# Patient Record
Sex: MALE | Race: BLACK OR AFRICAN AMERICAN | NOT HISPANIC OR LATINO
[De-identification: names, ages, dates, MRNs, and addresses within clinical notes are randomized per-mention and may not be internally consistent; named-entity substitution may affect disease eponyms.]

---

## 2014-05-20 RX ORDER — LISINOPRIL 2.5 MG/1
1 TABLET ORAL
Qty: 0 | Refills: 0 | COMMUNITY
Start: 2014-05-20

## 2017-02-15 ENCOUNTER — APPOINTMENT (OUTPATIENT)
Dept: INTERNAL MEDICINE | Facility: CLINIC | Age: 62
End: 2017-02-15

## 2017-02-28 ENCOUNTER — MEDICATION RENEWAL (OUTPATIENT)
Age: 62
End: 2017-02-28

## 2017-04-12 ENCOUNTER — APPOINTMENT (OUTPATIENT)
Dept: INTERNAL MEDICINE | Facility: CLINIC | Age: 62
End: 2017-04-12

## 2017-04-12 DIAGNOSIS — M79.673 PAIN IN UNSPECIFIED FOOT: ICD-10-CM

## 2017-04-12 DIAGNOSIS — Z78.9 OTHER SPECIFIED HEALTH STATUS: ICD-10-CM

## 2017-04-13 ENCOUNTER — OTHER (OUTPATIENT)
Age: 62
End: 2017-04-13

## 2017-04-13 VITALS
BODY MASS INDEX: 30.54 KG/M2 | HEART RATE: 60 BPM | DIASTOLIC BLOOD PRESSURE: 60 MMHG | WEIGHT: 219 LBS | RESPIRATION RATE: 14 BRPM | SYSTOLIC BLOOD PRESSURE: 118 MMHG

## 2017-04-13 PROBLEM — M79.673 FOOT PAIN: Status: RESOLVED | Noted: 2017-02-28 | Resolved: 2017-04-13

## 2017-04-13 LAB
25(OH)D3 SERPL-MCNC: 25.1 NG/ML
ALBUMIN SERPL ELPH-MCNC: 3.9 G/DL
ALP BLD-CCNC: 82 U/L
ALT SERPL-CCNC: 12 U/L
ANION GAP SERPL CALC-SCNC: 18 MMOL/L
AST SERPL-CCNC: 15 U/L
BILIRUB SERPL-MCNC: 0.5 MG/DL
BUN SERPL-MCNC: 22 MG/DL
CALCIUM SERPL-MCNC: 9.2 MG/DL
CHLORIDE SERPL-SCNC: 110 MMOL/L
CHOLEST SERPL-MCNC: 132 MG/DL
CHOLEST/HDLC SERPL: 2.2 RATIO
CO2 SERPL-SCNC: 19 MMOL/L
CREAT SERPL-MCNC: 1.84 MG/DL
GLUCOSE SERPL-MCNC: 88 MG/DL
HBA1C MFR BLD HPLC: 6.5 %
HDLC SERPL-MCNC: 61 MG/DL
LDLC SERPL CALC-MCNC: 51 MG/DL
POTASSIUM SERPL-SCNC: 3.6 MMOL/L
PROT SERPL-MCNC: 6.8 G/DL
PSA FREE FLD-MCNC: 25.6 %
PSA FREE SERPL-MCNC: 2.44 NG/ML
PSA SERPL-MCNC: 9.52 NG/ML
SODIUM SERPL-SCNC: 147 MMOL/L
TRIGL SERPL-MCNC: 100 MG/DL
URATE SERPL-MCNC: 11.5 MG/DL

## 2017-05-03 ENCOUNTER — RX RENEWAL (OUTPATIENT)
Age: 62
End: 2017-05-03

## 2017-06-24 ENCOUNTER — RX RENEWAL (OUTPATIENT)
Age: 62
End: 2017-06-24

## 2017-07-12 ENCOUNTER — APPOINTMENT (OUTPATIENT)
Dept: INTERNAL MEDICINE | Facility: CLINIC | Age: 62
End: 2017-07-12

## 2017-07-13 ENCOUNTER — APPOINTMENT (OUTPATIENT)
Dept: UROLOGY | Facility: CLINIC | Age: 62
End: 2017-07-13

## 2017-07-13 VITALS
HEIGHT: 70 IN | HEART RATE: 84 BPM | SYSTOLIC BLOOD PRESSURE: 133 MMHG | WEIGHT: 222 LBS | RESPIRATION RATE: 16 BRPM | BODY MASS INDEX: 31.78 KG/M2 | DIASTOLIC BLOOD PRESSURE: 90 MMHG

## 2017-07-15 LAB — BACTERIA UR CULT: NORMAL

## 2017-07-24 ENCOUNTER — MEDICATION RENEWAL (OUTPATIENT)
Age: 62
End: 2017-07-24

## 2017-08-10 ENCOUNTER — APPOINTMENT (OUTPATIENT)
Dept: UROLOGY | Facility: CLINIC | Age: 62
End: 2017-08-10
Payer: COMMERCIAL

## 2017-08-10 PROCEDURE — 99213 OFFICE O/P EST LOW 20 MIN: CPT

## 2017-08-16 LAB
ANION GAP SERPL CALC-SCNC: 17 MMOL/L
BACTERIA UR CULT: NORMAL
BUN SERPL-MCNC: 32 MG/DL
CALCIUM SERPL-MCNC: 9.8 MG/DL
CHLORIDE SERPL-SCNC: 104 MMOL/L
CO2 SERPL-SCNC: 22 MMOL/L
CREAT SERPL-MCNC: 1.93 MG/DL
GLUCOSE SERPL-MCNC: 108 MG/DL
POTASSIUM SERPL-SCNC: 5.1 MMOL/L
PSA FREE FLD-MCNC: 25.1
PSA FREE SERPL-MCNC: 2.63 NG/ML
PSA SERPL-MCNC: 10.47 NG/ML
SODIUM SERPL-SCNC: 143 MMOL/L

## 2017-08-22 ENCOUNTER — APPOINTMENT (OUTPATIENT)
Dept: INTERNAL MEDICINE | Facility: CLINIC | Age: 62
End: 2017-08-22

## 2017-08-30 ENCOUNTER — APPOINTMENT (OUTPATIENT)
Dept: INTERNAL MEDICINE | Facility: CLINIC | Age: 62
End: 2017-08-30
Payer: COMMERCIAL

## 2017-08-30 DIAGNOSIS — M25.511 PAIN IN RIGHT SHOULDER: ICD-10-CM

## 2017-08-30 PROCEDURE — 99213 OFFICE O/P EST LOW 20 MIN: CPT

## 2017-08-31 LAB — URATE SERPL-MCNC: 11.2 MG/DL

## 2017-09-19 ENCOUNTER — MESSAGE (OUTPATIENT)
Age: 62
End: 2017-09-19

## 2017-09-21 ENCOUNTER — APPOINTMENT (OUTPATIENT)
Dept: UROLOGY | Facility: CLINIC | Age: 62
End: 2017-09-21
Payer: COMMERCIAL

## 2017-09-21 ENCOUNTER — OUTPATIENT (OUTPATIENT)
Dept: OUTPATIENT SERVICES | Facility: HOSPITAL | Age: 62
LOS: 1 days | End: 2017-09-21
Payer: COMMERCIAL

## 2017-09-21 VITALS
DIASTOLIC BLOOD PRESSURE: 72 MMHG | HEART RATE: 54 BPM | SYSTOLIC BLOOD PRESSURE: 132 MMHG | TEMPERATURE: 98.3 F | RESPIRATION RATE: 16 BRPM

## 2017-09-21 DIAGNOSIS — R35.0 FREQUENCY OF MICTURITION: ICD-10-CM

## 2017-09-21 DIAGNOSIS — Z95.9 PRESENCE OF CARDIAC AND VASCULAR IMPLANT AND GRAFT, UNSPECIFIED: Chronic | ICD-10-CM

## 2017-09-21 PROCEDURE — 55700: CPT

## 2017-09-21 PROCEDURE — 76942 ECHO GUIDE FOR BIOPSY: CPT | Mod: 26,59

## 2017-09-21 PROCEDURE — 76872 US TRANSRECTAL: CPT | Mod: 26

## 2017-09-21 PROCEDURE — 76872 US TRANSRECTAL: CPT

## 2017-09-21 PROCEDURE — 76942 ECHO GUIDE FOR BIOPSY: CPT | Mod: 59

## 2017-09-23 LAB — CORE LAB BIOPSY: NORMAL

## 2017-09-26 DIAGNOSIS — R97.20 ELEVATED PROSTATE SPECIFIC ANTIGEN [PSA]: ICD-10-CM

## 2017-10-26 ENCOUNTER — MEDICATION RENEWAL (OUTPATIENT)
Age: 62
End: 2017-10-26

## 2017-11-01 ENCOUNTER — APPOINTMENT (OUTPATIENT)
Dept: INTERNAL MEDICINE | Facility: CLINIC | Age: 62
End: 2017-11-01
Payer: COMMERCIAL

## 2017-11-01 PROCEDURE — 99213 OFFICE O/P EST LOW 20 MIN: CPT

## 2017-11-10 ENCOUNTER — MED ADMIN CHARGE (OUTPATIENT)
Age: 62
End: 2017-11-10

## 2017-12-08 ENCOUNTER — APPOINTMENT (OUTPATIENT)
Dept: MRI IMAGING | Facility: CLINIC | Age: 62
End: 2017-12-08
Payer: COMMERCIAL

## 2017-12-08 ENCOUNTER — OUTPATIENT (OUTPATIENT)
Dept: OUTPATIENT SERVICES | Facility: HOSPITAL | Age: 62
LOS: 1 days | End: 2017-12-08
Payer: COMMERCIAL

## 2017-12-08 DIAGNOSIS — Z95.9 PRESENCE OF CARDIAC AND VASCULAR IMPLANT AND GRAFT, UNSPECIFIED: Chronic | ICD-10-CM

## 2017-12-08 DIAGNOSIS — Z00.8 ENCOUNTER FOR OTHER GENERAL EXAMINATION: ICD-10-CM

## 2017-12-08 PROCEDURE — 73221 MRI JOINT UPR EXTREM W/O DYE: CPT | Mod: 26,RT

## 2017-12-08 PROCEDURE — 73221 MRI JOINT UPR EXTREM W/O DYE: CPT

## 2017-12-10 ENCOUNTER — RX RENEWAL (OUTPATIENT)
Age: 62
End: 2017-12-10

## 2018-01-05 ENCOUNTER — RX RENEWAL (OUTPATIENT)
Age: 63
End: 2018-01-05

## 2018-01-30 ENCOUNTER — MEDICATION RENEWAL (OUTPATIENT)
Age: 63
End: 2018-01-30

## 2018-01-31 ENCOUNTER — NON-APPOINTMENT (OUTPATIENT)
Age: 63
End: 2018-01-31

## 2018-01-31 ENCOUNTER — APPOINTMENT (OUTPATIENT)
Dept: ELECTROPHYSIOLOGY | Facility: CLINIC | Age: 63
End: 2018-01-31
Payer: COMMERCIAL

## 2018-01-31 VITALS
WEIGHT: 217 LBS | SYSTOLIC BLOOD PRESSURE: 137 MMHG | BODY MASS INDEX: 31.07 KG/M2 | DIASTOLIC BLOOD PRESSURE: 89 MMHG | HEART RATE: 64 BPM | HEIGHT: 70 IN | OXYGEN SATURATION: 100 %

## 2018-01-31 PROCEDURE — 99215 OFFICE O/P EST HI 40 MIN: CPT

## 2018-01-31 PROCEDURE — 93000 ELECTROCARDIOGRAM COMPLETE: CPT

## 2018-01-31 RX ORDER — APIXABAN 5 MG/1
5 TABLET, FILM COATED ORAL
Qty: 180 | Refills: 0 | Status: COMPLETED | COMMUNITY
Start: 2017-11-10

## 2018-01-31 RX ORDER — APIXABAN 2.5 MG/1
2.5 TABLET, FILM COATED ORAL
Qty: 180 | Refills: 3 | Status: DISCONTINUED | COMMUNITY
Start: 2018-01-31 | End: 2018-01-31

## 2018-01-31 RX ORDER — NAPROXEN 500 MG/1
500 TABLET ORAL
Qty: 60 | Refills: 1 | Status: DISCONTINUED | COMMUNITY
Start: 2017-08-30 | End: 2018-01-31

## 2018-01-31 RX ORDER — LISINOPRIL 10 MG/1
10 TABLET ORAL
Qty: 30 | Refills: 0 | Status: COMPLETED | COMMUNITY
Start: 2017-12-26

## 2018-01-31 RX ORDER — ALLOPURINOL 100 MG/1
100 TABLET ORAL DAILY
Qty: 90 | Refills: 3 | Status: DISCONTINUED | COMMUNITY
Start: 2017-08-31 | End: 2018-01-31

## 2018-01-31 RX ORDER — ALLOPURINOL 100 MG/1
100 TABLET ORAL DAILY
Qty: 90 | Refills: 3 | Status: DISCONTINUED | COMMUNITY
Start: 2017-07-12 | End: 2018-01-31

## 2018-01-31 RX ORDER — CEFDINIR 300 MG/1
300 CAPSULE ORAL
Qty: 14 | Refills: 0 | Status: COMPLETED | COMMUNITY
Start: 2017-03-07

## 2018-02-13 ENCOUNTER — OUTPATIENT (OUTPATIENT)
Dept: OUTPATIENT SERVICES | Facility: HOSPITAL | Age: 63
LOS: 1 days | End: 2018-02-13
Payer: COMMERCIAL

## 2018-02-13 ENCOUNTER — APPOINTMENT (OUTPATIENT)
Dept: CV DIAGNOSITCS | Facility: HOSPITAL | Age: 63
End: 2018-02-13

## 2018-02-13 VITALS
DIASTOLIC BLOOD PRESSURE: 96 MMHG | WEIGHT: 216.93 LBS | TEMPERATURE: 98 F | HEART RATE: 58 BPM | RESPIRATION RATE: 18 BRPM | SYSTOLIC BLOOD PRESSURE: 154 MMHG | OXYGEN SATURATION: 100 % | HEIGHT: 71 IN

## 2018-02-13 DIAGNOSIS — Z95.9 PRESENCE OF CARDIAC AND VASCULAR IMPLANT AND GRAFT, UNSPECIFIED: Chronic | ICD-10-CM

## 2018-02-13 DIAGNOSIS — I48.91 UNSPECIFIED ATRIAL FIBRILLATION: ICD-10-CM

## 2018-02-13 LAB
ALBUMIN SERPL ELPH-MCNC: 3.7 G/DL — SIGNIFICANT CHANGE UP (ref 3.3–5)
ALP SERPL-CCNC: 78 U/L — SIGNIFICANT CHANGE UP (ref 40–120)
ALT FLD-CCNC: 10 U/L RC — SIGNIFICANT CHANGE UP (ref 10–45)
ANION GAP SERPL CALC-SCNC: 13 MMOL/L — SIGNIFICANT CHANGE UP (ref 5–17)
APTT BLD: 33.6 SEC — SIGNIFICANT CHANGE UP (ref 27.5–37.4)
AST SERPL-CCNC: 14 U/L — SIGNIFICANT CHANGE UP (ref 10–40)
BILIRUB SERPL-MCNC: 0.8 MG/DL — SIGNIFICANT CHANGE UP (ref 0.2–1.2)
BUN SERPL-MCNC: 22 MG/DL — SIGNIFICANT CHANGE UP (ref 7–23)
CALCIUM SERPL-MCNC: 9.5 MG/DL — SIGNIFICANT CHANGE UP (ref 8.4–10.5)
CHLORIDE SERPL-SCNC: 103 MMOL/L — SIGNIFICANT CHANGE UP (ref 96–108)
CO2 SERPL-SCNC: 28 MMOL/L — SIGNIFICANT CHANGE UP (ref 22–31)
CREAT SERPL-MCNC: 1.54 MG/DL — HIGH (ref 0.5–1.3)
GLUCOSE SERPL-MCNC: 76 MG/DL — SIGNIFICANT CHANGE UP (ref 70–99)
HCT VFR BLD CALC: 38.9 % — LOW (ref 39–50)
HGB BLD-MCNC: 12.6 G/DL — LOW (ref 13–17)
INR BLD: 1.44 RATIO — HIGH (ref 0.88–1.16)
MCHC RBC-ENTMCNC: 20.7 PG — LOW (ref 27–34)
MCHC RBC-ENTMCNC: 32.3 GM/DL — SIGNIFICANT CHANGE UP (ref 32–36)
MCV RBC AUTO: 64.2 FL — LOW (ref 80–100)
PLATELET # BLD AUTO: 243 K/UL — SIGNIFICANT CHANGE UP (ref 150–400)
POTASSIUM SERPL-MCNC: 3.8 MMOL/L — SIGNIFICANT CHANGE UP (ref 3.5–5.3)
POTASSIUM SERPL-SCNC: 3.8 MMOL/L — SIGNIFICANT CHANGE UP (ref 3.5–5.3)
PROT SERPL-MCNC: 7 G/DL — SIGNIFICANT CHANGE UP (ref 6–8.3)
PROTHROM AB SERPL-ACNC: 15.7 SEC — HIGH (ref 9.8–12.7)
RBC # BLD: 6.06 M/UL — HIGH (ref 4.2–5.8)
RBC # FLD: 15.9 % — HIGH (ref 10.3–14.5)
SODIUM SERPL-SCNC: 144 MMOL/L — SIGNIFICANT CHANGE UP (ref 135–145)
WBC # BLD: 14.7 K/UL — HIGH (ref 3.8–10.5)
WBC # FLD AUTO: 14.7 K/UL — HIGH (ref 3.8–10.5)

## 2018-02-13 PROCEDURE — 85730 THROMBOPLASTIN TIME PARTIAL: CPT

## 2018-02-13 PROCEDURE — 93010 ELECTROCARDIOGRAM REPORT: CPT | Mod: 76

## 2018-02-13 PROCEDURE — 93005 ELECTROCARDIOGRAM TRACING: CPT

## 2018-02-13 PROCEDURE — 80053 COMPREHEN METABOLIC PANEL: CPT

## 2018-02-13 PROCEDURE — 93306 TTE W/DOPPLER COMPLETE: CPT

## 2018-02-13 PROCEDURE — 85027 COMPLETE CBC AUTOMATED: CPT

## 2018-02-13 PROCEDURE — 93312 ECHO TRANSESOPHAGEAL: CPT | Mod: 26

## 2018-02-13 PROCEDURE — 93306 TTE W/DOPPLER COMPLETE: CPT | Mod: 26

## 2018-02-13 PROCEDURE — 93312 ECHO TRANSESOPHAGEAL: CPT

## 2018-02-13 PROCEDURE — 85610 PROTHROMBIN TIME: CPT

## 2018-02-13 NOTE — H&P CARDIOLOGY - PSH
History of Arthroscopy- ankle  left ankle 2003 s/p "something fell on it"  S/P angioplasty with stent    S/P Arthroscopic Surgery of Left Knee  2001 injury- hit knee on desk

## 2018-02-13 NOTE — H&P CARDIOLOGY - PMH
Arthritis  L arm and hands  Atrial fibrillation    CAD (coronary artery disease)  1 stent  Childhood Asthma    CKD (chronic kidney disease)    Gout    History of cardioversion    HLD (hyperlipidemia)    HTN - Hypertension    Inguinal Hernia  left  Keloid  multiple on chest and arms

## 2018-02-13 NOTE — H&P CARDIOLOGY - HISTORY OF PRESENT ILLNESS
60 year old male with PMH of HTN, HLD, CAD with prior stent, A Fib on Eliquis with Hx of cardioversion, stage 3 CKD, childhood asthma presents today for A-fib ablation. 62 year old male with PMH of HTN, HLD, CAD with prior stent in 2014, A Fib on Eliquis with Hx of cardioversion, stage 3 CKD, childhood asthma, s/p  A-fib ablation, LV dysfunction with repeat echo 8/2014 showing EF 50%.   Pt presents today for NICOLETTE/DCCV. 62 year old male with PMH of HTN, HLD, CAD with prior stent in 2014, A Fib on Eliquis with Hx of cardioversion, stage 3 CKD, childhood asthma, s/p  A-fib ablation, LV dysfunction with repeat echo 8/2014 showing EF 50%. Pt went into Afib/Aflutter last month.    Pt presents today for NCIOLETTE/DCCV.

## 2018-02-21 ENCOUNTER — TRANSCRIPTION ENCOUNTER (OUTPATIENT)
Age: 63
End: 2018-02-21

## 2018-02-21 ENCOUNTER — INPATIENT (INPATIENT)
Facility: HOSPITAL | Age: 63
LOS: 0 days | Discharge: HOME | DRG: 247 | End: 2018-02-22
Attending: INTERNAL MEDICINE | Admitting: INTERNAL MEDICINE
Payer: COMMERCIAL

## 2018-02-21 VITALS
HEART RATE: 93 BPM | SYSTOLIC BLOOD PRESSURE: 141 MMHG | OXYGEN SATURATION: 99 % | RESPIRATION RATE: 20 BRPM | DIASTOLIC BLOOD PRESSURE: 87 MMHG | TEMPERATURE: 98 F

## 2018-02-21 DIAGNOSIS — R07.9 CHEST PAIN, UNSPECIFIED: ICD-10-CM

## 2018-02-21 DIAGNOSIS — Z95.9 PRESENCE OF CARDIAC AND VASCULAR IMPLANT AND GRAFT, UNSPECIFIED: Chronic | ICD-10-CM

## 2018-02-21 LAB
ALBUMIN SERPL ELPH-MCNC: 3.7 G/DL — SIGNIFICANT CHANGE UP (ref 3.3–5)
ALP SERPL-CCNC: 73 U/L — SIGNIFICANT CHANGE UP (ref 40–120)
ALT FLD-CCNC: 8 U/L RC — LOW (ref 10–45)
ANION GAP SERPL CALC-SCNC: 14 MMOL/L — SIGNIFICANT CHANGE UP (ref 5–17)
APTT BLD: 35.2 SEC — SIGNIFICANT CHANGE UP (ref 27.5–37.4)
AST SERPL-CCNC: 14 U/L — SIGNIFICANT CHANGE UP (ref 10–40)
BASOPHILS # BLD AUTO: 0.1 K/UL — SIGNIFICANT CHANGE UP (ref 0–0.2)
BASOPHILS NFR BLD AUTO: 0.4 % — SIGNIFICANT CHANGE UP (ref 0–2)
BILIRUB SERPL-MCNC: 1 MG/DL — SIGNIFICANT CHANGE UP (ref 0.2–1.2)
BLD GP AB SCN SERPL QL: NEGATIVE — SIGNIFICANT CHANGE UP
BUN SERPL-MCNC: 24 MG/DL — HIGH (ref 7–23)
CALCIUM SERPL-MCNC: 9.8 MG/DL — SIGNIFICANT CHANGE UP (ref 8.4–10.5)
CHLORIDE SERPL-SCNC: 102 MMOL/L — SIGNIFICANT CHANGE UP (ref 96–108)
CK MB CFR SERPL CALC: 1.1 NG/ML — SIGNIFICANT CHANGE UP (ref 0–6.7)
CK SERPL-CCNC: 87 U/L — SIGNIFICANT CHANGE UP (ref 30–200)
CO2 SERPL-SCNC: 25 MMOL/L — SIGNIFICANT CHANGE UP (ref 22–31)
CREAT SERPL-MCNC: 1.66 MG/DL — HIGH (ref 0.5–1.3)
EOSINOPHIL # BLD AUTO: 0 K/UL — SIGNIFICANT CHANGE UP (ref 0–0.5)
EOSINOPHIL NFR BLD AUTO: 0.3 % — SIGNIFICANT CHANGE UP (ref 0–6)
GAS PNL BLDV: SIGNIFICANT CHANGE UP
GLUCOSE SERPL-MCNC: 114 MG/DL — HIGH (ref 70–99)
HCT VFR BLD CALC: 39 % — SIGNIFICANT CHANGE UP (ref 39–50)
HGB BLD-MCNC: 13 G/DL — SIGNIFICANT CHANGE UP (ref 13–17)
INR BLD: 1.38 RATIO — HIGH (ref 0.88–1.16)
LYMPHOCYTES # BLD AUTO: 14.1 % — SIGNIFICANT CHANGE UP (ref 13–44)
LYMPHOCYTES # BLD AUTO: 2.1 K/UL — SIGNIFICANT CHANGE UP (ref 1–3.3)
MCHC RBC-ENTMCNC: 21 PG — LOW (ref 27–34)
MCHC RBC-ENTMCNC: 33.3 GM/DL — SIGNIFICANT CHANGE UP (ref 32–36)
MCV RBC AUTO: 63.2 FL — LOW (ref 80–100)
MONOCYTES # BLD AUTO: 1.4 K/UL — HIGH (ref 0–0.9)
MONOCYTES NFR BLD AUTO: 9.1 % — SIGNIFICANT CHANGE UP (ref 2–14)
NEUTROPHILS # BLD AUTO: 11.5 K/UL — HIGH (ref 1.8–7.4)
NEUTROPHILS NFR BLD AUTO: 76 % — SIGNIFICANT CHANGE UP (ref 43–77)
PLATELET # BLD AUTO: 225 K/UL — SIGNIFICANT CHANGE UP (ref 150–400)
POTASSIUM SERPL-MCNC: 4.3 MMOL/L — SIGNIFICANT CHANGE UP (ref 3.5–5.3)
POTASSIUM SERPL-SCNC: 4.3 MMOL/L — SIGNIFICANT CHANGE UP (ref 3.5–5.3)
PROT SERPL-MCNC: 8.1 G/DL — SIGNIFICANT CHANGE UP (ref 6–8.3)
PROTHROM AB SERPL-ACNC: 15.1 SEC — HIGH (ref 9.8–12.7)
RBC # BLD: 6.18 M/UL — HIGH (ref 4.2–5.8)
RBC # FLD: 15.9 % — HIGH (ref 10.3–14.5)
RH IG SCN BLD-IMP: POSITIVE — SIGNIFICANT CHANGE UP
SODIUM SERPL-SCNC: 141 MMOL/L — SIGNIFICANT CHANGE UP (ref 135–145)
TROPONIN T SERPL-MCNC: 0.01 NG/ML — SIGNIFICANT CHANGE UP (ref 0–0.06)
WBC # BLD: 15.2 K/UL — HIGH (ref 3.8–10.5)
WBC # FLD AUTO: 15.2 K/UL — HIGH (ref 3.8–10.5)

## 2018-02-21 PROCEDURE — 93010 ELECTROCARDIOGRAM REPORT: CPT

## 2018-02-21 PROCEDURE — 71046 X-RAY EXAM CHEST 2 VIEWS: CPT | Mod: 26

## 2018-02-21 PROCEDURE — 99285 EMERGENCY DEPT VISIT HI MDM: CPT | Mod: 25

## 2018-02-21 RX ORDER — ASPIRIN/CALCIUM CARB/MAGNESIUM 324 MG
81 TABLET ORAL DAILY
Qty: 0 | Refills: 0 | Status: DISCONTINUED | OUTPATIENT
Start: 2018-02-21 | End: 2018-02-22

## 2018-02-21 RX ORDER — PANTOPRAZOLE SODIUM 20 MG/1
40 TABLET, DELAYED RELEASE ORAL
Qty: 0 | Refills: 0 | Status: DISCONTINUED | OUTPATIENT
Start: 2018-02-21 | End: 2018-02-22

## 2018-02-21 RX ORDER — CLOPIDOGREL BISULFATE 75 MG/1
1 TABLET, FILM COATED ORAL
Qty: 30 | Refills: 11 | OUTPATIENT
Start: 2018-02-21 | End: 2019-02-15

## 2018-02-21 RX ORDER — APIXABAN 2.5 MG/1
5 TABLET, FILM COATED ORAL EVERY 12 HOURS
Qty: 0 | Refills: 0 | Status: DISCONTINUED | OUTPATIENT
Start: 2018-02-21 | End: 2018-02-22

## 2018-02-21 RX ORDER — CLOPIDOGREL BISULFATE 75 MG/1
75 TABLET, FILM COATED ORAL DAILY
Qty: 0 | Refills: 0 | Status: DISCONTINUED | OUTPATIENT
Start: 2018-02-22 | End: 2018-02-22

## 2018-02-21 RX ORDER — LISINOPRIL 2.5 MG/1
2.5 TABLET ORAL DAILY
Qty: 0 | Refills: 0 | Status: DISCONTINUED | OUTPATIENT
Start: 2018-02-21 | End: 2018-02-22

## 2018-02-21 RX ORDER — AMIODARONE HYDROCHLORIDE 400 MG/1
200 TABLET ORAL
Qty: 0 | Refills: 0 | Status: DISCONTINUED | OUTPATIENT
Start: 2018-02-21 | End: 2018-02-22

## 2018-02-21 RX ORDER — SIMVASTATIN 20 MG/1
40 TABLET, FILM COATED ORAL AT BEDTIME
Qty: 0 | Refills: 0 | Status: DISCONTINUED | OUTPATIENT
Start: 2018-02-21 | End: 2018-02-22

## 2018-02-21 RX ORDER — HYDROCHLOROTHIAZIDE 25 MG
25 TABLET ORAL DAILY
Qty: 0 | Refills: 0 | Status: DISCONTINUED | OUTPATIENT
Start: 2018-02-21 | End: 2018-02-22

## 2018-02-21 RX ORDER — AMIODARONE HYDROCHLORIDE 400 MG/1
1 TABLET ORAL
Qty: 60 | Refills: 2 | OUTPATIENT
Start: 2018-02-21 | End: 2018-05-21

## 2018-02-21 RX ORDER — ASPIRIN/CALCIUM CARB/MAGNESIUM 324 MG
1 TABLET ORAL
Qty: 30 | Refills: 11 | OUTPATIENT
Start: 2018-02-21 | End: 2019-02-15

## 2018-02-21 RX ORDER — METOPROLOL TARTRATE 50 MG
25 TABLET ORAL DAILY
Qty: 0 | Refills: 0 | Status: DISCONTINUED | OUTPATIENT
Start: 2018-02-21 | End: 2018-02-22

## 2018-02-21 RX ADMIN — APIXABAN 5 MILLIGRAM(S): 2.5 TABLET, FILM COATED ORAL at 20:25

## 2018-02-21 RX ADMIN — AMIODARONE HYDROCHLORIDE 200 MILLIGRAM(S): 400 TABLET ORAL at 16:59

## 2018-02-21 RX ADMIN — SIMVASTATIN 40 MILLIGRAM(S): 20 TABLET, FILM COATED ORAL at 20:25

## 2018-02-21 NOTE — ED ADULT NURSE NOTE - OBJECTIVE STATEMENT
Patient presented to ED ambulatory w/daughter c/o palpitations since Saturday on and off, patient also c/o SOB on and off. Patient denies pain. Patient O2 in ED  99/100%. No respiratory distress or WOB noted.

## 2018-02-21 NOTE — DISCHARGE NOTE ADULT - MEDICATION SUMMARY - MEDICATIONS TO TAKE
I will START or STAY ON the medications listed below when I get home from the hospital:    aspirin 81 mg oral delayed release tablet  -- 1 tab(s) by mouth once a day  -- Indication: For To keep stent open     lisinopril 2.5 mg oral tablet  -- 1 tab(s) by mouth once a day  -- Indication: For High blood pressure     amiodarone 200 mg oral tablet  -- 1 tab(s) by mouth 2 times a day.   ******TAKE 2 TABS DAILY UNTIL 02/25/2018 EVENING.   *****THEN 1 TAB DAILY   -- Indication: For Heart rhythm control     Eliquis 2.5 mg oral tablet  -- 1 tab(s) by mouth 2 times a day- LAST DOSE monday morning   -- Indication: For Afib    simvastatin 40 mg oral tablet  -- 1 tab(s) by mouth once a day (at bedtime)  -- Indication: For High cholesterol     clopidogrel 75 mg oral tablet  -- 1 tab(s) by mouth once a day  -- Indication: For To keep stent open     metoprolol succinate 25 mg oral tablet, extended release  -- 1 tab(s) by mouth 2 times a day  -- Indication: For High blood pressure     hydroCHLOROthiazide 25 mg oral tablet  -- 1 tab(s) by mouth once a day  -- Indication: For High blood preesure     pantoprazole 40 mg oral delayed release tablet  -- 1 tab(s) by mouth once a day (before a meal)  -- Indication: For GERD I will START or STAY ON the medications listed below when I get home from the hospital:    aspirin 81 mg oral delayed release tablet  -- 1 tab(s) by mouth once a day  -- Indication: For To keep stent open     lisinopril 2.5 mg oral tablet  -- 1 tab(s) by mouth once a day  -- Indication: For High blood pressure     amiodarone 200 mg oral tablet  -- 2 tab(s) by mouth every 12 hours for 7 doses first dose tonight then 1 tab oral daily starting 2/26/18  -- Indication: For atrial fibrillation    Eliquis 2.5 mg oral tablet  -- 1 tab(s) by mouth 2 times a day- LAST DOSE monday morning   -- Indication: For Afib    simvastatin 40 mg oral tablet  -- 1 tab(s) by mouth once a day (at bedtime)  -- Indication: For High cholesterol     clopidogrel 75 mg oral tablet  -- 1 tab(s) by mouth once a day  -- Indication: For To keep stent open     metoprolol succinate 25 mg oral tablet, extended release  -- 1 tab(s) by mouth 2 times a day MDD:2  -- Indication: For atrial fibrillation    hydroCHLOROthiazide 25 mg oral tablet  -- 1 tab(s) by mouth once a day  -- Indication: For High blood preesure     pantoprazole 40 mg oral delayed release tablet  -- 1 tab(s) by mouth once a day (before a meal)  -- Indication: For GERD

## 2018-02-21 NOTE — CONSULT NOTE ADULT - ASSESSMENT
63 yo male with PMH of HTN, HLD, CAD, Afib on Eliquis, systolic CHF, CKD3, gout, p/w chest pain, now s/p cath:  1. Chest pain, CAD - s/p cath w/PCI, plan per Cardio, c/w ASA, Plavix, statin  2. Syst CHF - appears euvolemic  3. HLD - c/w statin  4. HTN - BP at goal  5. CKD3 - Cr around baseline, monitor post-contrast  6. A fib - c/w Eliquis, Amio per Cardio

## 2018-02-21 NOTE — DISCHARGE NOTE ADULT - HOSPITAL COURSE
This is a 61 yo - American male, former smoker, with PMH of HTN, HLD, CAD w/ stent ( last stent approx 4 years ago), Afib on Eliquis s/p x2 failed cardioversions - last cardioversion a few weeks ago ( last dose on Eliquis on Monday night -as per patient), CHF ( systolic, last EF on ECHO from 02/13/2018 is 25%), CKD3 ( creat 1.66 today, pt states to have seen a Nephrologist in the past but cannot recall his name), Gout.  Presents to [facility] ED w/ s/s oc shortness of breath and chest pain x 3 days.  Symptoms both intermittent occasional, at rest and on exertion; pt unable to verbalize any specific triggers.  Seen by Dr Lee, given symptomatology referred for cardiac cath today w/ possible intervention.  Presently in [facility] awaiting on cath, symptomatic. Pt is now s/p cardiac cath MARIAN x 1 pLAD ( ISR 80%) via right radial artery access. This is a 61 yo - American male, former smoker, with PMH of HTN, HLD, CAD w/ stent ( last stent approx 4 years ago), Afib on Eliquis s/p x2 failed cardioversions - last cardioversion a few weeks ago ( last dose on Eliquis on Monday night -as per patient), CHF ( systolic, last EF on ECHO from 02/13/2018 is 25%), CKD3 ( creat 1.66 today, pt states to have seen a Nephrologist in the past but cannot recall his name), Gout.  Presents to [facility] ED w/ s/s oc shortness of breath and chest pain x 3 days.  Symptoms both intermittent occasional, at rest and on exertion; pt unable to verbalize any specific triggers.  Seen by Dr Lee, given symptomatology referred for cardiac cath today w/ possible intervention.  Presently in [facility] awaiting on cath, symptomatic. Pt is now s/p cardiac cath MARIAN x 1 pLAD ( ISR 80%) via right radial artery access.       63 y/o AA male with pmh of HTN, HLD, CAD with prior stent, AF on Eliquis HFrEF, CKD stage 3 s/p Cath with lunimal LCx, Ramus, RCA disease  but severe instent lesion of the ostial/proximal LAD stent via RRA with NSVT o/n on tele.    - Will change current amio load to 400mg BID x 4 days with first dose now and Amiodarone 200mg daily.    - Increase Toprol XL 25 Daily to BID   - EP consult with Dr. James follow up in 2 weeks as outpt.    - Cardioversion in 1 month   - Cont Eliquis for Atrial Flutter  - CKD stable with Cr trending down  - Discussed with Dr. Lee pt stable for d/c home

## 2018-02-21 NOTE — ED PROVIDER NOTE - OBJECTIVE STATEMENT
62 year old man PMH AFib/apixaban, HTN, HLD gout, CAD/PCI p/w palpitations. States that over the past 2-3 days has had intermittent palpitations and shortness of breath. Associated with mild chest discomfort. No exertional dyspnea, orthopnea, recent infectious symptoms.

## 2018-02-21 NOTE — H&P CARDIOLOGY - HISTORY OF PRESENT ILLNESS
This is a 61 yo - American male, former smoker, with PMH of HTN, HLD, CAD w/ stent ( last stent approx 4 years ago), Afib on Eliquis s/p x2 failed cardioversions - last cardioversion a few weeks ago ( last dose on Eliquis on Monday night -as per patient), CHF ( systolic, last EF on ECHO from 02/13/2018 is 25%), CKD3 ( creat 1.66 today, pt states to have seen a Nephrologist in the past but cannot recall his name), Gout.  Presents to [facility] ED w/ s/s oc shortness of breath and chest pain x 3 days.  Symptoms both intermittent occasional, at rest and on exertion; pt unable to verbalize any specific triggers.  Seen by Dr Lee, given symptomatology referred for cardiac cath today w/ possible intervention.  Presently in [facility] awaiting on cath, symptomatic.       ECHO   Conclusions:  EF 25%  1. Tethered thickened  mitral valve leaflets. Severe  posterolateral mitral regurgitation.  2. Mildly dilated left atrium.  LA volume index = 37 cc/m2.    No left atrial or left atrial appendage thrombus.  Normal left atrial appendage function (velocity= 0.5 m/s).  3. Severe segmental left ventricular systolic dysfunction.  Akinesis of the inferior and inferolateral wall, apex.  Hypokinesis of the remaining segments.  4. Moderate right atrial enlargement.  5. Right ventricular enlargement with decreased right  ventricular systolic function.  6. Estimated pulmonary artery systolic pressure equals 46  mm Hg, assuming right atrial pressure equals 8 mm Hg,  consistent with mild pulmonary pressures.  Reviewed in real time with Dr. James  *** Compared with echocardiogram of 2/27/2016,  there is a  decliine in left ventricular systolic function as well as  development of significant mitral regurgitation.

## 2018-02-21 NOTE — ED PROVIDER NOTE - ATTENDING CONTRIBUTION TO CARE
Attending MD Plaza.  Agree with above.  Pt is a 62 yr old male with PMHx of Afib, HLD, CAD< gout, CKD, arthritis, HTN, reduced EF who presents to ED with complaint of palpitations since last weekend.  Pt in aflutter with variable block.  Dr. Lee (cards) called who is recommending admission to cardiology for planned cath and further eval poss with EP for management.  PT has remote hx of cardioversion.  Pt with stable vital signs. Attending MD Plaza.  Agree with above.  Pt is a 62 yr old male with PMHx of Afib, HLD, CAD< gout, CKD, arthritis, HTN, reduced EF who presents to ED with complaint of palpitations since last weekend.  Pt in aflutter with variable block.  Dr. Lee (cards) called who is recommending admission to cardiology for planned cath and further eval poss with EP for management.  PT has remote hx of cardioversion.  Pt with stable vital signs.  Cath lab calling for report.  PT made aware of plan.  No acute distress at time of admission.

## 2018-02-21 NOTE — DISCHARGE NOTE ADULT - PLAN OF CARE
Pt remains chest pain free and understands post cath discharge instructions No heavy lifting or pushing/pulling with procedure arm for 2 weeks. No driving for 2 days. You may shower 24 hours following the procedure but avoid baths/swimming for 1 week. Check your wrist site for bleeding and/or swelling daily following procedure and call your doctor immediately if it occurs or if you experience increased pain at the site. Follow up with your cardiologist in 1-2 weeks. You may call [facility] if you have any questions/concerns regarding your procedure [phone]. Your LDL cholesterol will be less than 70mg/dL Continue with your cholesterol medications. Eat a heart healthy diet that is low in saturated fats and salt, and includes whole grains, fruits, vegetables and lean protein; exercise regularly (consult with your physician or cardiologist first); maintain a heart healthy weight. Continue to follow with your primary physician or cardiologist for treatment goals, continue medication, have liver function testing every 3 months as anti lipid medications can cause liver irritation. If you smoke - quit (A resource to help you stop smoking is the [facility] – phone number 074-267-4588.). Your heart rate and rhythm will be controlled. Cont amiodarone load as instructed  Continue Eliquis  Follow up with Dr. Lee as scheduled  Follow up with Dr. James in 2 weeks make appointment [phone]

## 2018-02-21 NOTE — DISCHARGE NOTE ADULT - ADDITIONAL INSTRUCTIONS
Follow-up with your Cardiologist in 1-2 weeks Follow-up with your Cardiologist in 1-2 weeks  ***********************Do not stop you Aspirin or Plavix unless instructed to do so by your cardiologist.****************** Follow-up with your Cardiologist as scheduled.  ***********************Do not stop you Aspirin or Plavix unless instructed to do so by your cardiologist.******************  Follow-up with Dr. James, Electrophysiologist in 2 weeks

## 2018-02-21 NOTE — H&P CARDIOLOGY - PMH
Arthritis  L arm and hands  Atrial fibrillation    CAD (coronary artery disease)  1 stent  CHF (congestive heart failure)    Childhood Asthma    CKD (chronic kidney disease)    Gout    History of cardioversion    HLD (hyperlipidemia)    HTN - Hypertension    Inguinal Hernia  left  Keloid  multiple on chest and arms

## 2018-02-21 NOTE — DISCHARGE NOTE ADULT - CARE PLAN
Principal Discharge DX:	CAD (coronary artery disease)  Goal:	Pt remains chest pain free and understands post cath discharge instructions  Assessment and plan of treatment:	No heavy lifting or pushing/pulling with procedure arm for 2 weeks. No driving for 2 days. You may shower 24 hours following the procedure but avoid baths/swimming for 1 week. Check your wrist site for bleeding and/or swelling daily following procedure and call your doctor immediately if it occurs or if you experience increased pain at the site. Follow up with your cardiologist in 1-2 weeks. You may call [facility] if you have any questions/concerns regarding your procedure [phone].  Secondary Diagnosis:	HLD (hyperlipidemia)  Goal:	Your LDL cholesterol will be less than 70mg/dL  Assessment and plan of treatment:	Continue with your cholesterol medications. Eat a heart healthy diet that is low in saturated fats and salt, and includes whole grains, fruits, vegetables and lean protein; exercise regularly (consult with your physician or cardiologist first); maintain a heart healthy weight. Continue to follow with your primary physician or cardiologist for treatment goals, continue medication, have liver function testing every 3 months as anti lipid medications can cause liver irritation. If you smoke - quit (A resource to help you stop smoking is the [facility] – phone number 942-817-8277.). Principal Discharge DX:	CAD (coronary artery disease)  Goal:	Pt remains chest pain free and understands post cath discharge instructions  Assessment and plan of treatment:	No heavy lifting or pushing/pulling with procedure arm for 2 weeks. No driving for 2 days. You may shower 24 hours following the procedure but avoid baths/swimming for 1 week. Check your wrist site for bleeding and/or swelling daily following procedure and call your doctor immediately if it occurs or if you experience increased pain at the site. Follow up with your cardiologist in 1-2 weeks. You may call [facility] if you have any questions/concerns regarding your procedure [phone].  Secondary Diagnosis:	HLD (hyperlipidemia)  Goal:	Your LDL cholesterol will be less than 70mg/dL  Assessment and plan of treatment:	Continue with your cholesterol medications. Eat a heart healthy diet that is low in saturated fats and salt, and includes whole grains, fruits, vegetables and lean protein; exercise regularly (consult with your physician or cardiologist first); maintain a heart healthy weight. Continue to follow with your primary physician or cardiologist for treatment goals, continue medication, have liver function testing every 3 months as anti lipid medications can cause liver irritation. If you smoke - quit (A resource to help you stop smoking is the [facility] – phone number 792-480-7109.).  Secondary Diagnosis:	Atrial fibrillation, unspecified type  Goal:	Your heart rate and rhythm will be controlled.  Assessment and plan of treatment:	Cont amiodarone load as instructed  Continue Eliquis  Follow up with Dr. Lee as scheduled  Follow up with Dr. James in 2 weeks make appointment [phone]

## 2018-02-21 NOTE — CONSULT NOTE ADULT - SUBJECTIVE AND OBJECTIVE BOX
HPI: This is a 63 yo - American male, former smoker, with PMH of HTN, HLD, CAD w/ stent ( last stent approx 4 years ago), Afib on Eliquis s/p x2 failed cardioversions - last cardioversion a few weeks ago ( last dose on Eliquis on Monday night -as per patient), CHF ( systolic, last EF on ECHO from 02/13/2018 is 25%), CKD3 ( creat 1.66 today, pt states to have seen a Nephrologist in the past but cannot recall his name), Gout.  Presents to [facility] ED w/ s/s oc shortness of breath and chest pain x 3 days.  Symptoms both intermittent occasional, at rest and on exertion; pt unable to verbalize any specific triggers.  Currently s/p cath, feeling well.      Allergies:  No Known Allergies      PAST MEDICAL & SURGICAL HISTORY:  CHF (congestive heart failure)  History of cardioversion  Atrial fibrillation  HLD (hyperlipidemia)  CAD (coronary artery disease): 1 stent  Gout  CKD (chronic kidney disease)  Arthritis: L arm and hands  Keloid: multiple on chest and arms    Childhood Asthma  Inguinal Hernia: left  HTN - Hypertension  S/P angioplasty with stent  History of Arthroscopy- ankle: left ankle 2003 s/p &quot;something fell on it&quot;  S/P Arthroscopic Surgery of Left Knee: 2001 injury- hit knee on desk      FAMILY HISTORY:  Family history of diabetes mellitus (Mother)  Family history of hypertension (Mother)      REVIEW OF SYSTEMS:  CONSTITUTIONAL: No fever, weight loss, or fatigue  EYES: No eye pain, visual disturbances, or discharge  NECK: No pain or stiffness  RESPIRATORY: No cough or wheezing, + shortness of breath  CARDIOVASCULAR: + chest pain, palpitations, dizziness, or leg swelling  GASTROINTESTINAL: No abdominal or epigastric pain. No nausea, vomiting, diarrhea or constipation  GENITOURINARY: No dysuria, urinary frequency or urgency, no hematuria  NEUROLOGICAL: No headaches, memory loss, loss of strength, numbness, or tremors  SKIN: No itching, burning, rashes, or lesions   MUSCULOSKELETAL: No joint pain or swelling; No muscle, back, or extremity pain    Medications:  MEDICATIONS  (STANDING):  amiodarone    Tablet 200 milliGRAM(s) Oral two times a day  apixaban 5 milliGRAM(s) Oral every 12 hours  aspirin enteric coated 81 milliGRAM(s) Oral daily  hydrochlorothiazide 25 milliGRAM(s) Oral daily  lisinopril 2.5 milliGRAM(s) Oral daily  metoprolol succinate ER 25 milliGRAM(s) Oral daily  pantoprazole    Tablet 40 milliGRAM(s) Oral before breakfast  simvastatin 40 milliGRAM(s) Oral at bedtime    MEDICATIONS  (PRN):    	    PHYSICAL EXAM:  T(C): 36.7 (02-21-18 @ 19:52), Max: 37 (02-21-18 @ 08:59)  HR: 85 (02-21-18 @ 19:52) (66 - 112)  BP: 114/83 (02-21-18 @ 19:52) (105/95 - 144/103)  RR: 16 (02-21-18 @ 19:52) (16 - 20)  SpO2: 98% (02-21-18 @ 19:52) (95% - 100%)  Wt(kg): --  I&O's Summary    21 Feb 2018 07:01  -  21 Feb 2018 21:10  --------------------------------------------------------  IN: 240 mL / OUT: 0 mL / NET: 240 mL        Appearance: Normal	  HEENT:   NCAT, PERRL, EOMI	  Lymphatic: No lymphadenopathy  Cardiovascular: Normal S1 S2, RRR  Respiratory: Lungs clear to auscultation BL  Psychiatry: A & O x 3, Mood & affect appropriate  Gastrointestinal:  Soft, Non-tender, + BS  Skin: No rashes, No ecchymoses, No cyanosis	  Neurologic: Non-focal  Extremities: Normal range of motion, No clubbing, cyanosis or edema    	  LABS:	 	    CARDIAC MARKERS:  CARDIAC MARKERS ( 21 Feb 2018 07:21 )  x     / 0.01 ng/mL / 87 U/L / x     / 1.1 ng/mL                                13.0   15.2  )-----------( 225      ( 21 Feb 2018 07:21 )             39.0     02-21    141  |  102  |  24<H>  ----------------------------<  114<H>  4.3   |  25  |  1.66<H>    Ca    9.8      21 Feb 2018 07:21    TPro  8.1  /  Alb  3.7  /  TBili  1.0  /  DBili  x   /  AST  14  /  ALT  8<L>  /  AlkPhos  73  02-21    proBNP:   Lipid Profile:   HgA1c:   TSH:

## 2018-02-21 NOTE — H&P CARDIOLOGY - ATTENDING COMMENTS
Patient seen and examined.  Agree with above NP note.  pt with history of severe LV dysfunction, history of PCI, with recent progressive chest pain and increased dyspnea with class III HF symptoms  cath performed for above  + severe instent restenosis of lad stent   s/p pci to proximal lad  asa, plavix withg eliquis restarted  will attempt to restore sinus rhythm with amio and eventual ablation if needed to further improve lv fxn, dec lv dilatation with hopeful improvement in mr burden

## 2018-02-21 NOTE — DISCHARGE NOTE ADULT - CARE PROVIDER_API CALL
Pernell Lee (MD), Cardiovascular Disease; Internal Medicine; Interventional Cardiology; Nuclear Cardiology  [address]  [place] [address]  Phone: [phone]  Fax: [phone] Pernell Lee (MD), Cardiovascular Disease; Internal Medicine; Interventional Cardiology; Nuclear Cardiology  [address]  [place] [address]  Phone: [phone]  Fax: [phone]    Ephraim James), Internal Medicine  [address], [place] [id number]  Phone: [phone]  Fax: [phone]

## 2018-02-21 NOTE — DISCHARGE NOTE ADULT - PATIENT PORTAL LINK FT
You can access the [facility], offered by [facility], by registering with the following website: [email or web address]

## 2018-02-21 NOTE — DISCHARGE NOTE ADULT - CARE PROVIDERS DIRECT ADDRESSES
,DirectAddress_Unknown ,DirectAddress_Unknown,[email or web address]

## 2018-02-21 NOTE — CHART NOTE - NSCHARTNOTEFT_GEN_A_CORE
Patient underwent a PCI procedure and is being admitted as they are at increased risk for major adverse cardiac and vascular events if discharged due to the following high risk characteristics:  ACS, acute systolic heart failure , major criteria unprotected LM , bifurcation lesion.

## 2018-02-21 NOTE — ED PROVIDER NOTE - PROGRESS NOTE DETAILS
discussed with pt's cardiologist would like admission to his service for work up of cardiomyopathy discovered outpt, will admit after labs result

## 2018-02-22 VITALS
OXYGEN SATURATION: 99 % | HEART RATE: 84 BPM | RESPIRATION RATE: 17 BRPM | TEMPERATURE: 98 F | DIASTOLIC BLOOD PRESSURE: 82 MMHG | SYSTOLIC BLOOD PRESSURE: 117 MMHG

## 2018-02-22 LAB
ANION GAP SERPL CALC-SCNC: 13 MMOL/L — SIGNIFICANT CHANGE UP (ref 5–17)
BUN SERPL-MCNC: 23 MG/DL — SIGNIFICANT CHANGE UP (ref 7–23)
CALCIUM SERPL-MCNC: 9.1 MG/DL — SIGNIFICANT CHANGE UP (ref 8.4–10.5)
CHLORIDE SERPL-SCNC: 102 MMOL/L — SIGNIFICANT CHANGE UP (ref 96–108)
CO2 SERPL-SCNC: 24 MMOL/L — SIGNIFICANT CHANGE UP (ref 22–31)
CREAT SERPL-MCNC: 1.55 MG/DL — HIGH (ref 0.5–1.3)
GLUCOSE SERPL-MCNC: 85 MG/DL — SIGNIFICANT CHANGE UP (ref 70–99)
HCT VFR BLD CALC: 41.2 % — SIGNIFICANT CHANGE UP (ref 39–50)
HGB BLD-MCNC: 12.7 G/DL — LOW (ref 13–17)
MAGNESIUM SERPL-MCNC: 2.3 MG/DL — SIGNIFICANT CHANGE UP (ref 1.6–2.6)
MCHC RBC-ENTMCNC: 19.8 PG — LOW (ref 27–34)
MCHC RBC-ENTMCNC: 31 GM/DL — LOW (ref 32–36)
MCV RBC AUTO: 63.9 FL — LOW (ref 80–100)
PLATELET # BLD AUTO: 221 K/UL — SIGNIFICANT CHANGE UP (ref 150–400)
POTASSIUM SERPL-MCNC: 3.7 MMOL/L — SIGNIFICANT CHANGE UP (ref 3.5–5.3)
POTASSIUM SERPL-SCNC: 3.7 MMOL/L — SIGNIFICANT CHANGE UP (ref 3.5–5.3)
RBC # BLD: 6.44 M/UL — HIGH (ref 4.2–5.8)
RBC # FLD: 16.1 % — HIGH (ref 10.3–14.5)
SODIUM SERPL-SCNC: 139 MMOL/L — SIGNIFICANT CHANGE UP (ref 135–145)
TSH SERPL-MCNC: 1.05 UIU/ML — SIGNIFICANT CHANGE UP (ref 0.27–4.2)
WBC # BLD: 10.5 K/UL — SIGNIFICANT CHANGE UP (ref 3.8–10.5)
WBC # FLD AUTO: 10.5 K/UL — SIGNIFICANT CHANGE UP (ref 3.8–10.5)

## 2018-02-22 PROCEDURE — 99152 MOD SED SAME PHYS/QHP 5/>YRS: CPT

## 2018-02-22 PROCEDURE — 86900 BLOOD TYPING SEROLOGIC ABO: CPT

## 2018-02-22 PROCEDURE — 85730 THROMBOPLASTIN TIME PARTIAL: CPT

## 2018-02-22 PROCEDURE — C9600: CPT | Mod: LD

## 2018-02-22 PROCEDURE — 82553 CREATINE MB FRACTION: CPT

## 2018-02-22 PROCEDURE — 71046 X-RAY EXAM CHEST 2 VIEWS: CPT

## 2018-02-22 PROCEDURE — 82803 BLOOD GASES ANY COMBINATION: CPT

## 2018-02-22 PROCEDURE — 93005 ELECTROCARDIOGRAM TRACING: CPT

## 2018-02-22 PROCEDURE — 99285 EMERGENCY DEPT VISIT HI MDM: CPT | Mod: 25

## 2018-02-22 PROCEDURE — 84132 ASSAY OF SERUM POTASSIUM: CPT

## 2018-02-22 PROCEDURE — 93454 CORONARY ARTERY ANGIO S&I: CPT | Mod: 59

## 2018-02-22 PROCEDURE — 85610 PROTHROMBIN TIME: CPT

## 2018-02-22 PROCEDURE — 84484 ASSAY OF TROPONIN QUANT: CPT

## 2018-02-22 PROCEDURE — C1725: CPT

## 2018-02-22 PROCEDURE — 84443 ASSAY THYROID STIM HORMONE: CPT

## 2018-02-22 PROCEDURE — 80053 COMPREHEN METABOLIC PANEL: CPT

## 2018-02-22 PROCEDURE — 82947 ASSAY GLUCOSE BLOOD QUANT: CPT

## 2018-02-22 PROCEDURE — 80048 BASIC METABOLIC PNL TOTAL CA: CPT

## 2018-02-22 PROCEDURE — 82550 ASSAY OF CK (CPK): CPT

## 2018-02-22 PROCEDURE — 86901 BLOOD TYPING SEROLOGIC RH(D): CPT

## 2018-02-22 PROCEDURE — 82330 ASSAY OF CALCIUM: CPT

## 2018-02-22 PROCEDURE — 86850 RBC ANTIBODY SCREEN: CPT

## 2018-02-22 PROCEDURE — C1894: CPT

## 2018-02-22 PROCEDURE — 85014 HEMATOCRIT: CPT

## 2018-02-22 PROCEDURE — C1874: CPT

## 2018-02-22 PROCEDURE — 82435 ASSAY OF BLOOD CHLORIDE: CPT

## 2018-02-22 PROCEDURE — 99153 MOD SED SAME PHYS/QHP EA: CPT

## 2018-02-22 PROCEDURE — 85027 COMPLETE CBC AUTOMATED: CPT

## 2018-02-22 PROCEDURE — 83605 ASSAY OF LACTIC ACID: CPT

## 2018-02-22 PROCEDURE — C1769: CPT

## 2018-02-22 PROCEDURE — 83735 ASSAY OF MAGNESIUM: CPT

## 2018-02-22 PROCEDURE — C1887: CPT

## 2018-02-22 PROCEDURE — 84295 ASSAY OF SERUM SODIUM: CPT

## 2018-02-22 RX ORDER — AMIODARONE HYDROCHLORIDE 400 MG/1
400 TABLET ORAL EVERY 8 HOURS
Qty: 0 | Refills: 0 | Status: DISCONTINUED | OUTPATIENT
Start: 2018-02-22 | End: 2018-02-22

## 2018-02-22 RX ORDER — METOPROLOL TARTRATE 50 MG
1 TABLET ORAL
Qty: 60 | Refills: 0 | OUTPATIENT
Start: 2018-02-22 | End: 2018-03-23

## 2018-02-22 RX ORDER — METOPROLOL TARTRATE 50 MG
25 TABLET ORAL EVERY 12 HOURS
Qty: 0 | Refills: 0 | Status: DISCONTINUED | OUTPATIENT
Start: 2018-02-22 | End: 2018-02-22

## 2018-02-22 RX ORDER — METOPROLOL TARTRATE 50 MG
1 TABLET ORAL
Qty: 0 | Refills: 0 | COMMUNITY

## 2018-02-22 RX ORDER — AMIODARONE HYDROCHLORIDE 400 MG/1
400 TABLET ORAL EVERY 12 HOURS
Qty: 0 | Refills: 0 | Status: DISCONTINUED | OUTPATIENT
Start: 2018-02-22 | End: 2018-02-22

## 2018-02-22 RX ORDER — POTASSIUM CHLORIDE 20 MEQ
40 PACKET (EA) ORAL ONCE
Qty: 0 | Refills: 0 | Status: COMPLETED | OUTPATIENT
Start: 2018-02-22 | End: 2018-02-22

## 2018-02-22 RX ORDER — AMIODARONE HYDROCHLORIDE 400 MG/1
200 TABLET ORAL DAILY
Qty: 0 | Refills: 0 | Status: CANCELLED | OUTPATIENT
Start: 2018-02-26 | End: 2018-02-22

## 2018-02-22 RX ORDER — AMIODARONE HYDROCHLORIDE 400 MG/1
2 TABLET ORAL
Qty: 120 | Refills: 0 | OUTPATIENT
Start: 2018-02-22 | End: 2018-03-23

## 2018-02-22 RX ADMIN — APIXABAN 5 MILLIGRAM(S): 2.5 TABLET, FILM COATED ORAL at 08:27

## 2018-02-22 RX ADMIN — Medication 25 MILLIGRAM(S): at 04:47

## 2018-02-22 RX ADMIN — CLOPIDOGREL BISULFATE 75 MILLIGRAM(S): 75 TABLET, FILM COATED ORAL at 04:48

## 2018-02-22 RX ADMIN — PANTOPRAZOLE SODIUM 40 MILLIGRAM(S): 20 TABLET, DELAYED RELEASE ORAL at 04:47

## 2018-02-22 RX ADMIN — AMIODARONE HYDROCHLORIDE 400 MILLIGRAM(S): 400 TABLET ORAL at 10:57

## 2018-02-22 RX ADMIN — Medication 40 MILLIEQUIVALENT(S): at 10:57

## 2018-02-22 RX ADMIN — LISINOPRIL 2.5 MILLIGRAM(S): 2.5 TABLET ORAL at 04:48

## 2018-02-22 RX ADMIN — Medication 81 MILLIGRAM(S): at 04:48

## 2018-02-22 RX ADMIN — AMIODARONE HYDROCHLORIDE 200 MILLIGRAM(S): 400 TABLET ORAL at 04:48

## 2018-02-22 RX ADMIN — Medication 25 MILLIGRAM(S): at 04:48

## 2018-02-22 NOTE — PROGRESS NOTE ADULT - ASSESSMENT
62M CAD s/p PCI to pLAD with MARIAN for ISR/CP, pAF hx of ablation, CKD, HTN with Afib and NSVT in setting of recent PCI. asymptomatic.   -cont amiodarone 400mg BID x 4 more days, then change to 400mg once daily  -pt to follow-up with Dr. James in two weeks

## 2018-02-22 NOTE — PROGRESS NOTE ADULT - SUBJECTIVE AND OBJECTIVE BOX
62y old  Male who presents with  chest pain (2018 20:41) now s/p cardiac cath MARIAN x 1 pLAD ( ISR 90%) via right radial artery access.       Allergies    No Known Allergies    Intolerances        Medications:  amiodarone    Tablet 200 milliGRAM(s) Oral two times a day  apixaban 5 milliGRAM(s) Oral every 12 hours  aspirin enteric coated 81 milliGRAM(s) Oral daily  clopidogrel Tablet 75 milliGRAM(s) Oral daily  hydrochlorothiazide 25 milliGRAM(s) Oral daily  lisinopril 2.5 milliGRAM(s) Oral daily  metoprolol succinate ER 25 milliGRAM(s) Oral daily  pantoprazole    Tablet 40 milliGRAM(s) Oral before breakfast  simvastatin 40 milliGRAM(s) Oral at bedtime      Vitals:  T(C): 36.6 (18 @ 04:36), Max: 37 (18 @ 08:59)  HR: 81 (18 @ 04:36) (66 - 112)  BP: 113/90 (18 @ 04:36) (105/95 - 144/103)  BP(mean): 106 (18 @ 08:59) (106 - 106)  RR: 17 (18 @ 04:36) (16 - 20)  SpO2: 98% (18 @ 04:36) (95% - 100%)  Wt(kg): --  Daily Height in cm: 180.34 (2018 11:10)    Daily Weight in k.4 (2018 20:41)  I&O's Summary    2018 07:01  -  2018 06:05  --------------------------------------------------------  IN: 420 mL / OUT: 0 mL / NET: 420 mL          Physical Exam:  Appearance: Normal  Eyes: PERRL, EOMI  Cardiovascular: S1S2, RRR, No M/R/G, no JVD, No Lower extremity edema  Procedural Access Site: Right radial artery access. No hematoma, Non-tender to palpation, 2+ pulse, No bruit, No Ecchymosis  Respiratory: Clear to auscultation bilaterally  Gastrointestinal: Soft, Non tender, Normal Bowel Sounds  Musculoskeletal: No clubbing, No joint deformity   Neurologic: Non-focal  Psychiatry: AAOx3, Mood & affect appropriate  Skin: No rashes, No ecchymoses, No cyanosis        139  |  102  |  23  ----------------------------<  85  3.7   |  24  |  1.55<H>    Ca    9.1      2018 03:51    TPro  8.1  /  Alb  3.7  /  TBili  1.0  /  DBili  x   /  AST  14  /  ALT  8<L>  /  AlkPhos  73      PT/INR - ( 2018 07:21 )   PT: 15.1 sec;   INR: 1.38 ratio         PTT - ( 2018 07:21 )  PTT:35.2 sec  CARDIAC MARKERS ( 2018 07:21 )  x     / 0.01 ng/mL / 87 U/L / x     / 1.1 ng/mL      Interpretation of Telemetry: 80-90bpm 10 beats WCT     ASSESSMENT/PLAN  62y old  Male who presents with  chest pain (2018 20:41) now s/p cardiac cath MARIAN x 1 pLAD ( ISR 90%) via right radial artery access. Pt tolerated the procedure well. cardiac cath site benign. Overnight remained uneventful. Overnight 10beats WCT. Amiodarone load in progress. Discharge  planning pending.

## 2018-02-22 NOTE — CHART NOTE - NSCHARTNOTEFT_GEN_A_CORE
Cardiology Note    CC: NSVT on tele 6 beats @6:03am and 4 beats multiform on 2/21 @1811    63 yo - American male, former smoker, with PMH of HTN, HLD, CAD w/ stent ( last stent approx 4 years ago), Afib on Eliquis s/p x2 failed cardioversions - last cardioversion a few weeks ago ( last dose on Eliquis on Monday night -as per patient), CHF ( systolic, last EF on ECHO from 02/13/2018 is 25%), CKD3 ( creat 1.66 today, pt states to have seen a Nephrologist in the past but cannot recall his name), Gout.  Presents to [facility] ED w/ s/s oc shortness of breath and chest pain x 3 days.  Symptoms both intermittent occasional, at rest and on exertion; pt unable to verbalize any specific triggers.  Seen by Dr Lee, given symptomatology referred for cardiac cath today w/ possible intervention.  Presently in [facility] awaiting on cath, symptomatic.       Vital Signs T(C): 36.6 (02-22-18 @ 04:36), Max: 36.8 (02-21-18 @ 13:25)  HR: 81 (02-22-18 @ 04:36) (66 - 102)  BP: 113/90 (02-22-18 @ 04:36) (109/77 - 144/103)  RR: 17 (02-22-18 @ 04:36) (16 - 18)  SpO2: 98% (02-22-18 @ 04:36) (95% - 100%)  Wt(kg): --     Medications amiodarone    Tablet 200 milliGRAM(s) Oral two times a day  apixaban 5 milliGRAM(s) Oral every 12 hours  aspirin enteric coated 81 milliGRAM(s) Oral daily  clopidogrel Tablet 75 milliGRAM(s) Oral daily  hydrochlorothiazide 25 milliGRAM(s) Oral daily  lisinopril 2.5 milliGRAM(s) Oral daily  metoprolol succinate ER 25 milliGRAM(s) Oral every 12 hours  pantoprazole    Tablet 40 milliGRAM(s) Oral before breakfast  simvastatin 40 milliGRAM(s) Oral at bedtime                          12.7   10.5  )-----------( 221      ( 22 Feb 2018 03:51 )             41.2  02-22    139  |  102  |  23  ----------------------------<  85  3.7   |  24  |  1.55<H>    Ca    9.1      22 Feb 2018 03:51    TPro  8.1  /  Alb  3.7  /  TBili  1.0  /  DBili  x   /  AST  14  /  ALT  8<L>  /  AlkPhos  73  02-21      63 y/o AA male with pmh of HTN, HLD, CAD with prior stent, AF on Eliquis HFrEF, CKD stage 3 s/p Cath with lunimal LCx, Ramus, RCA disease  but severe instent lesion of the ostial/proximal LAD stent via RRA with NSVT o/n on tele.    - Continue Amio load  - Increase Toprol XL 25 Daily to BID   - Cardioversion in 1 month   - Cont Eliquis for Atrial Flutter  - CKD stable with Cr trending down  - Discussed with Dr. Lee  - Maintain K >4 Mag >2 will replete K and added Magnesium to am labs will follow    Nory Lopez NP-C  Cardiology Cardiology Note    CC: NSVT on tele 10 beats ~200bpm @ 2:54am; 6 beats @6:03am and 4 beats multiform on 2/21 @1811    63 yo - American male, former smoker, with PMH of HTN, HLD, CAD w/ stent ( last stent approx 4 years ago), Afib on Eliquis s/p x2 failed cardioversions - last cardioversion a few weeks ago ( last dose on Eliquis on Monday night -as per patient), CHF ( systolic, last EF on ECHO from 02/13/2018 is 25%), CKD3 ( creat 1.66 today, pt states to have seen a Nephrologist in the past but cannot recall his name), Gout.  Presents to [facility] ED w/ s/s oc shortness of breath and chest pain x 3 days.  Symptoms both intermittent occasional, at rest and on exertion; pt unable to verbalize any specific triggers.  Seen by Dr eLe, given symptomatology referred for cardiac cath today w/ possible intervention.  Presently in [facility] awaiting on cath, symptomatic.       Vital Signs T(C): 36.6 (02-22-18 @ 04:36), Max: 36.8 (02-21-18 @ 13:25)  HR: 81 (02-22-18 @ 04:36) (66 - 102)  BP: 113/90 (02-22-18 @ 04:36) (109/77 - 144/103)  RR: 17 (02-22-18 @ 04:36) (16 - 18)  SpO2: 98% (02-22-18 @ 04:36) (95% - 100%)  Wt(kg): --     Medications amiodarone    Tablet 200 milliGRAM(s) Oral two times a day  apixaban 5 milliGRAM(s) Oral every 12 hours  aspirin enteric coated 81 milliGRAM(s) Oral daily  clopidogrel Tablet 75 milliGRAM(s) Oral daily  hydrochlorothiazide 25 milliGRAM(s) Oral daily  lisinopril 2.5 milliGRAM(s) Oral daily  metoprolol succinate ER 25 milliGRAM(s) Oral every 12 hours  pantoprazole    Tablet 40 milliGRAM(s) Oral before breakfast  simvastatin 40 milliGRAM(s) Oral at bedtime                          12.7   10.5  )-----------( 221      ( 22 Feb 2018 03:51 )             41.2  02-22    139  |  102  |  23  ----------------------------<  85  3.7   |  24  |  1.55<H>    Ca    9.1      22 Feb 2018 03:51    TPro  8.1  /  Alb  3.7  /  TBili  1.0  /  DBili  x   /  AST  14  /  ALT  8<L>  /  AlkPhos  73  02-21      61 y/o AA male with pmh of HTN, HLD, CAD with prior stent, AF on Eliquis HFrEF, CKD stage 3 s/p Cath with lunimal LCx, Ramus, RCA disease  but severe instent lesion of the ostial/proximal LAD stent via RRA with NSVT o/n on tele.    - Continue Amio load 5gms 400mg TID then 200mg daily  - Increase Toprol XL 25 Daily to BID   - EP consult  - Cardioversion in 1 month   - Cont Eliquis for Atrial Flutter  - CKD stable with Cr trending down  - Discussed with Dr. Lee  - Maintain K >4 Mag >2 will replete K 3.7 with potassium 40 MEQ    Nory Lopez NP-C  Cardiology Cardiology Note    CC: NSVT on tele 10 beats ~200bpm @ 2:54am; 6 beats @6:03am and 4 beats multiform on 2/21 @1811    63 yo - American male, former smoker, with PMH of HTN, HLD, CAD w/ stent ( last stent approx 4 years ago), Afib on Eliquis s/p x2 failed cardioversions - last cardioversion a few weeks ago ( last dose on Eliquis on Monday night -as per patient), CHF ( systolic, last EF on ECHO from 02/13/2018 is 25%), CKD3 ( creat 1.66 today, pt states to have seen a Nephrologist in the past but cannot recall his name), Gout.  Presents to [facility] ED w/ s/s oc shortness of breath and chest pain x 3 days.  Symptoms both intermittent occasional, at rest and on exertion; pt unable to verbalize any specific triggers.  Seen by Dr Lee, given symptomatology referred for cardiac cath today w/ possible intervention.  Presently in [facility] awaiting on cath, symptomatic.       Vital Signs T(C): 36.6 (02-22-18 @ 04:36), Max: 36.8 (02-21-18 @ 13:25)  HR: 81 (02-22-18 @ 04:36) (66 - 102)  BP: 113/90 (02-22-18 @ 04:36) (109/77 - 144/103)  RR: 17 (02-22-18 @ 04:36) (16 - 18)  SpO2: 98% (02-22-18 @ 04:36) (95% - 100%)  Wt(kg): --     Medications amiodarone    Tablet 200 milliGRAM(s) Oral two times a day  apixaban 5 milliGRAM(s) Oral every 12 hours  aspirin enteric coated 81 milliGRAM(s) Oral daily  clopidogrel Tablet 75 milliGRAM(s) Oral daily  hydrochlorothiazide 25 milliGRAM(s) Oral daily  lisinopril 2.5 milliGRAM(s) Oral daily  metoprolol succinate ER 25 milliGRAM(s) Oral every 12 hours  pantoprazole    Tablet 40 milliGRAM(s) Oral before breakfast  simvastatin 40 milliGRAM(s) Oral at bedtime                          12.7   10.5  )-----------( 221      ( 22 Feb 2018 03:51 )             41.2  02-22    139  |  102  |  23  ----------------------------<  85  3.7   |  24  |  1.55<H>    Ca    9.1      22 Feb 2018 03:51    TPro  8.1  /  Alb  3.7  /  TBili  1.0  /  DBili  x   /  AST  14  /  ALT  8<L>  /  AlkPhos  73  02-21      63 y/o AA male with pmh of HTN, HLD, CAD with prior stent, AF on Eliquis HFrEF, CKD stage 3 s/p Cath with lunimal LCx, Ramus, RCA disease  but severe instent lesion of the ostial/proximal LAD stent via RRA with NSVT o/n on tele.    - Will change current amio load to 400mg BID x 4 days with first dose now and Amiodarone 200mg daily.    - Increase Toprol XL 25 Daily to BID   - EP consult with Dr. James  - Cardioversion in 1 month   - Cont Eliquis for Atrial Flutter  - CKD stable with Cr trending down  - Discussed with Dr. Lee  - Maintain K >4 Mag >2 will replete K 3.7 with potassium 40 MEQ    HOLLI Harris  Cardiology Cardiology Note    CC: NSVT on tele 10 beats ~200bpm @ 2:54am; 6 beats @6:03am and 4 beats multiform on 2/21 @1811    61 yo - American male, former smoker, with PMH of HTN, HLD, CAD w/ stent ( last stent approx 4 years ago), Afib on Eliquis s/p x2 failed cardioversions - last cardioversion a few weeks ago ( last dose on Eliquis on Monday night -as per patient), CHF ( systolic, last EF on ECHO from 02/13/2018 is 25%), CKD3 ( creat 1.66 today, pt states to have seen a Nephrologist in the past but cannot recall his name), Gout.  Presents to [facility] ED w/ s/s oc shortness of breath and chest pain x 3 days.  Symptoms both intermittent occasional, at rest and on exertion; pt unable to verbalize any specific triggers.  Seen by Dr Lee, given symptomatology referred for cardiac cath today w/ possible intervention.  Presently in [facility] awaiting on cath, symptomatic.       Vital Signs T(C): 36.6 (02-22-18 @ 04:36), Max: 36.8 (02-21-18 @ 13:25)  HR: 81 (02-22-18 @ 04:36) (66 - 102)  BP: 113/90 (02-22-18 @ 04:36) (109/77 - 144/103)  RR: 17 (02-22-18 @ 04:36) (16 - 18)  SpO2: 98% (02-22-18 @ 04:36) (95% - 100%)  Wt(kg): --     Medications amiodarone    Tablet 200 milliGRAM(s) Oral two times a day  apixaban 5 milliGRAM(s) Oral every 12 hours  aspirin enteric coated 81 milliGRAM(s) Oral daily  clopidogrel Tablet 75 milliGRAM(s) Oral daily  hydrochlorothiazide 25 milliGRAM(s) Oral daily  lisinopril 2.5 milliGRAM(s) Oral daily  metoprolol succinate ER 25 milliGRAM(s) Oral every 12 hours  pantoprazole    Tablet 40 milliGRAM(s) Oral before breakfast  simvastatin 40 milliGRAM(s) Oral at bedtime                          12.7   10.5  )-----------( 221      ( 22 Feb 2018 03:51 )             41.2  02-22    139  |  102  |  23  ----------------------------<  85  3.7   |  24  |  1.55<H>    Ca    9.1      22 Feb 2018 03:51    TPro  8.1  /  Alb  3.7  /  TBili  1.0  /  DBili  x   /  AST  14  /  ALT  8<L>  /  AlkPhos  73  02-21      63 y/o AA male with pmh of HTN, HLD, CAD with prior stent, AF on Eliquis HFrEF, CKD stage 3 s/p Cath with lunimal LCx, Ramus, RCA disease  but severe instent lesion of the ostial/proximal LAD stent via RRA with NSVT o/n on tele.    - Will change current amio load to 400mg BID x 4 days with first dose now and Amiodarone 400mg daily.    - Increase Toprol XL 25 Daily to BID   - EP consult with Dr. James follow up in 2 weeks as outpt.    - Cardioversion in 1 month   - Cont Eliquis for Atrial Flutter  - CKD stable with Cr trending down  - Discussed with Dr. Lee  - Maintain K >4 Mag >2 will replete K 3.7 with potassium 40 MEQ    Nory Lopez NP-LATANYA  Cardiology Cardiology Note    CC: NSVT on tele 10 beats ~200bpm @ 2:54am; 6 beats @6:03am and 4 beats multiform on 2/21 @1811    63 yo - American male, former smoker, with PMH of HTN, HLD, CAD w/ stent ( last stent approx 4 years ago), Afib on Eliquis s/p x2 failed cardioversions - last cardioversion a few weeks ago ( last dose on Eliquis on Monday night -as per patient), CHF ( systolic, last EF on ECHO from 02/13/2018 is 25%), CKD3 ( creat 1.66 today, pt states to have seen a Nephrologist in the past but cannot recall his name), Gout.  Presents to [facility] ED w/ s/s oc shortness of breath and chest pain x 3 days.  Symptoms both intermittent occasional, at rest and on exertion; pt unable to verbalize any specific triggers.  Seen by Dr Lee, given symptomatology referred for cardiac cath today w/ possible intervention.  Presently in [facility] awaiting on cath, symptomatic.       Vital Signs T(C): 36.6 (02-22-18 @ 04:36), Max: 36.8 (02-21-18 @ 13:25)  HR: 81 (02-22-18 @ 04:36) (66 - 102)  BP: 113/90 (02-22-18 @ 04:36) (109/77 - 144/103)  RR: 17 (02-22-18 @ 04:36) (16 - 18)  SpO2: 98% (02-22-18 @ 04:36) (95% - 100%)  Wt(kg): --     Medications amiodarone    Tablet 200 milliGRAM(s) Oral two times a day  apixaban 5 milliGRAM(s) Oral every 12 hours  aspirin enteric coated 81 milliGRAM(s) Oral daily  clopidogrel Tablet 75 milliGRAM(s) Oral daily  hydrochlorothiazide 25 milliGRAM(s) Oral daily  lisinopril 2.5 milliGRAM(s) Oral daily  metoprolol succinate ER 25 milliGRAM(s) Oral every 12 hours  pantoprazole    Tablet 40 milliGRAM(s) Oral before breakfast  simvastatin 40 milliGRAM(s) Oral at bedtime                          12.7   10.5  )-----------( 221      ( 22 Feb 2018 03:51 )             41.2  02-22    139  |  102  |  23  ----------------------------<  85  3.7   |  24  |  1.55<H>    Ca    9.1      22 Feb 2018 03:51    TPro  8.1  /  Alb  3.7  /  TBili  1.0  /  DBili  x   /  AST  14  /  ALT  8<L>  /  AlkPhos  73  02-21      63 y/o AA male with pmh of HTN, HLD, CAD with prior stent, AF on Eliquis HFrEF, CKD stage 3 s/p Cath with lunimal LCx, Ramus, RCA disease  but severe instent lesion of the ostial/proximal LAD stent via RRA with NSVT o/n on tele.    - Will change current amio load to 400mg BID x 4 days with first dose now and Amiodarone 200mg daily.    - Increase Toprol XL 25 Daily to BID   - EP consult with Dr. James follow up in 2 weeks as outpt.    - Cardioversion in 1 month   - Cont Eliquis for Atrial Flutter  - CKD stable with Cr trending down  - Discussed with Dr. Lee  - Maintain K >4 Mag >2 will replete K 3.7 with potassium 40 MEQ    Nory Lopez NP-LATANYA  Cardiology

## 2018-02-22 NOTE — PROGRESS NOTE ADULT - ASSESSMENT
CAD, s/p PCI to proximal LAD, CMP, AF, s/p ablation, CKD, HTN admitted with unstable angina sx, chf, s/p cath with severe proximal LAD restenosis, s/p PCI to lad 2/21.    stable  no chest pain, sob  on tele NSVT x 3  inc amio to 400mg tid  cont toprol, inc to BID  cont asa, plaVIX, eliquis   will d./w with eps regarding nsvt  d/c pending ep

## 2018-02-22 NOTE — PROGRESS NOTE ADULT - ASSESSMENT
63 yo male with PMH of HTN, HLD, CAD, Afib on Eliquis, systolic CHF, CKD3, gout, p/w chest pain, now s/p cath:  1. Chest pain, CAD - s/p cath w/PCI, plan per Cardio, c/w ASA, Plavix, statin  2. Syst CHF - appears euvolemic  3. HLD - c/w statin  4. HTN - BP at goal  5. CKD3 - Cr around baseline, monitor post-contrast  6. A fib - c/w Eliquis  7. NSVT - on Amio load per EPS

## 2018-02-22 NOTE — PROGRESS NOTE ADULT - SUBJECTIVE AND OBJECTIVE BOX
CARDIOLOGY FOLLOW UP NOTE - DR. MANCUSO    Subjective:    feels good  no cp, sob, palps    PHYSICAL EXAM:  T(C): 36.6 (02-22-18 @ 04:36), Max: 36.8 (02-21-18 @ 13:25)  HR: 81 (02-22-18 @ 04:36) (66 - 102)  BP: 113/90 (02-22-18 @ 04:36) (109/77 - 144/103)  RR: 17 (02-22-18 @ 04:36) (16 - 18)  SpO2: 98% (02-22-18 @ 04:36) (95% - 100%)  Wt(kg): --  I&O's Summary    21 Feb 2018 07:01  -  22 Feb 2018 07:00  --------------------------------------------------------  IN: 420 mL / OUT: 0 mL / NET: 420 mL    22 Feb 2018 07:01  -  22 Feb 2018 10:28  --------------------------------------------------------  IN: 240 mL / OUT: 0 mL / NET: 240 mL        Appearance: Normal	  Cardiovascular: Normal S1 S2,RRR, No JVD, No murmurs  Respiratory: Lungs clear to auscultation	  Gastrointestinal:  Soft, Non-tender, + BS	  Extremities: Normal range of motion,min edema b/l  radial site c/d/i, 2+ pulse    MEDICATIONS  (STANDING):  amiodarone    Tablet 400 milliGRAM(s) Oral every 8 hours  apixaban 5 milliGRAM(s) Oral every 12 hours  aspirin enteric coated 81 milliGRAM(s) Oral daily  clopidogrel Tablet 75 milliGRAM(s) Oral daily  hydrochlorothiazide 25 milliGRAM(s) Oral daily  lisinopril 2.5 milliGRAM(s) Oral daily  metoprolol succinate ER 25 milliGRAM(s) Oral every 12 hours  pantoprazole    Tablet 40 milliGRAM(s) Oral before breakfast  potassium chloride    Tablet ER 40 milliEquivalent(s) Oral once  simvastatin 40 milliGRAM(s) Oral at bedtime      TELEMETRY: 	    ECG:  	  RADIOLOGY:   DIAGNOSTIC TESTING:  [ ] Echocardiogram:  [ ] Catheterization:  [ ] Stress Test:    OTHER: 	    LABS:	 	    CARDIAC MARKERS:                                12.7   10.5  )-----------( 221      ( 22 Feb 2018 03:51 )             41.2     02-22    139  |  102  |  23  ----------------------------<  85  3.7   |  24  |  1.55<H>    Ca    9.1      22 Feb 2018 03:51  Mg     2.3     02-22    TPro  8.1  /  Alb  3.7  /  TBili  1.0  /  DBili  x   /  AST  14  /  ALT  8<L>  /  AlkPhos  73  02-21    proBNP:   PT/INR - ( 21 Feb 2018 07:21 )   PT: 15.1 sec;   INR: 1.38 ratio         PTT - ( 21 Feb 2018 07:21 )  PTT:35.2 sec  Lipid Profile:   HgA1c:

## 2018-02-22 NOTE — PROVIDER CONTACT NOTE (OTHER) - SITUATION
as per [facility] tele tech pt had 6 beats of wide complex tachycardia. pt is asymptomatic and resting in bed watching tv. pt denies chest pain.

## 2018-02-22 NOTE — PROGRESS NOTE ADULT - SUBJECTIVE AND OBJECTIVE BOX
chief complaint: chest pain    Interval hx: feels well, NSVT on tele    Allergies:  No Known Allergies      PAST MEDICAL & SURGICAL HISTORY:  CHF (congestive heart failure)  History of cardioversion  Atrial fibrillation  HLD (hyperlipidemia)  CAD (coronary artery disease): 1 stent  Gout  CKD (chronic kidney disease)  Arthritis: L arm and hands  Keloid: multiple on chest and arms    Childhood Asthma  Inguinal Hernia: left  HTN - Hypertension  S/P angioplasty with stent  History of Arthroscopy- ankle: left ankle 2003 s/p &quot;something fell on it&quot;  S/P Arthroscopic Surgery of Left Knee: 2001 injury- hit knee on desk      FAMILY HISTORY:  Family history of diabetes mellitus (Mother)  Family history of hypertension (Mother)      REVIEW OF SYSTEMS:  CONSTITUTIONAL: No fever, weight loss, or fatigue  EYES: No eye pain, visual disturbances, or discharge  NECK: No pain or stiffness  RESPIRATORY: No cough or wheezing, + shortness of breath  CARDIOVASCULAR: + chest pain, palpitations, dizziness, or leg swelling  GASTROINTESTINAL: No abdominal or epigastric pain. No nausea, vomiting, diarrhea or constipation  GENITOURINARY: No dysuria, urinary frequency or urgency, no hematuria  NEUROLOGICAL: No headaches, memory loss, loss of strength, numbness, or tremors  SKIN: No itching, burning, rashes, or lesions   MUSCULOSKELETAL: No joint pain or swelling; No muscle, back, or extremity pain      Medications:  MEDICATIONS  (STANDING):  amiodarone    Tablet 400 milliGRAM(s) Oral every 12 hours  apixaban 5 milliGRAM(s) Oral every 12 hours  aspirin enteric coated 81 milliGRAM(s) Oral daily  clopidogrel Tablet 75 milliGRAM(s) Oral daily  hydrochlorothiazide 25 milliGRAM(s) Oral daily  lisinopril 2.5 milliGRAM(s) Oral daily  metoprolol succinate ER 25 milliGRAM(s) Oral every 12 hours  pantoprazole    Tablet 40 milliGRAM(s) Oral before breakfast  simvastatin 40 milliGRAM(s) Oral at bedtime    MEDICATIONS  (PRN):    	    PHYSICAL EXAM:  T(C): 36.6 (02-22-18 @ 04:36), Max: 36.8 (02-21-18 @ 13:25)  HR: 81 (02-22-18 @ 04:36) (80 - 102)  BP: 113/90 (02-22-18 @ 04:36) (109/77 - 141/104)  RR: 17 (02-22-18 @ 04:36) (16 - 18)  SpO2: 98% (02-22-18 @ 04:36) (95% - 99%)  Wt(kg): --  I&O's Summary    21 Feb 2018 07:01  -  22 Feb 2018 07:00  --------------------------------------------------------  IN: 420 mL / OUT: 0 mL / NET: 420 mL    22 Feb 2018 07:01  -  22 Feb 2018 12:06  --------------------------------------------------------  IN: 240 mL / OUT: 0 mL / NET: 240 mL      Appearance: Normal	  HEENT:   NCAT, PERRL, EOMI	  Lymphatic: No lymphadenopathy  Cardiovascular: Normal S1 S2, RRR  Respiratory: Lungs clear to auscultation BL  Psychiatry: A & O x 3, Mood & affect appropriate  Gastrointestinal:  Soft, Non-tender, + BS  Skin: No rashes, No ecchymoses, No cyanosis	  Neurologic: Non-focal  Extremities: Normal range of motion, No clubbing, cyanosis or edema    	  LABS:	 	    CARDIAC MARKERS:  CARDIAC MARKERS ( 21 Feb 2018 07:21 )  x     / 0.01 ng/mL / 87 U/L / x     / 1.1 ng/mL                                12.7   10.5  )-----------( 221      ( 22 Feb 2018 03:51 )             41.2     02-22    139  |  102  |  23  ----------------------------<  85  3.7   |  24  |  1.55<H>    Ca    9.1      22 Feb 2018 03:51  Mg     2.3     02-22    TPro  8.1  /  Alb  3.7  /  TBili  1.0  /  DBili  x   /  AST  14  /  ALT  8<L>  /  AlkPhos  73  02-21    proBNP:   Lipid Profile:   HgA1c:   TSH:

## 2018-02-22 NOTE — PROGRESS NOTE ADULT - SUBJECTIVE AND OBJECTIVE BOX
Electrophysiology Progress Note    New EP consults: [phone]  EP NP Spectra: [id number]  EP Fellow Spectra: [id number]    Chief complaint:  afib/nsvt    Interval Events:  feels well    MEDICATIONS:  amiodarone    Tablet 400 milliGRAM(s) Oral every 12 hours  apixaban 5 milliGRAM(s) Oral every 12 hours  aspirin enteric coated 81 milliGRAM(s) Oral daily  clopidogrel Tablet 75 milliGRAM(s) Oral daily  hydrochlorothiazide 25 milliGRAM(s) Oral daily  lisinopril 2.5 milliGRAM(s) Oral daily  metoprolol succinate ER 25 milliGRAM(s) Oral every 12 hours  pantoprazole    Tablet 40 milliGRAM(s) Oral before breakfast  simvastatin 40 milliGRAM(s) Oral at bedtime    PHYSICAL EXAM:  T(C): 36.6 (18 @ 04:36), Max: 36.8 (18 @ 13:25)  HR: 81 (18 @ 04:36) (80 - 102)  BP: 113/90 (18 @ 04:36) (109/77 - 144/103)  RR: 17 (18 @ 04:36) (16 - 18)  SpO2: 98% (18 @ 04:36) (95% - 100%)  Wt(kg): --  I&O's Summary    2018 07:  -  2018 07:00  --------------------------------------------------------  IN: 420 mL / OUT: 0 mL / NET: 420 mL    2018 07:  -  2018 11:26  --------------------------------------------------------  IN: 240 mL / OUT: 0 mL / NET: 240 mL      Daily     Daily Weight in k.4 (2018 20:41)    Appearance: Normal	  HEENT:  mmm	  Cardiovascular: Normal S1 S2, irr irr, No JVD, No edema  Respiratory: Lungs clear to auscultation	  Psychiatry: A & O x 3, Mood & affect appropriate  Gastrointestinal:  soft nt nd,  Skin: No rashes, No ecchymoses, No cyanosis	  Neurologic: grossly nonfocal  Extremities:  No clubbing, cyanosis    LABS:	 	  CBC Full  -  ( 2018 03:51 )  WBC Count : 10.5 K/uL  Hemoglobin : 12.7 g/dL  Hematocrit : 41.2 %  Platelet Count - Automated : 221 K/uL      139  |  102  |  23  ----------------------------<  85  3.7   |  24  |  1.55<H>      141  |  102  |  24<H>  ----------------------------<  114<H>  4.3   |  25  |  1.66<H>    Ca    9.1      2018 03:51  Ca    9.8      2018 07:21  Mg     2.3         TPro  8.1  /  Alb  3.7  /  TBili  1.0  /  DBili  x   /  AST  14  /  ALT  8<L>  /  AlkPhos  73      CARDIAC MARKERS:  Troponin T, Serum: 0.01 ng/mL ( @ 07:21)    TELEMETRY: 	  afib/flut with NSVT	    PREVIOUS DIAGNOSTIC TESTING:    [x] Catheterization: < from: Cardiac Cath Lab - Adult (18 @ 09:21) >  CORONARY VESSELS: The coronary circulation is right dominant.  LM:   --  LM: Normal.  LAD:   --  Proximal LAD: There was a tubular 75 % stenosis at the site of a  prior stent.  CX:   --  Circumflex: Normal.  --  OM1: Normal.  --  OM2: Normal.  RI:   --  Ramus intermedius: Angiography showed minor luminal  irregularities with no flow limiting lesions.  RCA:   --  RCA: Angiography showed minor luminal irregularities with no  flow limiting lesions.  --  RPDA: Angiography showed minor luminal irregularities with no flow  limiting lesions.  --  RPL1: Angiography showed minor luminal irregularities withno flow  limiting lesions.  [ ] Stress Test:

## 2018-03-02 ENCOUNTER — MEDICATION RENEWAL (OUTPATIENT)
Age: 63
End: 2018-03-02

## 2018-03-07 ENCOUNTER — APPOINTMENT (OUTPATIENT)
Dept: ELECTROPHYSIOLOGY | Facility: CLINIC | Age: 63
End: 2018-03-07

## 2018-03-13 ENCOUNTER — APPOINTMENT (OUTPATIENT)
Dept: INTERNAL MEDICINE | Facility: CLINIC | Age: 63
End: 2018-03-13
Payer: COMMERCIAL

## 2018-03-13 PROCEDURE — 99213 OFFICE O/P EST LOW 20 MIN: CPT

## 2018-03-28 ENCOUNTER — NON-APPOINTMENT (OUTPATIENT)
Age: 63
End: 2018-03-28

## 2018-03-28 ENCOUNTER — APPOINTMENT (OUTPATIENT)
Dept: ELECTROPHYSIOLOGY | Facility: CLINIC | Age: 63
End: 2018-03-28
Payer: COMMERCIAL

## 2018-03-28 VITALS — DIASTOLIC BLOOD PRESSURE: 75 MMHG | SYSTOLIC BLOOD PRESSURE: 131 MMHG | HEART RATE: 53 BPM

## 2018-03-28 PROCEDURE — 93000 ELECTROCARDIOGRAM COMPLETE: CPT

## 2018-03-28 PROCEDURE — 99215 OFFICE O/P EST HI 40 MIN: CPT

## 2018-03-28 RX ORDER — HYDROCHLOROTHIAZIDE 25 MG/1
25 TABLET ORAL
Qty: 30 | Refills: 3 | Status: DISCONTINUED | COMMUNITY
Start: 2018-01-31 | End: 2018-03-28

## 2018-03-28 RX ORDER — CEPHALEXIN 250 MG/1
250 CAPSULE ORAL TWICE DAILY
Qty: 20 | Refills: 1 | Status: DISCONTINUED | COMMUNITY
Start: 2018-03-13 | End: 2018-03-28

## 2018-03-28 RX ORDER — AMIODARONE HYDROCHLORIDE 200 MG/1
200 TABLET ORAL
Qty: 120 | Refills: 0 | Status: COMPLETED | COMMUNITY
Start: 2018-02-22 | End: 1900-01-01

## 2018-04-25 ENCOUNTER — APPOINTMENT (OUTPATIENT)
Dept: INTERNAL MEDICINE | Facility: CLINIC | Age: 63
End: 2018-04-25
Payer: COMMERCIAL

## 2018-04-25 DIAGNOSIS — R97.20 ELEVATED PROSTATE, SPECIFIC ANTIGEN [PSA]: ICD-10-CM

## 2018-04-25 DIAGNOSIS — Z87.891 PERSONAL HISTORY OF NICOTINE DEPENDENCE: ICD-10-CM

## 2018-04-25 DIAGNOSIS — Z87.2 PERSONAL HISTORY OF DISEASES OF THE SKIN AND SUBCUTANEOUS TISSUE: ICD-10-CM

## 2018-04-25 PROCEDURE — 99396 PREV VISIT EST AGE 40-64: CPT

## 2018-04-25 PROCEDURE — G0444 DEPRESSION SCREEN ANNUAL: CPT | Mod: 59

## 2018-04-26 ENCOUNTER — OTHER (OUTPATIENT)
Age: 63
End: 2018-04-26

## 2018-04-26 VITALS
SYSTOLIC BLOOD PRESSURE: 122 MMHG | DIASTOLIC BLOOD PRESSURE: 68 MMHG | HEART RATE: 60 BPM | WEIGHT: 213 LBS | RESPIRATION RATE: 14 BRPM | BODY MASS INDEX: 30.56 KG/M2

## 2018-04-26 PROBLEM — Z87.2 HISTORY OF CELLULITIS: Status: RESOLVED | Noted: 2018-03-13 | Resolved: 2018-04-26

## 2018-04-26 PROBLEM — R97.20 ELEVATED PSA MEASUREMENT: Noted: 2017-07-13

## 2018-04-26 LAB
25(OH)D3 SERPL-MCNC: 10.2 NG/ML
ALBUMIN SERPL ELPH-MCNC: 3.8 G/DL
ALP BLD-CCNC: 88 U/L
ALT SERPL-CCNC: 11 U/L
ANION GAP SERPL CALC-SCNC: 15 MMOL/L
AST SERPL-CCNC: 19 U/L
BILIRUB SERPL-MCNC: 0.5 MG/DL
BUN SERPL-MCNC: 28 MG/DL
CALCIUM SERPL-MCNC: 9.3 MG/DL
CHLORIDE SERPL-SCNC: 105 MMOL/L
CHOLEST SERPL-MCNC: 148 MG/DL
CHOLEST/HDLC SERPL: 2.1 RATIO
CO2 SERPL-SCNC: 25 MMOL/L
CREAT SERPL-MCNC: 1.62 MG/DL
GLUCOSE SERPL-MCNC: 63 MG/DL
HBA1C MFR BLD HPLC: 6.1 %
HDLC SERPL-MCNC: 71 MG/DL
LDLC SERPL CALC-MCNC: 66 MG/DL
POTASSIUM SERPL-SCNC: 4.5 MMOL/L
PROT SERPL-MCNC: 7.2 G/DL
PSA SERPL-MCNC: 10.3 NG/ML
SODIUM SERPL-SCNC: 145 MMOL/L
TRIGL SERPL-MCNC: 57 MG/DL

## 2018-04-26 RX ORDER — ENEMA 19; 7 G/133ML; G/133ML
7-19 ENEMA RECTAL
Qty: 1 | Refills: 0 | Status: DISCONTINUED | COMMUNITY
Start: 2017-08-10 | End: 2018-04-26

## 2018-04-26 RX ORDER — SELENIUM SULFIDE 25 MG/ML
2.5 LOTION TOPICAL
Qty: 1 | Refills: 5 | Status: DISCONTINUED | COMMUNITY
Start: 2017-02-28 | End: 2018-04-26

## 2018-04-26 RX ORDER — AMOXICILLIN AND CLAVULANATE POTASSIUM 875; 125 MG/1; MG/1
875-125 TABLET, COATED ORAL
Qty: 6 | Refills: 0 | Status: DISCONTINUED | COMMUNITY
Start: 2017-08-10 | End: 2018-04-26

## 2018-06-07 ENCOUNTER — MEDICATION RENEWAL (OUTPATIENT)
Age: 63
End: 2018-06-07

## 2018-06-07 ENCOUNTER — MESSAGE (OUTPATIENT)
Age: 63
End: 2018-06-07

## 2018-06-16 ENCOUNTER — RX RENEWAL (OUTPATIENT)
Age: 63
End: 2018-06-16

## 2018-06-27 ENCOUNTER — APPOINTMENT (OUTPATIENT)
Dept: INTERNAL MEDICINE | Facility: CLINIC | Age: 63
End: 2018-06-27

## 2018-06-27 ENCOUNTER — OTHER (OUTPATIENT)
Age: 63
End: 2018-06-27

## 2018-06-28 ENCOUNTER — APPOINTMENT (OUTPATIENT)
Dept: UROLOGY | Facility: CLINIC | Age: 63
End: 2018-06-28
Payer: COMMERCIAL

## 2018-06-28 ENCOUNTER — APPOINTMENT (OUTPATIENT)
Dept: OPHTHALMOLOGY | Facility: CLINIC | Age: 63
End: 2018-06-28
Payer: COMMERCIAL

## 2018-06-28 ENCOUNTER — APPOINTMENT (OUTPATIENT)
Dept: INTERNAL MEDICINE | Facility: CLINIC | Age: 63
End: 2018-06-28
Payer: COMMERCIAL

## 2018-06-28 DIAGNOSIS — H25.043 POSTERIOR SUBCAPSULAR POLAR AGE-RELATED CATARACT, BILATERAL: ICD-10-CM

## 2018-06-28 PROCEDURE — 92004 COMPRE OPH EXAM NEW PT 1/>: CPT

## 2018-06-28 PROCEDURE — 99213 OFFICE O/P EST LOW 20 MIN: CPT

## 2018-06-28 NOTE — PHYSICAL EXAM
[Appearance Of Hands Right] : diffuse dorsal hand swelling [2nd] : 2nd [3rd] : 3rd [PIP] : PIP [Warmth] : no warmth

## 2018-06-28 NOTE — HISTORY OF PRESENT ILLNESS
[Hand Problem] : Hand Problem [Pain] : pain [Swelling] : swelling [Stiffness] : stiffness [Warmth] : no warmth [Weak Grasp] : weak grasp [Difficulty Writing] : difficulty writing

## 2018-06-29 ENCOUNTER — RX RENEWAL (OUTPATIENT)
Age: 63
End: 2018-06-29

## 2018-07-02 LAB
ANION GAP SERPL CALC-SCNC: 16 MMOL/L
BUN SERPL-MCNC: 27 MG/DL
CALCIUM SERPL-MCNC: 9.6 MG/DL
CHLORIDE SERPL-SCNC: 100 MMOL/L
CO2 SERPL-SCNC: 27 MMOL/L
CREAT SERPL-MCNC: 1.78 MG/DL
GLUCOSE SERPL-MCNC: 83 MG/DL
POTASSIUM SERPL-SCNC: 3.7 MMOL/L
PSA FREE FLD-MCNC: 23.8
PSA FREE SERPL-MCNC: 3.26 NG/ML
PSA SERPL-MCNC: 13.71 NG/ML
SODIUM SERPL-SCNC: 143 MMOL/L

## 2018-07-03 ENCOUNTER — OTHER (OUTPATIENT)
Age: 63
End: 2018-07-03

## 2018-07-25 ENCOUNTER — NON-APPOINTMENT (OUTPATIENT)
Age: 63
End: 2018-07-25

## 2018-07-25 ENCOUNTER — APPOINTMENT (OUTPATIENT)
Dept: ELECTROPHYSIOLOGY | Facility: CLINIC | Age: 63
End: 2018-07-25
Payer: COMMERCIAL

## 2018-07-25 ENCOUNTER — APPOINTMENT (OUTPATIENT)
Dept: INTERNAL MEDICINE | Facility: CLINIC | Age: 63
End: 2018-07-25

## 2018-07-25 VITALS
OXYGEN SATURATION: 99 % | HEIGHT: 70 IN | HEART RATE: 50 BPM | WEIGHT: 213 LBS | SYSTOLIC BLOOD PRESSURE: 132 MMHG | DIASTOLIC BLOOD PRESSURE: 75 MMHG | BODY MASS INDEX: 30.49 KG/M2

## 2018-07-25 PROCEDURE — 99214 OFFICE O/P EST MOD 30 MIN: CPT

## 2018-07-25 PROCEDURE — 93000 ELECTROCARDIOGRAM COMPLETE: CPT

## 2018-07-30 ENCOUNTER — APPOINTMENT (OUTPATIENT)
Dept: OPHTHALMOLOGY | Facility: CLINIC | Age: 63
End: 2018-07-30
Payer: COMMERCIAL

## 2018-07-30 PROCEDURE — 92014 COMPRE OPH EXAM EST PT 1/>: CPT

## 2018-07-30 PROCEDURE — 76519 ECHO EXAM OF EYE: CPT

## 2018-07-31 PROBLEM — I50.9 HEART FAILURE, UNSPECIFIED: Chronic | Status: ACTIVE | Noted: 2018-02-21

## 2018-08-01 ENCOUNTER — OTHER (OUTPATIENT)
Age: 63
End: 2018-08-01

## 2018-08-02 ENCOUNTER — FORM ENCOUNTER (OUTPATIENT)
Age: 63
End: 2018-08-02

## 2018-08-03 ENCOUNTER — OUTPATIENT (OUTPATIENT)
Dept: OUTPATIENT SERVICES | Facility: HOSPITAL | Age: 63
LOS: 1 days | End: 2018-08-03
Payer: COMMERCIAL

## 2018-08-03 ENCOUNTER — APPOINTMENT (OUTPATIENT)
Dept: COLORECTAL SURGERY | Facility: CLINIC | Age: 63
End: 2018-08-03
Payer: COMMERCIAL

## 2018-08-03 ENCOUNTER — APPOINTMENT (OUTPATIENT)
Dept: MRI IMAGING | Facility: IMAGING CENTER | Age: 63
End: 2018-08-03
Payer: COMMERCIAL

## 2018-08-03 VITALS
BODY MASS INDEX: 30.1 KG/M2 | HEIGHT: 71 IN | SYSTOLIC BLOOD PRESSURE: 115 MMHG | HEART RATE: 51 BPM | RESPIRATION RATE: 14 BRPM | DIASTOLIC BLOOD PRESSURE: 79 MMHG | OXYGEN SATURATION: 100 % | WEIGHT: 215 LBS

## 2018-08-03 DIAGNOSIS — R97.20 ELEVATED PROSTATE SPECIFIC ANTIGEN [PSA]: ICD-10-CM

## 2018-08-03 DIAGNOSIS — Z95.9 PRESENCE OF CARDIAC AND VASCULAR IMPLANT AND GRAFT, UNSPECIFIED: Chronic | ICD-10-CM

## 2018-08-03 PROCEDURE — 72197 MRI PELVIS W/O & W/DYE: CPT

## 2018-08-03 PROCEDURE — 82565 ASSAY OF CREATININE: CPT

## 2018-08-03 PROCEDURE — 72197 MRI PELVIS W/O & W/DYE: CPT | Mod: 26

## 2018-08-03 PROCEDURE — 45300 PROCTOSIGMOIDOSCOPY DX: CPT

## 2018-08-03 PROCEDURE — A9585: CPT

## 2018-08-03 PROCEDURE — 99202 OFFICE O/P NEW SF 15 MIN: CPT | Mod: 25

## 2018-08-03 RX ORDER — SULINDAC 150 MG/1
150 TABLET ORAL
Qty: 10 | Refills: 0 | Status: DISCONTINUED | COMMUNITY
Start: 2018-06-28 | End: 2018-08-03

## 2018-08-03 RX ORDER — DICLOFENAC SODIUM 10 MG/G
1 GEL TOPICAL
Qty: 1 | Refills: 3 | Status: DISCONTINUED | COMMUNITY
Start: 2018-03-13 | End: 2018-08-03

## 2018-08-03 RX ORDER — BACLOFEN 10 MG/1
10 TABLET ORAL 3 TIMES DAILY
Qty: 30 | Refills: 0 | Status: DISCONTINUED | COMMUNITY
Start: 2018-04-25 | End: 2018-08-03

## 2018-08-03 RX ORDER — IPRATROPIUM BROMIDE AND ALBUTEROL SULFATE 2.5; .5 MG/3ML; MG/3ML
0.5-2.5 (3) SOLUTION RESPIRATORY (INHALATION)
Qty: 90 | Refills: 0 | Status: DISCONTINUED | COMMUNITY
Start: 2017-03-07 | End: 2018-08-03

## 2018-08-26 ENCOUNTER — RX RENEWAL (OUTPATIENT)
Age: 63
End: 2018-08-26

## 2018-09-02 ENCOUNTER — OTHER (OUTPATIENT)
Age: 63
End: 2018-09-02

## 2018-09-17 ENCOUNTER — OTHER (OUTPATIENT)
Age: 63
End: 2018-09-17

## 2018-09-28 ENCOUNTER — MED ADMIN CHARGE (OUTPATIENT)
Age: 63
End: 2018-09-28

## 2018-10-10 ENCOUNTER — APPOINTMENT (OUTPATIENT)
Dept: INTERNAL MEDICINE | Facility: CLINIC | Age: 63
End: 2018-10-10
Payer: COMMERCIAL

## 2018-10-10 PROCEDURE — 99213 OFFICE O/P EST LOW 20 MIN: CPT

## 2018-10-11 VITALS — HEART RATE: 64 BPM | RESPIRATION RATE: 14 BRPM | DIASTOLIC BLOOD PRESSURE: 78 MMHG | SYSTOLIC BLOOD PRESSURE: 108 MMHG

## 2018-10-11 NOTE — HISTORY OF PRESENT ILLNESS
[Aortic Stenosis] : no aortic stenosis [Atrial Fibrillation] : atrial fibrillation [Coronary Artery Disease] : coronary artery disease [Recent Myocardial Infarction] : no recent myocardial infarction [Implantable Device/Pacemaker] : no implantable device/pacemaker [Asthma] : no asthma [COPD] : no COPD [Sleep Apnea] : no sleep apnea [Smoker] : not a smoker [No Adverse Anesthesia Reaction] : no adverse anesthesia reaction in self or family member [Chronic Anticoagulation] : chronic anticoagulation [Chronic Kidney Disease] : chronic kidney disease [Diabetes] : no diabetes [(Patient denies any chest pain, claudication, dyspnea on exertion, orthopnea, palpitations or syncope)] : Patient denies any chest pain, claudication, dyspnea on exertion, orthopnea, palpitations or syncope [Moderate (4-6 METs)] : Moderate (4-6 METs) [Anti-Platelet Agents: _____] : Anti-Platelet Agents: [unfilled] [Anticoagulants: _____] : Anticoagulants: [unfilled] [FreeTextEntry1] : Cataract extraction OS [FreeTextEntry2] : 10/18/18

## 2018-10-11 NOTE — PHYSICAL EXAM
[Clear to Auscultation] : lungs were clear to auscultation bilaterally [Normal Rate] : normal rate  [Regular Rhythm] : with a regular rhythm [Normal S1, S2] : normal S1 and S2 [No Edema] : there was no peripheral edema [Soft] : abdomen soft [Non Tender] : non-tender [Non-distended] : non-distended [No HSM] : no HSM

## 2018-10-11 NOTE — ASSESSMENT
[High Risk Surgery - Intraperitoneal, Intrathoracic or Supringuinal Vascular Procedures] : High Risk Surgery - Intraperitoneal, Intrathoracic or Supringuinal Vascular Procedures - No (0) [Ischemic Heart Disease] : Ischemic Heart Disease  - Yes (1) [Congestive Heart Failure] : Congestive Heart Failure - Yes (1) [Prior Cerebrovascular Accident or TIA] : Prior Cerebrovascular Accident or TIA - No (0) [Creatinine >= 2mg/dL (1 Point)] : Creatinine >= 2mg/dL - No (0) [Insulin-dependent Diabetic (1 Point)] : Insulin-dependent Diabetic - No (0) [2] : 2 , RCRI Class: III, Risk of Post-Op Cardiac Complications: 6.6%, Procedure Risk: Elevated-Risk [Patient Optimized for Surgery] : Patient optimized for surgery [No Further Testing Recommended] : no further testing recommended [FreeTextEntry4] : Patient is a good candidate for this low risk procedure. \par Hold Eliquis for 5 days before procedure, as per cardiology\par Continue ASA, Amiodarone and metoprolol AM of procedure

## 2018-10-12 ENCOUNTER — RX RENEWAL (OUTPATIENT)
Age: 63
End: 2018-10-12

## 2018-10-12 DIAGNOSIS — H26.013: ICD-10-CM

## 2018-10-17 ENCOUNTER — TRANSCRIPTION ENCOUNTER (OUTPATIENT)
Age: 63
End: 2018-10-17

## 2018-10-18 ENCOUNTER — APPOINTMENT (OUTPATIENT)
Dept: OPHTHALMOLOGY | Facility: HOSPITAL | Age: 63
End: 2018-10-18

## 2018-10-18 ENCOUNTER — OUTPATIENT (OUTPATIENT)
Dept: OUTPATIENT SERVICES | Facility: HOSPITAL | Age: 63
LOS: 1 days | End: 2018-10-18
Payer: COMMERCIAL

## 2018-10-18 VITALS
DIASTOLIC BLOOD PRESSURE: 76 MMHG | HEIGHT: 72 IN | TEMPERATURE: 99 F | SYSTOLIC BLOOD PRESSURE: 126 MMHG | HEART RATE: 53 BPM | WEIGHT: 209 LBS | OXYGEN SATURATION: 100 % | RESPIRATION RATE: 21 BRPM

## 2018-10-18 VITALS
DIASTOLIC BLOOD PRESSURE: 78 MMHG | RESPIRATION RATE: 18 BRPM | HEART RATE: 46 BPM | OXYGEN SATURATION: 97 % | SYSTOLIC BLOOD PRESSURE: 124 MMHG

## 2018-10-18 DIAGNOSIS — Z95.9 PRESENCE OF CARDIAC AND VASCULAR IMPLANT AND GRAFT, UNSPECIFIED: Chronic | ICD-10-CM

## 2018-10-18 DIAGNOSIS — Z98.890 OTHER SPECIFIED POSTPROCEDURAL STATES: Chronic | ICD-10-CM

## 2018-10-18 DIAGNOSIS — H25.812 COMBINED FORMS OF AGE-RELATED CATARACT, LEFT EYE: ICD-10-CM

## 2018-10-18 PROCEDURE — 66984 XCAPSL CTRC RMVL W/O ECP: CPT | Mod: LT

## 2018-10-18 PROCEDURE — V2632: CPT

## 2018-10-18 NOTE — ASU PATIENT PROFILE, ADULT - PSH
History of Arthroscopy- ankle  left ankle 2003 s/p "something fell on it"  S/P angioplasty with stent    S/P Arthroscopic Surgery of Left Knee  2001 injury- hit knee on desk  S/P hernia surgery

## 2018-10-19 ENCOUNTER — APPOINTMENT (OUTPATIENT)
Dept: OPHTHALMOLOGY | Facility: CLINIC | Age: 63
End: 2018-10-19
Payer: COMMERCIAL

## 2018-10-19 DIAGNOSIS — Z96.1 PRESENCE OF INTRAOCULAR LENS: ICD-10-CM

## 2018-10-19 PROCEDURE — 99024 POSTOP FOLLOW-UP VISIT: CPT

## 2018-10-24 ENCOUNTER — RX RENEWAL (OUTPATIENT)
Age: 63
End: 2018-10-24

## 2018-10-25 ENCOUNTER — APPOINTMENT (OUTPATIENT)
Dept: INTERNAL MEDICINE | Facility: CLINIC | Age: 63
End: 2018-10-25
Payer: COMMERCIAL

## 2018-10-25 ENCOUNTER — APPOINTMENT (OUTPATIENT)
Dept: GASTROENTEROLOGY | Facility: CLINIC | Age: 63
End: 2018-10-25
Payer: COMMERCIAL

## 2018-10-25 VITALS
TEMPERATURE: 98 F | OXYGEN SATURATION: 98 % | DIASTOLIC BLOOD PRESSURE: 70 MMHG | RESPIRATION RATE: 16 BRPM | HEART RATE: 48 BPM | WEIGHT: 210 LBS | SYSTOLIC BLOOD PRESSURE: 110 MMHG | HEIGHT: 71 IN | BODY MASS INDEX: 29.4 KG/M2

## 2018-10-25 DIAGNOSIS — R22.9 LOCALIZED SWELLING, MASS AND LUMP, UNSPECIFIED: ICD-10-CM

## 2018-10-25 PROCEDURE — 99204 OFFICE O/P NEW MOD 45 MIN: CPT

## 2018-10-25 PROCEDURE — 99212 OFFICE O/P EST SF 10 MIN: CPT

## 2018-10-25 NOTE — PHYSICAL EXAM
[de-identified] : RLE medial calf.  Non tender nodule, firm well circumscribed. Does not transilluminate

## 2018-10-25 NOTE — HISTORY OF PRESENT ILLNESS
[FreeTextEntry8] : Present with concern of a nodule on right lower calf.  No trauma Not painful  States waxes and wanes in size over course of day.  Has been present for about a week

## 2018-10-26 ENCOUNTER — APPOINTMENT (OUTPATIENT)
Dept: OPHTHALMOLOGY | Facility: CLINIC | Age: 63
End: 2018-10-26
Payer: COMMERCIAL

## 2018-10-26 PROCEDURE — 99024 POSTOP FOLLOW-UP VISIT: CPT

## 2018-10-28 ENCOUNTER — FORM ENCOUNTER (OUTPATIENT)
Age: 63
End: 2018-10-28

## 2018-10-29 ENCOUNTER — APPOINTMENT (OUTPATIENT)
Dept: ULTRASOUND IMAGING | Facility: IMAGING CENTER | Age: 63
End: 2018-10-29
Payer: COMMERCIAL

## 2018-10-29 ENCOUNTER — OUTPATIENT (OUTPATIENT)
Dept: OUTPATIENT SERVICES | Facility: HOSPITAL | Age: 63
LOS: 1 days | End: 2018-10-29
Payer: COMMERCIAL

## 2018-10-29 DIAGNOSIS — Z98.890 OTHER SPECIFIED POSTPROCEDURAL STATES: Chronic | ICD-10-CM

## 2018-10-29 DIAGNOSIS — Z95.9 PRESENCE OF CARDIAC AND VASCULAR IMPLANT AND GRAFT, UNSPECIFIED: Chronic | ICD-10-CM

## 2018-10-29 DIAGNOSIS — R22.9 LOCALIZED SWELLING, MASS AND LUMP, UNSPECIFIED: ICD-10-CM

## 2018-10-29 PROCEDURE — 76882 US LMTD JT/FCL EVL NVASC XTR: CPT | Mod: 26,RT

## 2018-10-29 PROCEDURE — 76882 US LMTD JT/FCL EVL NVASC XTR: CPT

## 2018-11-02 ENCOUNTER — MEDICATION RENEWAL (OUTPATIENT)
Age: 63
End: 2018-11-02

## 2018-11-02 DIAGNOSIS — K21.9 GASTRO-ESOPHAGEAL REFLUX DISEASE W/OUT ESOPHAGITIS: ICD-10-CM

## 2018-11-14 ENCOUNTER — NON-APPOINTMENT (OUTPATIENT)
Age: 63
End: 2018-11-14

## 2018-11-14 ENCOUNTER — APPOINTMENT (OUTPATIENT)
Dept: ELECTROPHYSIOLOGY | Facility: CLINIC | Age: 63
End: 2018-11-14
Payer: COMMERCIAL

## 2018-11-14 VITALS
DIASTOLIC BLOOD PRESSURE: 80 MMHG | HEART RATE: 56 BPM | OXYGEN SATURATION: 98 % | BODY MASS INDEX: 30.13 KG/M2 | SYSTOLIC BLOOD PRESSURE: 129 MMHG | WEIGHT: 216 LBS

## 2018-11-14 PROCEDURE — 93000 ELECTROCARDIOGRAM COMPLETE: CPT

## 2018-11-14 PROCEDURE — 99214 OFFICE O/P EST MOD 30 MIN: CPT

## 2018-11-14 NOTE — PHYSICAL EXAM
[General Appearance - Well Developed] : well developed [General Appearance - In No Acute Distress] : no acute distress [Normal Conjunctiva] : the conjunctiva exhibited no abnormalities [No Oral Pallor] : no oral pallor [Normal Jugular Venous V Waves Present] : normal jugular venous V waves present [Respiration, Rhythm And Depth] : normal respiratory rhythm and effort [Auscultation Breath Sounds / Voice Sounds] : lungs were clear to auscultation bilaterally [Abdomen Tenderness] : non-tender [Abnormal Walk] : normal gait [Nail Clubbing] : no clubbing of the fingernails [Cyanosis, Localized] : no localized cyanosis [No Venous Stasis] : no venous stasis [Impaired Insight] : insight and judgment were intact [5th Left ICS - MCL] : palpated at the 5th LICS in the midclavicular line [Normal Rate] : normal [Rhythm Regular] : regular [II] : a grade 2 [2+] : left 2+ [No Abnormalities] : the abdominal aorta was not enlarged and no bruit was heard [No Pitting Edema] : no pitting edema present

## 2018-11-16 ENCOUNTER — APPOINTMENT (OUTPATIENT)
Dept: OPHTHALMOLOGY | Facility: CLINIC | Age: 63
End: 2018-11-16
Payer: COMMERCIAL

## 2018-11-16 PROCEDURE — 76519 ECHO EXAM OF EYE: CPT | Mod: 26,RT

## 2018-11-16 PROCEDURE — 92014 COMPRE OPH EXAM EST PT 1/>: CPT | Mod: 24

## 2018-11-21 ENCOUNTER — APPOINTMENT (OUTPATIENT)
Dept: INTERNAL MEDICINE | Facility: CLINIC | Age: 63
End: 2018-11-21

## 2018-12-03 ENCOUNTER — APPOINTMENT (OUTPATIENT)
Dept: GASTROENTEROLOGY | Facility: HOSPITAL | Age: 63
End: 2018-12-03

## 2018-12-03 ENCOUNTER — OUTPATIENT (OUTPATIENT)
Dept: OUTPATIENT SERVICES | Facility: HOSPITAL | Age: 63
LOS: 1 days | End: 2018-12-03
Payer: COMMERCIAL

## 2018-12-03 DIAGNOSIS — Z98.890 OTHER SPECIFIED POSTPROCEDURAL STATES: Chronic | ICD-10-CM

## 2018-12-03 DIAGNOSIS — Z95.9 PRESENCE OF CARDIAC AND VASCULAR IMPLANT AND GRAFT, UNSPECIFIED: Chronic | ICD-10-CM

## 2018-12-03 DIAGNOSIS — R58 HEMORRHAGE, NOT ELSEWHERE CLASSIFIED: ICD-10-CM

## 2018-12-03 PROCEDURE — 45378 DIAGNOSTIC COLONOSCOPY: CPT

## 2018-12-14 ENCOUNTER — APPOINTMENT (OUTPATIENT)
Dept: INTERNAL MEDICINE | Facility: CLINIC | Age: 63
End: 2018-12-14
Payer: COMMERCIAL

## 2018-12-14 VITALS — SYSTOLIC BLOOD PRESSURE: 110 MMHG | DIASTOLIC BLOOD PRESSURE: 70 MMHG | HEART RATE: 64 BPM | RESPIRATION RATE: 14 BRPM

## 2018-12-14 PROCEDURE — 99213 OFFICE O/P EST LOW 20 MIN: CPT

## 2018-12-14 NOTE — HISTORY OF PRESENT ILLNESS
[Aortic Stenosis] : no aortic stenosis [Atrial Fibrillation] : atrial fibrillation [Coronary Artery Disease] : coronary artery disease [Recent Myocardial Infarction] : no recent myocardial infarction [Implantable Device/Pacemaker] : no implantable device/pacemaker [Asthma] : no asthma [COPD] : no COPD [Sleep Apnea] : no sleep apnea [Smoker] : not a smoker [No Adverse Anesthesia Reaction] : no adverse anesthesia reaction in self or family member [Chronic Anticoagulation] : chronic anticoagulation [Chronic Kidney Disease] : chronic kidney disease [Diabetes] : no diabetes [(Patient denies any chest pain, claudication, dyspnea on exertion, orthopnea, palpitations or syncope)] : Patient denies any chest pain, claudication, dyspnea on exertion, orthopnea, palpitations or syncope [Good (7-10 METs)] : Good (7-10 METs) [Anticoagulants: _____] : Anticoagulants: [unfilled] [FreeTextEntry1] : Cataract extraction  [FreeTextEntry2] : 1/3/2019 [FreeTextEntry3] : Dr. Osborne [FreeTextEntry6] : No bleeding or bruising tendencies.\par

## 2018-12-14 NOTE — PHYSICAL EXAM
[No Acute Distress] : no acute distress [Clear to Auscultation] : lungs were clear to auscultation bilaterally [Normal Rate] : normal rate  [Normal S1, S2] : normal S1 and S2 [No Murmur] : no murmur heard [No Carotid Bruits] : no carotid bruits [No Edema] : there was no peripheral edema [Soft] : abdomen soft [Non Tender] : non-tender [Non-distended] : non-distended [No Masses] : no abdominal mass palpated [No HSM] : no HSM

## 2018-12-16 NOTE — DISCUSSION/SUMMARY
[FreeTextEntry1] : He has remained in SR on amiodarone. His LV function is improving on meds and by maint of SR. Follow up echo to reassess MR and will decide whether he should get a repeat ablation - would not want to continue on amiodarone long term\par Will treat with ACE/beta blocker and reassess LV function/MR while in SR.\par Plan\par Possible repeat catheter ablation pending reassessment of severity of the mitral regurgitation.

## 2018-12-16 NOTE — HISTORY OF PRESENT ILLNESS
[FreeTextEntry1] : Patient is a 64 yo man with Cardiomyopathy, previously severe mitral regurg, AF - s/p AF ablation - recurrence - has been on amiodarone and in SR. \par He has history of CAD, s/p stent prox LAD 5/2014, LV dysfunction. \par Patient is feeling well and denies chest pain, SOB, dizziness, lightheadedness, palpitations. \par He stopped smoking about 4 year ago and drank alcohol infrequently. He is on ASA/Eliquis.

## 2018-12-16 NOTE — REVIEW OF SYSTEMS
[see HPI] : see HPI [Negative] : Heme/Lymph [Recent Weight Gain (___ Lbs)] : no recent weight gain [Feeling Fatigued] : not feeling fatigued [Recent Weight Loss (___ Lbs)] : no recent weight loss

## 2018-12-20 ENCOUNTER — OTHER (OUTPATIENT)
Age: 63
End: 2018-12-20

## 2018-12-21 ENCOUNTER — RX RENEWAL (OUTPATIENT)
Age: 63
End: 2018-12-21

## 2019-01-02 ENCOUNTER — TRANSCRIPTION ENCOUNTER (OUTPATIENT)
Age: 64
End: 2019-01-02

## 2019-01-03 ENCOUNTER — APPOINTMENT (OUTPATIENT)
Dept: OPHTHALMOLOGY | Facility: HOSPITAL | Age: 64
End: 2019-01-03

## 2019-01-03 ENCOUNTER — OUTPATIENT (OUTPATIENT)
Dept: OUTPATIENT SERVICES | Facility: HOSPITAL | Age: 64
LOS: 1 days | End: 2019-01-03
Payer: COMMERCIAL

## 2019-01-03 VITALS
DIASTOLIC BLOOD PRESSURE: 75 MMHG | RESPIRATION RATE: 18 BRPM | HEART RATE: 53 BPM | SYSTOLIC BLOOD PRESSURE: 106 MMHG | OXYGEN SATURATION: 99 %

## 2019-01-03 VITALS
HEART RATE: 60 BPM | TEMPERATURE: 99 F | WEIGHT: 209.44 LBS | RESPIRATION RATE: 20 BRPM | HEIGHT: 71 IN | OXYGEN SATURATION: 95 % | SYSTOLIC BLOOD PRESSURE: 128 MMHG | DIASTOLIC BLOOD PRESSURE: 85 MMHG

## 2019-01-03 DIAGNOSIS — H25.811 COMBINED FORMS OF AGE-RELATED CATARACT, RIGHT EYE: ICD-10-CM

## 2019-01-03 DIAGNOSIS — Z95.9 PRESENCE OF CARDIAC AND VASCULAR IMPLANT AND GRAFT, UNSPECIFIED: Chronic | ICD-10-CM

## 2019-01-03 DIAGNOSIS — Z98.49 CATARACT EXTRACTION STATUS, UNSPECIFIED EYE: Chronic | ICD-10-CM

## 2019-01-03 DIAGNOSIS — Z98.890 OTHER SPECIFIED POSTPROCEDURAL STATES: Chronic | ICD-10-CM

## 2019-01-03 PROCEDURE — V2632: CPT

## 2019-01-03 PROCEDURE — 66984 XCAPSL CTRC RMVL W/O ECP: CPT | Mod: RT

## 2019-01-03 PROCEDURE — 66984 XCAPSL CTRC RMVL W/O ECP: CPT | Mod: RT,79

## 2019-01-03 NOTE — ASU DISCHARGE PLAN (ADULT/PEDIATRIC). - SPECIAL INSTRUCTIONS
begin drops 3 times per day in operative eye.    keep eye shield on- only remove to place drops in the eye.

## 2019-01-03 NOTE — ASU PATIENT PROFILE, ADULT - PSH
History of Arthroscopy- ankle  left ankle 2003 s/p "something fell on it"  S/P angioplasty with stent    S/P Arthroscopic Surgery of Left Knee  2001 injury- hit knee on desk  S/P hernia surgery    Status post cataract extraction  left eye done 10/18/2018

## 2019-01-04 ENCOUNTER — APPOINTMENT (OUTPATIENT)
Dept: OPHTHALMOLOGY | Facility: CLINIC | Age: 64
End: 2019-01-04
Payer: COMMERCIAL

## 2019-01-04 PROCEDURE — 99024 POSTOP FOLLOW-UP VISIT: CPT

## 2019-01-11 ENCOUNTER — APPOINTMENT (OUTPATIENT)
Dept: OPHTHALMOLOGY | Facility: CLINIC | Age: 64
End: 2019-01-11
Payer: COMMERCIAL

## 2019-01-11 PROCEDURE — 99024 POSTOP FOLLOW-UP VISIT: CPT

## 2019-02-01 ENCOUNTER — APPOINTMENT (OUTPATIENT)
Dept: OPHTHALMOLOGY | Facility: CLINIC | Age: 64
End: 2019-02-01
Payer: COMMERCIAL

## 2019-02-01 PROCEDURE — 99024 POSTOP FOLLOW-UP VISIT: CPT

## 2019-02-01 PROCEDURE — 92015 DETERMINE REFRACTIVE STATE: CPT

## 2019-03-13 ENCOUNTER — MEDICATION RENEWAL (OUTPATIENT)
Age: 64
End: 2019-03-13

## 2019-03-18 ENCOUNTER — MEDICATION RENEWAL (OUTPATIENT)
Age: 64
End: 2019-03-18

## 2019-03-19 ENCOUNTER — MEDICATION RENEWAL (OUTPATIENT)
Age: 64
End: 2019-03-19

## 2019-03-22 ENCOUNTER — APPOINTMENT (OUTPATIENT)
Dept: INTERNAL MEDICINE | Facility: CLINIC | Age: 64
End: 2019-03-22
Payer: COMMERCIAL

## 2019-03-22 ENCOUNTER — APPOINTMENT (OUTPATIENT)
Dept: COLORECTAL SURGERY | Facility: CLINIC | Age: 64
End: 2019-03-22
Payer: COMMERCIAL

## 2019-03-22 DIAGNOSIS — B35.6 TINEA CRURIS: ICD-10-CM

## 2019-03-22 PROCEDURE — 99213 OFFICE O/P EST LOW 20 MIN: CPT

## 2019-03-22 NOTE — HISTORY OF PRESENT ILLNESS
[FreeTextEntry1] : Very pleasant 63-year-old man of color who presents for a follow-up visit. I saw him initially in August of 2018 with complaints of rectal bleeding and mucus discharge with perianal moisture. He was found to have sizable mixed hemorrhoids. Patient does take Eliquis for atrial fibrillation.\par \par He did have a clearing colonoscopy by Dr. Condon of GI and no problems were encountered. He continues to have the same symptoms that he presented with months ago.\par \par Inspection of the anorectal area reveals a persistence of sizable mixed hemorrhoids.\par \par The only appropriate way to properly deal with his pathology is an excisional hemorrhoidectomy.\par \par I reviewed the procedure with him and asked him to obtain a cardiology clearance preoperatively and stressed the fact he must be off Eliquis for an appropriate period of time prior to the hemorrhoidectomy.

## 2019-03-24 VITALS — RESPIRATION RATE: 14 BRPM | DIASTOLIC BLOOD PRESSURE: 78 MMHG | HEART RATE: 60 BPM | SYSTOLIC BLOOD PRESSURE: 120 MMHG

## 2019-03-24 NOTE — PHYSICAL EXAM
[Clear to Auscultation] : lungs were clear to auscultation bilaterally [No Murmur] : no murmur heard [Skin Lesions 1] : Skin lesion: [Red] : red

## 2019-03-24 NOTE — HISTORY OF PRESENT ILLNESS
[Initial Evaluation] : an initial evaluation of [Tinea Cruris] : tinea latonyas [Rash] : rash [Itchy] : itchy [Inguinal Area] : in the inguinal area [___ Week(s) Ago] : [unfilled] week(s) ago [de-identified] : pruritis

## 2019-04-02 ENCOUNTER — OUTPATIENT (OUTPATIENT)
Dept: OUTPATIENT SERVICES | Facility: HOSPITAL | Age: 64
LOS: 1 days | End: 2019-04-02
Payer: COMMERCIAL

## 2019-04-02 VITALS — HEIGHT: 71 IN | WEIGHT: 214.95 LBS

## 2019-04-02 DIAGNOSIS — Z01.818 ENCOUNTER FOR OTHER PREPROCEDURAL EXAMINATION: ICD-10-CM

## 2019-04-02 DIAGNOSIS — K64.9 UNSPECIFIED HEMORRHOIDS: ICD-10-CM

## 2019-04-02 DIAGNOSIS — I25.10 ATHEROSCLEROTIC HEART DISEASE OF NATIVE CORONARY ARTERY WITHOUT ANGINA PECTORIS: ICD-10-CM

## 2019-04-02 DIAGNOSIS — Z98.890 OTHER SPECIFIED POSTPROCEDURAL STATES: Chronic | ICD-10-CM

## 2019-04-02 DIAGNOSIS — Z95.9 PRESENCE OF CARDIAC AND VASCULAR IMPLANT AND GRAFT, UNSPECIFIED: Chronic | ICD-10-CM

## 2019-04-02 DIAGNOSIS — Z98.49 CATARACT EXTRACTION STATUS, UNSPECIFIED EYE: Chronic | ICD-10-CM

## 2019-04-02 PROCEDURE — 85027 COMPLETE CBC AUTOMATED: CPT

## 2019-04-02 PROCEDURE — 80048 BASIC METABOLIC PNL TOTAL CA: CPT

## 2019-04-02 PROCEDURE — 93010 ELECTROCARDIOGRAM REPORT: CPT | Mod: 76

## 2019-04-02 PROCEDURE — 93005 ELECTROCARDIOGRAM TRACING: CPT

## 2019-04-02 PROCEDURE — G0463: CPT

## 2019-04-02 RX ORDER — SODIUM CHLORIDE 9 MG/ML
3 INJECTION INTRAMUSCULAR; INTRAVENOUS; SUBCUTANEOUS EVERY 8 HOURS
Qty: 0 | Refills: 0 | Status: DISCONTINUED | OUTPATIENT
Start: 2019-04-12 | End: 2019-04-27

## 2019-04-02 RX ORDER — LIDOCAINE HCL 20 MG/ML
0.2 VIAL (ML) INJECTION ONCE
Qty: 0 | Refills: 0 | Status: DISCONTINUED | OUTPATIENT
Start: 2019-04-12 | End: 2019-04-27

## 2019-04-02 NOTE — H&P PST ADULT - NSICDXFAMILYHX_GEN_ALL_CORE_FT
FAMILY HISTORY:  Mother  Still living? Unknown  Family history of diabetes mellitus, Age at diagnosis: Age Unknown  Family history of hypertension, Age at diagnosis: Age Unknown

## 2019-04-02 NOTE — H&P PST ADULT - NSICDXPASTSURGICALHX_GEN_ALL_CORE_FT
PAST SURGICAL HISTORY:  History of Arthroscopy- ankle left ankle 2003 s/p "something fell on it"    S/P angioplasty with stent 5/2014 2016    S/P Arthroscopic Surgery of Left Knee 2001 injury- hit knee on desk    S/P hernia surgery     Status post cataract extraction left eye done 10/18/2018 PAST SURGICAL HISTORY:  H/O cardiac radiofrequency ablation     History of Arthroscopy- ankle left ankle 2003 s/p "something fell on it"    S/P angioplasty with stent 5/2014 2016    S/P Arthroscopic Surgery of Left Knee 2001 injury- hit knee on desk    S/P hernia surgery     Status post cataract extraction left eye done 10/18/2018

## 2019-04-02 NOTE — H&P PST ADULT - HISTORY OF PRESENT ILLNESS
63yr old male with hemorrhoids coming in for hemorrhoidectomy.  Pt hx sig for CAD stents x2 A-fib ablation cardiomyopathy LV dysfunction

## 2019-04-02 NOTE — H&P PST ADULT - NSICDXPROBLEM_GEN_ALL_CORE_FT
PROBLEM DIAGNOSES  Problem: Unspecified hemorrhoids  Assessment and Plan:     Problem: CAD, multiple vessel  Assessment and Plan: Pt on Eliis going for Medical eval with Dr Pernell Lee [phone]  He will give the plan for Eliis PROBLEM DIAGNOSES  Problem: CAD, multiple vessel  Assessment and Plan: RedCap recommendation: interrupt DOAC. Bridging with LMWH not suggested for DOACs  Last dose 2 days before surgery    Problem: Unspecified hemorrhoids  Assessment and Plan:

## 2019-04-02 NOTE — H&P PST ADULT - NSANTHOSAYNRD_GEN_A_CORE
No. BOBBY screening performed.  STOP BANG Legend: 0-2 = LOW Risk; 3-4 = INTERMEDIATE Risk; 5-8 = HIGH Risk

## 2019-04-02 NOTE — H&P PST ADULT - NSICDXPASTMEDICALHX_GEN_ALL_CORE_FT
PAST MEDICAL HISTORY:  Acid reflux     Arthritis L arm and hands    Atrial fibrillation     CAD (coronary artery disease) 1 stent    CHF (congestive heart failure)     Childhood Asthma     CKD (chronic kidney disease)     Gout     History of cardiomyopathy LV dysfunction    History of cardioversion     HLD (hyperlipidemia)     HTN - Hypertension     Inguinal Hernia left    Keloid multiple on chest and arms PAST MEDICAL HISTORY:  Acid reflux     Arthritis L arm and hands    Atrial fibrillation     CAD (coronary artery disease) 1 stent    CHF (congestive heart failure)     Childhood Asthma     CKD (chronic kidney disease)     Gout     History of cardiomyopathy LV dysfunction    History of cardioversion     HLD (hyperlipidemia)     HTN - Hypertension     Inguinal Hernia left    Keloid multiple on chest and arms      Thalassemia minor

## 2019-04-03 PROBLEM — Z86.79 PERSONAL HISTORY OF OTHER DISEASES OF THE CIRCULATORY SYSTEM: Chronic | Status: ACTIVE | Noted: 2019-04-02

## 2019-04-09 ENCOUNTER — MEDICATION RENEWAL (OUTPATIENT)
Age: 64
End: 2019-04-09

## 2019-04-11 ENCOUNTER — TRANSCRIPTION ENCOUNTER (OUTPATIENT)
Age: 64
End: 2019-04-11

## 2019-04-12 ENCOUNTER — APPOINTMENT (OUTPATIENT)
Dept: COLORECTAL SURGERY | Facility: HOSPITAL | Age: 64
End: 2019-04-12

## 2019-04-12 ENCOUNTER — RESULT REVIEW (OUTPATIENT)
Age: 64
End: 2019-04-12

## 2019-04-12 ENCOUNTER — OUTPATIENT (OUTPATIENT)
Dept: OUTPATIENT SERVICES | Facility: HOSPITAL | Age: 64
LOS: 1 days | End: 2019-04-12
Payer: COMMERCIAL

## 2019-04-12 VITALS
HEIGHT: 71 IN | WEIGHT: 214.95 LBS | RESPIRATION RATE: 16 BRPM | HEART RATE: 55 BPM | OXYGEN SATURATION: 98 % | TEMPERATURE: 98 F | DIASTOLIC BLOOD PRESSURE: 76 MMHG | SYSTOLIC BLOOD PRESSURE: 121 MMHG

## 2019-04-12 VITALS
RESPIRATION RATE: 15 BRPM | HEART RATE: 48 BPM | TEMPERATURE: 97 F | OXYGEN SATURATION: 98 % | SYSTOLIC BLOOD PRESSURE: 115 MMHG | DIASTOLIC BLOOD PRESSURE: 70 MMHG

## 2019-04-12 DIAGNOSIS — Z98.49 CATARACT EXTRACTION STATUS, UNSPECIFIED EYE: Chronic | ICD-10-CM

## 2019-04-12 DIAGNOSIS — Z98.890 OTHER SPECIFIED POSTPROCEDURAL STATES: Chronic | ICD-10-CM

## 2019-04-12 DIAGNOSIS — K64.9 UNSPECIFIED HEMORRHOIDS: ICD-10-CM

## 2019-04-12 DIAGNOSIS — Z95.9 PRESENCE OF CARDIAC AND VASCULAR IMPLANT AND GRAFT, UNSPECIFIED: Chronic | ICD-10-CM

## 2019-04-12 DIAGNOSIS — Z01.818 ENCOUNTER FOR OTHER PREPROCEDURAL EXAMINATION: ICD-10-CM

## 2019-04-12 PROCEDURE — 88304 TISSUE EXAM BY PATHOLOGIST: CPT

## 2019-04-12 PROCEDURE — 46260 REMOVE IN/EX HEM GROUPS 2+: CPT

## 2019-04-12 PROCEDURE — 88304 TISSUE EXAM BY PATHOLOGIST: CPT | Mod: 26

## 2019-04-12 PROCEDURE — C1889: CPT

## 2019-04-12 RX ORDER — OXYCODONE HYDROCHLORIDE 5 MG/1
5 TABLET ORAL ONCE
Qty: 0 | Refills: 0 | Status: DISCONTINUED | OUTPATIENT
Start: 2019-04-12 | End: 2019-04-12

## 2019-04-12 RX ORDER — APIXABAN 2.5 MG/1
1 TABLET, FILM COATED ORAL
Qty: 0 | Refills: 0 | COMMUNITY

## 2019-04-12 RX ORDER — OXYCODONE HYDROCHLORIDE 5 MG/1
10 TABLET ORAL ONCE
Qty: 0 | Refills: 0 | Status: DISCONTINUED | OUTPATIENT
Start: 2019-04-12 | End: 2019-04-12

## 2019-04-12 RX ORDER — SODIUM CHLORIDE 9 MG/ML
1000 INJECTION, SOLUTION INTRAVENOUS
Qty: 0 | Refills: 0 | Status: DISCONTINUED | OUTPATIENT
Start: 2019-04-12 | End: 2019-04-27

## 2019-04-12 RX ORDER — FAMOTIDINE 10 MG/ML
20 INJECTION INTRAVENOUS ONCE
Qty: 0 | Refills: 0 | Status: COMPLETED | OUTPATIENT
Start: 2019-04-12 | End: 2019-04-12

## 2019-04-12 RX ORDER — ONDANSETRON 8 MG/1
4 TABLET, FILM COATED ORAL ONCE
Qty: 0 | Refills: 0 | Status: DISCONTINUED | OUTPATIENT
Start: 2019-04-12 | End: 2019-04-27

## 2019-04-12 RX ORDER — OXYCODONE HYDROCHLORIDE 5 MG/1
1 TABLET ORAL
Qty: 20 | Refills: 0 | OUTPATIENT
Start: 2019-04-12

## 2019-04-12 RX ADMIN — FAMOTIDINE 20 MILLIGRAM(S): 10 INJECTION INTRAVENOUS at 07:29

## 2019-04-12 NOTE — ASU PATIENT PROFILE, ADULT - PSH
H/O cardiac radiofrequency ablation    History of Arthroscopy- ankle  left ankle 2003 s/p "something fell on it"  S/P angioplasty with stent  5/2014 2016  S/P Arthroscopic Surgery of Left Knee  2001 injury- hit knee on desk  S/P hernia surgery    Status post cataract extraction  left eye done 10/18/2018

## 2019-04-12 NOTE — ASU DISCHARGE PLAN (ADULT/PEDIATRIC) - CARE PROVIDER_API CALL
Fernie Esparza)  ColonRectal Surgery; Surgery  [address], [address]  [place] [address]  Phone: [phone]  Fax: [phone]  Follow Up Time:

## 2019-04-12 NOTE — BRIEF OPERATIVE NOTE - SPECIMENS
Left lateral, posterior, right anterior, right posterior Left lateral, right anterior, right posterior hemorrhoids

## 2019-04-12 NOTE — ASU DISCHARGE PLAN (ADULT/PEDIATRIC) - ASU DC SPECIAL INSTRUCTIONSFT
HOME MEDICATIONS: Resume all your home meds except for Eliquis, which can be re-started next Wednesday, April 17th.     Please refer to discharge instruction sheet for all other details.

## 2019-04-12 NOTE — ASU PATIENT PROFILE, ADULT - PMH
Acid reflux    Arthritis  L arm and hands  Atrial fibrillation    CAD (coronary artery disease)  1 stent  CHF (congestive heart failure)    Childhood Asthma    CKD (chronic kidney disease)    Gout    History of cardiomyopathy  LV dysfunction  History of cardioversion    HLD (hyperlipidemia)    HTN - Hypertension    Inguinal Hernia  left  Keloid  multiple on chest and arms    Thalassemia minor

## 2019-04-12 NOTE — PRE-ANESTHESIA EVALUATION ADULT - NSANTHPMHFT_GEN_ALL_CORE
CADx2 ( ASA 81 mg 4/12/19)  reflux well controlled.   denies CP, denies SOB  AFIB successfully ablated per patient. CADx2 ( ASA 81 mg 4/12/19). last stent Febr 2018  reflux well controlled.   denies CP, denies SOB  AFIB successfully ablated per patient. on cardizem.

## 2019-04-12 NOTE — ASU DISCHARGE PLAN (ADULT/PEDIATRIC) - CALL YOUR DOCTOR IF YOU HAVE ANY OF THE FOLLOWING:
Wound/Surgical Site with redness, or foul smelling discharge or pus/Bleeding that does not stop/Pain not relieved by Medications/Nausea and vomiting that does not stop/Unable to urinate/Swelling that gets worse

## 2019-04-15 ENCOUNTER — RX RENEWAL (OUTPATIENT)
Age: 64
End: 2019-04-15

## 2019-04-15 RX ORDER — OXYCODONE HYDROCHLORIDE 5 MG/1
1 TABLET ORAL
Qty: 20 | Refills: 0 | OUTPATIENT
Start: 2019-04-15

## 2019-04-18 ENCOUNTER — INPATIENT (INPATIENT)
Facility: HOSPITAL | Age: 64
LOS: 1 days | Discharge: HOME | DRG: 920 | End: 2019-04-20
Attending: SURGERY | Admitting: SURGERY
Payer: COMMERCIAL

## 2019-04-18 VITALS
RESPIRATION RATE: 20 BRPM | TEMPERATURE: 98 F | HEART RATE: 58 BPM | HEIGHT: 71 IN | OXYGEN SATURATION: 99 % | SYSTOLIC BLOOD PRESSURE: 98 MMHG | WEIGHT: 216.93 LBS | DIASTOLIC BLOOD PRESSURE: 56 MMHG

## 2019-04-18 DIAGNOSIS — Z98.890 OTHER SPECIFIED POSTPROCEDURAL STATES: Chronic | ICD-10-CM

## 2019-04-18 DIAGNOSIS — K92.2 GASTROINTESTINAL HEMORRHAGE, UNSPECIFIED: ICD-10-CM

## 2019-04-18 DIAGNOSIS — Z95.9 PRESENCE OF CARDIAC AND VASCULAR IMPLANT AND GRAFT, UNSPECIFIED: Chronic | ICD-10-CM

## 2019-04-18 DIAGNOSIS — Z98.49 CATARACT EXTRACTION STATUS, UNSPECIFIED EYE: Chronic | ICD-10-CM

## 2019-04-18 PROBLEM — D56.3 THALASSEMIA MINOR: Chronic | Status: ACTIVE | Noted: 2019-04-02

## 2019-04-18 LAB
ALBUMIN SERPL ELPH-MCNC: 3.1 G/DL — LOW (ref 3.3–5)
ALBUMIN SERPL ELPH-MCNC: 3.3 G/DL — SIGNIFICANT CHANGE UP (ref 3.3–5)
ALP SERPL-CCNC: 64 U/L — SIGNIFICANT CHANGE UP (ref 40–120)
ALP SERPL-CCNC: 64 U/L — SIGNIFICANT CHANGE UP (ref 40–120)
ALT FLD-CCNC: 10 U/L — SIGNIFICANT CHANGE UP (ref 10–45)
ALT FLD-CCNC: 17 U/L — SIGNIFICANT CHANGE UP (ref 10–45)
ANION GAP SERPL CALC-SCNC: 15 MMOL/L — SIGNIFICANT CHANGE UP (ref 5–17)
ANION GAP SERPL CALC-SCNC: 15 MMOL/L — SIGNIFICANT CHANGE UP (ref 5–17)
APTT BLD: 27 SEC — LOW (ref 27.5–36.3)
AST SERPL-CCNC: 12 U/L — SIGNIFICANT CHANGE UP (ref 10–40)
AST SERPL-CCNC: 45 U/L — HIGH (ref 10–40)
BASOPHILS # BLD AUTO: 0.1 K/UL — SIGNIFICANT CHANGE UP (ref 0–0.2)
BASOPHILS NFR BLD AUTO: 0.4 % — SIGNIFICANT CHANGE UP (ref 0–2)
BILIRUB SERPL-MCNC: 0.5 MG/DL — SIGNIFICANT CHANGE UP (ref 0.2–1.2)
BILIRUB SERPL-MCNC: 0.6 MG/DL — SIGNIFICANT CHANGE UP (ref 0.2–1.2)
BLD GP AB SCN SERPL QL: NEGATIVE — SIGNIFICANT CHANGE UP
BUN SERPL-MCNC: 57 MG/DL — HIGH (ref 7–23)
BUN SERPL-MCNC: 57 MG/DL — HIGH (ref 7–23)
CALCIUM SERPL-MCNC: 8.5 MG/DL — SIGNIFICANT CHANGE UP (ref 8.4–10.5)
CALCIUM SERPL-MCNC: 8.7 MG/DL — SIGNIFICANT CHANGE UP (ref 8.4–10.5)
CHLORIDE SERPL-SCNC: 97 MMOL/L — SIGNIFICANT CHANGE UP (ref 96–108)
CHLORIDE SERPL-SCNC: 98 MMOL/L — SIGNIFICANT CHANGE UP (ref 96–108)
CO2 SERPL-SCNC: 22 MMOL/L — SIGNIFICANT CHANGE UP (ref 22–31)
CO2 SERPL-SCNC: 24 MMOL/L — SIGNIFICANT CHANGE UP (ref 22–31)
CREAT SERPL-MCNC: 5.24 MG/DL — HIGH (ref 0.5–1.3)
CREAT SERPL-MCNC: 5.33 MG/DL — HIGH (ref 0.5–1.3)
EOSINOPHIL # BLD AUTO: 0.2 K/UL — SIGNIFICANT CHANGE UP (ref 0–0.5)
EOSINOPHIL NFR BLD AUTO: 0.8 % — SIGNIFICANT CHANGE UP (ref 0–6)
GAS PNL BLDV: SIGNIFICANT CHANGE UP
GLUCOSE SERPL-MCNC: 103 MG/DL — HIGH (ref 70–99)
GLUCOSE SERPL-MCNC: 112 MG/DL — HIGH (ref 70–99)
HCT VFR BLD CALC: 29.1 % — LOW (ref 39–50)
HCT VFR BLD CALC: 30.8 % — LOW (ref 39–50)
HGB BLD-MCNC: 9.5 G/DL — LOW (ref 13–17)
HGB BLD-MCNC: 9.9 G/DL — LOW (ref 13–17)
INR BLD: 1.29 RATIO — HIGH (ref 0.88–1.16)
LYMPHOCYTES # BLD AUTO: 11.1 % — LOW (ref 13–44)
LYMPHOCYTES # BLD AUTO: 2 K/UL — SIGNIFICANT CHANGE UP (ref 1–3.3)
MCHC RBC-ENTMCNC: 20.7 PG — LOW (ref 27–34)
MCHC RBC-ENTMCNC: 21.2 PG — LOW (ref 27–34)
MCHC RBC-ENTMCNC: 32.2 GM/DL — SIGNIFICANT CHANGE UP (ref 32–36)
MCHC RBC-ENTMCNC: 32.8 GM/DL — SIGNIFICANT CHANGE UP (ref 32–36)
MCV RBC AUTO: 64.3 FL — LOW (ref 80–100)
MCV RBC AUTO: 64.6 FL — LOW (ref 80–100)
MONOCYTES # BLD AUTO: 1.5 K/UL — HIGH (ref 0–0.9)
MONOCYTES NFR BLD AUTO: 8.2 % — SIGNIFICANT CHANGE UP (ref 2–14)
NEUTROPHILS # BLD AUTO: 14.6 K/UL — HIGH (ref 1.8–7.4)
NEUTROPHILS NFR BLD AUTO: 79.5 % — HIGH (ref 43–77)
PLATELET # BLD AUTO: 223 K/UL — SIGNIFICANT CHANGE UP (ref 150–400)
PLATELET # BLD AUTO: 233 K/UL — SIGNIFICANT CHANGE UP (ref 150–400)
POTASSIUM SERPL-MCNC: 3.4 MMOL/L — LOW (ref 3.5–5.3)
POTASSIUM SERPL-MCNC: 6.4 MMOL/L — CRITICAL HIGH (ref 3.5–5.3)
POTASSIUM SERPL-SCNC: 3.4 MMOL/L — LOW (ref 3.5–5.3)
POTASSIUM SERPL-SCNC: 6.4 MMOL/L — CRITICAL HIGH (ref 3.5–5.3)
PROT SERPL-MCNC: 6.1 G/DL — SIGNIFICANT CHANGE UP (ref 6–8.3)
PROT SERPL-MCNC: 6.8 G/DL — SIGNIFICANT CHANGE UP (ref 6–8.3)
PROTHROM AB SERPL-ACNC: 15 SEC — HIGH (ref 10–12.9)
RBC # BLD: 4.5 M/UL — SIGNIFICANT CHANGE UP (ref 4.2–5.8)
RBC # BLD: 4.8 M/UL — SIGNIFICANT CHANGE UP (ref 4.2–5.8)
RBC # FLD: 17 % — HIGH (ref 10.3–14.5)
RBC # FLD: 17.4 % — HIGH (ref 10.3–14.5)
RH IG SCN BLD-IMP: POSITIVE — SIGNIFICANT CHANGE UP
SODIUM SERPL-SCNC: 134 MMOL/L — LOW (ref 135–145)
SODIUM SERPL-SCNC: 137 MMOL/L — SIGNIFICANT CHANGE UP (ref 135–145)
SURGICAL PATHOLOGY STUDY: SIGNIFICANT CHANGE UP
WBC # BLD: 16 K/UL — HIGH (ref 3.8–10.5)
WBC # BLD: 18.4 K/UL — HIGH (ref 3.8–10.5)
WBC # FLD AUTO: 16 K/UL — HIGH (ref 3.8–10.5)
WBC # FLD AUTO: 18.4 K/UL — HIGH (ref 3.8–10.5)

## 2019-04-18 PROCEDURE — 99291 CRITICAL CARE FIRST HOUR: CPT

## 2019-04-18 RX ORDER — SODIUM CHLORIDE 9 MG/ML
1000 INJECTION, SOLUTION INTRAVENOUS
Qty: 0 | Refills: 0 | Status: DISCONTINUED | OUTPATIENT
Start: 2019-04-18 | End: 2019-04-19

## 2019-04-18 RX ORDER — ACETAMINOPHEN 500 MG
650 TABLET ORAL EVERY 6 HOURS
Qty: 0 | Refills: 0 | Status: DISCONTINUED | OUTPATIENT
Start: 2019-04-18 | End: 2019-04-20

## 2019-04-18 RX ADMIN — SODIUM CHLORIDE 100 MILLILITER(S): 9 INJECTION, SOLUTION INTRAVENOUS at 19:53

## 2019-04-18 NOTE — ED PROVIDER NOTE - PROGRESS NOTE DETAILS
Seen by surgery, pending SICU evaluation. Will go to surgery floor if not accepted to SICU. Nichole Price DO

## 2019-04-18 NOTE — ED PROVIDER NOTE - ATTENDING CONTRIBUTION TO CARE
Dr. Davis : I have personally seen and examined this patient at the bedside. I have fully participated in the care of this patient. I have reviewed all pertinent clinical information, including history, physical exam, plan and the Resident's note and agree except as noted.       64yo M PMh Coronary Artery Disease, congestive heart failure, chronic kidney disease, HTN, hyperlipidemia, atrial fibrillation on Eliquis pw BRBPR SINCE YESTERDAY and feeling dizzy. sp hemorrhoidedctomy by dr. jaime 1 week ago. restarted eliques few days ago. notes 2 episodes of clots with brbpr yesterday non today. hypotensive systolic in 90s. tx with 2l fluid and 1 prbc unit and transferred from [facility].   Denies f/c/n/v/cp/sob/palpitations/cough/abd.pain/d/c/dysuria/hematuria. no sick contacts/recent travel.    PE:  head; atraumatic normocephalic  eyes: perrla  Heart: rrr s1s2  lungs: ctab  abd: soft, nt nd + bs no rebound/guarding no cva ttp  le: no swelling no calf ttp  back: no midline cervical/thoracic/lumbar ttp    -->lower gi bleed will fu labs to check h/h; pt currently receiving a unit of blood; no complaints surgery evaluated--pt to be admitted to SICU; new RUSSELL cr 5 got 2 l of fluids at [facility] -- mainanence fluid =s ordered by surg--admit to SICU

## 2019-04-18 NOTE — CONSULT NOTE ADULT - SUBJECTIVE AND OBJECTIVE BOX
HISTORY OF PRESENT ILLNESS:  SYEDA WEAVER is a 63y Male w/ CAD s/p stents x2, afib s/p ablation (eliquis, ASA), cardiomyopathy, LV dysfunction, CKD (not on HD), HTN, thalassemia minor presents s/p 3 quadrant hemorrhoidectomy on 4/12 with BRBPR and hypotension. Patient was recovering well from the hemorrhoidectomy, no longer requiring pain medication. He was instructed to hold his Eliquis post operatively until 4/17. He took one dose yesterday AM and last night, began to feel lightheaded and dizzy. He then had multiple large bowel movements with bright red blood coating the stool and dripping down his leg afterwards. He had another small bowel movement this AM with less blood, more on the toilet paper than in the stool, and continued to feel dizzy. He presented to [facility] for evaluation, where he was noted to be hypotensive 90/50 with H&H 9.1/28.3. He was given one unit pRBC and was transferred to [facility] for further care.  In [facility], patient's repeat H&H prior to completion of transfusion 9.9/30.8. Patient still noted to be hypotensive to the 90s, denied any active bleeding.  SICU consulted for hemodynamic monitoring.     PAST MEDICAL HISTORY: Thalassemia minor  Acid reflux  History of cardiomyopathy  CHF (congestive heart failure)  History of cardioversion  Atrial fibrillation  HLD (hyperlipidemia)  CAD (coronary artery disease)  Gout  CKD (chronic kidney disease)  Arthritis  Keloid  Childhood Asthma  Inguinal Hernia  HTN - Hypertension      PAST SURGICAL HISTORY: H/O cardiac radiofrequency ablation  Status post cataract extraction  S/P hernia surgery  S/P angioplasty with stent  History of Arthroscopy- ankle  S/P Arthroscopic Surgery of Left Knee      FAMILY HISTORY: Family history of diabetes mellitus  Family history of hypertension      HOME MEDICATIONS:  · 	aspirin 81 mg oral delayed release tablet: 1 tab(s) orally once a day, Last Dose Taken:    · 	pantoprazole 40 mg oral delayed release tablet: 1 tab(s) orally once a day (before a meal), Last Dose Taken:    · 	simvastatin 40 mg oral tablet: 1 tab(s) orally once a day (at bedtime), Last Dose Taken:    · 	lisinopril 2.5 mg oral tablet: 1 tab(s) orally once a day, Last Dose Taken:    · 	hydroCHLOROthiazide 25 mg oral tablet: 1 tab(s) orally once a day, Last Dose Taken:    · 	amiodarone 200 mg oral tablet: 1 tab(s) orally once a day, Last Dose Taken:    · 	metoprolol tartrate 25 mg oral tablet: 1/2 tab oral daily, Last Dose Taken:    · 	Eliquis 5 mg oral tablet: 2 tab(s) orally 2 times a day, Last Dose Taken:      ALLERGIES: No Known Allergies      VITAL SIGNS:  ICU Vital Signs Last 24 Hrs  T(C): 37 (18 Apr 2019 17:21), Max: 37 (18 Apr 2019 17:21)  T(F): 98.6 (18 Apr 2019 17:21), Max: 98.6 (18 Apr 2019 17:21)  HR: 61 (18 Apr 2019 17:21) (58 - 61)  BP: 111/67 (18 Apr 2019 17:21) (98/56 - 111/67)  BP(mean): --  ABP: --  ABP(mean): --  RR: 18 (18 Apr 2019 17:21) (18 - 20)  SpO2: 98% (18 Apr 2019 17:21) (98% - 99%)      NEURO  Exam: AOx3, denies pain      RESPIRATORY  Exam: unlabored breathing, CTAB      CARDIOVASCULAR  VBG - ( 18 Apr 2019 17:17 )  pH: 7.38  /  pCO2: 47    /  pO2: 30    / HCO3: 27    / Base Excess: 1.8   /  SaO2: 41     Lactate: 1.4      Exam: hypotensive to 90s, RRR  Cardiac Rhythm: sinus      GI/NUTRITION  Exam: soft, NT, ND, no active bleeding on external rectal exam  Diet: NPO      GENITOURINARY/RENAL      Weight (kg): [id number] (04-18 @ 13:06)  04-18    134<L>  |  97  |  57<H>  ----------------------------<  112<H>  6.4<HH>   |  22  |  5.33<H>    Ca    8.7      18 Apr 2019 13:37    TPro  6.8  /  Alb  3.3  /  TBili  0.5  /  DBili  x   /  AST  45<H>  /  ALT  17  /  AlkPhos  64  04-18    [ ] Huang catheter, indication: urine output monitoring in critically ill patient    HEMATOLOGIC  [ ] VTE Prophylaxis: holding in setting of active bleed                          9.9    18.4  )-----------( 233      ( 18 Apr 2019 13:37 )             30.8     PT/INR - ( 18 Apr 2019 13:37 )   PT: 15.0 sec;   INR: 1.29 ratio         PTT - ( 18 Apr 2019 13:37 )  PTT:27.0 sec  Transfusion: [ ] PRBC	[ ] Platelets	[ ] FFP	[ ] Cryoprecipitate      INFECTIOUS DISEASES  no active issues      ENDOCRINE  no active issues      PATIENT CARE ACCESS DEVICES:  [x] Peripheral IV  [ ] Central Venous Line	[ ] R	[ ] L	[ ] IJ	[ ] Fem	[ ] SC	Placed:   [ ] Arterial Line		[ ] R	[ ] L	[ ] Fem	[ ] Rad	[ ] Ax	Placed:   [ ] PICC:					[ ] Mediport  [ ] Urinary Catheter, Date Placed:   [x] Necessity of urinary, arterial, and venous catheters discussed

## 2019-04-18 NOTE — ED ADULT NURSE NOTE - OBJECTIVE STATEMENT
63 yr old male was a transfer from [facility] after a hemorrhoid procedure last week. he started taking eliquis and started bleeding with bowel movements. h/h/ 9/26 but symptomatic. on assessment a and o x 3 lungs clear abd soft non tender. 63 yr old male was a transfer from [facility] after a hemorrhoid procedure last week. he started taking eliquis and started bleeding with bowel movements. h/h/ 9/26 but symptomatic. on assessment a and o x 3 lungs clear abd soft non tender. no swelling in extremities, vitals stable. no active bleeding since yesterday

## 2019-04-18 NOTE — CONSULT NOTE ADULT - SUBJECTIVE AND OBJECTIVE BOX
CARDIOLOGY CONSULT - Dr. Lee     CHIEF COMPLAINT:  cardiac management     HPI: 63M w/ CAD s/p stents x2, afib s/p ablation (eliquis, ASA), cardiomyopathy, LV dysfunction, CKD, HTN, thalassemia minor presents s/p 3 quadrant hemorrhoidectomy on  with BRBPR and hypotension. Patient was recovering well from the hemorrhoidectomy, no longer requiring pain medication. He was instructed to hold his eliquis post operatively until . He took one dose yesterday AM and last night, began to feel lightheaded and dizzy. He then had multiple large bowel movements with bright red blood coating the stool and dripping down his leg afterwards. He presented to [facility] for evaluation, where he was noted to be hypotensive 90/50 with H&H 9.1/28.3. He was given one unit pRBC and bolus and was transferred to [facility]. Pt. seen and examined in ED, no recent cp/sob/goldberg, LE edema, orthopnea, syncope, or palpitations. Pt. states he does feel weak, last time he ate or drank anything was yesterday. Labs also significant for RUSSELL creat 5.33 and hyperkalemia, potassium 6.4. Pt. denies fever, chills, dysuria.     PAST MEDICAL & SURGICAL HISTORY:  Thalassemia minor  History of cardiomyopathy: LV dysfunction  CHF (congestive heart failure)  Atrial fibrillation  HLD (hyperlipidemia)  CAD (coronary artery disease): 1 stent  Gout  CKD (chronic kidney disease)  Arthritis: L arm and hands  HTN - Hypertension  H/O cardiac radiofrequency ablation  Status post cataract extraction: left eye done 10/18/2018  S/P hernia surgery  S/P angioplasty with stent: 2014  History of Arthroscopy- ankle: left ankle  s/p &quot;something fell on it&quot;  S/P Arthroscopic Surgery of Left Knee:  injury- hit knee on desk    PREVIOUS DIAGNOSTIC TESTING:    [ ] Echocardiogram: < from: Transesophageal Echocardiogram (18 @ 13:43) >  Conclusions:  1. Tethered thickened  mitral valve leaflets. Severe  posterolateral mitral regurgitation.  2. Mildly dilated left atrium.  LA volume index = 37 cc/m2.    No left atrial or left atrial appendage thrombus.  Normal left atrial appendage function (velocity= 0.5 m/s).  3. Severe segmental left ventricular systolic dysfunction.  Akinesis of the inferior and inferolateral wall, apex.  Hypokinesis of the remaining segments.  4. Moderate right atrial enlargement.  5. Right ventricular enlargement with decreased right  ventricular systolic function.  6. Estimated pulmonary artery systolic pressure equals 46  mm Hg, assuming right atrial pressure equals 8 mm Hg,  consistent with mild pulmonary pressures.  Reviewed in real time with Dr. James  *** Compared with echocardiogram of 2016,  there is a  decliine in left ventricular systolic function as well as  development of significant mitral regurgitation.    < end of copied text >    [ ]  Catheterization: < from: Cardiac Cath Lab - Adult (18 @ 09:21) >  [facility]  Department of Cardiology  [address] [place] [address]  [phone]  Cath Lab Report -- Comprehensive Report  Patient: SYEDA WEAVER  Study date: 2018  Account number: [id number]  MR number: [id number]  : 1955  Gender: Male  Race:  Amer  Case Physician(s):  BRYAN Minor M.D.  Fellow:  Yony Sierra M.D.  Referring Physician:  BRYAN Minor M.D.  INDICATIONS: Unstable angina - CCS3. Recent onset increased dyspnea (as  anginal equivalent) and palpitations with now severe LV systolic  dysfunction and class IV CHF. Also in atrial flutter for the past 2  months. Cath with lunimal LCx, Ramus, RCA disease but severe instent  lesion of the ostial/proximal LAD stent.  HISTORY: The patient has a history of coronary artery disease. The patient  has hypertension, renal failure (not requiring dialysis),  medication-treated dyslipidemia, atrial fibrillation, s/p ablation, CKD,  and a family history of coronary artery disease. PRIOR CARDIOVASCULAR  PROCEDURES: Stent of the proximal LAD.  PROCEDURE:  --  Left coronary angiography.  --  Right coronary angiography.  --  Intervention on proximal LAD: drug-eluting stent.  TECHNIQUE: The risks and alternatives of the procedures and conscious  sedation were explained to the patient and informed consent was obtained.  Cardiac catheterization performed electively. Coronary intervention  performed electively.  Local anesthetic given. Right radial artery access. Left coronary artery  angiography. The vessel was injected utilizing a catheter. Right coronary  artery angiography. The vessel was injected utilizing a catheter.  RADIATION EXPOSURE: 31.2 min. A drug-eluting stent was performed on the 75  % lesion in the proximal LAD. Following intervention there was a 1 %  residual stenosis. There was no dissection. Vessel setup was performed. A  6FR JL3.5 LAUNCHER guiding catheter was used to intubate the vessel.  Vessel setup was performed. A BMW UNIVERSAL 190CM wire was used to cross  the lesion. Balloon angioplasty was performed, using a 2.50 X 15 APEX  balloon, with 2 inflations and a maximum inflation pressure of 12 nohemi.  Balloon angioplasty was performed, using a 3.00 X15 APEX balloon, with 2  inflations and a maximum inflation pressure of 14 nohemi. A 3.50 X 38 SYNERGY  drug-eluting stent placement was attempted, but would not cross the lesion  and was removed. A 3.50 X 38 SYNERGY drug-eluting stent was placed across  the lesion and deployed at a maximum inflation pressure of 14 nohemi. Balloon  angioplasty was performed, using a 3.50 X 15 NC APEX balloon, with 2  inflations and a maximum inflation pressure of 16 nohemi.  CONTRAST GIVEN: Omnipaque 54 ml. Omnipaque 101 ml.  MEDICATIONS GIVEN: Midazolam, 0.5 mg, IV. Fentanyl, 25 mcg, IV. Fentanyl,  25 mcg, IV. Verapamil (Isoptin, Calan, Covera), 2.5 mg, IA. Nitroglycerin,  100 mcg, intracoronary. Heparin, 3000 units, IA. Bivalirudin (Angiomax),  infusion rate of 1.75, IV. Bivalirudin (Angiomax), 15 ml, IV. Clopidogrel  (Plavix), 600 mg, PO. Aspirin, 325 mg, PO.  VENTRICLES: No left ventriculogram was performed.  CORONARY VESSELS: The coronary circulation is right dominant.  LM:   --  LM: Normal.  LAD:   --  Proximal LAD: There was a tubular 75 % stenosis at the site of a  prior stent.  CX:   --  Circumflex: Normal.  --  OM1: Normal.  --  OM2: Normal.  RI:   --  Ramus intermedius: Angiography showed minor luminal  irregularities with no flow limiting lesions.  RCA:   --  RCA: Angiography showed minor luminal irregularities with no  flow limiting lesions.  --  RPDA: Angiography showed minor luminal irregularities with no flow  limiting lesions.  --  RPL1: Angiography showed minor luminal irregularities withno flow  limiting lesions.  COMPLICATIONS: There were no complications.  DIAGNOSTIC IMPRESSIONS: Recent onset increased dyspnea and palpitations  with now severe LV systolic dysfunction and class IV CHF. Also in atrial  flutter for the past 2 months.Cath with lunimal LCx, Ramus, RCA disease  but severe instent lesion of the ostial/proximal LAD stent.  DIAGNOSTIC RECOMMENDATIONS: PCI with MARIAN to severe proximal LAD instent  lesion in setting of new onset class IV heart failure and new severe  cardiomyopathy. Patient remains in his atrial arrhythmia. Recent NICOLETTE  revealed severe functional mitral regurgitation. Mitral valve disease is  secondary to new LV dysfunction and subsequent LV dilatation. Etiology of  cardiomyopathy is likely mixed and due to LAD disease and atrial  arrhythmia. PCI to LAD performed In light of severe lesion with  cardiomyopathy with hopeful improvement in LV function. Will need  restoration of sinus rhythm with ablation for optimal improvement in  overall LV function and hopeful decrease in MR burden. Continue asa,  plavix, and eliquis as ordered.  INTERVENTIONAL IMPRESSIONS: Recent onset increased dyspnea and palpitations  with now severe LV systolic dysfunction and class IV CHF. Also in atrial  flutter for thepast 2 months. Cath with lunimal LCx, Ramus, RCA disease  but severe instent lesion of the ostial/proximal LAD stent.  INTERVENTIONAL RECOMMENDATIONS: PCI with MARIAN to severe proximal LAD instent  lesion in setting of new onset class IV heart failure and new severe  cardiomyopathy. Patient remains in his atrial arrhythmia. Recent NICOLETTE  revealed severe functional mitral regurgitation. Mitral valve disease is  secondary to new LV dysfunction and subsequent LV dilatation. Etiology of  cardiomyopathy is likely mixed and due to LAD disease and atrial  arrhythmia. PCI to LAD performed In light of severe lesion with  cardiomyopathy with hopeful improvement in LV function. Will need  restoration of sinus rhythm with ablation for optimal improvement in  overall LV function and hopeful decrease in MR burden. Continue asa,  plavix, and eliquis as ordered.  Prepared and signed by  Pernell Lee M.D.    < end of copied text >    [ ] Stress Test:  	< from: Nuclear Stress Test-Pharmacologic (14 @ 00:00) >  IMPRESSIONS:Abnormal Study  * Chest Pain: No chest pain with exercise.  * HR Response: Appropriate.  * BP Response: Appropriate.  * Heart Rhythm: normal sinus.  * ECG Abnormalities: None.  * Arrhythmia: Frequent VPD's, Ventricular Couplets.  * Review of raw data shows: The study is of good technical  quality.  * The left ventricle was mildly dilated at baseline.There  is a medium sized, moderate to severe defect in inferior  wall that is fixed consistent with infarction with minimal  mauro-infarct ischemia.  * Post-stress gated wall motion analysis was performed  (LVEF = 32 %;LVEDV = 161 ml.), revealing moderate  hypokinesis in inferior and inferolateral wall(s).  * No previous Nuclear/Stress exam.    < end of copied text >      MEDICATIONS:  MEDICATIONS  (STANDING):      FAMILY HISTORY:  Family history of diabetes mellitus (Mother)  Family history of hypertension (Mother)      SOCIAL HISTORY:    [ ] Non-smoker  [ ] Smoker  [ ] Alcohol    Allergies    No Known Allergies    Intolerances    	    REVIEW OF SYSTEMS:  CONSTITUTIONAL: No fever, weight loss, or fatigue  EYES: No eye pain, visual disturbances, or discharge  ENMT:  No difficulty hearing, tinnitus, vertigo; No sinus or throat pain  NECK: No pain or stiffness  RESPIRATORY: No cough, wheezing, chills or hemoptysis; No Shortness of Breath  CARDIOVASCULAR: No chest pain, palpitations, passing out, +dizziness, or leg swelling  GASTROINTESTINAL: No abdominal or epigastric pain. No nausea, vomiting, or hematemesis; No diarrhea or constipation. +BRBPR   GENITOURINARY: No dysuria, frequency, hematuria, or incontinence  NEUROLOGICAL: No headaches, memory loss, loss of strength, numbness, or tremors  SKIN: No itching, burning, rashes, or lesions   	  [X] All others negative	  [ ] Unable to obtain    PHYSICAL EXAM:  T(C): 36.4 (19 @ 13:06), Max: 36.4 (19 @ 13:06)  HR: 58 (19 @ 13:06) (58 - 58)  BP: 98/56 (19 @ 13:06) (98/56 - 98/56)  RR: 20 (19 @ 13:06) (20 - 20)  SpO2: 99% (19 @ 13:06) (99% - 99%)  Wt(kg): --  I&O's Summary      Appearance: Normal	  Psychiatry: A & O x 3, Mood & affect appropriate  HEENT:   Normal oral mucosa, PERRL, EOMI	  Lymphatic: No lymphadenopathy  Cardiovascular: Normal S1 S2,RRR, No JVD, No murmurs  Respiratory: Lungs clear to auscultation	  Gastrointestinal:  Soft, Non-tender, + BS	  Skin: No rashes, No ecchymoses, No cyanosis	  Neurologic: Non-focal  Extremities: Normal range of motion, No clubbing, cyanosis or edema  Vascular: Peripheral pulses palpable 2+ bilaterally    TELEMETRY: 	    ECG:  	  RADIOLOGY:  OTHER: 	  	  LABS:	 	    CARDIAC MARKERS:                                  9.9    18.4  )-----------( 233      ( 2019 13:37 )             30.8     -18    134<L>  |  97  |  57<H>  ----------------------------<  112<H>  6.4<HH>   |  22  |  5.33<H>    Ca    8.7      2019 13:37    TPro  6.8  /  Alb  3.3  /  TBili  0.5  /  DBili  x   /  AST  45<H>  /  ALT  17  /  AlkPhos  64  04-18    PT/INR - ( 2019 13:37 )   PT: 15.0 sec;   INR: 1.29 ratio         PTT - ( 2019 13:37 )  PTT:27.0 sec  proBNP:   Lipid Profile:   HgA1c:   TSH: CARDIOLOGY CONSULT - Dr. Lee     CHIEF COMPLAINT:  cardiac management     63M w/ CAD s/p stents x 2, afib s/p ablation (eliquis, ASA), cardiomyopathy, LV dysfunction, CKD, HTN, thalassemia minor presents s/p 3 quadrant hemorrhoidectomy on  with BRBPR and hypotension. Patient was recovering well from the hemorrhoidectomy, no longer requiring pain medication. He was instructed to hold his eliquis post operatively until . He took one dose yesterday AM and last night, began to feel lightheaded and dizzy. He then had multiple large bowel movements with bright red blood coating the stool and dripping down his leg afterwards. He presented to [facility] for evaluation, where he was noted to be hypotensive 90/50 with H&H 9.1/28.3. He was given one unit pRBC and bolus and was transferred to [facility]. Pt. seen and examined in ED, no recent cp/sob/goldberg, LE edema, orthopnea, syncope, or palpitations. Pt. states he does feel weak, last time he ate or drank anything was yesterday. Labs also significant for RUSSELL creat 5.33 and hyperkalemia, potassium 6.4. Pt. denies fever, chills, dysuria.     PAST MEDICAL & SURGICAL HISTORY:  Thalassemia minor  History of cardiomyopathy: LV dysfunction  CHF (congestive heart failure)  Atrial fibrillation  HLD (hyperlipidemia)  CAD (coronary artery disease): 1 stent  Gout  CKD (chronic kidney disease)  Arthritis: L arm and hands  HTN - Hypertension  H/O cardiac radiofrequency ablation  Status post cataract extraction: left eye done 10/18/2018  S/P hernia surgery  S/P angioplasty with stent: 2014  History of Arthroscopy- ankle: left ankle  s/p &quot;something fell on it&quot;  S/P Arthroscopic Surgery of Left Knee:  injury- hit knee on desk    PREVIOUS DIAGNOSTIC TESTING:    [ ] Echocardiogram: < from: Transesophageal Echocardiogram (18 @ 13:43) >  Conclusions:  1. Tethered thickened  mitral valve leaflets. Severe  posterolateral mitral regurgitation.  2. Mildly dilated left atrium.  LA volume index = 37 cc/m2.    No left atrial or left atrial appendage thrombus.  Normal left atrial appendage function (velocity= 0.5 m/s).  3. Severe segmental left ventricular systolic dysfunction.  Akinesis of the inferior and inferolateral wall, apex.  Hypokinesis of the remaining segments.  4. Moderate right atrial enlargement.  5. Right ventricular enlargement with decreased right  ventricular systolic function.  6. Estimated pulmonary artery systolic pressure equals 46  mm Hg, assuming right atrial pressure equals 8 mm Hg,  consistent with mild pulmonary pressures.  Reviewed in real time with Dr. James  *** Compared with echocardiogram of 2016,  there is a  decliine in left ventricular systolic function as well as  development of significant mitral regurgitation.    < end of copied text >    [ ]  Catheterization: < from: Cardiac Cath Lab - Adult (18 @ 09:21) >  [facility]  Department of Cardiology  [address] [place] [address]  [phone]  Cath Lab Report -- Comprehensive Report  Patient: SYEDA WEAVER  Study date: 2018  Account number: [id number]  MR number: [id number]  : 1955  Gender: Male  Race:  Amer  Case Physician(s):  BRYAN Minor M.D.  Fellow:  Yony Sierra M.D.  Referring Physician:  BRYAN Minor M.D.  INDICATIONS: Unstable angina - CCS3. Recent onset increased dyspnea (as  anginal equivalent) and palpitations with now severe LV systolic  dysfunction and class IV CHF. Also in atrial flutter for the past 2  months. Cath with lunimal LCx, Ramus, RCA disease but severe instent  lesion of the ostial/proximal LAD stent.  HISTORY: The patient has a history of coronary artery disease. The patient  has hypertension, renal failure (not requiring dialysis),  medication-treated dyslipidemia, atrial fibrillation, s/p ablation, CKD,  and a family history of coronary artery disease. PRIOR CARDIOVASCULAR  PROCEDURES: Stent of the proximal LAD.  PROCEDURE:  --  Left coronary angiography.  --  Right coronary angiography.  --  Intervention on proximal LAD: drug-eluting stent.  TECHNIQUE: The risks and alternatives of the procedures and conscious  sedation were explained to the patient and informed consent was obtained.  Cardiac catheterization performed electively. Coronary intervention  performed electively.  Local anesthetic given. Right radial artery access. Left coronary artery  angiography. The vessel was injected utilizing a catheter. Right coronary  artery angiography. The vessel was injected utilizing a catheter.  RADIATION EXPOSURE: 31.2 min. A drug-eluting stent was performed on the 75  % lesion in the proximal LAD. Following intervention there was a 1 %  residual stenosis. There was no dissection. Vessel setup was performed. A  6FR JL3.5 LAUNCHER guiding catheter was used to intubate the vessel.  Vessel setup was performed. A BMW UNIVERSAL 190CM wire was used to cross  the lesion. Balloon angioplasty was performed, using a 2.50 X 15 APEX  balloon, with 2 inflations and a maximum inflation pressure of 12 nohemi.  Balloon angioplasty was performed, using a 3.00 X15 APEX balloon, with 2  inflations and a maximum inflation pressure of 14 nohemi. A 3.50 X 38 SYNERGY  drug-eluting stent placement was attempted, but would not cross the lesion  and was removed. A 3.50 X 38 SYNERGY drug-eluting stent was placed across  the lesion and deployed at a maximum inflation pressure of 14 nohemi. Balloon  angioplasty was performed, using a 3.50 X 15 NC APEX balloon, with 2  inflations and a maximum inflation pressure of 16 nohemi.  CONTRAST GIVEN: Omnipaque 54 ml. Omnipaque 101 ml.  MEDICATIONS GIVEN: Midazolam, 0.5 mg, IV. Fentanyl, 25 mcg, IV. Fentanyl,  25 mcg, IV. Verapamil (Isoptin, Calan, Covera), 2.5 mg, IA. Nitroglycerin,  100 mcg, intracoronary. Heparin, 3000 units, IA. Bivalirudin (Angiomax),  infusion rate of 1.75, IV. Bivalirudin (Angiomax), 15 ml, IV. Clopidogrel  (Plavix), 600 mg, PO. Aspirin, 325 mg, PO.  VENTRICLES: No left ventriculogram was performed.  CORONARY VESSELS: The coronary circulation is right dominant.  LM:   --  LM: Normal.  LAD:   --  Proximal LAD: There was a tubular 75 % stenosis at the site of a  prior stent.  CX:   --  Circumflex: Normal.  --  OM1: Normal.  --  OM2: Normal.  RI:   --  Ramus intermedius: Angiography showed minor luminal  irregularities with no flow limiting lesions.  RCA:   --  RCA: Angiography showed minor luminal irregularities with no  flow limiting lesions.  --  RPDA: Angiography showed minor luminal irregularities with no flow  limiting lesions.  --  RPL1: Angiography showed minor luminal irregularities withno flow  limiting lesions.  COMPLICATIONS: There were no complications.  DIAGNOSTIC IMPRESSIONS: Recent onset increased dyspnea and palpitations  with now severe LV systolic dysfunction and class IV CHF. Also in atrial  flutter for the past 2 months.Cath with lunimal LCx, Ramus, RCA disease  but severe instent lesion of the ostial/proximal LAD stent.  DIAGNOSTIC RECOMMENDATIONS: PCI with MARIAN to severe proximal LAD instent  lesion in setting of new onset class IV heart failure and new severe  cardiomyopathy. Patient remains in his atrial arrhythmia. Recent NICOLETTE  revealed severe functional mitral regurgitation. Mitral valve disease is  secondary to new LV dysfunction and subsequent LV dilatation. Etiology of  cardiomyopathy is likely mixed and due to LAD disease and atrial  arrhythmia. PCI to LAD performed In light of severe lesion with  cardiomyopathy with hopeful improvement in LV function. Will need  restoration of sinus rhythm with ablation for optimal improvement in  overall LV function and hopeful decrease in MR burden. Continue asa,  plavix, and eliquis as ordered.  INTERVENTIONAL IMPRESSIONS: Recent onset increased dyspnea and palpitations  with now severe LV systolic dysfunction and class IV CHF. Also in atrial  flutter for thepast 2 months. Cath with lunimal LCx, Ramus, RCA disease  but severe instent lesion of the ostial/proximal LAD stent.  INTERVENTIONAL RECOMMENDATIONS: PCI with MARIAN to severe proximal LAD instent  lesion in setting of new onset class IV heart failure and new severe  cardiomyopathy. Patient remains in his atrial arrhythmia. Recent NICOLETTE  revealed severe functional mitral regurgitation. Mitral valve disease is  secondary to new LV dysfunction and subsequent LV dilatation. Etiology of  cardiomyopathy is likely mixed and due to LAD disease and atrial  arrhythmia. PCI to LAD performed In light of severe lesion with  cardiomyopathy with hopeful improvement in LV function. Will need  restoration of sinus rhythm with ablation for optimal improvement in  overall LV function and hopeful decrease in MR burden. Continue asa,  plavix, and eliquis as ordered.  Prepared and signed by  Pernell Lee M.D.    < end of copied text >    [ ] Stress Test:  	< from: Nuclear Stress Test-Pharmacologic (14 @ 00:00) >  IMPRESSIONS:Abnormal Study  * Chest Pain: No chest pain with exercise.  * HR Response: Appropriate.  * BP Response: Appropriate.  * Heart Rhythm: normal sinus.  * ECG Abnormalities: None.  * Arrhythmia: Frequent VPD's, Ventricular Couplets.  * Review of raw data shows: The study is of good technical  quality.  * The left ventricle was mildly dilated at baseline.There  is a medium sized, moderate to severe defect in inferior  wall that is fixed consistent with infarction with minimal  mauro-infarct ischemia.  * Post-stress gated wall motion analysis was performed  (LVEF = 32 %;LVEDV = 161 ml.), revealing moderate  hypokinesis in inferior and inferolateral wall(s).  * No previous Nuclear/Stress exam.    < end of copied text >      MEDICATIONS:  MEDICATIONS  (STANDING):      FAMILY HISTORY:  Family history of diabetes mellitus (Mother)  Family history of hypertension (Mother)      SOCIAL HISTORY:    [ ] Non-smoker  [ ] Smoker  [ ] Alcohol    Allergies    No Known Allergies    Intolerances    	    REVIEW OF SYSTEMS:  CONSTITUTIONAL: No fever, weight loss, or fatigue  EYES: No eye pain, visual disturbances, or discharge  ENMT:  No difficulty hearing, tinnitus, vertigo; No sinus or throat pain  NECK: No pain or stiffness  RESPIRATORY: No cough, wheezing, chills or hemoptysis; No Shortness of Breath  CARDIOVASCULAR: No chest pain, palpitations, passing out, +dizziness, or leg swelling  GASTROINTESTINAL: No abdominal or epigastric pain. No nausea, vomiting, or hematemesis; No diarrhea or constipation. +BRBPR   GENITOURINARY: No dysuria, frequency, hematuria, or incontinence  NEUROLOGICAL: No headaches, memory loss, loss of strength, numbness, or tremors  SKIN: No itching, burning, rashes, or lesions   	  [X] All others negative	  [ ] Unable to obtain    PHYSICAL EXAM:  T(C): 36.4 (19 @ 13:06), Max: 36.4 (19 @ 13:06)  HR: 58 (19 @ 13:06) (58 - 58)  BP: 98/56 (19 @ 13:06) (98/56 - 98/56)  RR: 20 (19 @ 13:06) (20 - 20)  SpO2: 99% (19 @ 13:06) (99% - 99%)  Wt(kg): --  I&O's Summary      Appearance: Normal	  Psychiatry: A & O x 3, Mood & affect appropriate  HEENT:   Normal oral mucosa, PERRL, EOMI	  Lymphatic: No lymphadenopathy  Cardiovascular: Normal S1 S2,RRR, No JVD, No murmurs  Respiratory: Lungs clear to auscultation	  Gastrointestinal:  Soft, Non-tender, + BS	  Skin: No rashes, No ecchymoses, No cyanosis	  Neurologic: Non-focal  Extremities: Normal range of motion, No clubbing, cyanosis or edema  Vascular: Peripheral pulses palpable 2+ bilaterally    TELEMETRY: 	    ECG:  	  RADIOLOGY:  OTHER: 	  	  LABS:	 	    CARDIAC MARKERS:                                  9.9    18.4  )-----------( 233      ( 2019 13:37 )             30.8     04-18    134<L>  |  97  |  57<H>  ----------------------------<  112<H>  6.4<HH>   |  22  |  5.33<H>    Ca    8.7      2019 13:37    TPro  6.8  /  Alb  3.3  /  TBili  0.5  /  DBili  x   /  AST  45<H>  /  ALT  17  /  AlkPhos  64  04-18    PT/INR - ( 2019 13:37 )   PT: 15.0 sec;   INR: 1.29 ratio         PTT - ( 2019 13:37 )  PTT:27.0 sec  proBNP:   Lipid Profile:   HgA1c:   TSH:

## 2019-04-18 NOTE — H&P ADULT - NSICDXPASTMEDICALHX_GEN_ALL_CORE_FT
PAST MEDICAL HISTORY:  Arthritis L arm and hands    Atrial fibrillation     CAD (coronary artery disease) 1 stent    CHF (congestive heart failure)     CKD (chronic kidney disease)     Gout     History of cardiomyopathy LV dysfunction    HLD (hyperlipidemia)     HTN - Hypertension     Thalassemia minor

## 2019-04-18 NOTE — H&P ADULT - HISTORY OF PRESENT ILLNESS
63M w/ CAD s/p stents x2, afib s/p ablation (eliquis, ASA), cardiomyopathy, LV dysfunction, CKD (not on HD), HTN, thalassemia minor presents s/p 3 quadrant hemorrhoidectomy on 4/12 with BRBPR and hypotension. Patient was recovering well from the hemorrhoidectomy, no longer requiring pain medication. He was instructed to hold his eliquis post operatively until 4/17. He took one dose yesterday AM and last night, began to feel lightheaded and dizzy. He then had multiple large bowel movements with bright red blood coating the stool and dripping down his leg afterwards. He had another small bowel movement this AM with less blood, more on the toilet paper than in the stool, and continued to feel dizzy. He presented to [facility] for evaluation, where he was noted to be hypotensive 90/50 with H&H 9.1/28.3. He was given one unit pRBC and crystalloid bolus (unknown volume) and was transferred to [facility] for further care.     In [facility], patient's repeat H&H prior to completion of transfusion 9.9/30.8. Patient's vitals notable for bradycardia (HR 58), BP 98/56. Surgery is consulted for post operative bleeding.

## 2019-04-18 NOTE — ED PROVIDER NOTE - OBJECTIVE STATEMENT
62yo male PMh Coronary Artery Disease, congestive heart failure, chronic kidney disease, HTN, hyperlipidemia, atrial fibrillation on Eliquis presenting with bright red blood per rectum and hypotension. patient had recent hemorrhoidectomy by Dr. REDD Mendez 1 week ago. Yesterday, patient had ~2 episodes of clotted bright red blood, a/w lightheadedness. Initially presented to OSH and found to be hypotensive, given 1 unit pRBCs and IV fluids, transferred to ED for evaluation.

## 2019-04-18 NOTE — ED PROVIDER NOTE - PMH
Arthritis  L arm and hands  Atrial fibrillation    CAD (coronary artery disease)  1 stent  CHF (congestive heart failure)    CKD (chronic kidney disease)    Gout    History of cardiomyopathy  LV dysfunction  HLD (hyperlipidemia)    HTN - Hypertension    Thalassemia minor

## 2019-04-18 NOTE — H&P ADULT - ASSESSMENT
ASSESSMENT:   63M w/ CAD s/p stents x2, afib s/p ablation (eliquis, ASA), cardiomyopathy, LV dysfunction, CKD (not on HD), HTN, thalassemia minor presents s/p 3 quadrant hemorrhoidectomy on 4/12 with BRBPR and hypotension. He presented to [facility] for evaluation, where he was noted to be hypotensive 90/50 with H&H 9.1/28.3. He was given one unit pRBC and crystalloid bolus (unknown volume) and was transferred to [facility] for further care. In [facility], patient's repeat H&H prior to completion of transfusion 9.9/30.8. Patient's vitals notable for bradycardia (HR 58), BP 98/56. Surgery is consulted for post operative bleeding.     - Admit to red team surgery under Dr. Esparza  - SICU consult for hypotension   - NPO during active resuscitation + IVF  - Will consult cardiology given patient's extensive medical history    Discussed with Dr. Isaias Payan [facility] PGY-2  Surgery Pager [phone]

## 2019-04-18 NOTE — CONSULT NOTE ADULT - ATTENDING COMMENTS
Patient seen and examined.  Agree with above NP note.  patient with history of CAD, s/p PCI, PAF, s/p ablation, mixed severe cardiomyopathy (AF/CAD), improved EF post DCCV and in SR, last EF 50%, HTN, CKD, baseline creat 1.4-1.6 s/p recent hemorrhoid surgery now returns with rectal bleeding, hypotension, RUSSELL    1. Acute blood loss anemia   prbc per sc  care/management per surgery    2. Hypotension  resolved, secondary to anemia, volume status  prbc/hydrate  hold bp meds    3. CAD/PCI  stable, no chest pain, acs  asa on hold     4. PAF, s/p ablation  remains in sinus rhythm  eventual restart bb  a/c on hold    5. RUSSELL  ? secondary to hypovolemia, recent hypotension, blood loss  r/o infection  hydrate  ace/hctz on hold    care per surgery   sicu eval pending

## 2019-04-18 NOTE — ED PROVIDER NOTE - CLINICAL SUMMARY MEDICAL DECISION MAKING FREE TEXT BOX
64yo male with atrial fibrillation on eliquis p/w lower Gi bleed, recent hemorrhoidectomy, s/p 1 unit pRBCs and now hemodynamically stable. Will obtain repeat labs, surgery c/s, admission. Nichole Price DO

## 2019-04-18 NOTE — H&P ADULT - NSICDXPASTSURGICALHX_GEN_ALL_CORE_FT
PAST SURGICAL HISTORY:  H/O cardiac radiofrequency ablation     History of Arthroscopy- ankle left ankle 2003 s/p "something fell on it"    S/P angioplasty with stent 5/2014 2016    S/P Arthroscopic Surgery of Left Knee 2001 injury- hit knee on desk    S/P hernia surgery     Status post cataract extraction left eye done 10/18/2018

## 2019-04-18 NOTE — CONSULT NOTE ADULT - ASSESSMENT
63M with CAD s/p stents, afib on Eliquis, presenting with BRBPR on POD 6 from hemorrhoidectomy, hypotensive to 90s in ED    Neuro:  - pain control with tylenol PRN    Resp: no active issues  - close monitoring    Cards: BP in 90s  - close hemodynamic monitoring in ICU  - likely 2/2 acute blood loss, transfuse prn  - holding all antihypertensive meds at this time     GI:  - NPO until H/H stable  - bowel regimen    :  - LR @ 100  - close monitoring I/Os    Heme: s/p 1U pRBC  - hold DVT ppx, ASA, and Eliquis  - serial H/H  - transfuse prn    ID: no active issues    Endo: no active issues    DAPHNE Norris pGY2  [id number]

## 2019-04-18 NOTE — CONSULT NOTE ADULT - ASSESSMENT
63M w/ CAD s/p stents x2, HTN, afib s/p ablation on eliquis, cardiomyopathy, CKD, HTN, mild-mod MR, PAD s/p LE stenting, thalassemia minor presents s/p 3 quadrant hemorrhoidectomy on 4/12 with PRBPR, acute anemia, RUSSELL, hyperkalemia.     1. Acute GIB, anemia   s/p recent 3 quadrant hemorrhoidectomy on 4/12   h/h 9.9/30.8 s/p 1UPRBC    continue to trend h/h, PRBC per primary team   hold eliquis, asa   admitted to surg for post-op bleeding   +hypotension, hold bp medications  SICU eval pending     2. RUSSELL/CKD, hyperkalemia   ? 2/2 to acute volume loss, dehydration   hold lisinopril , hydrochlorothiazide   IVF   renal eval    3. AFib s/p ablation  stable  AC on hold for acute anemia     4. CAD s/p PCI  cv stable  AC/asa on hold for anemia     5. Chronic systolic chf, Cardiomyopathy  recent outpt. echo with improved LV function, EF 50%   euvolemic on exam  bp meds on hold for hypotension     dvt ppx 63 year old man with history of CAD s/p PCI x 2, HTN, afib s/p ablation on eliquis, cardiomyopathy, CKD, HTN, mild-mod MR, PAD s/p LE stenting, thalassemia minor presents s/p 3 quadrant hemorrhoidectomy on 4/12 with PRBPR, acute anemia, RUSSELL, hyperkalemia.     1. Acute blood loss anemia, GI/post op bleed    s/p recent 3 quadrant hemorrhoidectomy on 4/12   h/h 9.9/30.8 s/p 1UPRBC    continue to trend h/h, PRBC per primary team   hold eliquis, asa   admitted to surg for post-op bleeding   +hypotension, hold bp medications  SICU eval pending     2. RUSSELL/CKD, hyperkalemia   ? 2/2 to acute volume loss, dehydration, ?abx   hold lisinopril , hydrochlorothiazide   IVF   renal eval    3. AFib s/p ablation  stable  AC on hold for acute anemia     4. CAD s/p PCI  cv stable  AC/asa on hold for anemia     5. Chronic systolic chf, Cardiomyopathy  recent outpt. echo with improved LV function, EF 50%   euvolemic on exam  bp meds on hold for hypotension   hydrate    6. Leukocytosis  r/o infection    dvt ppx

## 2019-04-18 NOTE — ED PROVIDER NOTE - PHYSICAL EXAMINATION
Gen: NAD  Head: NCAT  Lung: CTAB, no respiratory distress, no wheezing, rales, rhonchi  CV: normal s1/s2, rrr, no murmurs, Normal perfusion, pulses 2+ throughout  Abd: soft, NTND  MSK: No edema, no visible deformities, full range of motion in all 4 extremities  Neuro: No focal neurologic deficits  Skin: No rash   Psych: normal affect

## 2019-04-18 NOTE — ED PROVIDER NOTE - CRITICAL CARE PROVIDED
consultation with other physicians/interpretation of diagnostic studies/additional history taking/direct patient care (not related to procedure)/documentation

## 2019-04-18 NOTE — H&P ADULT - NSHPPHYSICALEXAM_GEN_ALL_CORE
Vital Signs Last 24 Hrs  T(C): 36.4 (18 Apr 2019 13:06), Max: 36.4 (18 Apr 2019 13:06)  T(F): 97.5 (18 Apr 2019 13:06), Max: 97.5 (18 Apr 2019 13:06)  HR: 58 (18 Apr 2019 13:06) (58 - 58)  BP: 98/56 (18 Apr 2019 13:06) (98/56 - 98/56)  BP(mean): --  RR: 20 (18 Apr 2019 13:06) (20 - 20)  SpO2: 99% (18 Apr 2019 13:06) (99% - 99%)    General: NAD, resting comfortably  Resp: unlabored breathing on RA  CV: hypotensive, rrr  Abd: soft, nontender, nondistended  Rectal: external w/o active bleed noted, pullup w/ dried blood  Extr: wwp

## 2019-04-19 LAB
ANION GAP SERPL CALC-SCNC: 13 MMOL/L — SIGNIFICANT CHANGE UP (ref 5–17)
BUN SERPL-MCNC: 57 MG/DL — HIGH (ref 7–23)
CALCIUM SERPL-MCNC: 8.7 MG/DL — SIGNIFICANT CHANGE UP (ref 8.4–10.5)
CHLORIDE SERPL-SCNC: 102 MMOL/L — SIGNIFICANT CHANGE UP (ref 96–108)
CO2 SERPL-SCNC: 24 MMOL/L — SIGNIFICANT CHANGE UP (ref 22–31)
CREAT ?TM UR-MCNC: 154 MG/DL — SIGNIFICANT CHANGE UP
CREAT SERPL-MCNC: 4.37 MG/DL — HIGH (ref 0.5–1.3)
GLUCOSE SERPL-MCNC: 97 MG/DL — SIGNIFICANT CHANGE UP (ref 70–99)
HCT VFR BLD CALC: 26.6 % — LOW (ref 39–50)
HCT VFR BLD CALC: 27.5 % — LOW (ref 39–50)
HCV AB S/CO SERPL IA: 0.04 S/CO — SIGNIFICANT CHANGE UP (ref 0–0.99)
HCV AB SERPL-IMP: SIGNIFICANT CHANGE UP
HGB BLD-MCNC: 8.9 G/DL — LOW (ref 13–17)
HGB BLD-MCNC: 9.1 G/DL — LOW (ref 13–17)
MAGNESIUM SERPL-MCNC: 2.6 MG/DL — SIGNIFICANT CHANGE UP (ref 1.6–2.6)
MCHC RBC-ENTMCNC: 21.2 PG — LOW (ref 27–34)
MCHC RBC-ENTMCNC: 21.5 PG — LOW (ref 27–34)
MCHC RBC-ENTMCNC: 33.2 GM/DL — SIGNIFICANT CHANGE UP (ref 32–36)
MCHC RBC-ENTMCNC: 33.4 GM/DL — SIGNIFICANT CHANGE UP (ref 32–36)
MCV RBC AUTO: 63.5 FL — LOW (ref 80–100)
MCV RBC AUTO: 64.9 FL — LOW (ref 80–100)
PHOSPHATE SERPL-MCNC: 5.1 MG/DL — HIGH (ref 2.5–4.5)
PLATELET # BLD AUTO: 203 K/UL — SIGNIFICANT CHANGE UP (ref 150–400)
PLATELET # BLD AUTO: 238 K/UL — SIGNIFICANT CHANGE UP (ref 150–400)
POTASSIUM SERPL-MCNC: 4 MMOL/L — SIGNIFICANT CHANGE UP (ref 3.5–5.3)
POTASSIUM SERPL-SCNC: 4 MMOL/L — SIGNIFICANT CHANGE UP (ref 3.5–5.3)
RBC # BLD: 4.18 M/UL — LOW (ref 4.2–5.8)
RBC # BLD: 4.24 M/UL — SIGNIFICANT CHANGE UP (ref 4.2–5.8)
RBC # FLD: 16.7 % — HIGH (ref 10.3–14.5)
RBC # FLD: 17.4 % — HIGH (ref 10.3–14.5)
SODIUM SERPL-SCNC: 139 MMOL/L — SIGNIFICANT CHANGE UP (ref 135–145)
SODIUM UR-SCNC: 58 MMOL/L — SIGNIFICANT CHANGE UP
WBC # BLD: 12.4 K/UL — HIGH (ref 3.8–10.5)
WBC # BLD: 13.9 K/UL — HIGH (ref 3.8–10.5)
WBC # FLD AUTO: 12.4 K/UL — HIGH (ref 3.8–10.5)
WBC # FLD AUTO: 13.9 K/UL — HIGH (ref 3.8–10.5)

## 2019-04-19 RX ORDER — SIMVASTATIN 20 MG/1
40 TABLET, FILM COATED ORAL AT BEDTIME
Qty: 0 | Refills: 0 | Status: DISCONTINUED | OUTPATIENT
Start: 2019-04-19 | End: 2019-04-20

## 2019-04-19 RX ORDER — PANTOPRAZOLE SODIUM 20 MG/1
40 TABLET, DELAYED RELEASE ORAL
Qty: 0 | Refills: 0 | Status: DISCONTINUED | OUTPATIENT
Start: 2019-04-19 | End: 2019-04-20

## 2019-04-19 RX ORDER — ENOXAPARIN SODIUM 100 MG/ML
30 INJECTION SUBCUTANEOUS DAILY
Qty: 0 | Refills: 0 | Status: DISCONTINUED | OUTPATIENT
Start: 2019-04-19 | End: 2019-04-19

## 2019-04-19 RX ORDER — AMIODARONE HYDROCHLORIDE 400 MG/1
200 TABLET ORAL DAILY
Qty: 0 | Refills: 0 | Status: DISCONTINUED | OUTPATIENT
Start: 2019-04-19 | End: 2019-04-20

## 2019-04-19 RX ORDER — SODIUM CHLORIDE 9 MG/ML
1000 INJECTION INTRAMUSCULAR; INTRAVENOUS; SUBCUTANEOUS
Qty: 0 | Refills: 0 | Status: DISCONTINUED | OUTPATIENT
Start: 2019-04-19 | End: 2019-04-20

## 2019-04-19 RX ORDER — METOPROLOL TARTRATE 50 MG
12.5 TABLET ORAL
Qty: 0 | Refills: 0 | Status: DISCONTINUED | OUTPATIENT
Start: 2019-04-19 | End: 2019-04-20

## 2019-04-19 RX ADMIN — SODIUM CHLORIDE 75 MILLILITER(S): 9 INJECTION INTRAMUSCULAR; INTRAVENOUS; SUBCUTANEOUS at 17:13

## 2019-04-19 RX ADMIN — Medication 12.5 MILLIGRAM(S): at 17:51

## 2019-04-19 RX ADMIN — ENOXAPARIN SODIUM 30 MILLIGRAM(S): 100 INJECTION SUBCUTANEOUS at 11:16

## 2019-04-19 RX ADMIN — SIMVASTATIN 40 MILLIGRAM(S): 20 TABLET, FILM COATED ORAL at 21:11

## 2019-04-19 NOTE — PROGRESS NOTE ADULT - ASSESSMENT
63M with CAD s/p stents, afib on Eliquis, presenting with BRBPR on POD 6 from hemorrhoidectomy, hypotensive to 90s in ED    Neuro:  - pain control with tylenol PRN    Resp: no active issues  - close monitoring    Cards: BP in 90s  - close hemodynamic monitoring in ICU  - likely 2/2 acute blood loss, transfuse prn  - holding all antihypertensive meds at this time     GI:  - NPO until H/H stable  - bowel regimen    :  - LR @ 100  - close monitoring I/Os    Heme: s/p 1U pRBC  - hold DVT ppx, ASA, and Eliquis  - serial H/H  - transfuse prn    ID: no active issues    Endo: no active issues    - Lane Nuñez PA-C

## 2019-04-19 NOTE — PROGRESS NOTE ADULT - SUBJECTIVE AND OBJECTIVE BOX
HISTORY  63y Male w/ CAD s/p stents x2, afib s/p ablation (eliquis, ASA), cardiomyopathy, LV dysfunction, CKD (not on HD), HTN, thalassemia minor presents s/p 3 quadrant hemorrhoidectomy on 4/12 with BRBPR and hypotension. Patient was recovering well from the hemorrhoidectomy, no longer requiring pain medication. He was instructed to hold his Eliquis post operatively until 4/17. He took one dose yesterday AM and last night, began to feel lightheaded and dizzy. He then had multiple large bowel movements with bright red blood coating the stool and dripping down his leg afterwards. He had another small bowel movement this AM with less blood, more on the toilet paper than in the stool, and continued to feel dizzy. He presented to [facility] for evaluation, where he was noted to be hypotensive 90/50 with H&H 9.1/28.3. He was given one unit pRBC and was transferred to [facility] for further care.  In [facility], patient's repeat H&H prior to completion of transfusion 9.9/30.8. Patient still noted to be hypotensive to the 90s, denied any active bleeding.  SICU consulted for hemodynamic monitoring.       24 HOUR EVENTS: CBC has been stable as well as hemodynamics. SBP between 90s and 100s. No overnight events.     SUBJECTIVE/ROS:  [ ] A ten-point review of systems was otherwise negative except as noted.  [ ] Due to altered mental status/intubation, subjective information were not able to be obtained from the patient. History was obtained, to the extent possible, from review of the chart and collateral sources of information.      NEURO  Exam: awake, alert, oriented  Meds: acetaminophen   Tablet .. 650 milliGRAM(s) Oral every 6 hours PRN Mild Pain (1 - 3)    [x] Adequacy of sedation and pain control has been assessed and adjusted      RESPIRATORY  RR: 15 (04-19-19 @ 03:00) (14 - 38)  SpO2: 99% (04-19-19 @ 03:00) (96% - 100%)  Exam: unlabored, clear to auscultation bilaterally  Mechanical Ventilation: none  [N/A] Extubation Readiness Assessed  Meds: none      CARDIOVASCULAR  HR: 56 (04-19-19 @ 03:00) (50 - 69)  BP: 113/66 (04-19-19 @ 03:00) (97/57 - 113/66)  BP(mean): 82 (04-19-19 @ 03:00) (72 - 82)  VBG - ( 18 Apr 2019 17:17 )  pH: 7.38  /  pCO2: 47    /  pO2: 30    / HCO3: 27    / Base Excess: 1.8   /  SaO2: 41     Lactate: 1.4    Exam: regular rate and rhythm  Cardiac Rhythm: sinus  Perfusion     [x]Adequate   [ ]Inadequate  Mentation   [x]Normal       [ ]Reduced  Extremities  [x]Warm         [ ]Cool  Volume Status [ ]Hypervolemic [x]Euvolemic [ ]Hypovolemic  Meds: none      GI/NUTRITION  Exam: soft, nontender, nondistended,   Diet: NPO   Meds: none    GENITOURINARY  I&O's Detail    04-18 @ 07:01  -  04-19 @ 04:37  --------------------------------------------------------  IN:    lactated ringers.: 900 mL  Total IN: 900 mL    OUT:    Voided: 700 mL  Total OUT: 700 mL    Total NET: 200 mL        Weight (kg): 90.3 (04-18 @ 19:43)  04-19    139  |  102  |  57<H>  ----------------------------<  97  4.0   |  24  |  4.37<H>    Ca    8.7      19 Apr 2019 03:09  Phos  5.1     04-19  Mg     2.6     04-19    TPro  6.1  /  Alb  3.1<L>  /  TBili  0.6  /  DBili  x   /  AST  12  /  ALT  10  /  AlkPhos  64  04-18    [ ] Huang catheter, indication: N/A  Meds: lactated ringers. 1000 milliLiter(s) IV Continuous <Continuous>        HEMATOLOGIC  Meds: none  [x] VTE Prophylaxis                        9.1    12.4  )-----------( 203      ( 19 Apr 2019 03:09 )             27.5     PT/INR - ( 18 Apr 2019 13:37 )   PT: 15.0 sec;   INR: 1.29 ratio         PTT - ( 18 Apr 2019 13:37 )  PTT:27.0 sec  Transfusion     [ ] PRBC   [ ] Platelets   [ ] FFP   [ ] Cryoprecipitate      INFECTIOUS DISEASES  WBC Count: 12.4 K/uL (04-19 @ 03:09)  WBC Count: 16.0 K/uL (04-18 @ 20:33)  WBC Count: 18.4 K/uL (04-18 @ 13:37)    RECENT CULTURES: none    Meds: none      ENDOCRINE  CAPILLARY BLOOD GLUCOSE        Meds: none      ACCESS DEVICES:  [x] Peripheral IV  [ ] Central Venous Line	[ ] R	[ ] L	[ ] IJ	[ ] Fem	[ ] SC	Placed:   [ ] Arterial Line		[ ] R	[ ] L	[ ] Fem	[ ] Rad	[ ] Ax	Placed:   [ ] PICC:					[ ] Mediport  [ ] Urinary Catheter, Date Placed:   [x] Necessity of urinary, arterial, and venous catheters discussed    OTHER MEDICATIONS: none      CODE STATUS: full code

## 2019-04-19 NOTE — PROGRESS NOTE ADULT - SUBJECTIVE AND OBJECTIVE BOX
HISTORY  SYEDA WEAVER is a 63y Male w/ CAD s/p stents x2, afib s/p ablation (eliquis, ASA), cardiomyopathy, LV dysfunction, CKD (not on HD), HTN, thalassemia minor presents s/p 3 quadrant hemorrhoidectomy on 4/12 with BRBPR and hypotension. Patient was recovering well from the hemorrhoidectomy, no longer requiring pain medication. He was instructed to hold his Eliquis post operatively until 4/17. He took one dose yesterday AM and last night, began to feel lightheaded and dizzy. He then had multiple large bowel movements with bright red blood coating the stool and dripping down his leg afterwards. He had another small bowel movement this AM with less blood, more on the toilet paper than in the stool, and continued to feel dizzy. He presented to [facility] for evaluation, where he was noted to be hypotensive 90/50 with H&H 9.1/28.3. He was given one unit pRBC and was transferred to [facility] for further care.  In [facility], patient's repeat H&H prior to completion of transfusion 9.9/30.8. Patient still noted to be hypotensive to the 90s, denied any active bleeding.  SICU consulted for hemodynamic monitoring.     24 HOUR EVENTS:  - H/H remained stable overnight 9.5/29.1 --> 9.1/27.5    SUBJECTIVE/ROS:  [x] A ten-point review of systems was otherwise negative except as noted.  [ ] Due to altered mental status/intubation, subjective information were not able to be obtained from the patient. History was obtained, to the extent possible, from review of the chart and collateral sources of information.      NEURO  RASS: 0  Exam: AAOx4  Meds: acetaminophen   Tablet .. 650 milliGRAM(s) Oral every 6 hours PRN Mild Pain (1 - 3)    [x] Adequacy of sedation and pain control has been assessed and adjusted      RESPIRATORY  RR: 15 (04-19-19 @ 03:00) (14 - 38)  SpO2: 99% (04-19-19 @ 03:00) (96% - 100%)  Wt(kg): --  Exam: unlabored, clear to auscultation bilaterally  Mechanical Ventilation:     [ ] Extubation Readiness Assessed  Meds:       CARDIOVASCULAR  HR: 56 (04-19-19 @ 03:00) (50 - 69)  BP: 113/66 (04-19-19 @ 03:00) (97/57 - 113/66)  BP(mean): 82 (04-19-19 @ 03:00) (72 - 82)  ABP: --  ABP(mean): --  Wt(kg): --  CVP(cm H2O): --  VBG - ( 18 Apr 2019 17:17 )  pH: 7.38  /  pCO2: 47    /  pO2: 30    / HCO3: 27    / Base Excess: 1.8   /  SaO2: 41     Lactate: 1.4                Exam:  Cardiac Rhythm: RRR  Perfusion     [x]Adequate   [ ]Inadequate  Mentation   [x]Normal       [ ]Reduced  Extremities  [x]Warm         [ ]Cool  Volume Status [ ]Hypervolemic [x]Euvolemic [ ]Hypovolemic  Meds:       GI/NUTRITION  Exam: Soft, NT/ND  Diet: NPO  Meds:     GENITOURINARY  I&O's Detail    04-18 @ 07:01  -  04-19 @ 04:39  --------------------------------------------------------  IN:    lactated ringers.: 900 mL  Total IN: 900 mL    OUT:    Voided: 700 mL  Total OUT: 700 mL    Total NET: 200 mL        Weight (kg): 90.3 (04-18 @ 19:43)  04-19    139  |  102  |  57<H>  ----------------------------<  97  4.0   |  24  |  4.37<H>    Ca    8.7      19 Apr 2019 03:09  Phos  5.1     04-19  Mg     2.6     04-19    TPro  6.1  /  Alb  3.1<L>  /  TBili  0.6  /  DBili  x   /  AST  12  /  ALT  10  /  AlkPhos  64  04-18    [ ] Huang catheter, indication: N/A  Meds: lactated ringers. 1000 milliLiter(s) IV Continuous <Continuous>        HEMATOLOGIC  Meds:   [x] VTE Prophylaxis                        9.1    12.4  )-----------( 203      ( 19 Apr 2019 03:09 )             27.5     PT/INR - ( 18 Apr 2019 13:37 )   PT: 15.0 sec;   INR: 1.29 ratio         PTT - ( 18 Apr 2019 13:37 )  PTT:27.0 sec  Transfusion     [ ] PRBC   [ ] Platelets   [ ] FFP   [ ] Cryoprecipitate      INFECTIOUS DISEASES  T(C): 37.1 (04-19-19 @ 03:00), Max: 37.2 (04-18-19 @ 19:43)  Wt(kg): --  WBC Count: 12.4 K/uL (04-19 @ 03:09)  WBC Count: 16.0 K/uL (04-18 @ 20:33)  WBC Count: 18.4 K/uL (04-18 @ 13:37)    Recent Cultures:    Meds:       ENDOCRINE  Capillary Blood Glucose    Meds:       ACCESS DEVICES:  [ ] Peripheral IV  [ ] Central Venous Line	[ ] R	[ ] L	[ ] IJ	[ ] Fem	[ ] SC	Placed:   [ ] Arterial Line		[ ] R	[ ] L	[ ] Fem	[ ] Rad	[ ] Ax	Placed:   [ ] PICC:					[ ] Mediport  [ ] Urinary Catheter, Date Placed:   [ ] Necessity of urinary, arterial, and venous catheters discussed    OTHER MEDICATIONS:      CODE STATUS:     IMAGING:

## 2019-04-19 NOTE — PROGRESS NOTE ADULT - SUBJECTIVE AND OBJECTIVE BOX
CARDIOLOGY FOLLOW UP - Dr. Lee    CC no cp/sob   H/H stable       PHYSICAL EXAM:  T(C): 36.8 (04-19-19 @ 12:44), Max: 37.2 (04-18-19 @ 19:43)  HR: 62 (04-19-19 @ 12:44) (49 - 69)  BP: 127/80 (04-19-19 @ 12:44) (97/57 - 134/61)  RR: 18 (04-19-19 @ 12:44) (13 - 38)  SpO2: 99% (04-19-19 @ 12:44) (96% - 100%)  Wt(kg): --  I&O's Summary    18 Apr 2019 07:01  -  19 Apr 2019 07:00  --------------------------------------------------------  IN: 1300 mL / OUT: 700 mL / NET: 600 mL    19 Apr 2019 07:01  -  19 Apr 2019 15:49  --------------------------------------------------------  IN: 680 mL / OUT: 0 mL / NET: 680 mL        Appearance: Normal	  Cardiovascular: Normal S1 S2,RRR, No JVD, No murmurs  Respiratory: Lungs clear to auscultation	  Gastrointestinal:  Soft, Non-tender, + BS	  Extremities: Normal range of motion, No clubbing, cyanosis or edema        MEDICATIONS  (STANDING):  amiodarone    Tablet 200 milliGRAM(s) Oral daily  metoprolol tartrate 12.5 milliGRAM(s) Oral two times a day  pantoprazole    Tablet 40 milliGRAM(s) Oral before breakfast  simvastatin 40 milliGRAM(s) Oral at bedtime      TELEMETRY: 	    ECG:  	  RADIOLOGY:   DIAGNOSTIC TESTING:  [ ] Echocardiogram:  [ ]  Catheterization:  [ ] Stress Test:    OTHER: 	    LABS:	 	                                9.1    12.4  )-----------( 203      ( 19 Apr 2019 03:09 )             27.5     04-19    139  |  102  |  57<H>  ----------------------------<  97  4.0   |  24  |  4.37<H>    Ca    8.7      19 Apr 2019 03:09  Phos  5.1     04-19  Mg     2.6     04-19    TPro  6.1  /  Alb  3.1<L>  /  TBili  0.6  /  DBili  x   /  AST  12  /  ALT  10  /  AlkPhos  64  04-18    PT/INR - ( 18 Apr 2019 13:37 )   PT: 15.0 sec;   INR: 1.29 ratio         PTT - ( 18 Apr 2019 13:37 )  PTT:27.0 sec

## 2019-04-19 NOTE — CHART NOTE - NSCHARTNOTEFT_GEN_A_CORE
GENERAL SURGERY FLOOR TRANSFER NOTE    63y Male transferred to floor from SICU    SUBJECTIVE:  Feeling well. Tolerating diet. No additional episodes of BRBPR.    OBJECTIVE:    T(C): 36.8 (04-19-19 @ 12:44), Max: 37.2 (04-18-19 @ 19:43)  HR: 62 (04-19-19 @ 12:44) (49 - 69)  BP: 127/80 (04-19-19 @ 12:44) (97/57 - 134/61)  RR: 18 (04-19-19 @ 12:44) (13 - 38)  SpO2: 99% (04-19-19 @ 12:44) (96% - 100%)  Wt(kg): --      I&O's Summary    18 Apr 2019 07:01  -  19 Apr 2019 07:00  --------------------------------------------------------  IN: 1300 mL / OUT: 700 mL / NET: 600 mL    19 Apr 2019 07:01  -  19 Apr 2019 13:27  --------------------------------------------------------  IN: 200 mL / OUT: 0 mL / NET: 200 mL                           9.1    12.4  )-----------( 203      ( 19 Apr 2019 03:09 )             27.5       04-19    139  |  102  |  57<H>  ----------------------------<  97  4.0   |  24  |  4.37<H>    Ca    8.7      19 Apr 2019 03:09  Phos  5.1     04-19  Mg     2.6     04-19    TPro  6.1  /  Alb  3.1<L>  /  TBili  0.6  /  DBili  x   /  AST  12  /  ALT  10  /  AlkPhos  64  04-18      MEDICATIONS  (STANDING):  pantoprazole    Tablet 40 milliGRAM(s) Oral before breakfast  simvastatin 40 milliGRAM(s) Oral at bedtime    MEDICATIONS  (PRN):  acetaminophen   Tablet .. 650 milliGRAM(s) Oral every 6 hours PRN Mild Pain (1 - 3)        PHYSICAL EXAM:  NAD, resting in bed responding appropriately  Airway intact, non-labored respirations  Soft, NT, ND  WWP      ASSESSMENT/PLAN:  63M hx CAD s/p PCI on ASA, Afib s/p ablation on Eliquis, cardiomyopathy, LV dysfunction, CKD, thalassemia minor, underwent recent 3 quadrant hemorrhoidectomy (4/12) who started having BRBPR after resuming anticoagulation, admitted for hemodynamic monitoring initially in SICU now stable on floors    - LRD  - Hold ASA and Eliquis, including chemical DVT ppx  - SCDs, OOBA    Dispo: floors    MONTEZ Naik, PGY-1  Red Surgery  [phone] with any questions

## 2019-04-19 NOTE — PROGRESS NOTE ADULT - ASSESSMENT
63M hx CAD s/p PCI on ASA, Afib s/p ablation on Eliquis, cardiomyopathy, LV dysfunction, CKD, thalassemia minor, underwent recent 3 quadrant hemorrhoidectomy (4/12) who started having BRBPR after resuming anticoagulation, admitted for hemodynamic monitoring    - Stable for transfer to floors  - Advance diet  - Hold ASA and Eliquis  - OOBA  - Appreciate SICU care    Dispo: [facility]    MONTEZ Naik, PGY-1  Red Surgery  [phone] with any questions

## 2019-04-19 NOTE — PROGRESS NOTE ADULT - ASSESSMENT
63 year old man with history of CAD s/p PCI x 2, HTN, afib s/p ablation on eliquis, cardiomyopathy, CKD, HTN, mild-mod MR, PAD s/p LE stenting, thalassemia minor presents s/p 3 quadrant hemorrhoidectomy on 4/12 with PRBPR, acute anemia, RUSSELL, hyperkalemia.     1. Acute blood loss anemia, GI/post op bleed    s/p recent 3 quadrant hemorrhoidectomy on 4/12   h/h stable   continue to trend h/h, PRBC per primary team   hold eliquis, asa   bp stable, low dose bb resumed   surg f/u     2. RUSSELL/CKD, hyperkalemia   ? 2/2 to acute volume loss, dehydration, ?abx   improving   hold lisinopril , hydrochlorothiazide   renal eval    3. AFib s/p ablation  stable, cont amio   AC on hold for acute anemia     4. CAD s/p PCI  cv stable  AC/asa on hold for anemia     5. Chronic systolic chf, Cardiomyopathy  recent outpt. echo with improved LV function, EF 50%   euvolemic on exam  gradually resume bp meds as tolerated   hydrate    6. Leukocytosis  r/o infection    dvt ppx 63 year old man with history of CAD s/p PCI x 2, HTN, afib s/p ablation on eliquis, cardiomyopathy, CKD, HTN, mild-mod MR, PAD s/p LE stenting, thalassemia minor presents s/p 3 quadrant hemorrhoidectomy on 4/12 with PRBPR, acute anemia, RUSSELL, hyperkalemia.     1. Acute blood loss anemia, GI/post op bleed    s/p recent 3 quadrant hemorrhoidectomy on 4/12   h/h stable   continue to trend h/h, PRBC per primary team   eliquis, asa on hold    bp stable, low dose bb resumed   surg f/u     2. RUSSELL/CKD, hyperkalemia   ? 2/2 to acute volume loss, dehydration, ?abx   improving   hold lisinopril , hydrochlorothiazide   renal eval    3. AFib s/p ablation  stable, cont amio   AC on hold for acute anemia     4. CAD s/p PCI  cv stable  AC/asa on hold for anemia     5. Chronic systolic chf, Cardiomyopathy  recent outpt. echo with improved LV function, EF 50%   euvolemic on exam  gradually resume bp meds as tolerated   hydrate    6. Leukocytosis  r/o infection    dvt ppx

## 2019-04-19 NOTE — PROGRESS NOTE ADULT - ATTENDING COMMENTS
Patient seen and examined and agree with resident note.    S/p hemorrhoidectomy with post procedural acute blood loss anemia.   H/H stable at 30--> 29-->27.5  CBC q daily     Chronic atrial fibrillation- will hold eloquis    Acute on chronic kidney disease stage 3- improving creatinine  -urine output ok     Patient to get out of bed
Patient seen and examined.  Agree with above NP note.  cv stable  a/c, asa remains on hold   resume ASA 81mg daily when ok by surgery for history of CAD/PCI  sbp stable  cont bb, amio for paf  renal fxn improving   care per sx

## 2019-04-19 NOTE — PROGRESS NOTE ADULT - SUBJECTIVE AND OBJECTIVE BOX
COLORECTAL SURGERY DAILY PROGRESS NOTE:    Interval:  No acute events overnight.    Subjective:  Patient seen and examined. Reports pain is well controlled. Denies N/V, BRBPR.    Vital Signs Last 24 Hrs  T(C): 36.7 (2019 07:00), Max: 37.2 (2019 19:43)  T(F): 98 (2019 07:00), Max: 98.9 (2019 19:43)  HR: 57 (2019 07:00) (49 - 69)  BP: 124/87 (2019 07:00) (97/57 - 124/87)  BP(mean): 95 (2019 07:00) (72 - 95)  RR: 27 (2019 07:00) (13 - 38)  SpO2: 99% (2019 07:00) (96% - 100%)    Exam:  Gen: NAD, resting in bed, alert and responding appropriately  Resp: Airway patent, non-labored respirations  Abd: Soft, ND, NT  Ext: WWP  Neuro: AAOx3, no focal deficits    I&O's Detail    2019 07:01  -  2019 07:00  --------------------------------------------------------  IN:    lactated ringers.: 1300 mL  Total IN: 1300 mL    OUT:    Voided: 700 mL  Total OUT: 700 mL    Total NET: 600 mL      2019 07:01  -  2019 09:47  --------------------------------------------------------  IN:    lactated ringers.: 100 mL  Total IN: 100 mL    OUT:  Total OUT: 0 mL    Total NET: 100 mL          Daily Height in cm: 180.34 (2019 19:43)    Daily Weight in k.3 (2019 00:00)    MEDICATIONS  (STANDING):  lactated ringers. 1000 milliLiter(s) (100 mL/Hr) IV Continuous <Continuous>  pantoprazole    Tablet 40 milliGRAM(s) Oral before breakfast  simvastatin 40 milliGRAM(s) Oral at bedtime    MEDICATIONS  (PRN):  acetaminophen   Tablet .. 650 milliGRAM(s) Oral every 6 hours PRN Mild Pain (1 - 3)      LABS:                        9.1    12.4  )-----------( 203      ( 2019 03:09 )             27.5     04-19    139  |  102  |  57<H>  ----------------------------<  97  4.0   |  24  |  4.37<H>    Ca    8.7      2019 03:09  Phos  5.1     -  Mg     2.6     -    TPro  6.1  /  Alb  3.1<L>  /  TBili  0.6  /  DBili  x   /  AST  12  /  ALT  10  /  AlkPhos  64  04-18    PT/INR - ( 2019 13:37 )   PT: 15.0 sec;   INR: 1.29 ratio         PTT - ( 2019 13:37 )  PTT:27.0 sec

## 2019-04-20 ENCOUNTER — TRANSCRIPTION ENCOUNTER (OUTPATIENT)
Age: 64
End: 2019-04-20

## 2019-04-20 VITALS
RESPIRATION RATE: 18 BRPM | OXYGEN SATURATION: 99 % | HEART RATE: 58 BPM | TEMPERATURE: 99 F | DIASTOLIC BLOOD PRESSURE: 81 MMHG | SYSTOLIC BLOOD PRESSURE: 131 MMHG

## 2019-04-20 LAB
ANION GAP SERPL CALC-SCNC: 14 MMOL/L — SIGNIFICANT CHANGE UP (ref 5–17)
BUN SERPL-MCNC: 45 MG/DL — HIGH (ref 7–23)
CALCIUM SERPL-MCNC: 8.6 MG/DL — SIGNIFICANT CHANGE UP (ref 8.4–10.5)
CHLORIDE SERPL-SCNC: 104 MMOL/L — SIGNIFICANT CHANGE UP (ref 96–108)
CO2 SERPL-SCNC: 23 MMOL/L — SIGNIFICANT CHANGE UP (ref 22–31)
CREAT SERPL-MCNC: 2.69 MG/DL — HIGH (ref 0.5–1.3)
GLUCOSE SERPL-MCNC: 83 MG/DL — SIGNIFICANT CHANGE UP (ref 70–99)
HCT VFR BLD CALC: 24.1 % — LOW (ref 39–50)
HGB BLD-MCNC: 7.6 G/DL — LOW (ref 13–17)
MAGNESIUM SERPL-MCNC: 2.5 MG/DL — SIGNIFICANT CHANGE UP (ref 1.6–2.6)
MCHC RBC-ENTMCNC: 20.2 PG — LOW (ref 27–34)
MCHC RBC-ENTMCNC: 31.5 GM/DL — LOW (ref 32–36)
MCV RBC AUTO: 63.9 FL — LOW (ref 80–100)
PHOSPHATE SERPL-MCNC: 3.4 MG/DL — SIGNIFICANT CHANGE UP (ref 2.5–4.5)
PLATELET # BLD AUTO: 201 K/UL — SIGNIFICANT CHANGE UP (ref 150–400)
POTASSIUM SERPL-MCNC: 4.4 MMOL/L — SIGNIFICANT CHANGE UP (ref 3.5–5.3)
POTASSIUM SERPL-SCNC: 4.4 MMOL/L — SIGNIFICANT CHANGE UP (ref 3.5–5.3)
RBC # BLD: 3.77 M/UL — LOW (ref 4.2–5.8)
RBC # FLD: 18.4 % — HIGH (ref 10.3–14.5)
SODIUM SERPL-SCNC: 141 MMOL/L — SIGNIFICANT CHANGE UP (ref 135–145)
UUN UR-MCNC: 732 MG/DL — SIGNIFICANT CHANGE UP
WBC # BLD: 11.83 K/UL — HIGH (ref 3.8–10.5)
WBC # FLD AUTO: 11.83 K/UL — HIGH (ref 3.8–10.5)

## 2019-04-20 PROCEDURE — 82947 ASSAY GLUCOSE BLOOD QUANT: CPT

## 2019-04-20 PROCEDURE — 85730 THROMBOPLASTIN TIME PARTIAL: CPT

## 2019-04-20 PROCEDURE — 85027 COMPLETE CBC AUTOMATED: CPT

## 2019-04-20 PROCEDURE — 84132 ASSAY OF SERUM POTASSIUM: CPT

## 2019-04-20 PROCEDURE — 80048 BASIC METABOLIC PNL TOTAL CA: CPT

## 2019-04-20 PROCEDURE — 83605 ASSAY OF LACTIC ACID: CPT

## 2019-04-20 PROCEDURE — 86803 HEPATITIS C AB TEST: CPT

## 2019-04-20 PROCEDURE — 82570 ASSAY OF URINE CREATININE: CPT

## 2019-04-20 PROCEDURE — 86850 RBC ANTIBODY SCREEN: CPT

## 2019-04-20 PROCEDURE — 82330 ASSAY OF CALCIUM: CPT

## 2019-04-20 PROCEDURE — 36430 TRANSFUSION BLD/BLD COMPNT: CPT

## 2019-04-20 PROCEDURE — 82435 ASSAY OF BLOOD CHLORIDE: CPT

## 2019-04-20 PROCEDURE — 84300 ASSAY OF URINE SODIUM: CPT

## 2019-04-20 PROCEDURE — 83735 ASSAY OF MAGNESIUM: CPT

## 2019-04-20 PROCEDURE — 85014 HEMATOCRIT: CPT

## 2019-04-20 PROCEDURE — 85610 PROTHROMBIN TIME: CPT

## 2019-04-20 PROCEDURE — 82803 BLOOD GASES ANY COMBINATION: CPT

## 2019-04-20 PROCEDURE — 99291 CRITICAL CARE FIRST HOUR: CPT | Mod: 25

## 2019-04-20 PROCEDURE — 84540 ASSAY OF URINE/UREA-N: CPT

## 2019-04-20 PROCEDURE — 80053 COMPREHEN METABOLIC PANEL: CPT

## 2019-04-20 PROCEDURE — 84100 ASSAY OF PHOSPHORUS: CPT

## 2019-04-20 PROCEDURE — 86900 BLOOD TYPING SEROLOGIC ABO: CPT

## 2019-04-20 PROCEDURE — 84295 ASSAY OF SERUM SODIUM: CPT

## 2019-04-20 PROCEDURE — 86901 BLOOD TYPING SEROLOGIC RH(D): CPT

## 2019-04-20 RX ORDER — ACETAMINOPHEN 500 MG
2 TABLET ORAL
Qty: 0 | Refills: 0 | DISCHARGE
Start: 2019-04-20

## 2019-04-20 RX ORDER — APIXABAN 2.5 MG/1
2 TABLET, FILM COATED ORAL
Qty: 0 | Refills: 0 | COMMUNITY

## 2019-04-20 RX ADMIN — PANTOPRAZOLE SODIUM 40 MILLIGRAM(S): 20 TABLET, DELAYED RELEASE ORAL at 05:38

## 2019-04-20 RX ADMIN — AMIODARONE HYDROCHLORIDE 200 MILLIGRAM(S): 400 TABLET ORAL at 05:38

## 2019-04-20 NOTE — DISCHARGE NOTE NURSING/CASE MANAGEMENT/SOCIAL WORK - NSDCDPATPORTLINK_GEN_ALL_CORE
You can access the [facility] Patient Portal, offered by [facility], by registering with the following website: [email or web address]

## 2019-04-20 NOTE — PROGRESS NOTE ADULT - SUBJECTIVE AND OBJECTIVE BOX
CC: no cp/sob    TELEMETRY:     PHYSICAL EXAM:    T(C): 36.9 (04-20-19 @ 09:00), Max: 37.2 (04-20-19 @ 00:46)  HR: 59 (04-20-19 @ 09:00) (58 - 82)  BP: 123/67 (04-20-19 @ 09:00) (105/67 - 127/80)  RR: 18 (04-20-19 @ 09:00) (18 - 18)  SpO2: 99% (04-20-19 @ 09:00) (97% - 100%)  Wt(kg): --  I&O's Summary    19 Apr 2019 07:01  -  20 Apr 2019 07:00  --------------------------------------------------------  IN: 2230 mL / OUT: 900 mL / NET: 1330 mL    20 Apr 2019 07:01  -  20 Apr 2019 11:18  --------------------------------------------------------  IN: 320 mL / OUT: 800 mL / NET: -480 mL        Appearance: Normal	  Cardiovascular: Normal S1 S2,RRR, No JVD, No murmurs  Respiratory: Lungs clear to auscultation	  Gastrointestinal:  Soft, Non-tender, + BS	  Extremities: Normal range of motion, No clubbing, cyanosis or edema  Vascular: Peripheral pulses palpable 2+ bilaterally     LABS:	 	                          8.9    13.9  )-----------( 238      ( 19 Apr 2019 16:22 )             26.6     04-20    141  |  104  |  45<H>  ----------------------------<  83  4.4   |  23  |  2.69<H>    Ca    8.6      20 Apr 2019 08:10  Phos  3.4     04-20  Mg     2.5     04-20    TPro  6.1  /  Alb  3.1<L>  /  TBili  0.6  /  DBili  x   /  AST  12  /  ALT  10  /  AlkPhos  64  04-18      PT/INR - ( 18 Apr 2019 13:37 )   PT: 15.0 sec;   INR: 1.29 ratio         PTT - ( 18 Apr 2019 13:37 )  PTT:27.0 sec    CARDIAC MARKERS:

## 2019-04-20 NOTE — DISCHARGE NOTE NURSING/CASE MANAGEMENT/SOCIAL WORK - NSDCPNINST_GEN_ALL_CORE
Instructed to call the MD with nausea, vomiting, bleeding, hypotension, dizziness, nausea, and vomiting

## 2019-04-20 NOTE — DISCHARGE NOTE PROVIDER - NSDCCPCAREPLAN_GEN_ALL_CORE_FT
PRINCIPAL DISCHARGE DIAGNOSIS  Diagnosis: Lower GI bleed  Assessment and Plan of Treatment: due to Eliquis restarting

## 2019-04-20 NOTE — PROGRESS NOTE ADULT - SUBJECTIVE AND OBJECTIVE BOX
COLORECTAL SURGERY DAILY PROGRESS NOTE:    Interval:  No acute events overnight.    Subjective:  Patient seen and examined. Reports pain is well controlled. Denies N/V, further bloody BMs. Tolerating diet.     Vital Signs Last 24 Hrs  T(C): 36.9 (20 Apr 2019 05:12), Max: 37.2 (20 Apr 2019 00:46)  T(F): 98.4 (20 Apr 2019 05:12), Max: 98.9 (20 Apr 2019 00:46)  HR: 58 (20 Apr 2019 05:12) (58 - 82)  BP: 117/71 (20 Apr 2019 05:12) (105/67 - 134/61)  BP(mean): 85 (19 Apr 2019 11:00) (85 - 87)  RR: 18 (20 Apr 2019 05:12) (18 - 34)  SpO2: 100% (20 Apr 2019 05:12) (97% - 100%)    Exam:  Gen: NAD, resting in bed, alert and responding appropriately  Resp: Airway patent, non-labored respirations  Abd: Soft, ND, NT  Ext: WWP  Neuro: AAOx3, no focal deficits    I&O's Detail    19 Apr 2019 07:01  -  20 Apr 2019 07:00  --------------------------------------------------------  IN:    lactated ringers.: 200 mL    Oral Fluid: 980 mL    sodium chloride 0.9%: 1050 mL  Total IN: 2230 mL    OUT:    Voided: 900 mL  Total OUT: 900 mL    Total NET: 1330 mL    MEDICATIONS  (STANDING):  amiodarone    Tablet 200 milliGRAM(s) Oral daily  metoprolol tartrate 12.5 milliGRAM(s) Oral two times a day  pantoprazole    Tablet 40 milliGRAM(s) Oral before breakfast  simvastatin 40 milliGRAM(s) Oral at bedtime    MEDICATIONS  (PRN):  acetaminophen   Tablet .. 650 milliGRAM(s) Oral every 6 hours PRN Mild Pain (1 - 3)      LABS:                        8.9    13.9  )-----------( 238      ( 19 Apr 2019 16:22 )             26.6     04-20    141  |  104  |  45<H>  ----------------------------<  83  4.4   |  23  |  2.69<H>    Ca    8.6      20 Apr 2019 08:10  Phos  3.4     04-20  Mg     2.5     04-20    TPro  6.1  /  Alb  3.1<L>  /  TBili  0.6  /  DBili  x   /  AST  12  /  ALT  10  /  AlkPhos  64  04-18    PT/INR - ( 18 Apr 2019 13:37 )   PT: 15.0 sec;   INR: 1.29 ratio         PTT - ( 18 Apr 2019 13:37 )  PTT:27.0 sec

## 2019-04-20 NOTE — PROGRESS NOTE ADULT - ASSESSMENT
63M hx CAD s/p PCI on ASA, Afib s/p ablation on Eliquis, cardiomyopathy, LV dysfunction, CKD, thalassemia minor, underwent recent 3 quadrant hemorrhoidectomy (4/12) who started having BRBPR after resuming anticoagulation, admitted for hemodynamic monitoring and acute on chronic kidney injury (intrinsic)    - Dash Diet  - Hold ASA and Eliquis, hold for additional week starting today  - Downtrending Cr, monitor  - OOBA    Dispo: marah Naik, PGY-1  [facility]  [id number] with any questions

## 2019-04-20 NOTE — DISCHARGE NOTE PROVIDER - NSDCFUADDINST_GEN_ALL_CORE_FT
MEDICATIONS: Hold Eliquis and Aspirin for 1 additional week. Resume on 4/27/19. Hold lisinopril and hydrochlorothiazide until your kidney function improves. Please follow up with your primary care doctor or cardiologist on when to resume these medications.    PAIN CONTROL: You may take 650 mg of Tylenol every 4-6 hours. Do not exceed 4 grams of Tylenol daily.   BATHING: No restrictions. Sitz baths.  ACTIVITY: No heavy lifting or straining. Otherwise, you may return to your usual level of physical activity. If you are taking narcotic pain medication (such as oxycodone), do NOT drive a car, operate machinery or make important decisions.  DIET: Return to your usual diet.  NOTIFY YOUR SURGEON IF: You have any bleeding that does not stop, any pus draining from your wound, any fever (over 100.4 F) or chills, persistent nausea/vomiting, persistent diarrhea, or if your pain is not controlled on your discharge pain medications.  FOLLOW-UP:  1. Please follow up with Dr. Esparza in 1-2 weeks following discharge from the hospital. You may call [phone] to schedule an appointment.   2. Please follow up with your primary care physician in one week regarding your hospitalization.

## 2019-04-20 NOTE — DISCHARGE NOTE PROVIDER - HOSPITAL COURSE
63M w/ CAD s/p stents x2, afib s/p ablation (eliquis, ASA), cardiomyopathy, LV dysfunction, CKD (not on HD), HTN, thalassemia minor presents s/p 3 quadrant hemorrhoidectomy on 4/12 with BRBPR and hypotension. Patient was recovering well from the hemorrhoidectomy, no longer requiring pain medication. He was instructed to hold his eliquis post operatively until 4/17. He took one dose yesterday AM and last night, began to feel lightheaded and dizzy. He then had multiple large bowel movements with bright red blood coating the stool and dripping down his leg afterwards. He had another small bowel movement this AM with less blood, more on the toilet paper than in the stool, and continued to feel dizzy. He presented to [facility] for evaluation, where he was noted to be hypotensive 90/50 with H&H 9.1/28.3. He was given one unit pRBC and crystalloid bolus (unknown volume) and was transferred to [facility] for further care.         In [facility], patient's repeat H&H prior to completion of transfusion 9.9/30.8. Patient's vitals notable for bradycardia (HR 58), BP 98/56. Surgery is consulted for post operative bleeding. He was admitted to SICU for hemodynamic monitoring. On HD2, pt was stable for transfer to floors. H/H remained stable. Creatinine noted to be elevated concerning for acute on chronic kidney damage. Started on gentle hydration, urine electrolytes suggestive of intrinsic process. HD3 pt feeling well with no further episodes of bleeding, hemodynamically stable. Creatinine downtrending with improvement. Deemed stable for discharge with outpatient follow up.

## 2019-04-20 NOTE — PROGRESS NOTE ADULT - ASSESSMENT
63 year old man with history of CAD s/p PCI x 2, HTN, afib s/p ablation on eliquis, cardiomyopathy, CKD, HTN, mild-mod MR, PAD s/p LE stenting, thalassemia minor presents s/p 3 quadrant hemorrhoidectomy on 4/12 with PRBPR, acute anemia, RUSSELL, hyperkalemia.     1. Acute blood loss anemia, GI/post op bleed    s/p recent 3 quadrant hemorrhoidectomy on 4/12   h/h stable   continue to trend h/h, PRBC per primary team   eliquis, asa on hold  for one more week per surgery  bp stable, low dose bb resumed   surg f/u     2. RUSSELL/CKD, hyperkalemia   ? 2/2 to acute volume loss, dehydration, ?abx   improving   hold lisinopril , hydrochlorothiazide   renal eval    3. AFib s/p ablation  stable, cont amio   AC on hold for acute anemia     4. CAD s/p PCI  cv stable  AC/asa on hold for anemia   resume asa ASAP per surgery given h/o PCI    5. Chronic systolic chf, Cardiomyopathy  recent outpt. echo with improved LV function, EF 50%   euvolemic on exam  gradually resume bp meds as tolerated   hydrate    6. Leukocytosis  r/o infection    dvt ppx

## 2019-04-20 NOTE — DISCHARGE NOTE PROVIDER - CARE PROVIDER_API CALL
Fernie Esparza)  ColonRectal Surgery; Surgery  [address], [address]  [place] [address]  Phone: [phone]  Fax: [phone]  Follow Up Time:     Pernell Lee)  Cardiovascular Disease; Interventional Cardiology; Nuclear Cardiology  [address], [address]  [place] [address]  Phone: [phone]  Fax: [phone]  Follow Up Time:

## 2019-05-01 ENCOUNTER — APPOINTMENT (OUTPATIENT)
Dept: COLORECTAL SURGERY | Facility: CLINIC | Age: 64
End: 2019-05-01
Payer: COMMERCIAL

## 2019-05-01 ENCOUNTER — APPOINTMENT (OUTPATIENT)
Dept: INTERNAL MEDICINE | Facility: CLINIC | Age: 64
End: 2019-05-01
Payer: COMMERCIAL

## 2019-05-01 PROCEDURE — 99024 POSTOP FOLLOW-UP VISIT: CPT

## 2019-05-01 PROCEDURE — 99213 OFFICE O/P EST LOW 20 MIN: CPT

## 2019-05-02 NOTE — PHYSICAL EXAM
[No Abnormalities] : no visible abnormalities [None] : no [Tactile Decrease Hand Palmar Aspect Of Radial 3 1/2 Digits] : no tactile decrease in the Median nerve distribution [Wrist Neurological Dysfunction Phalen's Maneuver Right] : a negative Phalen's maneuver

## 2019-05-02 NOTE — HISTORY OF PRESENT ILLNESS
[Right Wrist] : the right wrist [Initial Evaluation] : an initial evaluation of [Hand Weakness] : hand weakness [Muscle Atrophy] : no muscle atrophy [Hand Tingling] : hand tingling [Currently Experiencing] : The patient is currently experiencing symptoms. [None] : There is no radiation [___ Week(s) Ago] : [unfilled] week(s) ago [Increased Pain Sensitivity] : no increased pain sensitivity [Weakened Grasp] : weakened grasp [Decreased Pain Sensitivity] : no decreased pain sensitivity [Decreased Pincer Strength] : decreased pincer strength

## 2019-05-07 NOTE — HISTORY OF PRESENT ILLNESS
[FreeTextEntry1] : Very pleasant 63-year-old white gentleman presents for his first postoperative visit. Patient is approximately 3 weeks status post 3 quadrant excisional hemorrhoidectomy for very large, mixed hemorrhoids. Patient normally takes anticoagulants which were held for one week after his surgery. When he resumed his anticoagulants, he had very significant bleeding requiring admission to the hospital and transfusion. His creatinine transiently lindsay significantly but then it returned to normal.\par \par Subsequent to discharge he has had no additional bleeding.\par \par Inspection of the anorectal area revealed that the postoperative edema is resolving. His excision sites are definitely healing. Digital examination reveals some slight stricture and this was dilated.\par \par I believe we can resume the patient's anticoagulants. I want to see him in 2 weeks for an office visit.

## 2019-05-22 ENCOUNTER — APPOINTMENT (OUTPATIENT)
Dept: ELECTROPHYSIOLOGY | Facility: CLINIC | Age: 64
End: 2019-05-22
Payer: COMMERCIAL

## 2019-05-22 ENCOUNTER — APPOINTMENT (OUTPATIENT)
Dept: COLORECTAL SURGERY | Facility: CLINIC | Age: 64
End: 2019-05-22
Payer: COMMERCIAL

## 2019-05-22 VITALS
HEART RATE: 53 BPM | SYSTOLIC BLOOD PRESSURE: 136 MMHG | WEIGHT: 216 LBS | HEIGHT: 71 IN | BODY MASS INDEX: 30.24 KG/M2 | DIASTOLIC BLOOD PRESSURE: 85 MMHG | OXYGEN SATURATION: 100 %

## 2019-05-22 DIAGNOSIS — I34.0 NONRHEUMATIC MITRAL (VALVE) INSUFFICIENCY: ICD-10-CM

## 2019-05-22 PROCEDURE — 99024 POSTOP FOLLOW-UP VISIT: CPT

## 2019-05-22 PROCEDURE — 99214 OFFICE O/P EST MOD 30 MIN: CPT

## 2019-05-22 PROCEDURE — 93000 ELECTROCARDIOGRAM COMPLETE: CPT

## 2019-05-22 RX ORDER — SODIUM SULFATE, POTASSIUM SULFATE, MAGNESIUM SULFATE 17.5; 3.13; 1.6 G/ML; G/ML; G/ML
17.5-3.13-1.6 SOLUTION, CONCENTRATE ORAL
Qty: 1 | Refills: 0 | Status: DISCONTINUED | COMMUNITY
Start: 2018-10-25 | End: 2019-05-22

## 2019-05-22 RX ORDER — MOXIFLOXACIN OPHTHALMIC 5 MG/ML
0.5 SOLUTION/ DROPS OPHTHALMIC 4 TIMES DAILY
Qty: 1 | Refills: 5 | Status: DISCONTINUED | COMMUNITY
Start: 2018-10-12 | End: 2019-05-22

## 2019-05-22 RX ORDER — PREDNISOLONE ACETATE 10 MG/ML
1 SUSPENSION/ DROPS OPHTHALMIC
Qty: 1 | Refills: 5 | Status: DISCONTINUED | COMMUNITY
Start: 2018-10-12 | End: 2019-05-22

## 2019-05-22 RX ORDER — BENZONATATE 100 MG/1
100 CAPSULE ORAL 3 TIMES DAILY
Qty: 21 | Refills: 0 | Status: DISCONTINUED | COMMUNITY
Start: 2018-12-14 | End: 2019-05-22

## 2019-05-22 NOTE — HISTORY OF PRESENT ILLNESS
[FreeTextEntry1] : Very pleasant 63-year-old gentleman who presents for follow-up visit. He is approximately 5 weeks status post 3 quadrant excisional hemorrhoidectomy for complex, bleeding mixed hemorrhoids. He looks and feels well. He has no pain with bowel movements and no further bleeding or drainage. He has good control of his bowel movements.\par \par Inspection of the anorectal area reveals that his excisional hemorrhoidectomy sites are 95% healed.\par \par Patient was encouraged that he is making an excellent recuperation. I will see him one more time in approximately one month.

## 2019-06-03 ENCOUNTER — NON-APPOINTMENT (OUTPATIENT)
Age: 64
End: 2019-06-03

## 2019-06-03 PROBLEM — I34.0 SEVERE MITRAL REGURGITATION: Status: ACTIVE | Noted: 2018-04-16

## 2019-06-03 NOTE — DISCUSSION/SUMMARY
[FreeTextEntry1] : He has remained in SR on amiodarone. \par No symptoms. \par Follow up echo pending.\par Discussed repeat ablation in order to discontinue amiodarone - he will consider this in the future. \par Amiodarone side effects rediscussed. \par Follow up visit within 6 months to rediscuss catheter ablation.\par Followup testing recommended: PFTs with DLCO, LFTs, TFTs, eye exam. I have asked him to follow up with you and referral to pulmonary for PFT testing. Continue on AC

## 2019-06-03 NOTE — HISTORY OF PRESENT ILLNESS
[FreeTextEntry1] : Patient is a 64 yo man with Cardiomyopathy, previously severe mitral regurg, AF - s/p AF ablation - recurrence - has been on amiodarone and in SR. \par He has history of CAD, s/p stent prox LAD 5/2014, LV dysfunction. He is on ASA/Eliquis.\par Follow up 5/22/19: Patient is feeling well and denies chest pain, SOB, dizziness, lightheadedness, palpitations. \par Prior rectal bleed 2nd to Hemorrhoid - s/p hemorrhoidectomy> No recurrence of bleeding. \par Was at [facility]  for rectal office. \par He stopped smoking about 4 year ago and drank alcohol infrequently.

## 2019-06-19 ENCOUNTER — APPOINTMENT (OUTPATIENT)
Dept: COLORECTAL SURGERY | Facility: CLINIC | Age: 64
End: 2019-06-19

## 2019-06-26 ENCOUNTER — APPOINTMENT (OUTPATIENT)
Dept: ORTHOPEDIC SURGERY | Facility: CLINIC | Age: 64
End: 2019-06-26

## 2019-06-27 ENCOUNTER — MEDICATION RENEWAL (OUTPATIENT)
Age: 64
End: 2019-06-27

## 2019-07-01 ENCOUNTER — APPOINTMENT (OUTPATIENT)
Dept: UROLOGY | Facility: CLINIC | Age: 64
End: 2019-07-01
Payer: COMMERCIAL

## 2019-07-01 PROCEDURE — 99214 OFFICE O/P EST MOD 30 MIN: CPT

## 2019-07-01 NOTE — ADDENDUM
[FreeTextEntry1] : Entered by Jaelyn Curry, acting as scribe for Dr. Pernell Keith.\par \par The documentation recorded by the scribe accurately reflects the service I personally performed and the decisions made by me.

## 2019-07-01 NOTE — LETTER BODY
[FreeTextEntry1] : London Lara M.D.\par [address]\[place] [address]\par P: [phone] \par F: [phone]\par \par Pernell Lee MD\par [address] \[address]\[place] [address]\par P: [phone]\par F: [phone]\par \par Dear Dr. Lara and Dr. Lee, \par \par Reason for visit: Elevated PSA.\par \par This is a 63 -year-old gentleman with history of hypertension presenting with elevated PSA. Patient returns today for followup. His most recent PSA was 13.71. Patient underwent a negative prostate biopsy in September 2017. Since his last visit, the patient reports he is doing well. He denies any urinary difficulties. Patient denies any changes in health. He denies any hematuria or dysuria. The past medical history, family history and social history are unchanged. All other review of systems are negative. Patient denies any changes in medications. Medication list was reconciled.\par \par On examination, the patient is a healthy-appearing gentleman in no acute distress. He is alert and oriented follows commands. He has normal mood and affect. He is normocephalic. Oral no thrush. Neck is supple. Respirations are unlabored. His abdomen is soft and nontender. Liver is nonpalpable. Bladder is nonpalpable. No CVA tenderness. Neurologically he is grossly intact. No peripheral edema. Skin without gross abnormality. He has normal male external genitalia. Normal meatus. Bilateral testes are descended intrascrotally and normal to palpation. On rectal examination, there is normal sphincter tone. The prostate is clinically benign without focal induration or nodularity. His prostate is enlarged. There is evidence of hemorrhoids. \par \par Patient's most recent PSA in 2018 demonstrated persistent elevation of PSA at 13.71. Hemoglobin A1c is 6.1%, which is also elevated. 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 \par \par Assessment: Elevated PSA.\par \par I counseled the patient. He will obtain BMP and PSA today to ensure stability. Risks and alternatives were discussed. I answered the patient questions. The patient will follow-up as directed and will contact me with any questions or concerns. Thank you for the opportunity to participate in the care of this patient. I'll keep you updated on his progress.\par \par Plan: Repeat PSA and BMP. Follow up 1 year.

## 2019-07-03 LAB
ANION GAP SERPL CALC-SCNC: 16 MMOL/L
BUN SERPL-MCNC: 29 MG/DL
CALCIUM SERPL-MCNC: 8.5 MG/DL
CHLORIDE SERPL-SCNC: 107 MMOL/L
CO2 SERPL-SCNC: 21 MMOL/L
CREAT SERPL-MCNC: 2.09 MG/DL
GLUCOSE SERPL-MCNC: 87 MG/DL
POTASSIUM SERPL-SCNC: 3.6 MMOL/L
PSA FREE FLD-MCNC: 29 %
PSA FREE SERPL-MCNC: 2.66 NG/ML
PSA SERPL-MCNC: 9.04 NG/ML
SODIUM SERPL-SCNC: 144 MMOL/L

## 2019-07-10 ENCOUNTER — APPOINTMENT (OUTPATIENT)
Dept: INTERNAL MEDICINE | Facility: CLINIC | Age: 64
End: 2019-07-10

## 2019-07-19 ENCOUNTER — APPOINTMENT (OUTPATIENT)
Dept: INTERNAL MEDICINE | Facility: CLINIC | Age: 64
End: 2019-07-19
Payer: COMMERCIAL

## 2019-07-19 PROCEDURE — 99213 OFFICE O/P EST LOW 20 MIN: CPT

## 2019-07-20 VITALS — HEART RATE: 50 BPM | DIASTOLIC BLOOD PRESSURE: 70 MMHG | RESPIRATION RATE: 14 BRPM | SYSTOLIC BLOOD PRESSURE: 108 MMHG

## 2019-07-20 NOTE — ASSESSMENT
[FreeTextEntry1] : Goal blood pressure /90\par At goal\par \par Urged to limit sodium intake\par Urged to maintain diet and exercise habits to keep BMI < 28\par Limit alcohol consumption to < 14 drinks per week\par Reinforced adherence to medication regimen\par

## 2019-07-20 NOTE — HISTORY OF PRESENT ILLNESS
[de-identified] : Patient comes in for reassessment of hypertension. Patient denies edema, chest pain, dizziness, LIRA, PND or orthopnea.  No problems with medications.\par

## 2019-08-01 ENCOUNTER — APPOINTMENT (OUTPATIENT)
Dept: OPHTHALMOLOGY | Facility: CLINIC | Age: 64
End: 2019-08-01

## 2019-09-06 ENCOUNTER — MEDICATION RENEWAL (OUTPATIENT)
Age: 64
End: 2019-09-06

## 2019-09-12 ENCOUNTER — MEDICATION RENEWAL (OUTPATIENT)
Age: 64
End: 2019-09-12

## 2019-09-26 ENCOUNTER — APPOINTMENT (OUTPATIENT)
Dept: INTERNAL MEDICINE | Facility: CLINIC | Age: 64
End: 2019-09-26

## 2019-09-26 ENCOUNTER — LABORATORY RESULT (OUTPATIENT)
Age: 64
End: 2019-09-26

## 2019-09-26 ENCOUNTER — APPOINTMENT (OUTPATIENT)
Dept: INTERNAL MEDICINE | Facility: CLINIC | Age: 64
End: 2019-09-26
Payer: COMMERCIAL

## 2019-09-26 VITALS
DIASTOLIC BLOOD PRESSURE: 80 MMHG | BODY MASS INDEX: 27.02 KG/M2 | OXYGEN SATURATION: 97 % | HEART RATE: 53 BPM | SYSTOLIC BLOOD PRESSURE: 130 MMHG | WEIGHT: 193 LBS | HEIGHT: 71 IN

## 2019-09-26 DIAGNOSIS — M19.049 PRIMARY OSTEOARTHRITIS, UNSPECIFIED HAND: ICD-10-CM

## 2019-09-26 PROCEDURE — 99214 OFFICE O/P EST MOD 30 MIN: CPT

## 2019-09-27 NOTE — REVIEW OF SYSTEMS
[Negative] : Genitourinary [FreeTextEntry7] : see hpi [FreeTextEntry8] : see hpi [FreeTextEntry9] : see hpi [de-identified] : see hpi-hx right CTS

## 2019-09-27 NOTE — PHYSICAL EXAM
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

## 2019-09-30 LAB
ALBUMIN SERPL ELPH-MCNC: 3.9 G/DL
ALP BLD-CCNC: 96 U/L
ALT SERPL-CCNC: 13 U/L
ANION GAP SERPL CALC-SCNC: 15 MMOL/L
AST SERPL-CCNC: 16 U/L
BASOPHILS # BLD AUTO: 0.1 K/UL
BASOPHILS NFR BLD AUTO: 0.7 %
BILIRUB SERPL-MCNC: 0.5 MG/DL
BUN SERPL-MCNC: 25 MG/DL
CALCIUM SERPL-MCNC: 9 MG/DL
CCP AB SER IA-ACNC: 38 UNITS
CHLORIDE SERPL-SCNC: 102 MMOL/L
CO2 SERPL-SCNC: 26 MMOL/L
CREAT SERPL-MCNC: 1.7 MG/DL
EOSINOPHIL # BLD AUTO: 0.26 K/UL
EOSINOPHIL NFR BLD AUTO: 1.9 %
ERYTHROCYTE [SEDIMENTATION RATE] IN BLOOD BY WESTERGREN METHOD: 62 MM/HR
ESTIMATED AVERAGE GLUCOSE: 123 MG/DL
FERRITIN SERPL-MCNC: 24 NG/ML
GLUCOSE SERPL-MCNC: 80 MG/DL
HBA1C MFR BLD HPLC: 5.9 %
HCT VFR BLD CALC: 31.8 %
HGB BLD-MCNC: 9.5 G/DL
IMM GRANULOCYTES NFR BLD AUTO: 0.6 %
IRON SATN MFR SERPL: 5 %
IRON SERPL-MCNC: 23 UG/DL
LYMPHOCYTES # BLD AUTO: 2.14 K/UL
LYMPHOCYTES NFR BLD AUTO: 15.3 %
MAN DIFF?: NORMAL
MCHC RBC-ENTMCNC: 16.9 PG
MCHC RBC-ENTMCNC: 29.9 GM/DL
MCV RBC AUTO: 56.5 FL
MONOCYTES # BLD AUTO: 1.2 K/UL
MONOCYTES NFR BLD AUTO: 8.6 %
NEUTROPHILS # BLD AUTO: 10.17 K/UL
NEUTROPHILS NFR BLD AUTO: 72.9 %
PLATELET # BLD AUTO: 321 K/UL
POTASSIUM SERPL-SCNC: 2.9 MMOL/L
PROT SERPL-MCNC: 7 G/DL
RBC # BLD: 5.63 M/UL
RBC # FLD: 25 %
RF+CCP IGG SER-IMP: ABNORMAL
RHEUMATOID FACT SER QL: <10 IU/ML
SODIUM SERPL-SCNC: 143 MMOL/L
T3FREE SERPL-MCNC: 2.28 PG/ML
T4 FREE SERPL-MCNC: 1.6 NG/DL
TIBC SERPL-MCNC: 420 UG/DL
TSH SERPL-ACNC: 0.86 UIU/ML
UIBC SERPL-MCNC: 397 UG/DL
URATE SERPL-MCNC: 8.8 MG/DL
VIT B12 SERPL-MCNC: 411 PG/ML
WBC # FLD AUTO: 13.96 K/UL

## 2019-10-02 ENCOUNTER — FORM ENCOUNTER (OUTPATIENT)
Age: 64
End: 2019-10-02

## 2019-10-02 LAB — VIT B6 SERPL-MCNC: 2.1 UG/L

## 2019-10-03 ENCOUNTER — MEDICATION RENEWAL (OUTPATIENT)
Age: 64
End: 2019-10-03

## 2019-10-03 ENCOUNTER — OUTPATIENT (OUTPATIENT)
Dept: OUTPATIENT SERVICES | Facility: HOSPITAL | Age: 64
LOS: 1 days | End: 2019-10-03
Payer: COMMERCIAL

## 2019-10-03 ENCOUNTER — APPOINTMENT (OUTPATIENT)
Dept: RADIOLOGY | Facility: IMAGING CENTER | Age: 64
End: 2019-10-03
Payer: COMMERCIAL

## 2019-10-03 DIAGNOSIS — M19.049 PRIMARY OSTEOARTHRITIS, UNSPECIFIED HAND: ICD-10-CM

## 2019-10-03 DIAGNOSIS — Z98.49 CATARACT EXTRACTION STATUS, UNSPECIFIED EYE: Chronic | ICD-10-CM

## 2019-10-03 DIAGNOSIS — Z98.890 OTHER SPECIFIED POSTPROCEDURAL STATES: Chronic | ICD-10-CM

## 2019-10-03 DIAGNOSIS — Z95.9 PRESENCE OF CARDIAC AND VASCULAR IMPLANT AND GRAFT, UNSPECIFIED: Chronic | ICD-10-CM

## 2019-10-03 PROCEDURE — 73130 X-RAY EXAM OF HAND: CPT | Mod: 26,50

## 2019-10-03 PROCEDURE — 73130 X-RAY EXAM OF HAND: CPT

## 2019-10-10 ENCOUNTER — APPOINTMENT (OUTPATIENT)
Dept: RHEUMATOLOGY | Facility: CLINIC | Age: 64
End: 2019-10-10
Payer: COMMERCIAL

## 2019-10-10 VITALS
WEIGHT: 220 LBS | BODY MASS INDEX: 30.68 KG/M2 | OXYGEN SATURATION: 98 % | SYSTOLIC BLOOD PRESSURE: 137 MMHG | TEMPERATURE: 98.4 F | HEART RATE: 76 BPM | DIASTOLIC BLOOD PRESSURE: 78 MMHG

## 2019-10-10 DIAGNOSIS — M17.10 UNILATERAL PRIMARY OSTEOARTHRITIS, UNSPECIFIED KNEE: ICD-10-CM

## 2019-10-10 DIAGNOSIS — Z86.79 PERSONAL HISTORY OF OTHER DISEASES OF THE CIRCULATORY SYSTEM: ICD-10-CM

## 2019-10-10 PROCEDURE — 99204 OFFICE O/P NEW MOD 45 MIN: CPT | Mod: 25

## 2019-10-10 PROCEDURE — 71046 X-RAY EXAM CHEST 2 VIEWS: CPT

## 2019-10-10 PROCEDURE — G0008: CPT

## 2019-10-10 PROCEDURE — 90674 CCIIV4 VAC NO PRSV 0.5 ML IM: CPT

## 2019-10-10 PROCEDURE — 73630 X-RAY EXAM OF FOOT: CPT | Mod: LT

## 2019-10-18 ENCOUNTER — APPOINTMENT (OUTPATIENT)
Dept: RADIOLOGY | Facility: IMAGING CENTER | Age: 64
End: 2019-10-18
Payer: COMMERCIAL

## 2019-10-18 ENCOUNTER — OUTPATIENT (OUTPATIENT)
Dept: OUTPATIENT SERVICES | Facility: HOSPITAL | Age: 64
LOS: 1 days | End: 2019-10-18
Payer: COMMERCIAL

## 2019-10-18 DIAGNOSIS — Z98.890 OTHER SPECIFIED POSTPROCEDURAL STATES: Chronic | ICD-10-CM

## 2019-10-18 DIAGNOSIS — M06.9 RHEUMATOID ARTHRITIS, UNSPECIFIED: ICD-10-CM

## 2019-10-18 DIAGNOSIS — Z98.49 CATARACT EXTRACTION STATUS, UNSPECIFIED EYE: Chronic | ICD-10-CM

## 2019-10-18 DIAGNOSIS — E55.9 VITAMIN D DEFICIENCY, UNSPECIFIED: ICD-10-CM

## 2019-10-18 DIAGNOSIS — Z95.9 PRESENCE OF CARDIAC AND VASCULAR IMPLANT AND GRAFT, UNSPECIFIED: Chronic | ICD-10-CM

## 2019-10-18 DIAGNOSIS — M10.9 GOUT, UNSPECIFIED: ICD-10-CM

## 2019-10-18 PROCEDURE — 73630 X-RAY EXAM OF FOOT: CPT

## 2019-10-18 PROCEDURE — 73630 X-RAY EXAM OF FOOT: CPT | Mod: 26,50

## 2019-10-18 PROCEDURE — 71046 X-RAY EXAM CHEST 2 VIEWS: CPT

## 2019-10-18 PROCEDURE — 71046 X-RAY EXAM CHEST 2 VIEWS: CPT | Mod: 26

## 2019-10-18 PROCEDURE — 72050 X-RAY EXAM NECK SPINE 4/5VWS: CPT | Mod: 26

## 2019-10-18 PROCEDURE — 72050 X-RAY EXAM NECK SPINE 4/5VWS: CPT

## 2019-10-25 LAB
25(OH)D3 SERPL-MCNC: 22.3 NG/ML
ALBUMIN SERPL ELPH-MCNC: 4 G/DL
ALP BLD-CCNC: 95 U/L
ALT SERPL-CCNC: 13 U/L
ANION GAP SERPL CALC-SCNC: 19 MMOL/L
AST SERPL-CCNC: 18 U/L
BASOPHILS # BLD AUTO: 0.07 K/UL
BASOPHILS NFR BLD AUTO: 0.5 %
BILIRUB SERPL-MCNC: 0.6 MG/DL
BUN SERPL-MCNC: 21 MG/DL
CALCIUM SERPL-MCNC: 9.3 MG/DL
CHLORIDE SERPL-SCNC: 103 MMOL/L
CO2 SERPL-SCNC: 23 MMOL/L
CREAT SERPL-MCNC: 1.88 MG/DL
CRP SERPL-MCNC: 2.16 MG/DL
EOSINOPHIL # BLD AUTO: 0.13 K/UL
EOSINOPHIL NFR BLD AUTO: 0.9 %
ERYTHROCYTE [SEDIMENTATION RATE] IN BLOOD BY WESTERGREN METHOD: 102 MM/HR
G6PD SER-CCNC: 27.9 U/G HGB
GLUCOSE SERPL-MCNC: 75 MG/DL
HBV CORE IGG+IGM SER QL: NONREACTIVE
HBV SURFACE AB SER QL: NONREACTIVE
HBV SURFACE AG SER QL: NONREACTIVE
HCT VFR BLD CALC: 30.5 %
HCV AB SER QL: NONREACTIVE
HCV S/CO RATIO: 0.09 S/CO
HGB BLD-MCNC: 9.3 G/DL
IMM GRANULOCYTES NFR BLD AUTO: 0.5 %
LYMPHOCYTES # BLD AUTO: 2.27 K/UL
LYMPHOCYTES NFR BLD AUTO: 15.2 %
M TB IFN-G BLD-IMP: NEGATIVE
MAN DIFF?: NORMAL
MCHC RBC-ENTMCNC: 17.3 PG
MCHC RBC-ENTMCNC: 30.5 GM/DL
MCV RBC AUTO: 56.6 FL
MONOCYTES # BLD AUTO: 1.27 K/UL
MONOCYTES NFR BLD AUTO: 8.5 %
NEUTROPHILS # BLD AUTO: 11.09 K/UL
NEUTROPHILS NFR BLD AUTO: 74.4 %
PLATELET # BLD AUTO: 306 K/UL
POTASSIUM SERPL-SCNC: 3.3 MMOL/L
PROT SERPL-MCNC: 7.3 G/DL
QUANTIFERON TB PLUS MITOGEN MINUS NIL: 0.89 IU/ML
QUANTIFERON TB PLUS NIL: 0.01 IU/ML
QUANTIFERON TB PLUS TB1 MINUS NIL: 0.01 IU/ML
QUANTIFERON TB PLUS TB2 MINUS NIL: 0.02 IU/ML
RBC # BLD: 5.39 M/UL
RBC # FLD: 24 %
SODIUM SERPL-SCNC: 145 MMOL/L
WBC # FLD AUTO: 14.9 K/UL

## 2019-10-29 ENCOUNTER — APPOINTMENT (OUTPATIENT)
Dept: INTERNAL MEDICINE | Facility: CLINIC | Age: 64
End: 2019-10-29
Payer: COMMERCIAL

## 2019-10-29 VITALS — DIASTOLIC BLOOD PRESSURE: 60 MMHG | RESPIRATION RATE: 14 BRPM | HEART RATE: 70 BPM | SYSTOLIC BLOOD PRESSURE: 106 MMHG

## 2019-10-29 PROCEDURE — 99213 OFFICE O/P EST LOW 20 MIN: CPT

## 2019-10-29 NOTE — HISTORY OF PRESENT ILLNESS
[Cough] : Cough [Dyspnea] : no dyspnea [Chills] : no chills [Fever] : no fever [Runny Nose] : no runny nose [Sore Throat] : no sore throat [Pleuritic Chest Pain] : no pleuritic chest pain [Postnasal Drainage] : no postnasal drainage

## 2019-11-01 ENCOUNTER — APPOINTMENT (OUTPATIENT)
Dept: RHEUMATOLOGY | Facility: CLINIC | Age: 64
End: 2019-11-01
Payer: COMMERCIAL

## 2019-11-01 VITALS
TEMPERATURE: 98.1 F | DIASTOLIC BLOOD PRESSURE: 74 MMHG | BODY MASS INDEX: 27.02 KG/M2 | HEART RATE: 60 BPM | HEIGHT: 71 IN | WEIGHT: 193 LBS | OXYGEN SATURATION: 98 % | SYSTOLIC BLOOD PRESSURE: 131 MMHG

## 2019-11-01 PROCEDURE — 99213 OFFICE O/P EST LOW 20 MIN: CPT

## 2019-11-04 ENCOUNTER — APPOINTMENT (OUTPATIENT)
Dept: ORTHOPEDIC SURGERY | Facility: CLINIC | Age: 64
End: 2019-11-04
Payer: COMMERCIAL

## 2019-11-04 VITALS — BODY MASS INDEX: 27.02 KG/M2 | WEIGHT: 193 LBS | HEIGHT: 71 IN

## 2019-11-04 DIAGNOSIS — M70.21 OLECRANON BURSITIS, RIGHT ELBOW: ICD-10-CM

## 2019-11-04 DIAGNOSIS — M70.22 OLECRANON BURSITIS, LEFT ELBOW: ICD-10-CM

## 2019-11-04 PROCEDURE — 73070 X-RAY EXAM OF ELBOW: CPT | Mod: RT

## 2019-11-04 PROCEDURE — 99214 OFFICE O/P EST MOD 30 MIN: CPT

## 2019-11-14 ENCOUNTER — OTHER (OUTPATIENT)
Age: 64
End: 2019-11-14

## 2019-11-15 ENCOUNTER — APPOINTMENT (OUTPATIENT)
Dept: ELECTROPHYSIOLOGY | Facility: CLINIC | Age: 64
End: 2019-11-15
Payer: COMMERCIAL

## 2019-11-15 ENCOUNTER — NON-APPOINTMENT (OUTPATIENT)
Age: 64
End: 2019-11-15

## 2019-11-15 VITALS
OXYGEN SATURATION: 99 % | DIASTOLIC BLOOD PRESSURE: 70 MMHG | BODY MASS INDEX: 26.5 KG/M2 | SYSTOLIC BLOOD PRESSURE: 92 MMHG | WEIGHT: 190 LBS | HEART RATE: 70 BPM

## 2019-11-15 PROCEDURE — 99214 OFFICE O/P EST MOD 30 MIN: CPT | Mod: 25

## 2019-11-15 PROCEDURE — 93000 ELECTROCARDIOGRAM COMPLETE: CPT

## 2019-11-15 NOTE — PHYSICAL EXAM
[General Appearance - In No Acute Distress] : no acute distress [General Appearance - Well Developed] : well developed [Normal Conjunctiva] : the conjunctiva exhibited no abnormalities [No Oral Pallor] : no oral pallor [Normal Jugular Venous V Waves Present] : normal jugular venous V waves present [Respiration, Rhythm And Depth] : normal respiratory rhythm and effort [Abdomen Tenderness] : non-tender [Auscultation Breath Sounds / Voice Sounds] : lungs were clear to auscultation bilaterally [Nail Clubbing] : no clubbing of the fingernails [Cyanosis, Localized] : no localized cyanosis [Abnormal Walk] : normal gait [Impaired Insight] : insight and judgment were intact [No Venous Stasis] : no venous stasis [Rhythm Regular] : regular [5th Left ICS - MCL] : palpated at the 5th LICS in the midclavicular line [Normal Rate] : normal [II] : a grade 2 [No Abnormalities] : the abdominal aorta was not enlarged and no bruit was heard [No Pitting Edema] : no pitting edema present [2+] : left 2+ [Heart Rate And Rhythm] : heart rate and rhythm were normal [Heart Sounds] : normal S1 and S2 [Murmurs] : no murmurs present [Edema] : no peripheral edema present [Arterial Pulses Normal] : the arterial pulses were normal

## 2019-11-20 NOTE — ADDENDUM
[FreeTextEntry1] : I, Nils Butterfield, acted solely as a scribe for Dr. James on this date 11/15/2019.

## 2019-11-20 NOTE — DISCUSSION/SUMMARY
[FreeTextEntry1] : He is currently doing well on amiodarone 200 mg today and has maintained sinus rhythm.  Patient is not symptomatic.  Would not like to continue amiodarone long-term this patient because of potential side effects.  We will reassess again the extent of his mitral regurgitation sized left atrium and would be in favor of another ablation.  We will make a final decision after reviewing his follow-up echocardiogram to determine left atrial size and valvular regurgitation.   \par Follow up echo pending.\par Discussed repeat ablation in order to discontinue amiodarone - he will consider this in the future. \par Amiodarone side effects rediscussed. \par Followup testing recommended: PFTs with DLCO, LFTs, TFTs, eye exam. I have asked him to follow up with you and referral to pulmonary for PFT testing. Continue on AC

## 2019-11-20 NOTE — END OF VISIT
[FreeTextEntry3] : All medical records entries made by the Scribe were at my, Dr. Ephraim James MD, direction and personally dictated by me on 11/15/2019. I have personally reviewed the chart and agree that the record accurately reflects my personal performance of the history, physical exam, assessment, and plan. I have also personally directed, reviewed, and agreed with the chart.

## 2019-11-20 NOTE — HISTORY OF PRESENT ILLNESS
[FreeTextEntry1] : The patient is a 64-year-old man who was seen in follow-up regarding his atrial fibrillation.  He has had prior A. fib ablation had a recurrence.  He has been on amiodarone has been doing well since then.  He previously had severe mitral regurgitation and follow-up echo showed moderate mitral regurgitation.  Patient is now feeling well without any specific complaints. He denies any chest pains, palpitations, shortness of breath, dizziness, lightheadedness, syncope, or near syncope. He is currently on amiodarone 200 mg/day. He notes having some numbness to his fingers which is being evaluated by neurology. \par \par He has history of CAD, s/p stent prox LAD 5/2014, LV dysfunction. He is on ASA/Eliquis.\par Prior rectal bleed 2nd to Hemorrhoid - s/p hemorrhoidectomy> No recurrence of bleeding. \par Was at [facility]  for rectal office. \par He stopped smoking about 4 year ago and drank alcohol infrequently.

## 2019-11-20 NOTE — REVIEW OF SYSTEMS
[Negative] : Endocrine [see HPI] : see HPI [Numbness (Hypesthesia)] : numbness [Feeling Fatigued] : not feeling fatigued [Recent Weight Gain (___ Lbs)] : no recent weight gain [Recent Weight Loss (___ Lbs)] : no recent weight loss

## 2019-11-22 ENCOUNTER — RX RENEWAL (OUTPATIENT)
Age: 64
End: 2019-11-22

## 2019-11-22 ENCOUNTER — TRANSCRIPTION ENCOUNTER (OUTPATIENT)
Age: 64
End: 2019-11-22

## 2019-11-27 ENCOUNTER — APPOINTMENT (OUTPATIENT)
Dept: NEPHROLOGY | Facility: CLINIC | Age: 64
End: 2019-11-27
Payer: COMMERCIAL

## 2019-11-27 ENCOUNTER — LABORATORY RESULT (OUTPATIENT)
Age: 64
End: 2019-11-27

## 2019-11-27 ENCOUNTER — APPOINTMENT (OUTPATIENT)
Dept: ELECTROPHYSIOLOGY | Facility: CLINIC | Age: 64
End: 2019-11-27

## 2019-11-27 VITALS
DIASTOLIC BLOOD PRESSURE: 80 MMHG | BODY MASS INDEX: 26.44 KG/M2 | OXYGEN SATURATION: 98 % | HEART RATE: 56 BPM | WEIGHT: 189.59 LBS | SYSTOLIC BLOOD PRESSURE: 135 MMHG

## 2019-11-27 PROCEDURE — 99204 OFFICE O/P NEW MOD 45 MIN: CPT

## 2019-11-27 NOTE — HISTORY OF PRESENT ILLNESS
[FreeTextEntry1] : 64 year male with hx of HTN long standing, CKD for few years, gout, CAD, AFib. here to establish care.\par no edema.\par BOP well controlled.\par recent gouty attacks and seeing Rheum. NO NSAIDs \par f/u with cardiology for AFIB, cardiomegaly. NO CHF \par iron def anemia but had recent colonoscopy normal.  had internal hemorrhoids( Dr. Condon).\par BPH but no Nocturia \par US in 2014 Normal.

## 2019-11-27 NOTE — REVIEW OF SYSTEMS
[Fever] : no fever [Chills] : no chills [Feeling Tired] : not feeling tired [Eye Pain] : no eye pain [Red Eyes] : eyes not red [Earache] : no earache [Loss Of Hearing] : no hearing loss [As noted in HPI] : as noted in HPI [Chest Pain] : no chest pain [Lower Ext Edema] : no extremity edema [SOB on Exertion] : no shortness of breath during exertion [PND] : no PND [As Noted in HPI] : as noted in HPI [Negative] : Heme/Lymph

## 2019-11-27 NOTE — PHYSICAL EXAM
[General Appearance - Alert] : alert [General Appearance - In No Acute Distress] : in no acute distress [General Appearance - Well Developed] : well developed [General Appearance - Well-Appearing] : healthy appearing [Sclera] : the sclera and conjunctiva were normal [Extraocular Movements] : extraocular movements were intact [Oropharynx] : the oropharynx was normal [Jugular Venous Distention Increased] : there was no jugular-venous distention [Auscultation Breath Sounds / Voice Sounds] : lungs were clear to auscultation bilaterally [Heart Sounds] : normal S1 and S2 [Murmurs] : no murmurs [Edema] : there was no peripheral edema [Abdomen Soft] : soft [No CVA Tenderness] : no ~M costovertebral angle tenderness [Abnormal Walk] : normal gait [FreeTextEntry1] : swollen Proximal P Joint  [] : no rash [Skin Lesions] : no skin lesions [No Focal Deficits] : no focal deficits [Oriented To Time, Place, And Person] : oriented to person, place, and time [Impaired Insight] : insight and judgment were intact

## 2019-11-28 LAB
ALBUMIN SERPL ELPH-MCNC: 3.9 G/DL
ANION GAP SERPL CALC-SCNC: 13 MMOL/L
BASOPHILS # BLD AUTO: 0.06 K/UL
BASOPHILS NFR BLD AUTO: 0.4 %
BUN SERPL-MCNC: 34 MG/DL
CALCIUM SERPL-MCNC: 9.4 MG/DL
CALCIUM SERPL-MCNC: 9.4 MG/DL
CHLORIDE SERPL-SCNC: 104 MMOL/L
CO2 SERPL-SCNC: 25 MMOL/L
CREAT SERPL-MCNC: 1.86 MG/DL
CREAT SPEC-SCNC: 171 MG/DL
CREAT/PROT UR: 0.2 RATIO
EOSINOPHIL # BLD AUTO: 0.09 K/UL
EOSINOPHIL NFR BLD AUTO: 0.6 %
FERRITIN SERPL-MCNC: 31 NG/ML
GLUCOSE SERPL-MCNC: 84 MG/DL
HCT VFR BLD CALC: 32.3 %
HGB BLD-MCNC: 9.7 G/DL
IMM GRANULOCYTES NFR BLD AUTO: 0.3 %
IRON SATN MFR SERPL: 6 %
IRON SERPL-MCNC: 24 UG/DL
LYMPHOCYTES # BLD AUTO: 2.63 K/UL
LYMPHOCYTES NFR BLD AUTO: 18.2 %
MAN DIFF?: NORMAL
MCHC RBC-ENTMCNC: 17.1 PG
MCHC RBC-ENTMCNC: 30 GM/DL
MCV RBC AUTO: 57 FL
MONOCYTES # BLD AUTO: 0.97 K/UL
MONOCYTES NFR BLD AUTO: 6.7 %
NEUTROPHILS # BLD AUTO: 10.65 K/UL
NEUTROPHILS NFR BLD AUTO: 73.8 %
PARATHYROID HORMONE INTACT: 76 PG/ML
PHOSPHATE SERPL-MCNC: 3.7 MG/DL
PLATELET # BLD AUTO: 357 K/UL
POTASSIUM SERPL-SCNC: 4.7 MMOL/L
PROT UR-MCNC: 34 MG/DL
RBC # BLD: 5.67 M/UL
RBC # FLD: 21 %
SODIUM SERPL-SCNC: 142 MMOL/L
TIBC SERPL-MCNC: 431 UG/DL
UIBC SERPL-MCNC: 407 UG/DL
WBC # FLD AUTO: 14.45 K/UL

## 2019-12-02 LAB
ALBUMIN MFR SERPL ELPH: 45.2 %
ALBUMIN SERPL-MCNC: 3.6 G/DL
ALBUMIN/GLOB SERPL: 0.8 RATIO
ALPHA1 GLOB MFR SERPL ELPH: 5.2 %
ALPHA1 GLOB SERPL ELPH-MCNC: 0.4 G/DL
ALPHA2 GLOB MFR SERPL ELPH: 14.6 %
ALPHA2 GLOB SERPL ELPH-MCNC: 1.2 G/DL
B-GLOBULIN MFR SERPL ELPH: 13.5 %
B-GLOBULIN SERPL ELPH-MCNC: 1.1 G/DL
DEPRECATED KAPPA LC FREE/LAMBDA SER: 1.15 RATIO
DEPRECATED KAPPA LC FREE/LAMBDA SER: 1.15 RATIO
GAMMA GLOB FLD ELPH-MCNC: 1.7 G/DL
GAMMA GLOB MFR SERPL ELPH: 21.5 %
IGA SER QL IEP: 450 MG/DL
IGG SER QL IEP: 1274 MG/DL
IGM SER QL IEP: 754 MG/DL
INTERPRETATION SERPL IEP-IMP: NORMAL
KAPPA LC CSF-MCNC: 3.43 MG/DL
KAPPA LC CSF-MCNC: 3.43 MG/DL
KAPPA LC SERPL-MCNC: 3.95 MG/DL
KAPPA LC SERPL-MCNC: 3.95 MG/DL
M PROTEIN MFR SERPL ELPH: NORMAL
M PROTEIN SPEC IFE-MCNC: NORMAL
MONOCLON BAND OBS SERPL: NORMAL
PROT SERPL-MCNC: 7.9 G/DL
PROT SERPL-MCNC: 7.9 G/DL

## 2019-12-06 ENCOUNTER — APPOINTMENT (OUTPATIENT)
Dept: RHEUMATOLOGY | Facility: CLINIC | Age: 64
End: 2019-12-06
Payer: COMMERCIAL

## 2019-12-06 VITALS
SYSTOLIC BLOOD PRESSURE: 131 MMHG | BODY MASS INDEX: 27.58 KG/M2 | HEIGHT: 71 IN | TEMPERATURE: 97.6 F | OXYGEN SATURATION: 97 % | DIASTOLIC BLOOD PRESSURE: 86 MMHG | WEIGHT: 197 LBS | HEART RATE: 98 BPM

## 2019-12-06 DIAGNOSIS — R20.2 ANESTHESIA OF SKIN: ICD-10-CM

## 2019-12-06 DIAGNOSIS — R20.0 ANESTHESIA OF SKIN: ICD-10-CM

## 2019-12-06 PROCEDURE — 99213 OFFICE O/P EST LOW 20 MIN: CPT

## 2019-12-08 PROBLEM — R20.0 NUMBNESS AND TINGLING OF HAND: Status: ACTIVE | Noted: 2019-09-26

## 2019-12-09 LAB
ALBUMIN SERPL ELPH-MCNC: 3.8 G/DL
ALP BLD-CCNC: 92 U/L
ALT SERPL-CCNC: 18 U/L
ANION GAP SERPL CALC-SCNC: 16 MMOL/L
AST SERPL-CCNC: 24 U/L
BILIRUB SERPL-MCNC: 0.5 MG/DL
BUN SERPL-MCNC: 25 MG/DL
CALCIUM SERPL-MCNC: 9 MG/DL
CHLORIDE SERPL-SCNC: 102 MMOL/L
CO2 SERPL-SCNC: 24 MMOL/L
CREAT SERPL-MCNC: 1.72 MG/DL
GLUCOSE SERPL-MCNC: 109 MG/DL
POTASSIUM SERPL-SCNC: 4.1 MMOL/L
PROT SERPL-MCNC: 7.2 G/DL
SODIUM SERPL-SCNC: 142 MMOL/L
URATE SERPL-MCNC: 8.4 MG/DL

## 2019-12-11 ENCOUNTER — INBOUND DOCUMENT (OUTPATIENT)
Age: 64
End: 2019-12-11

## 2020-01-14 ENCOUNTER — RX RENEWAL (OUTPATIENT)
Age: 65
End: 2020-01-14

## 2020-01-31 ENCOUNTER — APPOINTMENT (OUTPATIENT)
Dept: NEUROLOGY | Facility: CLINIC | Age: 65
End: 2020-01-31

## 2020-02-20 ENCOUNTER — APPOINTMENT (OUTPATIENT)
Dept: RHEUMATOLOGY | Facility: CLINIC | Age: 65
End: 2020-02-20

## 2020-02-20 NOTE — HISTORY OF PRESENT ILLNESS
[FreeTextEntry1] : 63 yo M, with PMH of HTN, DL, A.Fib on Eliquis, gout, RA, presenting for follow up evaluation.\par \par -He reports that he has been having for several months numbness at the tips of the first 3 fingers of the right hand. No weakness, not extending to the hand/forearm/arm, no neck pain, no preceding trauma/injury. He was diagnosed with CTS and advised to wear a splint at night. He reports using it for few weeks then wasn't compliant with it. \par -He has a long history of gout with 3-4 attacks/year mostly involving the wrists and knees, occurs after consumption of shrimps/red meat. He has prednisone at home that he takes for acute attacks. never been on urate lowering agents\par -He states that he was diagnosed with RA many years ago but can't recall the name of the doctor or the circumstances, never been on treatment for RA\par he states that he doesn't get joint pain or swelling outside his gout attacks, no morning stiffness. 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

## 2020-02-20 NOTE — ASSESSMENT
[FreeTextEntry1] : =RA,longstanding deforming erosive seropositive RA, never treated \par  -Patient denies any joint pain or swelling related to RA and no active synovitis noted one exam, which likely reflect a "burnt out " disease.\par -Discussed that initiating treatment at this point might prevent further erosions but won't reverse current deformities. Weighing risks/benefits of immunosuppressives in view of his comorbidities. MTX would be contraindicated because of underlying CKD. 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) and erosions on Xrays \par in view of CKD, recurrent attacks, patient requires a urate lowering agent such as allopurinol (renally adjusted)\par (would avoid uloric in view of evidence of increased CV mortality)\par -SUA improved from 11 to 8, but remains not at target, allopurinol was increased to 100 mg daily \par -repeat labs today\par if SUA>6, will increase allopurinol to 150 mg daily \par \par =CTS at right wrist \par advised to use splint at nighttime, referred to hand sx for steroid injection, awaiting NCV studies \par \par =Bone health, low vitamin D\par continue supplementation\par \par RTO in 1 month\par \par Patient aware of my assessment and plan, all questions answered.\par  \par \par

## 2020-02-26 ENCOUNTER — APPOINTMENT (OUTPATIENT)
Dept: INTERNAL MEDICINE | Facility: CLINIC | Age: 65
End: 2020-02-26

## 2020-03-14 ENCOUNTER — APPOINTMENT (OUTPATIENT)
Dept: RHEUMATOLOGY | Facility: CLINIC | Age: 65
End: 2020-03-14

## 2020-04-01 DIAGNOSIS — E87.6 HYPOKALEMIA: ICD-10-CM

## 2020-04-07 ENCOUNTER — APPOINTMENT (OUTPATIENT)
Dept: NEPHROLOGY | Facility: CLINIC | Age: 65
End: 2020-04-07

## 2020-04-08 ENCOUNTER — APPOINTMENT (OUTPATIENT)
Dept: NEPHROLOGY | Facility: CLINIC | Age: 65
End: 2020-04-08

## 2020-05-23 ENCOUNTER — RX RENEWAL (OUTPATIENT)
Age: 65
End: 2020-05-23

## 2020-06-11 ENCOUNTER — APPOINTMENT (OUTPATIENT)
Dept: INTERNAL MEDICINE | Facility: CLINIC | Age: 65
End: 2020-06-11
Payer: COMMERCIAL

## 2020-06-11 DIAGNOSIS — M19.039 PRIMARY OSTEOARTHRITIS, UNSPECIFIED WRIST: ICD-10-CM

## 2020-06-11 DIAGNOSIS — Z01.818 ENCOUNTER FOR OTHER PREPROCEDURAL EXAMINATION: ICD-10-CM

## 2020-06-11 DIAGNOSIS — M06.9 RHEUMATOID ARTHRITIS, UNSPECIFIED: ICD-10-CM

## 2020-06-11 PROCEDURE — 99213 OFFICE O/P EST LOW 20 MIN: CPT

## 2020-06-12 VITALS — RESPIRATION RATE: 14 BRPM | HEART RATE: 88 BPM | SYSTOLIC BLOOD PRESSURE: 100 MMHG | DIASTOLIC BLOOD PRESSURE: 60 MMHG

## 2020-06-12 PROBLEM — Z01.818 PREOPERATIVE EXAMINATION: Status: RESOLVED | Noted: 2018-12-14 | Resolved: 2020-06-12

## 2020-06-12 PROBLEM — M06.9 RHEUMATOID ARTHRITIS: Noted: 2019-10-10

## 2020-06-12 NOTE — HISTORY OF PRESENT ILLNESS
[de-identified] : Mr. WEAVER comes in for reassessment of hypertension. He denies edema, chest pain, dizziness, LIRA, PND and orthopnea.  He  has had no problems with his medications.\par Saw his cardiologist today for atrial fibrillation and CAD.  Doing well.  Remains on AC.

## 2020-06-12 NOTE — PHYSICAL EXAM
[II] : a grade 2 [Normal] : soft, non-tender, non-distended, no masses palpated, no HSM and normal bowel sounds

## 2020-07-09 ENCOUNTER — APPOINTMENT (OUTPATIENT)
Dept: UROLOGY | Facility: CLINIC | Age: 65
End: 2020-07-09
Payer: COMMERCIAL

## 2020-07-09 VITALS — TEMPERATURE: 97.2 F

## 2020-07-09 DIAGNOSIS — H26.9 UNSPECIFIED CATARACT: ICD-10-CM

## 2020-07-09 PROCEDURE — 99213 OFFICE O/P EST LOW 20 MIN: CPT

## 2020-07-09 NOTE — LETTER BODY
[FreeTextEntry1] : London Lara M.D.\par [address] [facility]\[place] [address]\par P: [phone] \par F: [phone]\par \par Pernell Lee MD\par [address] [place] [address] \[address]\[place] [address]\par P: [phone]\par F: [phone]\par \par Dear Dr. Lara and Dr. Lee, \par \par Reason for visit: Elevated PSA.\par \par This is a 63 -year-old gentleman with history of hypertension presenting with elevated PSA. Patient underwent a negative prostate biopsy in September 2017. He obtained a negative prostate MRI in 2018, PIRADS 2. His last PSA was 9 in 2019. Patient returns today for followup. Since his last visit, patient denies any changes in health. He reports seeing nephrologist, Dr. Hayes, for his elevated creatinine. He denies any hematuria or dysuria. The past medical history, family history and social history are unchanged. All other review of systems are negative. Patient denies any changes in medications. Medication list was reconciled.\par \par On examination, the patient is a healthy-appearing gentleman in no acute distress. He is alert and oriented follows commands. He has normal mood and affect. He is normocephalic. Oral no thrush. Neck is supple. Respirations are unlabored. His abdomen is soft and nontender. Liver is nonpalpable. Bladder is nonpalpable. No CVA tenderness. Neurologically he is grossly intact. No peripheral edema. Skin without gross abnormality. He has normal male external genitalia. Normal meatus. Bilateral testes are descended intrascrotally and normal to palpation. On rectal examination, there is normal sphincter tone. The prostate is clinically benign without focal induration or nodularity. His prostate is enlarged. There is evidence of hemorrhoids. \par \par His PSA was 9 last year, elevated. His CMP demonstrated poor renal function, creatinine 1.72.\par \par Assessment: Elevated PSA.\par \par I counseled the patient. He is overall well. I recommend he repeat PSA today to ensure stability. I encouraged him to continue follow-up with Dr. Hayes for his elevated creatinine. Risks and alternatives were discussed. I answered the patient questions. The patient will follow-up as directed and will contact me with any questions or concerns. Thank you for the opportunity to participate in the care of this patient. I'll keep you updated on his progress.\par \par Plan: Repeat PSA. Follow up 1 year.

## 2020-07-09 NOTE — ADDENDUM
[FreeTextEntry1] : Entered by Jared Mallory, acting as scribe for Dr. Pernell Keith.\par \par The documentation recorded by the scribe accurately reflects the service I personally performed and the decisions made by me.

## 2020-07-13 DIAGNOSIS — G56.01 CARPAL TUNNEL SYNDROME, RIGHT UPPER LIMB: ICD-10-CM

## 2020-07-13 LAB
PSA FREE FLD-MCNC: 23 %
PSA FREE SERPL-MCNC: 2.94 NG/ML
PSA SERPL-MCNC: 12.7 NG/ML

## 2020-07-15 ENCOUNTER — APPOINTMENT (OUTPATIENT)
Dept: ELECTROPHYSIOLOGY | Facility: CLINIC | Age: 65
End: 2020-07-15

## 2020-08-17 ENCOUNTER — APPOINTMENT (OUTPATIENT)
Dept: INTERNAL MEDICINE | Facility: CLINIC | Age: 65
End: 2020-08-17
Payer: COMMERCIAL

## 2020-08-17 PROCEDURE — 99211 OFF/OP EST MAY X REQ PHY/QHP: CPT

## 2020-08-17 NOTE — ASSESSMENT
[FreeTextEntry1] : 65M with history as above here for snellen chart measure of vision for DMV\par \par He is 20/40 in both eyes with glasses on\par \par Form was filled out and will be scanned into the system

## 2020-08-17 NOTE — PHYSICAL EXAM
[Normal] : no acute distress, well nourished, well developed and well-appearing [Normal Sclera/Conjunctiva] : normal sclera/conjunctiva [Snellen] : acuity screening with Snellen chart [20/___] : both eyes 20/[unfilled] [No Respiratory Distress] : no respiratory distress  [No Accessory Muscle Use] : no accessory muscle use [Normal Gait] : normal gait [Normal Affect] : the affect was normal [Normal Insight/Judgement] : insight and judgment were intact

## 2020-08-17 NOTE — HISTORY OF PRESENT ILLNESS
[FreeTextEntry1] : eye exam for DMV form [de-identified] : 64M with afib on eliquis, mitral regurgitation, HTN, elevated PSA with MRI showing enlarged prostate (central prostatic hypertrophy) without any suspicious lesions. PIRADS 2 - Low. Unlikely to have clinically significant prostate ca. He is here today for an eye exam - specifically measuring vision on snellen chart for DMV form. He is 20/40 in both eyes when he is wearing his glasses.\par \par He has no other complaints today.

## 2020-08-20 ENCOUNTER — RESULT REVIEW (OUTPATIENT)
Age: 65
End: 2020-08-20

## 2020-08-20 ENCOUNTER — APPOINTMENT (OUTPATIENT)
Dept: MRI IMAGING | Facility: IMAGING CENTER | Age: 65
End: 2020-08-20
Payer: COMMERCIAL

## 2020-08-20 ENCOUNTER — OUTPATIENT (OUTPATIENT)
Dept: OUTPATIENT SERVICES | Facility: HOSPITAL | Age: 65
LOS: 1 days | End: 2020-08-20
Payer: COMMERCIAL

## 2020-08-20 DIAGNOSIS — Z00.00 ENCOUNTER FOR GENERAL ADULT MEDICAL EXAMINATION WITHOUT ABNORMAL FINDINGS: ICD-10-CM

## 2020-08-20 DIAGNOSIS — Z95.9 PRESENCE OF CARDIAC AND VASCULAR IMPLANT AND GRAFT, UNSPECIFIED: Chronic | ICD-10-CM

## 2020-08-20 DIAGNOSIS — Z98.890 OTHER SPECIFIED POSTPROCEDURAL STATES: Chronic | ICD-10-CM

## 2020-08-20 DIAGNOSIS — Z98.49 CATARACT EXTRACTION STATUS, UNSPECIFIED EYE: Chronic | ICD-10-CM

## 2020-08-20 PROCEDURE — 76377 3D RENDER W/INTRP POSTPROCES: CPT | Mod: 26

## 2020-08-20 PROCEDURE — 72197 MRI PELVIS W/O & W/DYE: CPT | Mod: 26

## 2020-08-20 PROCEDURE — A9585: CPT

## 2020-08-20 PROCEDURE — 72197 MRI PELVIS W/O & W/DYE: CPT

## 2020-08-20 PROCEDURE — 76377 3D RENDER W/INTRP POSTPROCES: CPT

## 2020-09-23 ENCOUNTER — APPOINTMENT (OUTPATIENT)
Dept: NEPHROLOGY | Facility: CLINIC | Age: 65
End: 2020-09-23

## 2020-10-07 ENCOUNTER — RX RENEWAL (OUTPATIENT)
Age: 65
End: 2020-10-07

## 2020-10-16 ENCOUNTER — APPOINTMENT (OUTPATIENT)
Dept: INTERNAL MEDICINE | Facility: CLINIC | Age: 65
End: 2020-10-16

## 2020-12-09 ENCOUNTER — NON-APPOINTMENT (OUTPATIENT)
Age: 65
End: 2020-12-09

## 2020-12-09 ENCOUNTER — APPOINTMENT (OUTPATIENT)
Dept: INTERNAL MEDICINE | Facility: CLINIC | Age: 65
End: 2020-12-09
Payer: COMMERCIAL

## 2020-12-09 PROCEDURE — 99213 OFFICE O/P EST LOW 20 MIN: CPT

## 2020-12-09 PROCEDURE — 99072 ADDL SUPL MATRL&STAF TM PHE: CPT

## 2020-12-10 VITALS — SYSTOLIC BLOOD PRESSURE: 112 MMHG | DIASTOLIC BLOOD PRESSURE: 60 MMHG | HEART RATE: 74 BPM | RESPIRATION RATE: 14 BRPM

## 2020-12-10 NOTE — HISTORY OF PRESENT ILLNESS
[de-identified] : Mr. WEAVER comes in for reassessment of hypertension. He denies edema, chest pain, dizziness, LIRA, PND and orthopnea.  He  has had no problems with his medications.\par \par Needs referral to nephrology to follow p CKD.

## 2020-12-16 DIAGNOSIS — R51.9 HEADACHE, UNSPECIFIED: ICD-10-CM

## 2020-12-23 ENCOUNTER — NON-APPOINTMENT (OUTPATIENT)
Age: 65
End: 2020-12-23

## 2020-12-30 ENCOUNTER — APPOINTMENT (OUTPATIENT)
Dept: NEPHROLOGY | Facility: CLINIC | Age: 65
End: 2020-12-30
Payer: COMMERCIAL

## 2020-12-30 VITALS
WEIGHT: 197.75 LBS | DIASTOLIC BLOOD PRESSURE: 90 MMHG | SYSTOLIC BLOOD PRESSURE: 135 MMHG | OXYGEN SATURATION: 99 % | BODY MASS INDEX: 27.69 KG/M2 | TEMPERATURE: 98 F | HEART RATE: 50 BPM | HEIGHT: 71 IN

## 2020-12-30 PROCEDURE — 99213 OFFICE O/P EST LOW 20 MIN: CPT

## 2020-12-30 PROCEDURE — 99072 ADDL SUPL MATRL&STAF TM PHE: CPT

## 2020-12-31 NOTE — PHYSICAL EXAM
[General Appearance - Alert] : alert [General Appearance - In No Acute Distress] : in no acute distress [General Appearance - Well Developed] : well developed [General Appearance - Well-Appearing] : healthy appearing [Sclera] : the sclera and conjunctiva were normal [Extraocular Movements] : extraocular movements were intact [Oropharynx] : the oropharynx was normal [Jugular Venous Distention Increased] : there was no jugular-venous distention [Auscultation Breath Sounds / Voice Sounds] : lungs were clear to auscultation bilaterally [Heart Sounds] : normal S1 and S2 [Murmurs] : no murmurs [Edema] : there was no peripheral edema [Abdomen Soft] : soft [No CVA Tenderness] : no ~M costovertebral angle tenderness [Abnormal Walk] : normal gait [] : no rash [Skin Lesions] : no skin lesions [No Focal Deficits] : no focal deficits [Oriented To Time, Place, And Person] : oriented to person, place, and time [Impaired Insight] : insight and judgment were intact

## 2020-12-31 NOTE — REVIEW OF SYSTEMS
[Fever] : no fever [Chills] : no chills [Feeling Tired] : not feeling tired [Eye Pain] : no eye pain [Red Eyes] : eyes not red [Earache] : no earache [Loss Of Hearing] : no hearing loss [Chest Pain] : no chest pain [Lower Ext Edema] : no extremity edema [SOB on Exertion] : no shortness of breath during exertion [PND] : no PND [Negative] : Heme/Lymph

## 2021-01-13 ENCOUNTER — APPOINTMENT (OUTPATIENT)
Dept: ELECTROPHYSIOLOGY | Facility: CLINIC | Age: 66
End: 2021-01-13
Payer: COMMERCIAL

## 2021-01-13 ENCOUNTER — NON-APPOINTMENT (OUTPATIENT)
Age: 66
End: 2021-01-13

## 2021-01-13 VITALS
WEIGHT: 201 LBS | HEIGHT: 71 IN | TEMPERATURE: 97.1 F | OXYGEN SATURATION: 99 % | HEART RATE: 54 BPM | DIASTOLIC BLOOD PRESSURE: 82 MMHG | BODY MASS INDEX: 28.14 KG/M2 | SYSTOLIC BLOOD PRESSURE: 128 MMHG

## 2021-01-13 DIAGNOSIS — I12.9 HYPERTENSIVE CHRONIC KIDNEY DISEASE WITH STAGE 1 THROUGH STAGE 4 CHRONIC KIDNEY DISEASE, OR UNSPECIFIED CHRONIC KIDNEY DISEASE: ICD-10-CM

## 2021-01-13 PROCEDURE — 93000 ELECTROCARDIOGRAM COMPLETE: CPT

## 2021-01-13 PROCEDURE — 99213 OFFICE O/P EST LOW 20 MIN: CPT

## 2021-01-13 PROCEDURE — 99072 ADDL SUPL MATRL&STAF TM PHE: CPT

## 2021-01-30 NOTE — REVIEW OF SYSTEMS
[see HPI] : see HPI [Feeling Fatigued] : not feeling fatigued [Sore Throat] : no sore throat [Palpitations] : no palpitations [Cough] : no cough [Abdominal Pain] : no abdominal pain [Dysphagia] : no dysphagia [Dizziness] : no dizziness [Easy Bleeding] : no tendency for easy bleeding [Easy Bruising] : no tendency for easy bruising

## 2021-01-30 NOTE — DISCUSSION/SUMMARY
[FreeTextEntry1] : The patient is doing extremely well in terms of symptoms and has not had recurrent atrial fibrillation on amiodarone.  I am concerned with long-term usage of amiodarone and potential toxicity in this patient.  We will decrease the amiodarone to 100 mg/day.\par \par Advised the patient that he needs follow-up pulmonary function testing with diffusing capacity.  Also recommended follow-up thyroid function test as well as liver function test.  Also make recommend follow-up ophthalmologic examination.\par \par He will see his cardiologist and get a follow-up echocardiogram which we will review.  In the meantime I recommend that he continue current medications including anticoagulation.\par \par Follow-up: He will see his primary care physician, nephrology, pulmonary for pulmonary function testing and cardiologist for follow-up echocardiogram.  Patient will see me in follow-up in approximately 6 months.

## 2021-01-30 NOTE — PHYSICAL EXAM
[General Appearance - Well Developed] : well developed [General Appearance - In No Acute Distress] : no acute distress [Normal Conjunctiva] : the conjunctiva exhibited no abnormalities [Normal Jugular Venous V Waves Present] : normal jugular venous V waves present [Auscultation Breath Sounds / Voice Sounds] : lungs were clear to auscultation bilaterally [Heart Rate And Rhythm] : heart rate and rhythm were normal [Heart Sounds] : normal S1 and S2 [Arterial Pulses Normal] : the arterial pulses were normal [Edema] : no peripheral edema present [Abdomen Tenderness] : non-tender [Nail Clubbing] : no clubbing of the fingernails [Cyanosis, Localized] : no localized cyanosis [Impaired Insight] : insight and judgment were intact [FreeTextEntry1] : 2/6 systolic murmur left lower sternal border

## 2021-01-30 NOTE — HISTORY OF PRESENT ILLNESS
[FreeTextEntry1] : The patient is a 65-year-old man who is seen in follow-up evaluation for his previous atrial fibrillation.  He had previous AF ablation done February 2016.  He had recurrent atrial fibrillation and was treated with amiodarone.  The patient has remained in sinus rhythm and has been doing well.\par \par He has a prior history of coronary disease and status post PCI to the LAD 2014.  He has a prior history of PAD and status post lower extremity stenting.  He has had a problem previous cardiomyopathy with ejection fraction 40% and moderate mitral regurgitation.  Patient previously had severe left atrial enlargement as well.  His left atrial diameter is greater than 5.0 cm.\par \par He has a history of hypertension and CKD stage III.\par \par He has had a prior history of rectal bleeding.  This was due to hemorrhoids and he had a hemorrhoidectomy.  He has had no recurrence of bleeding.  Patient continues on aspirin as well as Eliquis.

## 2021-03-08 ENCOUNTER — NON-APPOINTMENT (OUTPATIENT)
Age: 66
End: 2021-03-08

## 2021-03-10 ENCOUNTER — APPOINTMENT (OUTPATIENT)
Dept: INTERNAL MEDICINE | Facility: CLINIC | Age: 66
End: 2021-03-10
Payer: COMMERCIAL

## 2021-03-10 PROCEDURE — 99072 ADDL SUPL MATRL&STAF TM PHE: CPT

## 2021-03-10 PROCEDURE — 99213 OFFICE O/P EST LOW 20 MIN: CPT

## 2021-03-11 VITALS — SYSTOLIC BLOOD PRESSURE: 142 MMHG | RESPIRATION RATE: 14 BRPM | HEART RATE: 60 BPM | DIASTOLIC BLOOD PRESSURE: 60 MMHG

## 2021-03-11 NOTE — PHYSICAL EXAM
[Scoliosis] : scoliosis [Muscle Spasms, Bilateral] : bilateral muscle spasms [Restricted] : was restricted [Pain] : was painful [Normal (bilateral)] : Hip strength was normal bilaterally [SLR] : negative Straight Leg Raise

## 2021-03-11 NOTE — HISTORY OF PRESENT ILLNESS
[Initial Evaluation] : an initial evaluation of [Left Lumbosacral Area] : in the left lumbosacral area [Right Lumbarosacral Area] : in the right lumbarosacral area [None] : There is no radiation [Sudden] : sudden [___ Day(s) Ago] : [unfilled] day(s) ago [With Sitting] : with sitting [Rash] : no rash [Fever] : no fever [Suprapubic Pain] : no suprapubic pain [Urinary Frequency] : no urinary frequency [Hematuria] : no hematuria [Dysuria] : no dysuria [Abdominal Pain] : no abdominal pain [General Malaise] : no general malaise [Weight Loss] : no weight loss [Urinary Incontinence] : no urinary incontinence [Fecal Incontinence] : no fecal incontinence [Urinary Retention] : no urinary retention [de-identified] : No LE weakness or numbness

## 2021-03-11 NOTE — ASSESSMENT
[FreeTextEntry1] : 24 minutes spent in preparation of this visit, including review of previous notes and test results, interview and examination of patient, discussion of plan, arranging for appropriate testing and treatment, and documentation.\par

## 2021-03-18 ENCOUNTER — APPOINTMENT (OUTPATIENT)
Dept: UROLOGY | Facility: CLINIC | Age: 66
End: 2021-03-18
Payer: COMMERCIAL

## 2021-03-18 DIAGNOSIS — I51.7 CARDIOMEGALY: ICD-10-CM

## 2021-03-18 PROCEDURE — 99072 ADDL SUPL MATRL&STAF TM PHE: CPT

## 2021-03-18 PROCEDURE — 99214 OFFICE O/P EST MOD 30 MIN: CPT

## 2021-03-20 LAB
ANION GAP SERPL CALC-SCNC: 15 MMOL/L
BUN SERPL-MCNC: 28 MG/DL
CALCIUM SERPL-MCNC: 9.2 MG/DL
CHLORIDE SERPL-SCNC: 103 MMOL/L
CO2 SERPL-SCNC: 22 MMOL/L
CREAT SERPL-MCNC: 2.43 MG/DL
GLUCOSE SERPL-MCNC: 105 MG/DL
POTASSIUM SERPL-SCNC: 3.9 MMOL/L
PSA FREE FLD-MCNC: 26 %
PSA FREE SERPL-MCNC: 1.68 NG/ML
PSA SERPL-MCNC: 6.49 NG/ML
SODIUM SERPL-SCNC: 140 MMOL/L

## 2021-03-20 NOTE — LETTER BODY
[FreeTextEntry1] : London Lara M.D.\par [address] [facility]\[place] [address]\par P: [phone] \par F: [phone]\par \par Pernell Lee MD\par [address] [place] [address] \[address]\[place] [address]\par P: [phone]\par F: [phone]\par \par Dear Dr. Lara and Dr. Lee, \par \par Reason for visit: Elevated PSA. BPH.\par \par This is a 65 year-old gentleman with hypertension, atrial fibrillation, and cardiomegaly, presenting with chronic BPH and elevated PSA. Patient underwent a negative prostate biopsy in September 2017. He obtained a negative prostate MRI in 2018, PI-RADS 2. Patient previously reported seeing nephrologist, Dr. Hayes, for his elevated creatinine. The patient returns today for follow-up. Since he was last seen, the patient underwent a prostate MRI in August 2020, which demonstrated an enlarged prostate without suspicious prostatic lesions, PI-RADS 2. He reports taking Proscar regularly without any side effects or difficulties with the medication. Patient denies any interval urinary difficulties. The patient is overall well. He denies any changes in health. He denies any hematuria or dysuria. The past medical history, family history and social history are unchanged. All other review of systems are negative. Patient denies any changes in medications. Medication list was reconciled.\par \par On examination, the patient is a healthy-appearing gentleman in no acute distress. He is alert and oriented follows commands. He has normal mood and affect. He is normocephalic. Oral no thrush. Neck is supple. Respirations are unlabored. His abdomen is soft and nontender. Liver is nonpalpable. Bladder is nonpalpable. No CVA tenderness. Neurologically he is grossly intact. No peripheral edema. Skin without gross abnormality. He has normal male external genitalia. Normal meatus. Bilateral testes are descended intrascrotally and normal to palpation. On rectal examination, there is normal sphincter tone. The prostate is clinically benign without focal induration or nodularity. His prostate is enlarged. There is evidence of hemorrhoids. \par \par His last PSA in July 2020 was 12.7, which is elevated. However, his PSA density is appropriate. His previous PSA was 9.\par \par I personally reviewed MRI prostate images with the patient today and images demonstrated an enlarged prostate gland of 81 cc with mass effect on the bladder without suspicious prostatic lesions, PI-RADS 2\par \par Assessment: Elevated PSA. BPH.  Chronic renal insufficiency\par \par I counseled the patient. The patient is doing well. In terms of his BPH, his symptoms are stable on medical therapy. I recommended he continue taking Proscar. I renewed the patient's prescription for Proscar today. I encouraged the patient to continue medications regularly as directed to maintain prostate size. In terms of his elevated PSA, I reassured the patient as his PSA density is appropriate. I recommend the patient repeat PSA and today to ensure stability.  In terms of his chronic renal insufficiency, I recommend he repeat BMP today and continue a low-sodium low-protein diet.  He will follow-up in 6 months to ensure stability. Patient understands that if he develops gross hematuria or any urinary discomfort, he will contact me for further evaluation. Risks and alternatives were discussed. I answered the patient questions. The patient will follow-up as directed and will contact me with any questions or concerns. Thank you for the opportunity to participate in the care of Mr. WEAVER. I will keep you updated on his progress.\par \par Plan: BMP. PSA. Continue Proscar. Follow-up in 6 months.

## 2021-05-08 ENCOUNTER — RX RENEWAL (OUTPATIENT)
Age: 66
End: 2021-05-08

## 2021-05-11 ENCOUNTER — APPOINTMENT (OUTPATIENT)
Dept: RHEUMATOLOGY | Facility: CLINIC | Age: 66
End: 2021-05-11
Payer: COMMERCIAL

## 2021-05-11 VITALS
SYSTOLIC BLOOD PRESSURE: 135 MMHG | OXYGEN SATURATION: 98 % | DIASTOLIC BLOOD PRESSURE: 85 MMHG | RESPIRATION RATE: 14 BRPM | BODY MASS INDEX: 27.02 KG/M2 | HEIGHT: 71 IN | TEMPERATURE: 97.3 F | WEIGHT: 193 LBS | HEART RATE: 52 BPM

## 2021-05-11 DIAGNOSIS — M10.9 GOUT, UNSPECIFIED: ICD-10-CM

## 2021-05-11 PROCEDURE — 99072 ADDL SUPL MATRL&STAF TM PHE: CPT

## 2021-05-11 PROCEDURE — 99212 OFFICE O/P EST SF 10 MIN: CPT

## 2021-05-26 ENCOUNTER — APPOINTMENT (OUTPATIENT)
Dept: NEPHROLOGY | Facility: CLINIC | Age: 66
End: 2021-05-26
Payer: COMMERCIAL

## 2021-05-26 ENCOUNTER — INPATIENT (INPATIENT)
Facility: HOSPITAL | Age: 66
LOS: 1 days | Discharge: HOME | DRG: 683 | End: 2021-05-28
Attending: HOSPITALIST | Admitting: STUDENT IN AN ORGANIZED HEALTH CARE EDUCATION/TRAINING PROGRAM
Payer: MEDICARE

## 2021-05-26 VITALS — OXYGEN SATURATION: 98 % | TEMPERATURE: 97.7 F | WEIGHT: 200.62 LBS | BODY MASS INDEX: 27.98 KG/M2 | HEART RATE: 58 BPM

## 2021-05-26 VITALS
TEMPERATURE: 99 F | RESPIRATION RATE: 16 BRPM | HEART RATE: 73 BPM | OXYGEN SATURATION: 99 % | HEIGHT: 71 IN | SYSTOLIC BLOOD PRESSURE: 158 MMHG | WEIGHT: 199.96 LBS | DIASTOLIC BLOOD PRESSURE: 107 MMHG

## 2021-05-26 VITALS — DIASTOLIC BLOOD PRESSURE: 85 MMHG | SYSTOLIC BLOOD PRESSURE: 145 MMHG

## 2021-05-26 DIAGNOSIS — Z98.890 OTHER SPECIFIED POSTPROCEDURAL STATES: Chronic | ICD-10-CM

## 2021-05-26 DIAGNOSIS — Z98.49 CATARACT EXTRACTION STATUS, UNSPECIFIED EYE: Chronic | ICD-10-CM

## 2021-05-26 DIAGNOSIS — Z95.9 PRESENCE OF CARDIAC AND VASCULAR IMPLANT AND GRAFT, UNSPECIFIED: Chronic | ICD-10-CM

## 2021-05-26 DIAGNOSIS — N17.9 ACUTE KIDNEY FAILURE, UNSPECIFIED: ICD-10-CM

## 2021-05-26 LAB
25(OH)D3 SERPL-MCNC: 17.8 NG/ML
ALBUMIN SERPL ELPH-MCNC: 3 G/DL — LOW (ref 3.3–5)
ALBUMIN SERPL ELPH-MCNC: 3.4 G/DL
ALP SERPL-CCNC: 119 U/L — SIGNIFICANT CHANGE UP (ref 40–120)
ALT FLD-CCNC: 6 U/L — LOW (ref 10–45)
ANION GAP SERPL CALC-SCNC: 12 MMOL/L
ANION GAP SERPL CALC-SCNC: 15 MMOL/L — SIGNIFICANT CHANGE UP (ref 5–17)
APPEARANCE UR: CLEAR — SIGNIFICANT CHANGE UP
APPEARANCE: CLEAR
AST SERPL-CCNC: 12 U/L — SIGNIFICANT CHANGE UP (ref 10–40)
BACTERIA # UR AUTO: NEGATIVE — SIGNIFICANT CHANGE UP
BACTERIA: NEGATIVE
BASOPHILS # BLD AUTO: 0 K/UL — SIGNIFICANT CHANGE UP (ref 0–0.2)
BASOPHILS # BLD AUTO: 0.1 K/UL
BASOPHILS NFR BLD AUTO: 0 % — SIGNIFICANT CHANGE UP (ref 0–2)
BASOPHILS NFR BLD AUTO: 0.8 %
BILIRUB SERPL-MCNC: 0.4 MG/DL — SIGNIFICANT CHANGE UP (ref 0.2–1.2)
BILIRUB UR-MCNC: NEGATIVE — SIGNIFICANT CHANGE UP
BILIRUBIN URINE: NEGATIVE
BLOOD URINE: NEGATIVE
BUN SERPL-MCNC: 44 MG/DL — HIGH (ref 7–23)
BUN SERPL-MCNC: 46 MG/DL
CALCIUM SERPL-MCNC: 8.6 MG/DL — SIGNIFICANT CHANGE UP (ref 8.4–10.5)
CALCIUM SERPL-MCNC: 8.8 MG/DL
CALCIUM SERPL-MCNC: 8.8 MG/DL
CHLORIDE SERPL-SCNC: 106 MMOL/L
CHLORIDE SERPL-SCNC: 107 MMOL/L — SIGNIFICANT CHANGE UP (ref 96–108)
CO2 SERPL-SCNC: 22 MMOL/L — SIGNIFICANT CHANGE UP (ref 22–31)
CO2 SERPL-SCNC: 25 MMOL/L
COLOR SPEC: SIGNIFICANT CHANGE UP
COLOR: NORMAL
CREAT SERPL-MCNC: 3.42 MG/DL — HIGH (ref 0.5–1.3)
CREAT SERPL-MCNC: 3.48 MG/DL
CREAT SPEC-SCNC: 94 MG/DL
CREAT/PROT UR: 1.2 RATIO
DIFF PNL FLD: NEGATIVE — SIGNIFICANT CHANGE UP
EOSINOPHIL # BLD AUTO: 0.19 K/UL
EOSINOPHIL # BLD AUTO: 0.22 K/UL — SIGNIFICANT CHANGE UP (ref 0–0.5)
EOSINOPHIL NFR BLD AUTO: 1.5 %
EOSINOPHIL NFR BLD AUTO: 1.8 % — SIGNIFICANT CHANGE UP (ref 0–6)
EPI CELLS # UR: 0 /HPF — SIGNIFICANT CHANGE UP
FERRITIN SERPL-MCNC: 26 NG/ML
GLUCOSE QUALITATIVE U: NEGATIVE
GLUCOSE SERPL-MCNC: 65 MG/DL
GLUCOSE SERPL-MCNC: 86 MG/DL — SIGNIFICANT CHANGE UP (ref 70–99)
GLUCOSE UR QL: NEGATIVE — SIGNIFICANT CHANGE UP
HCT VFR BLD CALC: 32.5 %
HCT VFR BLD CALC: 33.6 % — LOW (ref 39–50)
HGB BLD-MCNC: 10.1 G/DL
HGB BLD-MCNC: 10.6 G/DL — LOW (ref 13–17)
HYALINE CASTS # UR AUTO: 0 /LPF — SIGNIFICANT CHANGE UP (ref 0–2)
HYALINE CASTS: 0 /LPF
IMM GRANULOCYTES NFR BLD AUTO: 0.2 %
IRON SATN MFR SERPL: 7 %
IRON SERPL-MCNC: 24 UG/DL
KETONES UR-MCNC: NEGATIVE — SIGNIFICANT CHANGE UP
KETONES URINE: NEGATIVE
LEUKOCYTE ESTERASE UR-ACNC: NEGATIVE — SIGNIFICANT CHANGE UP
LEUKOCYTE ESTERASE URINE: NEGATIVE
LYMPHOCYTES # BLD AUTO: 1.71 K/UL
LYMPHOCYTES # BLD AUTO: 19.5 % — SIGNIFICANT CHANGE UP (ref 13–44)
LYMPHOCYTES # BLD AUTO: 2.38 K/UL — SIGNIFICANT CHANGE UP (ref 1–3.3)
LYMPHOCYTES NFR BLD AUTO: 13.9 %
MAGNESIUM SERPL-MCNC: 2.3 MG/DL — SIGNIFICANT CHANGE UP (ref 1.6–2.6)
MAN DIFF?: NORMAL
MANUAL SMEAR VERIFICATION: SIGNIFICANT CHANGE UP
MCHC RBC-ENTMCNC: 19.1 PG — LOW (ref 27–34)
MCHC RBC-ENTMCNC: 19.4 PG
MCHC RBC-ENTMCNC: 31.1 GM/DL
MCHC RBC-ENTMCNC: 31.5 GM/DL — LOW (ref 32–36)
MCV RBC AUTO: 60.4 FL — LOW (ref 80–100)
MCV RBC AUTO: 62.5 FL
MICROSCOPIC-UA: NORMAL
MONOCYTES # BLD AUTO: 0.54 K/UL — SIGNIFICANT CHANGE UP (ref 0–0.9)
MONOCYTES # BLD AUTO: 1.17 K/UL
MONOCYTES NFR BLD AUTO: 4.4 % — SIGNIFICANT CHANGE UP (ref 2–14)
MONOCYTES NFR BLD AUTO: 9.5 %
NEUTROPHILS # BLD AUTO: 9.06 K/UL — HIGH (ref 1.8–7.4)
NEUTROPHILS # BLD AUTO: 9.13 K/UL
NEUTROPHILS NFR BLD AUTO: 74.1 %
NEUTROPHILS NFR BLD AUTO: 74.3 % — SIGNIFICANT CHANGE UP (ref 43–77)
NITRITE UR-MCNC: NEGATIVE — SIGNIFICANT CHANGE UP
NITRITE URINE: NEGATIVE
PARATHYROID HORMONE INTACT: 162 PG/ML
PH UR: 6 — SIGNIFICANT CHANGE UP (ref 5–8)
PH URINE: 6
PHOSPHATE SERPL-MCNC: 3 MG/DL — SIGNIFICANT CHANGE UP (ref 2.5–4.5)
PHOSPHATE SERPL-MCNC: 3.6 MG/DL
PLAT MORPH BLD: NORMAL — SIGNIFICANT CHANGE UP
PLATELET # BLD AUTO: 264 K/UL
PLATELET # BLD AUTO: 265 K/UL — SIGNIFICANT CHANGE UP (ref 150–400)
POTASSIUM SERPL-MCNC: 3.9 MMOL/L — SIGNIFICANT CHANGE UP (ref 3.5–5.3)
POTASSIUM SERPL-SCNC: 3.9 MMOL/L — SIGNIFICANT CHANGE UP (ref 3.5–5.3)
POTASSIUM SERPL-SCNC: 4.3 MMOL/L
PROT SERPL-MCNC: 7.1 G/DL — SIGNIFICANT CHANGE UP (ref 6–8.3)
PROT UR-MCNC: 111 MG/DL
PROT UR-MCNC: ABNORMAL
PROTEIN URINE: ABNORMAL
RBC # BLD: 5.2 M/UL
RBC # BLD: 5.56 M/UL — SIGNIFICANT CHANGE UP (ref 4.2–5.8)
RBC # FLD: 19.5 % — HIGH (ref 10.3–14.5)
RBC # FLD: 19.8 %
RBC BLD AUTO: SIGNIFICANT CHANGE UP
RBC CASTS # UR COMP ASSIST: 2 /HPF — SIGNIFICANT CHANGE UP (ref 0–4)
RED BLOOD CELLS URINE: 1 /HPF
SARS-COV-2 RNA SPEC QL NAA+PROBE: SIGNIFICANT CHANGE UP
SODIUM SERPL-SCNC: 143 MMOL/L
SODIUM SERPL-SCNC: 144 MMOL/L — SIGNIFICANT CHANGE UP (ref 135–145)
SP GR SPEC: 1.02 — SIGNIFICANT CHANGE UP (ref 1.01–1.02)
SPECIFIC GRAVITY URINE: 1.01
SQUAMOUS EPITHELIAL CELLS: 1 /HPF
TIBC SERPL-MCNC: 361 UG/DL
UIBC SERPL-MCNC: 337 UG/DL
UROBILINOGEN FLD QL: ABNORMAL
UROBILINOGEN URINE: ABNORMAL
WBC # BLD: 12.19 K/UL — HIGH (ref 3.8–10.5)
WBC # FLD AUTO: 12.19 K/UL — HIGH (ref 3.8–10.5)
WBC # FLD AUTO: 12.33 K/UL
WBC UR QL: 1 /HPF — SIGNIFICANT CHANGE UP (ref 0–5)
WHITE BLOOD CELLS URINE: 1 /HPF

## 2021-05-26 PROCEDURE — 99214 OFFICE O/P EST MOD 30 MIN: CPT

## 2021-05-26 PROCEDURE — 76770 US EXAM ABDO BACK WALL COMP: CPT | Mod: 26

## 2021-05-26 PROCEDURE — 99285 EMERGENCY DEPT VISIT HI MDM: CPT

## 2021-05-26 PROCEDURE — 99223 1ST HOSP IP/OBS HIGH 75: CPT

## 2021-05-26 RX ORDER — ENEMA 19; 7 G/133ML; G/133ML
7-19 ENEMA RECTAL
Qty: 2 | Refills: 0 | Status: DISCONTINUED | COMMUNITY
Start: 2020-07-13 | End: 2021-05-26

## 2021-05-26 RX ORDER — TRAMADOL HYDROCHLORIDE 50 MG/1
50 TABLET, COATED ORAL
Qty: 30 | Refills: 0 | Status: DISCONTINUED | COMMUNITY
Start: 2021-03-10 | End: 2021-05-26

## 2021-05-26 RX ORDER — AMLODIPINE BESYLATE 2.5 MG/1
5 TABLET ORAL ONCE
Refills: 0 | Status: COMPLETED | OUTPATIENT
Start: 2021-05-26 | End: 2021-05-26

## 2021-05-26 RX ORDER — ALLOPURINOL 100 MG/1
100 TABLET ORAL
Qty: 30 | Refills: 0 | Status: DISCONTINUED | COMMUNITY
Start: 2019-11-01 | End: 2021-05-26

## 2021-05-26 RX ADMIN — AMLODIPINE BESYLATE 5 MILLIGRAM(S): 2.5 TABLET ORAL at 22:10

## 2021-05-26 NOTE — ED PROVIDER NOTE - CLINICAL SUMMARY MEDICAL DECISION MAKING FREE TEXT BOX
65y M  with PMHx of CHF (EF 25% in 2018), HLD, CAD s/p stents, Gout, CKD, arthritis, HTN presents to ED with c/o elevated Cr level after routine blood work.     Discussed with Renal  Plan   -admit to medicine for RUSSELL work up  -UA, Urine lytes, Pr/Cr  -US kidney & bladder, bladder scan  -Possible renal biopsy this admission

## 2021-05-26 NOTE — ED PROVIDER NOTE - ATTENDING CONTRIBUTION TO CARE
65y M  with PMHx of CHF, HLD, CAD, Gout, CKD, arthritis, HTN sent in by his nephrologist, seen today with worsening kidney function, urinating at baseline, no f/c, no swelling, discuss with renal fellow, likely admission, labs sent, US renal.

## 2021-05-26 NOTE — ASSESSMENT
[FreeTextEntry1] : \par CKD stage 3  with normal electrolytes likely secondary to HTN nephrosclerosis;\par -Protein./cr ratio <200mg \par -SPEP/IPEP normal \par -avoid NSAIDS\par -low salt diet reinforced. other readings are ok. \par -gout only prednisone for acute attacks. Allopurinol stopped. \par -check labs \par RTC in 4 months. \par \par addendum \par labs reviewed  now with RUSSELL ? hemodynamic \par asked to go to ER as Cr jumped up to 3.5. inpatient renal team notified.

## 2021-05-26 NOTE — PHYSICAL EXAM
[General Appearance - Alert] : alert [General Appearance - In No Acute Distress] : in no acute distress [Jugular Venous Distention Increased] : there was no jugular-venous distention [Auscultation Breath Sounds / Voice Sounds] : lungs were clear to auscultation bilaterally [Heart Sounds] : normal S1 and S2 [Murmurs] : no murmurs [Edema] : there was no peripheral edema [Abnormal Walk] : normal gait

## 2021-05-26 NOTE — HISTORY OF PRESENT ILLNESS
[FreeTextEntry1] : no complaints \par denies NSAIDs \par stopped allopurinol\par BP a little high on HCTZ and ARB\par cr up to 2.4 in 3/21 from 1.7 BL\par no edema \par no nocturia

## 2021-05-26 NOTE — ED PROVIDER NOTE - PHYSICAL EXAMINATION
Vital Signs Last 24 Hrs  T(C): 36.8 (26 May 2021 19:45), Max: 37.1 (26 May 2021 17:11)  T(F): 98.2 (26 May 2021 19:45), Max: 98.7 (26 May 2021 17:11)  HR: 54 (26 May 2021 19:45) (54 - 73)  BP: 169/110 (26 May 2021 19:45) (158/107 - 169/110)  BP(mean): --  RR: 18 (26 May 2021 19:45) (16 - 18)  SpO2: 98% (26 May 2021 19:45) (98% - 99%)  PHYSICAL EXAM:  GENERAL: NAD, well-groomed, well-developed  HEAD:  Atraumatic, Normocephalic  EYES: EOMI, PERRLA, conjunctiva and sclera clear  ENMT: No tonsillar erythema, exudates, or enlargement; Moist mucous membranes  NECK: Supple, mild JVP appreciated, negative HJR  HEART: irregular rate and rhythm; No murmurs, rubs, or gallops  RESPIRATORY: CTA B/L, No W/R/R  ABDOMEN: Soft, Nontender, Nondistended; Bowel sounds present  NEUROLOGY: A&Ox3, nonfocal, moving all extremities  EXTREMITIES: No clubbing, cyanosis, or edema  SKIN: warm, dry, normal color, no rash or abnormal lesions

## 2021-05-26 NOTE — ED PROVIDER NOTE - OBJECTIVE STATEMENT
65y M  with PMHx of CHF (EF 25% in 2018), HLD, CAD s/p stents, Gout, CKD, arthritis, HTN presents to ED with c/o elevated Cr level after routine blood work check. States last week he was feeling fatigue but is feeling fine as of now. Cr outpatient was 3.48, baseline 1.72 as of 2019. Reports mild SOB with walking 1-2 weeks ago, no PND, orthopnea, or leg swelling. No weight gain. No fevers or sick contacts. No CP. Adherance to diet and all meds. Feels well otherwise.

## 2021-05-26 NOTE — ED PROVIDER NOTE - NS ED ROS FT
REVIEW OF SYSTEMS:  Constitutional: [-] fevers, [ -] chills, [- ] weight loss, [- ] weight gain  HEENT: [ -] vision problems, [- ] eye pain, [ -] nasal congestion, [- ] rhinorrhea, [- ] sore throat, [- ] dysphagia  CV: [- ] chest pain, [- ] orthopnea, [- ] palpitations  Resp: [- ] cough, [- ] dyspnea, [- ] wheezing, [ -] hemoptysis  GI: [- ] nausea, [- ] vomiting, [- ] diarrhea, [ -] constipation, [- ] abdominal pain  : [- ] dysuria [- ] nocturia [- ] hematuria [ -] increased urinary frequency  Musculoskeletal: [- ] back pain [ -] myalgias [- ] arthralgias [- ] fracture  Skin: [- ] rash [ -] itch  Neurological: [ -] headache [- ] dizziness [- ] syncope [- ] weakness [- ] numbness  Psychiatric: [- ] anxiety [- ] depression  Endocrine: [- ] diabetes [ -] thyroid problem  Hematologic/Lymphatic: [- ] anemia [- ] bleeding problem  Allergic/Immunologic: [ -] itchy eyes [ -] nasal discharge [- ] hives [ -] angioedema

## 2021-05-27 ENCOUNTER — TRANSCRIPTION ENCOUNTER (OUTPATIENT)
Age: 66
End: 2021-05-27

## 2021-05-27 DIAGNOSIS — I25.10 ATHEROSCLEROTIC HEART DISEASE OF NATIVE CORONARY ARTERY WITHOUT ANGINA PECTORIS: ICD-10-CM

## 2021-05-27 DIAGNOSIS — I48.91 UNSPECIFIED ATRIAL FIBRILLATION: ICD-10-CM

## 2021-05-27 DIAGNOSIS — M06.9 RHEUMATOID ARTHRITIS, UNSPECIFIED: ICD-10-CM

## 2021-05-27 DIAGNOSIS — M10.9 GOUT, UNSPECIFIED: ICD-10-CM

## 2021-05-27 DIAGNOSIS — I10 ESSENTIAL (PRIMARY) HYPERTENSION: ICD-10-CM

## 2021-05-27 DIAGNOSIS — N17.9 ACUTE KIDNEY FAILURE, UNSPECIFIED: ICD-10-CM

## 2021-05-27 DIAGNOSIS — N40.0 BENIGN PROSTATIC HYPERPLASIA WITHOUT LOWER URINARY TRACT SYMPTOMS: ICD-10-CM

## 2021-05-27 DIAGNOSIS — Z29.9 ENCOUNTER FOR PROPHYLACTIC MEASURES, UNSPECIFIED: ICD-10-CM

## 2021-05-27 DIAGNOSIS — I50.9 HEART FAILURE, UNSPECIFIED: ICD-10-CM

## 2021-05-27 LAB
ALBUMIN SERPL ELPH-MCNC: 2.8 G/DL — LOW (ref 3.3–5)
ALP SERPL-CCNC: 110 U/L — SIGNIFICANT CHANGE UP (ref 40–120)
ALT FLD-CCNC: 5 U/L — LOW (ref 10–45)
ANION GAP SERPL CALC-SCNC: 14 MMOL/L — SIGNIFICANT CHANGE UP (ref 5–17)
AST SERPL-CCNC: 8 U/L — LOW (ref 10–40)
BASE EXCESS BLDV CALC-SCNC: 3.7 MMOL/L — HIGH (ref -2–2)
BILIRUB SERPL-MCNC: 0.4 MG/DL — SIGNIFICANT CHANGE UP (ref 0.2–1.2)
BUN SERPL-MCNC: 42 MG/DL — HIGH (ref 7–23)
CA-I SERPL-SCNC: 1.14 MMOL/L — SIGNIFICANT CHANGE UP (ref 1.12–1.3)
CALCIUM SERPL-MCNC: 8.5 MG/DL — SIGNIFICANT CHANGE UP (ref 8.4–10.5)
CHLORIDE BLDV-SCNC: 110 MMOL/L — HIGH (ref 96–108)
CHLORIDE SERPL-SCNC: 107 MMOL/L — SIGNIFICANT CHANGE UP (ref 96–108)
CO2 BLDV-SCNC: 30 MMOL/L — SIGNIFICANT CHANGE UP (ref 22–30)
CO2 SERPL-SCNC: 24 MMOL/L — SIGNIFICANT CHANGE UP (ref 22–31)
CREAT ?TM UR-MCNC: 49 MG/DL — SIGNIFICANT CHANGE UP
CREAT SERPL-MCNC: 3.1 MG/DL — HIGH (ref 0.5–1.3)
GAS PNL BLDV: 143 MMOL/L — SIGNIFICANT CHANGE UP (ref 135–145)
GAS PNL BLDV: SIGNIFICANT CHANGE UP
GAS PNL BLDV: SIGNIFICANT CHANGE UP
GLUCOSE BLDV-MCNC: 79 MG/DL — SIGNIFICANT CHANGE UP (ref 70–99)
GLUCOSE SERPL-MCNC: 82 MG/DL — SIGNIFICANT CHANGE UP (ref 70–99)
HAV IGM SER-ACNC: SIGNIFICANT CHANGE UP
HBV CORE IGM SER-ACNC: SIGNIFICANT CHANGE UP
HBV SURFACE AB SER-ACNC: SIGNIFICANT CHANGE UP
HBV SURFACE AG SER-ACNC: SIGNIFICANT CHANGE UP
HBV SURFACE AG SER-ACNC: SIGNIFICANT CHANGE UP
HCO3 BLDV-SCNC: 29 MMOL/L — SIGNIFICANT CHANGE UP (ref 21–29)
HCT VFR BLDA CALC: 32 % — LOW (ref 39–50)
HCV AB S/CO SERPL IA: 0.13 S/CO — SIGNIFICANT CHANGE UP (ref 0–0.99)
HCV AB S/CO SERPL IA: 0.13 S/CO — SIGNIFICANT CHANGE UP (ref 0–0.99)
HCV AB SERPL-IMP: SIGNIFICANT CHANGE UP
HCV AB SERPL-IMP: SIGNIFICANT CHANGE UP
HGB BLD CALC-MCNC: 10.3 G/DL — LOW (ref 13–17)
HIV 1+2 AB+HIV1 P24 AG SERPL QL IA: SIGNIFICANT CHANGE UP
LACTATE BLDV-MCNC: 1.5 MMOL/L — SIGNIFICANT CHANGE UP (ref 0.7–2)
MAGNESIUM SERPL-MCNC: 2.3 MG/DL — SIGNIFICANT CHANGE UP (ref 1.6–2.6)
OSMOLALITY UR: 407 MOS/KG — SIGNIFICANT CHANGE UP (ref 300–900)
PCO2 BLDV: 50 MMHG — SIGNIFICANT CHANGE UP (ref 35–50)
PH BLDV: 7.38 — SIGNIFICANT CHANGE UP (ref 7.35–7.45)
PHOSPHATE SERPL-MCNC: 3.8 MG/DL — SIGNIFICANT CHANGE UP (ref 2.5–4.5)
PO2 BLDV: 33 MMHG — SIGNIFICANT CHANGE UP (ref 25–45)
POTASSIUM BLDV-SCNC: 3.4 MMOL/L — LOW (ref 3.5–5.3)
POTASSIUM SERPL-MCNC: 3.6 MMOL/L — SIGNIFICANT CHANGE UP (ref 3.5–5.3)
POTASSIUM SERPL-SCNC: 3.6 MMOL/L — SIGNIFICANT CHANGE UP (ref 3.5–5.3)
PROT ?TM UR-MCNC: 83 MG/DL — HIGH (ref 0–12)
PROT SERPL-MCNC: 6.6 G/DL — SIGNIFICANT CHANGE UP (ref 6–8.3)
PROT/CREAT UR-RTO: 1.7 RATIO — HIGH (ref 0–0.2)
SAO2 % BLDV: 56 % — LOW (ref 67–88)
SODIUM SERPL-SCNC: 145 MMOL/L — SIGNIFICANT CHANGE UP (ref 135–145)
SODIUM UR-SCNC: 123 MMOL/L — SIGNIFICANT CHANGE UP
URATE SERPL-MCNC: 10 MG/DL — HIGH (ref 3.4–8.8)

## 2021-05-27 PROCEDURE — 99233 SBSQ HOSP IP/OBS HIGH 50: CPT | Mod: GC

## 2021-05-27 PROCEDURE — 71045 X-RAY EXAM CHEST 1 VIEW: CPT | Mod: 26

## 2021-05-27 PROCEDURE — 99222 1ST HOSP IP/OBS MODERATE 55: CPT | Mod: GC

## 2021-05-27 RX ORDER — AMIODARONE HYDROCHLORIDE 400 MG/1
100 TABLET ORAL DAILY
Refills: 0 | Status: DISCONTINUED | OUTPATIENT
Start: 2021-05-27 | End: 2021-05-28

## 2021-05-27 RX ORDER — METOPROLOL TARTRATE 50 MG
25 TABLET ORAL DAILY
Refills: 0 | Status: DISCONTINUED | OUTPATIENT
Start: 2021-05-27 | End: 2021-05-28

## 2021-05-27 RX ORDER — AMLODIPINE BESYLATE 2.5 MG/1
10 TABLET ORAL DAILY
Refills: 0 | Status: DISCONTINUED | OUTPATIENT
Start: 2021-05-28 | End: 2021-05-28

## 2021-05-27 RX ORDER — ASPIRIN/CALCIUM CARB/MAGNESIUM 324 MG
81 TABLET ORAL DAILY
Refills: 0 | Status: DISCONTINUED | OUTPATIENT
Start: 2021-05-27 | End: 2021-05-27

## 2021-05-27 RX ORDER — TAMSULOSIN HYDROCHLORIDE 0.4 MG/1
0.4 CAPSULE ORAL AT BEDTIME
Refills: 0 | Status: DISCONTINUED | OUTPATIENT
Start: 2021-05-27 | End: 2021-05-28

## 2021-05-27 RX ORDER — SIMVASTATIN 20 MG/1
40 TABLET, FILM COATED ORAL AT BEDTIME
Refills: 0 | Status: DISCONTINUED | OUTPATIENT
Start: 2021-05-27 | End: 2021-05-28

## 2021-05-27 RX ORDER — AMLODIPINE BESYLATE 2.5 MG/1
5 TABLET ORAL DAILY
Refills: 0 | Status: DISCONTINUED | OUTPATIENT
Start: 2021-05-27 | End: 2021-05-27

## 2021-05-27 RX ORDER — FERROUS SULFATE 325(65) MG
325 TABLET ORAL DAILY
Refills: 0 | Status: DISCONTINUED | OUTPATIENT
Start: 2021-05-27 | End: 2021-05-28

## 2021-05-27 RX ORDER — HYDRALAZINE HCL 50 MG
25 TABLET ORAL THREE TIMES A DAY
Refills: 0 | Status: DISCONTINUED | OUTPATIENT
Start: 2021-05-27 | End: 2021-05-28

## 2021-05-27 RX ORDER — HYDRALAZINE HCL 50 MG
5 TABLET ORAL ONCE
Refills: 0 | Status: COMPLETED | OUTPATIENT
Start: 2021-05-27 | End: 2021-05-27

## 2021-05-27 RX ORDER — PANTOPRAZOLE SODIUM 20 MG/1
40 TABLET, DELAYED RELEASE ORAL
Refills: 0 | Status: DISCONTINUED | OUTPATIENT
Start: 2021-05-27 | End: 2021-05-28

## 2021-05-27 RX ORDER — HEPARIN SODIUM 5000 [USP'U]/ML
5000 INJECTION INTRAVENOUS; SUBCUTANEOUS EVERY 8 HOURS
Refills: 0 | Status: DISCONTINUED | OUTPATIENT
Start: 2021-05-27 | End: 2021-05-27

## 2021-05-27 RX ORDER — AMIODARONE HYDROCHLORIDE 400 MG/1
1 TABLET ORAL
Qty: 0 | Refills: 0 | DISCHARGE

## 2021-05-27 RX ADMIN — PANTOPRAZOLE SODIUM 40 MILLIGRAM(S): 20 TABLET, DELAYED RELEASE ORAL at 09:26

## 2021-05-27 RX ADMIN — TAMSULOSIN HYDROCHLORIDE 0.4 MILLIGRAM(S): 0.4 CAPSULE ORAL at 22:48

## 2021-05-27 RX ADMIN — HEPARIN SODIUM 5000 UNIT(S): 5000 INJECTION INTRAVENOUS; SUBCUTANEOUS at 14:29

## 2021-05-27 RX ADMIN — SIMVASTATIN 40 MILLIGRAM(S): 20 TABLET, FILM COATED ORAL at 22:48

## 2021-05-27 RX ADMIN — AMIODARONE HYDROCHLORIDE 100 MILLIGRAM(S): 400 TABLET ORAL at 09:26

## 2021-05-27 RX ADMIN — Medication 81 MILLIGRAM(S): at 11:36

## 2021-05-27 RX ADMIN — Medication 25 MILLIGRAM(S): at 06:37

## 2021-05-27 RX ADMIN — Medication 25 MILLIGRAM(S): at 18:51

## 2021-05-27 RX ADMIN — AMLODIPINE BESYLATE 5 MILLIGRAM(S): 2.5 TABLET ORAL at 11:36

## 2021-05-27 RX ADMIN — HEPARIN SODIUM 5000 UNIT(S): 5000 INJECTION INTRAVENOUS; SUBCUTANEOUS at 06:37

## 2021-05-27 RX ADMIN — Medication 5 MILLIGRAM(S): at 01:46

## 2021-05-27 NOTE — CONSULT NOTE ADULT - SUBJECTIVE AND OBJECTIVE BOX
HPI from Admission:  63 yo M with history HTN, HLD, CAD s/p PCI to LAD in 2014, PAD s/p lower ext stenting, rectal bleeding s/p hemorrhoidectomy, BPH, afib on eliquis, gout, RA (not on meds), CKD stage 3, presents with acute rise in serum Cr from outpatient nephrology clinic. Per chart review he had a SPEP and UPEP in last 6 months which was repotedly normal. Was on allopurinol for gout but stopped in setting of worsening renal function. No noted electrolyte abnormalities. He continues to take lisinopril, HCTZ, and eliquis daily.     The patient says he feels completely normal in his usual state of health. Denies F/C/CP/SOB/abdominal pain/N/V/D/dysuria.hematuria/oliguria.  (27 May 2021 00:35)    Hospital Course and Subjective:   Patient is admitted to medicine for further workup. Nephrology is consulted for RUSSELL. At bedside, hedenies  chest pain, shortness of breath, dyspnea on exertion, palpitations, pedal edema, weight changes, orthopnea, dizziness, syncope, fevers, chills, nausea, vomiting, diarrhea, abdominal pain, constipation, melena, hematochezia, loss of appetite,  dysuria, cough, runny nose, sore throat, malaise, recent sick contacts, recent travel, rashes, joint pain, headache, sensory/motor weakness, vision changes. Endorses compliance with all medications, denies OTC medication/supplement use, or NSAID use.    Past Medical History:   HTN - Hypertension    Inguinal Hernia    Childhood Asthma    Keloid    Arthritis    CKD (chronic kidney disease)    Gout    CAD (coronary artery disease)    HLD (hyperlipidemia)    Atrial fibrillation    History of cardioversion    CHF (congestive heart failure)    History of cardiomyopathy    Acid reflux    Thalassemia minor        Past Surgical History:   S/P Arthroscopic Surgery of Left Knee    History of Arthroscopy- ankle    S/P angioplasty with stent    S/P hernia surgery    Status post cataract extraction    H/O cardiac radiofrequency ablation        Medications:   aMIOdarone    Tablet 100 milliGRAM(s) Oral daily  aspirin enteric coated 81 milliGRAM(s) Oral daily  heparin   Injectable 5000 Unit(s) SubCutaneous every 8 hours  metoprolol succinate ER 25 milliGRAM(s) Oral daily  pantoprazole    Tablet 40 milliGRAM(s) Oral before breakfast  simvastatin 40 milliGRAM(s) Oral at bedtime      Allergies:  No Known Allergies      FAMILY HISTORY:  Family history of hypertension    Family history of diabetes mellitus      No FH of SCD or early onset heart failure    Social History:  Smoking History: Denied  Alcohol Use: Denied   Drug Use: Denied    Review of Systems:  Constitutional: [ ] Fever [ ] Chills [ ] Fatigue [ ] Weight change   HEENT: [ ] Blurred vision [ ] Eye Pain [ ] Headache [ ] Runny nose [ ] Sore Throat   Respiratory: [ ] Cough [ ] Wheezing [ ] Shortness of breath  Cardiovascular: [ ] Chest Pain [ ] Palpitations [ ] LIRA [ ] PND [ ] Orthopnea  Gastrointestinal: [ ] Abdominal Pain [ ] Diarrhea [ ] Constipation [ ] Hemorrhoids [ ] Nausea [ ] Vomiting  Genitourinary: [ ] Nocturia [ ] Dysuria [ ] Incontinence  Extremities: [ ] Swelling [ ] Joint Pain  Neurologic: [ ] Focal deficit [ ] Paresthesias [ ] Syncope  Lymphatic: [ ] Swelling [ ] Lymphadenopathy   Skin: [ ] Rash [ ] Ecchymoses [ ] Wounds [ ] Lesions  Psychiatry: [ ] Depression [ ] Suicidal/Homicidal Ideation [ ] Anxiety [ ] Sleep Disturbances  [x ] 10 point review of systems is otherwise negative except as mentioned above              [ ] Unable to obtain    Vital Signs Last 24 Hrs  T(C): 36.4 (27 May 2021 08:03), Max: 37.1 (26 May 2021 17:11)  T(F): 97.6 (27 May 2021 08:03), Max: 98.7 (26 May 2021 17:11)  HR: 66 (27 May 2021 08:03) (54 - 88)  BP: 150/112 (27 May 2021 08:03) (150/112 - 177/114)  BP(mean): --  RR: 19 (27 May 2021 08:03) (16 - 19)  SpO2: 98% (27 May 2021 08:03) (97% - 99%)    I&O's Summary      Physical Exam:  General: No distress. Comfortable  HEENT: EOMI	  Neck: JVP not elevated  Chest: Clear to auscultation bilaterally  CV: RRR. Normal S1 and S2. No murmurs, rubs, or gallops. No edema.  Abdomen: Soft, non-distended, non-tender  Skin: No rashes, ecchymoses, or skin breakdown  Extremities: Warm.  Neuro: Alert and oriented x 3. No focal deficits.  Psych: Mood and affect appropriate    Labs:    Personally reviewed labs, imaging, EKG                        10.6   12.19 )-----------( 265      ( 26 May 2021 17:23 )             33.6         145  |  107  |  42<H>  ----------------------------<  82  3.6   |  24  |  3.10<H>    Ca    8.5      27 May 2021 07:00  Phos  3.8       Mg     2.3         TPro  6.6  /  Alb  2.8<L>  /  TBili  0.4  /  DBili  x   /  AST  8<L>  /  ALT  5<L>  /  AlkPhos  110        Urinalysis Basic - ( 26 May 2021 17:41 )    Color: Light Yellow / Appearance: Clear / S.016 / pH: x  Gluc: x / Ketone: Negative  / Bili: Negative / Urobili: 2 mg/dL   Blood: x / Protein: 100 mg/dL / Nitrite: Negative   Leuk Esterase: Negative / RBC: 2 /hpf / WBC 1 /HPF   Sq Epi: x / Non Sq Epi: 0 /hpf / Bacteria: Negative        06:57 - VBG - pH: 7.38  | pCO2: 50    | pO2: 33    | Lactate: 1.5        Chest X Ray 21 @ 10:03 (Personal Read):  ECG 21 @ 10:03 (Personal Read):  HPI from Admission:  63 yo M with history HTN, HLD, CAD s/p PCI to LAD in , PAD s/p lower ext stenting, rectal bleeding s/p hemorrhoidectomy, BPH, afib on eliquis, gout, RA (not on meds), CKD stage 3, presents with acute rise in serum Cr from outpatient nephrology clinic. Per chart review he had a SPEP and UPEP in last 6 months which was repotedly normal. Was on allopurinol for gout but stopped in setting of worsening renal function. No noted electrolyte abnormalities. He continues to take lisinopril, HCTZ, and eliquis daily.     The patient says he feels completely normal in his usual state of health. Denies F/C/CP/SOB/abdominal pain/N/V/D/dysuria.hematuria/oliguria.  (27 May 2021 00:35)    Hospital Course and Subjective:   Patient is admitted to medicine for further workup. Nephrology is consulted for RUSSELL. At bedside, he denies  chest pain, shortness of breath, dyspnea on exertion, palpitations, pedal edema, weight changes, orthopnea, dizziness, syncope, fevers, chills, nausea, vomiting, diarrhea, abdominal pain, constipation, melena, hematochezia, loss of appetite,  dysuria, cough, runny nose, sore throat, malaise, recent sick contacts, recent travel, rashes, joint pain, headache, sensory/motor weakness, vision changes. Endorses compliance with all medications, denies OTC medication/supplement use, or NSAID use.    He endorses polyuria, going to the bathroom 1x/hr, denies any urinary incontinence, incomplete voiding.     Past Medical History:   HTN - Hypertension    Inguinal Hernia    Childhood Asthma    Keloid    Arthritis    CKD (chronic kidney disease)    Gout    CAD (coronary artery disease)    HLD (hyperlipidemia)    Atrial fibrillation    History of cardioversion    CHF (congestive heart failure)    History of cardiomyopathy    Acid reflux    Thalassemia minor        Past Surgical History:   S/P Arthroscopic Surgery of Left Knee    History of Arthroscopy- ankle    S/P angioplasty with stent    S/P hernia surgery    Status post cataract extraction    H/O cardiac radiofrequency ablation        Medications:   aMIOdarone    Tablet 100 milliGRAM(s) Oral daily  aspirin enteric coated 81 milliGRAM(s) Oral daily  heparin   Injectable 5000 Unit(s) SubCutaneous every 8 hours  metoprolol succinate ER 25 milliGRAM(s) Oral daily  pantoprazole    Tablet 40 milliGRAM(s) Oral before breakfast  simvastatin 40 milliGRAM(s) Oral at bedtime      Allergies:  No Known Allergies      FAMILY HISTORY:  Family history of hypertension    Family history of diabetes mellitus      No FH of SCD or early onset heart failure    Social History:  Smoking History: Denied  Alcohol Use: Denied   Drug Use: Denied    Review of Systems:  Constitutional: [ ] Fever [ ] Chills [ ] Fatigue [ ] Weight change   HEENT: [ ] Blurred vision [ ] Eye Pain [ ] Headache [ ] Runny nose [ ] Sore Throat   Respiratory: [ ] Cough [ ] Wheezing [ ] Shortness of breath  Cardiovascular: [ ] Chest Pain [ ] Palpitations [ ] LIRA [ ] PND [ ] Orthopnea  Gastrointestinal: [ ] Abdominal Pain [ ] Diarrhea [ ] Constipation [ ] Hemorrhoids [ ] Nausea [ ] Vomiting  Genitourinary: [ ] Nocturia [ ] Dysuria [ ] Incontinence  Extremities: [ ] Swelling [ ] Joint Pain  Neurologic: [ ] Focal deficit [ ] Paresthesias [ ] Syncope  Lymphatic: [ ] Swelling [ ] Lymphadenopathy   Skin: [ ] Rash [ ] Ecchymoses [ ] Wounds [ ] Lesions  Psychiatry: [ ] Depression [ ] Suicidal/Homicidal Ideation [ ] Anxiety [ ] Sleep Disturbances  [x ] 10 point review of systems is otherwise negative except as mentioned above              [ ] Unable to obtain    Vital Signs Last 24 Hrs  T(C): 36.4 (27 May 2021 08:03), Max: 37.1 (26 May 2021 17:11)  T(F): 97.6 (27 May 2021 08:03), Max: 98.7 (26 May 2021 17:11)  HR: 66 (27 May 2021 08:03) (54 - 88)  BP: 150/112 (27 May 2021 08:03) (150/112 - 177/114)  BP(mean): --  RR: 19 (27 May 2021 08:03) (16 - 19)  SpO2: 98% (27 May 2021 08:03) (97% - 99%)    I&O's Summary      Physical Exam:  General: No distress. Comfortable  HEENT: EOMI	  Neck: JVP not elevated  Chest: Clear to auscultation bilaterally  CV: RRR. Normal S1 and S2. No murmurs, rubs, or gallops. No edema.  Abdomen: Soft, non-distended, non-tender  Skin: No rashes, ecchymoses, or skin breakdown  Extremities: Warm.  Neuro: Alert and oriented x 3. No focal deficits.  Psych: Mood and affect appropriate    Labs:    Personally reviewed labs, imaging, EKG                        10.6   12.19 )-----------( 265      ( 26 May 2021 17:23 )             33.6         145  |  107  |  42<H>  ----------------------------<  82  3.6   |  24  |  3.10<H>    Ca    8.5      27 May 2021 07:00  Phos  3.8       Mg     2.3         TPro  6.6  /  Alb  2.8<L>  /  TBili  0.4  /  DBili  x   /  AST  8<L>  /  ALT  5<L>  /  AlkPhos  110        Urinalysis Basic - ( 26 May 2021 17:41 )    Color: Light Yellow / Appearance: Clear / S.016 / pH: x  Gluc: x / Ketone: Negative  / Bili: Negative / Urobili: 2 mg/dL   Blood: x / Protein: 100 mg/dL / Nitrite: Negative   Leuk Esterase: Negative / RBC: 2 /hpf / WBC 1 /HPF   Sq Epi: x / Non Sq Epi: 0 /hpf / Bacteria: Negative        06:57 - VBG - pH: 7.38  | pCO2: 50    | pO2: 33    | Lactate: 1.5        Chest X Ray 21 @ 10:03 (Personal Read):  ECG 21 @ 10:03 (Personal Read):

## 2021-05-27 NOTE — ED ADULT NURSE REASSESSMENT NOTE - NS ED NURSE REASSESS COMMENT FT1
Patient resting comfortably in stretcher. Patient A&Ox4. Patient's /117, Contacted admitting team regarding blood pressure. Patient receive 25mg metoprolol as per EMAR. Denies chest pain, SOB, headache, N/V/D, abdominal pain, fever/chills. Plan of care discussed. Safety and comfort measures maintained.

## 2021-05-27 NOTE — H&P ADULT - PROBLEM SELECTOR PLAN 9
dvt ppx- HSQ 5000 q 8 for now until eliquis dosing clarified  diet- regular DASH  dispo- likely home

## 2021-05-27 NOTE — DISCHARGE NOTE PROVIDER - NSDCCPTREATMENT_GEN_ALL_CORE_FT
PRINCIPAL PROCEDURE  Procedure: Duplex scan of both kidneys  Findings and Treatment: Ri  < end of copied text >  ght kidney: 11.1. No renal mass, hydronephrosis or calculi. A 5 mm cyst is seen. Increased echogenicity seen consistent with renal parenchymal disease.  Left kidney: 11.9. No renal mass, hydronephrosis or calculi. There is increased echogenicity consistent with renal parenchymal disease.  Both kidneys are better visualized on the current examination.  Urinary bladder: Within normal limits.  Color and spectral Doppler reveals normal, symmetric blood flow throughout both kidneys.  Peak aortic velocity is 66 cm/sec.  IVC/Renal Veins: Patent.  RIGHT  Renal Artery:  Peak systolic velocity is 116 cm/sec origin,151 cm/sec proximal, 113 cm/sec mid, 67 cm/sec distal and 46 cm/sec hilum.  Upper Segmental Artery:  RI = 0.71  Middle Segmental Artery: RI = 0.74  Lower Segmental Artery: RI = 0.68  LEFT  Renal Artery:  Peak systolic velocity is 84 cm/sec origin, 95 cm/sec proximal, 63 cm/sec mid, 68 cm/sec distal and 31 cm/sec hilum.  Upper Segmental Artery:  RI = 0.58  Middle Segmental Artery: RI = 0.60  Lower Segmental Artery: RI = 0.56  IMPRESSION:  Increased echogenicity consistent with renal parenchymal disease. No evidence of hydronephrosis.  No evidence of renal artery stenosis.  < from: US Duplex Kidneys (US Doppler Renal) (05.28.21 @ 11:08) >        SECONDARY PROCEDURE  Procedure: US kidney complete  Findings and Treatment:   < end of copied text >  FINDINGS:  Right kidney: 9.4 cm. No hydronephrosis or obvious renal stone. Echogenic, reflecting parenchymal disease.  Left kidney: 9.7 cm. No hydronephrosis or obvious renal stone.  Echogenic, reflecting parenchymal disease.  Urinary bladder: Partially distended. Prevoid volume of 185 ml. Postvoid volume of 14 ml.  IMPRESSION:  No hydronephrosis. Additional findings as described.< from: US Kidney and Bladder (05.26.21 @ 22:47) >

## 2021-05-27 NOTE — PATIENT PROFILE ADULT - FUNCTIONAL SCREEN CURRENT LEVEL: COMMUNICATION, MLM
NURSE ANTICOAGULATION PHONE CONTACT    Jennifer Martin 1948 contacted by phone today for 1 week INR results  No outpatient medications have been marked as taking for the 1/7/19 encounter (Telephone) with Agatha Mac MD.       INR (no units)   Date Value   12/06/2018 6.8 (HH)     INR-POC (no units)   Date Value   01/02/2019 1.4 (A)     INR CAPILLARY (no units)   Date Value   01/07/2019 2.4     GOAL RANGE: 2.0-3.0    ALLERGIES:   Allergen Reactions   • Augmentin [Amoclan] RASH   • Lisinopril Cough   • Seasonal Other (See Comments)   • Zithromax [Azithromycin Dihydrate] RASH       Patient Active Problem List   Diagnosis   • PAC (premature atrial contraction)   • PVC's (premature ventricular contractions)   • Shingles   • Atrial flutter (CMS/HCC)   • Benign essential HTN   • Paroxysmal atrial fibrillation (CMS/HCC)   • Acute systolic congestive heart failure (CMS/HCC)   • Hypertensive urgency   • Shortness of breath   • Encounter for therapeutic drug monitoring [Z51.81]   • Long term (current) use of anticoagulants [Z79.01]       PATIENT REPORTED CHANGES: Patient is now WNL to discontinue warfarin and start eliquis. Patient will take eliquis starting tomorrow morning.    NURSE DOSING ADJUSTMENTS AND EDUCATION: Discontinue warfarin    Patient encouraged to contact our office with any further questions or concerns.      
----- Message from Maryuri Velarde RN sent at 1/7/2019 10:17 AM CST -----      ----- Message -----  [facility]  Sent: 1/7/2019   9:07 AM  To: Jose (Open Enrollment) Acs Card/Ep    
0 = understands/communicates without difficulty

## 2021-05-27 NOTE — DISCHARGE NOTE PROVIDER - HOSPITAL COURSE
63 yo M with history HTN, HLD, CAD s/p PCI to LAD in 2014, PAD s/p lower ext stenting, rectal bleeding s/p hemorrhoidectomy, BPH, afib on eliquis, gout, RA (not on meds), CKD stage 3, presents with acute rise in serum Cr from outpatient nephrology clinic. Nephrology was consulted who rec that we send labs and perform a kidney biopsy. The biopsy showed __________________. Renal Us did not show signs of hydronephrosis. Duplex showed _________________. NAILA, anti ds DNA were _________. C3/C4 were___________. Patient was seen by cardiology. Liusinopril and htz was stopped and he was started on amlodipine. He _____________________      INCOMPLETE 63 yo M with history HTN, HLD, CAD s/p PCI to LAD in 2014, PAD s/p lower ext stenting, rectal bleeding s/p hemorrhoidectomy, BPH, afib on eliquis, gout, RA (not on meds), CKD stage 3, presents with acute rise in serum Cr from outpatient nephrology clinic. Nephrology was consulted who rec that we send labs and perform a kidney biopsy in the outpatient setting. Renal Us did not show signs of hydronephrosis. Kidney duplex showed Increased echogenicity consistent with renal parenchymal disease. No evidence of hydronephrosis. No evidence of renal artery stenosis. Patient was seen by cardiology. Lisinopril and htz was stopped and his Cr decreased. He was started on amlodipine 10, hydralazine 25 tid and isosorbide dinitrate 10mg.  Follow up with nephology outpatient     Patient medically optimized and stable for discharge

## 2021-05-27 NOTE — DISCHARGE NOTE PROVIDER - CARE PROVIDERS DIRECT ADDRESSES
[email or web address]

## 2021-05-27 NOTE — CHART NOTE - NSCHARTNOTEFT_GEN_A_CORE
Paged nephrology fellow overnight x2 without response.     Eliquis, lisinopril, HCTZ on hold for RUSSELL.    HSQ started in interim. Awaiting recommendations re: kidney biopsy to determine plan for a/c. Day team to follow.

## 2021-05-27 NOTE — DISCHARGE NOTE PROVIDER - NSDCMRMEDTOKEN_GEN_ALL_CORE_FT
amiodarone 100 mg oral tablet: orally once a day  aspirin 81 mg oral tablet: orally once a day  Eliquis 5 mg oral tablet: 1 tab(s) orally 2 times a day  hydroCHLOROthiazide 25 mg oral tablet: 1 tab(s) orally once a day  lisinopril 2.5 mg oral tablet: 1 tab(s) orally 2 times a day  Metoprolol Succinate ER 25 mg oral tablet, extended release: 1 tab(s) orally once a day  metoprolol tartrate 25 mg oral tablet: 1/2 tab oral daily  pantoprazole 40 mg oral delayed release tablet: 1 tab(s) orally once a day (before a meal)  simvastatin 40 mg oral tablet: 1 tab(s) orally once a day (at bedtime)   amiodarone 100 mg oral tablet: orally once a day  amLODIPine 10 mg oral tablet: 1 tab(s) orally once a day  aspirin 81 mg oral tablet: orally once a day  Eliquis 5 mg oral tablet: 1 tab(s) orally 2 times a day  ferrous sulfate 325 mg (65 mg elemental iron) oral tablet: 1 tab(s) orally once a day  hydrALAZINE 25 mg oral tablet: 1 tab(s) orally 3 times a day  isosorbide dinitrate 10 mg oral tablet: 1 tab(s) orally 3 times a day  Metoprolol Succinate ER 25 mg oral tablet, extended release: 1 tab(s) orally once a day  metoprolol tartrate 25 mg oral tablet: 1/2 tab oral daily  pantoprazole 40 mg oral delayed release tablet: 1 tab(s) orally once a day (before a meal)  simvastatin 40 mg oral tablet: 1 tab(s) orally once a day (at bedtime)  tamsulosin 0.4 mg oral capsule: 1 cap(s) orally once a day (at bedtime)   amiodarone 100 mg oral tablet: orally once a day  amLODIPine 10 mg oral tablet: 1 tab(s) orally once a day  aspirin 81 mg oral tablet: orally once a day  Eliquis 5 mg oral tablet: 1 tab(s) orally 2 times a day  ferrous sulfate 325 mg (65 mg elemental iron) oral tablet: 1 tab(s) orally once a day  hydrALAZINE 25 mg oral tablet: 1 tab(s) orally 3 times a day  isosorbide dinitrate 10 mg oral tablet: 1 tab(s) orally 3 times a day  Metoprolol Succinate ER 25 mg oral tablet, extended release: 1 tab(s) orally once a day  pantoprazole 40 mg oral delayed release tablet: 1 tab(s) orally once a day (before a meal)  simvastatin 40 mg oral tablet: 1 tab(s) orally once a day (at bedtime)   amiodarone 100 mg oral tablet: 1 tab(s) orally once a day  amLODIPine 10 mg oral tablet: 1 tab(s) orally once a day  aspirin 81 mg oral tablet: 1 tab(s) orally once a day  Eliquis 5 mg oral tablet: 1 tab(s) orally 2 times a day  ferrous sulfate 325 mg (65 mg elemental iron) oral tablet: 1 tab(s) orally once a day  finasteride 5 mg oral tablet: 1 tab(s) orally once a day  hydrALAZINE 25 mg oral tablet: 1 tab(s) orally 3 times a day  isosorbide dinitrate 10 mg oral tablet: 1 tab(s) orally 3 times a day  Metoprolol Succinate ER 25 mg oral tablet, extended release: 1 tab(s) orally once a day  pantoprazole 40 mg oral delayed release tablet: 1 tab(s) orally once a day (before a meal)  simvastatin 40 mg oral tablet: 1 tab(s) orally once a day (at bedtime)

## 2021-05-27 NOTE — DISCHARGE NOTE PROVIDER - NSDCFUADDAPPT_GEN_ALL_CORE_FT
Please follow up with your nephrologist in one week to set up the kidney biopsy  Please follow up with your nephrologist in one week to set up the kidney biopsy   Follow up with your PCP within 2 weeks

## 2021-05-27 NOTE — H&P ADULT - PROBLEM SELECTOR PLAN 1
RUSSELL on CKD III, likely in setting of longstanding HTN and poor adherence to low sodium diet  FENA calculated to be 0.9% which could suggest pre-renal etiology however no evidence of medication or iatrogenic injury, and no hypotension or fluid losses to suggest a superimposed ischemic injury. Patient was taken off allopurinol, and is off ACEi.  Urine osm sent  UA sent to evaluate for hematuria and proteinuria ( neg blood, 100 protein()  Protein./cr ratio <200mg   check HIV, hepatitis panel and hepatitis C  check mag, phos, uric acid  check VBG  check NAILA  check spep, upep with immunofixation  At this time no acute indication for RRT: No SOB or clinical signs of fluid overload, but will check CXR to r/o trace pulmonary edema. Monitor electrolytes. No change in mental status, seizures or signs/symptoms of pericarditis from uremia, patient producing urine  Monitor I's and O's.  Await formal renal consult RUSSELL on CKD III, likely in setting of longstanding HTN and poor adherence to low sodium diet  FENA calculated to be 0.9% which could suggest pre-renal etiology however no evidence of medication or iatrogenic injury, and no hypotension or fluid losses to suggest a superimposed ischemic injury. Patient was taken off allopurinol, and is off ACEi.  Urine osm sent  UA sent to evaluate for hematuria and proteinuria ( neg blood, 100 protein)  Protein./cr ratio <200mg   check HIV, hepatitis panel and hepatitis C  check mag, phos, uric acid  check VBG  check NAILA  check spep, upep with immunofixation  At this time no acute indication for RRT: No SOB or clinical signs of fluid overload, but will check CXR to r/o trace pulmonary edema. Monitor electrolytes. No change in mental status, seizures or signs/symptoms of pericarditis from uremia, patient producing urine  Monitor I's and O's.  Await formal renal consult  Patient euvolemic, no indication for IV fluids or diuresis.

## 2021-05-27 NOTE — CONSULT NOTE ADULT - SUBJECTIVE AND OBJECTIVE BOX
CARDIOLOGY CONSULT - Dr. Lee         HPI:  63 yo M well known to practice with history Hypertension, Coronary Artery Disease, S/P PCI to LAD (5/2014, MAR[facility], [facility]), S/P PCI to LAD 2/21/2018, History of Severe Mixed Cardiomyopathy, Atrial Fibrillation/Flutter, S/P NICOLETTE/DCCV 1/2016, S/P Ablation 2/2016, PADS/P Lower Extremity Stenting, Mitral Regurgitation , CKD stage 3, presents with acute rise in serum Cr from outpatient nephrology clinic. Per chart review he had a SPEP and UPEP in last 6 months which was reportedly normal. Was on allopurinol for gout but stopped in setting of worsening renal function. No noted electrolyte abnormalities. He continues to take lisinopril, HCTZ, and eliquis daily. The patient says he feels completely normal in his usual state of health. Denies F/C/CP/SOB/abdominal pain/N/V/D/dysuria.hematuria/oliguria. Of note most recent Office Echo 10/2/18 revealed EF 50%, mild-mod MR, min AR, mild lv sys dysfx.  ROS otherwise neg     In ED  Vital Signs Last 24 Hrs  T(C): 36.8 (26 May 2021 19:45), Max: 37.1 (26 May 2021 17:11)  T(F): 98.2 (26 May 2021 19:45), Max: 98.7 (26 May 2021 17:11)  HR: 64 (26 May 2021 22:05) (54 - 73)  BP: 169/116 (26 May 2021 22:05) (158/107 - 169/116)  RR: 18 (26 May 2021 22:05) (16 - 18)  SpO2: 98% (26 May 2021 22:05) (98% - 99%) (27 May 2021 00:35)      PAST MEDICAL & SURGICAL HISTORY:  HTN - Hypertension    Arthritis  L arm and hands    CKD (chronic kidney disease)    Gout    CAD (coronary artery disease)  1 stent    HLD (hyperlipidemia)    Atrial fibrillation    CHF (congestive heart failure)    History of cardiomyopathy  LV dysfunction    Thalassemia minor    S/P Arthroscopic Surgery of Left Knee  2001 injury- hit knee on desk    History of Arthroscopy- ankle  left ankle 2003 s/p &quot;something fell on it&quot;    S/P angioplasty with stent  5/2014 2016    S/P hernia surgery    Status post cataract extraction  left eye done 10/18/2018    H/O cardiac radiofrequency ablation            PREVIOUS DIAGNOSTIC TESTING:    [x ] Echocardiogram:   < from: Transesophageal Echocardiogram (02.13.18 @ 13:43) >  Dimensions:    Normal Values:  LA:     5.0    2.0 - 4.0 cm  Ao:     3.4    2.0 - 3.8 cm  SEPTUM: 1.0    0.6 - 1.2 cm  PWT:    1.3    0.6 - 1.1 cm  LVIDd:  6.3    3.0 - 5.6 cm  LVIDs:  5.5    1.8 - 4.0 cm  Derived variables:  LVMI: 150 g/m2  RWT: 0.41  Fractional short: 13 %  EF (Perry Rule): 25 %  ------------------------------------------------------------------------  Observations:  Mitral Valve: Tethered thickened  mitral valve leaflets.  Severe posterolateral mitral regurgitation.  Aortic Valve/Aorta: Normal trileaflet aortic valve.  Aortic Root: 3.4 cm.  LVOT diameter: 2.3 cm.  Left Atrium: Mildly dilated left atrium.  LA volume index =  37 cc/m2.   No left atrial or left atrial appendage  thrombus.   Normal left atrial appendage function  (velocity= 0.5 m/s).  Left Ventricle: Severe segmental left ventricular systolic  dysfunction.  Akinesis of the inferior and inferolateral  wall, apex.  Hypokinesis of the remaining segments.  Eccentric left ventricular hypertrophy (dilated left  ventricle with normal relative wall thickness).  Right Heart: Moderate right atrial enlargement. Right  ventricular enlargement with decreased right ventricular  systolic function. Normal tricuspid valve. Mild tricuspid  regurgitation. Normal pulmonic valve.  Pericardium/Pleura: Normal pericardiumwith no pericardial  effusion.  Hemodynamic: Estimated right atrial pressure is 8 mm Hg.  Estimated right ventricular systolic pressure equals 46 mm  Hg, assuming right atrial pressure equals 8 mm Hg,  consistent with mild pulmonary hypertension. Agitated  saline injection and color flow Doppler demonstrates no  evidence of a patent foramen ovale.  ------------------------------------------------------------------------  Conclusions:  1. Tethered thickened  mitral valve leaflets. Severe  posterolateral mitral regurgitation.  2. Mildly dilated left atrium.  LA volume index = 37 cc/m2.    No left atrial or left atrial appendage thrombus.  Normal left atrial appendage function (velocity= 0.5 m/s).  3. Severe segmental left ventricular systolic dysfunction.  Akinesis of the inferior and inferolateral wall, apex.  Hypokinesis of the remaining segments.  4. Moderate right atrial enlargement.  5. Right ventricular enlargement with decreased right  ventricular systolic function.  6. Estimated pulmonary artery systolic pressure equals 46  mm Hg, assuming right atrial pressure equals 8 mm Hg,  consistent with mild pulmonary pressures.  Reviewed in real time with Dr. James  *** Compared with echocardiogram of 2/27/2016,  there is a  decliine in left ventricular systolic function as well as  development of significant mitral regurgitation.    < end of copied text >    [ x]  Catheterization:   < from: Cardiac Cath Lab - Adult (02.21.18 @ 09:21) >  CORONARY VESSELS: The coronary circulation is right dominant.  LM:   --  LM: Normal.  LAD:   --  Proximal LAD: There was a tubular 75 % stenosis at the site of a  prior stent.  CX:   --  Circumflex: Normal.  --  OM1: Normal.  --  OM2: Normal.  RI:   --  Ramus intermedius: Angiography showed minor luminal  irregularities with no flow limiting lesions.  RCA:   --  RCA: Angiography showed minor luminal irregularities with no  flow limiting lesions.  --  RPDA: Angiography showed minor luminal irregularities with no flow  limiting lesions.  --  RPL1: Angiography showed minor luminal irregularities withno flow  limiting lesions.  COMPLICATIONS: There were no complications.    < end of copied text >    [ ] Stress Test:  	    MEDICATIONS:  Home Medications:  amiodarone 100 mg oral tablet: orally once a day (27 May 2021 01:36)  aspirin 81 mg oral tablet: orally once a day (27 May 2021 01:36)  Eliquis 5 mg oral tablet: 1 tab(s) orally 2 times a day (27 May 2021 01:36)  hydroCHLOROthiazide 25 mg oral tablet: 1 tab(s) orally once a day (27 May 2021 01:36)  lisinopril 2.5 mg oral tablet: 1 tab(s) orally 2 times a day (27 May 2021 01:36)  Metoprolol Succinate ER 25 mg oral tablet, extended release: 1 tab(s) orally once a day (27 May 2021 01:37)  metoprolol tartrate 25 mg oral tablet: 1/2 tab oral daily (27 May 2021 01:36)  pantoprazole 40 mg oral delayed release tablet: 1 tab(s) orally once a day (before a meal) (27 May 2021 01:36)  simvastatin 40 mg oral tablet: 1 tab(s) orally once a day (at bedtime) (27 May 2021 01:36)      MEDICATIONS  (STANDING):  aMIOdarone    Tablet 100 milliGRAM(s) Oral daily  aspirin enteric coated 81 milliGRAM(s) Oral daily  heparin   Injectable 5000 Unit(s) SubCutaneous every 8 hours  metoprolol succinate ER 25 milliGRAM(s) Oral daily  pantoprazole    Tablet 40 milliGRAM(s) Oral before breakfast  simvastatin 40 milliGRAM(s) Oral at bedtime      FAMILY HISTORY:  Family history of hypertension    Family history of diabetes mellitus        SOCIAL HISTORY:    [x ] Non-smoker  [ ] Smoker  [ ] Alcohol    Allergies    No Known Allergies    Intolerances    	    REVIEW OF SYSTEMS:  CONSTITUTIONAL: No fever, weight loss, or fatigue  EYES: No eye pain, visual disturbances, or discharge  ENMT:  No difficulty hearing, tinnitus, vertigo; No sinus or throat pain  NECK: No pain or stiffness  RESPIRATORY: No cough, wheezing, chills or hemoptysis; No Shortness of Breath  CARDIOVASCULAR: No chest pain, palpitations, passing out, dizziness, or leg swelling  GASTROINTESTINAL: No abdominal or epigastric pain. No nausea, vomiting, or hematemesis; No diarrhea or constipation. No melena or hematochezia.  GENITOURINARY: No dysuria, frequency, hematuria, or incontinence  NEUROLOGICAL: No headaches, memory loss, loss of strength, numbness, or tremors  SKIN: No itching, burning, rashes, or lesions   	    [x ] All others negative	see hpi   [ ] Unable to obtain    PHYSICAL EXAM:  T(C): 36.8 (05-27-21 @ 10:07), Max: 37.1 (05-26-21 @ 17:11)  HR: 53 (05-27-21 @ 10:07) (53 - 88)  BP: 171/104 (05-27-21 @ 10:07) (150/112 - 177/114)  RR: 18 (05-27-21 @ 10:07) (16 - 19)  SpO2: 98% (05-27-21 @ 10:07) (97% - 99%)  Wt(kg): --  I&O's Summary      Appearance: Normal	  Psychiatry: A & O x 3, Mood & affect appropriate  HEENT:   Normal oral mucosa, PERRL, EOMI	  Lymphatic: No lymphadenopathy  Cardiovascular: Normal S1 S2,RRR, No JVD, No murmurs  Respiratory: Lungs clear to auscultation	  Gastrointestinal:  Soft, Non-tender, + BS	  Skin: No rashes, No ecchymoses, No cyanosis	  Neurologic: Non-focal  Extremities: Normal range of motion, No clubbing, cyanosis or edema  Vascular: Peripheral pulses palpable 2+ bilaterally    TELEMETRY: 	    ECG:  	not in chart/sunrise  RADIOLOGY:   from: US Kidney and Bladder (05.26.21 @ 22:47) >  FINDINGS:    Right kidney: 9.4 cm. No hydronephrosis or obvious renal stone. Echogenic, reflecting parenchymal disease.    Left kidney: 9.7 cm. No hydronephrosis or obvious renal stone.  Echogenic, reflecting parenchymal disease.    Urinary bladder: Partially distended. Prevoid volume of 185 ml. Postvoid volume of 14 ml.    IMPRESSION:    No hydronephrosis. Additional findings as described.  OTHER: 	  	  LABS:	 	    CARDIAC MARKERS:                                  10.6   12.19 )-----------( 265      ( 26 May 2021 17:23 )             33.6     05-27    145  |  107  |  42<H>  ----------------------------<  82  3.6   |  24  |  3.10<H>    Ca    8.5      27 May 2021 07:00  Phos  3.8     05-27  Mg     2.3     05-27    TPro  6.6  /  Alb  2.8<L>  /  TBili  0.4  /  DBili  x   /  AST  8<L>  /  ALT  5<L>  /  AlkPhos  110  05-27      proBNP:   Lipid Profile:   HgA1c:   TSH:

## 2021-05-27 NOTE — DISCHARGE NOTE PROVIDER - NSDCFUSCHEDAPPT_GEN_ALL_CORE_FT
SYEDA WEAVER ; 06/30/2021 ; [facility] SYEDA Bañuelos ; 08/11/2021 ; [facility]

## 2021-05-27 NOTE — PROGRESS NOTE ADULT - PROBLEM SELECTOR PLAN 1
RUSSELL on CKD III, likely in setting of longstanding HTN and poor adherence to low sodium diet  FENA calculated to be 0.9% which could suggest pre-renal etiology however no evidence of medication or iatrogenic injury, and no hypotension or fluid losses to suggest a superimposed ischemic injury. Patient was taken off allopurinol, and is off ACEi.  Urine osm sent  UA sent to evaluate for hematuria and proteinuria ( neg blood, 100 protein)  Protein./cr ratio <200mg   check HIV, hepatitis panel and hepatitis C  check mag, phos, uric acid  check VBG  check NAILA  check spep, upep with immunofixation  At this time no acute indication for RRT: No SOB or clinical signs of fluid overload, but will check CXR to r/o trace pulmonary edema. Monitor electrolytes. No change in mental status, seizures or signs/symptoms of pericarditis from uremia, patient producing urine  Monitor I's and O's.  - will need kindey bx  - will send labs per renal recs

## 2021-05-27 NOTE — CONSULT NOTE ADULT - ASSESSMENT
*****INCOMPLETE******  *****INCOMPLETE******  *****INCOMPLETE******  *****INCOMPLETE******  CASE NOT DISCUSSED WITH ATTENDING OR FELLOW  *****INCOMPLETE******  *****INCOMPLETE******  *****INCOMPLETE******  *****INCOMPLETE******  #RUSSELL on CKD  - Likely post renal in setting of BPH vs. prerenal in setting of ACEi   - On presentation: Pr/Cr: 1.7  UA Clear   FeNa: 5.4 (post renal obstructive)  - US Kidney/Bladder: Echogenic bilaterally, representing underlying paremchymal disease. no hydro. Prevoid 185cc, post void 14 cc.   - Uric acid serum: 10mg   - Lactate on presentation 1.5  - Hold home lisinopril    #BPH  - On finasteride at home  - Add flomax .4 qd  *****INCOMPLETE******  *****INCOMPLETE******  *****INCOMPLETE******  *****INCOMPLETE******  CASE NOT DISCUSSED WITH ATTENDING OR FELLOW  *****INCOMPLETE******  *****INCOMPLETE******  *****INCOMPLETE******  *****INCOMPLETE******  #RUSSELL on CKD  - Baseline Scr 1.7, increased to 2.1 recently on 5/21, most recently creatinin 3.1 on admission  - Per outpt nephrology pt with CKD stage 3 with normal electrolytes likely secondary to HTN nephrosclerosis  - RUSSELL likely post renal in setting of BPH vs. prerenal in setting of ACEi   - On presentation: Pr/Cr: 1.7  UA Clear   FeNa: 5.4 (post renal obstructive)  - US Kidney/Bladder: Echogenic bilaterally, representing underlying paremchymal disease. no hydro. Prevoid 185cc, post void 14 cc.   - Uric acid serum: 10mg   - Lactate on presentation 1.5  - Had stopped allopurinol for gout   - Hold home lisinopril    #BPH  - sp prostate MRI in August 2020, which demonstrated an enlarged prostate without suspicious prostatic lesions, PI-RADS 2.  - On finasteride at home  - Add flomax .4 qd  *****INCOMPLETE******  *****INCOMPLETE******  *****INCOMPLETE******  *****INCOMPLETE******  CASE NOT DISCUSSED WITH ATTENDING OR FELLOW  *****INCOMPLETE******  *****INCOMPLETE******  *****INCOMPLETE******  *****INCOMPLETE******  #RUSSELL on CKD  - Baseline Scr 1.7, increased to 2.1 recently on 5/21, most recently creatinin 3.1 on admission  - Per outpt nephrology pt with CKD stage 3 with normal electrolytes likely secondary to HTN nephrosclerosis  - RUSSELL likely post renal in setting of BPH vs. prerenal in setting of ACEi   - On presentation: Pr/Cr: 1.7  UA Clear   FeNa: 5.4 (post renal obstructive)  - US Kidney/Bladder: Echogenic bilaterally, representing underlying paremchymal disease. no hydro. Prevoid 185cc, post void 14 cc.   - Uric acid serum: 10mg   - Lactate on presentation 1.5  - Had stopped allopurinol for gout   - Hold home lisinopril  -Please send tests for NAILA, dsDNA, anti smith, ANCAs, Anti PLA2R level, Hep panel, HIV , c3,c4, immunoglobulin free light chains, Serum immunofixation,         #BPH  - sp prostate MRI in August 2020, which demonstrated an enlarged prostate without suspicious prostatic lesions, PI-RADS 2.  - On finasteride at home  - Add flomax .4 qd  *****INCOMPLETE******  *****INCOMPLETE******  *****INCOMPLETE******  *****INCOMPLETE******  CASE NOT DISCUSSED WITH ATTENDING OR FELLOW  *****INCOMPLETE******  *****INCOMPLETE******  *****INCOMPLETE******  *****INCOMPLETE******  #RUSSELL on CKD  - Baseline Scr 1.7, increased to 2.1 recently on 5/21, most recently creatinin 3.1 on admission. Pt endorses polyuria 1x/hr  - Per outpt nephrology pt with CKD stage 3 with normal electrolytes likely secondary to HTN nephrosclerosis  - RUSSELL likely post renal in setting of BPH vs. prerenal in setting of ACEi   - On presentation: Pr/Cr: 1.7  UA Clear   FeNa: 5.4 (post renal obstructive)  - US Kidney/Bladder: Echogenic bilaterally, representing underlying parenchymal disease. no hydro. Prevoid 185cc, post void 14 cc.   - Uric acid serum: 10mg   - Lactate on presentation 1.5  - Had stopped allopurinol for gout   - Hold home lisinopril, HCTZ  -Please send tests for NAILA, dsDNA, anti smith, ANCAs, Anti PLA2R level, Hep panel, HIV , c3,c4, immunoglobulin free light chains, Serum immunofixation,     #BPH  - sp prostate MRI in August 2020, which demonstrated an enlarged prostate without suspicious prostatic lesions, PI-RADS 2.  - On finasteride at home  - Add flomax .4 qd  *****INCOMPLETE******  *****INCOMPLETE******  *****INCOMPLETE******  *****INCOMPLETE******  CASE NOT DISCUSSED WITH ATTENDING OR FELLOW  *****INCOMPLETE******  *****INCOMPLETE******  *****INCOMPLETE******  *****INCOMPLETE******  #RUSSELL on CKD  - Baseline Scr 1.7, increased to 2.1 recently on 5/21, most recently creatinin 3.1 on admission. Pt endorses polyuria 1x/hr  - Per outpt nephrology pt with CKD stage 3 with normal electrolytes likely secondary to HTN nephrosclerosis  - RUSSELL likely post renal in setting of BPH vs. prerenal in setting of ACEi   - On presentation: Pr/Cr: 1.7  UA Clear   FeNa: 5.4 (post renal obstructive)  - US Kidney/Bladder: Echogenic bilaterally, representing underlying parenchymal disease. no hydro. Prevoid 185cc, post void 14 cc.   - Uric acid serum: 10mg   - Lactate on presentation 1.5  - Had stopped allopurinol for gout   - Hold home lisinopril, HCTZ  -Please send tests for NAILA, dsDNA, anti smith, ANCAs, Anti PLA2R level, Hep panel, HIV , c3,c4, immunoglobulin free light chains, Serum immunofixation, LDH, Haptoglobin     #BPH  - sp prostate MRI in August 2020, which demonstrated an enlarged prostate without suspicious prostatic lesions, PI-RADS 2.  - On finasteride at home  - Add flomax .4 qd  65y M  with PMHx of CHF, HLD, CAD, Gout, CKD, arthritis, HTN presents to ED with c/o elevated Cr level after routine blood work check.     #RUSSELL on CKD  - Baseline Scr 1.7, increased to 2.1 on 5/21, peaked to 3.5 on 5/25 most recently creatinine 3.1 on admission. Pt endorses polyuria 1x/hr  - Per outpt nephrology pt with CKD stage 3 with normal electrolytes likely secondary to HTN nephrosclerosis  - RUSSELL likely post renal in setting of BPH vs. prerenal in setting of ACEi   - On presentation: Pr/Cr: 1.7  UA Clear   FeNa: 5.4 (post renal obstructive)  - US Kidney/Bladder: Echogenic bilaterally, representing underlying parenchymal disease. no hydro. Prevoid 185cc, post void 14 cc.   - Uric acid serum: 10mg   - Lactate on presentation 1.5  - Had stopped allopurinol for gout   - Hold home lisinopril, HCTZ  - Hold asa eliquis for kidney bx   -Please send tests for NAILA, dsDNA, anti smith, ANCAs, Anti PLA2R level, Hep panel, HIV , c3,c4, immunoglobulin free light chains, Serum immunofixation, LDH, Haptoglobin     #BPH  - sp prostate MRI in August 2020, which demonstrated an enlarged prostate without suspicious prostatic lesions, PI-RADS 2.  - On finasteride at home

## 2021-05-27 NOTE — H&P ADULT - PROBLEM SELECTOR PLAN 2
u sodium 58  u cr 154  serum sodium 144  serum Cr 3.42 ischemic CM  no TTE in system  Patient sees Dr. Lee as cardiologist

## 2021-05-27 NOTE — CONSULT NOTE ADULT - ASSESSMENT
Office Echo 10/2/18: EF 50%, mild-mod MR, min AR, mild lv sys dysfx   LHC 2/21/18: PCI to LAD  NICOLETTE 2/13/18: EF 25%, severe MR, severe segmental lv sys dysfx, mild TR, mild pulm HTN     a/p   65 yo M well known to practice with history Hypertension, Coronary Artery Disease, S/P PCI to LAD (5/2014, [facility], [facility]), S/P PCI to LAD 2/21/2018, History of Severe Mixed Cardiomyopathy, Atrial Fibrillation/Flutter, S/P NICOLETTE/DCCV 1/2016, S/P Ablation 2/2016, PADS/P Lower Extremity Stenting, Mitral Regurgitation , CKD stage 3, presents with acute rise in serum Cr from outpatient nephrology clinic.    1. RUSSELL on CKD  -Cr noted  -acei, eliqius, hctz remain on hold  -possible renal bx   -renal eval noted    2. Coronary Artery Disease. S/P PCI to LAD  -stable without chest pain or dyspnea  -continue toprol, statin, and asa 81mg daily    3. Atrial Fibrillation/Flutter. S/P AF Ablation  -remains in sinus rhythm on amiodarone and metoprolol  -Eliquis on hold for now given russell and possible renal bx     4. Chronic Systolic Heart Failure/ Cardiomyopathy  -Recent echo revealed improvement in LV function with sinus rhythm  -EF now 50%. Continue beta blocker  -acei held for russell  -vol status stable - no diuretic need     5. Mitral Regurgitation  -Most recent echo revealed improvement in mitral regurgitation with improvement in LV function  -Continue to monitor    6. Hypertension  -bp elevated with some improvement s/p IV hydral and 1xdose Norvasc 5mg   -acei/hctz on hold as above  -cont bb  -if sbp remains elevated can start Norvasc 5mg and inc as needed       dvt ppx

## 2021-05-27 NOTE — H&P ADULT - HISTORY OF PRESENT ILLNESS
63 yo M with hiostory HTN, HLD, CAD s/p PCI to LAD in 2014, PAD s/p lower ext stenting, rectal bleeding s/p hemorrhoidectomy, BPH, afib on eliquis, gout, RA (not on meds), CKD stage 3, presents with acute rise in serum Cr from outpatient nephrology clinic. Per chart review he had a SPEP and UPEP in last 6 months which was repotedly normal. Was on allopurinol for gout but stopped in setting of worsenng renal function. No noted electrolyte abnormalities.       Meds  eliquis 5 BID  amiodarone 100 qd   simvastatin 40 qd  finasteride 5  protonix 40  HCTZ 12.5 qd    In ED  Vital Signs Last 24 Hrs  T(C): 36.8 (26 May 2021 19:45), Max: 37.1 (26 May 2021 17:11)  T(F): 98.2 (26 May 2021 19:45), Max: 98.7 (26 May 2021 17:11)  HR: 64 (26 May 2021 22:05) (54 - 73)  BP: 169/116 (26 May 2021 22:05) (158/107 - 169/116)  BP(mean): --  RR: 18 (26 May 2021 22:05) (16 - 18)  SpO2: 98% (26 May 2021 22:05) (98% - 99%) 63 yo M with hiostory HTN, HLD, CAD s/p PCI to LAD in 2014, PAD s/p lower ext stenting, rectal bleeding s/p hemorrhoidectomy, BPH, afib on eliquis, gout, RA (not on meds), CKD stage 3, presents with acute rise in serum Cr from outpatient nephrology clinic. Per chart review he had a SPEP and UPEP in last 6 months which was repotedly normal. Was on allopurinol for gout but stopped in setting of worsening renal function. No noted electrolyte abnormalities. He continues to take lisinopril, HCTZ, and eliquis daily.     The patient says he feels completely normal in his usual state of health. Denies F/C/CP/SOB/abdominal pain/N/V/D/dysuria.hematuria/oliguria.     In ED  Vital Signs Last 24 Hrs  T(C): 36.8 (26 May 2021 19:45), Max: 37.1 (26 May 2021 17:11)  T(F): 98.2 (26 May 2021 19:45), Max: 98.7 (26 May 2021 17:11)  HR: 64 (26 May 2021 22:05) (54 - 73)  BP: 169/116 (26 May 2021 22:05) (158/107 - 169/116)  RR: 18 (26 May 2021 22:05) (16 - 18)  SpO2: 98% (26 May 2021 22:05) (98% - 99%) 65 yo M with history HTN, HLD, CAD s/p PCI to LAD in 2014, PAD s/p lower ext stenting, rectal bleeding s/p hemorrhoidectomy, BPH, afib on eliquis, gout, RA (not on meds), CKD stage 3, presents with acute rise in serum Cr from outpatient nephrology clinic. Per chart review he had a SPEP and UPEP in last 6 months which was repotedly normal. Was on allopurinol for gout but stopped in setting of worsening renal function. No noted electrolyte abnormalities. He continues to take lisinopril, HCTZ, and eliquis daily.     The patient says he feels completely normal in his usual state of health. Denies F/C/CP/SOB/abdominal pain/N/V/D/dysuria.hematuria/oliguria.     In ED  Vital Signs Last 24 Hrs  T(C): 36.8 (26 May 2021 19:45), Max: 37.1 (26 May 2021 17:11)  T(F): 98.2 (26 May 2021 19:45), Max: 98.7 (26 May 2021 17:11)  HR: 64 (26 May 2021 22:05) (54 - 73)  BP: 169/116 (26 May 2021 22:05) (158/107 - 169/116)  RR: 18 (26 May 2021 22:05) (16 - 18)  SpO2: 98% (26 May 2021 22:05) (98% - 99%)

## 2021-05-27 NOTE — CONSULT NOTE ADULT - ATTENDING COMMENTS
Alert, conversant, not uremic  1.  ARF on CKD--unclear acute component.  Hold diuretic+ ACEi.  Repeat serologic w/u. Renal venous and arterial dopplers.  Possible renal bx next week but will need to be off eliquis,   2>  Hypertension--CCB, other titration    discussed with med team

## 2021-05-27 NOTE — DISCHARGE NOTE PROVIDER - CARE PROVIDER_API CALL
London Lara)  Internal Medicine  [address], [address]  [place] [address]  Phone: [phone]  Fax: [phone]  Established Patient  Follow Up Time: 1 week    Jamal Hayes)  Internal Medicine; Nephrology  [address], [address] [place] [address]  Phone: [phone]  Fax: [phone]  Established Patient  Follow Up Time: 1 week

## 2021-05-27 NOTE — H&P ADULT - PROBLEM SELECTOR PLAN 8
on eliquis  plan for renal dosing (2.5 qd) however per ED renal is considering biopsy. Paged renal, awaiting response Hold off ordering eliquis for now, d/w renal in AM to clarify plans for biopsy CHADS vasc 4  currently on eliquis  Would plan for renal dosing (2.5 qd) however per ED renal is considering biopsy. Paged renal, awaiting response Hold off ordering eliquis for now, d/w renal in AM to clarify plans for biopsy  5000 HSQ for now

## 2021-05-27 NOTE — DISCHARGE NOTE PROVIDER - NSDCCPCAREPLAN_GEN_ALL_CORE_FT
PRINCIPAL DISCHARGE DIAGNOSIS  Diagnosis: RUSSELL (acute kidney injury)  Assessment and Plan of Treatment:        PRINCIPAL DISCHARGE DIAGNOSIS  Diagnosis: RUSSELL (acute kidney injury)  Assessment and Plan of Treatment: You presented to the hospital with an elevated createnine which indicates worsening of your kidney function. We stopped your lisinopril and hydrochlorothiazide which seemed to help your kidney function improve. Please do not take these two medications and follow up with your kidney doctor for further management including a kidney biopsy. Please call the office to schedule.      SECONDARY DISCHARGE DIAGNOSES  Diagnosis: HTN (hypertension)  Assessment and Plan of Treatment: We stopped two of your blood pressure medications (lisinopril and hydrochlorothiazide) because they were affecting your kidney function. we started you on amlodipine daily and hydralazine and isosorbide dinitrate three times a day. Prescriptions were sent to your pharmacy. Please follow up with your cardiologist for further management. If you start to feel dizzy have a headache, have vision problems please call a physician. Please try to take your blood pressure daily and if the top number (systolic) is greater than 180 or less than 95 please call your doctor.    Diagnosis: Need for prophylactic measure  Assessment and Plan of Treatment: You were found to have mild iron deficiency anemia. We started you on ferrous sulfate (iron) supplementation for you to take daily. Please continue to take this and follow up with your PCP in 1 week. If this supplement makes your constipated you make take Miralax to help move your bowels.     PRINCIPAL DISCHARGE DIAGNOSIS  Diagnosis: RUSSELL (acute kidney injury)  Assessment and Plan of Treatment: You presented to the hospital with an elevated createnine which indicates worsening of your kidney function. We stopped your lisinopril and hydrochlorothiazide which seemed to help your kidney function improve. Please do not take these two medications and follow up with your kidney doctor for further management including a kidney biopsy outpatient. Please call the office to schedule.      SECONDARY DISCHARGE DIAGNOSES  Diagnosis: HTN (hypertension)  Assessment and Plan of Treatment: We stopped two of your blood pressure medications (lisinopril and hydrochlorothiazide) because they were affecting your kidney function. we started you on amlodipine daily and hydralazine and isosorbide dinitrate three times a day. Prescriptions were sent to your pharmacy. Please follow up with your cardiologist for further management. If you start to feel dizzy have a headache, have vision problems please call a physician. Please try to take your blood pressure daily and if the top number (systolic) is greater than 180 or less than 95 please call your doctor.    Diagnosis: Need for prophylactic measure  Assessment and Plan of Treatment: You were found to have mild iron deficiency anemia. We started you on ferrous sulfate (iron) supplementation for you to take daily. Please continue to take this and follow up with your PCP in 1 week. If this supplement makes your constipated you make take Miralax to help move your bowels.

## 2021-05-27 NOTE — DISCHARGE NOTE PROVIDER - PROVIDER TOKENS
PROVIDER:[TOKEN:[[id number]],FOLLOWUP:[1 week],ESTABLISHEDPATIENT:[T]],PROVIDER:[TOKEN:[[id number]],FOLLOWUP:[1 week],ESTABLISHEDPATIENT:[T]]

## 2021-05-27 NOTE — PROGRESS NOTE ADULT - ATTENDING COMMENTS
discussed above plan with resident on rounds. patient seen and examined. no acute complaints. was not taking any nsaids for RA.   labs and imaging reviewed.  I agree with the above findings, assessment, and plan with the following additions and exceptions:  RUSSELL on CKD - acute worsening creatinine from 2.1 to 3.5 since 5/21/21. held ace-i and hctz. renal us without hydro. reported post void residual 14cc. urine lytes c/w intrinsic disease. non-nephrotic range proteinuria. plan for renal biopsy 5/28. Renal consult appreciated. f/u blood work   HTN - uncontrolled bp - held ace-i and hctz. started on norvasc 5mg qd, will likely add hydralazine then isosorbide if bp still elevated. c/w metoprolol  chronic systolic heart failure - previous ef 50% - now appears euvolemic.   CAD - last PCI 2018 - will hold asa for now for biopsy tomorrow. would like to restart soon   AFib - [id number] =4. holding eliquis for biopsy, c/w metoprolol and amiodarone   gout - on allopurinol at home - will continue to hold for now. will discuss with renal restarting at low dose in setting of elevated uric acid level   bph - started on flomax discussed above plan with resident on rounds. patient seen and examined. no acute complaints. was not taking any nsaids for RA.   labs and imaging reviewed.  I agree with the above findings, assessment, and plan with the following additions and exceptions:  RUSSELL on CKD - acute worsening creatinine from 2.1 to 3.5 since 5/21/21. held ace-i and hctz. renal us without hydro. reported post void residual 14cc. urine lytes c/w intrinsic disease. non-nephrotic range proteinuria. plan for renal biopsy 5/28. Renal consult appreciated. f/u blood work   HTN - uncontrolled bp - held ace-i and hctz. started on norvasc 5mg qd, will likely add hydralazine then isosorbide if bp still elevated. c/w metoprolol  chronic systolic heart failure - previous ef 50% - now appears euvolemic.   CAD - last PCI 2018 - will hold asa for now for biopsy tomorrow. would like to restart soon   AFib - [id number] =4. holding eliquis for biopsy, c/w metoprolol and amiodarone   gout - on allopurinol at home - will continue to hold for now. will discuss with renal restarting at low dose in setting of elevated uric acid level   bph - started on flomax, ?on finasteride at home - will need to clarify

## 2021-05-27 NOTE — H&P ADULT - ASSESSMENT
65 yo M with hiostory HTN, HLD, CAD s/p PCI to LAD in 2014, PAD s/p lower ext stenting, rectal bleeding s/p hemorrhoidectomy, BPH, afib on eliquis, gout, RA (not on meds), CKD stage 3, presents with acute rise in serum Cr from outpatient nephrology clinic.

## 2021-05-27 NOTE — H&P ADULT - NSHPLABSRESULTS_GEN_ALL_CORE
LABS:  CBC Full  -  ( 26 May 2021 17:23 )  WBC Count : 12.19 K/uL  Hemoglobin : 10.6 g/dL  Hematocrit : 33.6 %  Platelet Count - Automated : 265 K/uL  Mean Cell Volume : 60.4 fl  Mean Cell Hemoglobin : 19.1 pg  Mean Cell Hemoglobin Concentration : 31.5 gm/dL  Auto Neutrophil # : 9.06 K/uL  Auto Lymphocyte # : 2.38 K/uL  Auto Monocyte # : 0.54 K/uL  Auto Eosinophil # : 0.22 K/uL  Auto Basophil # : 0.00 K/uL  Auto Neutrophil % : 74.3 %  Auto Lymphocyte % : 19.5 %  Auto Monocyte % : 4.4 %  Auto Eosinophil % : 1.8 %  Auto Basophil % : 0.0 %    144    |  107    |  44<H>  ----------------------------<  86       26 May 2021 17:23  3.9     |  22     |  3.42<H>        Ca 8.6           26 May 2021 17:23    Phos  3.0       26 May 2021 17:23    Mg 2.3       26 May 2021 17:23    TPro  7.1    /  Alb  3.0<L>  /  TBili  0.4    /  DBili  x      /  AST  12     /  ALT  6<L>   /  AlkPhos  119    26 May 2021 17:23      Urinalysis Basic - ( 26 May 2021 17:41 )    U< from: US Kidney and Bladder (21 @ 22:47) >    FINDINGS:    Right kidney: 9.4 cm. No hydronephrosis or obvious renal stone. Echogenic, reflecting parenchymal disease.    Left kidney: 9.7 cm. No hydronephrosis or obvious renal stone.  Echogenic, reflecting parenchymal disease.    Urinary bladder: Partially distended. Prevoid volume of 185 ml. Postvoid volume of 14 ml.    IMPRESSION:    No hydronephrosis. Additional findings as described.        < end of copied text >      Color: Light Yellow / Appearance: Clear / S.016 / pH: x  Gluc: x / Ketone: Negative  / Bili: Negative / Urobili: 2 mg/dL   Blood: x / Protein: 100 mg/dL / Nitrite: Negative   Leuk Esterase: Negative / RBC: 2 /hpf / WBC 1 /HPF   Sq Epi: x / Non Sq Epi: 0 /hpf / Bacteria: Negative

## 2021-05-27 NOTE — H&P ADULT - NSHPPHYSICALEXAM_GEN_ALL_CORE
GENERAL: No acute distress, well-developed  HEAD:  Atraumatic, Normocephalic  ENT: EOMI, PERRLA, conjunctiva and sclera clear, Neck supple, No JVD, moist mucosa, no pharynageal erythema, no tonsillar enlargement or exudate  CHEST/LUNG: Clear to auscultation bilaterally; No wheeze, equal breath sounds bilaterally   HEART: Regular rate and rhythm; No murmurs, rubs, or gallops  ABDOMEN: Soft, Nontender, Nondistended; Bowel sounds present, no organomegaly  EXTREMITIES:  2+ Peripheral Pulses, No clubbing, cyanosis, or edema  PSYCH: AAOx3  NEUROLOGY: non-focal, cranial nerves intact  SKIN: Normal color, No rashes or lesions

## 2021-05-28 ENCOUNTER — NON-APPOINTMENT (OUTPATIENT)
Age: 66
End: 2021-05-28

## 2021-05-28 ENCOUNTER — TRANSCRIPTION ENCOUNTER (OUTPATIENT)
Age: 66
End: 2021-05-28

## 2021-05-28 VITALS
HEART RATE: 63 BPM | OXYGEN SATURATION: 95 % | RESPIRATION RATE: 18 BRPM | TEMPERATURE: 98 F | SYSTOLIC BLOOD PRESSURE: 136 MMHG | DIASTOLIC BLOOD PRESSURE: 91 MMHG

## 2021-05-28 LAB
ALBUMIN SERPL ELPH-MCNC: 2.8 G/DL — LOW (ref 3.3–5)
ALP SERPL-CCNC: 116 U/L — SIGNIFICANT CHANGE UP (ref 40–120)
ALT FLD-CCNC: 5 U/L — LOW (ref 10–45)
ANION GAP SERPL CALC-SCNC: 13 MMOL/L — SIGNIFICANT CHANGE UP (ref 5–17)
AST SERPL-CCNC: 10 U/L — SIGNIFICANT CHANGE UP (ref 10–40)
BASOPHILS # BLD AUTO: 0.1 K/UL — SIGNIFICANT CHANGE UP (ref 0–0.2)
BASOPHILS NFR BLD AUTO: 1 % — SIGNIFICANT CHANGE UP (ref 0–2)
BILIRUB SERPL-MCNC: 0.4 MG/DL — SIGNIFICANT CHANGE UP (ref 0.2–1.2)
BUN SERPL-MCNC: 40 MG/DL — HIGH (ref 7–23)
C3 SERPL-MCNC: 101 MG/DL — SIGNIFICANT CHANGE UP (ref 81–157)
C4 SERPL-MCNC: 27 MG/DL — SIGNIFICANT CHANGE UP (ref 13–39)
CALCIUM SERPL-MCNC: 8.4 MG/DL — SIGNIFICANT CHANGE UP (ref 8.4–10.5)
CHLORIDE SERPL-SCNC: 106 MMOL/L — SIGNIFICANT CHANGE UP (ref 96–108)
CO2 SERPL-SCNC: 23 MMOL/L — SIGNIFICANT CHANGE UP (ref 22–31)
COVID-19 SPIKE DOMAIN AB INTERP: NEGATIVE — SIGNIFICANT CHANGE UP
COVID-19 SPIKE DOMAIN ANTIBODY RESULT: 0.4 U/ML — SIGNIFICANT CHANGE UP
CREAT SERPL-MCNC: 2.82 MG/DL — HIGH (ref 0.5–1.3)
EOSINOPHIL # BLD AUTO: 0.55 K/UL — HIGH (ref 0–0.5)
EOSINOPHIL NFR BLD AUTO: 5.2 % — SIGNIFICANT CHANGE UP (ref 0–6)
FERRITIN SERPL-MCNC: 26 NG/ML — LOW (ref 30–400)
GLUCOSE SERPL-MCNC: 93 MG/DL — SIGNIFICANT CHANGE UP (ref 70–99)
HAPTOGLOB SERPL-MCNC: 249 MG/DL — HIGH (ref 34–200)
HAV IGM SER-ACNC: SIGNIFICANT CHANGE UP
HBV CORE IGM SER-ACNC: SIGNIFICANT CHANGE UP
HBV SURFACE AG SER-ACNC: SIGNIFICANT CHANGE UP
HCT VFR BLD CALC: 35.7 % — LOW (ref 39–50)
HCV AB S/CO SERPL IA: 0.13 S/CO — SIGNIFICANT CHANGE UP (ref 0–0.99)
HCV AB SERPL-IMP: SIGNIFICANT CHANGE UP
HGB BLD-MCNC: 11.1 G/DL — LOW (ref 13–17)
HIV 1+2 AB+HIV1 P24 AG SERPL QL IA: SIGNIFICANT CHANGE UP
IGA FLD-MCNC: 344 MG/DL — SIGNIFICANT CHANGE UP (ref 84–499)
IGG FLD-MCNC: 1156 MG/DL — SIGNIFICANT CHANGE UP (ref 610–1660)
IGM SERPL-MCNC: 1204 MG/DL — HIGH (ref 35–242)
IMM GRANULOCYTES NFR BLD AUTO: 0.4 % — SIGNIFICANT CHANGE UP (ref 0–1.5)
INTERPRETATION SERPL IFE-IMP: SIGNIFICANT CHANGE UP
IRON SATN MFR SERPL: 22 UG/DL — LOW (ref 45–165)
IRON SATN MFR SERPL: 7 % — LOW (ref 16–55)
KAPPA LC SER QL IFE: 8.24 MG/DL — HIGH (ref 0.33–1.94)
KAPPA LC SER QL IFE: 8.24 MG/DL — HIGH (ref 0.33–1.94)
KAPPA/LAMBDA FREE LIGHT CHAIN RATIO, SERUM: 1.26 RATIO — SIGNIFICANT CHANGE UP (ref 0.26–1.65)
KAPPA/LAMBDA FREE LIGHT CHAIN RATIO, SERUM: 1.26 RATIO — SIGNIFICANT CHANGE UP (ref 0.26–1.65)
LAMBDA LC SER QL IFE: 6.56 MG/DL — HIGH (ref 0.57–2.63)
LAMBDA LC SER QL IFE: 6.56 MG/DL — HIGH (ref 0.57–2.63)
LDH SERPL L TO P-CCNC: 237 U/L — SIGNIFICANT CHANGE UP (ref 50–242)
LYMPHOCYTES # BLD AUTO: 1.34 K/UL — SIGNIFICANT CHANGE UP (ref 1–3.3)
LYMPHOCYTES # BLD AUTO: 12.8 % — LOW (ref 13–44)
MAGNESIUM SERPL-MCNC: 2.2 MG/DL — SIGNIFICANT CHANGE UP (ref 1.6–2.6)
MCHC RBC-ENTMCNC: 18.9 PG — LOW (ref 27–34)
MCHC RBC-ENTMCNC: 31.1 GM/DL — LOW (ref 32–36)
MCV RBC AUTO: 60.8 FL — LOW (ref 80–100)
MONOCYTES # BLD AUTO: 0.72 K/UL — SIGNIFICANT CHANGE UP (ref 0–0.9)
MONOCYTES NFR BLD AUTO: 6.9 % — SIGNIFICANT CHANGE UP (ref 2–14)
NEUTROPHILS # BLD AUTO: 7.74 K/UL — HIGH (ref 1.8–7.4)
NEUTROPHILS NFR BLD AUTO: 73.7 % — SIGNIFICANT CHANGE UP (ref 43–77)
NRBC # BLD: 0 /100 WBCS — SIGNIFICANT CHANGE UP (ref 0–0)
PHOSPHATE SERPL-MCNC: 3.6 MG/DL — SIGNIFICANT CHANGE UP (ref 2.5–4.5)
PLATELET # BLD AUTO: 263 K/UL — SIGNIFICANT CHANGE UP (ref 150–400)
POTASSIUM SERPL-MCNC: 3.8 MMOL/L — SIGNIFICANT CHANGE UP (ref 3.5–5.3)
POTASSIUM SERPL-SCNC: 3.8 MMOL/L — SIGNIFICANT CHANGE UP (ref 3.5–5.3)
PROT SERPL-MCNC: 6.5 G/DL — SIGNIFICANT CHANGE UP (ref 6–8.3)
RBC # BLD: 5.87 M/UL — HIGH (ref 4.2–5.8)
RBC # FLD: 20.4 % — HIGH (ref 10.3–14.5)
SARS-COV-2 IGG+IGM SERPL QL IA: 0.4 U/ML — SIGNIFICANT CHANGE UP
SARS-COV-2 IGG+IGM SERPL QL IA: NEGATIVE — SIGNIFICANT CHANGE UP
SODIUM SERPL-SCNC: 142 MMOL/L — SIGNIFICANT CHANGE UP (ref 135–145)
TIBC SERPL-MCNC: 316 UG/DL — SIGNIFICANT CHANGE UP (ref 220–430)
TRANSFERRIN SERPL-MCNC: 282 MG/DL — SIGNIFICANT CHANGE UP (ref 200–360)
UIBC SERPL-MCNC: 294 UG/DL — SIGNIFICANT CHANGE UP (ref 110–370)
WBC # BLD: 10.49 K/UL — SIGNIFICANT CHANGE UP (ref 3.8–10.5)
WBC # FLD AUTO: 10.49 K/UL — SIGNIFICANT CHANGE UP (ref 3.8–10.5)

## 2021-05-28 PROCEDURE — 83521 IG LIGHT CHAINS FREE EACH: CPT

## 2021-05-28 PROCEDURE — 93975 VASCULAR STUDY: CPT | Mod: 26

## 2021-05-28 PROCEDURE — 84466 ASSAY OF TRANSFERRIN: CPT

## 2021-05-28 PROCEDURE — 80074 ACUTE HEPATITIS PANEL: CPT

## 2021-05-28 PROCEDURE — 86706 HEP B SURFACE ANTIBODY: CPT

## 2021-05-28 PROCEDURE — 99232 SBSQ HOSP IP/OBS MODERATE 35: CPT | Mod: GC

## 2021-05-28 PROCEDURE — 86160 COMPLEMENT ANTIGEN: CPT

## 2021-05-28 PROCEDURE — 83010 ASSAY OF HAPTOGLOBIN QUANT: CPT

## 2021-05-28 PROCEDURE — 86038 ANTINUCLEAR ANTIBODIES: CPT

## 2021-05-28 PROCEDURE — 86036 ANCA SCREEN EACH ANTIBODY: CPT

## 2021-05-28 PROCEDURE — 82435 ASSAY OF BLOOD CHLORIDE: CPT

## 2021-05-28 PROCEDURE — 82803 BLOOD GASES ANY COMBINATION: CPT

## 2021-05-28 PROCEDURE — 82330 ASSAY OF CALCIUM: CPT

## 2021-05-28 PROCEDURE — 83935 ASSAY OF URINE OSMOLALITY: CPT

## 2021-05-28 PROCEDURE — U0003: CPT

## 2021-05-28 PROCEDURE — 83735 ASSAY OF MAGNESIUM: CPT

## 2021-05-28 PROCEDURE — 83615 LACTATE (LD) (LDH) ENZYME: CPT

## 2021-05-28 PROCEDURE — 96374 THER/PROPH/DIAG INJ IV PUSH: CPT

## 2021-05-28 PROCEDURE — 84550 ASSAY OF BLOOD/URIC ACID: CPT

## 2021-05-28 PROCEDURE — 83540 ASSAY OF IRON: CPT

## 2021-05-28 PROCEDURE — 82728 ASSAY OF FERRITIN: CPT

## 2021-05-28 PROCEDURE — 76770 US EXAM ABDO BACK WALL COMP: CPT

## 2021-05-28 PROCEDURE — 84100 ASSAY OF PHOSPHORUS: CPT

## 2021-05-28 PROCEDURE — 83605 ASSAY OF LACTIC ACID: CPT

## 2021-05-28 PROCEDURE — 84295 ASSAY OF SERUM SODIUM: CPT

## 2021-05-28 PROCEDURE — 83550 IRON BINDING TEST: CPT

## 2021-05-28 PROCEDURE — 84156 ASSAY OF PROTEIN URINE: CPT

## 2021-05-28 PROCEDURE — U0005: CPT

## 2021-05-28 PROCEDURE — 86225 DNA ANTIBODY NATIVE: CPT

## 2021-05-28 PROCEDURE — 99239 HOSP IP/OBS DSCHRG MGMT >30: CPT

## 2021-05-28 PROCEDURE — 87340 HEPATITIS B SURFACE AG IA: CPT

## 2021-05-28 PROCEDURE — 82947 ASSAY GLUCOSE BLOOD QUANT: CPT

## 2021-05-28 PROCEDURE — 93306 TTE W/DOPPLER COMPLETE: CPT | Mod: 26

## 2021-05-28 PROCEDURE — 81001 URINALYSIS AUTO W/SCOPE: CPT

## 2021-05-28 PROCEDURE — 85014 HEMATOCRIT: CPT

## 2021-05-28 PROCEDURE — 82784 ASSAY IGA/IGD/IGG/IGM EACH: CPT

## 2021-05-28 PROCEDURE — 86334 IMMUNOFIX E-PHORESIS SERUM: CPT

## 2021-05-28 PROCEDURE — 84300 ASSAY OF URINE SODIUM: CPT

## 2021-05-28 PROCEDURE — 85025 COMPLETE CBC W/AUTO DIFF WBC: CPT

## 2021-05-28 PROCEDURE — 80053 COMPREHEN METABOLIC PANEL: CPT

## 2021-05-28 PROCEDURE — 93306 TTE W/DOPPLER COMPLETE: CPT

## 2021-05-28 PROCEDURE — 86803 HEPATITIS C AB TEST: CPT

## 2021-05-28 PROCEDURE — 82570 ASSAY OF URINE CREATININE: CPT

## 2021-05-28 PROCEDURE — 85018 HEMOGLOBIN: CPT

## 2021-05-28 PROCEDURE — 71045 X-RAY EXAM CHEST 1 VIEW: CPT

## 2021-05-28 PROCEDURE — 99285 EMERGENCY DEPT VISIT HI MDM: CPT | Mod: 25

## 2021-05-28 PROCEDURE — 93975 VASCULAR STUDY: CPT

## 2021-05-28 PROCEDURE — 84132 ASSAY OF SERUM POTASSIUM: CPT

## 2021-05-28 PROCEDURE — 87389 HIV-1 AG W/HIV-1&-2 AB AG IA: CPT

## 2021-05-28 PROCEDURE — 86769 SARS-COV-2 COVID-19 ANTIBODY: CPT

## 2021-05-28 RX ORDER — FERROUS SULFATE 325(65) MG
1 TABLET ORAL
Qty: 30 | Refills: 0
Start: 2021-05-28 | End: 2021-06-26

## 2021-05-28 RX ORDER — LISINOPRIL 2.5 MG/1
1 TABLET ORAL
Qty: 0 | Refills: 0 | DISCHARGE

## 2021-05-28 RX ORDER — HYDRALAZINE HCL 50 MG
1 TABLET ORAL
Qty: 90 | Refills: 0
Start: 2021-05-28 | End: 2021-06-26

## 2021-05-28 RX ORDER — AMLODIPINE BESYLATE 2.5 MG/1
1 TABLET ORAL
Qty: 30 | Refills: 0
Start: 2021-05-28 | End: 2021-06-26

## 2021-05-28 RX ORDER — ASPIRIN/CALCIUM CARB/MAGNESIUM 324 MG
0 TABLET ORAL
Qty: 0 | Refills: 0 | DISCHARGE

## 2021-05-28 RX ORDER — METOPROLOL TARTRATE 50 MG
0 TABLET ORAL
Qty: 0 | Refills: 0 | DISCHARGE

## 2021-05-28 RX ORDER — ISOSORBIDE DINITRATE 5 MG/1
1 TABLET ORAL
Qty: 90 | Refills: 0
Start: 2021-05-28 | End: 2021-06-26

## 2021-05-28 RX ORDER — ISOSORBIDE DINITRATE 5 MG/1
10 TABLET ORAL THREE TIMES A DAY
Refills: 0 | Status: DISCONTINUED | OUTPATIENT
Start: 2021-05-28 | End: 2021-05-28

## 2021-05-28 RX ORDER — TAMSULOSIN HYDROCHLORIDE 0.4 MG/1
1 CAPSULE ORAL
Qty: 0 | Refills: 0 | DISCHARGE
Start: 2021-05-28

## 2021-05-28 RX ORDER — AMIODARONE HYDROCHLORIDE 400 MG/1
0 TABLET ORAL
Qty: 0 | Refills: 0 | DISCHARGE

## 2021-05-28 RX ADMIN — PANTOPRAZOLE SODIUM 40 MILLIGRAM(S): 20 TABLET, DELAYED RELEASE ORAL at 06:27

## 2021-05-28 RX ADMIN — AMIODARONE HYDROCHLORIDE 100 MILLIGRAM(S): 400 TABLET ORAL at 06:29

## 2021-05-28 RX ADMIN — Medication 25 MILLIGRAM(S): at 06:27

## 2021-05-28 RX ADMIN — Medication 25 MILLIGRAM(S): at 14:23

## 2021-05-28 RX ADMIN — Medication 325 MILLIGRAM(S): at 14:23

## 2021-05-28 RX ADMIN — AMLODIPINE BESYLATE 10 MILLIGRAM(S): 2.5 TABLET ORAL at 06:28

## 2021-05-28 NOTE — PROGRESS NOTE ADULT - PROBLEM SELECTOR PLAN 1
RUSSELL on CKD III, likely in setting of longstanding HTN and poor adherence to low sodium diet  FENA calculated to be 0.9% which could suggest pre-renal etiology however no evidence of medication or iatrogenic injury, and no hypotension or fluid losses to suggest a superimposed ischemic injury. Patient was taken off allopurinol, and is off ACEi.  Urine osm sent  UA sent to evaluate for hematuria and proteinuria ( neg blood, 100 protein)  check NAILA  check spep, upep with immunofixation  Monitor I's and O's.  - will need moshe bx, may be able to do outpatient   - will send labs per renal recs

## 2021-05-28 NOTE — PROGRESS NOTE ADULT - PROBLEM SELECTOR PLAN 9
dvt ppx- HSQ 5000 q 8 for now until eliquis dosing clarified  diet- regular DASH  dispo- likely home
dvt ppx- HSQ 5000 q 8 for now until eliquis dosing clarified  diet- regular DASH  dispo- likely home

## 2021-05-28 NOTE — PROGRESS NOTE ADULT - ASSESSMENT
Office Echo 10/2/18: EF 50%, mild-mod MR, min AR, mild lv sys dysfx   LHC 2/21/18: PCI to LAD  NICOLETTE 2/13/18: EF 25%, severe MR, severe segmental lv sys dysfx, mild TR, mild pulm HTN     a/p   63 yo M well known to practice with history Hypertension, Coronary Artery Disease, S/P PCI to LAD (5/2014, [facility], [facility]), S/P PCI to LAD 2/21/2018, History of Severe Mixed Cardiomyopathy, Atrial Fibrillation/Flutter, S/P NICOLETTE/DCCV 1/2016, S/P Ablation 2/2016, PADS/P Lower Extremity Stenting, Mitral Regurgitation , CKD stage 3, presents with acute rise in serum Cr from outpatient nephrology clinic.    1. RUSSELL on CKD  -Cr noted  -acei, eliqius, hctz remain on hold  -Possible renal bx next week as outpt  pt can proceed from a cv standpoint   -renal f/u  2. Coronary Artery Disease. S/P PCI to LAD  -stable without chest pain or dyspnea  -continue toprol, statin, and asa 81mg daily    3. Atrial Fibrillation/Flutter. S/P AF Ablation  -remains in sinus rhythm on amiodarone and metoprolol  -Resume Eliquis at dc     4. Chronic Systolic Heart Failure/ Cardiomyopathy  -Recent echo revealed improvement in LV function with sinus rhythm  -EF now 50%. Continue beta blocker  -acei held for russell  -vol status stable - no diuretic need     5. Mitral Regurgitation  -Most recent echo revealed improvement in mitral regurgitation with improvement in LV function  -Continue to monitor    6. Hypertension  -bp elevated   -acei/hctz on hold as above  -cont bb  -if sbp remains elevated increase hydral to 50 mg TID        dvt ppx     DCP per primary team     
65y M  with PMHx of CHF, HLD, CAD, Gout, CKD, arthritis, HTN presents to ED with c/o elevated Cr level after routine blood work check.     #RUSSELL on CKD  - Baseline Scr 1.7, increased to 2.1 on 5/21, peaked to 3.5 on 5/25 most recently creatinine 3.1 on admission.  - Improved today to 2.8mg/dl, has proteinuria otherwise bland. Needs biopsy. Non-urgent  - US Kidney/Bladder: Echogenic bilaterally, representing underlying parenchymal disease. no hydro. Prevoid 185cc, post void 14 cc.  - Duplex kidney negative for TIMOTEO   - Serological workup sent  - Can be discharged off of Lisinopril/HCTZ at this time. Can follow up outpt next week with Dr. Hayes at [address] [place] [address] for repeat labs and arranging a renal biopsy    #HTN  Holding Lisinopril/ HCTZ due to RUSSELL  On amlodipine, hydralazine, isordil. Can d/c on these medications and f/u outpt. Once Cr stabilizes we can switch the patient back to his ACE-I    #BPH  - sp prostate MRI in August 2020, which demonstrated an enlarged prostate without suspicious prostatic lesions, PI-RADS 2.  - On finasteride at home   
65 yo M with hiostory HTN, HLD, CAD s/p PCI to LAD in 2014, PAD s/p lower ext stenting, rectal bleeding s/p hemorrhoidectomy, BPH, afib on eliquis, gout, RA (not on meds), CKD stage 3, presents with acute rise in serum Cr from outpatient nephrology clinic.
63 yo M with hiostory HTN, HLD, CAD s/p PCI to LAD in 2014, PAD s/p lower ext stenting, rectal bleeding s/p hemorrhoidectomy, BPH, afib on eliquis, gout, RA (not on meds), CKD stage 3, presents with acute rise in serum Cr from outpatient nephrology clinic.

## 2021-05-28 NOTE — PROGRESS NOTE ADULT - PROBLEM SELECTOR PLAN 2
ischemic CM  no TTE in system  TTE   Can start norvasc and uptitrate for eleavted BP   Patient sees Dr. Lee as cardiologist
ischemic CM  no TTE in system  TTE pending   Patient sees Dr. Lee as cardiologist

## 2021-05-28 NOTE — PROGRESS NOTE ADULT - PROBLEM SELECTOR PLAN 7
not on meds per pt  Pt endorses frequent urination  check PVR bladder scan to r/o obstruction
not on meds per pt  Pt endorses frequent urination  check PVR bladder scan to r/o obstruction

## 2021-05-28 NOTE — PROGRESS NOTE ADULT - TIME BILLING
agree with above NP note.  cv stable  renal fxn improved  cont to hold ace, hctz   cont bp meds as ordered, hydral, amolopind, isordil is new  cont a/c, cont asa  outpt f/u  outpt renal f/u

## 2021-05-28 NOTE — PROGRESS NOTE ADULT - PROBLEM SELECTOR PLAN 8
CHADS vasc 4  currently on eliquis  Would plan for renal dosing (2.5 qd) however per ED renal is considering biopsy. Paged renal, awaiting response Hold off ordering eliquis for now, d/w renal in AM to clarify plans for biopsy  5000 HSQ for now
CHADS vasc 4  currently on eliquis  Would plan for renal dosing (2.5 qd) however per ED renal is considering biopsy. Paged renal, awaiting response Hold off ordering eliquis for now, d/w renal in AM to clarify plans for biopsy  5000 HSQ for now

## 2021-05-28 NOTE — PROGRESS NOTE ADULT - REASON FOR ADMISSION
elevated BUN/Cr on outpatient labs

## 2021-05-28 NOTE — PROGRESS NOTE ADULT - ATTENDING COMMENTS
Feeling OK  1.  ARF on CKD--resolving.  NON oliguric.  Can d/c today and f/u discussed in detail with outpatient nephrologist Abigail.    2.  Hypertension--dopplers -.  Avoid ACEi, ARB in near term.      discussed with med team

## 2021-05-28 NOTE — PROGRESS NOTE ADULT - ATTENDING COMMENTS
discussed above plan with resident on rounds. patient seen and examined. no acute complaints. was not taking any nsaids for RA.   labs and imaging reviewed.  I agree with the above findings, assessment, and plan with the following additions and exceptions:  RUSSELL on CKD - acute worsening creatinine from 2.1 to 3.5 since 5/21/21. held ace-i and hctz. renal us without hydro. reported post void residual 14cc. urine lytes c/w intrinsic disease. non-nephrotic range proteinuria. per renal - no plans for renal biopsy inpatient - patient to follow up as outpatient for biopsy.    HTN - bp better controlled - held ace-i and hctz. c/w norvasc 5mg qd, hydralazine 25mg tid, isosorbide dinitrate 10mg tid, c/w metoprolol   chronic systolic heart failure - previous ef 50% - now appears euvolemic.   CAD - last PCI 2018 - will hold asa for now for biopsy tomorrow. would like to restart soon   AFib - [id number] =4. restarted eliquis - patient will have biopsy scheduled as outaptient and will be informed when to stop eliquis, c/w metoprolol and amiodarone   gout - on allopurinol at home - stopped per renal - will need follow up to restart  bph - restart home medications  discharge time - 36 minutes

## 2021-05-28 NOTE — PROGRESS NOTE ADULT - SUBJECTIVE AND OBJECTIVE BOX
[facility] DIVISION OF KIDNEY DISEASES AND HYPERTENSION -- FOLLOW UP NOTE  --------------------------------------------------------------------------------  Fred Molina   Nephrology Fellow  Pager NS: [phone]/ [facility]: [phone]  (After 5 pm or on weekends please page the on-call fellow, can view the schedule on BLOVES. Login is anne ramesh, schedule under [facility] medicine, psych, derm.      Patient is a 65y old  Male who presents with a chief complaint of elevated BUN/Cr on outpatient labs (28 May 2021 11:18)      24 hour events/subjective: Patient seen and examined at the bedside. Vital signs, labs, medications reviewed. Patient w/o complaints. Scr improving.         PAST HISTORY  --------------------------------------------------------------------------------  No significant changes to PMH, PSH, FHx, SHx, unless otherwise noted    ALLERGIES & MEDICATIONS  --------------------------------------------------------------------------------  Allergies    No Known Allergies    Intolerances      Standing Inpatient Medications  aMIOdarone    Tablet 100 milliGRAM(s) Oral daily  amLODIPine   Tablet 10 milliGRAM(s) Oral daily  ferrous    sulfate 325 milliGRAM(s) Oral daily  hydrALAZINE 25 milliGRAM(s) Oral three times a day  isosorbide   dinitrate Tablet (ISORDIL) 10 milliGRAM(s) Oral three times a day  metoprolol succinate ER 25 milliGRAM(s) Oral daily  pantoprazole    Tablet 40 milliGRAM(s) Oral before breakfast  simvastatin 40 milliGRAM(s) Oral at bedtime  tamsulosin 0.4 milliGRAM(s) Oral at bedtime    PRN Inpatient Medications      REVIEW OF SYSTEMS  --------------------------------------------------------------------------------  Gen: No fevers/chills  Skin: No rashes  Head/Eyes/Ears: Normal hearing, no difficulty seeing  Respiratory: No dyspnea, cough  CV: No chest pain  GI: No abdominal pain, diarrhea  : No dysuria, hematuria  MSK: No  edema  Heme: No easy bruising or bleeding  Psych: No significant depression    >>> <<<    VITALS/PHYSICAL EXAM  --------------------------------------------------------------------------------  T(C): 36.7 (05-28-21 @ 05:28), Max: 36.7 (05-28-21 @ 05:28)  HR: 60 (05-28-21 @ 05:28) (60 - 67)  BP: 156/99 (05-28-21 @ 05:28) (150/89 - 160/99)  RR: 18 (05-28-21 @ 05:28) (18 - 18)  SpO2: 94% (05-28-21 @ 05:28) (94% - 97%)  Wt(kg): --  Height (cm): 180.3 (05-27-21 @ 12:51)  Weight (kg): 87.5 (05-27-21 @ 12:51)  BMI (kg/m2): 26.9 (05-27-21 @ 12:51)  BSA (m2): 2.08 (05-27-21 @ 12:51)      05-27-21 @ 07:01  -  05-28-21 @ 07:00  --------------------------------------------------------  IN: 1400 mL / OUT: 600 mL / NET: 800 mL      Physical Exam:    	Gen: NAD  	HEENT: Anicteric  	Pulm: CTA B/L  	CV: S1S2  	Abd: Soft, +BS   	MSK: No LE edema B/L  	Neuro: Awake  	Skin: Warm and dry  	Vascular access:      LABS/STUDIES  --------------------------------------------------------------------------------              11.1   10.49 >-----------<  263      [05-28-21 @ 07:03]              35.7     142  |  106  |  40  ----------------------------<  93      [05-28-21 @ 06:52]  3.8   |  23  |  2.82        Ca     8.4     [05-28-21 @ 06:52]      Mg     2.2     [05-28-21 @ 06:52]      Phos  3.6     [05-28-21 @ 06:52]    TPro  6.5  /  Alb  2.8  /  TBili  0.4  /  DBili  x   /  AST  10  /  ALT  5   /  AlkPhos  116  [05-28-21 @ 06:52]        Uric acid 10.0      [05-27-21 @ 07:00]        [05-28-21 @ 06:52]    Creatinine Trend:  SCr 2.82 [05-28 @ 06:52]  SCr 3.10 [05-27 @ 07:00]  SCr 3.42 [05-26 @ 17:23]    Urinalysis - [05-26-21 @ 17:41]      Color Light Yellow / Appearance Clear / SG 1.016 / pH 6.0      Gluc Negative / Ketone Negative  / Bili Negative / Urobili 2 mg/dL       Blood Negative / Protein 100 mg/dL / Leuk Est Negative / Nitrite Negative      RBC 2 / WBC 1 / Hyaline 0 / Gran  / Sq Epi  / Non Sq Epi 0 / Bacteria Negative    Urine Creatinine 49      [05-27-21 @ 02:06]  Urine Protein 83      [05-27-21 @ 02:06]  Urine Sodium 123      [05-27-21 @ 02:06]  Urine Osmolality 407      [05-27-21 @ 02:06]    Iron 22, TIBC 316, %sat 7      [05-28-21 @ 10:50]  Ferritin 26      [05-28-21 @ 09:06]    HBsAb Nonreact      [05-27-21 @ 08:38]  HBsAg Nonreact      [05-27-21 @ 08:38]  HCV 0.13, Nonreact      [05-27-21 @ 08:38]  HIV Nonreact      [05-27-21 @ 08:24]    
PROGRESS NOTE:   Authored by Dr. Arielle Berkowitz, JUVENTINO pager [phone]    Patient is a 65y old  Male who presents with a chief complaint of elevated BUN/Cr on outpatient labs (27 May 2021 10:21)      SUBJECTIVE / OVERNIGHT EVENTS: Patient examined at bedside. He appeared well and had no acute complaints. He states that over the last few months he has been trying to go see his docotrs since he has not been able to during COVID. His kidney Dr. sent him in because of increase in Cr. Patient denies difficulties with urinating, dysuria, SOB, chest pain, LE swelling. He sometimes has headaches but denies vision changes and     MEDICATIONS  (STANDING):  aMIOdarone    Tablet 100 milliGRAM(s) Oral daily  amLODIPine   Tablet 5 milliGRAM(s) Oral daily  aspirin enteric coated 81 milliGRAM(s) Oral daily  ferrous    sulfate 325 milliGRAM(s) Oral daily  heparin   Injectable 5000 Unit(s) SubCutaneous every 8 hours  metoprolol succinate ER 25 milliGRAM(s) Oral daily  pantoprazole    Tablet 40 milliGRAM(s) Oral before breakfast  simvastatin 40 milliGRAM(s) Oral at bedtime  tamsulosin 0.4 milliGRAM(s) Oral at bedtime    MEDICATIONS  (PRN):      CAPILLARY BLOOD GLUCOSE        I&O's Summary      PHYSICAL EXAM:  Vital Signs Last 24 Hrs  T(C): 36.5 (27 May 2021 12:51), Max: 37.1 (26 May 2021 17:11)  T(F): 97.7 (27 May 2021 12:51), Max: 98.7 (26 May 2021 17:11)  HR: 50 (27 May 2021 12:51) (50 - 88)  BP: 159/102 (27 May 2021 12:51) (150/112 - 177/114)  BP(mean): --  RR: 16 (27 May 2021 12:51) (16 - 19)  SpO2: 97% (27 May 2021 12:51) (97% - 99%)    GENERAL: No acute distress, well-developed  HEAD:  Atraumatic, Normocephalic  EYES: EOMI, PERRLA, conjunctiva and sclera clear  NECK: Supple, no lymphadenopathy, no JVD  CHEST/LUNG: CTAB; No wheezes, rales, or rhonchi  HEART: Regular rate and rhythm; No murmurs, rubs, or gallops  ABDOMEN: Soft, non-tender, non-distended; normal bowel sounds, no organomegaly  EXTREMITIES:  2+ peripheral pulses b/l, No clubbing, cyanosis, or edema  NEUROLOGY: A&O x 3, no focal deficits  SKIN: No rashes or lesions    LABS:                        10.6   12.19 )-----------( 265      ( 26 May 2021 17:23 )             33.6         145  |  107  |  42<H>  ----------------------------<  82  3.6   |  24  |  3.10<H>    Ca    8.5      27 May 2021 07:00  Phos  3.8       Mg     2.3         TPro  6.6  /  Alb  2.8<L>  /  TBili  0.4  /  DBili  x   /  AST  8<L>  /  ALT  5<L>  /  AlkPhos  110            Urinalysis Basic - ( 26 May 2021 17:41 )    Color: Light Yellow / Appearance: Clear / S.016 / pH: x  Gluc: x / Ketone: Negative  / Bili: Negative / Urobili: 2 mg/dL   Blood: x / Protein: 100 mg/dL / Nitrite: Negative   Leuk Esterase: Negative / RBC: 2 /hpf / WBC 1 /HPF   Sq Epi: x / Non Sq Epi: 0 /hpf / Bacteria: Negative          RADIOLOGY & ADDITIONAL TESTS:  Results Reviewed:   Imaging Personally Reviewed:  Electrocardiogram Personally Reviewed:    COORDINATION OF CARE:  Care Discussed with Consultants/Other Providers [Y/N]:  Prior or Outpatient Records Reviewed [Y/N]:  
CARDIOLOGY FOLLOW UP - Dr. Lee  DATE OF SERVICE: 5/28/21     CC no cp or sob        REVIEW OF SYSTEMS:  CONSTITUTIONAL: No fever, weight loss, or fatigue  RESPIRATORY: No cough, wheezing, chills or hemoptysis; No Shortness of Breath  CARDIOVASCULAR: No chest pain, palpitations, passing out, dizziness, or leg swelling  GASTROINTESTINAL: No abdominal or epigastric pain. No nausea, vomiting, or hematemesis; No diarrhea or constipation. No melena or hematochezia.  VASCULAR: No edema     PHYSICAL EXAM:  T(C): 36.7 (05-28-21 @ 05:28), Max: 36.7 (05-28-21 @ 05:28)  HR: 60 (05-28-21 @ 05:28) (60 - 67)  BP: 156/99 (05-28-21 @ 05:28) (150/89 - 160/99)  RR: 18 (05-28-21 @ 05:28) (18 - 18)  SpO2: 94% (05-28-21 @ 05:28) (94% - 97%)  Wt(kg): --  I&O's Summary    27 May 2021 07:01  -  28 May 2021 07:00  --------------------------------------------------------  IN: 1400 mL / OUT: 600 mL / NET: 800 mL        Appearance: Normal	  Cardiovascular: Normal S1 S2,RRR, No JVD, No murmurs  Respiratory: Lungs clear to auscultation	  Gastrointestinal:  Soft, Non-tender, + BS	  Extremities: Normal range of motion, No clubbing, cyanosis or edema      Home Medications:  amiodarone 100 mg oral tablet: orally once a day (27 May 2021 01:36)  aspirin 81 mg oral tablet: orally once a day (27 May 2021 01:36)  Eliquis 5 mg oral tablet: 1 tab(s) orally 2 times a day (27 May 2021 01:36)  Metoprolol Succinate ER 25 mg oral tablet, extended release: 1 tab(s) orally once a day (27 May 2021 01:37)  pantoprazole 40 mg oral delayed release tablet: 1 tab(s) orally once a day (before a meal) (27 May 2021 01:36)  simvastatin 40 mg oral tablet: 1 tab(s) orally once a day (at bedtime) (27 May 2021 01:36)      MEDICATIONS  (STANDING):  aMIOdarone    Tablet 100 milliGRAM(s) Oral daily  amLODIPine   Tablet 10 milliGRAM(s) Oral daily  ferrous    sulfate 325 milliGRAM(s) Oral daily  hydrALAZINE 25 milliGRAM(s) Oral three times a day  isosorbide   dinitrate Tablet (ISORDIL) 10 milliGRAM(s) Oral three times a day  metoprolol succinate ER 25 milliGRAM(s) Oral daily  pantoprazole    Tablet 40 milliGRAM(s) Oral before breakfast  simvastatin 40 milliGRAM(s) Oral at bedtime  tamsulosin 0.4 milliGRAM(s) Oral at bedtime      TELEMETRY: 	    ECG:  	  RADIOLOGY:   DIAGNOSTIC TESTING:  [ ] Echocardiogram:  [ ]  Catheterization:  [ ] Stress Test:    OTHER: 	    LABS:	 	                            11.1   10.49 )-----------( 263      ( 28 May 2021 07:03 )             35.7     05-28    142  |  106  |  40<H>  ----------------------------<  93  3.8   |  23  |  2.82<H>    Ca    8.4      28 May 2021 06:52  Phos  3.6     05-28  Mg     2.2     05-28    TPro  6.5  /  Alb  2.8<L>  /  TBili  0.4  /  DBili  x   /  AST  10  /  ALT  5<L>  /  AlkPhos  116  05-28            
PROGRESS NOTE:   Authored by Dr. Areille Berkowitz, JUVENTINO pager [phone]    Patient is a 65y old  Male who presents with a chief complaint of elevated BUN/Cr on outpatient labs (27 May 2021 17:01)      SUBJECTIVE / OVERNIGHT EVENTS: Patient examined at bedside. He appears well and denies any acute issues. He is able to urinate without issues and denies dysuria. Patient denies h/a, dizziness, vision changes, SOB and chest pain     MEDICATIONS  (STANDING):  aMIOdarone    Tablet 100 milliGRAM(s) Oral daily  amLODIPine   Tablet 10 milliGRAM(s) Oral daily  ferrous    sulfate 325 milliGRAM(s) Oral daily  hydrALAZINE 25 milliGRAM(s) Oral three times a day  metoprolol succinate ER 25 milliGRAM(s) Oral daily  pantoprazole    Tablet 40 milliGRAM(s) Oral before breakfast  simvastatin 40 milliGRAM(s) Oral at bedtime  tamsulosin 0.4 milliGRAM(s) Oral at bedtime    MEDICATIONS  (PRN):      CAPILLARY BLOOD GLUCOSE        I&O's Summary    27 May 2021 07:01  -  28 May 2021 07:00  --------------------------------------------------------  IN: 1400 mL / OUT: 600 mL / NET: 800 mL        PHYSICAL EXAM:  Vital Signs Last 24 Hrs  T(C): 36.7 (28 May 2021 05:28), Max: 36.7 (28 May 2021 05:28)  T(F): 98 (28 May 2021 05:28), Max: 98 (28 May 2021 05:28)  HR: 60 (28 May 2021 05:28) (50 - 67)  BP: 156/99 (28 May 2021 05:28) (150/89 - 160/99)  BP(mean): --  RR: 18 (28 May 2021 05:28) (16 - 18)  SpO2: 94% (28 May 2021 05:28) (94% - 97%)    GENERAL: No acute distress, well-developed  HEAD:  Atraumatic, Normocephalic  EYES: EOMI, PERRLA, conjunctiva and sclera clear  NECK: Supple, no lymphadenopathy, no JVD  CHEST/LUNG: CTAB; No wheezes, rales, or rhonchi  HEART: Regular rate and rhythm; No murmurs, rubs, or gallops  ABDOMEN: Soft, non-tender, non-distended; normal bowel sounds, no organomegaly  EXTREMITIES:  2+ peripheral pulses b/l, No clubbing, cyanosis, or edema  NEUROLOGY: A&O x 3, no focal deficits  SKIN: No rashes or lesions    LABS:                        11.1   10.49 )-----------( 263      ( 28 May 2021 07:03 )             35.7         142  |  106  |  40<H>  ----------------------------<  93  3.8   |  23  |  2.82<H>    Ca    8.4      28 May 2021 06:52  Phos  3.6       Mg     2.2         TPro  6.5  /  Alb  2.8<L>  /  TBili  0.4  /  DBili  x   /  AST  10  /  ALT  5<L>  /  AlkPhos  116            Urinalysis Basic - ( 26 May 2021 17:41 )    Color: Light Yellow / Appearance: Clear / S.016 / pH: x  Gluc: x / Ketone: Negative  / Bili: Negative / Urobili: 2 mg/dL   Blood: x / Protein: 100 mg/dL / Nitrite: Negative   Leuk Esterase: Negative / RBC: 2 /hpf / WBC 1 /HPF   Sq Epi: x / Non Sq Epi: 0 /hpf / Bacteria: Negative          RADIOLOGY & ADDITIONAL TESTS:  Results Reviewed:   Imaging Personally Reviewed:  Electrocardiogram Personally Reviewed:    COORDINATION OF CARE:  Care Discussed with Consultants/Other Providers [Y/N]:  Prior or Outpatient Records Reviewed [Y/N]:

## 2021-05-28 NOTE — PROGRESS NOTE ADULT - PROBLEM SELECTOR PLAN 4
holding lisinopril  - can start norvasc for htn
holding lisinopril  Can start norvasc and uptitrate for eleavted BP   - started on hydralazone and will start on isosorbide dinitrate

## 2021-05-28 NOTE — DISCHARGE NOTE NURSING/CASE MANAGEMENT/SOCIAL WORK - PATIENT PORTAL LINK FT
You can access the FollowMyHealth Patient Portal offered by [facility] by registering at the following website: [email or web address]. By joining [facility]’s FollowMyHealth portal, you will also be able to view your health information using other applications (apps) compatible with our system.

## 2021-05-28 NOTE — DISCHARGE NOTE NURSING/CASE MANAGEMENT/SOCIAL WORK - NSDCFUADDAPPT_GEN_ALL_CORE_FT
Please follow up with your nephrologist in one week to set up the kidney biopsy   Follow up with your PCP within 2 weeks

## 2021-05-31 LAB — DSDNA AB SER-ACNC: <12 IU/ML — SIGNIFICANT CHANGE UP

## 2021-06-01 ENCOUNTER — NON-APPOINTMENT (OUTPATIENT)
Age: 66
End: 2021-06-01

## 2021-06-02 ENCOUNTER — LABORATORY RESULT (OUTPATIENT)
Age: 66
End: 2021-06-02

## 2021-06-02 LAB
ANA TITR SER: NEGATIVE — SIGNIFICANT CHANGE UP
AUTO DIFF PNL BLD: NEGATIVE — SIGNIFICANT CHANGE UP
C-ANCA SER-ACNC: NEGATIVE — SIGNIFICANT CHANGE UP
MPO AB + PR3 PNL SER: SIGNIFICANT CHANGE UP
P-ANCA SER-ACNC: NEGATIVE — SIGNIFICANT CHANGE UP

## 2021-06-03 LAB
ALBUMIN SERPL ELPH-MCNC: 3.3 G/DL
ANION GAP SERPL CALC-SCNC: 11 MMOL/L
APPEARANCE: CLEAR
BACTERIA: NEGATIVE
BASOPHILS # BLD AUTO: 0.13 K/UL
BASOPHILS NFR BLD AUTO: 1.2 %
BILIRUBIN URINE: NEGATIVE
BLOOD URINE: NEGATIVE
BUN SERPL-MCNC: 27 MG/DL
CALCIUM SERPL-MCNC: 8.9 MG/DL
CHLORIDE SERPL-SCNC: 105 MMOL/L
CO2 SERPL-SCNC: 24 MMOL/L
COLOR: YELLOW
CREAT SERPL-MCNC: 2.94 MG/DL
CREAT SPEC-SCNC: 246 MG/DL
CREAT/PROT UR: 1.4 RATIO
EOSINOPHIL # BLD AUTO: 0.5 K/UL
EOSINOPHIL NFR BLD AUTO: 4.7 %
GLUCOSE QUALITATIVE U: NEGATIVE
GLUCOSE SERPL-MCNC: 78 MG/DL
HCT VFR BLD CALC: 35.4 %
HGB BLD-MCNC: 11 G/DL
HYALINE CASTS: 2 /LPF
IMM GRANULOCYTES NFR BLD AUTO: 0.4 %
KETONES URINE: NEGATIVE
LEUKOCYTE ESTERASE URINE: NEGATIVE
LYMPHOCYTES # BLD AUTO: 1.56 K/UL
LYMPHOCYTES NFR BLD AUTO: 14.8 %
MAN DIFF?: NORMAL
MCHC RBC-ENTMCNC: 19.3 PG
MCHC RBC-ENTMCNC: 31.1 GM/DL
MCV RBC AUTO: 62 FL
MICROSCOPIC-UA: NORMAL
MONOCYTES # BLD AUTO: 1 K/UL
MONOCYTES NFR BLD AUTO: 9.5 %
NEUTROPHILS # BLD AUTO: 7.32 K/UL
NEUTROPHILS NFR BLD AUTO: 69.4 %
NITRITE URINE: NEGATIVE
PH URINE: 6
PHOSPHATE SERPL-MCNC: 3.1 MG/DL
PLATELET # BLD AUTO: 272 K/UL
POTASSIUM SERPL-SCNC: 3.7 MMOL/L
PROT UR-MCNC: 345 MG/DL
PROTEIN URINE: ABNORMAL
RBC # BLD: 5.71 M/UL
RBC # FLD: 21.3 %
RED BLOOD CELLS URINE: 1 /HPF
SODIUM SERPL-SCNC: 140 MMOL/L
SPECIFIC GRAVITY URINE: 1.02
SQUAMOUS EPITHELIAL CELLS: 1 /HPF
URATE SERPL-MCNC: 6.8 MG/DL
UROBILINOGEN URINE: ABNORMAL
WBC # FLD AUTO: 10.55 K/UL
WHITE BLOOD CELLS URINE: 4 /HPF

## 2021-06-06 ENCOUNTER — INPATIENT (INPATIENT)
Facility: HOSPITAL | Age: 66
LOS: 1 days | Discharge: HOME | DRG: 683 | End: 2021-06-08
Attending: HOSPITALIST | Admitting: STUDENT IN AN ORGANIZED HEALTH CARE EDUCATION/TRAINING PROGRAM
Payer: COMMERCIAL

## 2021-06-06 VITALS
RESPIRATION RATE: 18 BRPM | TEMPERATURE: 99 F | DIASTOLIC BLOOD PRESSURE: 74 MMHG | HEART RATE: 93 BPM | OXYGEN SATURATION: 98 % | SYSTOLIC BLOOD PRESSURE: 128 MMHG | WEIGHT: 197.98 LBS | HEIGHT: 71 IN

## 2021-06-06 DIAGNOSIS — Z98.890 OTHER SPECIFIED POSTPROCEDURAL STATES: Chronic | ICD-10-CM

## 2021-06-06 DIAGNOSIS — Z95.9 PRESENCE OF CARDIAC AND VASCULAR IMPLANT AND GRAFT, UNSPECIFIED: Chronic | ICD-10-CM

## 2021-06-06 DIAGNOSIS — I50.9 HEART FAILURE, UNSPECIFIED: ICD-10-CM

## 2021-06-06 DIAGNOSIS — I10 ESSENTIAL (PRIMARY) HYPERTENSION: ICD-10-CM

## 2021-06-06 DIAGNOSIS — I48.91 UNSPECIFIED ATRIAL FIBRILLATION: ICD-10-CM

## 2021-06-06 DIAGNOSIS — N17.9 ACUTE KIDNEY FAILURE, UNSPECIFIED: ICD-10-CM

## 2021-06-06 DIAGNOSIS — M25.539 PAIN IN UNSPECIFIED WRIST: ICD-10-CM

## 2021-06-06 DIAGNOSIS — E78.5 HYPERLIPIDEMIA, UNSPECIFIED: ICD-10-CM

## 2021-06-06 DIAGNOSIS — N40.0 BENIGN PROSTATIC HYPERPLASIA WITHOUT LOWER URINARY TRACT SYMPTOMS: ICD-10-CM

## 2021-06-06 DIAGNOSIS — M10.9 GOUT, UNSPECIFIED: ICD-10-CM

## 2021-06-06 DIAGNOSIS — Z98.49 CATARACT EXTRACTION STATUS, UNSPECIFIED EYE: Chronic | ICD-10-CM

## 2021-06-06 DIAGNOSIS — Z29.9 ENCOUNTER FOR PROPHYLACTIC MEASURES, UNSPECIFIED: ICD-10-CM

## 2021-06-06 LAB
ALBUMIN SERPL ELPH-MCNC: 3.1 G/DL — LOW (ref 3.3–5)
ALP SERPL-CCNC: 114 U/L — SIGNIFICANT CHANGE UP (ref 40–120)
ALT FLD-CCNC: 5 U/L — LOW (ref 10–45)
ANION GAP SERPL CALC-SCNC: 15 MMOL/L — SIGNIFICANT CHANGE UP (ref 5–17)
APPEARANCE UR: CLEAR — SIGNIFICANT CHANGE UP
AST SERPL-CCNC: 9 U/L — LOW (ref 10–40)
BACTERIA # UR AUTO: NEGATIVE — SIGNIFICANT CHANGE UP
BASOPHILS # BLD AUTO: 0.12 K/UL — SIGNIFICANT CHANGE UP (ref 0–0.2)
BASOPHILS NFR BLD AUTO: 0.9 % — SIGNIFICANT CHANGE UP (ref 0–2)
BILIRUB SERPL-MCNC: 0.7 MG/DL — SIGNIFICANT CHANGE UP (ref 0.2–1.2)
BILIRUB UR-MCNC: NEGATIVE — SIGNIFICANT CHANGE UP
BUN SERPL-MCNC: 31 MG/DL — HIGH (ref 7–23)
CALCIUM SERPL-MCNC: 8.8 MG/DL — SIGNIFICANT CHANGE UP (ref 8.4–10.5)
CHLORIDE SERPL-SCNC: 102 MMOL/L — SIGNIFICANT CHANGE UP (ref 96–108)
CO2 SERPL-SCNC: 20 MMOL/L — LOW (ref 22–31)
COLOR SPEC: SIGNIFICANT CHANGE UP
CREAT SERPL-MCNC: 3.68 MG/DL — HIGH (ref 0.5–1.3)
CRP SERPL-MCNC: 52 MG/L — HIGH (ref 0–4)
DIFF PNL FLD: NEGATIVE — SIGNIFICANT CHANGE UP
EOSINOPHIL # BLD AUTO: 0.6 K/UL — HIGH (ref 0–0.5)
EOSINOPHIL NFR BLD AUTO: 4.4 % — SIGNIFICANT CHANGE UP (ref 0–6)
EPI CELLS # UR: 0 /HPF — SIGNIFICANT CHANGE UP
GLUCOSE SERPL-MCNC: 110 MG/DL — HIGH (ref 70–99)
GLUCOSE UR QL: NEGATIVE — SIGNIFICANT CHANGE UP
HCT VFR BLD CALC: 34 % — LOW (ref 39–50)
HGB BLD-MCNC: 10.5 G/DL — LOW (ref 13–17)
HYALINE CASTS # UR AUTO: 1 /LPF — SIGNIFICANT CHANGE UP (ref 0–2)
KETONES UR-MCNC: NEGATIVE — SIGNIFICANT CHANGE UP
LEUKOCYTE ESTERASE UR-ACNC: NEGATIVE — SIGNIFICANT CHANGE UP
LYMPHOCYTES # BLD AUTO: 0.6 K/UL — LOW (ref 1–3.3)
LYMPHOCYTES # BLD AUTO: 4.4 % — LOW (ref 13–44)
MANUAL SMEAR VERIFICATION: SIGNIFICANT CHANGE UP
MCHC RBC-ENTMCNC: 18.4 PG — LOW (ref 27–34)
MCHC RBC-ENTMCNC: 30.9 GM/DL — LOW (ref 32–36)
MCV RBC AUTO: 59.5 FL — LOW (ref 80–100)
METAMYELOCYTES # FLD: 0.9 % — HIGH (ref 0–0)
MONOCYTES # BLD AUTO: 0.71 K/UL — SIGNIFICANT CHANGE UP (ref 0–0.9)
MONOCYTES NFR BLD AUTO: 5.2 % — SIGNIFICANT CHANGE UP (ref 2–14)
NEUTROPHILS # BLD AUTO: 11.47 K/UL — HIGH (ref 1.8–7.4)
NEUTROPHILS NFR BLD AUTO: 84.2 % — HIGH (ref 43–77)
NITRITE UR-MCNC: NEGATIVE — SIGNIFICANT CHANGE UP
OSMOLALITY UR: 218 MOS/KG — LOW (ref 300–900)
PH UR: 6 — SIGNIFICANT CHANGE UP (ref 5–8)
PLAT MORPH BLD: NORMAL — SIGNIFICANT CHANGE UP
PLATELET # BLD AUTO: 242 K/UL — SIGNIFICANT CHANGE UP (ref 150–400)
POTASSIUM SERPL-MCNC: 3.5 MMOL/L — SIGNIFICANT CHANGE UP (ref 3.5–5.3)
POTASSIUM SERPL-SCNC: 3.5 MMOL/L — SIGNIFICANT CHANGE UP (ref 3.5–5.3)
PROT SERPL-MCNC: 7.3 G/DL — SIGNIFICANT CHANGE UP (ref 6–8.3)
PROT UR-MCNC: 100 — SIGNIFICANT CHANGE UP
RBC # BLD: 5.71 M/UL — SIGNIFICANT CHANGE UP (ref 4.2–5.8)
RBC # FLD: 19.2 % — HIGH (ref 10.3–14.5)
RBC BLD AUTO: SIGNIFICANT CHANGE UP
RBC CASTS # UR COMP ASSIST: 2 /HPF — SIGNIFICANT CHANGE UP (ref 0–4)
SARS-COV-2 RNA SPEC QL NAA+PROBE: SIGNIFICANT CHANGE UP
SODIUM SERPL-SCNC: 137 MMOL/L — SIGNIFICANT CHANGE UP (ref 135–145)
SODIUM UR-SCNC: 15 MMOL/L — SIGNIFICANT CHANGE UP
SP GR SPEC: 1.01 — LOW (ref 1.01–1.02)
URATE SERPL-MCNC: 9.1 MG/DL — HIGH (ref 3.4–8.8)
UROBILINOGEN FLD QL: NEGATIVE — SIGNIFICANT CHANGE UP
WBC # BLD: 13.62 K/UL — HIGH (ref 3.8–10.5)
WBC # FLD AUTO: 13.62 K/UL — HIGH (ref 3.8–10.5)
WBC UR QL: 1 /HPF — SIGNIFICANT CHANGE UP (ref 0–5)

## 2021-06-06 PROCEDURE — 99223 1ST HOSP IP/OBS HIGH 75: CPT | Mod: GC

## 2021-06-06 PROCEDURE — 73120 X-RAY EXAM OF HAND: CPT | Mod: 26,LT

## 2021-06-06 PROCEDURE — 99285 EMERGENCY DEPT VISIT HI MDM: CPT

## 2021-06-06 PROCEDURE — 73110 X-RAY EXAM OF WRIST: CPT | Mod: 26,LT

## 2021-06-06 RX ORDER — AMIODARONE HYDROCHLORIDE 400 MG/1
100 TABLET ORAL DAILY
Refills: 0 | Status: DISCONTINUED | OUTPATIENT
Start: 2021-06-07 | End: 2021-06-08

## 2021-06-06 RX ORDER — PANTOPRAZOLE SODIUM 20 MG/1
40 TABLET, DELAYED RELEASE ORAL
Refills: 0 | Status: DISCONTINUED | OUTPATIENT
Start: 2021-06-06 | End: 2021-06-08

## 2021-06-06 RX ORDER — SODIUM CHLORIDE 9 MG/ML
1000 INJECTION, SOLUTION INTRAVENOUS
Refills: 0 | Status: DISCONTINUED | OUTPATIENT
Start: 2021-06-06 | End: 2021-06-06

## 2021-06-06 RX ORDER — FERROUS SULFATE 325(65) MG
325 TABLET ORAL
Refills: 0 | Status: DISCONTINUED | OUTPATIENT
Start: 2021-06-06 | End: 2021-06-08

## 2021-06-06 RX ORDER — SIMVASTATIN 20 MG/1
40 TABLET, FILM COATED ORAL AT BEDTIME
Refills: 0 | Status: DISCONTINUED | OUTPATIENT
Start: 2021-06-06 | End: 2021-06-08

## 2021-06-06 RX ORDER — OXYCODONE HYDROCHLORIDE 5 MG/1
5 TABLET ORAL ONCE
Refills: 0 | Status: DISCONTINUED | OUTPATIENT
Start: 2021-06-06 | End: 2021-06-06

## 2021-06-06 RX ORDER — ACETAMINOPHEN 500 MG
975 TABLET ORAL ONCE
Refills: 0 | Status: COMPLETED | OUTPATIENT
Start: 2021-06-06 | End: 2021-06-06

## 2021-06-06 RX ORDER — METOPROLOL TARTRATE 50 MG
25 TABLET ORAL DAILY
Refills: 0 | Status: DISCONTINUED | OUTPATIENT
Start: 2021-06-07 | End: 2021-06-08

## 2021-06-06 RX ORDER — FINASTERIDE 5 MG/1
5 TABLET, FILM COATED ORAL DAILY
Refills: 0 | Status: DISCONTINUED | OUTPATIENT
Start: 2021-06-06 | End: 2021-06-08

## 2021-06-06 RX ORDER — HEPARIN SODIUM 5000 [USP'U]/ML
5000 INJECTION INTRAVENOUS; SUBCUTANEOUS EVERY 8 HOURS
Refills: 0 | Status: DISCONTINUED | OUTPATIENT
Start: 2021-06-06 | End: 2021-06-07

## 2021-06-06 RX ORDER — ASPIRIN/CALCIUM CARB/MAGNESIUM 324 MG
81 TABLET ORAL DAILY
Refills: 0 | Status: DISCONTINUED | OUTPATIENT
Start: 2021-06-06 | End: 2021-06-07

## 2021-06-06 RX ORDER — ACETAMINOPHEN 500 MG
650 TABLET ORAL EVERY 6 HOURS
Refills: 0 | Status: DISCONTINUED | OUTPATIENT
Start: 2021-06-06 | End: 2021-06-08

## 2021-06-06 RX ORDER — ISOSORBIDE DINITRATE 5 MG/1
10 TABLET ORAL THREE TIMES A DAY
Refills: 0 | Status: DISCONTINUED | OUTPATIENT
Start: 2021-06-06 | End: 2021-06-08

## 2021-06-06 RX ORDER — HYDRALAZINE HCL 50 MG
25 TABLET ORAL THREE TIMES A DAY
Refills: 0 | Status: DISCONTINUED | OUTPATIENT
Start: 2021-06-06 | End: 2021-06-08

## 2021-06-06 RX ORDER — AMLODIPINE BESYLATE 2.5 MG/1
10 TABLET ORAL DAILY
Refills: 0 | Status: DISCONTINUED | OUTPATIENT
Start: 2021-06-07 | End: 2021-06-08

## 2021-06-06 RX ADMIN — SODIUM CHLORIDE 75 MILLILITER(S): 9 INJECTION, SOLUTION INTRAVENOUS at 15:03

## 2021-06-06 RX ADMIN — Medication 25 MILLIGRAM(S): at 22:44

## 2021-06-06 RX ADMIN — SODIUM CHLORIDE 75 MILLILITER(S): 9 INJECTION, SOLUTION INTRAVENOUS at 18:09

## 2021-06-06 RX ADMIN — Medication 975 MILLIGRAM(S): at 11:22

## 2021-06-06 RX ADMIN — SODIUM CHLORIDE 1000 MILLILITER(S): 9 INJECTION, SOLUTION INTRAVENOUS at 15:40

## 2021-06-06 RX ADMIN — OXYCODONE HYDROCHLORIDE 5 MILLIGRAM(S): 5 TABLET ORAL at 11:25

## 2021-06-06 RX ADMIN — SODIUM CHLORIDE 75 MILLILITER(S): 9 INJECTION, SOLUTION INTRAVENOUS at 16:35

## 2021-06-06 RX ADMIN — SIMVASTATIN 40 MILLIGRAM(S): 20 TABLET, FILM COATED ORAL at 22:44

## 2021-06-06 RX ADMIN — Medication 50 MILLIGRAM(S): at 11:25

## 2021-06-06 RX ADMIN — SODIUM CHLORIDE 75 MILLILITER(S): 9 INJECTION, SOLUTION INTRAVENOUS at 15:39

## 2021-06-06 RX ADMIN — OXYCODONE HYDROCHLORIDE 5 MILLIGRAM(S): 5 TABLET ORAL at 11:41

## 2021-06-06 RX ADMIN — HEPARIN SODIUM 5000 UNIT(S): 5000 INJECTION INTRAVENOUS; SUBCUTANEOUS at 22:43

## 2021-06-06 RX ADMIN — Medication 975 MILLIGRAM(S): at 11:21

## 2021-06-06 NOTE — H&P ADULT - NSHPLABSRESULTS_GEN_ALL_CORE
LABS:                        10.5   13.62 )-----------( 242      ( 06 Jun 2021 11:18 )             34.0     06-06    137  |  102  |  31<H>  ----------------------------<  110<H>  3.5   |  20<L>  |  3.68<H>    Ca    8.8      06 Jun 2021 11:18    TPro  7.3  /  Alb  3.1<L>  /  TBili  0.7  /  DBili  x   /  AST  9<L>  /  ALT  5<L>  /  AlkPhos  114  06-06    06-06 @ 11:18 ESR -- / CRP 52                Microbiology     EKG:    RADIOLOGY & ADDITIONAL TESTS: LABS:                        10.5   13.62 )-----------( 242      ( 06 Jun 2021 11:18 )             34.0     06-06    137  |  102  |  31<H>  ----------------------------<  110<H>  3.5   |  20<L>  |  3.68<H>    Ca    8.8      06 Jun 2021 11:18    TPro  7.3  /  Alb  3.1<L>  /  TBili  0.7  /  DBili  x   /  AST  9<L>  /  ALT  5<L>  /  AlkPhos  114  06-06    06-06 @ 11:18 ESR -- / CRP 52    I have reviewed the labs and imaging

## 2021-06-06 NOTE — H&P ADULT - ATTENDING COMMENTS
64M with PMHx of HTN, HLD, CAD s/p PCI to LAD in 2014, PAD s/p lower ext stenting, BPH, Afib on eliquis, gout, RA (not on meds), CKD stage 3, Gout, presents to the ED c/o sudden onset atraumatic left hand pain with worsening swelling since 3 days ago. Pain and swelling located primarily around metacarpal joints. Location of symptoms seemingly more suspicious for RA than gout but could be either. Needs to be on urate suppressive meds. Was not candidate for methotrexate for gout given CKD. Pt states pain has improved significantly after treatment. Also noted to have progressively worsening RUSSELL  -Cont. medrol dose back for now but unclear if pt would benefit from prednisone instead  -Rheum consult in AM  -Trend BMP  -Renal dose meds  -Nephrology consult in AM regarding worsening CKD  -Eliquis currently on hold in case of possible need for arthrocentesis or kidney biopsy. Resume if decision made not to proceed with procedure.  -Cont. other home meds  -DVT PPx, Hep subq for now

## 2021-06-06 NOTE — H&P ADULT - PROBLEM SELECTOR PLAN 3
- ischemic CM, hx CAD s/p PCI to LAD in 2014, PAD s/p lower ext stenting  - TTE 5/2021 showed severe global LV dysfunction, moderate MR, decreased RV systolic function   - Follows with Cards Dr. Lee as cardiologist  - Currently euvolemic on exam   - C/w ASA, BP meds

## 2021-06-06 NOTE — H&P ADULT - PROBLEM SELECTOR PROBLEM 1
Initial SW/CM Assessment/Plan of Care Note     Baseline Assessment   71 year old admitted 12/26/2019 as Inpatient. Prior to admission patient was living with Spouse/significant other and residing at House,  . Decision maker is Patient.     Patient’[facility]     Progress Note   Patient was IPTA, denies any discharge needs, declined Tv- states [facility] will [facility] to check on [facility]. [facility], including IN[facility] checks.     Funcational Status Prior to Admission             Agency/Support   Type of Services Prior to Hospitalization: None   Support Systems: Spouse/Significant other   Home Devices/Equipment: Blood glucose monitor   Mobility Assist Devices: None   Sensory Support Devices: Eyeglasses     Therapy Recommendations          Insurance   Primary: MEDICARE   Secondary: [employer] [place]     Barriers to Discharge   Identified Barriers to Discharge/Transition Planning: Assessment/stabilization in progress     Plan   Discharge/Care Planning  Patient's Anticipated Discharge Needs: Home with no anticipated needs  Patient/Family Discharge Goal: Home  Identified Barriers to Discharge/Transitions: Assessment/stabilization in progress  Planned Discharge/Disposition: Home or Self Care  Patient/Family Agree to Plan: Patient agrees and understands goals and plan   SW/CM - Recommendations for Discharge: Home   List Provided (if applicable):    Referral to (if applicable):    Disclosure of [facility] Affiliation (if applicable):        Anticipate patient can return to the environment from which patient entered the hospital.     Anticipate patient can provide self-care at discharge.     Refer to SW/CM Assessment flowsheet for objective data.     
Pt instructed on heart healthy, low sodium diet. Pt eats a varied diet that includes all major food groups. Discussed decreasing red meat consumption, switching from whole milk to 2% or 1% and adding in more fruits/vegetables/whole grains. Discussed ways to decrease sodium in diet and how to make healthier choices when dining out. Pt very receptive and appears motivated to make healthy changes. Provided printed information from Nutrition Care Manual with RD contact information for follow up questions or concerns. No further nutrition intervention indicated at this time. RD available by consult. Will follow per policy.  
Wrist pain

## 2021-06-06 NOTE — ED ADULT NURSE NOTE - OBJECTIVE STATEMENT
65 year old male A&OX4 with a past medical history of HTN, HLD, CAD, CKD, Atrial Fibrillation, presents with left hand swelling x 3-4 days. Patient states that he was recently seen and admitted for an increased Creatinine found by primary care doctor. Patient has had multiple medication changes during the last admission. He notes that there is no other additional swelling other than the right hand. Pain is 8/10 and he reports changes in strength. He denies shortness of breath, dizziness, weakness, chest pain, fevers, chills, nausea, vomiting, changes in urinary frequency. No trauma reported.

## 2021-06-06 NOTE — ED PROVIDER NOTE - OBJECTIVE STATEMENT
63 y/o M with PMHx of HTN, HLD, CAD s/p PCI to LAD in 2014, PAD s/p lower ext stenting, BPH, Afib on eliquis, gout, RA (not on meds), CKD stage 3, Gout, presents to the ED c/o sudden onset atraumatic left hand pain with worsening swelling since 3 days ago. States that pain started in the 3rd, 4th, and 5th digits in the left hand. Pain is rated a 8/10 at its worst and associated with limited ROM and warmth in his left hand. Was recently admitted on 5/21/21 for acute rise in serum Cr noted from outpatient nephrology clinic. Lisinopril and HCTZ were D/C'ed with improvement in renal function. Pt had his peripheral IVs placed in his right hand during last admission. Denies recent trauma or injuries. Endorses prior gout flareups with an episode occurring in the left wrist and left elbow as well. Further denies fevers, chest pain, or pain anywhere else in the body. 63 y/o M with PMHx of HTN, HLD, CAD s/p PCI to LAD in 2014, PAD s/p lower ext stenting, BPH, Afib on eliquis, gout, RA (not on meds), CKD stage 3, Gout, presents to the ED c/o sudden onset atraumatic left hand pain with worsening swelling since 3 days ago. States that pain started in the 3rd, 4th, and 5th digits in the left hand. Pain is rated a 8/10 at its worst and associated with limited ROM and warmth in his left hand. Was recently admitted on 5/21/21 for acute rise in serum Cr noted from outpatient nephrology clinic. Lisinopril and HCTZ were D/C'ed with improvement in renal function. Pt had his peripheral IVs placed in his right hand during last admission. Denies recent trauma or injuries. Endorses prior gout flare-ups with an episode occurring in the left wrist and left elbow as well. Not taking any meds for gout at the current time. Further denies fevers, chest pain, or pain anywhere else in the body.

## 2021-06-06 NOTE — H&P ADULT - TIME BILLING
Seeing, examining, discussing case with resident as well as review of prior medical notes. I spent time at bedside discuss the differential for his symptoms, our current treatment plan and what to expect later on in this admission.

## 2021-06-06 NOTE — H&P ADULT - PROBLEM SELECTOR PLAN 5
- CHADS vasc 4  - currently on eliquis  - Would plan for renal dosing (2.5 qd) however renal is planning for biopsy. Will hold off at this time  - C/w amiodarone and toprol - CHADS vasc 4  - currently on eliquis, will hold at this time in the setting of procedure   - C/w amiodarone and toprol

## 2021-06-06 NOTE — H&P ADULT - HISTORY OF PRESENT ILLNESS
65 y/o M with PMHx of HTN, HLD, CAD s/p PCI to LAD in 2014, PAD s/p lower ext stenting, BPH, Afib on eliquis, gout, RA (not on meds), CKD stage 3, Gout, presents to the ED c/o sudden onset atraumatic left hand pain with worsening swelling since 3 days ago. States that pain started in the 3rd, 4th, and 5th digits in the left hand. Pain is rated a 8/10 at its worst and associated with limited ROM and warmth in his left hand. Was recently admitted on 5/21/21 for acute rise in serum Cr noted from outpatient nephrology clinic. Lisinopril and HCTZ were D/C'ed with improvement in renal function. Pt had his peripheral IVs placed in his right hand during last admission. Denies recent trauma or injuries. Endorses prior gout flare-ups with an episode occurring in the left wrist and left elbow as well. Not taking any meds for gout at the current time. Further denies fevers, chest pain, or pain anywhere else in the body. The patient denies any dietary indiscretion, hasn't had alcohol in over 8 years, no cheeseburgers 63 y/o M with PMHx of HTN, HLD, CAD s/p PCI to LAD in 2014, PAD s/p lower ext stenting, BPH, Afib on eliquis, gout, RA (not on meds), CKD stage 3, Gout, presents to the ED c/o sudden onset atraumatic left hand pain with worsening swelling since 3 days ago. States that pain started in the 3rd, 4th, and 5th digits in the left hand. Pain is rated a 8/10 at its worst and associated with limited ROM and warmth in his left hand. Was recently admitted on 5/21/21 for acute rise in serum Cr noted from outpatient nephrology clinic. Lisinopril and HCTZ were D/C'ed with improvement in renal function. Pt had his peripheral IVs placed in his right hand during last admission. Denies recent trauma or injuries. Endorses prior gout flare-ups with an episode occurring in the left wrist and left elbow as well. Not taking any meds for gout at the current time. Further denies fevers, chest pain, or pain anywhere else in the body. The patient denies any dietary indiscretion, hasn't had alcohol in over 8 years, no cheeseburgers. Pain started about 3 days ago, took acetaminophen for the pain which did not resolve his symptoms.     In the ED the patient's vitals were Tmax 98.9, HR=93, RX=167/74, RR=18, SpO2=98%. He received tylenol, oxy, and prednisone for his pain. He was started on LR maintenance fluids. Labs were significant for a leukocytosis of ~13k, microcytic anemia of 10.5 (baseline appears to be 10-11), SCr of 3.68 (baseline per outpatient notes appears to be 1.7). Left hand and wrist XR consistent with severe joint space disease consistent with inflammatory arthritis, no evidence of acute fracture.

## 2021-06-06 NOTE — H&P ADULT - NSHPOUTPATIENTPROVIDERS_GEN_ALL_CORE
Dr. London Lara--PCP  Dr. Justine Scott--Rheumatologist  Dr. Karli Talbert-- Nephrology Dr. London Lara--PCP  Dr. Justine Scott--Rheumatologist  Dr. Hayes- Nephrology

## 2021-06-06 NOTE — H&P ADULT - PROBLEM SELECTOR PLAN 9
Plan: dvt ppx- HSQ 5000 q 8 for now, holding eliquis given plan for renal biopsy   diet- regular DASH  dispo- likely home after biopsy and improvement of symptoms

## 2021-06-06 NOTE — H&P ADULT - NSHPSOCIALHISTORY_GEN_ALL_CORE
Ex smoker (quit 3 years ago), no EtOH use, no illicit drug use. Works in the [employer]. Lives at home alone, independent of ADLs.

## 2021-06-06 NOTE — H&P ADULT - PROBLEM SELECTOR PLAN 2
RUSSELL on CKD. Thought to be in the setting of longstanding HTN   Previous sonogram 5/2021 with increased echogenicity consistent with renal parenchymal disease, no evidence of hydronephrosis, no evidence of renal artery stenosis.  Pt taken off allopurinol, ACEi, and HCTZ recently   F/u U/A, urine lytes, bladder scan for PVR   Renal consulted, f/u recs. Plan for renal biopsy while inpatient   Recent workup showed negative NAILA, spep, upep  Monitor I/Os

## 2021-06-06 NOTE — H&P ADULT - NSHPPHYSICALEXAM_GEN_ALL_CORE
Vital Signs Last 24 Hrs  T(C): 37.2 (06 Jun 2021 10:03), Max: 37.2 (06 Jun 2021 10:03)  T(F): 98.9 (06 Jun 2021 10:03), Max: 98.9 (06 Jun 2021 10:03)  HR: 93 (06 Jun 2021 10:03) (93 - 93)  BP: 128/74 (06 Jun 2021 10:03) (128/74 - 128/74)  BP(mean): --  ABP: --  ABP(mean): --  RR: 18 (06 Jun 2021 10:03) (18 - 18)  SpO2: 98% (06 Jun 2021 10:03) (98% - 98%)      General: Alert and oriented to name, place, and date.   HEENT: EOMI, PERRLA, no nasal discharge, no sinus congestion ,MMM, no oral lesion.  Neck: Supple, no thyromegaly, no appreciable JVP.   Lungs: Good inspiratory effort, clear to auscultation bilaterally, no wheezes, rales, or rhonchi.   Heart: RRR, no murmurs/rubs/gallops. PMI in 5th IC space in mid-clavicular line.   Abdomen: Soft, non-tender, non-distended, normal bowel sounds, no Hepatosplenomegaly (HSM), no suprapubic tenderness.   Back: Exam limited 2/2 patient discomfort, but no spinal or paraspinal tenderness. No Costrovertebral angle tenderness (CVAT).   Extremities: No clubbing, cyanosis, or edema. Pulses 2+ in all extremities.   Skin: No rash, ecchymosis, petechiae, or nodules.  Neuro: CN II-XII intact. Normal tone.  Strength 5/5 in b/l upper and lower extremities. Sensation to light touch and cold temperature intact throughout. Reflexes 2+ in upper and lower extremities. Coordination intact. Gait not tested. Vital Signs Last 24 Hrs  T(C): 37.2 (06 Jun 2021 10:03), Max: 37.2 (06 Jun 2021 10:03)  T(F): 98.9 (06 Jun 2021 10:03), Max: 98.9 (06 Jun 2021 10:03)  HR: 93 (06 Jun 2021 10:03) (93 - 93)  BP: 128/74 (06 Jun 2021 10:03) (128/74 - 128/74)  BP(mean): --  ABP: --  ABP(mean): --  RR: 18 (06 Jun 2021 10:03) (18 - 18)  SpO2: 98% (06 Jun 2021 10:03) (98% - 98%)      General: Alert and oriented to name, place, and date.   HEENT: EOMI, PERRLA, no nasal discharge, no sinus congestion ,MMM, no oral lesion.  Neck: Supple, no thyromegaly, no appreciable JVP.   Lungs: Good inspiratory effort, clear to auscultation bilaterally, no wheezes, rales, or rhonchi.   Heart: RRR, no murmurs/rubs/gallops. PMI in 5th IC space in mid-clavicular line.   Abdomen: Soft, non-tender, non-distended, normal bowel sounds, no Hepatosplenomegaly (HSM), no suprapubic tenderness.   Back: Exam limited 2/2 patient discomfort, but no spinal or paraspinal tenderness. No Costovertebral angle tenderness (CVAT).   Extremities: +L 3rd, 4th, and 5th MCP swelling, no erythema. Pain with  flexion. No TTP to left wrist area. [place] neck deformity present.   Skin: No rash, ecchymosis, petechiae, or nodules.  Neuro: CN II-XII intact. Normal tone.  Strength 5/5 in b/l upper and lower extremities. Sensation to light touch and cold temperature intact throughout. Reflexes 2+ in upper and lower extremities. Coordination intact. Gait not tested. Vital Signs Last 24 Hrs  T(C): 37.2 (06 Jun 2021 10:03), Max: 37.2 (06 Jun 2021 10:03)  T(F): 98.9 (06 Jun 2021 10:03), Max: 98.9 (06 Jun 2021 10:03)  HR: 93 (06 Jun 2021 10:03) (93 - 93)  BP: 128/74 (06 Jun 2021 10:03) (128/74 - 128/74)  BP(mean): --  ABP: --  ABP(mean): --  RR: 18 (06 Jun 2021 10:03) (18 - 18)  SpO2: 98% (06 Jun 2021 10:03) (98% - 98%)    General: Alert and oriented to name, place, and date.   HEENT: EOMI, PERRLA, no nasal discharge, no sinus congestion ,MMM, no oral lesion.  Neck: Supple, no thyromegaly, no appreciable JVP.   Lungs: Good inspiratory effort, clear to auscultation bilaterally, no wheezes, rales, or rhonchi.   Heart: RRR, no murmurs/rubs/gallops. PMI in 5th IC space in mid-clavicular line.   Abdomen: Soft, non-tender, non-distended, normal bowel sounds, no Hepatosplenomegaly (HSM), no suprapubic tenderness.   Back: Exam limited 2/2 patient discomfort, but no spinal or paraspinal tenderness. No Costovertebral angle tenderness (CVAT).   Extremities: +L 3rd, 4th, and 5th MCP swelling, no erythema. Pain with  flexion. No TTP to left wrist area. [place] neck deformity present.   Skin: No rash, ecchymosis, petechiae, or nodules.  Neuro: CN II-XII intact. Normal tone.  Strength 5/5 in b/l upper and lower extremities. Sensation to light touch and cold temperature intact throughout. Reflexes 2+ in upper and lower extremities. Coordination intact. Gait not tested.

## 2021-06-06 NOTE — H&P ADULT - NSICDXFAMILYHX_GEN_ALL_CORE_FT
FAMILY HISTORY:  Father  Still living? Unknown  Family history of diabetes mellitus, Age at diagnosis: Age Unknown  Family history of hypertension, Age at diagnosis: Age Unknown    Mother  Still living? Unknown  Family history of diabetes mellitus, Age at diagnosis: Age Unknown  Family history of hypertension, Age at diagnosis: Age Unknown

## 2021-06-06 NOTE — ED PROVIDER NOTE - PROGRESS NOTE DETAILS
Attending MD Jasso: Recent admission May 21 Review: 63 yo M with history HTN, HLD, CAD s/p PCI to LAD in 2014, PAD s/p lower ext stenting, rectal bleeding s/p hemorrhoidectomy, BPH, afib on eliquis, gout, RA (not on meds), CKD stage 3, presents with acute rise in serum Cr from outpatient nephrology clinic. Nephrology was consulted who rec that we send labs and perform a kidney biopsy in the outpatient setting. Renal Us did not show signs of hydronephrosis. Kidney duplex showed Increased echogenicity consistent with renal parenchymal disease. No evidence of hydronephrosis. No evidence of renal artery stenosis. Patient was seen by cardiology. Lisinopril and htz was stopped and his Cr decreased. He was started on amlodipine 10, hydralazine 25 tid and isosorbide dinitrate 10mg.

## 2021-06-06 NOTE — H&P ADULT - PROBLEM SELECTOR PLAN 8
- S/p prostate MRI in August 2020, which demonstrated an enlarged prostate without suspicious prostatic lesions, PI-RADS 2.  - On finasteride at home, c/w finasteride  - F/u bladder scan

## 2021-06-06 NOTE — H&P ADULT - ASSESSMENT
65 y/o M with PMHx of HTN, HLD, CAD s/p PCI to LAD in 2014, PAD s/p lower ext stenting, BPH, Afib on eliquis, gout, RA (not on meds), CKD stage 3, Gout, presents to the ED c/o sudden onset atraumatic left hand pain with worsening swelling since 3 days ago admitted for concern for gout flare.

## 2021-06-06 NOTE — ED PROVIDER NOTE - MUSCULOSKELETAL MINIMAL EXAM
Left hand dorsal warmth and edema. Pain with ROM in all 5 fingers in the left hand. TTP to palmar surface as well. Left wrist with good ROM. No proximal lymphangitic spread. Good ROM at the left shoulder and elbow. Radial pulses 2+.

## 2021-06-06 NOTE — H&P ADULT - NSHPREVIEWOFSYSTEMS_GEN_ALL_CORE
Constitutional/General- Denies acute distress. Patient has been feeling well the last couple of weeks.   Eyes- No changes in vision, double vision, blurry vision, does not wear glasses.  ENT- No nasal congestion or rhinorrhea,  changes in hearing, does not wear hearing aids. No odynophagia or dysphagia.   Skin-Denies rashes.  Cardiovascular- Denies chest pain or heart palpitations. No orthopnea/PND.  Pulmonary- Denies shortness of breath, no cough. No pleuritic chest pain or hemoptysis.   Gastrointestinal- Denies nausea, vomiting, diarrhea, bloating, constipation, abdominal pain.  Genitourinary-Denies dysuria, frequency, or change in urine odor/appearance.   Musculoskeletal-Denies changes in strength, no joint tenderness or swelling.  Neurologic-Denies changes in memory. Denies headache. weakness, paresthesias, or imbalance.   Endocrine-Denies heat/cold intolerance, weight change, excessive sweating, or polydipsia/polyuria  Psychology-Denies changes in mood. Denies anxiety or depression.  Heme/Lymph-Denies easy bruising. Constitutional/General- Denies acute distress. Patient has been feeling well the last couple of weeks.   Eyes- No changes in vision, double vision, blurry vision, does not wear glasses.  ENT- No nasal congestion or rhinorrhea,  changes in hearing, does not wear hearing aids. No odynophagia or dysphagia.   Skin-Denies rashes.  Cardiovascular- Denies chest pain or heart palpitations. No orthopnea/PND.  Pulmonary- Denies shortness of breath, no cough. No pleuritic chest pain or hemoptysis.   Gastrointestinal- Denies nausea, vomiting, diarrhea, bloating, constipation, abdominal pain.  Genitourinary-Denies dysuria, frequency, or change in urine odor/appearance.   Musculoskeletal-+ left hand swelling and pain.   Neurologic-Denies changes in memory. Denies headache. weakness, paresthesias, or imbalance.   Endocrine-Denies heat/cold intolerance, weight change, excessive sweating, or polydipsia/polyuria  Psychology-Denies changes in mood. Denies anxiety or depression.  Heme/Lymph-Denies easy bruising.

## 2021-06-06 NOTE — ED PROVIDER NOTE - CARE PLAN
Principal Discharge DX:	Acute kidney injury  Secondary Diagnosis:	Acute gout of left hand, unspecified cause

## 2021-06-07 DIAGNOSIS — N18.9 CHRONIC KIDNEY DISEASE, UNSPECIFIED: ICD-10-CM

## 2021-06-07 DIAGNOSIS — I48.0 PAROXYSMAL ATRIAL FIBRILLATION: ICD-10-CM

## 2021-06-07 LAB
ANION GAP SERPL CALC-SCNC: 15 MMOL/L — SIGNIFICANT CHANGE UP (ref 5–17)
BASOPHILS # BLD AUTO: 0.05 K/UL — SIGNIFICANT CHANGE UP (ref 0–0.2)
BASOPHILS NFR BLD AUTO: 0.4 % — SIGNIFICANT CHANGE UP (ref 0–2)
BUN SERPL-MCNC: 38 MG/DL — HIGH (ref 7–23)
CALCIUM SERPL-MCNC: 9 MG/DL — SIGNIFICANT CHANGE UP (ref 8.4–10.5)
CHLORIDE SERPL-SCNC: 103 MMOL/L — SIGNIFICANT CHANGE UP (ref 96–108)
CO2 SERPL-SCNC: 18 MMOL/L — LOW (ref 22–31)
COVID-19 SPIKE DOMAIN AB INTERP: NEGATIVE — SIGNIFICANT CHANGE UP
COVID-19 SPIKE DOMAIN ANTIBODY RESULT: 0.4 U/ML — SIGNIFICANT CHANGE UP
CREAT SERPL-MCNC: 3.37 MG/DL — HIGH (ref 0.5–1.3)
EOSINOPHIL # BLD AUTO: 0 K/UL — SIGNIFICANT CHANGE UP (ref 0–0.5)
EOSINOPHIL NFR BLD AUTO: 0 % — SIGNIFICANT CHANGE UP (ref 0–6)
GLUCOSE SERPL-MCNC: 79 MG/DL — SIGNIFICANT CHANGE UP (ref 70–99)
HCT VFR BLD CALC: 35.1 % — LOW (ref 39–50)
HGB BLD-MCNC: 11.1 G/DL — LOW (ref 13–17)
IMM GRANULOCYTES NFR BLD AUTO: 0.6 % — SIGNIFICANT CHANGE UP (ref 0–1.5)
LYMPHOCYTES # BLD AUTO: 1.41 K/UL — SIGNIFICANT CHANGE UP (ref 1–3.3)
LYMPHOCYTES # BLD AUTO: 10.7 % — LOW (ref 13–44)
MAGNESIUM SERPL-MCNC: 2.4 MG/DL — SIGNIFICANT CHANGE UP (ref 1.6–2.6)
MCHC RBC-ENTMCNC: 18.7 PG — LOW (ref 27–34)
MCHC RBC-ENTMCNC: 31.6 GM/DL — LOW (ref 32–36)
MCV RBC AUTO: 59.1 FL — LOW (ref 80–100)
MONOCYTES # BLD AUTO: 0.92 K/UL — HIGH (ref 0–0.9)
MONOCYTES NFR BLD AUTO: 7 % — SIGNIFICANT CHANGE UP (ref 2–14)
NEUTROPHILS # BLD AUTO: 10.72 K/UL — HIGH (ref 1.8–7.4)
NEUTROPHILS NFR BLD AUTO: 81.3 % — HIGH (ref 43–77)
NRBC # BLD: 0 /100 WBCS — SIGNIFICANT CHANGE UP (ref 0–0)
PHOSPHATE SERPL-MCNC: 4.2 MG/DL — SIGNIFICANT CHANGE UP (ref 2.5–4.5)
PLATELET # BLD AUTO: 257 K/UL — SIGNIFICANT CHANGE UP (ref 150–400)
POTASSIUM SERPL-MCNC: 4.2 MMOL/L — SIGNIFICANT CHANGE UP (ref 3.5–5.3)
POTASSIUM SERPL-SCNC: 4.2 MMOL/L — SIGNIFICANT CHANGE UP (ref 3.5–5.3)
RBC # BLD: 5.94 M/UL — HIGH (ref 4.2–5.8)
RBC # FLD: 19.5 % — HIGH (ref 10.3–14.5)
SARS-COV-2 IGG+IGM SERPL QL IA: 0.4 U/ML — SIGNIFICANT CHANGE UP
SARS-COV-2 IGG+IGM SERPL QL IA: NEGATIVE — SIGNIFICANT CHANGE UP
SODIUM SERPL-SCNC: 136 MMOL/L — SIGNIFICANT CHANGE UP (ref 135–145)
WBC # BLD: 13.18 K/UL — HIGH (ref 3.8–10.5)
WBC # FLD AUTO: 13.18 K/UL — HIGH (ref 3.8–10.5)

## 2021-06-07 PROCEDURE — 99232 SBSQ HOSP IP/OBS MODERATE 35: CPT | Mod: GC

## 2021-06-07 PROCEDURE — 99222 1ST HOSP IP/OBS MODERATE 55: CPT | Mod: GC

## 2021-06-07 PROCEDURE — 99222 1ST HOSP IP/OBS MODERATE 55: CPT

## 2021-06-07 RX ORDER — APIXABAN 2.5 MG/1
5 TABLET, FILM COATED ORAL
Refills: 0 | Status: DISCONTINUED | OUTPATIENT
Start: 2021-06-07 | End: 2021-06-08

## 2021-06-07 RX ADMIN — ISOSORBIDE DINITRATE 10 MILLIGRAM(S): 5 TABLET ORAL at 12:51

## 2021-06-07 RX ADMIN — Medication 81 MILLIGRAM(S): at 12:48

## 2021-06-07 RX ADMIN — AMLODIPINE BESYLATE 10 MILLIGRAM(S): 2.5 TABLET ORAL at 05:55

## 2021-06-07 RX ADMIN — Medication 25 MILLIGRAM(S): at 05:55

## 2021-06-07 RX ADMIN — FINASTERIDE 5 MILLIGRAM(S): 5 TABLET, FILM COATED ORAL at 12:51

## 2021-06-07 RX ADMIN — PANTOPRAZOLE SODIUM 40 MILLIGRAM(S): 20 TABLET, DELAYED RELEASE ORAL at 05:55

## 2021-06-07 RX ADMIN — Medication 25 MILLIGRAM(S): at 21:25

## 2021-06-07 RX ADMIN — Medication 25 MILLIGRAM(S): at 17:54

## 2021-06-07 RX ADMIN — Medication 4 MILLIGRAM(S): at 10:30

## 2021-06-07 RX ADMIN — Medication 325 MILLIGRAM(S): at 12:51

## 2021-06-07 RX ADMIN — ISOSORBIDE DINITRATE 10 MILLIGRAM(S): 5 TABLET ORAL at 21:25

## 2021-06-07 RX ADMIN — SIMVASTATIN 40 MILLIGRAM(S): 20 TABLET, FILM COATED ORAL at 21:25

## 2021-06-07 RX ADMIN — Medication 4 MILLIGRAM(S): at 12:52

## 2021-06-07 RX ADMIN — Medication 24 MILLIGRAM(S): at 05:56

## 2021-06-07 RX ADMIN — HEPARIN SODIUM 5000 UNIT(S): 5000 INJECTION INTRAVENOUS; SUBCUTANEOUS at 05:56

## 2021-06-07 RX ADMIN — AMIODARONE HYDROCHLORIDE 100 MILLIGRAM(S): 400 TABLET ORAL at 05:56

## 2021-06-07 RX ADMIN — APIXABAN 5 MILLIGRAM(S): 2.5 TABLET, FILM COATED ORAL at 17:52

## 2021-06-07 RX ADMIN — ISOSORBIDE DINITRATE 10 MILLIGRAM(S): 5 TABLET ORAL at 05:55

## 2021-06-07 NOTE — PROGRESS NOTE ADULT - PROBLEM SELECTOR PLAN 1
XR concerning for inflammatory arthritis. Left wrist and MCP joints swollen, minimal effusions, no rashses. Hx of Gout previously, taken off allopurinol maintenance in the setting of the patient's worsening renal function. DDx includes gout vs. RA vs. possible septic joint. Most likely gout flare given mono-articular nature of the patient's gout.   -C/w medrol dose pack   -Rheum consult in the am  -Avoid NSAIDs in the setting of RUSSELL  -Monitor pain XR concerning for inflammatory arthritis. Left wrist and MCP joints swollen, minimal effusions, no rashses. Hx of Gout previously, taken off allopurinol maintenance in the setting of the patient's worsening renal function. DDx includes gout vs. RA vs. possible septic joint. Most likely gout flare given mono-articular nature of the patient's gout.   -Transitioned from solumedrol to prednisone 30qd.   -Rheum following appreciate recs.   -Avoid NSAIDs in the setting of RUSSELL  -Monitor pain  -Can consider uricosuric options like SGLT2 inhibitor, ARB as outpatient (CR fluctuation and GFR probably limit these options).

## 2021-06-07 NOTE — PROGRESS NOTE ADULT - PROBLEM SELECTOR PLAN 5
- CHADS vasc 4  - currently on eliquis, will hold at this time in the setting of procedure   - C/w amiodarone and toprol - CHADS vasc 4  - currently on eliquis, will discuss with patient.   - C/w amiodarone and toprol - CHADS vasc 4  - currently on eliquis, per nephrology if patient's hospitalization to last past this week can consider renal biopsy next week however given patient's improvement in symptoms will most likely be discharged in the next day or two and can get the renal biopsy as outpatient next week. Will restart eliquis for now with no plans for inpatient biopsy, fellow in agreement.   - C/w amiodarone and toprol

## 2021-06-07 NOTE — PROGRESS NOTE ADULT - PROBLEM SELECTOR PLAN 9
Plan: dvt ppx- HSQ 5000 q 8 for now, holding eliquis given plan for renal biopsy   diet- regular DASH  dispo- likely home after biopsy and improvement of symptoms Plan: dvt ppx- Restarting eliquis 5mg i/s/o biopsy deferred to outpatient per nephrology with no plans to hold patient inpatient for renal biopsy.  diet- regular DASH  dispo- home after symptomatic improvement.

## 2021-06-07 NOTE — CONSULT NOTE ADULT - ATTENDING COMMENTS
as above
CKD III, recurrent RUSSELL  Here with wrist pain    Looks well, no edema    - Cont BP meds  - Hold aspirin, will need kidney biopsy  - Remainder per fellow, will follow

## 2021-06-07 NOTE — PROGRESS NOTE ADULT - PROBLEM SELECTOR PLAN 2
RUSSELL on CKD. Thought to be in the setting of longstanding HTN   Previous sonogram 5/2021 with increased echogenicity consistent with renal parenchymal disease, no evidence of hydronephrosis, no evidence of renal artery stenosis.  Pt taken off allopurinol, ACEi, and HCTZ recently   F/u U/A, urine lytes, bladder scan for PVR   Renal consulted, f/u recs. Plan for renal biopsy while inpatient   Recent workup showed negative NAILA, spep, upep  Monitor I/Os RUSSELL on CKD. Thought to be in the setting of longstanding HTN   Previous sonogram 5/2021 with increased echogenicity consistent with renal parenchymal disease, no evidence of hydronephrosis, no evidence of renal artery stenosis.  Pt taken off allopurinol, ACEi, and HCTZ recently   UA unremarkable, Alejandro and UOsm noted.   Renal consulted, will defer renal biopsy to outpatient for patient. Will restart eliquis.   Recent workup showed negative NAILA, spep, upep  Monitor I/Os

## 2021-06-07 NOTE — PROGRESS NOTE ADULT - PROBLEM SELECTOR PLAN 7
- Management as above for gout flare - Management as above for gout flare  - Outpatient discussion regarding ULT.

## 2021-06-07 NOTE — CONSULT NOTE ADULT - ASSESSMENT
63 y/o M with PMHx of HTN, HLD, CAD s/p PCI to LAD in 2014, PAD s/p lower ext stenting, BPH, Afib on eliquis, gout, RA (not on meds), CKD stage 3, presented to the ED c/o sudden onset left hand pain.   -------------------------------------------------------    #RUSSELL  Pt with RUSSELL in the setting of CKD stage 3. Upon review of [facility] Cr at  ~1.7-1.9 (04/2019). Recently admitted in march/2021 for RUSSELL, Cr peaked to 3.5mg/dl and discharged with Cr 2.8. On presentation on 6/6/21 Cr up to 3.68mg/dl. UA unremarkable. Urine Na 15. RUSSELL ?pre-renal vs uric acid nephropathy        Recs:  At this time Agree with gentle IV fluid hydration   If no improvement will need to consider Kidney biopsy during this admission   Will need to consider HD if renal failure continues to worsen.   Monitor labs and urine output.   Avoid NSAIDs, ACEI/ARBS, RCA and nephrotoxins.   Dose medications as per eGFR.  **will need to follow with Dr Hayes upon discharge at [address] [place] [address].

## 2021-06-07 NOTE — CONSULT NOTE ADULT - ASSESSMENT
Echo 5/28/21: mod MR, severe global lv sys dyxfx, mild TR, min VT  Office Echo 10/2/18: EF 50%, mild-mod MR, min AR, mild lv sys dysfx   [facility] 2/21/18: PCI to LAD  NICOLETTE 2/13/18: EF 25%, severe MR, severe segmental lv sys dysfx, mild TR, mild pulm HTN     a/p   63 y/o M with well known to practice with PMHx of HTN, HLD, CAD s/p PCI to LAD in 2014, PAD s/p lower ext stenting, BPH, Afib on eliquis, gout, RA (not on meds), CKD stage 3, Gout, presents to the ED c/o sudden onset atraumatic left hand pain with worsening swelling since 3 days ago.     1. L hand pain/swelling- r/o Gout   -improving  -Xray noted  -started on medrol pack for possible gout flare up  -Rheum eval     2. RUSSELL on CKD  -Cr noted  -acei, eliqius, hctz remain on hold  -Possible renal bx if no improvement -  pt can proceed from a cv standpoint   -renal f/u    3. Coronary Artery Disease. S/P PCI to LAD  -stable without chest pain or dyspnea  -continue toprol, statin, and asa 81mg daily    4. Atrial Fibrillation/Flutter. S/P AF Ablation  -remains in sinus rhythm on amiodarone and metoprolol  -Eliquis held for possible renal bx      5. Chronic Systolic Heart Failure/ Cardiomyopathy  -Recent echo revealed improvement in LV function with sinus rhythm  -EF now 50%. Continue beta blocker  -acei held for russell  -vol status stable - no diuretic need     6. Mitral Regurgitation  -Most recent echo revealed improvement in mitral regurgitation with improvement in LV function  -Continue to monitor    7. Hypertension  -bp stable   -acei/hctz on hold as above  -cont bb, hydral     dvt ppx          Echo 5/28/21: mod MR, severe global lv sys dyxfx, mild TR, min CO  Office Echo 10/2/18: EF 50%, mild-mod MR, min AR, mild lv sys dysfx   [facility] 2/21/18: PCI to LAD  NICOLETTE 2/13/18: EF 25%, severe MR, severe segmental lv sys dysfx, mild TR, mild pulm HTN     a/p   65 y/o M with well known to practice with PMHx of HTN, HLD, CAD s/p PCI to LAD in 2014, PAD s/p lower ext stenting, BPH, Afib on eliquis, gout, RA (not on meds), CKD stage 3, Gout, presents to the ED c/o sudden onset atraumatic left hand pain with worsening swelling since 3 days ago.     #L hand pain/swelling  -r/o Gout. rheum w/u  -medrol    #RUSSELL on CKD  -Cr noted  -acei, eliqius, hctz remain on hold  -Possible renal bx if no improvement -  pt can proceed from a cv standpoint   -renal f/u    #Coronary Artery Disease. S/P PCI to LAD  -stable without chest pain or dyspnea  -continue toprol, statin, and asa 81mg daily    #Atrial Fibrillation/Flutter. S/P AF Ablation  -continue amiodarone and metoprolol  -AFlutter on ecg 5/27  -Eliquis held for possible renal bx    -will need eventual repeat ablation in light of severe lv dysfunction  -patient with history of AF induced cmp that resolved post maint of sr    #Chronic Systolic Heart Failure/ Cardiomyopathy  -Recent echo with severe lv dsyfxn  -continue med tx for now   -will need eventual repeat ablation  -Continue beta blocker  -acei held for russell  -vol status stable - no diuretic need     #Mitral Regurgitation  -moderate on echo  -cont med tx    #Hypertension  -bp stable  -acei/hctz on hold as above  -cont bb, hydral, ccb, imdur     dvt ppx

## 2021-06-07 NOTE — CONSULT NOTE ADULT - TIME BILLING
Agree with above NP note.  65 y/o M with well known to practice with PMHx of HTN, HLD, CAD s/p PCI to LAD in 2014, PAD s/p lower ext stenting, BPH, Afib on eliquis, gout, RA (not on meds), CKD stage 3, Gout, presents to the ED c/o sudden onset atraumatic left hand pain with worsening swelling since 3 days ago.     #L hand pain/swelling  -r/o Gout. rheum w/u  -medrol    #RUSSELL on CKD  -Cr noted  -acei, eliqius, hctz remain on hold  -Possible renal bx if no improvement -  pt can proceed from a cv standpoint   -renal f/u    #Coronary Artery Disease. S/P PCI to LAD  -stable without chest pain or dyspnea  -continue toprol, statin, and asa 81mg daily    #Atrial Fibrillation/Flutter. S/P AF Ablation  -continue amiodarone and metoprolol  -AFlutter on ecg 5/27  -Eliquis held for possible renal bx    -will need eventual repeat ablation in light of severe lv dysfunction  -patient with history of AF induced cmp that resolved post maint of sr    #Chronic Systolic Heart Failure/ Cardiomyopathy  -Recent echo with severe lv dsyfxn  -continue med tx for now   -will need eventual repeat ablation  -Continue beta blocker  -acei held for russell  -vol status stable - no diuretic need     #Mitral Regurgitation  -moderate on echo  -cont med tx    #Hypertension  -bp stable  -acei/hctz on hold as above  -cont bb, hydral, ccb, imdur     dvt ppx

## 2021-06-07 NOTE — PROGRESS NOTE ADULT - PROBLEM SELECTOR PLAN 3
- ischemic CM, hx CAD s/p PCI to LAD in 2014, PAD s/p lower ext stenting  - TTE 5/2021 showed severe global LV dysfunction, moderate MR, decreased RV systolic function   - Follows with [facility] Dr. Lee as cardiologist  - Currently euvolemic on exam   - C/w ASA, BP meds - ischemic CM, hx CAD s/p PCI to LAD in 2014, PAD s/p lower ext stenting  - TTE 5/2021 showed severe global LV dysfunction, moderate MR, decreased RV systolic function   - Follows with [facility] Dr. Lee as cardiologist  - Currently euvolemic on exam   - Will hold Asa in anticipation for renal biopsy along with low dose decreasing uric acid clearance.

## 2021-06-07 NOTE — CONSULT NOTE ADULT - SUBJECTIVE AND OBJECTIVE BOX
[facility] DIVISION OF KIDNEY DISEASES AND HYPERTENSION -- [phone]  -- INITIAL CONSULT NOTE  --------------------------------------------------------------------------------  If any questions, please feel free to contact me  NS pager: [phone], [facility]: [phone]  Yadiel Hector M.D.  Nephrology Fellow  --------------------------------------------------------------------------------    HPI:  65 y/o M with PMHx of HTN, HLD, CAD s/p PCI to LAD in 2014, PAD s/p lower ext stenting, BPH, Afib on eliquis, gout, RA (not on meds), CKD stage 3, presented to the ED c/o sudden onset atraumatic left hand pain with worsening swelling since 3 days ago. States that pain started in the 3rd, 4th, and 5th digits in the left hand. Pain is rated a 8/10 at its worst and associated with limited ROM and warmth in his left hand. Was recently admitted on 5/21/21 for acute kidney injury. Denies recent trauma or injuries. Denies fever, chest pain, SOB, dizziness, vomiting, diarrhea, nausea. At ED he was started on prednisone and LR fluids.     Kidney hx: Patient with hx of CKD3, follows with Dr Hayes, Recently admitted in march/2021 for RUSSELL, Cr peaked to 3.5mg/dl and discharged with Cr 2.8. On presentation on 6/6/21 Cr up to 3.68mg/dl - plan was for patient to get kidney biopsy as outpatient.  US Kidney/Bladder: Echogenic bilaterally, representing underlying parenchymal disease. Duplex kidney negative for TIMOTEO. Discharged in march off of Lisinopril/HCTZ.     Nephrology consulted for RUSSELL on CKD.       PAST HISTORY  --------------------------------------------------------------------------------  PAST MEDICAL & SURGICAL HISTORY:  HTN - Hypertension    Arthritis  L arm and hands    CKD (chronic kidney disease)    Gout    CAD (coronary artery disease)  1 stent    HLD (hyperlipidemia)    Atrial fibrillation    CHF (congestive heart failure)    History of cardiomyopathy  LV dysfunction    Thalassemia minor    S/P Arthroscopic Surgery of Left Knee  2001 injury- hit knee on desk    History of Arthroscopy- ankle  left ankle 2003 s/p &quot;something fell on it&quot;    S/P angioplasty with stent  5/2014 2016    S/P hernia surgery    Status post cataract extraction  left eye done 10/18/2018    H/O cardiac radiofrequency ablation      FAMILY HISTORY:  Family history of hypertension (Father, Mother)    Family history of diabetes mellitus (Father, Mother)      PAST SOCIAL HISTORY:  no smoking, no drugs, no alcohol.     ALLERGIES & MEDICATIONS  --------------------------------------------------------------------------------  Allergies    No Known Allergies    Intolerances      Standing Inpatient Medications  aMIOdarone    Tablet 100 milliGRAM(s) Oral daily  amLODIPine   Tablet 10 milliGRAM(s) Oral daily  aspirin  chewable 81 milliGRAM(s) Oral daily  ferrous    sulfate 325 milliGRAM(s) Oral <User Schedule>  finasteride 5 milliGRAM(s) Oral daily  heparin   Injectable 5000 Unit(s) SubCutaneous every 8 hours  hydrALAZINE 25 milliGRAM(s) Oral three times a day  isosorbide   dinitrate Tablet (ISORDIL) 10 milliGRAM(s) Oral three times a day  methylPREDNISolone   Oral   methylPREDNISolone 4 milliGRAM(s) Oral before breakfast  methylPREDNISolone 4 milliGRAM(s) Oral after lunch  methylPREDNISolone 4 milliGRAM(s) Oral after dinner  methylPREDNISolone 8 milliGRAM(s) Oral at bedtime  metoprolol succinate ER 25 milliGRAM(s) Oral daily  pantoprazole    Tablet 40 milliGRAM(s) Oral before breakfast  simvastatin 40 milliGRAM(s) Oral at bedtime    PRN Inpatient Medications  acetaminophen   Tablet .. 650 milliGRAM(s) Oral every 6 hours PRN      REVIEW OF SYSTEMS  --------------------------------------------------------------------------------  Gen: No fevers/chills  Skin: No rashes  Head/Eyes/Ears: Normal hearing,   Respiratory: No dyspnea, cough  CV: No chest pain  GI: No abdominal pain, diarrhea  : No dysuria, hematuria  MSK: No  edema,  +LUE pain    All other systems were reviewed and are negative, except as noted.    VITALS/PHYSICAL EXAM  --------------------------------------------------------------------------------  T(C): 36.8 (06-07-21 @ 05:47), Max: 37.2 (06-06-21 @ 10:03)  HR: 59 (06-07-21 @ 05:47) (55 - 93)  BP: 125/79 (06-07-21 @ 05:47) (122/78 - 142/95)  RR: 18 (06-07-21 @ 05:47) (18 - 19)  SpO2: 98% (06-07-21 @ 05:47) (98% - 99%)  Wt(kg): --  Height (cm): 180.3 (06-06-21 @ 10:03)  Weight (kg): 89.766 (06-06-21 @ 10:03)  BMI (kg/m2): 27.6 (06-06-21 @ 10:03)  BSA (m2): 2.1 (06-06-21 @ 10:03)      06-06-21 @ 07:01  -  06-07-21 @ 07:00  --------------------------------------------------------  IN: 0 mL / OUT: 400 mL / NET: -400 mL      Physical Exam:  	Gen: NAD, cooperative  	HEENT: MMM  	Pulm: CTA B/L, no wheezing   	CV: S1S2, RRR  	Abd: Soft, +BS, NT, ND   	Ext: No LE edema B/L-- LUE with edema, warm to touch and tender.   	Neuro: Awake, A&O x3  	Skin: Warm and dry              : no suprapubic tenderness      LABS/STUDIES  --------------------------------------------------------------------------------              10.5   13.62 >-----------<  242      [06-06-21 @ 11:18]              34.0     137  |  102  |  31  ----------------------------<  110      [06-06-21 @ 11:18]  3.5   |  20  |  3.68        Ca     8.8     [06-06-21 @ 11:18]    TPro  7.3  /  Alb  3.1  /  TBili  0.7  /  DBili  x   /  AST  9   /  ALT  5   /  AlkPhos  114  [06-06-21 @ 11:18]        Uric acid 9.1      [06-06-21 @ 11:18]    Creatinine Trend:  SCr 3.68 [06-06 @ 11:18]  SCr 2.82 [05-28 @ 06:52]  SCr 3.10 [05-27 @ 07:00]  SCr 3.42 [05-26 @ 17:23]    Urinalysis - [06-06-21 @ 20:30]      Color Light Yellow / Appearance Clear / SG 1.009 / pH 6.0      Gluc Negative / Ketone Negative  / Bili Negative / Urobili Negative       Blood Negative / Protein 100 / Leuk Est Negative / Nitrite Negative      RBC 2 / WBC 1 / Hyaline 1 / Gran  / Sq Epi  / Non Sq Epi 0 / Bacteria Negative    Urine Sodium 15      [06-06-21 @ 20:30]  Urine Osmolality 218      [06-06-21 @ 20:30]    Iron 22, TIBC 316, %sat 7      [05-28-21 @ 10:50]  Ferritin 26      [05-28-21 @ 09:06]    HBsAb Nonreact      [05-27-21 @ 08:38]  HBsAg Nonreact      [05-28-21 @ 09:09]  HCV 0.13, Nonreact      [05-28-21 @ 09:09]  HIV Nonreact      [05-28-21 @ 09:09]    NAILA: titer Negative, pattern --      [05-28-21 @ 09:09]  dsDNA <12      [05-28-21 @ 09:09]  C3 Complement 101      [05-28-21 @ 08:50]  C4 Complement 27      [05-28-21 @ 08:50]  ANCA: cANCA Negative, pANCA Negative, atypical ANCA Negative      [05-28-21 @ 09:09]  Free Light Chains: kappa 8.24, lambda 6.56, ratio = 1.26      [05-28 @ 08:50]  Immunofixation Serum:   One IgM Lambda and One IgM Kappa Bands Identified    Reference Range: None Detected      [05-28-21 @ 08:50]  
SYEDA OWEN  [id number]    HISTORY OF PRESENT ILLNESS:    65 Y M with a PMHx of HTN, HLD, Afib, CAD, CKD, CHF, seropositive erosive RA and Tophaceous gout presents with left hand pain and swelling.   The pain and swelling started 2 days ago, pt says almost his entire left hand became swollen.   No fever or chills, no other joints hurt.   No CP, SOB, n/v/d, abdominal pain, cough.  No trauma to the left hand.   Says he ate red meat last week and the pain started after that.   Hx of seropositive erosive RA and Tophaceous gout:  Follows Dr Jose G Vela in clinic intermittently.   Has refused both ULT with Allopurinol recently (did take it briefly but then refused, on no ULT since 2019).  Gets 3-4 flares a year, has tophi and erosive dx from gout.   Continues to eat red meat and shellfish.   Has never been on DMARD therapy for RA.  Has chronic deformities of RA, however no recent flares.     PAST MEDICAL & SURGICAL HISTORY:  HTN - Hypertension    Arthritis  L arm and hands    CKD (chronic kidney disease)    Gout    CAD (coronary artery disease)  1 stent    HLD (hyperlipidemia)    Atrial fibrillation    CHF (congestive heart failure)    History of cardiomyopathy  LV dysfunction    Thalassemia minor    S/P Arthroscopic Surgery of Left Knee   injury- hit knee on desk    History of Arthroscopy- ankle  left ankle  s/p &quot;something fell on it&quot;    S/P angioplasty with stent  2014    S/P hernia surgery    Status post cataract extraction  left eye done 10/18/2018    H/O cardiac radiofrequency ablation    Review of Systems:  As per HPI    MEDICATIONS  (STANDING):  aMIOdarone    Tablet 100 milliGRAM(s) Oral daily  amLODIPine   Tablet 10 milliGRAM(s) Oral daily  apixaban 5 milliGRAM(s) Oral two times a day  ferrous    sulfate 325 milliGRAM(s) Oral <User Schedule>  finasteride 5 milliGRAM(s) Oral daily  hydrALAZINE 25 milliGRAM(s) Oral three times a day  isosorbide   dinitrate Tablet (ISORDIL) 10 milliGRAM(s) Oral three times a day  metoprolol succinate ER 25 milliGRAM(s) Oral daily  pantoprazole    Tablet 40 milliGRAM(s) Oral before breakfast  simvastatin 40 milliGRAM(s) Oral at bedtime    MEDICATIONS  (PRN):  acetaminophen   Tablet .. 650 milliGRAM(s) Oral every 6 hours PRN Temp greater or equal to 38C (100.4F), Mild Pain (1 - 3), Moderate Pain (4 - 6)      Pertinent Medication history:  Home Medications:  amiodarone 100 mg oral tablet: 1 tab(s) orally once a day (2021 18:52)  aspirin 81 mg oral tablet: 1 tab(s) orally once a day (2021 18:52)  Eliquis 5 mg oral tablet: 1 tab(s) orally 2 times a day (2021 18:52)  finasteride 5 mg oral tablet: 1 tab(s) orally once a day (2021 18:52)  Metoprolol Succinate ER 25 mg oral tablet, extended release: 1 tab(s) orally once a day (2021 18:52)  pantoprazole 40 mg oral delayed release tablet: 1 tab(s) orally once a day (before a meal) (2021 18:52)  simvastatin 40 mg oral tablet: 1 tab(s) orally once a day (at bedtime) (2021 18:52)      Allergies    No Known Allergies    Intolerances    SOCIAL HISTORY:  Former smoker, former alcohol drinker, no illicit drugs.     FAMILY HISTORY:  Family history of hypertension (Father, Mother)    Family history of diabetes mellitus (Father, Mother)        Vital Signs Last 24 Hrs  T(C): 36.6 (2021 16:38), Max: 36.8 (2021 05:47)  T(F): 97.8 (2021 16:38), Max: 98.2 (2021 05:47)  HR: 56 (2021 16:38) (55 - 87)  BP: 134/79 (2021 16:38) (122/78 - 142/95)  BP(mean): --  RR: 18 (2021 16:38) (18 - 19)  SpO2: 98% (2021 16:38) (98% - 99%)    Daily     Daily     Physical Exam:  General: No apparent distress  HEENT: EOMI, MMM  CVS: +S1/S2, RRR  Resp: CTA b/l  GI: Soft, NT/ND  MSK: Minimal synovitis in left 4th and 5th MCP joints, no tenderness or warmth, chronic RA deformities but significant limitation of ROM in left hand more than right. No signs of synovitis, in any other joint. RA deformities in right hand and both knees noted. Right elbow olecranon bursitis with tophi.    Neuro: AAOx3  Skin: No rashes    LABS:                        11.1   13.18 )-----------( 257      ( 2021 07:13 )             35.1     06-07    136  |  103  |  38<H>  ----------------------------<  79  4.2   |  18<L>  |  3.37<H>    Ca    9.0      2021 07:13  Phos  4.2     06-  Mg     2.4     06-    TPro  7.3  /  Alb  3.1<L>  /  TBili  0.7  /  DBili  x   /  AST  9<L>  /  ALT  5<L>  /  AlkPhos  114  -06      Urinalysis Basic - ( 2021 20:30 )    Color: Light Yellow / Appearance: Clear / S.009 / pH: x  Gluc: x / Ketone: Negative  / Bili: Negative / Urobili: Negative   Blood: x / Protein: 100 / Nitrite: Negative   Leuk Esterase: Negative / RBC: 2 /hpf / WBC 1 /HPF   Sq Epi: x / Non Sq Epi: 0 /hpf / Bacteria: Negative      RADIOLOGY & ADDITIONAL STUDIES:
CARDIOLOGY CONSULT - Dr. Lee         HPI:  63 y/o M  well known to practice, with PMHx of HTN, HLD, CAD s/p PCI to LAD in 2014, PAD s/p lower ext stenting, BPH, Afib on eliquis, gout, RA (not on meds), CKD stage 3, Gout, presents to the ED c/o sudden onset atraumatic left hand pain with worsening swelling since 3 days ago. States that pain started in the 3rd, 4th, and 5th digits in the left hand. Pain is rated a 8/10 at its worst and associated with limited ROM and warmth in his left hand. Was recently admitted on 5/21/21 for acute rise in serum Cr noted from outpatient nephrology clinic. Lisinopril and HCTZ were D/C'ed with improvement in renal function. Pt had his peripheral IVs placed in his right hand during last admission. Denies recent trauma or injuries. Endorses prior gout flare-ups with an episode occurring in the left wrist and left elbow as well. Not taking any meds for gout at the current time. Further denies fevers, chest pain, or pain anywhere else in the body. The patient denies any dietary indiscretion, hasn't had alcohol in over 8 years, no cheeseburgers. Pain started about 3 days ago, took acetaminophen for the pain which did not resolve his symptoms.     In the ED the patient's vitals were Tmax 98.9, HR=93, EB=242/74, RR=18, SpO2=98%. He received tylenol, oxy, and prednisone for his pain. He was started on LR maintenance fluids. Labs were significant for a leukocytosis of ~13k, microcytic anemia of 10.5 (baseline appears to be 10-11), SCr of 3.68 (baseline per outpatient notes appears to be 1.7). Left hand and wrist XR consistent with severe joint space disease consistent with inflammatory arthritis, no evidence of acute fracture.  (06 Jun 2021 17:11)    Patient denies any chest pain, dyspnea, palpitations, cough, syncope, edema, exertional symptoms, nausea, abdominal pain, fever, chills,  or rash.     PAST MEDICAL & SURGICAL HISTORY:  HTN - Hypertension    Arthritis  L arm and hands    CKD (chronic kidney disease)    Gout    CAD (coronary artery disease)  1 stent    HLD (hyperlipidemia)    Atrial fibrillation    CHF (congestive heart failure)    History of cardiomyopathy  LV dysfunction    Thalassemia minor    S/P Arthroscopic Surgery of Left Knee  2001 injury- hit knee on desk    History of Arthroscopy- ankle  left ankle 2003 s/p &quot;something fell on it&quot;    S/P angioplasty with stent  5/2014 2016    S/P hernia surgery    Status post cataract extraction  left eye done 10/18/2018    H/O cardiac radiofrequency ablation            PREVIOUS DIAGNOSTIC TESTING:    [x ] Echocardiogram:   < from: Transthoracic Echocardiogram (05.28.21 @ 09:14) >  Dimensions:    Normal Values:  LA:     4.0    2.0 - 4.0 cm  Ao:     3.2    2.0 - 3.8 cm  SEPTUM: 1.1    0.6 - 1.2 cm  PWT:    1.2    0.6 - 1.1 cm  LVIDd:  6.0    3.0 - 5.6 cm  LVIDs:  5.3    1.8 - 4.0 cm  Derived variables:  LVMI: 143 g/m2  RWT: 0.40  Fractional short: 12 %  Doppler Peak Velocity (m/sec): AoV=1.3  ------------------------------------------------------------------------  Observations:  Mitral Valve: Mitral annular calcification, otherwise  normal mitral valve. Moderate eccentric mitral  regurgitation.  Aortic Valve/Aorta: Normal trileaflet aortic valve. Peak  transaortic valve gradient equals 7 mm Hg, mean transaortic  valve gradient equals 4 mm Hg, aortic valve velocity time  integral equals 27 cm. Peak left ventricular outflow tract  gradient equals 1 mm Hg, mean gradient is equal to 1 mm Hg,  LVOT velocity time integral equals 11 cm.  Aortic Root: 3.2 cm.  LeftAtrium: Severely dilated left atrium.  LA volume index  = 52 cc/m2.  Left Ventricle: Severe global left ventricular systolic  dysfunction. Normal left ventricular internal dimensions  and wall thicknesses. Increased E/e'  is consistent with  elevatedleft ventricular filling pressure.  Right Heart: Normal right atrium. Normal right ventricular  size with , decreased right ventricular systolic function.  Normal tricuspid valve. Mild tricuspid regurgitation.  Normal pulmonic valve. Minimal pulmonicregurgitation.  Pericardium/Pleura: Normal pericardium with no pericardial  effusion.  Hemodynamic: Estimated right atrial pressure is 8 mm Hg.  Estimated right ventricular systolic pressure equals 33 mm  Hg, assuming right atrial pressure equals 8 mm Hg,  consistent with normal pulmonary pressures.  ------------------------------------------------------------------------  Conclusions:  1. Mitral annular calcification, otherwise normal mitral  valve. Moderate eccentric mitral regurgitation.  2. Severely dilated left atrium.  LA volume index = 52  cc/m2.  3. Increased E/e'  is consistent with elevated left  ventricular filling pressure.  4. Normal right ventricular size with , decreased right  ventricular systolic function.        [x ]  Catheterization:  < from: Cardiac Cath Lab - Adult (02.21.18 @ 09:21) >  CORONARY VESSELS: The coronary circulation is right dominant.  LM:   --  LM: Normal.  LAD:   --  Proximal LAD: There was a tubular 75 % stenosis at the site of a  prior stent.  CX:   --  Circumflex: Normal.  --  OM1: Normal.  --  OM2: Normal.  RI:   --  Ramus intermedius: Angiography showed minor luminal  irregularities with no flow limiting lesions.  RCA:   --  RCA: Angiography showed minor luminal irregularities with no  flow limiting lesions.  --  RPDA: Angiography showed minor luminal irregularities with no flow  limiting lesions.  --  RPL1: Angiography showed minor luminal irregularities withno flow  limiting lesions.  COMPLICATIONS: There were no complications.  DIAGNOSTIC IMPRESSIONS: Recent onset increased dyspnea and palpitations  with now severe LV systolic dysfunction and class IV CHF. Also in atrial  flutter for the past 2 months.Cath with lunimal LCx, Ramus, RCA disease  but severe instent lesion of the ostial/proximal LAD stent.  DIAGNOSTIC RECOMMENDATIONS: PCI with MARIAN to severe proximal LAD instent  lesion in setting of new onset class IV heart failure and new severe  cardiomyopathy. Patient remains in his atrial arrhythmia. Recent NICOLETTE  revealed severe functional mitral regurgitation. Mitral valve disease is  secondary to new LV dysfunction and subsequent LV dilatation. Etiology of  cardiomyopathy is likely mixed and due to LAD disease and atrial  arrhythmia. PCI to LAD performed In light of severe lesion with  cardiomyopathy with hopeful improvement in LV function. Will need  restoration of sinus rhythm with ablation for optimal improvement in  overall LV function and hopeful decrease in MR burden. Continue asa,  plavix, and eliquis as ordered.  INTERVENTIONAL IMPRESSIONS: Recent onset increased dyspnea and palpitations  with now severe LV systolic dysfunction and class IV CHF. Also in atrial  flutter for thepast 2 months. Cath with lunimal LCx, Ramus, RCA disease  but severe instent lesion of the ostial/proximal LAD stent.  INTERVENTIONAL RECOMMENDATIONS: PCI with MARIAN to severe proximal LAD instent  lesion in setting of new onset class IV heart failure and new severe  cardiomyopathy. Patient remains in his atrial arrhythmia. Recent NICOLETTE  revealed severe functional mitral regurgitation. Mitral valve disease is  secondary to new LV dysfunction and subsequent LV dilatation. Etiology of  cardiomyopathy is likely mixed and due to LAD disease and atrial  arrhythmia. PCI to LAD performed In light of severe lesion with  cardiomyopathy with hopeful improvement in LV function. Will need  restoration of sinus rhythm with ablation for optimal improvement in  overall LV function and hopeful decrease in MR burden. Continue asa,  plavix, and eliquis as ordered.        [ ] Stress Test:  	    MEDICATIONS:  Home Medications:  amiodarone 100 mg oral tablet: 1 tab(s) orally once a day (06 Jun 2021 18:52)  aspirin 81 mg oral tablet: 1 tab(s) orally once a day (06 Jun 2021 18:52)  Eliquis 5 mg oral tablet: 1 tab(s) orally 2 times a day (06 Jun 2021 18:52)  finasteride 5 mg oral tablet: 1 tab(s) orally once a day (06 Jun 2021 18:52)  Metoprolol Succinate ER 25 mg oral tablet, extended release: 1 tab(s) orally once a day (06 Jun 2021 18:52)  pantoprazole 40 mg oral delayed release tablet: 1 tab(s) orally once a day (before a meal) (06 Jun 2021 18:52)  simvastatin 40 mg oral tablet: 1 tab(s) orally once a day (at bedtime) (06 Jun 2021 18:52)      MEDICATIONS  (STANDING):  aMIOdarone    Tablet 100 milliGRAM(s) Oral daily  amLODIPine   Tablet 10 milliGRAM(s) Oral daily  aspirin  chewable 81 milliGRAM(s) Oral daily  ferrous    sulfate 325 milliGRAM(s) Oral <User Schedule>  finasteride 5 milliGRAM(s) Oral daily  heparin   Injectable 5000 Unit(s) SubCutaneous every 8 hours  hydrALAZINE 25 milliGRAM(s) Oral three times a day  isosorbide   dinitrate Tablet (ISORDIL) 10 milliGRAM(s) Oral three times a day  methylPREDNISolone   Oral   methylPREDNISolone 4 milliGRAM(s) Oral before breakfast  methylPREDNISolone 4 milliGRAM(s) Oral after lunch  methylPREDNISolone 4 milliGRAM(s) Oral after dinner  methylPREDNISolone 8 milliGRAM(s) Oral at bedtime  metoprolol succinate ER 25 milliGRAM(s) Oral daily  pantoprazole    Tablet 40 milliGRAM(s) Oral before breakfast  simvastatin 40 milliGRAM(s) Oral at bedtime      FAMILY HISTORY:  Family history of hypertension (Father, Mother)    Family history of diabetes mellitus (Father, Mother)        SOCIAL HISTORY:    [ ] Non-smoker  [ ] Smoker  [ ] Alcohol    Allergies    No Known Allergies    Intolerances    	    REVIEW OF SYSTEMS:  CONSTITUTIONAL: No fever, weight loss, or fatigue  EYES: No eye pain, visual disturbances, or discharge  ENMT:  No difficulty hearing, tinnitus, vertigo; No sinus or throat pain  NECK: No pain or stiffness  RESPIRATORY: No cough, wheezing, chills or hemoptysis; No Shortness of Breath  CARDIOVASCULAR: No chest pain, palpitations, passing out, dizziness, or leg swelling  GASTROINTESTINAL: No abdominal or epigastric pain. No nausea, vomiting, or hematemesis; No diarrhea or constipation. No melena or hematochezia.  GENITOURINARY: No dysuria, frequency, hematuria, or incontinence  NEUROLOGICAL: No headaches, memory loss, loss of strength, numbness, or tremors  SKIN: No itching, burning, rashes, or lesions   	    [ x] All others negative	see hpi   [ ] Unable to obtain    PHYSICAL EXAM:  T(C): 36.8 (06-07-21 @ 09:04), Max: 36.8 (06-07-21 @ 05:47)  HR: 68 (06-07-21 @ 09:04) (55 - 87)  BP: 130/86 (06-07-21 @ 09:04) (122/78 - 142/95)  RR: 18 (06-07-21 @ 09:04) (18 - 19)  SpO2: 98% (06-07-21 @ 09:04) (98% - 99%)  Wt(kg): --  I&O's Summary    06 Jun 2021 07:01  -  07 Jun 2021 07:00  --------------------------------------------------------  IN: 0 mL / OUT: 400 mL / NET: -400 mL    07 Jun 2021 07:01  -  07 Jun 2021 10:12  --------------------------------------------------------  IN: 360 mL / OUT: 0 mL / NET: 360 mL        Appearance: Normal	  Psychiatry: A & O x 3, Mood & affect appropriate  HEENT:   Normal oral mucosa, PERRL, EOMI	  Lymphatic: No lymphadenopathy  Cardiovascular: Normal S1 S2,RRR, No JVD, No murmurs  Respiratory: Lungs clear to auscultation	  Gastrointestinal:  Soft, Non-tender, + BS	  Skin: No rashes, No ecchymoses, No cyanosis	  Neurologic: Non-focal  Extremities: Normal range of motion, No clubbing, cyanosis or edema  Vascular: Peripheral pulses palpable 2+ bilaterally    TELEMETRY: 	    ECG:  	  RADIOLOGY:  < from: Xray Wrist 3 Views, Left (06.06.21 @ 12:02) >    IMPRESSION:    Redemonstration of severe joint space erosion and ankylosis involving the left radiocarpal and carpal joints, as well as erosions of the first and second MCParticular margins, consistent with inflammatory arthritis.  There is uniform soft tissue swelling around the radiocarpal, MCP and PIP joints.  No acute displaced fracture or dislocation.  Boutonniere deformity of the left thumb.    < end of copied text >    OTHER: 	  	  LABS:	 	    CARDIAC MARKERS:                                  11.1   13.18 )-----------( 257      ( 07 Jun 2021 07:13 )             35.1     06-07    136  |  103  |  38<H>  ----------------------------<  79  4.2   |  18<L>  |  3.37<H>    Ca    9.0      07 Jun 2021 07:13  Phos  4.2     06-07  Mg     2.4     06-07    TPro  7.3  /  Alb  3.1<L>  /  TBili  0.7  /  DBili  x   /  AST  9<L>  /  ALT  5<L>  /  AlkPhos  114  06-06      proBNP:   Lipid Profile:   HgA1c:   TSH:

## 2021-06-07 NOTE — CONSULT NOTE ADULT - ASSESSMENT
65 Y M with a PMHx of HTN, HLD, Afib, CAD, CKD, CHF, seropositive erosive RA and Tophaceous gout presents with left hand pain and swelling.     Impression  # Left hand swelling and pain.  # Hx of untreated Tophaceous and erosive gout  # Hx of untreated seropositive erosive RA  - Relevant data:  . Acute pain and swelling of left hand after eating red meat.  . Is currently not on any ULT or DMARD for RA.   . Exam per admission notes showed left hand swelling and tenderness, today (after steroids) shows minimal synovitis in left 4th and 5th MCP joints, no tenderness or warmth, chronic RA defromities but significant limitation of ROM in left hand more than right.   . Labs show elevated, RUSSELL on CKD, high UA, high WBC.  . Exam shows RA erosive changes, no new fx.   . Given a total of 32mg oral Methylprednisolone since yesterday this AM, symptoms now much improved.   - DDx: Most likely this represents another gout flare due to the risk factors for gout mentioned above; RA flare less likely as the joint distribution is asymmetrical.     Recs:   - Stop Solumedrol and start Prednisone 30 mg from tomorrow morning.    - Ice.   - Will continue to discuss ULT with patient as outpatient.     DW with Dr Jose G John MD  Rheum fellow PGY 4  Pager [phone]        65 Y M with a PMHx of HTN, HLD, Afib, CAD, CKD, CHF, seropositive erosive RA and Tophaceous gout presents with left hand pain and swelling.     Impression  # Left hand swelling and pain.  # Hx of untreated Tophaceous and erosive gout  # Hx of untreated seropositive erosive RA  - Relevant data:  . Acute pain and swelling of left hand after eating red meat.  . Is currently not on any ULT or DMARD for RA.   . Exam per admission notes showed left hand swelling and tenderness, today (after steroids) shows minimal synovitis in left 4th and 5th MCP joints, no tenderness or warmth, chronic RA defromities but significant limitation of ROM in left hand more than right.   . Labs show elevated, RUSSELL on CKD, high UA, high WBC.  . Exam shows RA erosive changes, no new fx.   . Given a total of 32mg oral Methylprednisolone since yesterday this AM, symptoms now much improved.   - DDx: Most likely this represents another gout flare due to the risk factors for gout mentioned above; RA flare less likely as the joint distribution is asymmetrical.     Recs:   - Stop Solumedrol and start Prednisone 30 mg from tomorrow morning.    - Ice to affected joint   - Will continue to discuss ULT with patient as outpatient.     DW with Dr Jose G John MD  Rheum fellow PGY 4  Pager [phone]

## 2021-06-07 NOTE — PROGRESS NOTE ADULT - SUBJECTIVE AND OBJECTIVE BOX
Spencer Davis M.D.  Internal Medicine PGY-1  [phone] / [phone]     Patient is a 65y old  Male who presents with a chief complaint of L hand pain and edema (2021 17:11)      SUBJECTIVE / OVERNIGHT EVENTS:          MEDICATIONS  (STANDING):  aMIOdarone    Tablet 100 milliGRAM(s) Oral daily  amLODIPine   Tablet 10 milliGRAM(s) Oral daily  aspirin  chewable 81 milliGRAM(s) Oral daily  ferrous    sulfate 325 milliGRAM(s) Oral <User Schedule>  finasteride 5 milliGRAM(s) Oral daily  heparin   Injectable 5000 Unit(s) SubCutaneous every 8 hours  hydrALAZINE 25 milliGRAM(s) Oral three times a day  isosorbide   dinitrate Tablet (ISORDIL) 10 milliGRAM(s) Oral three times a day  methylPREDNISolone   Oral   methylPREDNISolone 4 milliGRAM(s) Oral before breakfast  methylPREDNISolone 4 milliGRAM(s) Oral after lunch  methylPREDNISolone 4 milliGRAM(s) Oral after dinner  methylPREDNISolone 8 milliGRAM(s) Oral at bedtime  metoprolol succinate ER 25 milliGRAM(s) Oral daily  pantoprazole    Tablet 40 milliGRAM(s) Oral before breakfast  simvastatin 40 milliGRAM(s) Oral at bedtime    MEDICATIONS  (PRN):  acetaminophen   Tablet .. 650 milliGRAM(s) Oral every 6 hours PRN Temp greater or equal to 38C (100.4F), Mild Pain (1 - 3), Moderate Pain (4 - 6)      Vital Signs Last 24 Hrs  T(C): 36.8 (2021 05:47), Max: 37.2 (2021 10:03)  T(F): 98.2 (2021 05:47), Max: 98.9 (2021 10:03)  HR: 59 (2021 05:47) (55 - 93)  BP: 125/79 (2021 05:47) (122/78 - 142/95)  BP(mean): --  RR: 18 (2021 05:47) (18 - 19)  SpO2: 98% (2021 05:47) (98% - 99%)      PHYSICAL EXAM  GENERAL: NAD, well-developed  HEAD:  Atraumatic, Normocephalic  EYES: EOMI, PERRLA, conjunctiva and sclera clear  NECK: Supple, No JVD  CHEST/LUNG: Clear to auscultation bilaterally; No wheeze  HEART: Regular rate and rhythm; No murmurs, rubs, or gallops  ABDOMEN: Soft, Nontender, Nondistended; Bowel sounds present  EXTREMITIES:  2+ Peripheral Pulses, No clubbing, cyanosis, or edema  PSYCH: AAOx3  SKIN: No rashes or lesions    CAPILLARY BLOOD GLUCOSE        I&O's Summary    2021 07:01  -  2021 07:00  --------------------------------------------------------  IN: 0 mL / OUT: 400 mL / NET: -400 mL        LABS:                        10.5   13.62 )-----------( 242      ( 2021 11:18 )             34.0     06-    137  |  102  |  31<H>  ----------------------------<  110<H>  3.5   |  20<L>  |  3.68<H>    Ca    8.8      2021 11:18    TPro  7.3  /  Alb  3.1<L>  /  TBili  0.7  /  DBili  x   /  AST  9<L>  /  ALT  5<L>  /  AlkPhos  114  06-06          Urinalysis Basic - ( 2021 20:30 )    Color: Light Yellow / Appearance: Clear / S.009 / pH: x  Gluc: x / Ketone: Negative  / Bili: Negative / Urobili: Negative   Blood: x / Protein: 100 / Nitrite: Negative   Leuk Esterase: Negative / RBC: 2 /hpf / WBC 1 /HPF   Sq Epi: x / Non Sq Epi: 0 /hpf / Bacteria: Negative        RADIOLOGY & ADDITIONAL TESTS:     MICROBIOLOGY:    ANTIMICROBIALS:    CONSULTS: Spencer Davis M.D.  Internal Medicine PGY-1  [phone] / [phone]     Patient is a 65y old  Male who presents with a chief complaint of L hand pain and edema (2021 17:11)      SUBJECTIVE / OVERNIGHT EVENTS:    Seen and examined at the bedside this am. Per nursing no acute events overnight. Patient this morning states that his left hand feels much improved. Denies any n/v/d.       MEDICATIONS  (STANDING):  aMIOdarone    Tablet 100 milliGRAM(s) Oral daily  amLODIPine   Tablet 10 milliGRAM(s) Oral daily  aspirin  chewable 81 milliGRAM(s) Oral daily  ferrous    sulfate 325 milliGRAM(s) Oral <User Schedule>  finasteride 5 milliGRAM(s) Oral daily  heparin   Injectable 5000 Unit(s) SubCutaneous every 8 hours  hydrALAZINE 25 milliGRAM(s) Oral three times a day  isosorbide   dinitrate Tablet (ISORDIL) 10 milliGRAM(s) Oral three times a day  methylPREDNISolone   Oral   methylPREDNISolone 4 milliGRAM(s) Oral before breakfast  methylPREDNISolone 4 milliGRAM(s) Oral after lunch  methylPREDNISolone 4 milliGRAM(s) Oral after dinner  methylPREDNISolone 8 milliGRAM(s) Oral at bedtime  metoprolol succinate ER 25 milliGRAM(s) Oral daily  pantoprazole    Tablet 40 milliGRAM(s) Oral before breakfast  simvastatin 40 milliGRAM(s) Oral at bedtime    MEDICATIONS  (PRN):  acetaminophen   Tablet .. 650 milliGRAM(s) Oral every 6 hours PRN Temp greater or equal to 38C (100.4F), Mild Pain (1 - 3), Moderate Pain (4 - 6)      Vital Signs Last 24 Hrs  T(C): 36.8 (2021 05:47), Max: 37.2 (2021 10:03)  T(F): 98.2 (2021 05:47), Max: 98.9 (2021 10:03)  HR: 59 (2021 05:47) (55 - 93)  BP: 125/79 (2021 05:47) (122/78 - 142/95)  BP(mean): --  RR: 18 (2021 05:47) (18 - 19)  SpO2: 98% (2021 05:47) (98% - 99%)      PHYSICAL EXAM  GENERAL: NAD, well-developed  HEAD:  Atraumatic, Normocephalic  EYES: EOMI, PERRLA, conjunctiva and sclera clear  NECK: Supple, No JVD  CHEST/LUNG: Clear to auscultation bilaterally; No wheeze  HEART: Regular rate and rhythm; No murmurs, rubs, or gallops  ABDOMEN: Soft, Nontender, Nondistended; Bowel sounds present  EXTREMITIES: +left 3,4,5th MCP less swollen, still some residual TTP however more flexion and increased  strength.   PSYCH: AAOx3  SKIN: No rashes or lesions    CAPILLARY BLOOD GLUCOSE        I&O's Summary    2021 07:01  -  2021 07:00  --------------------------------------------------------  IN: 0 mL / OUT: 400 mL / NET: -400 mL        LABS:                        10.5   13.62 )-----------( 242      ( 2021 11:18 )             34.0     06-    137  |  102  |  31<H>  ----------------------------<  110<H>  3.5   |  20<L>  |  3.68<H>    Ca    8.8      2021 11:18    TPro  7.3  /  Alb  3.1<L>  /  TBili  0.7  /  DBili  x   /  AST  9<L>  /  ALT  5<L>  /  AlkPhos  114  06-          Urinalysis Basic - ( 2021 20:30 )    Color: Light Yellow / Appearance: Clear / S.009 / pH: x  Gluc: x / Ketone: Negative  / Bili: Negative / Urobili: Negative   Blood: x / Protein: 100 / Nitrite: Negative   Leuk Esterase: Negative / RBC: 2 /hpf / WBC 1 /HPF   Sq Epi: x / Non Sq Epi: 0 /hpf / Bacteria: Negative        RADIOLOGY & ADDITIONAL TESTS:     MICROBIOLOGY:    ANTIMICROBIALS:    CONSULTS:

## 2021-06-07 NOTE — PROGRESS NOTE ADULT - PROBLEM SELECTOR PLAN 8
- S/p prostate MRI in August 2020, which demonstrated an enlarged prostate without suspicious prostatic lesions, PI-RADS 2.  - On finasteride at home, c/w finasteride  - F/u bladder scan - S/p prostate MRI in August 2020, which demonstrated an enlarged prostate without suspicious prostatic lesions, PI-RADS 2.  - On finasteride at home, c/w finasteride

## 2021-06-07 NOTE — PROGRESS NOTE ADULT - ASSESSMENT
65 y/o M with PMHx of HTN, HLD, CAD s/p PCI to LAD in 2014, PAD s/p lower ext stenting, BPH, Afib on eliquis, gout, RA (not on meds), CKD stage 3, Gout, presents to the ED c/o sudden onset atraumatic left hand pain with worsening swelling since 3 days ago admitted for concern for gout flare.  65 y/o M with PMHx of HTN, HLD, CAD s/p PCI to LAD in 2014, PAD s/p lower ext stenting, BPH, Afib on eliquis, gout, RA (not on meds), CKD stage 3, Gout, presents to the ED c/o sudden onset atraumatic left hand pain with worsening swelling since 3 days ago admitted for concern for gout flare 2/2 possible dietary indiscretion.

## 2021-06-08 ENCOUNTER — TRANSCRIPTION ENCOUNTER (OUTPATIENT)
Age: 66
End: 2021-06-08

## 2021-06-08 ENCOUNTER — NON-APPOINTMENT (OUTPATIENT)
Age: 66
End: 2021-06-08

## 2021-06-08 ENCOUNTER — APPOINTMENT (OUTPATIENT)
Dept: NEPHROLOGY | Facility: CLINIC | Age: 66
End: 2021-06-08

## 2021-06-08 VITALS
RESPIRATION RATE: 18 BRPM | TEMPERATURE: 98 F | HEART RATE: 60 BPM | SYSTOLIC BLOOD PRESSURE: 142 MMHG | OXYGEN SATURATION: 99 % | DIASTOLIC BLOOD PRESSURE: 83 MMHG

## 2021-06-08 LAB
ANION GAP SERPL CALC-SCNC: 16 MMOL/L — SIGNIFICANT CHANGE UP (ref 5–17)
BASOPHILS # BLD AUTO: 0.05 K/UL — SIGNIFICANT CHANGE UP (ref 0–0.2)
BASOPHILS NFR BLD AUTO: 0.3 % — SIGNIFICANT CHANGE UP (ref 0–2)
BLD GP AB SCN SERPL QL: NEGATIVE — SIGNIFICANT CHANGE UP
BUN SERPL-MCNC: 48 MG/DL — HIGH (ref 7–23)
CALCIUM SERPL-MCNC: 9 MG/DL — SIGNIFICANT CHANGE UP (ref 8.4–10.5)
CHLORIDE SERPL-SCNC: 107 MMOL/L — SIGNIFICANT CHANGE UP (ref 96–108)
CO2 SERPL-SCNC: 19 MMOL/L — LOW (ref 22–31)
CREAT SERPL-MCNC: 3.25 MG/DL — HIGH (ref 0.5–1.3)
EOSINOPHIL # BLD AUTO: 0.02 K/UL — SIGNIFICANT CHANGE UP (ref 0–0.5)
EOSINOPHIL NFR BLD AUTO: 0.1 % — SIGNIFICANT CHANGE UP (ref 0–6)
GLUCOSE SERPL-MCNC: 113 MG/DL — HIGH (ref 70–99)
HCT VFR BLD CALC: 32 % — LOW (ref 39–50)
HGB BLD-MCNC: 10.1 G/DL — LOW (ref 13–17)
IMM GRANULOCYTES NFR BLD AUTO: 0.6 % — SIGNIFICANT CHANGE UP (ref 0–1.5)
LYMPHOCYTES # BLD AUTO: 1.27 K/UL — SIGNIFICANT CHANGE UP (ref 1–3.3)
LYMPHOCYTES # BLD AUTO: 7.2 % — LOW (ref 13–44)
MAGNESIUM SERPL-MCNC: 2.4 MG/DL — SIGNIFICANT CHANGE UP (ref 1.6–2.6)
MCHC RBC-ENTMCNC: 18.6 PG — LOW (ref 27–34)
MCHC RBC-ENTMCNC: 31.6 GM/DL — LOW (ref 32–36)
MCV RBC AUTO: 58.9 FL — LOW (ref 80–100)
MONOCYTES # BLD AUTO: 1.04 K/UL — HIGH (ref 0–0.9)
MONOCYTES NFR BLD AUTO: 5.9 % — SIGNIFICANT CHANGE UP (ref 2–14)
NEUTROPHILS # BLD AUTO: 15.08 K/UL — HIGH (ref 1.8–7.4)
NEUTROPHILS NFR BLD AUTO: 85.9 % — HIGH (ref 43–77)
NRBC # BLD: 0 /100 WBCS — SIGNIFICANT CHANGE UP (ref 0–0)
PHOSPHATE SERPL-MCNC: 3.3 MG/DL — SIGNIFICANT CHANGE UP (ref 2.5–4.5)
PLATELET # BLD AUTO: 264 K/UL — SIGNIFICANT CHANGE UP (ref 150–400)
POTASSIUM SERPL-MCNC: 3.9 MMOL/L — SIGNIFICANT CHANGE UP (ref 3.5–5.3)
POTASSIUM SERPL-SCNC: 3.9 MMOL/L — SIGNIFICANT CHANGE UP (ref 3.5–5.3)
RBC # BLD: 5.43 M/UL — SIGNIFICANT CHANGE UP (ref 4.2–5.8)
RBC # FLD: 19.2 % — HIGH (ref 10.3–14.5)
RH IG SCN BLD-IMP: POSITIVE — SIGNIFICANT CHANGE UP
SODIUM SERPL-SCNC: 142 MMOL/L — SIGNIFICANT CHANGE UP (ref 135–145)
WBC # BLD: 17.56 K/UL — HIGH (ref 3.8–10.5)
WBC # FLD AUTO: 17.56 K/UL — HIGH (ref 3.8–10.5)

## 2021-06-08 PROCEDURE — 84550 ASSAY OF BLOOD/URIC ACID: CPT

## 2021-06-08 PROCEDURE — U0005: CPT

## 2021-06-08 PROCEDURE — 99239 HOSP IP/OBS DSCHRG MGMT >30: CPT | Mod: GC

## 2021-06-08 PROCEDURE — 86140 C-REACTIVE PROTEIN: CPT

## 2021-06-08 PROCEDURE — 73120 X-RAY EXAM OF HAND: CPT

## 2021-06-08 PROCEDURE — 73110 X-RAY EXAM OF WRIST: CPT

## 2021-06-08 PROCEDURE — 86769 SARS-COV-2 COVID-19 ANTIBODY: CPT

## 2021-06-08 PROCEDURE — 85025 COMPLETE CBC W/AUTO DIFF WBC: CPT

## 2021-06-08 PROCEDURE — 86900 BLOOD TYPING SEROLOGIC ABO: CPT

## 2021-06-08 PROCEDURE — 83935 ASSAY OF URINE OSMOLALITY: CPT

## 2021-06-08 PROCEDURE — 99231 SBSQ HOSP IP/OBS SF/LOW 25: CPT | Mod: GC

## 2021-06-08 PROCEDURE — 80053 COMPREHEN METABOLIC PANEL: CPT

## 2021-06-08 PROCEDURE — 84300 ASSAY OF URINE SODIUM: CPT

## 2021-06-08 PROCEDURE — 83735 ASSAY OF MAGNESIUM: CPT

## 2021-06-08 PROCEDURE — 80048 BASIC METABOLIC PNL TOTAL CA: CPT

## 2021-06-08 PROCEDURE — 86901 BLOOD TYPING SEROLOGIC RH(D): CPT

## 2021-06-08 PROCEDURE — U0003: CPT

## 2021-06-08 PROCEDURE — 81001 URINALYSIS AUTO W/SCOPE: CPT

## 2021-06-08 PROCEDURE — 84100 ASSAY OF PHOSPHORUS: CPT

## 2021-06-08 PROCEDURE — 99285 EMERGENCY DEPT VISIT HI MDM: CPT

## 2021-06-08 PROCEDURE — 85652 RBC SED RATE AUTOMATED: CPT

## 2021-06-08 PROCEDURE — 86850 RBC ANTIBODY SCREEN: CPT

## 2021-06-08 RX ORDER — METOPROLOL TARTRATE 50 MG
1 TABLET ORAL
Qty: 30 | Refills: 0
Start: 2021-06-08 | End: 2021-07-07

## 2021-06-08 RX ORDER — ASPIRIN/CALCIUM CARB/MAGNESIUM 324 MG
1 TABLET ORAL
Qty: 0 | Refills: 0 | DISCHARGE

## 2021-06-08 RX ORDER — ISOSORBIDE DINITRATE 5 MG/1
1 TABLET ORAL
Qty: 90 | Refills: 0
Start: 2021-06-08 | End: 2021-07-07

## 2021-06-08 RX ORDER — APIXABAN 2.5 MG/1
1 TABLET, FILM COATED ORAL
Qty: 60 | Refills: 0
Start: 2021-06-08 | End: 2021-07-07

## 2021-06-08 RX ORDER — AMIODARONE HYDROCHLORIDE 400 MG/1
1 TABLET ORAL
Qty: 30 | Refills: 0
Start: 2021-06-08 | End: 2021-07-07

## 2021-06-08 RX ORDER — APIXABAN 2.5 MG/1
1 TABLET, FILM COATED ORAL
Qty: 0 | Refills: 0 | DISCHARGE

## 2021-06-08 RX ORDER — SIMVASTATIN 20 MG/1
1 TABLET, FILM COATED ORAL
Qty: 30 | Refills: 0
Start: 2021-06-08 | End: 2021-07-07

## 2021-06-08 RX ORDER — AMIODARONE HYDROCHLORIDE 400 MG/1
1 TABLET ORAL
Qty: 0 | Refills: 0 | DISCHARGE

## 2021-06-08 RX ORDER — METOPROLOL TARTRATE 50 MG
1 TABLET ORAL
Qty: 0 | Refills: 0 | DISCHARGE

## 2021-06-08 RX ORDER — PANTOPRAZOLE SODIUM 20 MG/1
1 TABLET, DELAYED RELEASE ORAL
Qty: 30 | Refills: 0
Start: 2021-06-08 | End: 2021-07-07

## 2021-06-08 RX ORDER — FINASTERIDE 5 MG/1
1 TABLET, FILM COATED ORAL
Qty: 0 | Refills: 0 | DISCHARGE

## 2021-06-08 RX ORDER — FINASTERIDE 5 MG/1
1 TABLET, FILM COATED ORAL
Qty: 30 | Refills: 0
Start: 2021-06-08 | End: 2021-07-07

## 2021-06-08 RX ORDER — AMLODIPINE BESYLATE 2.5 MG/1
1 TABLET ORAL
Qty: 30 | Refills: 0
Start: 2021-06-08 | End: 2021-07-07

## 2021-06-08 RX ORDER — HYDRALAZINE HCL 50 MG
1 TABLET ORAL
Qty: 90 | Refills: 0
Start: 2021-06-08 | End: 2021-07-07

## 2021-06-08 RX ORDER — FERROUS SULFATE 325(65) MG
1 TABLET ORAL
Qty: 30 | Refills: 0
Start: 2021-06-08 | End: 2021-07-07

## 2021-06-08 RX ADMIN — FINASTERIDE 5 MILLIGRAM(S): 5 TABLET, FILM COATED ORAL at 12:39

## 2021-06-08 RX ADMIN — APIXABAN 5 MILLIGRAM(S): 2.5 TABLET, FILM COATED ORAL at 05:46

## 2021-06-08 RX ADMIN — AMLODIPINE BESYLATE 10 MILLIGRAM(S): 2.5 TABLET ORAL at 05:45

## 2021-06-08 RX ADMIN — ISOSORBIDE DINITRATE 10 MILLIGRAM(S): 5 TABLET ORAL at 05:45

## 2021-06-08 RX ADMIN — Medication 25 MILLIGRAM(S): at 05:45

## 2021-06-08 RX ADMIN — ISOSORBIDE DINITRATE 10 MILLIGRAM(S): 5 TABLET ORAL at 12:38

## 2021-06-08 RX ADMIN — AMIODARONE HYDROCHLORIDE 100 MILLIGRAM(S): 400 TABLET ORAL at 05:46

## 2021-06-08 RX ADMIN — PANTOPRAZOLE SODIUM 40 MILLIGRAM(S): 20 TABLET, DELAYED RELEASE ORAL at 05:46

## 2021-06-08 RX ADMIN — Medication 25 MILLIGRAM(S): at 05:46

## 2021-06-08 RX ADMIN — Medication 30 MILLIGRAM(S): at 10:27

## 2021-06-08 RX ADMIN — Medication 25 MILLIGRAM(S): at 15:58

## 2021-06-08 NOTE — PROGRESS NOTE ADULT - ASSESSMENT
65 Y M with a PMHx of HTN, HLD, Afib, CAD, CKD, CHF, seropositive erosive RA and Tophaceous gout presents with left hand pain and swelling.   #Acute oligoarthritis, suspecting gout flare likely in the setting of RUSSELL on CKD and dietary triggers, excellent response to steroids  X-rays and blood work reviewed and discussed with patient   #Known erosive deforming RA, burnt out disease, previously declined therapy  #Tophaceous gout, was transiently on Allopurinol then self discontinued since 2019 without follow up    Recommend:  -starting tomorrow: prednisone 20 mg daily for 3 days then 15 mg daily for 3 days then 10 mg daily for 3 days then 5 mg daily for 3 days then STOP  -patient to follow up with me in the office to discuss again reinitiation of ULT  -follow up with nephrology as planned    Patient aware of A/P  DW medical team

## 2021-06-08 NOTE — DISCHARGE NOTE PROVIDER - INSTRUCTIONS
Please avoid beer, red meat, seafood, sugary drinks and foods that are high in fructose, processed foods and refined carbohydrates.

## 2021-06-08 NOTE — PROGRESS NOTE ADULT - TIME BILLING
Agree with above NP note.  CV stable  Cr noted  Plan for outpt renal bx  Eliquis resumed   Cont current meds

## 2021-06-08 NOTE — DISCHARGE NOTE PROVIDER - NSDCFUADDINST_GEN_ALL_CORE_FT
Continue holding your aspirin until your renal biopsy. Continue with your eliquis until you have been advised to stop it prior to your procedure.

## 2021-06-08 NOTE — PROGRESS NOTE ADULT - PROBLEM SELECTOR PLAN 5
- CHADS vasc 4  - currently on eliquis, per nephrology if patient's hospitalization to last past this week can consider renal biopsy next week however given patient's improvement in symptoms will most likely be discharged in the next day or two and can get the renal biopsy as outpatient next week. Will restart eliquis for now with no plans for inpatient biopsy, fellow in agreement.   - C/w amiodarone and toprol

## 2021-06-08 NOTE — PROGRESS NOTE ADULT - SUBJECTIVE AND OBJECTIVE BOX
SYEDA OWEN  [id number]    INTERVAL HPI/OVERNIGHT EVENTS:    Patient reports significant improvement in left hand/wrist pain and swelling   he has no new complaints at this time       MEDICATIONS  (STANDING):  aMIOdarone    Tablet 100 milliGRAM(s) Oral daily  amLODIPine   Tablet 10 milliGRAM(s) Oral daily  apixaban 5 milliGRAM(s) Oral two times a day  ferrous    sulfate 325 milliGRAM(s) Oral <User Schedule>  finasteride 5 milliGRAM(s) Oral daily  hydrALAZINE 25 milliGRAM(s) Oral three times a day  isosorbide   dinitrate Tablet (ISORDIL) 10 milliGRAM(s) Oral three times a day  metoprolol succinate ER 25 milliGRAM(s) Oral daily  pantoprazole    Tablet 40 milliGRAM(s) Oral before breakfast  predniSONE   Tablet 30 milliGRAM(s) Oral daily  simvastatin 40 milliGRAM(s) Oral at bedtime    MEDICATIONS  (PRN):  acetaminophen   Tablet .. 650 milliGRAM(s) Oral every 6 hours PRN Temp greater or equal to 38C (100.4F), Mild Pain (1 - 3), Moderate Pain (4 - 6)      Allergies    No Known Allergies    Intolerances        Review of Systems:   as above       Vital Signs Last 24 Hrs  T(C): 36.4 (2021 13:18), Max: 36.8 (2021 20:26)  T(F): 97.6 (2021 13:18), Max: 98.2 (2021 20:26)  HR: 60 (2021 13:18) (56 - 68)  BP: 142/83 (2021 13:18) (121/68 - 158/97)  BP(mean): --  RR: 18 (2021 13:18) (18 - 20)  SpO2: 99% (2021 13:18) (98% - 100%)    Physical Exam:  General: NAD  HEENT: EOMI, MMM  MSK:  trace synovitis at left wrist, preserved ROM  chronic deformities  elbow tophaceous bursitis   AAOx3       LABS:                        10.1   17.56 )-----------( 264      ( 2021 07:12 )             32.0     06-08    142  |  107  |  48<H>  ----------------------------<  113<H>  3.9   |  19<L>  |  3.25<H>    Ca    9.0      2021 07:12  Phos  3.3       Mg     2.4     -        Urinalysis Basic - ( 2021 20:30 )    Color: Light Yellow / Appearance: Clear / S.009 / pH: x  Gluc: x / Ketone: Negative  / Bili: Negative / Urobili: Negative   Blood: x / Protein: 100 / Nitrite: Negative   Leuk Esterase: Negative / RBC: 2 /hpf / WBC 1 /HPF   Sq Epi: x / Non Sq Epi: 0 /hpf / Bacteria: Negative          RADIOLOGY & ADDITIONAL TESTS:  < from: Xray Wrist 3 Views, Left (21 @ 12:02) >  Redemonstration of severe joint space erosion and ankylosis involving the left radiocarpal and carpal joints, as well as erosions of the first and second MCParticular margins, consistent with inflammatory arthritis.  There is uniform soft tissue swelling around the radiocarpal, MCP and PIP joints.  No acute displaced fracture or dislocation.  Boutonniere deformity of the left thumb.    < end of copied text >

## 2021-06-08 NOTE — DISCHARGE NOTE PROVIDER - HOSPITAL COURSE
HPI:     63 y/o M with PMHx of HTN, HLD, CAD s/p PCI to LAD in 2014, PAD s/p lower ext stenting, BPH, Afib on eliquis, gout, RA (not on meds), CKD stage 3, Gout, presents to the ED c/o sudden onset atraumatic left hand pain with worsening swelling since 3 days ago. States that pain started in the 3rd, 4th, and 5th digits in the left hand. Pain is rated a 8/10 at its worst and associated with limited ROM and warmth in his left hand. Was recently admitted on 5/21/21 for acute rise in serum Cr noted from outpatient nephrology clinic. Lisinopril and HCTZ were D/C'ed with improvement in renal function. Pt had his peripheral IVs placed in his right hand during last admission. Denies recent trauma or injuries. Endorses prior gout flare-ups with an episode occurring in the left wrist and left elbow as well. Not taking any meds for gout at the current time. Further denies fevers, chest pain, or pain anywhere else in the body. The patient denies any dietary indiscretion, hasn't had alcohol in over 8 years, no cheeseburgers. Pain started about 3 days ago, took acetaminophen for the pain which did not resolve his symptoms.     In the ED the patient's vitals were Tmax 98.9, HR=93, EU=631/74, RR=18, SpO2=98%. He received tylenol, oxy, and prednisone for his pain. He was started on LR maintenance fluids. Labs were significant for a leukocytosis of ~13k, microcytic anemia of 10.5 (baseline appears to be 10-11), SCr of 3.68 (baseline per outpatient notes appears to be 1.7). Left hand and wrist XR consistent with severe joint space disease consistent with inflammatory arthritis, no evidence of acute fracture.     Hospital Course:    In the hospital the patient felt subjectively better after receiving steroids. Rheumatology evaluated the patient in the hospital for possible gout flare and determined that he can continue with his steroids with an outpatient taper schedule. Nephrology evaluated the patient regarding his RUSSELL on CKD. The patient's SCr has been stable in the ~3.0-3.5 range this admission. Per nephrology and patient, renal biopsy was deferred outpatient, patient's eliquis was restarted and his aspirin will remain on hold until the patient's renal biopsy can be completed. Today the patient is hemodynamically stable, his gout flare is now resolving, hand pain is now resolving, and his SCr has remained stable this admission. The patient is cleared for discharge from rheumatology, nephrology, and medicine perspective with close outpatient f/u with nephrology with regards to renal biopsy.

## 2021-06-08 NOTE — DISCHARGE NOTE NURSING/CASE MANAGEMENT/SOCIAL WORK - PATIENT PORTAL LINK FT
You can access the FollowMyHealth Patient Portal offered by [facility] by registering at the following website: [email or web address]. By joining [facility]’s FollowMyHealth portal, you will also be able to view your health information using other applications (apps) compatible with our system.

## 2021-06-08 NOTE — DISCHARGE NOTE PROVIDER - CARE PROVIDERS DIRECT ADDRESSES
[email or web address]

## 2021-06-08 NOTE — PROGRESS NOTE ADULT - PROBLEM SELECTOR PLAN 3
- ischemic CM, hx CAD s/p PCI to LAD in 2014, PAD s/p lower ext stenting  - TTE 5/2021 showed severe global LV dysfunction, moderate MR, decreased RV systolic function   - Follows with Cards Dr. Lee as cardiologist  - Currently euvolemic on exam   - Will hold Asa in anticipation for renal biopsy along with low dose decreasing uric acid clearance.

## 2021-06-08 NOTE — PROGRESS NOTE ADULT - PROBLEM SELECTOR PLAN 4
- C/w amlodipine, hydralazine, and isordil   - Monitor BP
- C/w amlodipine, hydralazine, and isordil   - Monitor BP

## 2021-06-08 NOTE — DISCHARGE NOTE PROVIDER - NSDCMRMEDTOKEN_GEN_ALL_CORE_FT
amiodarone 100 mg oral tablet: 1 tab(s) orally once a day  amLODIPine 10 mg oral tablet: 1 tab(s) orally once a day  aspirin 81 mg oral tablet: 1 tab(s) orally once a day  Eliquis 5 mg oral tablet: 1 tab(s) orally 2 times a day  ferrous sulfate 325 mg (65 mg elemental iron) oral tablet: 1 tab(s) orally once a day  finasteride 5 mg oral tablet: 1 tab(s) orally once a day  hydrALAZINE 25 mg oral tablet: 1 tab(s) orally 3 times a day  isosorbide dinitrate 10 mg oral tablet: 1 tab(s) orally 3 times a day  Metoprolol Succinate ER 25 mg oral tablet, extended release: 1 tab(s) orally once a day  pantoprazole 40 mg oral delayed release tablet: 1 tab(s) orally once a day (before a meal)  simvastatin 40 mg oral tablet: 1 tab(s) orally once a day (at bedtime)   amiodarone 100 mg oral tablet: 1 tab(s) orally once a day  amLODIPine 10 mg oral tablet: 1 tab(s) orally once a day  Eliquis 5 mg oral tablet: 1 tab(s) orally 2 times a day  ferrous sulfate 325 mg (65 mg elemental iron) oral tablet: 1 tab(s) orally once a day  finasteride 5 mg oral tablet: 1 tab(s) orally once a day  hydrALAZINE 25 mg oral tablet: 1 tab(s) orally 3 times a day  isosorbide dinitrate 10 mg oral tablet: 1 tab(s) orally 3 times a day  Metoprolol Succinate ER 25 mg oral tablet, extended release: 1 tab(s) orally once a day  pantoprazole 40 mg oral delayed release tablet: 1 tab(s) orally once a day (before a meal)  predniSONE 5 mg oral tablet: 1 tab(s) orally once a day   please take 4 tabs daily from 6/9-6/10, 3 tabs daily from 6/11-6/12, 2 tabs daily from 6/13-6/14, and 1 tab daily from 6/15-6/16, then stop   simvastatin 40 mg oral tablet: 1 tab(s) orally once a day (at bedtime)   amiodarone 100 mg oral tablet: 1 tab(s) orally once a day  amLODIPine 10 mg oral tablet: 1 tab(s) orally once a day  Eliquis 5 mg oral tablet: 1 tab(s) orally 2 times a day  ferrous sulfate 325 mg (65 mg elemental iron) oral tablet: 1 tab(s) orally once a day  finasteride 5 mg oral tablet: 1 tab(s) orally once a day  hydrALAZINE 25 mg oral tablet: 1 tab(s) orally 3 times a day  isosorbide dinitrate 10 mg oral tablet: 1 tab(s) orally 3 times a day  Metoprolol Succinate ER 25 mg oral tablet, extended release: 1 tab(s) orally once a day  pantoprazole 40 mg oral delayed release tablet: 1 tab(s) orally once a day (before a meal)  predniSONE 5 mg oral tablet: please take 4 tabs daily from 6/9-6/11, 3 tabs daily from 6/12-6/14, 2 tabs daily from 6/15-6/17, and 1 tab daily from 6/18-6/20, then stop   simvastatin 40 mg oral tablet: 1 tab(s) orally once a day (at bedtime)

## 2021-06-08 NOTE — DISCHARGE NOTE PROVIDER - CARE PROVIDER_API CALL
Justine Vega)  Internal Medicine; Rheumatology  [address], [address]  [place] [address]  Phone: [phone]  Fax: [phone]  Follow Up Time:     London Lara)  Internal Medicine  [address], [address]  [place] [address]  Phone: [phone]  Fax: [phone]  Follow Up Time:     Jamal Hayes)  Internal Medicine; Nephrology  [address] [place] [address], [address] [place] [address]  Phone: [phone]  Fax: [phone]  Follow Up Time:

## 2021-06-08 NOTE — PROGRESS NOTE ADULT - ASSESSMENT
65 y/o M with PMHx of HTN, HLD, CAD s/p PCI to LAD in 2014, PAD s/p lower ext stenting, BPH, Afib on eliquis, gout, RA (not on meds), CKD stage 3, Gout, presents to the ED c/o sudden onset atraumatic left hand pain with worsening swelling since 3 days ago admitted for concern for gout flare 2/2 possible dietary indiscretion.

## 2021-06-08 NOTE — DISCHARGE NOTE PROVIDER - NSDCFUADDAPPT_GEN_ALL_CORE_FT
Please follow up with your nephrologist Dr. Hayes within 1 week at [address]. Please continue to hold your aspirin on discharge until your biopsy is scheduled and once you are done with your procedure please restart your aspirin once advised by your PCP or nephrologist.

## 2021-06-08 NOTE — PROGRESS NOTE ADULT - PROBLEM SELECTOR PLAN 8
- S/p prostate MRI in August 2020, which demonstrated an enlarged prostate without suspicious prostatic lesions, PI-RADS 2.  - On finasteride at home, c/w finasteride

## 2021-06-08 NOTE — PROGRESS NOTE ADULT - SUBJECTIVE AND OBJECTIVE BOX
Spencer Davis M.D.  Internal Medicine PGY-1  [phone] / [id number]     Patient is a 65y old  Male who presents with a chief complaint of L hand pain and edema (2021 16:47)      SUBJECTIVE / OVERNIGHT EVENTS:          MEDICATIONS  (STANDING):  aMIOdarone    Tablet 100 milliGRAM(s) Oral daily  amLODIPine   Tablet 10 milliGRAM(s) Oral daily  apixaban 5 milliGRAM(s) Oral two times a day  ferrous    sulfate 325 milliGRAM(s) Oral <User Schedule>  finasteride 5 milliGRAM(s) Oral daily  hydrALAZINE 25 milliGRAM(s) Oral three times a day  isosorbide   dinitrate Tablet (ISORDIL) 10 milliGRAM(s) Oral three times a day  metoprolol succinate ER 25 milliGRAM(s) Oral daily  pantoprazole    Tablet 40 milliGRAM(s) Oral before breakfast  predniSONE   Tablet 30 milliGRAM(s) Oral daily  simvastatin 40 milliGRAM(s) Oral at bedtime    MEDICATIONS  (PRN):  acetaminophen   Tablet .. 650 milliGRAM(s) Oral every 6 hours PRN Temp greater or equal to 38C (100.4F), Mild Pain (1 - 3), Moderate Pain (4 - 6)      Vital Signs Last 24 Hrs  T(C): 36.4 (2021 05:38), Max: 36.8 (2021 09:04)  T(F): 97.5 (2021 05:38), Max: 98.2 (2021 09:04)  HR: 65 (2021 05:38) (56 - 68)  BP: 121/68 (2021 05:38) (121/68 - 158/97)  BP(mean): --  RR: 18 (2021 05:38) (18 - 18)  SpO2: 98% (2021 05:38) (98% - 100%)      PHYSICAL EXAM  GENERAL: NAD, well-developed  HEAD:  Atraumatic, Normocephalic  EYES: EOMI, PERRLA, conjunctiva and sclera clear  NECK: Supple, No JVD  CHEST/LUNG: Clear to auscultation bilaterally; No wheeze  HEART: Regular rate and rhythm; No murmurs, rubs, or gallops  ABDOMEN: Soft, Nontender, Nondistended; Bowel sounds present  EXTREMITIES:  2+ Peripheral Pulses, No clubbing, cyanosis, or edema  PSYCH: AAOx3  SKIN: No rashes or lesions    CAPILLARY BLOOD GLUCOSE        I&O's Summary    2021 07:01  -  2021 07:00  --------------------------------------------------------  IN: 680 mL / OUT: 400 mL / NET: 280 mL        LABS:                        11.1   13.18 )-----------( 257      ( 2021 07:13 )             35.1     06-07    136  |  103  |  38<H>  ----------------------------<  79  4.2   |  18<L>  |  3.37<H>    Ca    9.0      2021 07:13  Phos  4.2     06-  Mg     2.4     06-    TPro  7.3  /  Alb  3.1<L>  /  TBili  0.7  /  DBili  x   /  AST  9<L>  /  ALT  5<L>  /  AlkPhos  114  06-06          Urinalysis Basic - ( 2021 20:30 )    Color: Light Yellow / Appearance: Clear / S.009 / pH: x  Gluc: x / Ketone: Negative  / Bili: Negative / Urobili: Negative   Blood: x / Protein: 100 / Nitrite: Negative   Leuk Esterase: Negative / RBC: 2 /hpf / WBC 1 /HPF   Sq Epi: x / Non Sq Epi: 0 /hpf / Bacteria: Negative        RADIOLOGY & ADDITIONAL TESTS:     MICROBIOLOGY:    ANTIMICROBIALS:    CONSULTS: Spencer Davis M.D.  Internal Medicine PGY-1  [phone] / [id number]     Patient is a 65y old  Male who presents with a chief complaint of L hand pain and edema (2021 16:47)      SUBJECTIVE / OVERNIGHT EVENTS:    Seen and examined at the bedside this am. Per nursing no acute events overnight. Hand pain much improved. No fevers, chills, n/v/d. No chest pain.       MEDICATIONS  (STANDING):  aMIOdarone    Tablet 100 milliGRAM(s) Oral daily  amLODIPine   Tablet 10 milliGRAM(s) Oral daily  apixaban 5 milliGRAM(s) Oral two times a day  ferrous    sulfate 325 milliGRAM(s) Oral <User Schedule>  finasteride 5 milliGRAM(s) Oral daily  hydrALAZINE 25 milliGRAM(s) Oral three times a day  isosorbide   dinitrate Tablet (ISORDIL) 10 milliGRAM(s) Oral three times a day  metoprolol succinate ER 25 milliGRAM(s) Oral daily  pantoprazole    Tablet 40 milliGRAM(s) Oral before breakfast  predniSONE   Tablet 30 milliGRAM(s) Oral daily  simvastatin 40 milliGRAM(s) Oral at bedtime    MEDICATIONS  (PRN):  acetaminophen   Tablet .. 650 milliGRAM(s) Oral every 6 hours PRN Temp greater or equal to 38C (100.4F), Mild Pain (1 - 3), Moderate Pain (4 - 6)      Vital Signs Last 24 Hrs  T(C): 36.4 (2021 05:38), Max: 36.8 (2021 09:04)  T(F): 97.5 (2021 05:38), Max: 98.2 (2021 09:04)  HR: 65 (2021 05:38) (56 - 68)  BP: 121/68 (2021 05:38) (121/68 - 158/97)  BP(mean): --  RR: 18 (2021 05:38) (18 - 18)  SpO2: 98% (2021 05:38) (98% - 100%)      PHYSICAL EXAM  GENERAL: NAD, well-developed  HEAD:  Atraumatic, Normocephalic  EYES: EOMI, PERRLA, conjunctiva and sclera clear  NECK: Supple, No JVD  CHEST/LUNG: Clear to auscultation bilaterally; No wheeze  HEART: Regular rate and rhythm; No murmurs, rubs, or gallops  ABDOMEN: Soft, Nontender, Nondistended; Bowel sounds present  EXTREMITIES: +left 3,4,5th MCP less swollen, still some residual TTP however more flexion and increased  strength.   PSYCH: AAOx3  SKIN: No rashes or lesions      CAPILLARY BLOOD GLUCOSE        I&O's Summary    2021 07:01  -  2021 07:00  --------------------------------------------------------  IN: 680 mL / OUT: 400 mL / NET: 280 mL        LABS:                        11.1   13.18 )-----------( 257      ( 2021 07:13 )             35.1     06-07    136  |  103  |  38<H>  ----------------------------<  79  4.2   |  18<L>  |  3.37<H>    Ca    9.0      2021 07:13  Phos  4.2     06-07  Mg     2.4     06-07    TPro  7.3  /  Alb  3.1<L>  /  TBili  0.7  /  DBili  x   /  AST  9<L>  /  ALT  5<L>  /  AlkPhos  114  06-06          Urinalysis Basic - ( 2021 20:30 )    Color: Light Yellow / Appearance: Clear / S.009 / pH: x  Gluc: x / Ketone: Negative  / Bili: Negative / Urobili: Negative   Blood: x / Protein: 100 / Nitrite: Negative   Leuk Esterase: Negative / RBC: 2 /hpf / WBC 1 /HPF   Sq Epi: x / Non Sq Epi: 0 /hpf / Bacteria: Negative        RADIOLOGY & ADDITIONAL TESTS:     MICROBIOLOGY:    ANTIMICROBIALS:    CONSULTS:

## 2021-06-08 NOTE — PROGRESS NOTE ADULT - PROBLEM SELECTOR PLAN 9
Plan: dvt ppx- Restarting eliquis 5mg i/s/o biopsy deferred to outpatient per nephrology with no plans to hold patient inpatient for renal biopsy.  diet- regular DASH  dispo- home after symptomatic improvement.

## 2021-06-08 NOTE — PROGRESS NOTE ADULT - ATTENDING COMMENTS
64M with PMHx of HTN, HLD, CAD s/p PCI to LAD in 2014, PAD s/p lower ext stenting, BPH, Afib on eliquis, gout, RA (not on meds), CKD stage 3, Gout, presents to the ED c/o sudden onset atraumatic left hand pain with worsening swelling since 3 days ago. Also noted to have progressively worsening RUSSELL.    Acute gout Flare  - improved with medrol --> rheum would like to change to prednisone.   - will f/u Rheum for tapering dose.  - pt will need ULT --> to be started outpatient per rheumatology    RUSSELL   -Renal dose meds  -Nephrology eval appreciated. Pt will need Renal biopsy  -Eliquis currently on hold in case of kidney biopsy  -Cont. other home meds    stable for discharge today.  Discharge time spent: 32 min
pt seen on 6/7/2021 @11:45pm    64M with PMHx of HTN, HLD, CAD s/p PCI to LAD in 2014, PAD s/p lower ext stenting, BPH, Afib on eliquis, gout, RA (not on meds), CKD stage 3, Gout, presents to the ED c/o sudden onset atraumatic left hand pain with worsening swelling since 3 days ago. Also noted to have progressively worsening RUSSELL.    Acute gout Flare  - improved with medrol  -Cont. medrol dose pack   -Rheum consult pending    RUSSELL   -Renal dose meds  -Nephrology eval appreciated. Pt will need Renal biopsy  -Eliquis currently on hold in case of kidney biopsy  -Cont. other home meds

## 2021-06-08 NOTE — DISCHARGE NOTE PROVIDER - NSDCCPTREATMENT_GEN_ALL_CORE_FT
PRINCIPAL PROCEDURE  Procedure: Xray left 5th finger  Findings and Treatment:   < end of copied text >  Redemonstration of severe joint space erosion and ankylosis involving the left radiocarpal and carpal joints, as well as erosions of the first and second MCParticular margins, consistent with inflammatory arthritis.  There is uniform soft tissue swelling around the radiocarpal, MCP and PIP joints.  No acute displaced fracture or dislocation.  Boutonniere deformity of the left thumb.< from: Xray Wrist 3 Views, Left (06.06.21 @ 12:02) >

## 2021-06-08 NOTE — PROGRESS NOTE ADULT - ASSESSMENT
Echo 5/28/21: mod MR, severe global lv sys dyxfx, mild TR, min KS  Office Echo 10/2/18: EF 50%, mild-mod MR, min AR, mild lv sys dysfx   LHC 2/21/18: PCI to LAD  NICOLETTE 2/13/18: EF 25%, severe MR, severe segmental lv sys dysfx, mild TR, mild pulm HTN     a/p   63 y/o M with well known to practice with PMHx of HTN, HLD, CAD s/p PCI to LAD in 2014, PAD s/p lower ext stenting, BPH, Afib on eliquis, gout, RA (not on meds), CKD stage 3, Gout, presents to the ED c/o sudden onset atraumatic left hand pain with worsening swelling since 3 days ago.     #L hand pain/swelling  -r/o Gout. rheum w/u  -medrol    #RUSSELL on CKD  -Cr noted  -acei, eliqius, hctz remain on hold  -Possible renal bx if no improvement -  pt can proceed from a cv standpoint   -renal f/u    #Coronary Artery Disease. S/P PCI to LAD  -stable without chest pain or dyspnea  -continue toprol, statin, and asa 81mg daily    #Atrial Fibrillation/Flutter. S/P AF Ablation  -in NSR - continue amiodarone and metoprolol  -AFlutter on ecg 5/27  -Eliquis held for possible renal bx    -will need eventual repeat ablation in light of severe lv dysfunction  -patient with history of AF induced cmp that resolved post maint of sr    #Chronic Systolic Heart Failure/ Cardiomyopathy  -Recent echo with severe lv dsyfxn  -continue med tx for now   -will need eventual repeat ablation  -Continue beta blocker  -acei held for russell  -vol status stable - no diuretic need     #Mitral Regurgitation  -moderate on echo  -cont med tx    #Hypertension  -bp stable  -acei/hctz on hold as above  -cont bb, hydral, ccb, imdur     dvt ppx     Echo 5/28/21: mod MR, severe global lv sys dyxfx, mild TR, min UT  Office Echo 10/2/18: EF 50%, mild-mod MR, min AR, mild lv sys dysfx   LHC 2/21/18: PCI to LAD  NICOLETTE 2/13/18: EF 25%, severe MR, severe segmental lv sys dysfx, mild TR, mild pulm HTN     a/p   65 y/o M with well known to practice with PMHx of HTN, HLD, CAD s/p PCI to LAD in 2014, PAD s/p lower ext stenting, BPH, Afib on eliquis, gout, RA (not on meds), CKD stage 3, Gout, presents to the ED c/o sudden onset atraumatic left hand pain with worsening swelling since 3 days ago.     #L hand pain/swelling  -r/o Gout. rheum w/u  -medrol    #RUSSELL on CKD  -Cr noted  -acei, hctz remain on hold  -Eliquis resumed - outpt renal bx   -renal f/u    #Coronary Artery Disease. S/P PCI to LAD  -stable without chest pain or dyspnea  -continue toprol, statin, and asa 81mg daily    #Atrial Fibrillation/Flutter. S/P AF Ablation  -in NSR - continue amiodarone and metoprolol  -AFlutter on ecg 5/27  -Eliquis resumed   -will need eventual repeat ablation in light of severe lv dysfunction  -patient with history of AF induced cmp that resolved post maint of sr    #Chronic Systolic Heart Failure/ Cardiomyopathy  -Recent echo with severe lv dsyfxn  -continue med tx for now   -will need eventual repeat ablation  -Continue beta blocker  -acei held for russell  -vol status stable - no diuretic need     #Mitral Regurgitation  -moderate on echo  -cont med tx    #Hypertension  -bp stable  -acei/hctz on hold as above  -cont bb, hydral, ccb, imdur     dvt ppx

## 2021-06-08 NOTE — PROGRESS NOTE ADULT - SUBJECTIVE AND OBJECTIVE BOX
CARDIOLOGY FOLLOW UP - Dr. Lee  Date of Service: 6/8/21  CC: denies cp, sob, and palpitations     Review of Systems:  Constitutional: No fever, weight loss, or fatigue  Respiratory: No cough, wheezing, or hemoptysis, no shortness of breath  Cardiovascular: No chest pain, palpitations, passing out, dizziness, or leg swelling  Gastrointestinal: No abd or epigastric pain.  No nausea, vomiting, or hematemesis; no diarrhea or constipation, no melena or hematochezia  Vascular: no edema       PHYSICAL EXAM:  T(C): 36.3 (06-08-21 @ 08:35), Max: 36.8 (06-07-21 @ 20:26)  HR: 59 (06-08-21 @ 08:35) (56 - 68)  BP: 131/80 (06-08-21 @ 08:35) (121/68 - 158/97)  RR: 20 (06-08-21 @ 08:35) (18 - 20)  SpO2: 98% (06-08-21 @ 08:35) (98% - 100%)  Wt(kg): --  I&O's Summary    07 Jun 2021 07:01  -  08 Jun 2021 07:00  --------------------------------------------------------  IN: 680 mL / OUT: 400 mL / NET: 280 mL    08 Jun 2021 07:01  -  08 Jun 2021 10:36  --------------------------------------------------------  IN: 360 mL / OUT: 0 mL / NET: 360 mL        Appearance: Normal	  Cardiovascular: Normal S1 S2,RRR, No JVD, No murmurs  Respiratory: Lungs clear to auscultation	  Gastrointestinal:  Soft, Non-tender, + BS	  Extremities: Normal range of motion, No clubbing, cyanosis or edema      Home Medications:  amiodarone 100 mg oral tablet: 1 tab(s) orally once a day (06 Jun 2021 18:52)  aspirin 81 mg oral tablet: 1 tab(s) orally once a day (06 Jun 2021 18:52)  Eliquis 5 mg oral tablet: 1 tab(s) orally 2 times a day (06 Jun 2021 18:52)  finasteride 5 mg oral tablet: 1 tab(s) orally once a day (06 Jun 2021 18:52)  Metoprolol Succinate ER 25 mg oral tablet, extended release: 1 tab(s) orally once a day (06 Jun 2021 18:52)  pantoprazole 40 mg oral delayed release tablet: 1 tab(s) orally once a day (before a meal) (06 Jun 2021 18:52)  simvastatin 40 mg oral tablet: 1 tab(s) orally once a day (at bedtime) (06 Jun 2021 18:52)      MEDICATIONS  (STANDING):  aMIOdarone    Tablet 100 milliGRAM(s) Oral daily  amLODIPine   Tablet 10 milliGRAM(s) Oral daily  apixaban 5 milliGRAM(s) Oral two times a day  ferrous    sulfate 325 milliGRAM(s) Oral <User Schedule>  finasteride 5 milliGRAM(s) Oral daily  hydrALAZINE 25 milliGRAM(s) Oral three times a day  isosorbide   dinitrate Tablet (ISORDIL) 10 milliGRAM(s) Oral three times a day  metoprolol succinate ER 25 milliGRAM(s) Oral daily  pantoprazole    Tablet 40 milliGRAM(s) Oral before breakfast  predniSONE   Tablet 30 milliGRAM(s) Oral daily  simvastatin 40 milliGRAM(s) Oral at bedtime      TELEMETRY: 	    ECG:  	  RADIOLOGY:   DIAGNOSTIC TESTING:  [ ] Echocardiogram:  [ ]  Catheterization:  [ ] Stress Test:    OTHER: 	    LABS:	 	                            10.1   17.56 )-----------( 264      ( 08 Jun 2021 07:12 )             32.0     06-08    142  |  107  |  48<H>  ----------------------------<  113<H>  3.9   |  19<L>  |  3.25<H>    Ca    9.0      08 Jun 2021 07:12  Phos  3.3     06-08  Mg     2.4     06-08    TPro  7.3  /  Alb  3.1<L>  /  TBili  0.7  /  DBili  x   /  AST  9<L>  /  ALT  5<L>  /  AlkPhos  114  06-06

## 2021-06-08 NOTE — PROGRESS NOTE ADULT - PROBLEM SELECTOR PLAN 1
XR concerning for inflammatory arthritis. Left wrist and MCP joints swollen, minimal effusions, no rashses. Hx of Gout previously, taken off allopurinol maintenance in the setting of the patient's worsening renal function. DDx includes gout vs. RA vs. possible septic joint. Most likely gout flare given mono-articular nature of the patient's gout.   -Transitioned from solumedrol to prednisone 30qd.   -Rheum following appreciate recs.   -Avoid NSAIDs in the setting of RUSSELL  -Monitor pain  -Can consider uricosuric options like SGLT2 inhibitor, ARB as outpatient (CR fluctuation and GFR probably limit these options). XR concerning for inflammatory arthritis. Left wrist and MCP joints swollen, minimal effusions, no rashses. Hx of Gout previously, taken off allopurinol maintenance in the setting of the patient's worsening renal function. DDx includes gout vs. RA vs. possible septic joint. Most likely gout flare given mono-articular nature of the patient's gout.   -Transitioned from solumedrol to prednisone 30qd will taper per rheum note as outpatient.   -Rheum following appreciate recs.   -Avoid NSAIDs in the setting of RUSSELL  -Monitor pain  -Can consider uricosuric options like SGLT2 inhibitor, ARB as outpatient (CR fluctuation and GFR probably limit these options).

## 2021-06-08 NOTE — DISCHARGE NOTE PROVIDER - NSDCFUSCHEDAPPT_GEN_ALL_CORE_FT
SYEDA WEAVER ; 06/08/2021 ; [facility] [address] SYEDA Bañuelos ; 06/09/2021 ; [facility] [address] [facility]  SYEDA WEAVER ; 06/30/2021 ; [facility]ph[facility] [address] SYEDA Bañuelos ; 08/11/2021 ; [facility] Neph[facility] [address]  SYEDA WEAVER ; 06/09/2021 ; [facility] [address] [facility]  SYEDA WEAVER ; 06/30/2021 ; [facility] Neph[facility] [address] SYEDA Bañuelos ; 08/11/2021 ; [facility] Nephro [address]

## 2021-06-08 NOTE — PROGRESS NOTE ADULT - PROBLEM SELECTOR PLAN 2
RUSSELL on CKD. Thought to be in the setting of longstanding HTN   Previous sonogram 5/2021 with increased echogenicity consistent with renal parenchymal disease, no evidence of hydronephrosis, no evidence of renal artery stenosis.  Pt taken off allopurinol, ACEi, and HCTZ recently   UA unremarkable, Alejandro and UOsm noted.   Renal consulted, will defer renal biopsy to outpatient for patient. Will restart eliquis.   Recent workup showed negative NAILA, spep, upep  Monitor I/Os

## 2021-06-09 ENCOUNTER — NON-APPOINTMENT (OUTPATIENT)
Age: 66
End: 2021-06-09

## 2021-06-09 ENCOUNTER — RESULT REVIEW (OUTPATIENT)
Age: 66
End: 2021-06-09

## 2021-06-09 ENCOUNTER — APPOINTMENT (OUTPATIENT)
Dept: INTERNAL MEDICINE | Facility: CLINIC | Age: 66
End: 2021-06-09
Payer: COMMERCIAL

## 2021-06-09 PROCEDURE — 99213 OFFICE O/P EST LOW 20 MIN: CPT

## 2021-06-10 ENCOUNTER — NON-APPOINTMENT (OUTPATIENT)
Age: 66
End: 2021-06-10

## 2021-06-10 ENCOUNTER — LABORATORY RESULT (OUTPATIENT)
Age: 66
End: 2021-06-10

## 2021-06-10 VITALS — HEART RATE: 60 BPM | RESPIRATION RATE: 14 BRPM | SYSTOLIC BLOOD PRESSURE: 110 MMHG | DIASTOLIC BLOOD PRESSURE: 70 MMHG

## 2021-06-10 NOTE — PHYSICAL EXAM
[Irregularly Irregular] : irregularly irregular [Normal] : soft, non-tender, non-distended, no masses palpated, no HSM and normal bowel sounds

## 2021-06-10 NOTE — HISTORY OF PRESENT ILLNESS
[de-identified] : RTC after recent hospitalization.\par Had a gout exacerbation but also had RUSSELL on CKD. \par Nephrology is planning a kidney biopsy. \par Gout resolving with use of prednisone. \par He prefers this over allopurinol.  Has a f/u appointment with rheumatology.\par Needs note for work. \par No chest pain, palpitations, LIRA, orthopnea, PND, lightheadedness or edema.\par \par

## 2021-06-11 ENCOUNTER — NON-APPOINTMENT (OUTPATIENT)
Age: 66
End: 2021-06-11

## 2021-06-11 LAB
ALBUMIN SERPL ELPH-MCNC: 3.4 G/DL
ALP BLD-CCNC: 117 U/L
ALT SERPL-CCNC: 5 U/L
ANION GAP SERPL CALC-SCNC: 14 MMOL/L
APPEARANCE: CLEAR
APTT BLD: 36.2 SEC
AST SERPL-CCNC: 9 U/L
BASOPHILS # BLD AUTO: 0.03 K/UL
BASOPHILS NFR BLD AUTO: 0.2 %
BILIRUB SERPL-MCNC: 0.4 MG/DL
BILIRUBIN URINE: NEGATIVE
BLOOD URINE: NEGATIVE
BUN SERPL-MCNC: 50 MG/DL
CALCIUM SERPL-MCNC: 8.9 MG/DL
CHLORIDE SERPL-SCNC: 107 MMOL/L
CO2 SERPL-SCNC: 20 MMOL/L
COLOR: YELLOW
CREAT SERPL-MCNC: 3.64 MG/DL
EOSINOPHIL # BLD AUTO: 0 K/UL
EOSINOPHIL NFR BLD AUTO: 0 %
GLUCOSE QUALITATIVE U: NEGATIVE
GLUCOSE SERPL-MCNC: 63 MG/DL
HCT VFR BLD CALC: 32.9 %
HGB BLD-MCNC: 10.3 G/DL
IMM GRANULOCYTES NFR BLD AUTO: 0.4 %
INR PPP: 1.59 RATIO
KETONES URINE: NEGATIVE
LEUKOCYTE ESTERASE URINE: NEGATIVE
LYMPHOCYTES # BLD AUTO: 0.92 K/UL
LYMPHOCYTES NFR BLD AUTO: 7.1 %
MAN DIFF?: NORMAL
MCHC RBC-ENTMCNC: 19 PG
MCHC RBC-ENTMCNC: 31.3 GM/DL
MCV RBC AUTO: 60.6 FL
MONOCYTES # BLD AUTO: 0.96 K/UL
MONOCYTES NFR BLD AUTO: 7.4 %
NEUTROPHILS # BLD AUTO: 10.96 K/UL
NEUTROPHILS NFR BLD AUTO: 84.9 %
NITRITE URINE: NEGATIVE
PH URINE: 6
PLATELET # BLD AUTO: 287 K/UL
POTASSIUM SERPL-SCNC: 4.9 MMOL/L
PROT SERPL-MCNC: 6.9 G/DL
PROTEIN URINE: ABNORMAL
PT BLD: 18.3 SEC
RBC # BLD: 5.43 M/UL
RBC # FLD: 19.5 %
SODIUM SERPL-SCNC: 141 MMOL/L
SPECIFIC GRAVITY URINE: 1.02
UROBILINOGEN URINE: ABNORMAL
WBC # FLD AUTO: 12.92 K/UL

## 2021-06-12 ENCOUNTER — OUTPATIENT (OUTPATIENT)
Dept: OUTPATIENT SERVICES | Facility: HOSPITAL | Age: 66
LOS: 1 days | End: 2021-06-12
Payer: COMMERCIAL

## 2021-06-12 DIAGNOSIS — Z98.890 OTHER SPECIFIED POSTPROCEDURAL STATES: Chronic | ICD-10-CM

## 2021-06-12 DIAGNOSIS — Z98.49 CATARACT EXTRACTION STATUS, UNSPECIFIED EYE: Chronic | ICD-10-CM

## 2021-06-12 DIAGNOSIS — Z11.52 ENCOUNTER FOR SCREENING FOR COVID-19: ICD-10-CM

## 2021-06-12 DIAGNOSIS — Z95.9 PRESENCE OF CARDIAC AND VASCULAR IMPLANT AND GRAFT, UNSPECIFIED: Chronic | ICD-10-CM

## 2021-06-14 PROCEDURE — C9803: CPT

## 2021-06-14 PROCEDURE — U0005: CPT

## 2021-06-14 PROCEDURE — U0003: CPT

## 2021-06-15 ENCOUNTER — OUTPATIENT (OUTPATIENT)
Dept: OUTPATIENT SERVICES | Facility: HOSPITAL | Age: 66
LOS: 1 days | End: 2021-06-15
Payer: COMMERCIAL

## 2021-06-15 ENCOUNTER — RESULT REVIEW (OUTPATIENT)
Age: 66
End: 2021-06-15

## 2021-06-15 DIAGNOSIS — Z98.49 CATARACT EXTRACTION STATUS, UNSPECIFIED EYE: Chronic | ICD-10-CM

## 2021-06-15 DIAGNOSIS — Z00.00 ENCOUNTER FOR GENERAL ADULT MEDICAL EXAMINATION WITHOUT ABNORMAL FINDINGS: ICD-10-CM

## 2021-06-15 DIAGNOSIS — N17.9 ACUTE KIDNEY FAILURE, UNSPECIFIED: ICD-10-CM

## 2021-06-15 DIAGNOSIS — Z95.9 PRESENCE OF CARDIAC AND VASCULAR IMPLANT AND GRAFT, UNSPECIFIED: Chronic | ICD-10-CM

## 2021-06-15 DIAGNOSIS — Z98.890 OTHER SPECIFIED POSTPROCEDURAL STATES: Chronic | ICD-10-CM

## 2021-06-15 LAB
APTT BLD: 33 SEC — SIGNIFICANT CHANGE UP (ref 27.5–35.5)
INR BLD: 1.15 RATIO — SIGNIFICANT CHANGE UP (ref 0.88–1.16)
PROTHROM AB SERPL-ACNC: 13.7 SEC — HIGH (ref 10.6–13.6)
SARS-COV-2 RNA SPEC QL NAA+PROBE: SIGNIFICANT CHANGE UP

## 2021-06-15 PROCEDURE — 76942 ECHO GUIDE FOR BIOPSY: CPT | Mod: 26

## 2021-06-15 PROCEDURE — 88305 TISSUE EXAM BY PATHOLOGIST: CPT | Mod: 26

## 2021-06-15 PROCEDURE — 88348 ELECTRON MICROSCOPY DX: CPT | Mod: 26

## 2021-06-15 PROCEDURE — 88305 TISSUE EXAM BY PATHOLOGIST: CPT

## 2021-06-15 PROCEDURE — 88312 SPECIAL STAINS GROUP 1: CPT

## 2021-06-15 PROCEDURE — 88346 IMFLUOR 1ST 1ANTB STAIN PX: CPT | Mod: 26

## 2021-06-15 PROCEDURE — 50200 RENAL BIOPSY PERQ: CPT | Mod: LT

## 2021-06-15 PROCEDURE — 88350 IMFLUOR EA ADDL 1ANTB STN PX: CPT | Mod: 26

## 2021-06-15 PROCEDURE — 50200 RENAL BIOPSY PERQ: CPT

## 2021-06-15 PROCEDURE — 88313 SPECIAL STAINS GROUP 2: CPT | Mod: 26

## 2021-06-15 PROCEDURE — 76942 ECHO GUIDE FOR BIOPSY: CPT

## 2021-06-15 PROCEDURE — 88346 IMFLUOR 1ST 1ANTB STAIN PX: CPT

## 2021-06-15 PROCEDURE — 88350 IMFLUOR EA ADDL 1ANTB STN PX: CPT

## 2021-06-15 PROCEDURE — 88312 SPECIAL STAINS GROUP 1: CPT | Mod: 26

## 2021-06-15 PROCEDURE — 88348 ELECTRON MICROSCOPY DX: CPT

## 2021-06-15 PROCEDURE — 88313 SPECIAL STAINS GROUP 2: CPT

## 2021-06-15 PROCEDURE — 85730 THROMBOPLASTIN TIME PARTIAL: CPT

## 2021-06-15 PROCEDURE — 85610 PROTHROMBIN TIME: CPT

## 2021-06-16 ENCOUNTER — APPOINTMENT (OUTPATIENT)
Dept: ULTRASOUND IMAGING | Facility: HOSPITAL | Age: 66
End: 2021-06-16

## 2021-06-17 LAB — SURGICAL PATHOLOGY STUDY: SIGNIFICANT CHANGE UP

## 2021-06-30 ENCOUNTER — APPOINTMENT (OUTPATIENT)
Dept: NEPHROLOGY | Facility: CLINIC | Age: 66
End: 2021-06-30
Payer: COMMERCIAL

## 2021-06-30 VITALS
SYSTOLIC BLOOD PRESSURE: 132 MMHG | TEMPERATURE: 97.6 F | HEART RATE: 62 BPM | BODY MASS INDEX: 28.44 KG/M2 | WEIGHT: 203.92 LBS | OXYGEN SATURATION: 98 % | DIASTOLIC BLOOD PRESSURE: 89 MMHG

## 2021-06-30 VITALS — SYSTOLIC BLOOD PRESSURE: 120 MMHG | DIASTOLIC BLOOD PRESSURE: 80 MMHG

## 2021-06-30 PROCEDURE — 99214 OFFICE O/P EST MOD 30 MIN: CPT

## 2021-06-30 RX ORDER — LISINOPRIL 2.5 MG/1
2.5 TABLET ORAL TWICE DAILY
Qty: 120 | Refills: 5 | Status: DISCONTINUED | COMMUNITY
Start: 2018-01-16 | End: 2021-06-30

## 2021-06-30 RX ORDER — POTASSIUM CHLORIDE 1500 MG/1
20 TABLET, FILM COATED, EXTENDED RELEASE ORAL DAILY
Qty: 3 | Refills: 0 | Status: DISCONTINUED | COMMUNITY
Start: 2019-09-27 | End: 2021-06-30

## 2021-06-30 RX ORDER — HYDROCHLOROTHIAZIDE 12.5 MG/1
12.5 TABLET ORAL DAILY
Qty: 90 | Refills: 3 | Status: DISCONTINUED | COMMUNITY
Start: 2018-07-25 | End: 2021-06-30

## 2021-07-01 LAB
ALBUMIN SERPL ELPH-MCNC: 3.5 G/DL
ANION GAP SERPL CALC-SCNC: 12 MMOL/L
APPEARANCE: CLEAR
BACTERIA: NEGATIVE
BASOPHILS # BLD AUTO: 0.08 K/UL
BASOPHILS NFR BLD AUTO: 0.7 %
BILIRUBIN URINE: NEGATIVE
BLOOD URINE: NEGATIVE
BUN SERPL-MCNC: 35 MG/DL
CALCIUM SERPL-MCNC: 9 MG/DL
CHLORIDE SERPL-SCNC: 109 MMOL/L
CO2 SERPL-SCNC: 22 MMOL/L
COLOR: NORMAL
CREAT SERPL-MCNC: 2.9 MG/DL
CREAT SPEC-SCNC: 63 MG/DL
CREAT/PROT UR: 1.8 RATIO
EOSINOPHIL # BLD AUTO: 0.42 K/UL
EOSINOPHIL NFR BLD AUTO: 3.8 %
GLUCOSE QUALITATIVE U: NEGATIVE
GLUCOSE SERPL-MCNC: 66 MG/DL
HCT VFR BLD CALC: 32 %
HGB BLD-MCNC: 10 G/DL
HYALINE CASTS: 0 /LPF
IMM GRANULOCYTES NFR BLD AUTO: 0.4 %
KETONES URINE: NEGATIVE
LEUKOCYTE ESTERASE URINE: NEGATIVE
LYMPHOCYTES # BLD AUTO: 1.71 K/UL
LYMPHOCYTES NFR BLD AUTO: 15.5 %
MAN DIFF?: NORMAL
MCHC RBC-ENTMCNC: 18.9 PG
MCHC RBC-ENTMCNC: 31.3 GM/DL
MCV RBC AUTO: 60.4 FL
MICROSCOPIC-UA: NORMAL
MONOCYTES # BLD AUTO: 0.75 K/UL
MONOCYTES NFR BLD AUTO: 6.8 %
NEUTROPHILS # BLD AUTO: 8.05 K/UL
NEUTROPHILS NFR BLD AUTO: 72.8 %
NITRITE URINE: NEGATIVE
PH URINE: 6
PHOSPHATE SERPL-MCNC: 3.1 MG/DL
PLATELET # BLD AUTO: 243 K/UL
POTASSIUM SERPL-SCNC: 4 MMOL/L
PROT UR-MCNC: 112 MG/DL
PROTEIN URINE: ABNORMAL
RBC # BLD: 5.3 M/UL
RBC # FLD: 21.1 %
RED BLOOD CELLS URINE: 1 /HPF
SODIUM SERPL-SCNC: 143 MMOL/L
SPECIFIC GRAVITY URINE: 1.01
SQUAMOUS EPITHELIAL CELLS: 0 /HPF
UROBILINOGEN URINE: NORMAL
WBC # FLD AUTO: 11.05 K/UL
WHITE BLOOD CELLS URINE: 1 /HPF

## 2021-07-01 NOTE — ASSESSMENT
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 - will do genetic testing to look for APOL-1 trait \par -SPEP/IPEP normal \par -severe hypertensive changes based on The report of the biopsy and ATN\par -continue to HOlD Lisinopril for now and repeat Cr to see trend \par --avoid NSAIDS\par -gout only prednisone for acute attacks. Allopurinol stopped.\par \par HTN\par -acceptable on Amlodipine \par -low salt diet reinforced. \par - will do genetic testing to look for APOL-1 trait \par \par Edema\par -Lasix PRN \par \par -proteuria\par worse likely because has been off ACE-I \par \par RTC in 4 months. \par \par \par

## 2021-07-01 NOTE — PHYSICAL EXAM
[General Appearance - Alert] : alert [General Appearance - In No Acute Distress] : in no acute distress [Jugular Venous Distention Increased] : there was no jugular-venous distention [Auscultation Breath Sounds / Voice Sounds] : lungs were clear to auscultation bilaterally [Heart Sounds] : normal S1 and S2 [Murmurs] : no murmurs [Abnormal Walk] : normal gait [No Focal Deficits] : no focal deficits [Oriented To Time, Place, And Person] : oriented to person, place, and time [Impaired Insight] : insight and judgment were intact [FreeTextEntry1] : 1+ edema

## 2021-07-01 NOTE — REVIEW OF SYSTEMS
[Lower Ext Edema] : lower extremity edema [Feeling Tired] : not feeling tired [SOB on Exertion] : no shortness of breath during exertion [Nocturia] : no nocturia

## 2021-07-01 NOTE — HISTORY OF PRESENT ILLNESS
[FreeTextEntry1] : c/o edema for the past 2 weeks ( had been off HCTZ and started Amlodipine) \par biopsy reveals severe arteriosclerosis, glomerulosclerosis, and ATN \par energy is good

## 2021-07-14 NOTE — ED PROVIDER NOTE - DISPOSITION TYPE
I have personally performed a face to face diagnostic evaluation on this patient. I have reviewed the ACP note and agree with the history, exam and plan of care, except as noted. ADMIT

## 2021-07-26 ENCOUNTER — NON-APPOINTMENT (OUTPATIENT)
Age: 66
End: 2021-07-26

## 2021-07-28 ENCOUNTER — APPOINTMENT (OUTPATIENT)
Dept: INTERNAL MEDICINE | Facility: CLINIC | Age: 66
End: 2021-07-28

## 2021-07-29 ENCOUNTER — APPOINTMENT (OUTPATIENT)
Dept: INTERNAL MEDICINE | Facility: CLINIC | Age: 66
End: 2021-07-29
Payer: COMMERCIAL

## 2021-07-29 PROCEDURE — 99213 OFFICE O/P EST LOW 20 MIN: CPT

## 2021-07-29 NOTE — REVIEW OF SYSTEMS
[Fever] : no fever [Chills] : no chills [Discharge] : no discharge [Earache] : no earache [Chest Pain] : no chest pain [Palpitations] : no palpitations [Lower Ext Edema] : no lower extremity edema [Shortness Of Breath] : no shortness of breath [Cough] : no cough [Dyspnea on Exertion] : not dyspnea on exertion [Abdominal Pain] : no abdominal pain [Nausea] : no nausea

## 2021-07-29 NOTE — ASSESSMENT
[FreeTextEntry1] : #  Pleural inflammation\par most likely viral infection with short illness and feeling better now. \par Pleural friction sound+.\par Sent Rx to pharmacy for Prednison 10mg 3 tabs for 3days, 2tabs for 3days, then 1tab for 3days.\par Encouraged to take enough oral fluid and rest.\par Dr. Lara gave pt RTW clearance note. \par \par RTC in 3months or prn

## 2021-07-29 NOTE — PHYSICAL EXAM
[No Acute Distress] : no acute distress [Normal Sclera/Conjunctiva] : normal sclera/conjunctiva [Normal Outer Ear/Nose] : the outer ears and nose were normal in appearance [Normal Oropharynx] : the oropharynx was normal [No JVD] : no jugular venous distention [No Lymphadenopathy] : no lymphadenopathy [No Respiratory Distress] : no respiratory distress  [No Accessory Muscle Use] : no accessory muscle use [Regular Rhythm] : with a regular rhythm [Normal S1, S2] : normal S1 and S2 [Soft] : abdomen soft [Non Tender] : non-tender [Normal] : no posterior cervical lymphadenopathy and no anterior cervical lymphadenopathy [No CVA Tenderness] : no CVA  tenderness [No Joint Swelling] : no joint swelling [de-identified] : Left lower lobe pleural friction rub sound+ [de-identified] : known A-fib on Eliquis

## 2021-08-11 ENCOUNTER — APPOINTMENT (OUTPATIENT)
Dept: NEPHROLOGY | Facility: CLINIC | Age: 66
End: 2021-08-11
Payer: COMMERCIAL

## 2021-08-11 VITALS
WEIGHT: 190.7 LBS | DIASTOLIC BLOOD PRESSURE: 86 MMHG | HEIGHT: 71 IN | HEART RATE: 71 BPM | SYSTOLIC BLOOD PRESSURE: 120 MMHG | BODY MASS INDEX: 26.7 KG/M2 | TEMPERATURE: 97.7 F | OXYGEN SATURATION: 99 %

## 2021-08-11 DIAGNOSIS — E55.9 VITAMIN D DEFICIENCY, UNSPECIFIED: ICD-10-CM

## 2021-08-11 LAB
25(OH)D3 SERPL-MCNC: 22.2 NG/ML
ALBUMIN SERPL ELPH-MCNC: 3.5 G/DL
ANION GAP SERPL CALC-SCNC: 14 MMOL/L
BUN SERPL-MCNC: 28 MG/DL
CALCIUM SERPL-MCNC: 8.6 MG/DL
CALCIUM SERPL-MCNC: 8.6 MG/DL
CHLORIDE SERPL-SCNC: 107 MMOL/L
CO2 SERPL-SCNC: 21 MMOL/L
CREAT SERPL-MCNC: 3.27 MG/DL
GLUCOSE SERPL-MCNC: 108 MG/DL
HCT VFR BLD CALC: 34.3 %
HGB BLD-MCNC: 10.4 G/DL
PARATHYROID HORMONE INTACT: 124 PG/ML
PHOSPHATE SERPL-MCNC: 3.3 MG/DL
POTASSIUM SERPL-SCNC: 3.8 MMOL/L
SODIUM SERPL-SCNC: 142 MMOL/L

## 2021-08-11 PROCEDURE — 99214 OFFICE O/P EST MOD 30 MIN: CPT | Mod: GC

## 2021-08-11 RX ORDER — ERGOCALCIFEROL 1.25 MG/1
1.25 MG CAPSULE, LIQUID FILLED ORAL
Qty: 8 | Refills: 1 | Status: DISCONTINUED | COMMUNITY
Start: 2018-04-26 | End: 2021-08-11

## 2021-08-11 NOTE — PHYSICAL EXAM
[General Appearance - Alert] : alert [General Appearance - Well Nourished] : well nourished [Sclera] : the sclera and conjunctiva were normal [] : no respiratory distress [Exaggerated Use Of Accessory Muscles For Inspiration] : no accessory muscle use [Apical Impulse] : the apical impulse was normal [Edema] : there was no peripheral edema [Abnormal Walk] : normal gait [No Focal Deficits] : no focal deficits [Oriented To Time, Place, And Person] : oriented to person, place, and time

## 2021-08-12 PROBLEM — E55.9 VITAMIN D DEFICIENCY: Status: ACTIVE | Noted: 2018-04-26

## 2021-08-12 LAB
CREAT SPEC-SCNC: 304 MG/DL
CREAT/PROT UR: 0.7 RATIO
PROT UR-MCNC: 214 MG/DL

## 2021-08-13 NOTE — ADDENDUM
[FreeTextEntry1] : Discussed lab results with Pt. Will Start PO Vitamin D supplements for Vitamin D insufficiency.

## 2021-08-13 NOTE — HISTORY OF PRESENT ILLNESS
[FreeTextEntry1] : Pt. presents for follow up for RUSSELL. Pt. states that his swelling has decreased, and improvement of gout after completing Prednisone. Pt. unsure if he takes Hydralazine 25mg TID and had been started on Isordil TID since last visit. BP today 120s/80s.Discussed with Pt. that Pr./Cr. ratio has been increasing because we have held Lisinopril. Pt. not currently taking Lasix as edema has improved. Pt. denies shortness of breath, dysuria or current edema.

## 2021-08-13 NOTE — END OF VISIT
[] : Fellow [FreeTextEntry3] : unfortunately he is progressing to CKD 4 despite stopping ARB/Diuretics\par BP is controlled \par start Vitamin D \par will enroll in HT for closer monitoring \par RTC in 3 months

## 2021-08-13 NOTE — ASSESSMENT
[FreeTextEntry1] : \par #CKD stage 4 with normal electrolytes likely secondary to HTN nephrosclerosis;\par #HTN\par recent RUSSELL/versus progression\par s/p Kidney biopsy \par Final Diagnosis\par Kidney, needle core biopsy\par Acute tubular injury with focal tubular necrosis\par -Advanced chronic changes, including:\par - Global glomerulosclerosis (56% of glomeruli)\par - Segmental glomerulosclerosis (7% of glomeruli)\par - Tubular atrophy and interstitial fibrosis (50% of cortex)\par - Severe arterial and arteriolar sclerosis... \par - F/u genetic testing to look for APOL-1 trait \par -SPEP/UPEP normal \par -severe hypertensive changes based on The report of the biopsy and ATN\par -continue to HOLD Lisinopril for now. Cr. 2.9 improving\par - f/u H/H as Pt. on Iron supplements for anemia\par --avoid NSAIDS\par -gout only prednisone for acute attacks. Allopurinol stopped.\par -f/u Vitamin D level-> may need to start supplements \par -acceptable on Amlodipine \par -low salt diet reinforced. \par - f/u genetic testing to look for APOL-1 trait \par -will f/u in AM about exact dosages of Hydralazine and Isordil. \par \par Edema-resolved\par -Lasix stopped\par \par -proteuria\par worse likely because has been off ACE-I \par \par RTC in 4 months. \par \par \par

## 2021-09-11 ENCOUNTER — APPOINTMENT (OUTPATIENT)
Dept: DISASTER EMERGENCY | Facility: OTHER | Age: 66
End: 2021-09-11
Payer: COMMERCIAL

## 2021-09-11 PROCEDURE — 0001A: CPT

## 2021-09-15 ENCOUNTER — APPOINTMENT (OUTPATIENT)
Dept: INTERNAL MEDICINE | Facility: CLINIC | Age: 66
End: 2021-09-15
Payer: COMMERCIAL

## 2021-09-17 NOTE — ED PROVIDER NOTE - NS ED NOTE AC HIGH RISK COUNTRIES
Left message to return call. Patient no-showed appt today 9/17/2021   Patient no-showed last 2 medicare appointments, he was aware no further refills would be provided if no showed second time.   Left message to return call. Upon call back please schedule [facility] wellness visit.    dizziness/chest pain No

## 2021-09-20 ENCOUNTER — NON-APPOINTMENT (OUTPATIENT)
Age: 66
End: 2021-09-20

## 2021-09-21 ENCOUNTER — APPOINTMENT (OUTPATIENT)
Dept: INTERNAL MEDICINE | Facility: CLINIC | Age: 66
End: 2021-09-21
Payer: COMMERCIAL

## 2021-09-21 VITALS — RESPIRATION RATE: 14 BRPM | SYSTOLIC BLOOD PRESSURE: 128 MMHG | HEART RATE: 70 BPM | DIASTOLIC BLOOD PRESSURE: 70 MMHG

## 2021-09-21 DIAGNOSIS — N18.30 CHRONIC KIDNEY DISEASE, STAGE 3 UNSPECIFIED: ICD-10-CM

## 2021-09-21 PROCEDURE — 99213 OFFICE O/P EST LOW 20 MIN: CPT

## 2021-09-21 NOTE — ASSESSMENT
[FreeTextEntry1] : Goal blood pressure /80\par At goal\par Urged to limit sodium intake\par Urged to maintain diet and exercise habits to keep BMI < 28\par Limit alcohol consumption to < 14 drinks per week\par Reinforced adherence to medication regimen\par \par \par 24 minutes spent in preparation of this visit, including review of previous notes and test results, interview and examination of patient, discussion of plan, arranging for appropriate testing and treatment, and documentation.\par

## 2021-09-22 NOTE — H&P ADULT - PROBLEM/PLAN-8
MEDICARE WELLNESS VISIT NOTE    HISTORY OF PRESENT ILLNESS:   González Patel presents for her Subsequent Annual Medicare Wellness Visit.   She has no current complaints or concerns.     Former  for [employer] but now retired; , no children but 2 dogs. Enjoys golfing, sailing/boating (used to life on [place]), car-shows.    Maintenance Measures:  - Breast Cancer Screening: last mammo 2/10/21 BIRADS-2, prefers to continue annual screening  - Colon Cancer Screening: last colonoscopy 4/2012 and normal, will order once more next year then likely d/c  - Cervical Cancer Screening: N/A  - Bone Density Screening: DEXA 9/1/21with advancing osteoporosis, see below  - Lung Cancer Screening: N/A  - Vaccines: due for flu and PPSV-23, ordering today  - Labs: last 7/27/21 at [facility] and normal, GFR 61; defer repeat until next year     Patient Care Team:  Annita Blum MD as PCP - General (Family Practice)        Patient Active Problem List   Diagnosis   • Other psoriasis   • 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, lt medial calf, 2-2003   • Personal history of other malignant neoplasm of skin-SCC, Lt forearm, 9-2010   • Environmental allergies   • Osteoporosis   • Reflux   • Generalized anxiety disorder         Past Medical History:   Diagnosis Date   • Acid reflux    • Anxiety    • Melanoma (CMS/HCC)    • Psoriasis    • Shingles    • Skin cancer    • Squamous cell skin cancer          Past Surgical History:   Procedure Laterality Date   • Skin cancer excision     • Tubal ligation           Social History     Tobacco Use   • Smoking status: Former Smoker     Types: Cigarettes   • Smokeless tobacco: Former User     Quit date: 3/1/1974   Vaping Use   • Vaping Use: never used   Substance Use Topics   • Alcohol use: Yes     Alcohol/week: 4.0 standard drinks     Types: 4 Standard drinks or equivalent per week   • Drug use: No     Drug use:    Drug Use:    No               Family History - Reviewed    Current Outpatient Medications   Medication Sig Dispense Refill   • sertraline (ZOLOFT) 50 MG tablet Take 1 tablet by mouth daily. 90 tablet 1   • clobetasol (TEMOVATE) 0.05 % topical solution Apply BID to the scalp for 5 days, then 2-3 times weekly to maintain effect. Place 5-6 drops on scalp (best if scalp wet), then rub in. 60 mL 4   • clobetasol (TEMOVATE) 0.05 % ointment Apply topically 2 times daily.     • Multiple Vitamins-Minerals (MULTIVITAMIN PO)      • alendronate (FOSAMAX) 70 MG tablet Take 1 tablet by mouth every 7 days. 52 tablet 0   • famotidine (Pepcid) 20 MG tablet Take 1 tablet by mouth nightly as needed (reflux). 90 tablet 1     No current facility-administered medications for this visit.        The following items on the Medicare Health Risk Assessment were found to be positive  1.) Do you have an Advance directive, living will, or power of  for health care document that contains your wishes for end of life care?: No     2.) Would you like additional information on advance directives?: Yes     6 b.) How many servings of High Fiber / Whole Grain Foods to you have each day ( 1 serving = 1 cup cold cereal, 1/2 cup cooked cereal, 1 slice bread): 1 per day     7c.) Do you worry about falling?: Yes     15.) How confident are you that you can control and manage most of your health problems?: Somewhat confident         Vision and Hearing screens: Both screenings were performed and reviewed    Advance Directive:   The patient has the following documents:  No Advance Directives on file. Patient offered documents.    Cognitive/Functional Status: no evidence of cognitive dysfunction by direct observation    Opioid Review: González is not taking opioid medications.    Recent PHQ 2/9 Score:    PHQ 2:  Date Adult PHQ 2 Score Adult PHQ 2 Interpretation   8/24/2021 0 No further screening needed       PHQ 9:       DEPRESSION ASSESSMENT/PLAN:  Depression screening is  DISPLAY PLAN FREE TEXT negative no further plan needed.     Body mass index is 19.49 kg/m².    BMI ASSESSMENT/PLAN:  Patient BMI is within normal range.     Needed Screening/Treatment:   Immunizations reviewed and patient needs: Influenza and Pneumococcal 23  Needed follow up:  None    1. Medicare annual wellness visit, subsequent    2. Age-related osteoporosis without current pathological fracture  DEXA 8/2021 with advancing osteoporosis to femora, patient had been written for alendronate in the past but was reluctant to start. After discussion of risks/beneifts and alternatives she agrees to once weekly alendronate trial; reviewed possible med SE, pending tolerability will consider Endo referral. Repeat DEXA in 2 years to ensure improvement/stability.  - alendronate (FOSAMAX) 70 MG tablet; Take 1 tablet by mouth every 7 days.  Dispense: 52 tablet; Refill: 0    3. Generalized anxiety disorder  Stable on sertraline 50mg daily for the last couple years. Will CTM.    4. Psoriasis and similar disorder  Stable on clobetasol ointment/solutions PRN. Follows with Billy Horta), will defer mgmt.    5. Gastroesophageal reflux disease without esophagitis  Currently stable on pantoprazole 20mg daily. Discussed possible negative effects this PPI could be having on bone health, especially with starting alendronate, and agrees to trial deescalation with eventual switch to nightly Pepcid as tolerated.  - famotidine (Pepcid) 20 MG tablet; Take 1 tablet by mouth nightly as needed (reflux).  Dispense: 90 tablet; Refill: 1    6. Environmental allergies  Stable on PRN regimen, will CTM.     7. 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     8. Need for vaccination  - INFLUENZA QUADRIVALENT SPLIT PRES FREE 0.5 ML VACC, IM (FLULAVAL,FLUARIX,FLUZONE)  - PNEUMOCOCCAL POLYSACCHARIDE ADULT VACC, IM/SQ (PNEUMOVAX 23)    See orders.   See Patient Instructions section.   Return  in about 6 months (around 3/22/2022).       Annita Blum MD

## 2021-10-02 ENCOUNTER — APPOINTMENT (OUTPATIENT)
Dept: DISASTER EMERGENCY | Facility: OTHER | Age: 66
End: 2021-10-02
Payer: COMMERCIAL

## 2021-10-02 PROCEDURE — 0002A: CPT

## 2021-10-13 ENCOUNTER — APPOINTMENT (OUTPATIENT)
Dept: NEPHROLOGY | Facility: CLINIC | Age: 66
End: 2021-10-13

## 2021-11-08 ENCOUNTER — RX RENEWAL (OUTPATIENT)
Age: 66
End: 2021-11-08

## 2021-11-17 ENCOUNTER — NON-APPOINTMENT (OUTPATIENT)
Age: 66
End: 2021-11-17

## 2021-11-17 ENCOUNTER — APPOINTMENT (OUTPATIENT)
Dept: ELECTROPHYSIOLOGY | Facility: CLINIC | Age: 66
End: 2021-11-17
Payer: COMMERCIAL

## 2021-11-17 VITALS
BODY MASS INDEX: 27.3 KG/M2 | OXYGEN SATURATION: 98 % | TEMPERATURE: 97.5 F | HEIGHT: 71 IN | SYSTOLIC BLOOD PRESSURE: 140 MMHG | WEIGHT: 195 LBS | DIASTOLIC BLOOD PRESSURE: 80 MMHG | HEART RATE: 61 BPM

## 2021-11-17 DIAGNOSIS — H53.8 OTHER VISUAL DISTURBANCES: ICD-10-CM

## 2021-11-17 PROCEDURE — 99214 OFFICE O/P EST MOD 30 MIN: CPT

## 2021-11-17 PROCEDURE — 93000 ELECTROCARDIOGRAM COMPLETE: CPT

## 2021-11-17 NOTE — PHYSICAL EXAM
[No Acute Distress] : no acute distress [Normal Conjunctiva] : normal conjunctiva [Normal Venous Pressure] : normal venous pressure [Normal S1, S2] : normal S1, S2 [Clear Lung Fields] : clear lung fields [Non Tender] : non-tender [No Edema] : no edema [No Cyanosis] : no cyanosis [No Clubbing] : no clubbing [No Focal Deficits] : no focal deficits [Alert and Oriented] : alert and oriented [de-identified] : Regular; 2/6 systolic murmur left lower sternal border [de-identified] : Limping gait

## 2021-11-17 NOTE — REVIEW OF SYSTEMS
[Blurry Vision] : blurred vision [Feeling Fatigued] : not feeling fatigued [Sore Throat] : no sore throat [SOB] : no shortness of breath [Dyspnea on exertion] : not dyspnea during exertion [Palpitations] : no palpitations [Syncope] : no syncope [Cough] : no cough [Abdominal Pain] : no abdominal pain [Rash] : no rash [Dizziness] : no dizziness [Memory Lapses Or Loss] : no memory lapses or loss [Under Stress] : not under stress [Easy Bleeding] : no tendency for easy bleeding

## 2021-11-17 NOTE — DISCUSSION/SUMMARY
[FreeTextEntry1] : His EKG today showed that he was in atrial fibrillation.  During examination afterwards he was in sinus rhythm.  Patient is currently on amiodarone 100 mg a day I have increased it to 200 mg/day.  He will see his cardiologist Dr. Lee January 2022 and we will rediscuss management done.  He was not keen to have a cath ablation.\par \par He has had prior cardiomyopathy with mitral regurgitation and severely enlarged left atrium.  We will review a follow-up echocardiogram to reassess these parameters.  \par He has been maintained on anticoagulation given his high stroke risk.  Patient has not had any recurrence of bleeding issues.\par \par His blood pressure seems to be controlled.\par \par He has CKD stage III his last creatinine was approximately 3.2.\par \par Advised the patient to get pulmonary follow-up and pulmonary function testing with diffusing capacity.  Also recommended follow-up thyroid function test as well as liver function test.  Also make recommend follow-up ophthalmologic examination.\par \par Follow-up: He will see his primary care physician, nephrology, pulmonary for pulmonary function testing and cardiologist for follow-up echocardiogram.  Patient will see me in follow-up in approximately 6 months.  We will reassess his A. fib on the higher dose of amiodarone.

## 2021-11-17 NOTE — HISTORY OF PRESENT ILLNESS
[FreeTextEntry1] : The patient is a 66-year-old man who is seen in follow-up evaluation for his previous atrial fibrillation.  He had previous AF ablation done February 2016.  He had recurrent atrial fibrillation and was treated with amiodarone and had been on 100 mg/d \par He has not been aware of the A. fib or any recurrences.  Patient denies palpitations.  He has been overall feeling well denies any chest pain, dizziness, lightheadedness, syncope, presyncope, shortness of breath or exertional symptoms.  He does have his some decrease in his overall exercise capacity which has been chronic.  His main complaint is the lower back pain and he walks with a limp.\par \par \par \par He has a prior history of coronary disease and status post PCI to the LAD 2014.  He has a prior history of PAD and status post lower extremity stenting.  He has had a problem previous cardiomyopathy with ejection fraction 40% and moderate mitral regurgitation.  Patient previously had severe left atrial enlargement as well.  His left atrial diameter is greater than 5.0 cm.\par \par He has a history of hypertension and CKD stage III.\par \par He has had a prior history of rectal bleeding.  This was due to hemorrhoids and he had a hemorrhoidectomy.  He has had no recurrence of bleeding.  Patient continues on aspirin as well as Eliquis.

## 2021-11-17 NOTE — CARDIOLOGY SUMMARY
[de-identified] : Atrial fibrillation  - occasional ectopic ventricular beat   \par Poor R-wave progression -nonspecific \par Nonspecific ST depression  -Nondiagnostic. \par

## 2021-12-01 ENCOUNTER — APPOINTMENT (OUTPATIENT)
Dept: NEPHROLOGY | Facility: CLINIC | Age: 66
End: 2021-12-01
Payer: COMMERCIAL

## 2021-12-01 ENCOUNTER — RX CHANGE (OUTPATIENT)
Age: 66
End: 2021-12-01

## 2021-12-01 PROCEDURE — 99214 OFFICE O/P EST MOD 30 MIN: CPT | Mod: 95

## 2021-12-02 NOTE — ASSESSMENT
[FreeTextEntry1] : \par A/CKD now stage 4..  s/p Kidney biopsy with Acute tubular injury with focal tubular necrosis with a background of Advanced chronic changes, including:\par - Global glomerulosclerosis (56% of glomeruli)\par - Segmental glomerulosclerosis (7% of glomeruli)\par - Tubular atrophy and interstitial fibrosis (50% of cortex)\par -and - Severe arterial and arteriolar sclerosis...  \par -repeat labs this week. ordered today \par -avoid NSAIDS\par -gout only prednisone for acute attacks. \par -He will enrol in HT. we discussed dialysis preparation and transplant listing. \par \par HTN\par -amlodipine, hydralazine/Isosorbide, diuretics as needed \par -low salt diet reinforced. \par -continue to HOlD Lisinopril for now and repeat Cr to see trend \par - will do genetic testing to look for APOL-1 trait \par \par Edema\par -Lasix PRN \par \par -proteuria\par worse likely because has been off ACE-I \par will do genetic testing to look for APOL-1 trait \par \par \par \par RTC in 4 months. \par \par

## 2021-12-02 NOTE — HISTORY OF PRESENT ILLNESS
[FreeTextEntry1] : he denies CP or sob \par no  Edema \par EP specialist Dr. James increase his amiodarone to 200mg on Nov 17. \par \par

## 2021-12-02 NOTE — REVIEW OF SYSTEMS
[Feeling Tired] : not feeling tired [Recent Weight Loss (___ Lbs)] : no recent weight loss [Lower Ext Edema] : no extremity edema [Vomiting] : no vomiting [Constipation] : no constipation [Diarrhea] : no diarrhea

## 2021-12-02 NOTE — REASON FOR VISIT
[Home] : at home, [unfilled] , at the time of the visit. [Medical Office: ([facility])___] : at the medical office located in  [Follow-Up] : a follow-up visit [Verbal consent obtained from patient] : the patient, [unfilled] [FreeTextEntry1] : CKD 4

## 2021-12-09 ENCOUNTER — APPOINTMENT (OUTPATIENT)
Dept: UROLOGY | Facility: CLINIC | Age: 66
End: 2021-12-09
Payer: COMMERCIAL

## 2021-12-09 PROCEDURE — 99214 OFFICE O/P EST MOD 30 MIN: CPT

## 2021-12-12 LAB
ANION GAP SERPL CALC-SCNC: 12 MMOL/L
BUN SERPL-MCNC: 41 MG/DL
CALCIUM SERPL-MCNC: 9.4 MG/DL
CHLORIDE SERPL-SCNC: 104 MMOL/L
CO2 SERPL-SCNC: 25 MMOL/L
CREAT SERPL-MCNC: 3.56 MG/DL
GLUCOSE SERPL-MCNC: 73 MG/DL
POTASSIUM SERPL-SCNC: 4.6 MMOL/L
PSA FREE FLD-MCNC: 28 %
PSA FREE SERPL-MCNC: 2.08 NG/ML
PSA SERPL-MCNC: 7.39 NG/ML
SODIUM SERPL-SCNC: 142 MMOL/L

## 2021-12-17 LAB
25(OH)D3 SERPL-MCNC: 21.2 NG/ML
ALBUMIN SERPL ELPH-MCNC: 3.8 G/DL
ANION GAP SERPL CALC-SCNC: 17 MMOL/L
APPEARANCE: CLEAR
BACTERIA: NEGATIVE
BILIRUBIN URINE: NEGATIVE
BLOOD URINE: NEGATIVE
BUN SERPL-MCNC: 37 MG/DL
CALCIUM SERPL-MCNC: 8.9 MG/DL
CALCIUM SERPL-MCNC: 8.9 MG/DL
CHLORIDE SERPL-SCNC: 103 MMOL/L
CHOLEST SERPL-MCNC: 132 MG/DL
CO2 SERPL-SCNC: 21 MMOL/L
COLOR: YELLOW
CREAT SERPL-MCNC: 4.29 MG/DL
CREAT SPEC-SCNC: 266 MG/DL
CREAT/PROT UR: 0.7 RATIO
ESTIMATED AVERAGE GLUCOSE: 126 MG/DL
FERRITIN SERPL-MCNC: 126 NG/ML
GLUCOSE QUALITATIVE U: NEGATIVE
GLUCOSE SERPL-MCNC: 85 MG/DL
HBA1C MFR BLD HPLC: 6 %
HCT VFR BLD CALC: 35.8 %
HDLC SERPL-MCNC: 47 MG/DL
HGB BLD-MCNC: 11.1 G/DL
HYALINE CASTS: 0 /LPF
IRON SATN MFR SERPL: 21 %
IRON SERPL-MCNC: 67 UG/DL
KETONES URINE: NEGATIVE
LDLC SERPL CALC-MCNC: 72 MG/DL
LEUKOCYTE ESTERASE URINE: NEGATIVE
MICROSCOPIC-UA: NORMAL
NITRITE URINE: NEGATIVE
NONHDLC SERPL-MCNC: 85 MG/DL
PARATHYROID HORMONE INTACT: 175 PG/ML
PH URINE: 6
PHOSPHATE SERPL-MCNC: 4.1 MG/DL
POTASSIUM SERPL-SCNC: 4 MMOL/L
PROT UR-MCNC: 193 MG/DL
PROTEIN URINE: ABNORMAL
RED BLOOD CELLS URINE: 0 /HPF
SODIUM SERPL-SCNC: 141 MMOL/L
SPECIFIC GRAVITY URINE: 1.02
SQUAMOUS EPITHELIAL CELLS: 1 /HPF
TIBC SERPL-MCNC: 320 UG/DL
TRIGL SERPL-MCNC: 64 MG/DL
UIBC SERPL-MCNC: 253 UG/DL
URATE SERPL-MCNC: 9.7 MG/DL
UROBILINOGEN URINE: ABNORMAL
WHITE BLOOD CELLS URINE: 4 /HPF

## 2021-12-19 NOTE — ADDENDUM
[FreeTextEntry1] : Entered by Lucinda Velazquez, acting as scribe for Dr. Pernell Keith.\par \par The documentation recorded by the scribe accurately reflects the service I personally performed and the decisions made by me.

## 2021-12-19 NOTE — HISTORY OF PRESENT ILLNESS
[FreeTextEntry1] : Please refer to [facility] Consult note \par \par FUA elevated PSA \par 6.49 on finasteride \par continue finasteride for BPH \par follow up in 1 year

## 2021-12-22 ENCOUNTER — APPOINTMENT (OUTPATIENT)
Dept: ORTHOPEDIC SURGERY | Facility: CLINIC | Age: 66
End: 2021-12-22
Payer: COMMERCIAL

## 2021-12-22 PROCEDURE — 20610 DRAIN/INJ JOINT/BURSA W/O US: CPT | Mod: LT

## 2021-12-22 PROCEDURE — 73562 X-RAY EXAM OF KNEE 3: CPT | Mod: LT

## 2021-12-22 PROCEDURE — 99214 OFFICE O/P EST MOD 30 MIN: CPT | Mod: 25

## 2021-12-22 NOTE — ADDENDUM
[FreeTextEntry1] : I, Phong Birch, acted solely as a scribe for Dr. Karri Dillard on this date 12/22/2021.\par All medical record entries made by the Scribe were at my, Dr. Karri Dillard, direction and personally dictated by me on 12/22/2021. I have reviewed the chart and agree that the record accurately reflects my personal performance of the history, physical exam, assessment and plan. I have also personally directed, reviewed, and agreed with the chart.

## 2021-12-22 NOTE — PHYSICAL EXAM
[de-identified] : Constitutional\par o Appearance : well-nourished, well developed, alert, in no acute distress \par Head and Face\par o Head :\par ¦ Inspection : atraumatic, normocephalic\par o Face :\par ¦ Inspection : no visible rash or discoloration\par Respiratory\par o Respiratory Effort: breathing unlabored \par Neurologic\par o Mental Status Examination :\par ¦ Orientation : grossly oriented to person, place and time\par Psychiatric\par o Mood and Affect: mood normal, affect appropriate \par \par Right Lower Extremity\par o Buttock : no tenderness, swelling or deformities \par o Right Hip :\par ¦ Inspection/Palpation : no tenderness, swelling or deformities\par ¦ Range of Motion : full and painless in all planes, no crepitance\par ¦ Stability : joint stability intact\par ¦ Strength : extension, flexion, adduction, abduction, internal rotation and external rotation 5/5 \par \par o Right Knee :\par ¦ Inspection/Palpation : no tenderness to palpation, no swelling, varus deformity, hypertrophic changes of the medial compartment L>R\par ¦ Range of Motion : 0-105°, patellofemoral crepitance, decreased patellofemoral glide \par ¦ Stability : no valgus or varus instability present on provocative testing\par ¦ Strength : flexion and extension 5/5\par ¦ Tests and Signs : Anterior Drawer negative, Lachman negative, Hadley's negative\par \par Left Lower Extremity\par o Buttock : no tenderness, swelling or deformities \par o Left Hip :\par ¦ Inspection/Palpation : no tenderness, no swelling or deformities\par ¦ Range of Motion : full flexion and rotation, no crepitance\par ¦ Stability : joint stability intact\par ¦ Strength : extension, flexion, adduction, abduction, internal rotation and external rotation 5/5\par \par o Left Knee :\par ¦ Inspection/Palpation : significant medial and lateral compartment tenderness to palpation, no swelling, hypertrophic changes in the medial compartment  L>R, varus deformity\par ¦ Range of Motion :  5-90° with pain, patellofemoral crepitance, no patellofemoral glide\par ¦ Stability : no valgus or varus instability present on provocative testing\par ¦ Strength : flexion and extension 5/5\par ¦ Tests and Signs : Anterior Drawer negative, Lachman negative, Hadley's negative\par \par o Peripheral Vascular System :\par ¦ Dorsalis Pedis Artery : pulse 2+ bilaterally\par ¦ Posterior Tibial Artery : pulse 2+ bilaterally \par \par Gait: very stiff-legged gait, no significant extremity swelling or lymphedema\par \par Radiology Results:\par o Right Knee: Standing AP, lateral and tunnel views were obtained and revealed bone on bone in the medial compartment with severe patellofemoral arthritis.\par o Left Knee: Standing AP, lateral and tunnel views were obtained and revealed severe tricompartmental osteoarthritis.\par \par o Left Knee injection : Indication- knee osteoarthritis, Anatomic location- left intra-articular joint space, Spray - area was sterilized with Betadine and alcohol and anesthetized with Ethyl Chloride , needle used-20G, Medications given- 5cc's lidocaine, 0.5cc's kenalog, 0.5 cc's dexamethasone, and patient tolerated it well.

## 2021-12-22 NOTE — DISCUSSION/SUMMARY
[de-identified] : I discussed the underlying pathophysiology of the patient's condition in great detail with the patient and used models to aid in my explanation. I went over the patient's x-rays with them in great detail. The extent of the patient’s arthritis was discussed in great detail with them. Various treatments including cortisone injections, viscosupplementation, and surgical intervention were discussed as solutions for the patient's symptoms. However, his insurance company ([employer]) does not cover viscosupplementation. He elected to receive a cortisone injection today and will consider knee replacement. The patient elected to receive a cortisone injection into his left knee today and tolerated it well. I instructed the patient on ROM exercises and told them to take it easy. The use of ice and rest was reviewed with the patient. The patient may resume activities tomorrow. All of his questions were answered. He understands and consents to the plan.\par \par FU 1 month.\par after a left knee cortisone injection today (12/22/2021).\par after considering knee replacement.\par \par The patient is an 66 year old male with bone on bone arthritis of their bilateral knees. Based upon the patient's continued symptoms and failure to respond to conservative treatment I have recommended a total knee replacement for this patient. A long discussion took place with the patient describing what a total joint replacement is and what the procedure would entail. A total knee model, similar to the implant that will be used during the operation was utilized to demonstrate and to discuss the various bearing surfaces of the implants. The hospitalization and post-operative care and rehabilitation were also discussed. The use of perioperative antibiotics and DVT prophylaxis were discussed. The risk, benefits and alternatives to a surgical intervention were discussed at length with the patient. The patient was also advised of risks related to the medical comorbidities and elevated body mass index (BMI). A lengthy discussion took place to review the most common complications including but not limited to: deep vein thrombosis, pulmonary embolus, heart attack, stroke, infection, wound breakdown, numbness, damage to nerves, tendon, muscles, arteries or other blood vessels, death and other possible complications from anesthesia. The patient was told that we will take steps to minimize these risks by using sterile technique, antibiotics and DVT prophylaxis when appropriate and follow the patient postoperatively in the office setting to monitor progress. The possibility of recurrent pain, no improvement in pain and actual worsening of pain were also discussed with the patient.\par \par The discharge plan of care focused on the patient going home following surgery. I encouraged the patient to make the necessary arrangements to have someone stay with them when they are discharged home. Following discharge, a home care nurse will visit the patient. They will open your home care case and request home physical therapy services. Home physical therapy will commence following discharge provided it is appropriate and covered by his health insurance benefit plan.\par \par The risks, benefits and alternative treatment options of total joint replacement were reviewed with the patient at great length. All questions were answered to the satisfaction of the patient. The patient participated in an interactive discussion of the total knee replacement implant planned for their surgery with questions answered. The patient agreed with the treatment plan, and has decided to move forward with the elective total knee replacement as planned.\par \par SYEDA WEAVER was seen face to face and needs a commode for bedside use as the patient has no ability to acces the bathroom in their home. The patient also requires a rolling walker as this is needed for activities of daily living within their home secondary to the diagnosis of osteoarthritis.

## 2021-12-22 NOTE — HISTORY OF PRESENT ILLNESS
[de-identified] : 66 year old male presents complaining of bilateral knee pain, left worse than right, that worsened about 2 weeks ago. He denies injury. 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 He describes a shooting 9/10 pain that is alleviated by rest and exacerbated by walking. He has not been treated for his knees. \par Pmhx: He is not COVID boosted. He is on Eliquis and aspirin. He had a cardiac stent 07/31/15.

## 2021-12-22 NOTE — ASU DISCHARGE PLAN (ADULT/PEDIATRIC). - NS DC INTERPRETER YES NO
Hi! I called Jose Raul. I shared we could not Rx Xanax until next due. She refused Seroquel and Buspar. She is on Lyrica at 300 mg BId which I told her will help with anxiety and potential withdrawal. She denies SI/HI. I told her to come to [facility] if S/S of withdrawal occur. She agreed to this plan. I did refill her Sonata, 30 days, NR. Thanks!  
Message to provider: Patient called asking to please resend zaleplon (SONATA) 10 MG capsules to [facility] [address]. Patient states the pharmacy never received the medication. Patient is currently out of medication.     [] Writer advised caller of callback from clinic within 24-72 hours  
Noted.  
zaleplon (SONATA) 10 MG capsule (Discontinued) 30 capsule 0 11/13/2021 11/17/2021    Sig: ONE CASULE NIGHTLY AS NEEDED  SLEEP.    Sent to pharmacy as: Zaleplon 10 MG Oral Capsule (SONATA)    Class: Eprescribe    Reason for Discontinue: Stop Taking at Discharge    Discontinued by Dr. Coello.  Re-enterd by Leighton Durant in PHP as a historical med.     Next appointment:  12/30/21  Last appointment:  12/6/21.  Verified dosage/medication.  Missed appointments:  None    Per ePDMP, last dispensed on  11/15/21  Is the patient due for refill of this medication(s):  Yes  PDMP review:  [x] Criteria MET. Rx pended and sent to provider for approval.  [] Criteria NOT MET.    Forwarded to provider for further review and decision on medication(s) requested.       Miriam Nolasco DCd at [facility], but then maybe restarted in PHP without new prescription sent.  Please advise if you want to send new prescription.     
No

## 2022-01-01 NOTE — ASSESSMENT
[FreeTextEntry1] : Goal blood pressure /90\par At goal\par Urged to limit sodium intake\par Urged to maintain diet and exercise habits to keep BMI < 28\par Limit alcohol consumption to < 14drinks per week\par Reinforced adherence to medication regimen\par  Yes

## 2022-01-03 PROBLEM — Z87.898 HISTORY OF BLOOD LOSS: Status: RESOLVED | Noted: 2018-10-25 | Resolved: 2022-01-03

## 2022-01-03 PROBLEM — Z02.89 ENCOUNTER FOR COMPLETION OF FORM WITH PATIENT: Status: RESOLVED | Noted: 2020-08-17 | Resolved: 2022-01-03

## 2022-01-03 PROBLEM — N18.9 CHRONIC RENAL IMPAIRMENT: Noted: 2019-09-26

## 2022-01-05 ENCOUNTER — APPOINTMENT (OUTPATIENT)
Dept: INTERNAL MEDICINE | Facility: CLINIC | Age: 67
End: 2022-01-05
Payer: COMMERCIAL

## 2022-01-05 DIAGNOSIS — Z02.89 ENCOUNTER FOR OTHER ADMINISTRATIVE EXAMINATIONS: ICD-10-CM

## 2022-01-05 DIAGNOSIS — N18.9 CHRONIC KIDNEY DISEASE, UNSPECIFIED: ICD-10-CM

## 2022-01-05 DIAGNOSIS — Z87.898 PERSONAL HISTORY OF OTHER SPECIFIED CONDITIONS: ICD-10-CM

## 2022-01-05 DIAGNOSIS — N28.9 DISORDER OF KIDNEY AND URETER, UNSPECIFIED: ICD-10-CM

## 2022-01-07 ENCOUNTER — APPOINTMENT (OUTPATIENT)
Dept: INTERNAL MEDICINE | Facility: CLINIC | Age: 67
End: 2022-01-07
Payer: COMMERCIAL

## 2022-01-07 PROCEDURE — 99213 OFFICE O/P EST LOW 20 MIN: CPT

## 2022-01-08 VITALS — DIASTOLIC BLOOD PRESSURE: 70 MMHG | HEART RATE: 64 BPM | SYSTOLIC BLOOD PRESSURE: 128 MMHG

## 2022-01-08 NOTE — ASSESSMENT
[FreeTextEntry1] : Goal blood pressure /90\par At goal\par Urged to limit sodium intake\par Urged to maintain diet and exercise habits to keep BMI < 28\par Limit alcohol consumption to < 7 drinks per week\par Reinforced adherence to medication regimen\par \par \par 24 minutes spent in preparation of this visit, including review of previous notes and test results, interview and examination of patient, discussion of plan, arranging for appropriate testing and treatment, and documentation.\par

## 2022-01-08 NOTE — HISTORY OF PRESENT ILLNESS
[de-identified] : Mr. WEAVER comes in for reassessment of hypertension. He denies edema, chest pain, dizziness, LIRA, PND and orthopnea.  He  has had no problems with his medications.\par \par Follows with renal and cardiology\par

## 2022-01-20 ENCOUNTER — APPOINTMENT (OUTPATIENT)
Dept: ORTHOPEDIC SURGERY | Facility: CLINIC | Age: 67
End: 2022-01-20
Payer: COMMERCIAL

## 2022-01-20 DIAGNOSIS — M17.0 BILATERAL PRIMARY OSTEOARTHRITIS OF KNEE: ICD-10-CM

## 2022-01-20 PROCEDURE — 99214 OFFICE O/P EST MOD 30 MIN: CPT

## 2022-01-20 NOTE — DISCUSSION/SUMMARY
[de-identified] : I went over the pathophysiology of the patient's symptoms in great detail with the patient. A discussion ensued about total knee replacement. The proper pre and post operative procedures and expectations were discussed in extensive detail with the patient. We had a lengthy discussion about the patient's issues, and talked about the benefits and risks of the procedure. The patient understands the most common risks include infection, allergy to the anesthetic and deep vein thrombosis. The risks are accepted. The patient was given no assurances that all the symptoms will be alleviated. He will talk to our surgical scheduler and choose a date. He will need to obtain medical clearance including a COVID-19 test. All of his questions were answered. He understands and consents to the plan.\par \par FU 7-10 days before his knee replacement.\par \par The patient is an 66 year old male with bone on bone arthritis of their bilateral knees. Based upon the patient's continued symptoms and failure to respond to conservative treatment I have recommended a total knee replacement for this patient. A long discussion took place with the patient describing what a total joint replacement is and what the procedure would entail. A total knee model, similar to the implant that will be used during the operation was utilized to demonstrate and to discuss the various bearing surfaces of the implants. The hospitalization and post-operative care and rehabilitation were also discussed. The use of perioperative antibiotics and DVT prophylaxis were discussed. The risk, benefits and alternatives to a surgical intervention were discussed at length with the patient. The patient was also advised of risks related to the medical comorbidities and elevated body mass index (BMI). A lengthy discussion took place to review the most common complications including but not limited to: deep vein thrombosis, pulmonary embolus, heart attack, stroke, infection, wound breakdown, numbness, damage to nerves, tendon, muscles, arteries or other blood vessels, death and other possible complications from anesthesia. The patient was told that we will take steps to minimize these risks by using sterile technique, antibiotics and DVT prophylaxis when appropriate and follow the patient postoperatively in the office setting to monitor progress. The possibility of recurrent pain, no improvement in pain and actual worsening of pain were also discussed with the patient.\par \par The discharge plan of care focused on the patient going home following surgery. I encouraged the patient to make the necessary arrangements to have someone stay with them when they are discharged home. Following discharge, a home care nurse will visit the patient. They will open your home care case and request home physical therapy services. Home physical therapy will commence following discharge provided it is appropriate and covered by his health insurance benefit plan.\par \par The risks, benefits and alternative treatment options of total joint replacement were reviewed with the patient at great length. All questions were answered to the satisfaction of the patient. The patient participated in an interactive discussion of the total knee replacement implant planned for their surgery with questions answered. The patient agreed with the treatment plan, and has decided to move forward with the elective total knee replacement as planned.\par \par SYEDA WEAVER was seen face to face and needs a commode for bedside use as the patient has no ability to acces the bathroom in their home. The patient also requires a rolling walker as this is needed for activities of daily living within their home secondary to the diagnosis of osteoarthritis.

## 2022-01-20 NOTE — ADDENDUM
[FreeTextEntry1] : I, Phong Birch, acted solely as a scribe for Dr. Karri Dillard on this date 01/20/2022.\par All medical record entries made by the Scribe were at my, Dr. Karri Dillard, direction and personally dictated by me on 01/20/2022. I have reviewed the chart and agree that the record accurately reflects my personal performance of the history, physical exam, assessment and plan. I have also personally directed, reviewed, and agreed with the chart.

## 2022-01-20 NOTE — HISTORY OF PRESENT ILLNESS
[de-identified] : 66 year old male presents complaining of bilateral knee pain, left worse than right. 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 He received a left knee cortisone injection on 12/22/2021 and reports it helped tremendously. He has not been treated otherwise for his knees. He has been considering knee replacement for after the winter.\par Pmhx: He is on Eliquis and baby aspirin. He had a cardiac stent 07/31/15. \par \par Radiology Results taken on 12/22/2021:\par o Right Knee: Standing AP, lateral and tunnel views were obtained and revealed bone on bone in the medial compartment with severe patellofemoral arthritis.\par o Left Knee: Standing AP, lateral and tunnel views were obtained and revealed severe tricompartmental osteoarthritis.

## 2022-01-20 NOTE — PHYSICAL EXAM
[de-identified] : Constitutional\par o Appearance : well-nourished, well developed, alert, in no acute distress \par Head and Face\par o Head :\par ¦ Inspection : atraumatic, normocephalic\par o Face :\par ¦ Inspection : no visible rash or discoloration\par Respiratory\par o Respiratory Effort: breathing unlabored \par Neurologic\par o Mental Status Examination :\par ¦ Orientation : grossly oriented to person, place and time\par Psychiatric\par o Mood and Affect: mood normal, affect appropriate \par \par Right Lower Extremity\par o Buttock : no tenderness, swelling or deformities \par o Right Hip :\par ¦ Inspection/Palpation : no tenderness, swelling or deformities\par ¦ Range of Motion : full and painless in all planes, no crepitance\par ¦ Stability : joint stability intact\par ¦ Strength : extension, flexion, adduction, abduction, internal rotation and external rotation 5/5 \par \par o Right Knee :\par ¦ Inspection/Palpation : no tenderness to palpation, no swelling, varus deformity, hypertrophic changes of the medial compartment L>R\par ¦ Range of Motion : 0-105°, patellofemoral crepitance, decreased patellofemoral glide \par ¦ Stability : no valgus or varus instability present on provocative testing\par ¦ Strength : flexion and extension 5/5\par ¦ Tests and Signs : Anterior Drawer negative, Lachman negative, Hadley's negative\par \par Left Lower Extremity\par o Buttock : no tenderness, swelling or deformities \par o Left Hip :\par ¦ Inspection/Palpation : no tenderness, no swelling or deformities\par ¦ Range of Motion : full flexion and rotation, no crepitance\par ¦ Stability : joint stability intact\par ¦ Strength : extension, flexion, adduction, abduction, internal rotation and external rotation 5/5\par \par o Left Knee :\par ¦ Inspection/Palpation : no medial or lateral compartment tenderness, no swelling, severe hypertrophic changes in the medial compartment  L>R, varus deformity\par ¦ Range of Motion :  3-100°, patellofemoral crepitance, no patellofemoral glide\par ¦ Stability : no valgus or varus instability present on provocative testing\par ¦ Strength : flexion and extension 5/5\par ¦ Tests and Signs : Anterior Drawer negative, Lachman negative, Hadley's negative\par \par o Peripheral Vascular System :\par ¦ Dorsalis Pedis Artery : pulse 2+ bilaterally\par ¦ Posterior Tibial Artery : pulse 2+ bilaterally \par \par Gait: very stiff-legged gait, no significant extremity swelling or lymphedema, no foot drop, normal sensation to light touch

## 2022-02-05 NOTE — ASU PREOP CHECKLIST - TAMPON REMOVED
Laceration    A laceration is a cut that goes through all of the layers of the skin and into the tissue that is right under the skin. Some lacerations heal on their own. Others need to be closed with skin adhesive strips, skin glue, stitches (sutures), or staples. Proper laceration care minimizes the risk of infection and helps the laceration to heal better.  If non-absorbable stitches or staples have been placed, they must be taken out within the time frame instructed by your healthcare provider.    SEEK IMMEDIATE MEDICAL CARE IF YOU HAVE ANY OF THE FOLLOWING SYMPTOMS: swelling around the wound, worsening pain, drainage from the wound, red streaking going away from your wound, inability to move finger or toe near the laceration, or discoloration of skin near the laceration.
n/a

## 2022-02-16 ENCOUNTER — APPOINTMENT (OUTPATIENT)
Dept: TRANSPLANT | Facility: CLINIC | Age: 67
End: 2022-02-16
Payer: COMMERCIAL

## 2022-02-16 ENCOUNTER — NON-APPOINTMENT (OUTPATIENT)
Age: 67
End: 2022-02-16

## 2022-02-16 ENCOUNTER — LABORATORY RESULT (OUTPATIENT)
Age: 67
End: 2022-02-16

## 2022-02-16 ENCOUNTER — APPOINTMENT (OUTPATIENT)
Dept: NEPHROLOGY | Facility: CLINIC | Age: 67
End: 2022-02-16

## 2022-02-16 VITALS
TEMPERATURE: 97.6 F | HEIGHT: 71 IN | SYSTOLIC BLOOD PRESSURE: 145 MMHG | BODY MASS INDEX: 25.34 KG/M2 | RESPIRATION RATE: 14 BRPM | HEART RATE: 47 BPM | OXYGEN SATURATION: 98 % | WEIGHT: 181 LBS | DIASTOLIC BLOOD PRESSURE: 81 MMHG

## 2022-02-16 PROCEDURE — 99072 ADDL SUPL MATRL&STAF TM PHE: CPT

## 2022-02-16 PROCEDURE — 99205 OFFICE O/P NEW HI 60 MIN: CPT

## 2022-02-16 NOTE — PLAN
[We should proceed with our protocol for kidney transplantation evaluation.] : We should proceed with our protocol for kidney transplantation evaluation. [TextBox_6] : - Chest CT - history of smoking\par - CT of abdomen, pelvis, and lower extremities (to evaluate presence of arterial stents) with NO IV contrast - pre-emptive candidate\par - prostate biopsy pathology report\par - Doppler of lower extremities (to address vessel patency)

## 2022-02-16 NOTE — CONSULT LETTER
[Dear  ___] : Dear  [unfilled], [Consult Letter:] : I had the pleasure of evaluating your patient, [unfilled]. [Please see my note below.] : Please see my note below. [Consult Closing:] : Thank you very much for allowing me to participate in the care of this patient.  If you have any questions, please do not hesitate to contact me. [Sincerely,] : Sincerely, [FreeTextEntry2] : Jamal Hayes MD [FreeTextEntry3] : Srikanth

## 2022-02-16 NOTE — ASSESSMENT
[FreeTextEntry1] : INCREASED RISK CANDIDATE\par Risk factors include:\par - cardiac history - a-fib , stents, and low EF - as per our protocol EF has to be 40% or greater - cardiology consult\par - history of smoking\par - elevated PSA - urology clearance\par - possible lower extremity arterial stents (as per cardiology note from 11-)

## 2022-02-16 NOTE — REVIEW OF SYSTEMS
[Wears glasses] : wears glasses [Can Walk (___ Blocks)] : can walk [unfilled] blocks [Dyspnea on Exertion] : dyspnea on exertion [Urine Output: ____] : Urine Output: [unfilled] [Joint Pain] : joint pain [Anemia] : anemia [Fever] : no fever [Chills] : no chills [Fatigue] : no fatigue [Night Sweats] : no night sweats [Recent Weight Gain (___ Lbs)] : no recent weight gain [Recent Weight Loss (___ Lbs)] : no recent weight loss [Sore throat] : no sore throat [Pain/Stiffness] : no pain/stiffness [Rhinorrhea] : no rhinorrhea [Trauma] : no trauma [Sclera anicteric] : sclera icteric [Double vision] : no double vision [Blurred Vision] : no blurred vision [Eye Pain] : no eye pain [Chest Pain] : no chest pain [Palpitations] : no palpitations [SOB] : no shortness of breath [Wheezing] : no wheezing [Cough] : no cough [Pleuritic Chest Pain] : no pleuritic chest pain [Abdominal Pain] : no abdominal pain [Nausea] : no nausea [Constipation] : no constipation [Diarrhea] : diarrhea [Vomiting] : no vomiting [Dysuria] : no dysuria [Frequency] : no frequency [Urgency] : no urinary urgency [Incontinence] : no incontinence [Hematuria] : no hematuria [UTI] : no UTI [Joint Stiffness] : no joint stiffness [Muscle Pain] : no muscle pain [Muscle Weakness] : no muscle weakness [Tattoos] : no tattoos [Itching] : no itching [Skin Rash] : no skin rash [Hair Changes] : no hair changes [Headache] : no headache [Dizziness] : no dizziness [Fainting] : no fainting [Confusion] : no confusion [Memory Loss] : no memory loss [Unsteady Gait] : steady gait [Seizures] : no seizures [Suicidal] : not suicidal [Hallucinations] : no hallucinations [Anxiety] : no anxiety [Depression] : no depression [Adenopathy] : no adenopathy [Easy Bleeding] : no easy bleeding [Easy Bruising] : no easy bruising [FreeTextEntry9] : Plan for left knee replacement

## 2022-02-16 NOTE — PHYSICAL EXAM
[Well Developed] : well developed [Well Nourished] : well nourished [No Acute Distress] : no acute distress [Normocephalic] : normocephalic [Atraumatic] : atraumatic [Sclera Anicteric] : sclera anicteric [Good Dentition] : good dentition [Neck Supple] : neck supple [Clear to Auscultation] : clear to auscultation [Breathing Comfortably on RA] : breathing comfortably on room air [Murmur] : murmur [Systolic Murmur] : systolic murmur [Soft] : soft [Non-tender] : non-tender [No] : no fistula/graft [] : right dorsalis pedis palpable [Alert] : alert [Responds to Questions Appropriately] : responds to questions appropriately [Oriented] : oriented [Appropriate] : appropriate [FreeTextEntry1] : no carotid bruits [TextBox_16] : Irregular  [TextBox_34] : No ulcers.  Mild bilateral ankle edema [TextBox_86] : No adenopathy

## 2022-02-16 NOTE — HISTORY OF PRESENT ILLNESS
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 [employer] [TextBox_42] : Born in [facility]\par - Atrial fibrillation - ablated once - planning a second ablation\par - Kidney biopsy - 2019 - Global glomerulosclerosis (56%) - Segmental glomerulosclerosis (7%) - tubular atrophy and interstitial fibrosis (50% of cortex) - Severe arterial and arteriolar sclerosis\par - Elevated PSA - by report underwent a prostate biopsy - MR from 2020 showed PIRADS 2 (clinically significant cancer is unlikely to be present)\par - Rheumatoid arthritis\par Father:  - age 70 - HTN, DM, CAD\par Mother:  - age 68 - HTN, DM\par Family history of kidney disease: no\par Marital status: single\par children: 3 (healthy)\par Smoking: D/C . Prior to that 1/2 ppd x 5 years\par Drinkiing: C/C .  Prior to that drank occasionally.\par Potential live donors: yes (at least 2 friends)\par

## 2022-02-16 NOTE — REASON FOR VISIT
[Initial] : an initial visit for [Kidney Transplant Evaluation] : kidney transplant evaluation [FreeTextEntry2] : Jamal Hayes MD

## 2022-02-17 ENCOUNTER — APPOINTMENT (OUTPATIENT)
Dept: TRANSPLANT | Facility: CLINIC | Age: 67
End: 2022-02-17
Payer: COMMERCIAL

## 2022-02-17 PROCEDURE — 99001T: CUSTOM

## 2022-02-17 PROCEDURE — 86833 HLA CLASS II HIGH DEFIN QUAL: CPT

## 2022-02-17 PROCEDURE — 81382T: CUSTOM

## 2022-02-17 PROCEDURE — 86832 HLA CLASS I HIGH DEFIN QUAL: CPT

## 2022-02-18 LAB
ABO + RH PNL BLD: NORMAL
ABO + RH PNL BLD: NORMAL
ALBUMIN SERPL ELPH-MCNC: 3.7 G/DL
ALP BLD-CCNC: 146 U/L
ALT SERPL-CCNC: 5 U/L
ANION GAP SERPL CALC-SCNC: 17 MMOL/L
APPEARANCE: CLEAR
AST SERPL-CCNC: 9 U/L
BACTERIA: NEGATIVE
BASOPHILS # BLD AUTO: 0.09 K/UL
BASOPHILS NFR BLD AUTO: 0.9 %
BILIRUB SERPL-MCNC: 0.3 MG/DL
BILIRUBIN URINE: NEGATIVE
BLOOD URINE: NEGATIVE
BUN SERPL-MCNC: 66 MG/DL
CALCIUM SERPL-MCNC: 8.8 MG/DL
CHLORIDE SERPL-SCNC: 104 MMOL/L
CHOLEST SERPL-MCNC: 151 MG/DL
CO2 SERPL-SCNC: 22 MMOL/L
COLOR: YELLOW
COVID-19 NUCLEOCAPSID  GAM ANTIBODY INTERPRETATION: POSITIVE
COVID-19 SPIKE DOMAIN ANTIBODY INTERPRETATION: POSITIVE
CREAT SERPL-MCNC: 8.35 MG/DL
CREAT SPEC-SCNC: 145 MG/DL
CREAT/PROT UR: 4.1 RATIO
EBV EA AB SER IA-ACNC: 5.5 U/ML
EBV EA AB TITR SER IF: POSITIVE
EBV EA IGG SER QL IA: >600 U/ML
EBV EA IGG SER-ACNC: NEGATIVE
EBV EA IGM SER IA-ACNC: NEGATIVE
EBV PATRN SPEC IB-IMP: NORMAL
EBV VCA IGG SER IA-ACNC: >750 U/ML
EBV VCA IGM SER QL IA: 11.4 U/ML
EOSINOPHIL # BLD AUTO: 0.91 K/UL
EOSINOPHIL NFR BLD AUTO: 8.9 %
EPSTEIN-BARR VIRUS CAPSID ANTIGEN IGG: POSITIVE
ESTIMATED AVERAGE GLUCOSE: 123 MG/DL
GLUCOSE QUALITATIVE U: ABNORMAL
GLUCOSE SERPL-MCNC: 87 MG/DL
HAV IGM SER QL: NONREACTIVE
HBA1C MFR BLD HPLC: 5.9 %
HBV CORE IGG+IGM SER QL: NONREACTIVE
HBV SURFACE AB SER QL: NONREACTIVE
HBV SURFACE AB SERPL IA-ACNC: <3 MIU/ML
HBV SURFACE AG SER QL: NONREACTIVE
HCT VFR BLD CALC: 39 %
HCV AB SER QL: NONREACTIVE
HCV S/CO RATIO: 0.12 S/CO
HDLC SERPL-MCNC: 54 MG/DL
HEPATITIS A IGG ANTIBODY: NONREACTIVE
HGB BLD-MCNC: 11.7 G/DL
HIV1+2 AB SPEC QL IA.RAPID: NONREACTIVE
HSV 1+2 IGG SER IA-IMP: NEGATIVE
HSV 1+2 IGG SER IA-IMP: POSITIVE
HSV1 IGG SER QL: 7.17 INDEX
HSV2 IGG SER QL: 0.22 INDEX
HYALINE CASTS: 4 /LPF
KETONES URINE: NEGATIVE
LDLC SERPL CALC-MCNC: 77 MG/DL
LEUKOCYTE ESTERASE URINE: NEGATIVE
LYMPHOCYTES # BLD AUTO: 1.83 K/UL
LYMPHOCYTES NFR BLD AUTO: 17.9 %
MAGNESIUM SERPL-MCNC: 2.6 MG/DL
MAN DIFF?: NORMAL
MCHC RBC-ENTMCNC: 19.2 PG
MCHC RBC-ENTMCNC: 30 GM/DL
MCV RBC AUTO: 64 FL
MICROSCOPIC-UA: NORMAL
MONOCYTES # BLD AUTO: 0.46 K/UL
MONOCYTES NFR BLD AUTO: 4.5 %
NEUTROPHILS # BLD AUTO: 6.95 K/UL
NEUTROPHILS NFR BLD AUTO: 67.8 %
NITRITE URINE: NEGATIVE
NONHDLC SERPL-MCNC: 97 MG/DL
PH URINE: 6
PHOSPHATE SERPL-MCNC: 4.6 MG/DL
PLATELET # BLD AUTO: 313 K/UL
POTASSIUM SERPL-SCNC: 4.4 MMOL/L
PROT SERPL-MCNC: 7.4 G/DL
PROT UR-MCNC: 588 MG/DL
PROTEIN URINE: ABNORMAL
PSA SERPL-MCNC: 5.96 NG/ML
RBC # BLD: 6.09 M/UL
RBC # FLD: 19.9 %
RED BLOOD CELLS URINE: 2 /HPF
RUBV IGG FLD-ACNC: 4.2 INDEX
RUBV IGG SER-IMP: POSITIVE
SARS-COV-2 AB SERPL IA-ACNC: >250 U/ML
SARS-COV-2 AB SERPL QL IA: 10.9 INDEX
SARS-COV-2 N GENE NPH QL NAA+PROBE: NOT DETECTED
SODIUM SERPL-SCNC: 143 MMOL/L
SPECIFIC GRAVITY URINE: 1.01
SQUAMOUS EPITHELIAL CELLS: 3 /HPF
T GONDII AB SER-IMP: NEGATIVE
T GONDII IGG SER QL: <3 IU/ML
T PALLIDUM AB SER QL IA: NEGATIVE
TRIGL SERPL-MCNC: 101 MG/DL
UROBILINOGEN URINE: NORMAL
VZV AB TITR SER: NEGATIVE
VZV IGG SER IF-ACNC: <10 INDEX
WBC # FLD AUTO: 10.25 K/UL
WHITE BLOOD CELLS URINE: 6 /HPF

## 2022-02-20 LAB — CMV DNA SPEC QL NAA+PROBE: NOT DETECTED IU/ML

## 2022-02-21 LAB
M TB IFN-G BLD-IMP: NEGATIVE
QUANTIFERON TB PLUS MITOGEN MINUS NIL: 0.52 IU/ML
QUANTIFERON TB PLUS NIL: 0.05 IU/ML
QUANTIFERON TB PLUS TB1 MINUS NIL: 0.11 IU/ML
QUANTIFERON TB PLUS TB2 MINUS NIL: -0.03 IU/ML

## 2022-02-22 ENCOUNTER — NON-APPOINTMENT (OUTPATIENT)
Age: 67
End: 2022-02-22

## 2022-02-22 LAB — EBV DNA SERPL NAA+PROBE-ACNC: NOT DETECTED IU/ML

## 2022-02-23 ENCOUNTER — NON-APPOINTMENT (OUTPATIENT)
Age: 67
End: 2022-02-23

## 2022-02-23 ENCOUNTER — APPOINTMENT (OUTPATIENT)
Age: 67
End: 2022-02-23
Payer: COMMERCIAL

## 2022-02-23 VITALS
WEIGHT: 188 LBS | HEART RATE: 45 BPM | OXYGEN SATURATION: 99 % | BODY MASS INDEX: 26.22 KG/M2 | DIASTOLIC BLOOD PRESSURE: 80 MMHG | SYSTOLIC BLOOD PRESSURE: 130 MMHG

## 2022-02-23 PROCEDURE — 99072 ADDL SUPL MATRL&STAF TM PHE: CPT

## 2022-02-23 PROCEDURE — 99205 OFFICE O/P NEW HI 60 MIN: CPT

## 2022-02-23 PROCEDURE — 93000 ELECTROCARDIOGRAM COMPLETE: CPT | Mod: NC

## 2022-02-23 NOTE — CARDIOLOGY SUMMARY
[de-identified] : 2/23/2022 atrial tachycardia with block, ventricular rate 48, possible old anterior wall MI. [de-identified] : 5/28/2021 severe LV dysfunction with dilated LV, moderate eccentric MR, normal aortic valve. There is decreased RV systolic function, only mild TR, no pulmonary hypertension. [de-identified] : 80% proximal LAD lesion, stent placed, normal left main, diagonal, circumflex and RCA. Severity of LV dysfunction disproportionate to severity of coronary disease.

## 2022-02-23 NOTE — PHYSICAL EXAM
[5th Left ICS - MCL] : palpated at the 5th LICS in the midclavicular line [Bradycardia] : bradycardic [Rhythm Regular] : regular [Normal S1] : normal S1 [S1 Diminished] : was diminished [Normal S2] : normal S2 [No Gallop] : no gallop heard [No Murmur] : no murmurs heard [No Pitting Edema] : no pitting edema present [2+] : left 2+ [1+] : left 1+ [No Abnormalities] : the abdominal aorta was not enlarged and no bruit was heard [Normal] : alert and oriented, normal memory [Right Carotid Bruit] : no bruit heard over the right carotid [Left Carotid Bruit] : no bruit heard over the left carotid

## 2022-02-23 NOTE — DISCUSSION/SUMMARY
[Paroxysmal Atrial Fibrillation] : paroxysmal atrial fibrillation [Anticoagulation] : anticoagulation [Cardiomyopathy] : cardiomyopathy [Responding to Treatment] : responding to treatment [Ischemic Cardiomyopathy] : ischemic cardiomyopathy [Coronary Artery Disease] : coronary artery disease [None] : There are no changes in medication management [Patient] : the patient [de-identified] : and atrial tachycardia [de-identified] : on apixaban 5 mg BID which appears to be correct dose based on age and weight and despite severely reduced GFR. [de-identified] : on appropriate medication regimen given contraints of kidney disease [FreeTextEntry1] : \par Mr. Nils Sawyer is a pre-emptive candidate for renal transplant. Ventricular function is impaired with ejection fraction severely reduced and unchanged for years. There is no indication that it will likely improve. Medications such as sacubitril/valsartan seem contraindicated at this time.

## 2022-02-23 NOTE — REASON FOR VISIT
[Other: ____] : [unfilled] [Cardiac Failure] : cardiac failure [Arrhythmia/ECG Abnorrmalities] : arrhythmia/ECG abnormalities [Coronary Artery Disease] : coronary artery disease

## 2022-02-23 NOTE — HISTORY OF PRESENT ILLNESS
[FreeTextEntry1] : Mr. Nils Sawyer is a 66 year old man referred as a pre-emptive candidate for evaluation in anticipation of renal transplant. Kidney failure is attributed to focal segmental glomerulosclerosis with kidney biopsy 2019. He has a history of atrial fibrillation ablation. He also has coronary artery disease with percutaneous coronary intervention 2014 and severely impaired left ventricular systolic function. He is managed by cardiologist, Dr. Lee.\par \par He has no symptoms, is unaware of palpitations, no awareness of abnormal heart rhythm. Functional capacity is satisfactory, can walk 5 blocks without stopping or climb two flights of stairs.

## 2022-03-02 ENCOUNTER — APPOINTMENT (OUTPATIENT)
Dept: NEPHROLOGY | Facility: CLINIC | Age: 67
End: 2022-03-02

## 2022-03-07 ENCOUNTER — APPOINTMENT (OUTPATIENT)
Dept: ELECTROPHYSIOLOGY | Facility: CLINIC | Age: 67
End: 2022-03-07
Payer: COMMERCIAL

## 2022-03-07 ENCOUNTER — NON-APPOINTMENT (OUTPATIENT)
Age: 67
End: 2022-03-07

## 2022-03-07 VITALS
BODY MASS INDEX: 25.2 KG/M2 | OXYGEN SATURATION: 94 % | HEIGHT: 71 IN | SYSTOLIC BLOOD PRESSURE: 130 MMHG | WEIGHT: 180 LBS | DIASTOLIC BLOOD PRESSURE: 80 MMHG | HEART RATE: 44 BPM | TEMPERATURE: 97.1 F

## 2022-03-07 DIAGNOSIS — M54.50 LOW BACK PAIN, UNSPECIFIED: ICD-10-CM

## 2022-03-07 DIAGNOSIS — R06.02 SHORTNESS OF BREATH: ICD-10-CM

## 2022-03-07 PROCEDURE — 93000 ELECTROCARDIOGRAM COMPLETE: CPT

## 2022-03-07 PROCEDURE — 99214 OFFICE O/P EST MOD 30 MIN: CPT

## 2022-03-08 ENCOUNTER — INPATIENT (INPATIENT)
Facility: HOSPITAL | Age: 67
LOS: 0 days | Discharge: ANOTHER HOSPITAL | End: 2022-03-08
Attending: INTERNAL MEDICINE | Admitting: INTERNAL MEDICINE
Payer: COMMERCIAL

## 2022-03-08 ENCOUNTER — INPATIENT (INPATIENT)
Facility: HOSPITAL | Age: 67
LOS: 19 days | Discharge: HOME HEALTH | DRG: 264 | End: 2022-03-28
Attending: INTERNAL MEDICINE | Admitting: INTERNAL MEDICINE
Payer: COMMERCIAL

## 2022-03-08 VITALS
OXYGEN SATURATION: 99 % | WEIGHT: 179.9 LBS | SYSTOLIC BLOOD PRESSURE: 154 MMHG | HEART RATE: 65 BPM | TEMPERATURE: 98 F | HEIGHT: 71 IN | RESPIRATION RATE: 18 BRPM | DIASTOLIC BLOOD PRESSURE: 98 MMHG

## 2022-03-08 VITALS
TEMPERATURE: 98 F | RESPIRATION RATE: 18 BRPM | OXYGEN SATURATION: 99 % | SYSTOLIC BLOOD PRESSURE: 141 MMHG | HEART RATE: 61 BPM | DIASTOLIC BLOOD PRESSURE: 92 MMHG

## 2022-03-08 VITALS
RESPIRATION RATE: 18 BRPM | SYSTOLIC BLOOD PRESSURE: 170 MMHG | HEART RATE: 59 BPM | OXYGEN SATURATION: 100 % | TEMPERATURE: 98 F | DIASTOLIC BLOOD PRESSURE: 88 MMHG

## 2022-03-08 DIAGNOSIS — Z98.49 CATARACT EXTRACTION STATUS, UNSPECIFIED EYE: Chronic | ICD-10-CM

## 2022-03-08 DIAGNOSIS — I50.9 HEART FAILURE, UNSPECIFIED: ICD-10-CM

## 2022-03-08 DIAGNOSIS — Z98.890 OTHER SPECIFIED POSTPROCEDURAL STATES: Chronic | ICD-10-CM

## 2022-03-08 DIAGNOSIS — N18.5 CHRONIC KIDNEY DISEASE, STAGE 5: ICD-10-CM

## 2022-03-08 DIAGNOSIS — E11.8 TYPE 2 DIABETES MELLITUS WITH UNSPECIFIED COMPLICATIONS: ICD-10-CM

## 2022-03-08 DIAGNOSIS — I10 ESSENTIAL (PRIMARY) HYPERTENSION: ICD-10-CM

## 2022-03-08 DIAGNOSIS — Z95.9 PRESENCE OF CARDIAC AND VASCULAR IMPLANT AND GRAFT, UNSPECIFIED: Chronic | ICD-10-CM

## 2022-03-08 DIAGNOSIS — I50.23 ACUTE ON CHRONIC SYSTOLIC (CONGESTIVE) HEART FAILURE: ICD-10-CM

## 2022-03-08 DIAGNOSIS — R77.8 OTHER SPECIFIED ABNORMALITIES OF PLASMA PROTEINS: ICD-10-CM

## 2022-03-08 DIAGNOSIS — I50.20 UNSPECIFIED SYSTOLIC (CONGESTIVE) HEART FAILURE: ICD-10-CM

## 2022-03-08 DIAGNOSIS — N40.0 BENIGN PROSTATIC HYPERPLASIA WITHOUT LOWER URINARY TRACT SYMPTOMS: ICD-10-CM

## 2022-03-08 DIAGNOSIS — I25.10 ATHEROSCLEROTIC HEART DISEASE OF NATIVE CORONARY ARTERY WITHOUT ANGINA PECTORIS: ICD-10-CM

## 2022-03-08 DIAGNOSIS — E78.5 HYPERLIPIDEMIA, UNSPECIFIED: ICD-10-CM

## 2022-03-08 LAB
ALBUMIN SERPL ELPH-MCNC: 2.6 G/DL — LOW (ref 3.3–5)
ALP SERPL-CCNC: 130 U/L — HIGH (ref 40–120)
ALT FLD-CCNC: 10 U/L — LOW (ref 12–78)
ANION GAP SERPL CALC-SCNC: 10 MMOL/L — SIGNIFICANT CHANGE UP (ref 5–17)
APTT BLD: 44.2 SEC — HIGH (ref 27.5–35.5)
AST SERPL-CCNC: 16 U/L — SIGNIFICANT CHANGE UP (ref 15–37)
BASOPHILS # BLD AUTO: 0.08 K/UL — SIGNIFICANT CHANGE UP (ref 0–0.2)
BASOPHILS NFR BLD AUTO: 0.7 % — SIGNIFICANT CHANGE UP (ref 0–2)
BILIRUB SERPL-MCNC: 0.6 MG/DL — SIGNIFICANT CHANGE UP (ref 0.2–1.2)
BUN SERPL-MCNC: 60 MG/DL — HIGH (ref 7–23)
CALCIUM SERPL-MCNC: 8.5 MG/DL — SIGNIFICANT CHANGE UP (ref 8.5–10.1)
CHLORIDE SERPL-SCNC: 109 MMOL/L — HIGH (ref 96–108)
CK MB BLD-MCNC: 0.7 % — SIGNIFICANT CHANGE UP (ref 0–3.5)
CK MB BLD-MCNC: 0.8 % — SIGNIFICANT CHANGE UP (ref 0–3.5)
CK MB CFR SERPL CALC: 2.5 NG/ML — SIGNIFICANT CHANGE UP (ref 0.5–3.6)
CK MB CFR SERPL CALC: 2.9 NG/ML — SIGNIFICANT CHANGE UP (ref 0.5–3.6)
CK SERPL-CCNC: 336 U/L — HIGH (ref 26–308)
CK SERPL-CCNC: 386 U/L — HIGH (ref 26–308)
CO2 SERPL-SCNC: 20 MMOL/L — LOW (ref 22–31)
CREAT SERPL-MCNC: 8.77 MG/DL — HIGH (ref 0.5–1.3)
EGFR: 6 ML/MIN/1.73M2 — LOW
EOSINOPHIL # BLD AUTO: 0.51 K/UL — HIGH (ref 0–0.5)
EOSINOPHIL NFR BLD AUTO: 4.6 % — SIGNIFICANT CHANGE UP (ref 0–6)
FLUAV AG NPH QL: SIGNIFICANT CHANGE UP
FLUBV AG NPH QL: SIGNIFICANT CHANGE UP
GLUCOSE BLDC GLUCOMTR-MCNC: 107 MG/DL — HIGH (ref 70–99)
GLUCOSE SERPL-MCNC: 111 MG/DL — HIGH (ref 70–99)
HCT VFR BLD CALC: 33.2 % — LOW (ref 39–50)
HGB BLD-MCNC: 10.6 G/DL — LOW (ref 13–17)
IMM GRANULOCYTES NFR BLD AUTO: 0.3 % — SIGNIFICANT CHANGE UP (ref 0–1.5)
INR BLD: 2.44 RATIO — HIGH (ref 0.88–1.16)
LYMPHOCYTES # BLD AUTO: 1.36 K/UL — SIGNIFICANT CHANGE UP (ref 1–3.3)
LYMPHOCYTES # BLD AUTO: 12.2 % — LOW (ref 13–44)
MCHC RBC-ENTMCNC: 19.8 PG — LOW (ref 27–34)
MCHC RBC-ENTMCNC: 31.9 G/DL — LOW (ref 32–36)
MCV RBC AUTO: 62.1 FL — LOW (ref 80–100)
MONOCYTES # BLD AUTO: 0.83 K/UL — SIGNIFICANT CHANGE UP (ref 0–0.9)
MONOCYTES NFR BLD AUTO: 7.5 % — SIGNIFICANT CHANGE UP (ref 2–14)
NEUTROPHILS # BLD AUTO: 8.32 K/UL — HIGH (ref 1.8–7.4)
NEUTROPHILS NFR BLD AUTO: 74.7 % — SIGNIFICANT CHANGE UP (ref 43–77)
NRBC # BLD: 0 /100 WBCS — SIGNIFICANT CHANGE UP (ref 0–0)
NT-PROBNP SERPL-SCNC: 8475 PG/ML — HIGH (ref 0–125)
PLATELET # BLD AUTO: 246 K/UL — SIGNIFICANT CHANGE UP (ref 150–400)
POTASSIUM SERPL-MCNC: 3.5 MMOL/L — SIGNIFICANT CHANGE UP (ref 3.5–5.3)
POTASSIUM SERPL-SCNC: 3.5 MMOL/L — SIGNIFICANT CHANGE UP (ref 3.5–5.3)
PROT SERPL-MCNC: 7.9 GM/DL — SIGNIFICANT CHANGE UP (ref 6–8.3)
PROTHROM AB SERPL-ACNC: 29.3 SEC — HIGH (ref 10.5–13.4)
RBC # BLD: 5.35 M/UL — SIGNIFICANT CHANGE UP (ref 4.2–5.8)
RBC # FLD: 18.8 % — HIGH (ref 10.3–14.5)
SARS-COV-2 RNA SPEC QL NAA+PROBE: SIGNIFICANT CHANGE UP
SODIUM SERPL-SCNC: 139 MMOL/L — SIGNIFICANT CHANGE UP (ref 135–145)
TROPONIN I, HIGH SENSITIVITY RESULT: 598.4 NG/L — HIGH
TROPONIN I, HIGH SENSITIVITY RESULT: 615.8 NG/L — HIGH
WBC # BLD: 11.13 K/UL — HIGH (ref 3.8–10.5)
WBC # FLD AUTO: 11.13 K/UL — HIGH (ref 3.8–10.5)

## 2022-03-08 PROCEDURE — 93306 TTE W/DOPPLER COMPLETE: CPT | Mod: 26

## 2022-03-08 PROCEDURE — 71045 X-RAY EXAM CHEST 1 VIEW: CPT | Mod: 26

## 2022-03-08 PROCEDURE — 99223 1ST HOSP IP/OBS HIGH 75: CPT

## 2022-03-08 PROCEDURE — 93010 ELECTROCARDIOGRAM REPORT: CPT

## 2022-03-08 PROCEDURE — 99285 EMERGENCY DEPT VISIT HI MDM: CPT

## 2022-03-08 RX ORDER — DEXTROSE 50 % IN WATER 50 %
25 SYRINGE (ML) INTRAVENOUS ONCE
Refills: 0 | Status: DISCONTINUED | OUTPATIENT
Start: 2022-03-08 | End: 2022-03-08

## 2022-03-08 RX ORDER — DEXTROSE 50 % IN WATER 50 %
15 SYRINGE (ML) INTRAVENOUS ONCE
Refills: 0 | Status: DISCONTINUED | OUTPATIENT
Start: 2022-03-08 | End: 2022-03-08

## 2022-03-08 RX ORDER — AMLODIPINE BESYLATE 2.5 MG/1
10 TABLET ORAL DAILY
Refills: 0 | Status: DISCONTINUED | OUTPATIENT
Start: 2022-03-08 | End: 2022-03-08

## 2022-03-08 RX ORDER — INSULIN LISPRO 100/ML
VIAL (ML) SUBCUTANEOUS AT BEDTIME
Refills: 0 | Status: DISCONTINUED | OUTPATIENT
Start: 2022-03-08 | End: 2022-03-08

## 2022-03-08 RX ORDER — AMIODARONE HYDROCHLORIDE 400 MG/1
200 TABLET ORAL DAILY
Refills: 0 | Status: DISCONTINUED | OUTPATIENT
Start: 2022-03-08 | End: 2022-03-08

## 2022-03-08 RX ORDER — METOPROLOL TARTRATE 50 MG
25 TABLET ORAL DAILY
Refills: 0 | Status: DISCONTINUED | OUTPATIENT
Start: 2022-03-08 | End: 2022-03-08

## 2022-03-08 RX ORDER — PANTOPRAZOLE SODIUM 20 MG/1
40 TABLET, DELAYED RELEASE ORAL
Refills: 0 | Status: DISCONTINUED | OUTPATIENT
Start: 2022-03-08 | End: 2022-03-08

## 2022-03-08 RX ORDER — FUROSEMIDE 40 MG
40 TABLET ORAL DAILY
Refills: 0 | Status: DISCONTINUED | OUTPATIENT
Start: 2022-03-08 | End: 2022-03-08

## 2022-03-08 RX ORDER — FINASTERIDE 5 MG/1
5 TABLET, FILM COATED ORAL DAILY
Refills: 0 | Status: DISCONTINUED | OUTPATIENT
Start: 2022-03-08 | End: 2022-03-08

## 2022-03-08 RX ORDER — ASPIRIN/CALCIUM CARB/MAGNESIUM 324 MG
81 TABLET ORAL DAILY
Refills: 0 | Status: DISCONTINUED | OUTPATIENT
Start: 2022-03-08 | End: 2022-03-08

## 2022-03-08 RX ORDER — ISOSORBIDE DINITRATE 5 MG/1
10 TABLET ORAL THREE TIMES A DAY
Refills: 0 | Status: DISCONTINUED | OUTPATIENT
Start: 2022-03-08 | End: 2022-03-08

## 2022-03-08 RX ORDER — ALBUTEROL 90 UG/1
2 AEROSOL, METERED ORAL EVERY 6 HOURS
Refills: 0 | Status: DISCONTINUED | OUTPATIENT
Start: 2022-03-08 | End: 2022-03-08

## 2022-03-08 RX ORDER — ACETAMINOPHEN 500 MG
650 TABLET ORAL EVERY 6 HOURS
Refills: 0 | Status: DISCONTINUED | OUTPATIENT
Start: 2022-03-08 | End: 2022-03-08

## 2022-03-08 RX ORDER — FERROUS SULFATE 325(65) MG
325 TABLET ORAL DAILY
Refills: 0 | Status: DISCONTINUED | OUTPATIENT
Start: 2022-03-08 | End: 2022-03-08

## 2022-03-08 RX ORDER — DEXTROSE 50 % IN WATER 50 %
12.5 SYRINGE (ML) INTRAVENOUS ONCE
Refills: 0 | Status: DISCONTINUED | OUTPATIENT
Start: 2022-03-08 | End: 2022-03-08

## 2022-03-08 RX ORDER — ALLOPURINOL 300 MG
100 TABLET ORAL DAILY
Refills: 0 | Status: DISCONTINUED | OUTPATIENT
Start: 2022-03-08 | End: 2022-03-08

## 2022-03-08 RX ORDER — SIMVASTATIN 20 MG/1
20 TABLET, FILM COATED ORAL AT BEDTIME
Refills: 0 | Status: DISCONTINUED | OUTPATIENT
Start: 2022-03-08 | End: 2022-03-08

## 2022-03-08 RX ORDER — GLUCAGON INJECTION, SOLUTION 0.5 MG/.1ML
1 INJECTION, SOLUTION SUBCUTANEOUS ONCE
Refills: 0 | Status: DISCONTINUED | OUTPATIENT
Start: 2022-03-08 | End: 2022-03-08

## 2022-03-08 RX ORDER — LANOLIN ALCOHOL/MO/W.PET/CERES
3 CREAM (GRAM) TOPICAL AT BEDTIME
Refills: 0 | Status: DISCONTINUED | OUTPATIENT
Start: 2022-03-08 | End: 2022-03-08

## 2022-03-08 RX ORDER — SODIUM CHLORIDE 9 MG/ML
1000 INJECTION, SOLUTION INTRAVENOUS
Refills: 0 | Status: DISCONTINUED | OUTPATIENT
Start: 2022-03-08 | End: 2022-03-08

## 2022-03-08 RX ORDER — INSULIN LISPRO 100/ML
VIAL (ML) SUBCUTANEOUS
Refills: 0 | Status: DISCONTINUED | OUTPATIENT
Start: 2022-03-08 | End: 2022-03-08

## 2022-03-08 RX ORDER — HYDRALAZINE HCL 50 MG
25 TABLET ORAL DAILY
Refills: 0 | Status: DISCONTINUED | OUTPATIENT
Start: 2022-03-08 | End: 2022-03-08

## 2022-03-08 RX ORDER — SIMVASTATIN 20 MG/1
40 TABLET, FILM COATED ORAL AT BEDTIME
Refills: 0 | Status: DISCONTINUED | OUTPATIENT
Start: 2022-03-08 | End: 2022-03-08

## 2022-03-08 RX ORDER — APIXABAN 2.5 MG/1
5 TABLET, FILM COATED ORAL
Refills: 0 | Status: DISCONTINUED | OUTPATIENT
Start: 2022-03-08 | End: 2022-03-08

## 2022-03-08 RX ADMIN — Medication 25 MILLIGRAM(S): at 18:37

## 2022-03-08 RX ADMIN — Medication 40 MILLIGRAM(S): at 15:04

## 2022-03-08 RX ADMIN — ISOSORBIDE DINITRATE 10 MILLIGRAM(S): 5 TABLET ORAL at 17:33

## 2022-03-08 RX ADMIN — Medication 100 MILLIGRAM(S): at 18:38

## 2022-03-08 RX ADMIN — APIXABAN 5 MILLIGRAM(S): 2.5 TABLET, FILM COATED ORAL at 18:28

## 2022-03-08 NOTE — H&P ADULT - PROBLEM SELECTOR PLAN 3
see above  cxr does not demonstrate lung opacification   exam c/w pulmonary edema from HF/fluid overload

## 2022-03-08 NOTE — CONSULT NOTE ADULT - SUBJECTIVE AND OBJECTIVE BOX
Patient chart reviewed, full consult to follow.      CKD 4 advancing over the past 6-12 months  Noted renal bx in June 2021:       Surgical Pathology Report (06.15.21 @ 09:00)    Surgical Pathology Report:   ACCESSION No:  [id number]  Acute tubular injury with focal tubular necrosis    Advanced chronic changes, including:  - Global glomerulosclerosis (56% of glomeruli)  - Segmental glomerulosclerosis (7% of glomeruli)  - Tubular atrophy and interstitial fibrosis (50% of cortex)  - Severe arterial and arteriolar sclerosis    No evidence of immune complex-mediated disease or paraprotein  deposition disease is seen in this sample    Comment:  The preliminary findings were communicated via email to Dr. Hayes on 6/16/2021 at 11:30AM.    The biopsy reveals advanced chronic changes, as detailed above,  with superimposed acute tubular injury. No significant  interstitial inflammation or signs of glomerular deposition  disease are seen in this sample. Vascular sclerosing changes are  severe and likely point to a primary vascular sclerosing disease  as a significant contributing factor to this patient's chronic  kidney disease.    The presence of only focal effacement of podocyte foot processes  together with the glomerular hypertrophy suggest that this is  most likely a secondary form of global and segmental  glomerulosclerosis due to structural and functional adaptive  changes. Glomerulosclerosis is frequently the final consequence  of such adaptations that are brought about by hemodynamic changes  and glomerular hypertrophy following loss of functioning  nephrons. Such loss of nephrons is often the result of an  independent primary glomerular, tubulointerstitial, or vascular  disease. Consideration should also be given to secondary focal  sclerosis related to obesity, reflux nephropathy, chronic  pyelonephritis, obstruction, nephrolithiasis, or other urological  conditions. The proteinuria in patients with secondary focal and  segmental glomerulosclerosis is most often slowly progressive,  usually subnephrotic, and not accompanied by    1. Light Microscopy:  Sections of formalin-fixed, paraffin embedded tissue were  evaluated using H&E, PAS, JMS, and trichrome stains. An H&E-  stained frozen section taken from the tissue allocated for  immunofluorescence microscopy and semi-thin toluidine blue-  stained epoxy sections of the tissue processed for electron  microscopywere also evaluated using light microscopy.    The sample submitted for light microscopy consists of renal  cortex and medulla, with up to 22 glomeruli. The entire biopsy  contains 27 glomeruli (LM-22; IF-1; EM-4), of which 15 are  globally sclerosedand 2 glomeruli show segmental sclerosis. Some  glomeruli are hypoperfused. The better preserved glomeruli are  enlarged. The glomerular capillary loops are segmentally wrinkled  and thickened. Significant endocapillary proliferation is not  present and cellular crescents are not seen in glomeruli. The  mesangium is mildly expanded by extracellular matrix. Tubules  reveal focal degenerative changes and flattening of the  epithelium. Some tubules contain protein reabsorption granules in  the epithelial cells. Few tubules contain necrotic cellular  debris. The interstitium reveals focal inflammation and edema.  The infiltrates are composed of mononuclear and polymorphonuclear  cells with admixed rare eosinophils. Approximately 50% of the  renalcortex shows tubular atrophy and interstitial fibrosis.  Arteries and arterioles show severe sclerosis.    2. Immunofluorescence Microscopy:  The sections of the sample submitted for immunofluorescence  studies were incubated with antibodies specificfor the heavy  chains of IgG, IgA, and IgM, for kappa and lambda light chains,  fibrin, albumin, and complement components C3 and C1q. The  intensity of immunofluorescence reactivity is expressed on a  scale 1-  -4+.    The sample contains 1 glomerulus. Glomeruli with global sclerosis  are not seen. No significant immune deposits are seen in the  glomerulus. Dull reactivity with fibrin is noted along the glomerular capillary  loops. Tubular epithelial cells contain protein reabsorption  cytoplasmic granules reactive for albumin, IgG, and C3. Some  tubules contain intraluminal casts reactive for polyclonal IgA.  The interstitium reveals scattered fibrin deposits. Arterioles  reveal focal reactivity for C3. There is no difference in  reactivity between kappa and lambda light chains in the  glomeruli, tubular casts, or in the background of the tissue.    3. Electron Microscopy:  The sample submitted for electron microscopy examination contains  4 glomeruli, of which 3 are globally sclerosed; a single non-  sclerosed glomerulus is examined ultrastructurally. The  glomerular visceral epithelial cells reveal minimal segmental  effacement of their foot processes. The glomerular basement  membranes are of normal thickness and texture. The endothelial  cells show non-specific changes. The mesangium reveals normal  cellular elements and a normal amount of matrix. No electron  dense deposits are present in the mesangium or along the  capillary walls.    Clinical History  A 65 y/o M with PMHx of HTN, HLD, CAD s/p PCI to LAD in 2014, PAD  s/p lower ext stenting, BPH, Afib on eliquis, gout, RA (not on  meds), CKD stage 3, admitted in march/2021 for RUSSELL,Cr peaked to  3.5mg/dl and discharged with Cr 2.8. On presentation on 6/6/21 Cr  up to 3.68mg/dl. Cr at ~1.7-1.9 (04/2019)          < from: US Duplex Kidneys (US Doppler Renal) (05.28.21 @ 11:08) >  Color and spectral Doppler reveals normal, symmetric blood flow throughout both kidneys.  Peak aortic velocity is 66 cm/sec.  IVC/Renal Veins: Patent.    RIGHT  Renal Artery:  Peak systolic velocity is 116 cm/sec origin,151 cm/sec proximal, 113 cm/sec mid, 67 cm/sec distal and 46 cm/sec hilum.  Upper Segmental Artery:  RI = 0.71  Middle Segmental Artery: RI = 0.74  Lower Segmental Artery: RI = 0.68    LEFT  Renal Artery:  Peak systolic velocity is 84 cm/sec origin, 95 cm/sec proximal, 63 cm/sec mid, 68 cm/sec distal and 31 cm/sec hilum.  Upper Segmental Artery:  RI = 0.58  Middle Segmental Artery: RI = 0.60  Lower Segmental Artery: RI = 0.56    IMPRESSION:  Increased echogenicity consistent with renal parenchymal disease. No evidence of hydronephrosis.  No evidence of renal artery stenosis.      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         Patient chart reviewed, full consult to follow.      CKD 4 advancing over the past 6-12 months  Noted renal bx in June 2021:       Surgical Pathology Report (06.15.21 @ 09:00)    Surgical Pathology Report:   ACCESSION No:  10 [id number]  Acute tubular injury with focal tubular necrosis    Advanced chronic changes, including:  - Global glomerulosclerosis (56% of glomeruli)  - Segmental glomerulosclerosis (7% of glomeruli)  - Tubular atrophy and interstitial fibrosis (50% of cortex)  - Severe arterial and arteriolar sclerosis    No evidence of immune complex-mediated disease or paraprotein  deposition disease is seen in this sample    Comment:  The preliminary findings were communicated via email to Dr. Hayes on 6/16/2021 at 11:30AM.    The biopsy reveals advanced chronic changes, as detailed above,  with superimposed acute tubular injury. No significant  interstitial inflammation or signs of glomerular deposition  disease are seen in this sample. Vascular sclerosing changes are  severe and likely point to a primary vascular sclerosing disease  as a significant contributing factor to this patient's chronic  kidney disease.    The presence of only focal effacement of podocyte foot processes  together with the glomerular hypertrophy suggest that this is  most likely a secondary form of global and segmental  glomerulosclerosis due to structural and functional adaptive  changes. Glomerulosclerosis is frequently the final consequence  of such adaptations that are brought about by hemodynamic changes  and glomerular hypertrophy following loss of functioning  nephrons. Such loss of nephrons is often the result of an  independent primary glomerular, tubulointerstitial, or vascular  disease. Consideration should also be given to secondary focal  sclerosis related to obesity, reflux nephropathy, chronic  pyelonephritis, obstruction, nephrolithiasis, or other urological  conditions. The proteinuria in patients with secondary focal and  segmental glomerulosclerosis is most often slowly progressive,  usually subnephrotic, and not accompanied by    1. Light Microscopy:  Sections of formalin-fixed, paraffin embedded tissue were  evaluated using H&E, PAS, JMS, and trichrome stains. An H&E-  stained frozen section taken from the tissue allocated for  immunofluorescence microscopy and semi-thin toluidine blue-  stained epoxy sections of the tissue processed for electron  microscopywere also evaluated using light microscopy.    The sample submitted for light microscopy consists of renal  cortex and medulla, with up to 22 glomeruli. The entire biopsy  contains 27 glomeruli (LM-22; IF-1; EM-4), of which 15 are  globally sclerosedand 2 glomeruli show segmental sclerosis. Some  glomeruli are hypoperfused. The better preserved glomeruli are  enlarged. The glomerular capillary loops are segmentally wrinkled  and thickened. Significant endocapillary proliferation is not  present and cellular crescents are not seen in glomeruli. The  mesangium is mildly expanded by extracellular matrix. Tubules  reveal focal degenerative changes and flattening of the  epithelium. Some tubules contain protein reabsorption granules in  the epithelial cells. Few tubules contain necrotic cellular  debris. The interstitium reveals focal inflammation and edema.  The infiltrates are composed of mononuclear and polymorphonuclear  cells with admixed rare eosinophils. Approximately 50% of the  renalcortex shows tubular atrophy and interstitial fibrosis.  Arteries and arterioles show severe sclerosis.    2. Immunofluorescence Microscopy:  The sections of the sample submitted for immunofluorescence  studies were incubated with antibodies specificfor the heavy  chains of IgG, IgA, and IgM, for kappa and lambda light chains,  fibrin, albumin, and complement components C3 and C1q. The  intensity of immunofluorescence reactivity is expressed on a  scale 1-  -4+.    The sample contains 1 glomerulus. Glomeruli with global sclerosis  are not seen. No significant immune deposits are seen in the  glomerulus. Dull reactivity with fibrin is noted along the glomerular capillary  loops. Tubular epithelial cells contain protein reabsorption  cytoplasmic granules reactive for albumin, IgG, and C3. Some  tubules contain intraluminal casts reactive for polyclonal IgA.  The interstitium reveals scattered fibrin deposits. Arterioles  reveal focal reactivity for C3. There is no difference in  reactivity between kappa and lambda light chains in the  glomeruli, tubular casts, or in the background of the tissue.    3. Electron Microscopy:  The sample submitted for electron microscopy examination contains  4 glomeruli, of which 3 are globally sclerosed; a single non-  sclerosed glomerulus is examined ultrastructurally. The  glomerular visceral epithelial cells reveal minimal segmental  effacement of their foot processes. The glomerular basement  membranes are of normal thickness and texture. The endothelial  cells show non-specific changes. The mesangium reveals normal  cellular elements and a normal amount of matrix. No electron  dense deposits are present in the mesangium or along the  capillary walls.    Clinical History  A 65 y/o M with PMHx of HTN, HLD, CAD s/p PCI to LAD in 2014, PAD  s/p lower ext stenting, BPH, Afib on eliquis, gout, RA (not on  meds), CKD stage 3, admitted in march/2021 for RUSSELL,Cr peaked to  3.5mg/dl and discharged with Cr 2.8. On presentation on 6/6/21 Cr  up to 3.68mg/dl. Cr at ~1.7-1.9 (04/2019)          < from: US Duplex Kidneys (US Doppler Renal) (05.28.21 @ 11:08) >  Color and spectral Doppler reveals normal, symmetric blood flow throughout both kidneys.  Peak aortic velocity is 66 cm/sec.  IVC/Renal Veins: Patent.    RIGHT  Renal Artery:  Peak systolic velocity is 116 cm/sec origin,151 cm/sec proximal, 113 cm/sec mid, 67 cm/sec distal and 46 cm/sec hilum.  Upper Segmental Artery:  RI = 0.71  Middle Segmental Artery: RI = 0.74  Lower Segmental Artery: RI = 0.68    LEFT  Renal Artery:  Peak systolic velocity is 84 cm/sec origin, 95 cm/sec proximal, 63 cm/sec mid, 68 cm/sec distal and 31 cm/sec hilum.  Upper Segmental Artery:  RI = 0.58  Middle Segmental Artery: RI = 0.60  Lower Segmental Artery: RI = 0.56    IMPRESSION:  Increased echogenicity consistent with renal parenchymal disease. No evidence of hydronephrosis.  No evidence of renal artery stenosis.      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       Gian Ang MD         Patient chart reviewed. He was not seen as he was transferred to [facility].      CKD 4 advancing over the past 6-12 months  Noted renal bx in June 2021:       Surgical Pathology Report (06.15.21 @ 09:00)    Surgical Pathology Report:   ACCESSION No:  10 [id number]  Acute tubular injury with focal tubular necrosis    Advanced chronic changes, including:  - Global glomerulosclerosis (56% of glomeruli)  - Segmental glomerulosclerosis (7% of glomeruli)  - Tubular atrophy and interstitial fibrosis (50% of cortex)  - Severe arterial and arteriolar sclerosis    No evidence of immune complex-mediated disease or paraprotein  deposition disease is seen in this sample    Comment:  The preliminary findings were communicated via email to Dr. Hayes on 6/16/2021 at 11:30AM.    The biopsy reveals advanced chronic changes, as detailed above,  with superimposed acute tubular injury. No significant  interstitial inflammation or signs of glomerular deposition  disease are seen in this sample. Vascular sclerosing changes are  severe and likely point to a primary vascular sclerosing disease  as a significant contributing factor to this patient's chronic  kidney disease.    The presence of only focal effacement of podocyte foot processes  together with the glomerular hypertrophy suggest that this is  most likely a secondary form of global and segmental  glomerulosclerosis due to structural and functional adaptive  changes. Glomerulosclerosis is frequently the final consequence  of such adaptations that are brought about by hemodynamic changes  and glomerular hypertrophy following loss of functioning  nephrons. Such loss of nephrons is often the result of an  independent primary glomerular, tubulointerstitial, or vascular  disease. Consideration should also be given to secondary focal  sclerosis related to obesity, reflux nephropathy, chronic  pyelonephritis, obstruction, nephrolithiasis, or other urological  conditions. The proteinuria in patients with secondary focal and  segmental glomerulosclerosis is most often slowly progressive,  usually subnephrotic, and not accompanied by    1. Light Microscopy:  Sections of formalin-fixed, paraffin embedded tissue were  evaluated using H&E, PAS, JMS, and trichrome stains. An H&E-  stained frozen section taken from the tissue allocated for  immunofluorescence microscopy and semi-thin toluidine blue-  stained epoxy sections of the tissue processed for electron  microscopywere also evaluated using light microscopy.    The sample submitted for light microscopy consists of renal  cortex and medulla, with up to 22 glomeruli. The entire biopsy  contains 27 glomeruli (LM-22; IF-1; EM-4), of which 15 are  globally sclerosedand 2 glomeruli show segmental sclerosis. Some  glomeruli are hypoperfused. The better preserved glomeruli are  enlarged. The glomerular capillary loops are segmentally wrinkled  and thickened. Significant endocapillary proliferation is not  present and cellular crescents are not seen in glomeruli. The  mesangium is mildly expanded by extracellular matrix. Tubules  reveal focal degenerative changes and flattening of the  epithelium. Some tubules contain protein reabsorption granules in  the epithelial cells. Few tubules contain necrotic cellular  debris. The interstitium reveals focal inflammation and edema.  The infiltrates are composed of mononuclear and polymorphonuclear  cells with admixed rare eosinophils. Approximately 50% of the  renalcortex shows tubular atrophy and interstitial fibrosis.  Arteries and arterioles show severe sclerosis.    2. Immunofluorescence Microscopy:  The sections of the sample submitted for immunofluorescence  studies were incubated with antibodies specificfor the heavy  chains of IgG, IgA, and IgM, for kappa and lambda light chains,  fibrin, albumin, and complement components C3 and C1q. The  intensity of immunofluorescence reactivity is expressed on a  scale 1-  -4+.    The sample contains 1 glomerulus. Glomeruli with global sclerosis  are not seen. No significant immune deposits are seen in the  glomerulus. Dull reactivity with fibrin is noted along the glomerular capillary  loops. Tubular epithelial cells contain protein reabsorption  cytoplasmic granules reactive for albumin, IgG, and C3. Some  tubules contain intraluminal casts reactive for polyclonal IgA.  The interstitium reveals scattered fibrin deposits. Arterioles  reveal focal reactivity for C3. There is no difference in  reactivity between kappa and lambda light chains in the  glomeruli, tubular casts, or in the background of the tissue.    3. Electron Microscopy:  The sample submitted for electron microscopy examination contains  4 glomeruli, of which 3 are globally sclerosed; a single non-  sclerosed glomerulus is examined ultrastructurally. The  glomerular visceral epithelial cells reveal minimal segmental  effacement of their foot processes. The glomerular basement  membranes are of normal thickness and texture. The endothelial  cells show non-specific changes. The mesangium reveals normal  cellular elements and a normal amount of matrix. No electron  dense deposits are present in the mesangium or along the  capillary walls.    Clinical History  A 65 y/o M with PMHx of HTN, HLD, CAD s/p PCI to LAD in 2014, PAD  s/p lower ext stenting, BPH, Afib on eliquis, gout, RA (not on  meds), CKD stage 3, admitted in march/2021 for RUSSELL,Cr peaked to  3.5mg/dl and discharged with Cr 2.8. On presentation on 6/6/21 Cr  up to 3.68mg/dl. Cr at ~1.7-1.9 (04/2019)          < from: US Duplex Kidneys (US Doppler Renal) (05.28.21 @ 11:08) >  Color and spectral Doppler reveals normal, symmetric blood flow throughout both kidneys.  Peak aortic velocity is 66 cm/sec.  IVC/Renal Veins: Patent.    RIGHT  Renal Artery:  Peak systolic velocity is 116 cm/sec origin,151 cm/sec proximal, 113 cm/sec mid, 67 cm/sec distal and 46 cm/sec hilum.  Upper Segmental Artery:  RI = 0.71  Middle Segmental Artery: RI = 0.74  Lower Segmental Artery: RI = 0.68    LEFT  Renal Artery:  Peak systolic velocity is 84 cm/sec origin, 95 cm/sec proximal, 63 cm/sec mid, 68 cm/sec distal and 31 cm/sec hilum.  Upper Segmental Artery:  RI = 0.58  Middle Segmental Artery: RI = 0.60  Lower Segmental Artery: RI = 0.56    IMPRESSION:  Increased echogenicity consistent with renal parenchymal disease. No evidence of hydronephrosis.  No evidence of renal artery stenosis.      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       Gian Ang MD

## 2022-03-08 NOTE — H&P ADULT - HISTORY OF PRESENT ILLNESS
66M w/ HFrEF (EF 25%; 2021), CAD s/p stent (likely prev AWMI), CKD5 suspected to be FSGS pending kidney transplant, HTN, Afib on eliquis, PAD s/p LE stenting, enlarged prostate presents as transfer from [facility] for acute heart failure exacerbation. The patient reports that he has been experiencing LIRA and sob for 7-10 which was progressive and now occurring at rest. He denies cp, palpitations, worsened sob when lying flat, swelling LE, enlargement of waist, productive cough (but does have rare dry cough now), n/v/d, decreased urination or painful urination. The patient initially sought care on 3/8/22 at [facility] where the patient was admitted for acute HF. The patient completed BNP and troponin testing which were both elevated. TTE was completed and identified EF 50-55%, Large LA, moderate MVR. Nephrology was consulted however patient was transferred to [facility] per Dr. Salvador before patient could be seen. The patient was treated with furosemide 40mg IV (15:04), eliquis 5mg (18:29), hydralazine 25mg PO(18:37), and isordil 10mg PO(17:33) prior to transfer. Patient noted to require 2L NC for hypoxia.     At time of medicine admission the patient denies any chest pain or palpitations.    Per chart review in EMR, outpatient Cr trend as follows 12/14 4.29, 2/16 8.35. renal biopsy was completed and per nephrology outpatient notation there is concern for FSGS which will require kidney transplant.    PCP is reported to be London Lara

## 2022-03-08 NOTE — ED PROVIDER NOTE - OBJECTIVE STATEMENT
66 year old male with PMH of HTN, CKD, CAD with 2 stents, HLD, Atrial fibrillation, and CHF presents to ED for x10 days of SOB. Pt reports he gets short winded after taking few steps such as going to the bathroom. Pt was seen by his cardiologist yesterday and is supposed to receive a call today for when he is going to have his ablation procedure. Pt states he has been coughing more than usual lately which radiates to abdominal pain. Pt denies CP, nausea, vomiting, and diarrhea. Pt is vaccinated against Covid-19.

## 2022-03-08 NOTE — H&P ADULT - PROBLEM SELECTOR PLAN 4
Date Of Previous Biopsy (Optional): 1-14-22 Previous Accession (Optional): [id number] Number Of Stages: 2 Size Of Lesion: 4.2 X Size Of Lesion In Cm (Optional): 3.7 Final Size + Margin In Cm: 0.7 Anesthesia Volume In Cc: 6 Repair Type: Repair deferred until later date Oculoplastic Surgeon Procedure Text (A): After obtaining clear surgical margins the patient was sent to oculoplastics for surgical repair.  The patient understands they will receive post-surgical care and follow-up from the referring physician's office. Oculoplastic Surgeon Procedure Text (B): After obtaining clear surgical margins the patient was sent to oculoplastics for surgical repair.  The patient understands they will receive post-surgical care and follow-up from the referring physician's office. Otolaryngologist Procedure Text (A): After obtaining clear surgical margins the patient was sent to otolaryngology for surgical repair.  The patient understands they will receive post-surgical care and follow-up from the referring physician's office. Otolaryngologist Procedure Text (B): After obtaining clear surgical margins the patient was sent to otolaryngology for surgical repair.  The patient understands they will receive post-surgical care and follow-up from the referring physician's office. Plastic Surgeon Procedure Text (A): After obtaining clear surgical margins the patient was sent to plastics for surgical repair.  The patient understands they will receive post-surgical care and follow-up from the referring physician's office. Plastic Surgeon Procedure Text (B): After obtaining clear surgical margins the patient was sent to plastics for surgical repair.  The patient understands they will receive post-surgical care and follow-up from the referring physician's office. General Surgeon Procedure Text (A): After obtaining clear surgical margins the patient was sent to general surgery for surgical repair.  The patient understands they will receive post-surgical care and follow-up from the referring physician's office. General Surgeon Procedure Text (B): After obtaining clear surgical margins the patient was sent to general surgery for surgical repair.  The patient understands they will receive post-surgical care and follow-up from the referring physician's office. Mid-Level Procedure Text (A): After obtaining clear surgical margins the patient was sent to a mid-level provider for surgical repair.  The patient understands they will receive post-surgical care and follow-up from the mid-level provider. Mid-Level Procedure Text (B): After obtaining clear surgical margins the patient was sent to a mid-level provider for surgical repair.  The patient understands they will receive post-surgical care and follow-up from the mid-level provider. Provider Procedure Text (A): After obtaining clear surgical margins the defect was repaired by another provider. Asc Procedure Text (A): After obtaining clear surgical margins the patient was sent to an ASC for surgical repair.  The patient understands they will receive post-surgical care and follow-up from the ASC physician. Asc Procedure Text (B): After obtaining clear surgical margins the patient was sent to an ASC for surgical repair.  The patient understands they will receive post-surgical care and follow-up from the ASC physician. 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 The patient is reportedly pending kidney transplant  urine studies ordered  - may require HD given presentation and therefore type and screen ordered and eliquis will be held for now (dosed 3/8/22 at [facility] at 18:28 Undermining Type: Entire Wound Debridement Text: The wound edges were debrided prior to proceeding with the closure to facilitate wound healing. Helical Rim Text: The closure involved the helical rim. Vermilion Border Text: The closure involved the vermilion border. Nostril Rim Text: The closure involved the nostril rim. Retention Suture Text: Retention sutures were placed to support the closure and prevent dehiscence. Primary Defect Length (In Cm): 5.8 Primary Defect Width (In Cm): 5 Secondary Defect Length (In Cm): 0 Lab: [id number] Lab Facility: [id number] Detail Level: Detailed Anesthesia Type: 1% lidocaine with 1:100,000 epinephrine and 408mcg clindamycin/ml and a 1:10 solution of 8.4% sodium bicarbonate Hemostasis: Electrocautery Estimated Blood Loss (Cc): minimal Brow Lift Text: A midfrontal incision was made medially to the defect to allow access to the tissues just superior to the left eyebrow. Following careful dissection inferiorly in a supraperiosteal plane to the level of the left eyebrow, several 3-0 monocryl sutures were used to resuspend the eyebrow orbicularis oculi muscular unit to the superior frontal bone periosteum. This resulted in an appropriate reapproximation of static eyebrow symmetry and correction of the left brow ptosis. Epidermal Sutures: 6-0 Prolene Donor Site Anesthesia Type: same as repair anesthesia Epidermal Closure Graft Donor Site (Optional): simple interrupted Graft Donor Site Bandage (Optional-Leave Blank If You Don't Want In Note): Steri-strips and a pressure bandage were applied to the donor site. Closure 2 Information: This tab is for additional flaps and grafts, including complex repair and grafts and complex repair and flaps. You can also specify a different location for the additional defect, if the location is the same you do not need to select a new one. We will insert the automated text for the repair you select below just as we do for solitary flaps and grafts. Please note that at this time if you select a location with a different insurance zone you will need to override the ICD10 and CPT if appropriate. Complex Repair Preamble Text (Leave Blank If You Do Not Want): Extensive wide undermining was performed. Intermediate Repair Preamble Text (Leave Blank If You Do Not Want): Undermining was performed with blunt dissection. Non-Graft Cartilage Fenestration Text: The cartilage was fenestrated with a 2mm punch biopsy to help facilitate healing. Graft Cartilage Fenestration Text: The cartilage was fenestrated with a 2mm punch biopsy to help facilitate graft survival and healing. Secondary Intention Text (Leave Blank If You Do Not Want): The defect will heal with secondary intention. No Repair - Repaired With Adjacent Surgical Defect Text (Leave Blank If You Do Not Want): After obtaining clear surgical margins the defect was repaired concurrently with another surgical defect which was in close approximation. Referred To General Surgery For Closure Text (Leave Blank If You Do Not Want): After obtaining clear surgical margins the patient was sent to general surgery for surgical repair.  The patient understands they will receive post-surgical care and follow-up from this physician's office. Adjacent Tissue Transfer Text: The defect edges were debeveled with a #15 scalpel blade.  Given the location of the defect and the proximity to free margins an adjacent tissue transfer was deemed most appropriate.  Using a sterile surgical marker, an appropriate flap was drawn incorporating the defect and placing the expected incisions within the relaxed skin tension lines where possible.    The area thus outlined was incised deep to adipose tissue with a #15 scalpel blade.  The skin margins were undermined to an appropriate distance in all directions utilizing iris scissors. Advancement Flap (Single) Text: The defect edges were debeveled with a #15 scalpel blade.  Given the location of the defect and the proximity to free margins a single advancement flap was deemed most appropriate.  Using a sterile surgical marker, an appropriate advancement flap was drawn incorporating the defect and placing the expected incisions within the relaxed skin tension lines where possible.    The area thus outlined was incised deep to adipose tissue with a #15 scalpel blade.  The skin margins were undermined to an appropriate distance in all directions utilizing iris scissors. Advancement Flap (Double) Text: The defect edges were debeveled with a #15 scalpel blade.  Given the location of the defect and the proximity to free margins a double advancement flap was deemed most appropriate.  Using a sterile surgical marker, the appropriate advancement flaps were drawn incorporating the defect and placing the expected incisions within the relaxed skin tension lines where possible.    The area thus outlined was incised deep to adipose tissue with a #15 scalpel blade.  The skin margins were undermined to an appropriate distance in all directions utilizing iris scissors. Burow's Advancement Flap Text: The defect edges were debeveled with a #15 scalpel blade.  Given the location of the defect and the proximity to free margins a Burow's advancement flap was deemed most appropriate.  Using a sterile surgical marker, the appropriate advancement flap was drawn incorporating the defect and placing the expected incisions within the relaxed skin tension lines where possible.    The area thus outlined was incised deep to adipose tissue with a #15 scalpel blade.  The skin margins were undermined to an appropriate distance in all directions utilizing iris scissors. Chonodrocutaneous Helical Advancement Flap Text: The defect edges were debeveled with a #15 scalpel blade.  Given the location of the defect and the proximity to free margins a chondrocutaneous helical advancement flap was deemed most appropriate.  Using a sterile surgical marker, the appropriate advancement flap was drawn incorporating the defect and placing the expected incisions within the relaxed skin tension lines where possible.    The area thus outlined was incised deep to adipose tissue with a #15 scalpel blade.  The skin margins were undermined to an appropriate distance in all directions utilizing iris scissors. Crescentic Advancement Flap Text: The defect edges were debeveled with a #15 scalpel blade.  Given the location of the defect and the proximity to free margins a crescentic advancement flap was deemed most appropriate.  Using a sterile surgical marker, the appropriate advancement flap was drawn incorporating the defect and placing the expected incisions within the relaxed skin tension lines where possible.    The area thus outlined was incised deep to adipose tissue with a #15 scalpel blade.  The skin margins were undermined to an appropriate distance in all directions utilizing iris scissors. A-T Advancement Flap Text: The defect edges were debeveled with a #15 scalpel blade.  Given the location of the defect, shape of the defect and the proximity to free margins an A-T advancement flap was deemed most appropriate.  Using a sterile surgical marker, an appropriate advancement flap was drawn incorporating the defect and placing the expected incisions within the relaxed skin tension lines where possible.    The area thus outlined was incised deep to adipose tissue with a #15 scalpel blade.  The skin margins were undermined to an appropriate distance in all directions utilizing iris scissors. O-T Advancement Flap Text: The defect edges were debeveled with a #15 scalpel blade.  Given the location of the defect, shape of the defect and the proximity to free margins an O-T advancement flap was deemed most appropriate.  Using a sterile surgical marker, an appropriate advancement flap was drawn incorporating the defect and placing the expected incisions within the relaxed skin tension lines where possible.    The area thus outlined was incised deep to adipose tissue with a #15 scalpel blade.  The skin margins were undermined to an appropriate distance in all directions utilizing iris scissors. O-L Flap Text: The defect edges were debeveled with a #15 scalpel blade.  Given the location of the defect, shape of the defect and the proximity to free margins an O-L flap was deemed most appropriate.  Using a sterile surgical marker, an appropriate advancement flap was drawn incorporating the defect and placing the expected incisions within the relaxed skin tension lines where possible.    The area thus outlined was incised deep to adipose tissue with a #15 scalpel blade.  The skin margins were undermined to an appropriate distance in all directions utilizing iris scissors. O-Z Flap Text: The defect edges were debeveled with a #15 scalpel blade.  Given the location of the defect, shape of the defect and the proximity to free margins an O-Z flap was deemed most appropriate.  Using a sterile surgical marker, an appropriate transposition flap was drawn incorporating the defect and placing the expected incisions within the relaxed skin tension lines where possible. The area thus outlined was incised deep to adipose tissue with a #15 scalpel blade.  The skin margins were undermined to an appropriate distance in all directions utilizing iris scissors. Double O-Z Flap Text: The defect edges were debeveled with a #15 scalpel blade.  Given the location of the defect, shape of the defect and the proximity to free margins a Double O-Z flap was deemed most appropriate.  Using a sterile surgical marker, an appropriate transposition flap was drawn incorporating the defect and placing the expected incisions within the relaxed skin tension lines where possible. The area thus outlined was incised deep to adipose tissue with a #15 scalpel blade.  The skin margins were undermined to an appropriate distance in all directions utilizing iris scissors. V-Y Flap Text: The defect edges were debeveled with a #15 scalpel blade.  Given the location of the defect, shape of the defect and the proximity to free margins a V-Y flap was deemed most appropriate.  Using a sterile surgical marker, an appropriate advancement flap was drawn incorporating the defect and placing the expected incisions within the relaxed skin tension lines where possible.    The area thus outlined was incised deep to adipose tissue with a #15 scalpel blade.  The skin margins were undermined to an appropriate distance in all directions utilizing iris scissors. Advancement-Rotation Flap Text: The defect edges were debeveled with a #15 scalpel blade.  Given the location of the defect, shape of the defect and the proximity to free margins an advancement-rotation flap was deemed most appropriate.  Using a sterile surgical marker, an appropriate flap was drawn incorporating the defect and placing the expected incisions within the relaxed skin tension lines where possible. The area thus outlined was incised deep to adipose tissue with a #15 scalpel blade.  The skin margins were undermined to an appropriate distance in all directions utilizing iris scissors. Mercedes Flap Text: The defect edges were debeveled with a #15 scalpel blade.  Given the location of the defect, shape of the defect and the proximity to free margins a Mercedes flap was deemed most appropriate.  Using a sterile surgical marker, an appropriate advancement flap was drawn incorporating the defect and placing the expected incisions within the relaxed skin tension lines where possible. The area thus outlined was incised deep to adipose tissue with a #15 scalpel blade.  The skin margins were undermined to an appropriate distance in all directions utilizing iris scissors. Modified Advancement Flap Text: The defect edges were debeveled with a #15 scalpel blade.  Given the location of the defect, shape of the defect and the proximity to free margins a modified advancement flap was deemed most appropriate.  Using a sterile surgical marker, an appropriate advancement flap was drawn incorporating the defect and placing the expected incisions within the relaxed skin tension lines where possible.    The area thus outlined was incised deep to adipose tissue with a #15 scalpel blade.  The skin margins were undermined to an appropriate distance in all directions utilizing iris scissors. Mucosal Advancement Flap Text: Given the location of the defect, shape of the defect and the proximity to free margins a mucosal advancement flap was deemed most appropriate. Incisions were made with a 15 blade scalpel in the appropriate fashion along the cutaneous vermilion border and the mucosal lip. The remaining actinically damaged mucosal tissue was excised.  The mucosal advancement flap was then elevated to the gingival sulcus with care taken to preserve the neurovascular structures and advanced into the primary defect. Care was taken to ensure that precise realignment of the vermilion border was achieved. Peng Advancement Flap Text: The defect edges were debeveled with a #15 scalpel blade.  Given the location of the defect, shape of the defect and the proximity to free margins a Peng advancement flap was deemed most appropriate.  Using a sterile surgical marker, an appropriate advancement flap was drawn incorporating the defect and placing the expected incisions within the relaxed skin tension lines where possible. The area thus outlined was incised deep to adipose tissue with a #15 scalpel blade.  The skin margins were undermined to an appropriate distance in all directions utilizing iris scissors. Hatchet Flap Text: The defect edges were debeveled with a #15 scalpel blade.  Given the location of the defect, shape of the defect and the proximity to free margins a hatchet flap was deemed most appropriate.  Using a sterile surgical marker, an appropriate hatchet flap was drawn incorporating the defect and placing the expected incisions within the relaxed skin tension lines where possible.    The area thus outlined was incised deep to adipose tissue with a #15 scalpel blade.  The skin margins were undermined to an appropriate distance in all directions utilizing iris scissors. Rotation Flap Text: The defect edges were debeveled with a #15 scalpel blade.  Given the location of the defect, shape of the defect and the proximity to free margins a rotation flap was deemed most appropriate.  Using a sterile surgical marker, an appropriate rotation flap was drawn incorporating the defect and placing the expected incisions within the relaxed skin tension lines where possible.    The area thus outlined was incised deep to adipose tissue with a #15 scalpel blade.  The skin margins were undermined to an appropriate distance in all directions utilizing iris scissors. Spiral Flap Text: The defect edges were debeveled with a #15 scalpel blade.  Given the location of the defect, shape of the defect and the proximity to free margins a spiral flap was deemed most appropriate.  Using a sterile surgical marker, an appropriate rotation flap was drawn incorporating the defect and placing the expected incisions within the relaxed skin tension lines where possible. The area thus outlined was incised deep to adipose tissue with a #15 scalpel blade.  The skin margins were undermined to an appropriate distance in all directions utilizing iris scissors. Staged Advancement Flap Text: The defect edges were debeveled with a #15 scalpel blade.  Given the location of the defect, shape of the defect and the proximity to free margins a staged advancement flap was deemed most appropriate.  Using a sterile surgical marker, an appropriate advancement flap was drawn incorporating the defect and placing the expected incisions within the relaxed skin tension lines where possible. The area thus outlined was incised deep to adipose tissue with a #15 scalpel blade.  The skin margins were undermined to an appropriate distance in all directions utilizing iris scissors. Star Wedge Flap Text: The defect edges were debeveled with a #15 scalpel blade.  Given the location of the defect, shape of the defect and the proximity to free margins a star wedge flap was deemed most appropriate.  Using a sterile surgical marker, an appropriate rotation flap was drawn incorporating the defect and placing the expected incisions within the relaxed skin tension lines where possible. The area thus outlined was incised deep to adipose tissue with a #15 scalpel blade.  The skin margins were undermined to an appropriate distance in all directions utilizing iris scissors. Transposition Flap Text: The defect edges were debeveled with a #15 scalpel blade.  Given the location of the defect and the proximity to free margins a transposition flap was deemed most appropriate.  Using a sterile surgical marker, an appropriate transposition flap was drawn incorporating the defect.    The area thus outlined was incised deep to adipose tissue with a #15 scalpel blade.  The skin margins were undermined to an appropriate distance in all directions utilizing iris scissors. Muscle Hinge Flap Text: The defect edges were debeveled with a #15 scalpel blade.  Given the size, depth and location of the defect and the proximity to free margins a muscle hinge flap was deemed most appropriate.  Using a sterile surgical marker, an appropriate hinge flap was drawn incorporating the defect. The area thus outlined was incised with a #15 scalpel blade.  The skin margins were undermined to an appropriate distance in all directions utilizing iris scissors. Mustarde Flap Text: The defect edges were debeveled with a #15 scalpel blade.  Given the size, depth and location of the defect and the proximity to free margins a Mustarde flap was deemed most appropriate.  Using a sterile surgical marker, an appropriate flap was drawn incorporating the defect. The area thus outlined was incised with a #15 scalpel blade.  The skin margins were undermined to an appropriate distance in all directions utilizing iris scissors. Nasal Turnover Hinge Flap Text: The defect edges were debeveled with a #15 scalpel blade.  Given the size, depth, location of the defect and the defect being full thickness a nasal turnover hinge flap was deemed most appropriate.  Using a sterile surgical marker, an appropriate hinge flap was drawn incorporating the defect. The area thus outlined was incised with a #15 scalpel blade. The flap was designed to recreate the nasal mucosal lining and the alar rim. The skin margins were undermined to an appropriate distance in all directions utilizing iris scissors. Nasalis-Muscle-Based Myocutaneous Island Pedicle Flap Text: Using a #15 blade, an incision was made around the donor flap to the level of the nasalis muscle. Wide lateral undermining was then performed in both the subcutaneous plane above the nasalis muscle, and in a submuscular plane just above periosteum. This allowed the formation of a free nasalis muscle axial pedicle (based on the angular artery) which was still attached to the actual cutaneous flap, increasing its mobility and vascular viability. Hemostasis was obtained with pinpoint electrocoagulation. The flap was mobilized into position and the pivotal anchor points positioned and stabilized with buried interrupted sutures. Subcutaneous and dermal tissues were closed in a multilayered fashion with sutures. Tissue redundancies were excised, and the epidermal edges were apposed without significant tension and sutured with sutures. Orbicularis Oris Muscle Flap Text: The defect edges were debeveled with a #15 scalpel blade.  Given that the defect affected the competency of the oral sphincter an orbicularis oris muscle flap was deemed most appropriate to restore this competency and normal muscle function.  Using a sterile surgical marker, an appropriate flap was drawn incorporating the defect. The area thus outlined was incised with a #15 scalpel blade. Melolabial Transposition Flap Text: The defect edges were debeveled with a #15 scalpel blade.  Given the location of the defect and the proximity to free margins a melolabial flap was deemed most appropriate.  Using a sterile surgical marker, an appropriate melolabial transposition flap was drawn incorporating the defect.    The area thus outlined was incised deep to adipose tissue with a #15 scalpel blade.  The skin margins were undermined to an appropriate distance in all directions utilizing iris scissors. Rhombic Flap Text: The defect edges were debeveled with a #15 scalpel blade.  Given the location of the defect and the proximity to free margins a rhombic flap was deemed most appropriate.  Using a sterile surgical marker, an appropriate rhombic flap was drawn incorporating the defect.    The area thus outlined was incised deep to adipose tissue with a #15 scalpel blade.  The skin margins were undermined to an appropriate distance in all directions utilizing iris scissors. Rhomboid Transposition Flap Text: The defect edges were debeveled with a #15 scalpel blade.  Given the location of the defect and the proximity to free margins a rhomboid transposition flap was deemed most appropriate.  Using a sterile surgical marker, an appropriate rhomboid flap was drawn incorporating the defect.    The area thus outlined was incised deep to adipose tissue with a #15 scalpel blade.  The skin margins were undermined to an appropriate distance in all directions utilizing iris scissors. Bi-Rhombic Flap Text: The defect edges were debeveled with a #15 scalpel blade.  Given the location of the defect and the proximity to free margins a bi-rhombic flap was deemed most appropriate.  Using a sterile surgical marker, an appropriate rhombic flap was drawn incorporating the defect. The area thus outlined was incised deep to adipose tissue with a #15 scalpel blade.  The skin margins were undermined to an appropriate distance in all directions utilizing iris scissors. Helical Rim Advancement Flap Text: The defect edges were debeveled with a #15 blade scalpel.  Given the location of the defect and the proximity to free margins (helical rim) a double helical rim advancement flap was deemed most appropriate.  Using a sterile surgical marker, the appropriate advancement flaps were drawn incorporating the defect and placing the expected incisions between the helical rim and antihelix where possible.  The area thus outlined was incised through and through with a #15 scalpel blade.  With a skin hook and iris scissors, the flaps were gently and sharply undermined and freed up. Bilateral Helical Rim Advancement Flap Text: The defect edges were debeveled with a #15 blade scalpel.  Given the location of the defect and the proximity to free margins (helical rim) a bilateral helical rim advancement flap was deemed most appropriate.  Using a sterile surgical marker, the appropriate advancement flaps were drawn incorporating the defect and placing the expected incisions between the helical rim and antihelix where possible.  The area thus outlined was incised through and through with a #15 scalpel blade.  With a skin hook and iris scissors, the flaps were gently and sharply undermined and freed up. Ear Star Wedge Flap Text: The defect edges were debeveled with a #15 blade scalpel.  Given the location of the defect and the proximity to free margins (helical rim) an ear star wedge flap was deemed most appropriate.  Using a sterile surgical marker, the appropriate flap was drawn incorporating the defect and placing the expected incisions between the helical rim and antihelix where possible.  The area thus outlined was incised through and through with a #15 scalpel blade. Banner Transposition Flap Text: The defect edges were debeveled with a #15 scalpel blade.  Given the location of the defect and the proximity to free margins a Banner transposition flap was deemed most appropriate.  Using a sterile surgical marker, an appropriate flap drawn around the defect. The area thus outlined was incised deep to adipose tissue with a #15 scalpel blade.  The skin margins were undermined to an appropriate distance in all directions utilizing iris scissors. Bilobed Flap Text: The defect edges were debeveled with a #15 scalpel blade.  Given the location of the defect and the proximity to free margins a bilobe flap was deemed most appropriate.  Using a sterile surgical marker, an appropriate bilobe flap drawn around the defect.    The area thus outlined was incised deep to adipose tissue with a #15 scalpel blade.  The skin margins were undermined to an appropriate distance in all directions utilizing iris scissors. Bilobed Transposition Flap Text: The defect edges were debeveled with a #15 scalpel blade.  Given the location of the defect and the proximity to free margins a bilobed transposition flap was deemed most appropriate.  Using a sterile surgical marker, an appropriate bilobe flap drawn around the defect.    The area thus outlined was incised deep to adipose tissue with a #15 scalpel blade.  The skin margins were undermined to an appropriate distance in all directions utilizing iris scissors. Trilobed Flap Text: The defect edges were debeveled with a #15 scalpel blade.  Given the location of the defect and the proximity to free margins a trilobed flap was deemed most appropriate.  Using a sterile surgical marker, an appropriate trilobed flap drawn around the defect.    The area thus outlined was incised deep to adipose tissue with a #15 scalpel blade.  The skin margins were undermined to an appropriate distance in all directions utilizing iris scissors. Dorsal Nasal Flap Text: The defect edges were debeveled with a #15 scalpel blade.  Given the location of the defect and the proximity to free margins a dorsal nasal flap was deemed most appropriate.  Using a sterile surgical marker, an appropriate dorsal nasal flap was drawn around the defect.    The area thus outlined was incised deep to adipose tissue with a #15 scalpel blade.  The skin margins were undermined to an appropriate distance in all directions utilizing iris scissors. Island Pedicle Flap Text: The defect edges were debeveled with a #15 scalpel blade.  Given the location of the defect, shape of the defect and the proximity to free margins an island pedicle advancement flap was deemed most appropriate.  Using a sterile surgical marker, an appropriate advancement flap was drawn incorporating the defect, outlining the appropriate donor tissue and placing the expected incisions within the relaxed skin tension lines where possible.    The area thus outlined was incised deep to adipose tissue with a #15 scalpel blade.  The skin margins were undermined to an appropriate distance in all directions around the primary defect and laterally outward around the island pedicle utilizing iris scissors.  There was minimal undermining beneath the pedicle flap. Island Pedicle Flap With Canthal Suspension Text: The defect edges were debeveled with a #15 scalpel blade.  Given the location of the defect, shape of the defect and the proximity to free margins an island pedicle advancement flap was deemed most appropriate.  Using a sterile surgical marker, an appropriate advancement flap was drawn incorporating the defect, outlining the appropriate donor tissue and placing the expected incisions within the relaxed skin tension lines where possible. The area thus outlined was incised deep to adipose tissue with a #15 scalpel blade.  The skin margins were undermined to an appropriate distance in all directions around the primary defect and laterally outward around the island pedicle utilizing iris scissors.  There was minimal undermining beneath the pedicle flap. A suspension suture was placed in the canthal tendon to prevent tension and prevent ectropion. Alar Island Pedicle Flap Text: The defect edges were debeveled with a #15 scalpel blade.  Given the location of the defect, shape of the defect and the proximity to the alar rim an island pedicle advancement flap was deemed most appropriate.  Using a sterile surgical marker, an appropriate advancement flap was drawn incorporating the defect, outlining the appropriate donor tissue and placing the expected incisions within the nasal ala running parallel to the alar rim. The area thus outlined was incised with a #15 scalpel blade.  The skin margins were undermined minimally to an appropriate distance in all directions around the primary defect and laterally outward around the island pedicle utilizing iris scissors.  There was minimal undermining beneath the pedicle flap. Double Island Pedicle Flap Text: The defect edges were debeveled with a #15 scalpel blade.  Given the location of the defect, shape of the defect and the proximity to free margins a double island pedicle advancement flap was deemed most appropriate.  Using a sterile surgical marker, an appropriate advancement flap was drawn incorporating the defect, outlining the appropriate donor tissue and placing the expected incisions within the relaxed skin tension lines where possible.    The area thus outlined was incised deep to adipose tissue with a #15 scalpel blade.  The skin margins were undermined to an appropriate distance in all directions around the primary defect and laterally outward around the island pedicle utilizing iris scissors.  There was minimal undermining beneath the pedicle flap. Island Pedicle Flap-Requiring Vessel Identification Text: The defect edges were debeveled with a #15 scalpel blade.  Given the location of the defect, shape of the defect and the proximity to free margins an island pedicle advancement flap was deemed most appropriate.  Using a sterile surgical marker, an appropriate advancement flap was drawn, based on the axial vessel mentioned above, incorporating the defect, outlining the appropriate donor tissue and placing the expected incisions within the relaxed skin tension lines where possible.    The area thus outlined was incised deep to adipose tissue with a #15 scalpel blade.  The skin margins were undermined to an appropriate distance in all directions around the primary defect and laterally outward around the island pedicle utilizing iris scissors.  There was minimal undermining beneath the pedicle flap. Keystone Flap Text: The defect edges were debeveled with a #15 scalpel blade.  Given the location of the defect, shape of the defect a keystone flap was deemed most appropriate.  Using a sterile surgical marker, an appropriate keystone flap was drawn incorporating the defect, outlining the appropriate donor tissue and placing the expected incisions within the relaxed skin tension lines where possible. The area thus outlined was incised deep to adipose tissue with a #15 scalpel blade.  The skin margins were undermined to an appropriate distance in all directions around the primary defect and laterally outward around the flap utilizing iris scissors. O-T Plasty Text: The defect edges were debeveled with a #15 scalpel blade.  Given the location of the defect, shape of the defect and the proximity to free margins an O-T plasty was deemed most appropriate.  Using a sterile surgical marker, an appropriate O-T plasty was drawn incorporating the defect and placing the expected incisions within the relaxed skin tension lines where possible.    The area thus outlined was incised deep to adipose tissue with a #15 scalpel blade.  The skin margins were undermined to an appropriate distance in all directions utilizing iris scissors. O-Z Plasty Text: The defect edges were debeveled with a #15 scalpel blade.  Given the location of the defect, shape of the defect and the proximity to free margins an O-Z plasty (double transposition flap) was deemed most appropriate.  Using a sterile surgical marker, the appropriate transposition flaps were drawn incorporating the defect and placing the expected incisions within the relaxed skin tension lines where possible.    The area thus outlined was incised deep to adipose tissue with a #15 scalpel blade.  The skin margins were undermined to an appropriate distance in all directions utilizing iris scissors.  Hemostasis was achieved with electrocautery.  The flaps were then transposed into place, one clockwise and the other counterclockwise, and anchored with interrupted buried subcutaneous sutures. Double O-Z Plasty Text: The defect edges were debeveled with a #15 scalpel blade.  Given the location of the defect, shape of the defect and the proximity to free margins a Double O-Z plasty (double transposition flap) was deemed most appropriate.  Using a sterile surgical marker, the appropriate transposition flaps were drawn incorporating the defect and placing the expected incisions within the relaxed skin tension lines where possible. The area thus outlined was incised deep to adipose tissue with a #15 scalpel blade.  The skin margins were undermined to an appropriate distance in all directions utilizing iris scissors.  Hemostasis was achieved with electrocautery.  The flaps were then transposed into place, one clockwise and the other counterclockwise, and anchored with interrupted buried subcutaneous sutures. V-Y Plasty Text: The defect edges were debeveled with a #15 scalpel blade.  Given the location of the defect, shape of the defect and the proximity to free margins an V-Y advancement flap was deemed most appropriate.  Using a sterile surgical marker, an appropriate advancement flap was drawn incorporating the defect and placing the expected incisions within the relaxed skin tension lines where possible.    The area thus outlined was incised deep to adipose tissue with a #15 scalpel blade.  The skin margins were undermined to an appropriate distance in all directions utilizing iris scissors. H Plasty Text: Given the location of the defect, shape of the defect and the proximity to free margins a H-plasty was deemed most appropriate for repair.  Using a sterile surgical marker, the appropriate advancement arms of the H-plasty were drawn incorporating the defect and placing the expected incisions within the relaxed skin tension lines where possible. The area thus outlined was incised deep to adipose tissue with a #15 scalpel blade. The skin margins were undermined to an appropriate distance in all directions utilizing iris scissors.  The opposing advancement arms were then advanced into place in opposite direction and anchored with interrupted buried subcutaneous sutures. W Plasty Text: The lesion was extirpated to the level of the fat with a #15 scalpel blade.  Given the location of the defect, shape of the defect and the proximity to free margins a W-plasty was deemed most appropriate for repair.  Using a sterile surgical marker, the appropriate transposition arms of the W-plasty were drawn incorporating the defect and placing the expected incisions within the relaxed skin tension lines where possible.    The area thus outlined was incised deep to adipose tissue with a #15 scalpel blade.  The skin margins were undermined to an appropriate distance in all directions utilizing iris scissors.  The opposing transposition arms were then transposed into place in opposite direction and anchored with interrupted buried subcutaneous sutures. Z Plasty Text: The lesion was extirpated to the level of the fat with a #15 scalpel blade.  Given the location of the defect, shape of the defect and the proximity to free margins a Z-plasty was deemed most appropriate for repair.  Using a sterile surgical marker, the appropriate transposition arms of the Z-plasty were drawn incorporating the defect and placing the expected incisions within the relaxed skin tension lines where possible.    The area thus outlined was incised deep to adipose tissue with a #15 scalpel blade.  The skin margins were undermined to an appropriate distance in all directions utilizing iris scissors.  The opposing transposition arms were then transposed into place in opposite direction and anchored with interrupted buried subcutaneous sutures. Zygomaticofacial Flap Text: Given the location of the defect, shape of the defect and the proximity to free margins a zygomaticofacial flap was deemed most appropriate for repair.  Using a sterile surgical marker, the appropriate flap was drawn incorporating the defect and placing the expected incisions within the relaxed skin tension lines where possible. The area thus outlined was incised deep to adipose tissue with a #15 scalpel blade with preservation of a vascular pedicle.  The skin margins were undermined to an appropriate distance in all directions utilizing iris scissors.  The flap was then placed into the defect and anchored with interrupted buried subcutaneous sutures. Cheek Interpolation Flap Text: A decision was made to reconstruct the defect utilizing an interpolation axial flap and a staged reconstruction.  A telfa template was made of the defect.  This telfa template was then used to outline the Cheek Interpolation flap.  The donor area for the pedicle flap was then injected with anesthesia.  The flap was excised through the skin and subcutaneous tissue down to the layer of the underlying musculature.  The interpolation flap was carefully excised within this deep plane to maintain its blood supply.  The edges of the donor site were undermined.   The donor site was closed in a primary fashion.  The pedicle was then rotated into position and sutured.  Once the tube was sutured into place, adequate blood supply was confirmed with blanching and refill.  The pedicle was then wrapped with xeroform gauze and dressed appropriately with a telfa and gauze bandage to ensure continued blood supply and protect the attached pedicle. Cheek-To-Nose Interpolation Flap Text: A decision was made to reconstruct the defect utilizing an interpolation axial flap and a staged reconstruction.  A telfa template was made of the defect.  This telfa template was then used to outline the Cheek-To-Nose Interpolation flap.  The donor area for the pedicle flap was then injected with anesthesia.  The flap was excised through the skin and subcutaneous tissue down to the layer of the underlying musculature.  The interpolation flap was carefully excised within this deep plane to maintain its blood supply.  The edges of the donor site were undermined.   The donor site was closed in a primary fashion.  The pedicle was then rotated into position and sutured.  Once the tube was sutured into place, adequate blood supply was confirmed with blanching and refill.  The pedicle was then wrapped with xeroform gauze and dressed appropriately with a telfa and gauze bandage to ensure continued blood supply and protect the attached pedicle. Interpolation Flap Text: A decision was made to reconstruct the defect utilizing an interpolation axial flap and a staged reconstruction.  A telfa template was made of the defect.  This telfa template was then used to outline the interpolation flap.  The donor area for the pedicle flap was then injected with anesthesia.  The flap was excised through the skin and subcutaneous tissue down to the layer of the underlying musculature.  The interpolation flap was carefully excised within this deep plane to maintain its blood supply.  The edges of the donor site were undermined.   The donor site was closed in a primary fashion.  The pedicle was then rotated into position and sutured.  Once the tube was sutured into place, adequate blood supply was confirmed with blanching and refill.  The pedicle was then wrapped with xeroform gauze and dressed appropriately with a telfa and gauze bandage to ensure continued blood supply and protect the attached pedicle. Melolabial Interpolation Flap Text: A decision was made to reconstruct the defect utilizing an interpolation axial flap and a staged reconstruction.  A telfa template was made of the defect.  This telfa template was then used to outline the melolabial interpolation flap.  The donor area for the pedicle flap was then injected with anesthesia.  The flap was excised through the skin and subcutaneous tissue down to the layer of the underlying musculature.  The pedicle flap was carefully excised within this deep plane to maintain its blood supply.  The edges of the donor site were undermined.   The donor site was closed in a primary fashion.  The pedicle was then rotated into position and sutured.  Once the tube was sutured into place, adequate blood supply was confirmed with blanching and refill.  The pedicle was then wrapped with xeroform gauze and dressed appropriately with a telfa and gauze bandage to ensure continued blood supply and protect the attached pedicle. Mastoid Interpolation Flap Text: A decision was made to reconstruct the defect utilizing an interpolation axial flap and a staged reconstruction.  A telfa template was made of the defect.  This telfa template was then used to outline the mastoid interpolation flap.  The donor area for the pedicle flap was then injected with anesthesia.  The flap was excised through the skin and subcutaneous tissue down to the layer of the underlying musculature.  The pedicle flap was carefully excised within this deep plane to maintain its blood supply.  The edges of the donor site were undermined.   The donor site was closed in a primary fashion.  The pedicle was then rotated into position and sutured.  Once the tube was sutured into place, adequate blood supply was confirmed with blanching and refill.  The pedicle was then wrapped with xeroform gauze and dressed appropriately with a telfa and gauze bandage to ensure continued blood supply and protect the attached pedicle. Posterior Auricular Interpolation Flap Text: A decision was made to reconstruct the defect utilizing an interpolation axial flap and a staged reconstruction.  A telfa template was made of the defect.  This telfa template was then used to outline the posterior auricular interpolation flap.  The donor area for the pedicle flap was then injected with anesthesia.  The flap was excised through the skin and subcutaneous tissue down to the layer of the underlying musculature.  The pedicle flap was carefully excised within this deep plane to maintain its blood supply.  The edges of the donor site were undermined.   The donor site was closed in a primary fashion.  The pedicle was then rotated into position and sutured.  Once the tube was sutured into place, adequate blood supply was confirmed with blanching and refill.  The pedicle was then wrapped with xeroform gauze and dressed appropriately with a telfa and gauze bandage to ensure continued blood supply and protect the attached pedicle. Paramedian Forehead Flap Text: A decision was made to reconstruct the defect utilizing an interpolation axial flap and a staged reconstruction.  A telfa template was made of the defect.  This telfa template was then used to outline the paramedian forehead pedicle flap.  The donor area for the pedicle flap was then injected with anesthesia.  The flap was excised through the skin and subcutaneous tissue down to the layer of the underlying musculature.  The pedicle flap was carefully excised within this deep plane to maintain its blood supply.  The edges of the donor site were undermined.   The donor site was closed in a primary fashion.  The pedicle was then rotated into position and sutured.  Once the tube was sutured into place, adequate blood supply was confirmed with blanching and refill.  The pedicle was then wrapped with xeroform gauze and dressed appropriately with a telfa and gauze bandage to ensure continued blood supply and protect the attached pedicle. Cheiloplasty (Less Than 50%) Text: A decision was made to reconstruct the defect with a  cheiloplasty.  The defect was undermined extensively.  Additional orbicularis oris muscle was excised with a 15 blade scalpel.  The defect was converted into a full thickness wedge, of less than 50% of the vertical height of the lip, to facilite a better cosmetic result.  Small vessels were then tied off with 5-0 monocyrl. The orbicularis oris, superficial fascia, adipose and dermis were then reapproximated.  After the deeper layers were approximated the epidermis was reapproximated with particular care given to realign the vermilion border. Cheiloplasty (Complex) Text: A decision was made to reconstruct the defect with a  cheiloplasty.  The defect was undermined extensively.  Additional orbicularis oris muscle was excised with a 15 blade scalpel.  The defect was converted into a full thickness wedge to facilite a better cosmetic result.  Small vessels were then tied off with 5-0 monocyrl. The orbicularis oris, superficial fascia, adipose and dermis were then reapproximated.  After the deeper layers were approximated the epidermis was reapproximated with particular care given to realign the vermilion border. Ear Wedge Repair Text: A wedge excision was completed by carrying down an excision through the full thickness of the ear and cartilage with an inward facing Burow's triangle. The wound was then closed in a layered fashion. Full Thickness Lip Wedge Repair (Flap) Text: Given the location of the defect and the proximity to free margins a full thickness wedge repair was deemed most appropriate.  Using a sterile surgical marker, the appropriate repair was drawn incorporating the defect and placing the expected incisions perpendicular to the vermilion border.  The vermilion border was also meticulously outlined to ensure appropriate reapproximation during the repair.  The area thus outlined was incised through and through with a #15 scalpel blade.  The muscularis and dermis were reaproximated with deep sutures following hemostasis. Care was taken to realign the vermilion border before proceeding with the superficial closure.  Once the vermilion was realigned the superfical and mucosal closure was finished. Ftsg Text: The defect edges were debeveled with a #15 scalpel blade.  Given the location of the defect, shape of the defect and the proximity to free margins a full thickness skin graft was deemed most appropriate.  Using a sterile surgical marker, the primary defect shape was transferred to the donor site. The area thus outlined was incised deep to adipose tissue with a #15 scalpel blade.  The harvested graft was then trimmed of adipose tissue until only dermis and epidermis was left.  The skin margins of the secondary defect were undermined to an appropriate distance in all directions utilizing iris scissors.  The secondary defect was closed with interrupted buried subcutaneous sutures.  The skin edges were then re-apposed with running  sutures.  The skin graft was then placed in the primary defect and oriented appropriately. Split-Thickness Skin Graft Text: The defect edges were debeveled with a #15 scalpel blade.  Given the location of the defect, shape of the defect and the proximity to free margins a split thickness skin graft was deemed most appropriate.  Using a sterile surgical marker, the primary defect shape was transferred to the donor site. The split thickness graft was then harvested.  The skin graft was then placed in the primary defect and oriented appropriately. Burow's Graft Text: The defect edges were debeveled with a #15 scalpel blade.  Given the location of the defect, shape of the defect, the proximity to free margins and the presence of a standing cone deformity a Burow's skin graft was deemed most appropriate. The standing cone was removed and this tissue was then trimmed to the shape of the primary defect. The adipose tissue was also removed until only dermis and epidermis were left.  The skin margins of the secondary defect were undermined to an appropriate distance in all directions utilizing iris scissors.  The secondary defect was closed with interrupted buried subcutaneous sutures.  The skin edges were then re-apposed with running  sutures.  The skin graft was then placed in the primary defect and oriented appropriately. Cartilage Graft Text: The defect edges were debeveled with a #15 scalpel blade.  Given the location of the defect, shape of the defect, the fact the defect involved a full thickness cartilage defect a cartilage graft was deemed most appropriate.  An appropriate donor site was identified, cleansed, and anesthetized. The cartilage graft was then harvested and transferred to the recipient site, oriented appropriately and then sutured into place.  The secondary defect was then repaired using a primary closure. Composite Graft Text: The defect edges were debeveled with a #15 scalpel blade.  Given the location of the defect, shape of the defect, the proximity to free margins and the fact the defect was full thickness a composite graft was deemed most appropriate.  The defect was outline and then transferred to the donor site.  A full thickness graft was then excised from the donor site. The graft was then placed in the primary defect, oriented appropriately and then sutured into place.  The secondary defect was then repaired using a primary closure. Epidermal Autograft Text: The defect edges were debeveled with a #15 scalpel blade.  Given the location of the defect, shape of the defect and the proximity to free margins an epidermal autograft was deemed most appropriate.  Using a sterile surgical marker, the primary defect shape was transferred to the donor site. The epidermal graft was then harvested.  The skin graft was then placed in the primary defect and oriented appropriately. Dermal Autograft Text: The defect edges were debeveled with a #15 scalpel blade.  Given the location of the defect, shape of the defect and the proximity to free margins a dermal autograft was deemed most appropriate.  Using a sterile surgical marker, the primary defect shape was transferred to the donor site. The area thus outlined was incised deep to adipose tissue with a #15 scalpel blade.  The harvested graft was then trimmed of adipose and epidermal tissue until only dermis was left.  The skin graft was then placed in the primary defect and oriented appropriately. Skin Substitute Text: The defect edges were debeveled with a #15 scalpel blade.  Given the location of the defect, shape of the defect and the proximity to free margins a skin substitute graft was deemed most appropriate.  The graft material was trimmed to fit the size of the defect. The graft was then placed in the primary defect and oriented appropriately. Tissue Cultured Epidermal Autograft Text: The defect edges were debeveled with a #15 scalpel blade.  Given the location of the defect, shape of the defect and the proximity to free margins a tissue cultured epidermal autograft was deemed most appropriate.  The graft was then trimmed to fit the size of the defect.  The graft was then placed in the primary defect and oriented appropriately. Xenograft Text: The defect edges were debeveled with a #15 scalpel blade.  Given the location of the defect, shape of the defect and the proximity to free margins a xenograft was deemed most appropriate.  The graft was then trimmed to fit the size of the defect.  The graft was then placed in the primary defect and oriented appropriately. Purse String (Simple) Text: Given the location of the defect and the characteristics of the surrounding skin a purse string closure was deemed most appropriate.  Undermining was performed circumfirentially around the surgical defect.  A purse string suture was then placed and tightened. Purse String (Intermediate) Text: Given the location of the defect and the characteristics of the surrounding skin a purse string intermediate closure was deemed most appropriate.  Undermining was performed circumfirentially around the surgical defect.  A purse string suture was then placed and tightened. Partial Purse String (Simple) Text: Given the location of the defect and the characteristics of the surrounding skin a simple purse string closure was deemed most appropriate.  Undermining was performed circumfirentially around the surgical defect.  A purse string suture was then placed and tightened. Wound tension only allowed a partial closure of the circular defect. Partial Purse String (Intermediate) Text: Given the location of the defect and the characteristics of the surrounding skin an intermediate purse string closure was deemed most appropriate.  Undermining was performed circumfirentially around the surgical defect.  A purse string suture was then placed and tightened. Wound tension only allowed a partial closure of the circular defect. Localized Dermabrasion Text: The patient was draped in routine manner.  Localized dermabrasion using 3 x 17 mm wire brush was performed in routine manner to papillary dermis. This spot dermabrasion is being performed to complete skin cancer reconstruction. It also will eliminate the other sun damaged precancerous cells that are known to be part of the regional effect of a lifetime's worth of sun exposure. This localized dermabrasion is therapeutic and should not be considered cosmetic in any regard. Tarsorrhaphy Text: A tarsorrhaphy was performed using Frost sutures. Complex Repair And Flap Additional Text (Will Appearing After The Standard Complex Repair Text): The complex repair was not sufficient to completely close the primary defect. The remaining additional defect was repaired with the flap mentioned below. Complex Repair And Graft Additional Text (Will Appearing After The Standard Complex Repair Text): The complex repair was not sufficient to completely close the primary defect. The remaining additional defect was repaired with the graft mentioned below. Path Notes (To The Dermatopathologist): A) 4-6 oclock \\nB) 6-8 oclock \\nC)8-10 oclock \\nA-C is peripheral margin for ENFACE sections, see photo for inking & orientation. Consent: The rationale for staged excision was explained to the patient and consent was obtained. The risks, benefits and alternatives to therapy were discussed in detail. Specifically, the risks of infection, scarring, bleeding, prolonged wound healing, incomplete removal, allergy to anesthesia, nerve injury and recurrence were addressed. Prior to the procedure, the treatment site was clearly identified and confirmed by the patient. All components of Universal Protocol/PAUSE Rule completed. Wound Care: Petrolatum Dressing: pressure dressing with telfa Wound Check: 1 day Unna Boot Text: An Unna boot was placed to help immobilize the limb and facilitate more rapid healing. Home Suture Removal Text: Patient was provided instructions on removing sutures and will remove their sutures at home.  If they have any questions or difficulties they will call the office. Render Postcare In The Note: Yes Post-Care Instructions: I reviewed with the patient in detail post-care instructions. Patient is not to engage in any heavy lifting, exercise, or swimming overnight. Should the patient develop any bleeding, uncontrolled pain, or other concerns, they are to page Dr. Bejarano or call the office. Excision Method Verbiage: A rim of normal appearing skin was marked circumferentially around the lesion. The area was prepped, draped and infiltrated with local anesthesia.   A peripheral margin of 7 mm was removed, inked, oriented and submitted for enface sections from 4-10:00 around the area of residual tumor.  A photographic map was also submitted to the pathologist. Subsequent Stages Method Verbiage: Using a similar technique to that described above, a thin layer of tissue was removed from all areas where tumor was reported on the previous stage. Billing Type: Third-Party Bill

## 2022-03-08 NOTE — H&P ADULT - PROBLEM SELECTOR PLAN 2
hsTnT 615.8, CKMB normal, proBNP 8475  No active chest pain  likely due to CHF and CKD  Serial trop/CK/CKMB

## 2022-03-08 NOTE — H&P ADULT - PROBLEM SELECTOR PLAN 5
hold eliquis for now as noted above  c/w metoprolol and amiodarone home dosing  cardiology consulted as noted above

## 2022-03-08 NOTE — H&P ADULT - PROBLEM SELECTOR PLAN 2
see above  trend troponin every 4 hr w/ hsTrop here. If after initial testing there is plateau or peak then can stop

## 2022-03-08 NOTE — H&P ADULT - NSHPLABSRESULTS_GEN_ALL_CORE
Personally reviewed available labs, imaging and ekg  [1]  CBC Full  -  ( 09 Mar 2022 00:46 )  WBC Count : 9.80 K/uL  RBC Count : 4.85 M/uL  Hemoglobin : 9.5 g/dL  Hematocrit : 30.5 %  Platelet Count - Automated : 219 K/uL  Mean Cell Volume : 62.9 fl  Mean Cell Hemoglobin : 19.6 pg  Mean Cell Hemoglobin Concentration : 31.1 gm/dL  Auto Neutrophil # : x  Auto Lymphocyte # : x  Auto Monocyte # : x  Auto Eosinophil # : x  Auto Basophil # : x  Auto Neutrophil % : x  Auto Lymphocyte % : x  Auto Monocyte % : x  Auto Eosinophil % : x  Auto Basophil % : x  141  |  105  |  67<H>  ----------------------------<  93  4.2   |  20<L>  |  9.17<H>  Ca    8.4      09 Mar 2022 00:46  Phos  6.4     03-09  Mg     2.4     03-09  TPro  7.0  /  Alb  3.0<L>  /  TBili  0.4  /  DBili  x   /  AST  11  /  ALT  5<L>  /  AlkPhos  121<H>  03-09  PT/INR - ( 09 Mar 2022 00:46 )   PT: 27.0 sec;   INR: 2.33 ratio    PTT - ( 09 Mar 2022 00:46 )  PTT:38.8 sec  Troponin T, High Sensitivity Result: 89:  (03.09.22 @ 00:46)  Creatine Kinase, Serum (03.09.22 @ 00:46)    Creatine Kinase, Serum: 364 U/L  CKMB Mass Assay (03.09.22 @ 00:46)    CKMB Units: 4.1 ng/mL    CPK Mass Assay %: 1.1 %  Troponin I, High Sensitivity Result: 598.4:  (03.08.22 @ 18:17)  Troponin I, High Sensitivity Result: 615.8:  (03.08.22 @ 10:11)  Creatine Kinase, Serum: 386 U/L (03.08.22 @ 18:17)  Creatine Kinase, Serum: 336 U/L (03.08.22 @ 10:11)  Imaging:  [ 2] I independently reviewed CXR and no lobar opacification is appreciated  TTE from [facility] on 3/8 reports EF 50-55%, Large LA, mod MVR  EKG:  [2] I independently reviewed EKG/cardiac tracing and afib appreciated    Review of old records:  [2] I personally reviewed previous records which identified history of 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

## 2022-03-08 NOTE — H&P ADULT - NSHPADDITIONALINFOADULT_GEN_ALL_CORE
Hardy Rangel MD  Medicine Attending  Department of Hospital Medicine  pager: [phone] (available from 20:00 to 08:00)

## 2022-03-08 NOTE — ED ADULT NURSE NOTE - PAIN: PRESENCE, MLM
Denies known Latex allergy or symptoms of Latex sensitivity.   Medications reviewed with patient:No changes made.  Tobacco use verified.  Medical and Surgical history reviewed: No changes made.      Preferred Pharmacy:   [facility] [address] [place] [address] [address] [place] [address] AT [facility] [address]  [address]  [facility] [address]  Phone: [phone] Fax: [phone]        Refills needed? no  Letter for work/school needed? no    Chief Complaint   Patient presents with   • Worker's Compensation     work comp / post op / total left hip arthroplasty polyethylene liner ceramic (anterior), DOS: 11/1/17. States he is doing ok but still has issues getting in and out of cars.   • Surgical Followup       denies pain/discomfort

## 2022-03-08 NOTE — ED PROVIDER NOTE - CONSTITUTIONAL, MLM
normal... Appears tachypneic, able to speak clear and complete sentences, awake, alert, oriented to person, place, time/situation and in no apparent distress.

## 2022-03-08 NOTE — ED ADULT TRIAGE NOTE - CHIEF COMPLAINT QUOTE
pt c/o sob x 10 days becoming worse last night, dyspnea on exertions. wheezing, cough noted. pt placed on o2 3L NC for comfort. Rt AC 20G placed by ems.  hx: afib, pending Ablation. pt c/o sob x 10 days becoming worse last night, dyspnea on exertions. wheezing, cough noted, cough with yellow phlegm. pt placed on o2 3L NC for comfort. Rt AC 20G placed by ems.  hx: afib, pending Ablation.

## 2022-03-08 NOTE — H&P ADULT - ASSESSMENT
66 years old male with h/o HTN, HLD, CAD s/p PCI to LAD in 2014, PAD s/p lower ext stenting, BPH, Afib on eliquis, gout, RA (not on meds), CKD present to ED with complain of worsening SOB for 7-10 days.  transferred from [facility] to 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

## 2022-03-08 NOTE — H&P ADULT - HISTORY OF PRESENT ILLNESS
66 years old male with h/o HTN, HLD, CAD s/p PCI to LAD in 2014, PAD s/p lower ext stenting, BPH, Afib on eliquis, gout, RA (not on meds), CKD present to ED with complain of worsening SOB for 7-10 days. Patient reported SOB, more with exertion, associated with orthopnpea, cough and occasional PND. Reported eating some salty food and drinking more water lately  Tachypneic in ED, sat well with 2L NC. EKG with Afib, VR 60, QTc 426. WBC 11.13, plt 246, K 3.25, Cr 8.77 ( Cr 8.35 on 02/16/22, 4.29 on 12/2021), hsTnT 615.8, CKMB normal, proBNP 8475. CXR image reviewed, no focal consolidation.     SH: no toxic habits

## 2022-03-08 NOTE — H&P ADULT - ASSESSMENT
66 years old male with h/o HTN, HLD, CAD s/p PCI to LAD in 2014, PAD s/p lower ext stenting, BPH, Afib on eliquis, gout, RA (not on meds), CKD present to ED with complain of worsening SOB for 7-10 days  Tachypneic in ED, sat well with 2L NC. EKG with Afib, VR 60, QTc 426. WBC 11.13, plt 246, K 3.25, Cr 8.77 ( Cr 8.35 on 02/16/22, 4.29 on 12/2021), hsTnT 615.8, CKMB normal, proBNP 8475. CXR image reviewed, no focal consolidation.    Admitted with CHF exacerbation

## 2022-03-08 NOTE — H&P ADULT - NSHPPHYSICALEXAM_GEN_ALL_CORE
Vital Signs Last 24 Hrs  T(C): 36.8 (08 Mar 2022 20:12), Max: 36.8 (08 Mar 2022 09:37)  T(F): 98.2 (08 Mar 2022 20:12), Max: 98.3 (08 Mar 2022 09:37)  HR: 61 (08 Mar 2022 20:12) (58 - 65)  BP: 141/92 (08 Mar 2022 20:12) (124/86 - 170/96)  RR: 18 (08 Mar 2022 20:12) (18 - 20)  SpO2: 99% (08 Mar 2022 20:12) (98% - 100%) on 2L NC    GENERAL: NAD, well-developed  HEAD:  Atraumatic, Normocephalic  EYES: EOMI, PERRL, conjunctiva and sclera clear  ENMT: MMM, good dentition  NECK: Supple, trachea midline  Lung: normal work of breathing, crackles in b/l bases  Cardiovascular: S1&S2+, rrr, no m/r/g appreciated; no pitting edema appreciated in b/l LE  ABDOMEN: bs+, soft, nt, nd, no appreciable masses  : No wynn catheter, no CVA tenderness  Neuro: Alert and follows command, no flaccid paralysis in extremities appreciated  SKIN: warm and dry, no visible purulence in exposed areas, normal skin turgor  Extremity: left hand with swan-neck deformity

## 2022-03-08 NOTE — H&P ADULT - NSHPLABSRESULTS_GEN_ALL_CORE
ECHO 05/2021  Conclusions:   1. Mitral annular calcification, otherwise normal mitral  valve. Moderate eccentric mitral regurgitation.  2. Severely dilated left atrium.  LA volume index = 52  cc/m2.  3. Increased E/e'  is consistent with elevated left  ventricular filling pressure.  4. Normal right ventricular size with , decreased right  ventricular systolic function.  LVEF 25-30%

## 2022-03-08 NOTE — H&P ADULT - PROBLEM SELECTOR PLAN 1
- cardiology consult in am  - tele monitoring. TTE at [facility] appears to have improved EF to 50% from last charted 25% 2021, no reported wall motion abnormalities, moderate MVR reported  - elevated troponin however plateaued. doubt acs as elevated troponin suspected to be from worsening CKD4 (patient pending kidney transplant)  - received lasix 40mg IV at [facility] with some improvement. However given degree of kidney failure will provide dose of lasix 80mg IV this morning. pending response will need cardiology to titrate. strict I/o  - c/w reported home dosing of hydralazine, isosorbide dinitrate, metoprolol for now

## 2022-03-08 NOTE — ED ADULT NURSE NOTE - OBJECTIVE STATEMENT
AAOx3, resps labored, wheezing + cough noted, skin warm and dry. Pt c/o SOB x7-10 days with cough bringing up light green sputum and headache. Denies CP, N/V, SpO2 93% on RA. Pt is due for an ablation, supposed to be scheduled by his cardiologist today. SOB worst with exertion, pt denies fevers. PMHx HTN, 2 stents.

## 2022-03-08 NOTE — PATIENT PROFILE ADULT - FALL HARM RISK - HARM RISK INTERVENTIONS

## 2022-03-08 NOTE — H&P ADULT - PROBLEM SELECTOR PROBLEM 8
[FreeTextEntry1] : I, Efrain Blackman, acted solely as a scribe for Dr. Rey on this date 10/25/2019.  Microcytic anemia

## 2022-03-08 NOTE — H&P ADULT - NSHPPHYSICALEXAM_GEN_ALL_CORE
CONSTITUTIONAL: Well developed, well nourished, alert and cooperative, mild respiratory distress  EYES: PERRL, no scleral icterus  ENT: Mucosa moist, tongue normal.   NECK: Neck supple, trachea midline, non-tender  CARDIAC: Afib. No Pedal edema. Peripheral pulses intact  LUNGS: Equal air entry both lungs. Bibasilar crackles and expiratory wheezing noted. Increase respiratory effort.   ABDOMEN: Soft, nondistended, nontender. No guarding or rebound tenderness. No hepatomegaly or splenomegaly. Bowel sound normal.   MUSCULOSKELETAL: Normocephalic, atraumatic. No clubbing or cyanosis. No significant deformity or joint abnormality.  NEUROLOGICAL: No gross motor or sensory deficits.  SKIN: no lesions or eruptions. Normal turgor  PSYCHIATRIC: A&O x 3, appropriate mood and affect

## 2022-03-08 NOTE — H&P ADULT - PROBLEM SELECTOR PLAN 1
present with worsening SOB  hsTnT 615.8, CKMB normal, proBNP 8475  Bibasilar crackles with wheezing noted  IV lasix 40mg daily   If wheezing persist despite after diuresis, will consider trial of prednisone  Albuterol prn  Monitor I/O , daily weight  ECHO, telemetry monitoring

## 2022-03-08 NOTE — H&P ADULT - NSHPREVIEWOFSYSTEMS_GEN_ALL_CORE
CONSTITUTIONAL: No fever, no chills  EYES: No eye pain, no acute blindness  ENMT: no pain in mouth, no cuts on tongue  RESPIRATORY: No cough, SOB at rest initially LIRA  CARDIOVASCULAR: No CP, no palpitations  GASTROINTESTINAL: no abdominal pain, no n/v/d  GENITOURINARY: No dysuria, no hematuria  Heme/Lymph: No easy bruising, no swelling of neck lymph nodes  NEUROLOGICAL: No seizure, No acute paralysis  SKIN: No itching, no rashes  MUSCULOSKELETAL: No acute joint pain, no joint swelling

## 2022-03-08 NOTE — ED ADULT NURSE NOTE - CHIEF COMPLAINT QUOTE
pt c/o sob x 10 days becoming worse last night, dyspnea on exertions. wheezing, cough noted, cough with yellow phlegm. pt placed on o2 3L NC for comfort. Rt AC 20G placed by ems.  hx: afib, pending Ablation.

## 2022-03-09 ENCOUNTER — NON-APPOINTMENT (OUTPATIENT)
Age: 67
End: 2022-03-09

## 2022-03-09 DIAGNOSIS — N17.9 ACUTE KIDNEY FAILURE, UNSPECIFIED: ICD-10-CM

## 2022-03-09 DIAGNOSIS — I50.9 HEART FAILURE, UNSPECIFIED: ICD-10-CM

## 2022-03-09 DIAGNOSIS — N18.5 CHRONIC KIDNEY DISEASE, STAGE 5: ICD-10-CM

## 2022-03-09 DIAGNOSIS — I48.20 CHRONIC ATRIAL FIBRILLATION, UNSPECIFIED: ICD-10-CM

## 2022-03-09 DIAGNOSIS — R77.8 OTHER SPECIFIED ABNORMALITIES OF PLASMA PROTEINS: ICD-10-CM

## 2022-03-09 DIAGNOSIS — J96.01 ACUTE RESPIRATORY FAILURE WITH HYPOXIA: ICD-10-CM

## 2022-03-09 DIAGNOSIS — N40.1 BENIGN PROSTATIC HYPERPLASIA WITH LOWER URINARY TRACT SYMPTOMS: ICD-10-CM

## 2022-03-09 DIAGNOSIS — D50.9 IRON DEFICIENCY ANEMIA, UNSPECIFIED: ICD-10-CM

## 2022-03-09 DIAGNOSIS — I10 ESSENTIAL (PRIMARY) HYPERTENSION: ICD-10-CM

## 2022-03-09 DIAGNOSIS — Z29.9 ENCOUNTER FOR PROPHYLACTIC MEASURES, UNSPECIFIED: ICD-10-CM

## 2022-03-09 DIAGNOSIS — N40.0 BENIGN PROSTATIC HYPERPLASIA WITHOUT LOWER URINARY TRACT SYMPTOMS: ICD-10-CM

## 2022-03-09 LAB
ALBUMIN SERPL ELPH-MCNC: 3 G/DL — LOW (ref 3.3–5)
ALP SERPL-CCNC: 121 U/L — HIGH (ref 40–120)
ALT FLD-CCNC: 5 U/L — LOW (ref 10–45)
ANION GAP SERPL CALC-SCNC: 16 MMOL/L — SIGNIFICANT CHANGE UP (ref 5–17)
ANION GAP SERPL CALC-SCNC: 18 MMOL/L — HIGH (ref 5–17)
APPEARANCE UR: CLEAR — SIGNIFICANT CHANGE UP
APTT BLD: 38.8 SEC — HIGH (ref 27.5–35.5)
APTT BLD: 39.3 SEC — HIGH (ref 27.5–35.5)
APTT BLD: 71 SEC — HIGH (ref 27.5–35.5)
APTT BLD: 73.6 SEC — HIGH (ref 27.5–35.5)
AST SERPL-CCNC: 11 U/L — SIGNIFICANT CHANGE UP (ref 10–40)
BACTERIA # UR AUTO: NEGATIVE — SIGNIFICANT CHANGE UP
BASE EXCESS BLDV CALC-SCNC: -5.1 MMOL/L — LOW (ref -2–2)
BASOPHILS # BLD AUTO: 0.11 K/UL — SIGNIFICANT CHANGE UP (ref 0–0.2)
BASOPHILS NFR BLD AUTO: 0.9 % — SIGNIFICANT CHANGE UP (ref 0–2)
BILIRUB SERPL-MCNC: 0.4 MG/DL — SIGNIFICANT CHANGE UP (ref 0.2–1.2)
BILIRUB UR-MCNC: NEGATIVE — SIGNIFICANT CHANGE UP
BLD GP AB SCN SERPL QL: NEGATIVE — SIGNIFICANT CHANGE UP
BUN SERPL-MCNC: 67 MG/DL — HIGH (ref 7–23)
BUN SERPL-MCNC: 67 MG/DL — HIGH (ref 7–23)
CALCIUM SERPL-MCNC: 8.4 MG/DL — SIGNIFICANT CHANGE UP (ref 8.4–10.5)
CALCIUM SERPL-MCNC: 9 MG/DL — SIGNIFICANT CHANGE UP (ref 8.4–10.5)
CHLORIDE SERPL-SCNC: 105 MMOL/L — SIGNIFICANT CHANGE UP (ref 96–108)
CHLORIDE SERPL-SCNC: 105 MMOL/L — SIGNIFICANT CHANGE UP (ref 96–108)
CK MB BLD-MCNC: 1 % — SIGNIFICANT CHANGE UP (ref 0–3.5)
CK MB BLD-MCNC: 1.1 % — SIGNIFICANT CHANGE UP (ref 0–3.5)
CK MB CFR SERPL CALC: 4.1 NG/ML — SIGNIFICANT CHANGE UP (ref 0–6.7)
CK MB CFR SERPL CALC: 4.2 NG/ML — SIGNIFICANT CHANGE UP (ref 0–6.7)
CK SERPL-CCNC: 364 U/L — HIGH (ref 30–200)
CK SERPL-CCNC: 410 U/L — HIGH (ref 30–200)
CO2 BLDV-SCNC: 22 MMOL/L — SIGNIFICANT CHANGE UP (ref 22–26)
CO2 SERPL-SCNC: 18 MMOL/L — LOW (ref 22–31)
CO2 SERPL-SCNC: 20 MMOL/L — LOW (ref 22–31)
COLOR SPEC: SIGNIFICANT CHANGE UP
CREAT ?TM UR-MCNC: 78 MG/DL — SIGNIFICANT CHANGE UP
CREAT SERPL-MCNC: 9.04 MG/DL — HIGH (ref 0.5–1.3)
CREAT SERPL-MCNC: 9.17 MG/DL — HIGH (ref 0.5–1.3)
DIFF PNL FLD: ABNORMAL
EGFR: 6 ML/MIN/1.73M2 — LOW
EGFR: 6 ML/MIN/1.73M2 — LOW
EOSINOPHIL # BLD AUTO: 0.72 K/UL — HIGH (ref 0–0.5)
EOSINOPHIL NFR BLD AUTO: 5.9 % — SIGNIFICANT CHANGE UP (ref 0–6)
EPI CELLS # UR: 1 /HPF — SIGNIFICANT CHANGE UP
GLUCOSE SERPL-MCNC: 93 MG/DL — SIGNIFICANT CHANGE UP (ref 70–99)
GLUCOSE SERPL-MCNC: 94 MG/DL — SIGNIFICANT CHANGE UP (ref 70–99)
GLUCOSE UR QL: ABNORMAL
HCO3 BLDV-SCNC: 21 MMOL/L — LOW (ref 22–29)
HCT VFR BLD CALC: 30.5 % — LOW (ref 39–50)
HCT VFR BLD CALC: 34.3 % — LOW (ref 39–50)
HCT VFR BLD CALC: 37 % — LOW (ref 39–50)
HGB BLD-MCNC: 10.9 G/DL — LOW (ref 13–17)
HGB BLD-MCNC: 11.4 G/DL — LOW (ref 13–17)
HGB BLD-MCNC: 9.5 G/DL — LOW (ref 13–17)
HYALINE CASTS # UR AUTO: 1 /LPF — SIGNIFICANT CHANGE UP (ref 0–2)
IMM GRANULOCYTES NFR BLD AUTO: 0.3 % — SIGNIFICANT CHANGE UP (ref 0–1.5)
INR BLD: 1.88 RATIO — HIGH (ref 0.88–1.16)
INR BLD: 2.33 RATIO — HIGH (ref 0.88–1.16)
KETONES UR-MCNC: NEGATIVE — SIGNIFICANT CHANGE UP
LACTATE BLDV-MCNC: 1.5 MMOL/L — SIGNIFICANT CHANGE UP (ref 0.7–2)
LEUKOCYTE ESTERASE UR-ACNC: NEGATIVE — SIGNIFICANT CHANGE UP
LYMPHOCYTES # BLD AUTO: 1.73 K/UL — SIGNIFICANT CHANGE UP (ref 1–3.3)
LYMPHOCYTES # BLD AUTO: 14.1 % — SIGNIFICANT CHANGE UP (ref 13–44)
MAGNESIUM SERPL-MCNC: 2.4 MG/DL — SIGNIFICANT CHANGE UP (ref 1.6–2.6)
MAGNESIUM SERPL-MCNC: 2.5 MG/DL — SIGNIFICANT CHANGE UP (ref 1.6–2.6)
MCHC RBC-ENTMCNC: 19.5 PG — LOW (ref 27–34)
MCHC RBC-ENTMCNC: 19.5 PG — LOW (ref 27–34)
MCHC RBC-ENTMCNC: 19.6 PG — LOW (ref 27–34)
MCHC RBC-ENTMCNC: 30.8 GM/DL — LOW (ref 32–36)
MCHC RBC-ENTMCNC: 31.1 GM/DL — LOW (ref 32–36)
MCHC RBC-ENTMCNC: 31.8 GM/DL — LOW (ref 32–36)
MCV RBC AUTO: 61.3 FL — LOW (ref 80–100)
MCV RBC AUTO: 62.9 FL — LOW (ref 80–100)
MCV RBC AUTO: 63.1 FL — LOW (ref 80–100)
MONOCYTES # BLD AUTO: 0.9 K/UL — SIGNIFICANT CHANGE UP (ref 0–0.9)
MONOCYTES NFR BLD AUTO: 7.3 % — SIGNIFICANT CHANGE UP (ref 2–14)
NEUTROPHILS # BLD AUTO: 8.79 K/UL — HIGH (ref 1.8–7.4)
NEUTROPHILS NFR BLD AUTO: 71.5 % — SIGNIFICANT CHANGE UP (ref 43–77)
NITRITE UR-MCNC: NEGATIVE — SIGNIFICANT CHANGE UP
NRBC # BLD: 0 /100 WBCS — SIGNIFICANT CHANGE UP (ref 0–0)
OSMOLALITY UR: 319 MOS/KG — SIGNIFICANT CHANGE UP (ref 300–900)
PCO2 BLDV: 43 MMHG — SIGNIFICANT CHANGE UP (ref 42–55)
PH BLDV: 7.3 — LOW (ref 7.32–7.43)
PH UR: 6 — SIGNIFICANT CHANGE UP (ref 5–8)
PHOSPHATE SERPL-MCNC: 6 MG/DL — HIGH (ref 2.5–4.5)
PHOSPHATE SERPL-MCNC: 6.4 MG/DL — HIGH (ref 2.5–4.5)
PLATELET # BLD AUTO: 219 K/UL — SIGNIFICANT CHANGE UP (ref 150–400)
PLATELET # BLD AUTO: 255 K/UL — SIGNIFICANT CHANGE UP (ref 150–400)
PLATELET # BLD AUTO: 265 K/UL — SIGNIFICANT CHANGE UP (ref 150–400)
PO2 BLDV: 55 MMHG — HIGH (ref 25–45)
POTASSIUM SERPL-MCNC: 4.1 MMOL/L — SIGNIFICANT CHANGE UP (ref 3.5–5.3)
POTASSIUM SERPL-MCNC: 4.2 MMOL/L — SIGNIFICANT CHANGE UP (ref 3.5–5.3)
POTASSIUM SERPL-SCNC: 4.1 MMOL/L — SIGNIFICANT CHANGE UP (ref 3.5–5.3)
POTASSIUM SERPL-SCNC: 4.2 MMOL/L — SIGNIFICANT CHANGE UP (ref 3.5–5.3)
PROT SERPL-MCNC: 7 G/DL — SIGNIFICANT CHANGE UP (ref 6–8.3)
PROT UR-MCNC: ABNORMAL
PROTHROM AB SERPL-ACNC: 21.7 SEC — HIGH (ref 10.5–13.4)
PROTHROM AB SERPL-ACNC: 27 SEC — HIGH (ref 10.5–13.4)
RBC # BLD: 4.85 M/UL — SIGNIFICANT CHANGE UP (ref 4.2–5.8)
RBC # BLD: 5.6 M/UL — SIGNIFICANT CHANGE UP (ref 4.2–5.8)
RBC # BLD: 5.86 M/UL — HIGH (ref 4.2–5.8)
RBC # FLD: 17.9 % — HIGH (ref 10.3–14.5)
RBC # FLD: 18.6 % — HIGH (ref 10.3–14.5)
RBC # FLD: 19 % — HIGH (ref 10.3–14.5)
RBC CASTS # UR COMP ASSIST: 2 /HPF — SIGNIFICANT CHANGE UP (ref 0–4)
RH IG SCN BLD-IMP: POSITIVE — SIGNIFICANT CHANGE UP
SAO2 % BLDV: 87.1 % — SIGNIFICANT CHANGE UP (ref 67–88)
SODIUM SERPL-SCNC: 141 MMOL/L — SIGNIFICANT CHANGE UP (ref 135–145)
SODIUM SERPL-SCNC: 141 MMOL/L — SIGNIFICANT CHANGE UP (ref 135–145)
SODIUM UR-SCNC: 67 MMOL/L — SIGNIFICANT CHANGE UP
SP GR SPEC: 1.01 — SIGNIFICANT CHANGE UP (ref 1.01–1.02)
TROPONIN T, HIGH SENSITIVITY RESULT: 86 NG/L — HIGH (ref 0–51)
TROPONIN T, HIGH SENSITIVITY RESULT: 89 NG/L — HIGH (ref 0–51)
TROPONIN T, HIGH SENSITIVITY RESULT: 93 NG/L — HIGH (ref 0–51)
TROPONIN T, HIGH SENSITIVITY RESULT: 95 NG/L — HIGH (ref 0–51)
URATE UR-MCNC: 8.1 MG/DL — SIGNIFICANT CHANGE UP
UROBILINOGEN FLD QL: NEGATIVE — SIGNIFICANT CHANGE UP
UUN UR-MCNC: 334 MG/DL — SIGNIFICANT CHANGE UP
WBC # BLD: 11.22 K/UL — HIGH (ref 3.8–10.5)
WBC # BLD: 12.29 K/UL — HIGH (ref 3.8–10.5)
WBC # BLD: 9.8 K/UL — SIGNIFICANT CHANGE UP (ref 3.8–10.5)
WBC # FLD AUTO: 11.22 K/UL — HIGH (ref 3.8–10.5)
WBC # FLD AUTO: 12.29 K/UL — HIGH (ref 3.8–10.5)
WBC # FLD AUTO: 9.8 K/UL — SIGNIFICANT CHANGE UP (ref 3.8–10.5)
WBC UR QL: 3 /HPF — SIGNIFICANT CHANGE UP (ref 0–5)

## 2022-03-09 PROCEDURE — 71250 CT THORAX DX C-: CPT | Mod: 26

## 2022-03-09 PROCEDURE — 93010 ELECTROCARDIOGRAM REPORT: CPT

## 2022-03-09 PROCEDURE — 99223 1ST HOSP IP/OBS HIGH 75: CPT | Mod: GC

## 2022-03-09 PROCEDURE — 99233 SBSQ HOSP IP/OBS HIGH 50: CPT

## 2022-03-09 PROCEDURE — 99221 1ST HOSP IP/OBS SF/LOW 40: CPT

## 2022-03-09 RX ORDER — ASPIRIN/CALCIUM CARB/MAGNESIUM 324 MG
0 TABLET ORAL
Qty: 0 | Refills: 0 | DISCHARGE

## 2022-03-09 RX ORDER — FINASTERIDE 5 MG/1
5 TABLET, FILM COATED ORAL DAILY
Refills: 0 | Status: DISCONTINUED | OUTPATIENT
Start: 2022-03-09 | End: 2022-03-21

## 2022-03-09 RX ORDER — AMIODARONE HYDROCHLORIDE 400 MG/1
200 TABLET ORAL DAILY
Refills: 0 | Status: DISCONTINUED | OUTPATIENT
Start: 2022-03-09 | End: 2022-03-21

## 2022-03-09 RX ORDER — ISOSORBIDE DINITRATE 5 MG/1
10 TABLET ORAL THREE TIMES A DAY
Refills: 0 | Status: DISCONTINUED | OUTPATIENT
Start: 2022-03-09 | End: 2022-03-09

## 2022-03-09 RX ORDER — HYDRALAZINE HCL 50 MG
25 TABLET ORAL DAILY
Refills: 0 | Status: DISCONTINUED | OUTPATIENT
Start: 2022-03-09 | End: 2022-03-21

## 2022-03-09 RX ORDER — FERROUS SULFATE 325(65) MG
325 TABLET ORAL DAILY
Refills: 0 | Status: DISCONTINUED | OUTPATIENT
Start: 2022-03-09 | End: 2022-03-21

## 2022-03-09 RX ORDER — HEPARIN SODIUM 5000 [USP'U]/ML
INJECTION INTRAVENOUS; SUBCUTANEOUS
Qty: 25000 | Refills: 0 | Status: DISCONTINUED | OUTPATIENT
Start: 2022-03-09 | End: 2022-03-12

## 2022-03-09 RX ORDER — PANTOPRAZOLE SODIUM 20 MG/1
40 TABLET, DELAYED RELEASE ORAL
Refills: 0 | Status: DISCONTINUED | OUTPATIENT
Start: 2022-03-09 | End: 2022-03-21

## 2022-03-09 RX ORDER — FUROSEMIDE 40 MG
80 TABLET ORAL ONCE
Refills: 0 | Status: COMPLETED | OUTPATIENT
Start: 2022-03-09 | End: 2022-03-09

## 2022-03-09 RX ORDER — FUROSEMIDE 40 MG
80 TABLET ORAL
Refills: 0 | Status: DISCONTINUED | OUTPATIENT
Start: 2022-03-09 | End: 2022-03-12

## 2022-03-09 RX ORDER — HEPARIN SODIUM 5000 [USP'U]/ML
6500 INJECTION INTRAVENOUS; SUBCUTANEOUS EVERY 6 HOURS
Refills: 0 | Status: DISCONTINUED | OUTPATIENT
Start: 2022-03-09 | End: 2022-03-12

## 2022-03-09 RX ORDER — HEPARIN SODIUM 5000 [USP'U]/ML
3000 INJECTION INTRAVENOUS; SUBCUTANEOUS EVERY 6 HOURS
Refills: 0 | Status: DISCONTINUED | OUTPATIENT
Start: 2022-03-09 | End: 2022-03-12

## 2022-03-09 RX ORDER — METOPROLOL TARTRATE 50 MG
25 TABLET ORAL DAILY
Refills: 0 | Status: DISCONTINUED | OUTPATIENT
Start: 2022-03-09 | End: 2022-03-09

## 2022-03-09 RX ORDER — AMLODIPINE BESYLATE 2.5 MG/1
10 TABLET ORAL DAILY
Refills: 0 | Status: DISCONTINUED | OUTPATIENT
Start: 2022-03-09 | End: 2022-03-21

## 2022-03-09 RX ORDER — ALLOPURINOL 300 MG
100 TABLET ORAL DAILY
Refills: 0 | Status: DISCONTINUED | OUTPATIENT
Start: 2022-03-09 | End: 2022-03-21

## 2022-03-09 RX ORDER — AMIODARONE HYDROCHLORIDE 400 MG/1
1 TABLET ORAL
Qty: 0 | Refills: 0 | DISCHARGE

## 2022-03-09 RX ORDER — ISOSORBIDE DINITRATE 5 MG/1
10 TABLET ORAL THREE TIMES A DAY
Refills: 0 | Status: DISCONTINUED | OUTPATIENT
Start: 2022-03-09 | End: 2022-03-21

## 2022-03-09 RX ORDER — FUROSEMIDE 40 MG
0 TABLET ORAL
Qty: 0 | Refills: 0 | DISCHARGE

## 2022-03-09 RX ORDER — CHOLECALCIFEROL (VITAMIN D3) 125 MCG
1000 CAPSULE ORAL DAILY
Refills: 0 | Status: DISCONTINUED | OUTPATIENT
Start: 2022-03-09 | End: 2022-03-21

## 2022-03-09 RX ORDER — ASPIRIN/CALCIUM CARB/MAGNESIUM 324 MG
81 TABLET ORAL DAILY
Refills: 0 | Status: DISCONTINUED | OUTPATIENT
Start: 2022-03-09 | End: 2022-03-21

## 2022-03-09 RX ORDER — SIMVASTATIN 20 MG/1
40 TABLET, FILM COATED ORAL AT BEDTIME
Refills: 0 | Status: DISCONTINUED | OUTPATIENT
Start: 2022-03-09 | End: 2022-03-21

## 2022-03-09 RX ORDER — METOPROLOL TARTRATE 50 MG
25 TABLET ORAL DAILY
Refills: 0 | Status: DISCONTINUED | OUTPATIENT
Start: 2022-03-09 | End: 2022-03-21

## 2022-03-09 RX ORDER — ALLOPURINOL 300 MG
0 TABLET ORAL
Qty: 0 | Refills: 0 | DISCHARGE

## 2022-03-09 RX ADMIN — Medication 80 MILLIGRAM(S): at 17:15

## 2022-03-09 RX ADMIN — ISOSORBIDE DINITRATE 10 MILLIGRAM(S): 5 TABLET ORAL at 05:29

## 2022-03-09 RX ADMIN — SIMVASTATIN 40 MILLIGRAM(S): 20 TABLET, FILM COATED ORAL at 21:04

## 2022-03-09 RX ADMIN — AMLODIPINE BESYLATE 10 MILLIGRAM(S): 2.5 TABLET ORAL at 05:29

## 2022-03-09 RX ADMIN — Medication 25 MILLIGRAM(S): at 05:29

## 2022-03-09 RX ADMIN — AMIODARONE HYDROCHLORIDE 200 MILLIGRAM(S): 400 TABLET ORAL at 05:29

## 2022-03-09 RX ADMIN — Medication 325 MILLIGRAM(S): at 12:31

## 2022-03-09 RX ADMIN — Medication 81 MILLIGRAM(S): at 12:30

## 2022-03-09 RX ADMIN — HEPARIN SODIUM 1400 UNIT(S)/HR: 5000 INJECTION INTRAVENOUS; SUBCUTANEOUS at 19:03

## 2022-03-09 RX ADMIN — Medication 1000 UNIT(S): at 12:31

## 2022-03-09 RX ADMIN — PANTOPRAZOLE SODIUM 40 MILLIGRAM(S): 20 TABLET, DELAYED RELEASE ORAL at 05:40

## 2022-03-09 RX ADMIN — HEPARIN SODIUM 1400 UNIT(S)/HR: 5000 INJECTION INTRAVENOUS; SUBCUTANEOUS at 16:13

## 2022-03-09 RX ADMIN — HEPARIN SODIUM 1400 UNIT(S)/HR: 5000 INJECTION INTRAVENOUS; SUBCUTANEOUS at 09:31

## 2022-03-09 RX ADMIN — Medication 100 MILLIGRAM(S): at 12:31

## 2022-03-09 RX ADMIN — Medication 200 MILLIGRAM(S): at 17:16

## 2022-03-09 RX ADMIN — Medication 200 MILLIGRAM(S): at 10:36

## 2022-03-09 RX ADMIN — Medication 80 MILLIGRAM(S): at 05:28

## 2022-03-09 RX ADMIN — Medication 100 MILLIGRAM(S): at 03:24

## 2022-03-09 RX ADMIN — FINASTERIDE 5 MILLIGRAM(S): 5 TABLET, FILM COATED ORAL at 12:31

## 2022-03-09 NOTE — CONSULT NOTE ADULT - TIME BILLING
Pt seen and examined, agree with the above assessment and plan by BRENDA Patton.  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 transplant, HTN, Afib on eliquis, PAD s/p LE stenting, transfer from [facility] to [place] for mgmt of an acute heart failure exacerbation  Pt clinically improving  Continue IV Lasix  Renal eval pending for mgmt of advanced renal disease/possible HD  Repeat echo with improved LV fx, ef 50-55%, mod MR, mild TR, trace SC  sbp stable - c/w bb, hydral  Atrial Fibrillation rates overall stable  multiple runs of AIVR on tele noted   EP eval noted - c/w  amiodarone 200 mg daily , Toprol  XL 25 mg daily  continue hep gtt for now   continue toprol, statin, and asa 81mg daily for mgmt of CAD

## 2022-03-09 NOTE — CONSULT NOTE ADULT - PROBLEM SELECTOR RECOMMENDATION 9
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  SCr at ~1.7-1.9 (04/2019).  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 Last SCr was elevated at 3.25 on 6/8/21. Scr on admission elevated at 8.77 and increased to 9.04. Pt received IV lasix and reports improvement in his symptoms. Recommend continue IV lasix, pt reports improvement in his symptoms. Labs reviewed. No critical acid-base disturbances noted. NO absolute indication for RRT at this moment. Will need to consider HD if renal failure continues to worsen. Monitor labs and urine output. Avoid nephrotoxins. Dose medications as per eGFR.

## 2022-03-09 NOTE — CONSULT NOTE ADULT - ASSESSMENT
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     #AIVR   Tele showed runs of AIVR, asymptomatic.   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     #AIVR   Tele showed runs of AIVR, asymptomatic.   -Continue to monitor at this time, no changes needed in meds   -Continue home amiodarone   -Continue home metoprolol   -K> 4 and Mg >2   -Tele     #Afib   #Atypical flutter   Hx of ablation in the past, EKG shows atypical flutter  -Please order pyrophosphate cardiac scan   -Currently on heparin gtt given possible HD and also workup for kidney transplant   -Meds as above

## 2022-03-09 NOTE — PROGRESS NOTE ADULT - PROBLEM SELECTOR PLAN 1
- tele monitoring. TTE at [facility] appears to have improved EF to 50% from last charted 25% 2021, no reported wall motion abnormalities, moderate MVR reported  - elevated troponin however plateaued. doubt acs as elevated troponin suspected to be from worsening CKD (patient pending kidney transplant)  - received lasix 40mg IV at [facility] with some improvement and received a dose of lasix 80mg IV this morning.  -Heart failure management per primary team  -strict I/o  - c/w reported home dosing of hydralazine, isosorbide dinitrate, metoprolol for now

## 2022-03-09 NOTE — CONSULT NOTE ADULT - ASSESSMENT
Echo 3/8/22: EF 50-55%, global lv sys fx mildly decreased, mod MR, mild TR, trace WV  Echo 5/28/21: mod MR, severe global lv sys dyxfx, mild TR, min WV  Office Echo 10/2/18: EF 50%, mild-mod MR, min AR, mild lv sys dysfx   LHC 2/21/18: PCI to LAD  NICOLETTE 2/13/18: EF 25%, severe MR, severe segmental lv sys dysfx, mild TR, mild pulm HTN     a/p   66M w/ HFrEF (EF 25%; 2021), CAD s/p stent (likely prev AWMI), CKD5 suspected to be FSGS pending kidney transplant, HTN, Afib on eliquis, PAD s/p LE stenting, enlarged prostate presents as transfer from [facility] for acute heart failure exacerbation    #Acute on Chronic Systolic HF  -likely in setting of renal failure   -dyspnea improving w IV lasix   -start iv lasix 80 mg BID for now   -repeat echo with improved LV fx, ef 50-55%, mod MR, mild TR, trace WV  -sbp stable - c/w bb, hydral  -no ace/arb given russell/ckd     #RUSSELL on CKD   -s/p renal bx, unrevealing  -renal eval  -possible need for HD     #Atrial Fibrillation/Flutter. S/P AF Ablation  -rates overall stable  -multiple runs of AIVR on tele noted   -EP eval noted - c/w  amiodarone 200 mg daily , Toprol  XL 25 mg daily  -continue hep gtt for now   -ep f/u     #Coronary Artery Disease. S/P PCI to LAD  -stable without chest pain or dyspnea  -hsT peaked, likely demand ischemia in setting of CKD and acute HF   -continue toprol, statin, and asa 81mg daily  -Remains off plavix as he completed 3 month course post PCI    #Mitral Regurgitation  -mod on recent echo , continue to monitor    #Hypertension  -Blood pressure controlled  -Continue current medications.    plan discussed with ACP

## 2022-03-09 NOTE — CONSULT NOTE ADULT - ATTENDING COMMENTS
FSGS referred with decompensated CHF and worsening renal failure  1.  ARF on CKD5--no indications for catheter placement and HD.  Patient need transplant referral and evaluation.  Explained that he would likely need HD before med evaluation/optimization , donor evaluations would be completed.  Rx 2  2.  CHF, systolic--diuretics, hydralazine, nitrates.  MAIN goal is relief of cardiopulmonary symptoms.  If this results in 1 worsening and ESRD/HD patient understands goals of rx.  NO ACEi, ARB  discussed with med team

## 2022-03-09 NOTE — CONSULT NOTE ADULT - SUBJECTIVE AND OBJECTIVE BOX
CARDIOLOGY CONSULT - Dr. Lee       HPI:  66M w/ HFrEF (EF 25%; 2021), CAD s/p stent (likely prev AWMI), CKD5 suspected to be FSGS pending kidney transplant, HTN, Afib on eliquis, PAD s/p LE stenting, enlarged prostate presents as transfer from [facility] for acute heart failure exacerbation. The patient reports that he has been experiencing LIRA and sob for 7-10 which was progressive and now occurring at rest. He denies cp, palpitations, worsened sob when lying flat, swelling LE, enlargement of waist, productive cough (but does have rare dry cough now), n/v/d, decreased urination or painful urination. The patient initially sought care on 3/8/22 at [facility] where the patient was admitted for acute HF. The patient completed BNP and troponin testing which were both elevated. TTE was completed and identified EF 50-55%, Large LA, moderate MVR. Nephrology was consulted however patient was transferred to [facility] per Dr. Salvador before patient could be seen. The patient was treated with furosemide 40mg IV (15:04), eliquis 5mg (18:29), hydralazine 25mg PO(18:37), and isordil 10mg PO(17:33) prior to transfer. Patient noted to require 2L NC for hypoxia.     At time of medicine admission the patient denies any chest pain or palpitations.    Per chart review in EMR, outpatient Cr trend as follows 12/14 4.29, 2/16 8.35. renal biopsy was completed and per nephrology outpatient notation there is concern for FSGS which will require kidney transplant.    PCP is reported to be London Lara (08 Mar 2022 23:56)    Notes sob to be improving s/p IV lasix. Tele noted with multiple runs of AIVR - asymptomatic-  ROS otherwise neg       PAST MEDICAL & SURGICAL HISTORY:  HTN - Hypertension    Arthritis  L arm and hands    CKD (chronic kidney disease)    Gout    CAD (coronary artery disease)  1 stent    HLD (hyperlipidemia)    Atrial fibrillation    CHF (congestive heart failure)    History of cardiomyopathy  LV dysfunction    Thalassemia minor    S/P Arthroscopic Surgery of Left Knee  2001 injury- hit knee on desk    History of Arthroscopy- ankle  left ankle 2003 s/p &quot;something fell on it&quot;    S/P angioplasty with stent  5/2014 2016    S/P hernia surgery    Status post cataract extraction  left eye done 10/18/2018    H/O cardiac radiofrequency ablation            PREVIOUS DIAGNOSTIC TESTING:     [ ] Echocardiogram: < from: TTE Echo Complete w/o Contrast w/ Doppler (03.08.22 @ 15:44) >    2D AND M-MODE MEASUREMENTS (normal ranges within parentheses):  Left                 Normal   Aorta/Left            Normal  Ventricle:                    Atrium:  IVSd (2D):    0.92  (0.7-1.1) Aortic Root    3.45  (2.4-3.7)                 cm             (2D):           cm  LVPWd (2D):   1.06  (0.7-1.1) Left Atrium    4.61  (1.9-4.0)                 cm             (2D):           cm  LVIDd (2D):   6.14  (3.4-5.7) LA Vol Index   70.8                 cm             (A4C):        ml/m²  LVIDs (2D):   5.04                 cm  LV FS (2D): 17.9   (>25%)                  %  Relative Wall 0.35   (<0.42)  Thickness    LV DIASTOLIC FUNCTION:  MV Peak E: 0.73 m/s Decel Time: 213 msec  MV Peak A: 0.28 m/s  E/A Ratio: 2.60    SPECTRAL DOPPLER ANALYSIS (where applicable):  Mitral Valve:  MV P1/2 Time: 61.89 msec  MV Area, PHT: 3.55 cm²    Tricuspid Valve and PA/RV Systolic Pressure: TR Max Velocity: 2.72 m/s RA   Pressure: 15 mmHg RVSP/PASP: 44.6 mmHg        PHYSICIAN INTERPRETATION:  Left Ventricle: The left ventricular internal cavity size is mildly   increased. Left ventricular wall thickness is normal.  Global LV systolic function was mildly decreased. Left ventricular   ejection fraction, by visual estimation, is 50 to 55%. Spectral Doppler   shows restrictive pattern of left ventricular myocardial filling (Grade   III diastolic dysfunction). Elevated mean left atrial pressure. Limited   apical views.  Right Ventricle: Normal right ventricular size and function.  Left Atrium: Severely enlarged left atrium.  Right Atrium: The right atrium is normal in size. Moderately enlarged   right atrium.  Pericardium: There is no evidence of pericardial effusion.  Mitral Valve: Structurally normal mitral valve, with normal leaflet   excursion. Moderate mitral valve regurgitation is seen.  Tricuspid Valve: Structurally normal tricuspid valve, with normal leaflet   excursion. Mild tricuspid regurgitation is visualized. Estimated   pulmonary artery systolic pressure is 44.6 mmHg assuming a right atrial   pressure of 15 mmHg, which is consistent with mild pulmonary hypertension.  Aortic Valve: Normal trileaflet aortic valve with normal opening. No   evidence of aortic valve regurgitation is seen.  Pulmonic Valve: Structurally normal pulmonic valve, with normal leaflet   excursion. Trace pulmonic valve regurgitation.  Aorta: The aortic root and ascending aorta are structurally normal, with   no evidence of dilitation.  Pulmonary Artery: The main pulmonary artery is normal in size.  Venous: The inferior vena cava was dilated, withrespiratory size   variation less than 50%.      Summary:   1. Left ventricular ejection fraction, by visual estimation, is 50 to   55%.   2. Technically limited study.   3. LV apex not well visualized in any view.   4. Mildly decreased global left ventricular systolic function.   5. Severely enlarged left atrium.   6. Elevated mean left atrial pressure.   7. Spectral Doppler shows restrictive pattern of left ventricular   myocardial filling (Grade III diastolic dysfunction).   8. Moderately enlarged right atrium.   9. Moderate mitral valve regurgitation.  10. Mild tricuspid regurgitation.  11. Estimated pulmonary artery systolic pressure is 44.6 mmHg assuming a   right atrial pressure of 15 mmHg, which is consistent with mild pulmonary   hypertension.      < end of copied text >    [ ]  Catheterization:  [ ] Stress Test:  	    MEDICATIONS:  Home Medications:  allopurinol 100 mg oral tablet: 1 tab(s) orally once a day (09 Mar 2022 02:11)  amiodarone 200 mg oral tablet: 1 tab(s) orally once a day (09 Mar 2022 02:11)  amLODIPine 10 mg oral tablet: 1 tab(s) orally once a day (09 Mar 2022 02:11)  Aspirin Enteric Coated 81 mg oral delayed release tablet: 1 tab(s) orally once a day (09 Mar 2022 02:11)  Eliquis 5 mg oral tablet: 1 tab(s) orally 2 times a day (09 Mar 2022 02:11)  Farxiga 5 mg oral tablet: 1 tab(s) orally once a day (09 Mar 2022 02:11)  ferrous sulfate 325 mg (65 mg elemental iron) oral tablet: 1 tab(s) orally once a day (09 Mar 2022 02:11)  finasteride 5 mg oral tablet: 1 tab(s) orally once a day (09 Mar 2022 02:11)  furosemide 40 mg oral tablet: 1 tab(s) orally once a day (09 Mar 2022 02:11)  hydrALAZINE 25 mg oral tablet: 1 tab(s) orally once a day (09 Mar 2022 02:11)  isosorbide dinitrate 10 mg oral tablet: 1 tab(s) orally 3 times a day (09 Mar 2022 02:11)  metoprolol succinate 25 mg oral tablet, extended release: 1 tab(s) orally once a day (09 Mar 2022 02:11)  pantoprazole 40 mg oral delayed release tablet: 1 tab(s) orally once a day (09 Mar 2022 02:11)  simvastatin 40 mg oral tablet: 1 tab(s) orally once a day (at bedtime) (09 Mar 2022 02:11)  Vitamin D3 25 mcg (1000 intl units) oral capsule: 1 cap(s) orally once a day (09 Mar 2022 02:11)      MEDICATIONS  (STANDING):  allopurinol 100 milliGRAM(s) Oral daily  aMIOdarone    Tablet 200 milliGRAM(s) Oral daily  amLODIPine   Tablet 10 milliGRAM(s) Oral daily  aspirin enteric coated 81 milliGRAM(s) Oral daily  cholecalciferol 1000 Unit(s) Oral daily  ferrous    sulfate 325 milliGRAM(s) Oral daily  finasteride 5 milliGRAM(s) Oral daily  heparin  Infusion.  Unit(s)/Hr (14 mL/Hr) IV Continuous <Continuous>  hydrALAZINE 25 milliGRAM(s) Oral daily  isosorbide   dinitrate Tablet (ISORDIL) 10 milliGRAM(s) Oral three times a day  metoprolol succinate ER 25 milliGRAM(s) Oral daily  pantoprazole    Tablet 40 milliGRAM(s) Oral before breakfast  simvastatin 40 milliGRAM(s) Oral at bedtime      FAMILY HISTORY:  Family history of hypertension (Father, Mother)    Family history of diabetes mellitus (Father, Mother)        SOCIAL HISTORY:    [ ] Non-smoker  [ ] Smoker  [ ] Alcohol    Allergies    No Known Allergies    Intolerances    	    REVIEW OF SYSTEMS:  CONSTITUTIONAL: No fever, weight loss, or fatigue  EYES: No eye pain, visual disturbances, or discharge  ENMT:  No difficulty hearing, tinnitus, vertigo; No sinus or throat pain  NECK: No pain or stiffness  RESPIRATORY: No cough, wheezing, chills or hemoptysis; + Shortness of Breath  CARDIOVASCULAR: No chest pain, palpitations, passing out, dizziness, or leg swelling  GASTROINTESTINAL: No abdominal or epigastric pain. No nausea, vomiting, or hematemesis; No diarrhea or constipation. No melena or hematochezia.  GENITOURINARY: No dysuria, frequency, hematuria, or incontinence  NEUROLOGICAL: No headaches, memory loss, loss of strength, numbness, or tremors  SKIN: No itching, burning, rashes, or lesions   	    [x ] All others negative	seeh pi  [ ] Unable to obtain    PHYSICAL EXAM:  T(C): 36.7 (03-09-22 @ 11:00), Max: 36.8 (03-08-22 @ 20:12)  HR: 45 (03-09-22 @ 11:35) (45 - 62)  BP: 126/76 (03-09-22 @ 11:00) (124/86 - 170/96)  RR: 18 (03-09-22 @ 11:52) (18 - 20)  SpO2: 97% (03-09-22 @ 11:52) (93% - 100%)  Wt(kg): --  I&O's Summary    09 Mar 2022 07:01  -  09 Mar 2022 12:41  --------------------------------------------------------  IN: 120 mL / OUT: 0 mL / NET: 120 mL        Appearance: Normal	  Psychiatry: A & O x 3, Mood & affect appropriate  HEENT:   Normal oral mucosa, PERRL, EOMI	  Lymphatic: No lymphadenopathy  Cardiovascular: Normal S1 S2,RRR, No JVD, No murmurs  Respiratory: coarse 	  Gastrointestinal:  Soft, Non-tender, + BS	  Skin: No rashes, No ecchymoses, No cyanosis	  Neurologic: Non-focal  Extremities: Normal range of motion, No clubbing, cyanosis or edema  Vascular: Peripheral pulses palpable 2+ bilaterally    TELEMETRY: afib 40-60s, multiple runs of AIVR 	    ECG:  	afib - no acute ischemic changes   RADIOLOGY:  < from: Xray Chest 1 View- PORTABLE-Urgent (Xray Chest 1 View- PORTABLE-Urgent .) (03.08.22 @ 10:52) >  of breath. There is history of atrial fibrillation, CHF, coronary stent.    Heart enlargement again noted.    Lungs remain clear.    Chest is similar to May 27, 2021.    IMPRESSION: Heart enlargement again noted.    < end of copied text >    OTHER: 	  	  LABS:	 	    CARDIAC MARKERS:  Troponin T, High Sensitivity Result: 93 ng/L (03-09 @ 08:54)  Troponin T, High Sensitivity Result: 95 ng/L (03-09 @ 08:50)  Troponin T, High Sensitivity Result: 86 ng/L (03-09 @ 05:28)  Troponin T, High Sensitivity Result: 89 ng/L (03-09 @ 00:46)                                  11.4   12.29 )-----------( 255      ( 09 Mar 2022 08:50 )             37.0     03-09    141  |  105  |  67<H>  ----------------------------<  94  4.1   |  18<L>  |  9.04<H>    Ca    9.0      09 Mar 2022 08:50  Phos  6.0     03-09  Mg     2.5     03-09    TPro  7.0  /  Alb  3.0<L>  /  TBili  0.4  /  DBili  x   /  AST  11  /  ALT  5<L>  /  AlkPhos  121<H>  03-09    PT/INR - ( 09 Mar 2022 08:50 )   PT: 21.7 sec;   INR: 1.88 ratio         PTT - ( 09 Mar 2022 08:50 )  PTT:39.3 sec  proBNP:   Lipid Profile:   HgA1c:   TSH:

## 2022-03-09 NOTE — PROGRESS NOTE ADULT - SUBJECTIVE AND OBJECTIVE BOX
[facility] Division of Hospital Medicine  Jewel Montelongo DO  Pager (MORRIS, 7R-9G): [phone]  Other Times:  [phone]    Patient is a 66y old  Male who presents with a chief complaint of 66M presenting as transfer from [facility] for acute heart failure (08 Mar 2022 23:56)    SUBJECTIVE / OVERNIGHT EVENTS: Patient admitted overnight as a transfer from [facility] for acute heart failure and management of RUSSELL on CKD. No acute events. Patient seen and examined at bedside this morning, states shortness of breath has improved with oxygen. Endorses slight cough and requesting cough syrup. Otherwise, denies chest pain, nausea, vomiting.    REVIEW OF SYSTEMS:    CONSTITUTIONAL: No weakness, fevers or chills  EYES/ENT: No visual changes;  No vertigo or throat pain   NECK: No pain or stiffness  RESPIRATORY: Endorses cough, denies wheezing, hemoptysis; No shortness of breath  CARDIOVASCULAR: No chest pain or palpitations  GASTROINTESTINAL: No abdominal or epigastric pain. No nausea, vomiting, or hematemesis; No diarrhea or constipation. No melena or hematochezia.  GENITOURINARY: No dysuria, frequency or hematuria  NEUROLOGICAL: No numbness or weakness  SKIN: No itching, burning, rashes, or lesions  MSK: No joint pain, no back pain  HEME: No easy bleeding, no easy bruising  All other review of systems is negative unless indicated above.    MEDICATIONS  (STANDING):  allopurinol 100 milliGRAM(s) Oral daily  aMIOdarone    Tablet 200 milliGRAM(s) Oral daily  amLODIPine   Tablet 10 milliGRAM(s) Oral daily  aspirin enteric coated 81 milliGRAM(s) Oral daily  cholecalciferol 1000 Unit(s) Oral daily  ferrous    sulfate 325 milliGRAM(s) Oral daily  finasteride 5 milliGRAM(s) Oral daily  heparin  Infusion.  Unit(s)/Hr (14 mL/Hr) IV Continuous <Continuous>  hydrALAZINE 25 milliGRAM(s) Oral daily  isosorbide   dinitrate Tablet (ISORDIL) 10 milliGRAM(s) Oral three times a day  metoprolol succinate ER 25 milliGRAM(s) Oral daily  pantoprazole    Tablet 40 milliGRAM(s) Oral before breakfast  simvastatin 40 milliGRAM(s) Oral at bedtime    MEDICATIONS  (PRN):  guaiFENesin Oral Liquid (Sugar-Free) 200 milliGRAM(s) Oral every 6 hours PRN Cough  heparin   Injectable 6500 Unit(s) IV Push every 6 hours PRN For aPTT less than 40  heparin   Injectable 3000 Unit(s) IV Push every 6 hours PRN For aPTT between 40 - 57      CAPILLARY BLOOD GLUCOSE      POCT Blood Glucose.: 107 mg/dL (08 Mar 2022 17:48)    I&O's Summary    09 Mar 2022 07:01  -  09 Mar 2022 10:36  --------------------------------------------------------  IN: 120 mL / OUT: 0 mL / NET: 120 mL        PHYSICAL EXAM:  Vital Signs Last 24 Hrs  T(C): 36.7 (09 Mar 2022 08:34), Max: 36.8 (08 Mar 2022 20:12)  T(F): 98.1 (09 Mar 2022 08:34), Max: 98.2 (08 Mar 2022 20:12)  HR: 59 (09 Mar 2022 08:34) (57 - 62)  BP: 147/85 (09 Mar 2022 08:34) (124/86 - 170/96)  BP(mean): --  RR: 18 (09 Mar 2022 08:34) (18 - 20)  SpO2: 93% (09 Mar 2022 08:34) (93% - 100%)    CONSTITUTIONAL: NAD, well-developed, well-groomed  EYES: EOMI; conjunctiva and sclera clear  ENMT: Moist oral mucosa, no pharyngeal injection or exudates; normal dentition  RESPIRATORY: Normal respiratory effort; lungs with wheezing bilaterally, no rales or rhonchi  CARDIOVASCULAR: Regular rate and rhythm, normal S1 and S2, no murmur/rub/gallop; No lower extremity edema  ABDOMEN: Nontender to palpation, normoactive bowel sounds, no rebound/guarding  MUSCULOSKELETAL: No clubbing or cyanosis of digits; no joint swelling or tenderness to palpation  PSYCH: A+O to person, place, and time; affect appropriate  NEUROLOGY: CN 2-12 are intact and symmetric; no gross sensory deficits   SKIN: No rashes; no palpable lesions    LABS:                        11.4   12.29 )-----------( 255      ( 09 Mar 2022 08:50 )             37.0     03-09    141  |  105  |  67<H>  ----------------------------<  94  4.1   |  18<L>  |  9.04<H>    Ca    9.0      09 Mar 2022 08:50  Phos  6.0     03-09  Mg     2.5     03-09    TPro  7.0  /  Alb  3.0<L>  /  TBili  0.4  /  DBili  x   /  AST  11  /  ALT  5<L>  /  AlkPhos  121<H>  03-09    PT/INR - ( 09 Mar 2022 08:50 )   PT: 21.7 sec;   INR: 1.88 ratio         PTT - ( 09 Mar 2022 08:50 )  PTT:39.3 sec  CARDIAC MARKERS ( 09 Mar 2022 08:54 )  x     / x     / 410 U/L / x     / 4.2 ng/mL  CARDIAC MARKERS ( 09 Mar 2022 00:46 )  x     / x     / 364 U/L / x     / 4.1 ng/mL  CARDIAC MARKERS ( 08 Mar 2022 18:17 )  x     / x     / 386 U/L / x     / 2.9 ng/mL  CARDIAC MARKERS ( 08 Mar 2022 10:11 )  x     / x     / 336 U/L / x     / 2.5 ng/mL      Urinalysis Basic - ( 09 Mar 2022 06:37 )    Color: Light Yellow / Appearance: Clear / S.012 / pH: x  Gluc: x / Ketone: Negative  / Bili: Negative / Urobili: Negative   Blood: x / Protein: 300 mg/dL / Nitrite: Negative   Leuk Esterase: Negative / RBC: 2 /hpf / WBC 3 /HPF   Sq Epi: x / Non Sq Epi: 1 /hpf / Bacteria: Negative

## 2022-03-09 NOTE — PATIENT PROFILE ADULT - FALL HARM RISK - HARM RISK INTERVENTIONS

## 2022-03-09 NOTE — PROGRESS NOTE ADULT - PROBLEM SELECTOR PLAN 3
Likely due to CHF  cxr does not demonstrate lung opacification   Management of diuretics per cardiology team

## 2022-03-09 NOTE — CONSULT NOTE ADULT - SUBJECTIVE AND OBJECTIVE BOX
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 The patient reports that he has been experiencing LIRA and sob for 7-10 which was progressive and now occurring at rest. He denies cp, palpitations, worsened sob when lying flat, swelling LE, enlargement of waist, productive cough (but does have rare dry cough now), n/v/d, decreased urination or painful urination. The patient initially sought care on 3/8/22 at [facility] where the patient was admitted for acute HF. The patient completed BNP and troponin testing which were both elevated. TTE was completed and identified EF 50-55%, Large LA, moderate MVR. Nephrology was consulted however patient was transferred to [facility] per Dr. Salvador before patient could be seen. The patient was treated with furosemide 40mg IV (15:04), eliquis 5mg (18:29), hydralazine 25mg PO(18:37), and isordil 10mg PO(17:33) prior to transfer. Patient noted to require 2L NC for hypoxia.     At time of medicine admission the patient denies any chest pain or palpitations.    Per chart review in EMR, outpatient Cr trend as follows 12/14 4.29, 2/16 8.35. renal biopsy was completed and per nephrology outpatient notation there is concern for FSGS which will require kidney transplant.    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 Patient noted for the past week he has been having dyspnea on exertion which improves with rest. Also endorsed orthopnea. Denies weight gain, LE edema, chest pain, palpitations, lightheadedness, dizziness. Says he continue to urinate appropriately. He initially went to [place] but requested transfer to [facility] as his cardiologist works there and is Dr. Salvador. He was diuresed with lasix with improvement of his symptoms. He denies any changes in his meds and reports taking all of his meds. He denies smoking, drinking, drug use.     HR 40-50s, on 2L NC now on RA. BP stable. Afebrile.     EKG shows coarse slow Afib     Tele showed runs of AIVR lasting 1-2 minutes, currently in Afib       PMHx:   HTN - Hypertension    Inguinal Hernia    Childhood Asthma    Keloid    Arthritis    CKD (chronic kidney disease)    Gout    CAD (coronary artery disease)    HLD (hyperlipidemia)    Atrial fibrillation    History of cardioversion    CHF (congestive heart failure)    History of cardiomyopathy    Acid reflux    Thalassemia minor        PSHx:   S/P Arthroscopic Surgery of Left Knee    History of Arthroscopy- ankle    S/P angioplasty with stent    S/P hernia surgery    Status post cataract extraction    H/O cardiac radiofrequency ablation        Allergies:  No Known Allergies      Home Meds:    Current Medications:   allopurinol 100 milliGRAM(s) Oral daily  aMIOdarone    Tablet 200 milliGRAM(s) Oral daily  amLODIPine   Tablet 10 milliGRAM(s) Oral daily  aspirin enteric coated 81 milliGRAM(s) Oral daily  cholecalciferol 1000 Unit(s) Oral daily  ferrous    sulfate 325 milliGRAM(s) Oral daily  finasteride 5 milliGRAM(s) Oral daily  guaiFENesin Oral Liquid (Sugar-Free) 200 milliGRAM(s) Oral every 6 hours PRN  heparin   Injectable 6500 Unit(s) IV Push every 6 hours PRN  heparin   Injectable 3000 Unit(s) IV Push every 6 hours PRN  heparin  Infusion.  Unit(s)/Hr IV Continuous <Continuous>  hydrALAZINE 25 milliGRAM(s) Oral daily  isosorbide   dinitrate Tablet (ISORDIL) 10 milliGRAM(s) Oral three times a day  metoprolol succinate ER 25 milliGRAM(s) Oral daily  pantoprazole    Tablet 40 milliGRAM(s) Oral before breakfast  simvastatin 40 milliGRAM(s) Oral at bedtime      FAMILY HISTORY:  Family history of hypertension (Father, Mother)    Family history of diabetes mellitus (Father, Mother)        Social History:  Smoking History:  Alcohol Use:  Drug Use:    REVIEW OF SYSTEMS:  Constitutional:     [x ] negative [ ] fevers [ ] chills [ ] weight loss [ ] weight gain  HEENT:                  [x ] negative [ ] dry eyes [ ] eye irritation [ ] postnasal drip [ ] nasal congestion  CV:                         [ x] negative  [ ] chest pain [ ] orthopnea [ ] palpitations [ ] murmur  Resp:                     [x ] negative [ ] cough [ ] shortness of breath [ ] dyspnea [ ] wheezing [ ] sputum [ ]hemoptysis  GI:                          [ x] negative [ ] nausea [ ] vomiting [ ] diarrhea [ ] constipation [ ] abd pain [ ] dysphagia   :                        [ x] negative [ ] dysuria [ ] nocturia [ ] hematuria [ ] increased urinary frequency  Musculoskeletal: [x ] negative [ ] back pain [ ] myalgias [ ] arthralgias [ ] fracture  Skin:                       [ x] negative [ ] rash [ ] itch  Neurological:        [ x] negative [ ] headache [ ] dizziness [ ] syncope [ ] weakness [ ] numbness  Psychiatric:           [ x] negative [ ] anxiety [ ] depression  Endocrine:            [ x] negative [ ] diabetes [ ] thyroid problem  Heme/Lymph:      [ x] negative [ ] anemia [ ] bleeding problem  Allergic/Immune: [ x] negative [ ] itchy eyes [ ] nasal discharge [ ] hives [ ] angioedema    [ x] All other systems negative  [ ] Unable to assess ROS due to      Physical Exam:  T(F): 98.1 (03-09), Max: 98.2 (03-08)  HR: 45 (03-09) (45 - 62)  BP: 126/76 (03-09) (124/86 - 170/96)  RR: 18 (03-09)  SpO2: 97% (03-09)  GENERAL: No acute distress   HEAD:  Atraumatic, Normocephalic  ENT: EOMI, conjunctiva and sclera clear, Neck supple, No JVD, moist mucosa  CHEST/LUNG: Clear to auscultation bilaterally; No wheeze, equal breath sounds bilaterally   HEART: Bradycardic rate and rhythm; No murmurs, rubs, or gallops  ABDOMEN: Soft, Nontender, Nondistended   EXTREMITIES:  No clubbing, cyanosis, or edema  PSYCH: Nl behavior, nl affect  NEUROLOGY: AAOx3, non-focal   SKIN: Normal color, No rashes or lesions  LINES:    Cardiovascular Diagnostic Testing:    ECG: Personally reviewed:    Echo: Personally reviewed:    Stress Testing:    Cath:    Imaging:    CXR: Personally reviewed    Labs: Personally reviewed                        11.4   12.29 )-----------( 255      ( 09 Mar 2022 08:50 )             37.0     03-09    141  |  105  |  67<H>  ----------------------------<  94  4.1   |  18<L>  |  9.04<H>    Ca    9.0      09 Mar 2022 08:50  Phos  6.0     03-09  Mg     2.5     03-09    TPro  7.0  /  Alb  3.0<L>  /  TBili  0.4  /  DBili  x   /  AST  11  /  ALT  5<L>  /  AlkPhos  121<H>  03-09    PT/INR - ( 09 Mar 2022 08:50 )   PT: 21.7 sec;   INR: 1.88 ratio         PTT - ( 09 Mar 2022 08:50 )  PTT:39.3 sec  Serum Pro-Brain Natriuretic Peptide: 8475 pg/mL (03-08 @ 10:11)         Patient seen and evaluated at bedside    Chief Complaint:    HPI:  66M w/ HFrEF (EF 25%; 2021), CAD s/p stent (likely prev AWMI), 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 The patient reports that he has been experiencing LIRA and sob for 7-10 which was progressive and now occurring at rest. He denies cp, palpitations, worsened sob when lying flat, swelling LE, enlargement of waist, productive cough (but does have rare dry cough now), n/v/d, decreased urination or painful urination. The patient initially sought care on 3/8/22 at [facility] where the patient was admitted for acute HF. The patient completed BNP and troponin testing which were both elevated. TTE was completed and identified EF 50-55%, Large LA, moderate MVR. Nephrology was consulted however patient was transferred to [facility] per Dr. Salvador before patient could be seen. The patient was treated with furosemide 40mg IV (15:04), eliquis 5mg (18:29), hydralazine 25mg PO(18:37), and isordil 10mg PO(17:33) prior to transfer. Patient noted to require 2L NC for hypoxia.     At time of medicine admission the patient denies any chest pain or palpitations.    Per chart review in EMR, outpatient Cr trend as follows 12/14 4.29, 2/16 8.35. renal biopsy was completed and per nephrology outpatient notation there is concern for FSGS which will require kidney transplant.    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 Patient noted for the past week he has been having dyspnea on exertion which improves with rest. Also endorsed orthopnea. Denies weight gain, LE edema, chest pain, palpitations, lightheadedness, dizziness. Says he continue to urinate appropriately. He initially went to [place] but requested transfer to [facility] as his cardiologist works there and is Dr. Salvador. He was diuresed with lasix with improvement of his symptoms. He denies any changes in his meds and reports taking all of his meds. He denies smoking, drinking, drug use.     HR 40-50s, on 2L NC now on RA. BP stable. Afebrile.     EKG shows atypical flutter     Tele showed runs of AIVR lasting 1-2 minutes, currently in Afib       PMHx:   HTN - Hypertension    Inguinal Hernia    Childhood Asthma    Keloid    Arthritis    CKD (chronic kidney disease)    Gout    CAD (coronary artery disease)    HLD (hyperlipidemia)    Atrial fibrillation    History of cardioversion    CHF (congestive heart failure)    History of cardiomyopathy    Acid reflux    Thalassemia minor        PSHx:   S/P Arthroscopic Surgery of Left Knee    History of Arthroscopy- ankle    S/P angioplasty with stent    S/P hernia surgery    Status post cataract extraction    H/O cardiac radiofrequency ablation        Allergies:  No Known Allergies      Home Meds:    Current Medications:   allopurinol 100 milliGRAM(s) Oral daily  aMIOdarone    Tablet 200 milliGRAM(s) Oral daily  amLODIPine   Tablet 10 milliGRAM(s) Oral daily  aspirin enteric coated 81 milliGRAM(s) Oral daily  cholecalciferol 1000 Unit(s) Oral daily  ferrous    sulfate 325 milliGRAM(s) Oral daily  finasteride 5 milliGRAM(s) Oral daily  guaiFENesin Oral Liquid (Sugar-Free) 200 milliGRAM(s) Oral every 6 hours PRN  heparin   Injectable 6500 Unit(s) IV Push every 6 hours PRN  heparin   Injectable 3000 Unit(s) IV Push every 6 hours PRN  heparin  Infusion.  Unit(s)/Hr IV Continuous <Continuous>  hydrALAZINE 25 milliGRAM(s) Oral daily  isosorbide   dinitrate Tablet (ISORDIL) 10 milliGRAM(s) Oral three times a day  metoprolol succinate ER 25 milliGRAM(s) Oral daily  pantoprazole    Tablet 40 milliGRAM(s) Oral before breakfast  simvastatin 40 milliGRAM(s) Oral at bedtime      FAMILY HISTORY:  Family history of hypertension (Father, Mother)    Family history of diabetes mellitus (Father, Mother)        Social History:  Smoking History:  Alcohol Use:  Drug Use:    REVIEW OF SYSTEMS:  Constitutional:     [x ] negative [ ] fevers [ ] chills [ ] weight loss [ ] weight gain  HEENT:                  [x ] negative [ ] dry eyes [ ] eye irritation [ ] postnasal drip [ ] nasal congestion  CV:                         [ x] negative  [ ] chest pain [ ] orthopnea [ ] palpitations [ ] murmur  Resp:                     [x ] negative [ ] cough [ ] shortness of breath [ ] dyspnea [ ] wheezing [ ] sputum [ ]hemoptysis  GI:                          [ x] negative [ ] nausea [ ] vomiting [ ] diarrhea [ ] constipation [ ] abd pain [ ] dysphagia   :                        [ x] negative [ ] dysuria [ ] nocturia [ ] hematuria [ ] increased urinary frequency  Musculoskeletal: [x ] negative [ ] back pain [ ] myalgias [ ] arthralgias [ ] fracture  Skin:                       [ x] negative [ ] rash [ ] itch  Neurological:        [ x] negative [ ] headache [ ] dizziness [ ] syncope [ ] weakness [ ] numbness  Psychiatric:           [ x] negative [ ] anxiety [ ] depression  Endocrine:            [ x] negative [ ] diabetes [ ] thyroid problem  Heme/Lymph:      [ x] negative [ ] anemia [ ] bleeding problem  Allergic/Immune: [ x] negative [ ] itchy eyes [ ] nasal discharge [ ] hives [ ] angioedema    [ x] All other systems negative  [ ] Unable to assess ROS due to      Physical Exam:  T(F): 98.1 (03-09), Max: 98.2 (03-08)  HR: 45 (03-09) (45 - 62)  BP: 126/76 (03-09) (124/86 - 170/96)  RR: 18 (03-09)  SpO2: 97% (03-09)  GENERAL: No acute distress   HEAD:  Atraumatic, Normocephalic  ENT: EOMI, conjunctiva and sclera clear, Neck supple, No JVD, moist mucosa  CHEST/LUNG: Clear to auscultation bilaterally; No wheeze, equal breath sounds bilaterally   HEART: Bradycardic rate and rhythm; No murmurs, rubs, or gallops  ABDOMEN: Soft, Nontender, Nondistended   EXTREMITIES:  No clubbing, cyanosis, or edema  PSYCH: Nl behavior, nl affect  NEUROLOGY: AAOx3, non-focal   SKIN: Normal color, No rashes or lesions  LINES:    Cardiovascular Diagnostic Testing:    ECG: Personally reviewed:    Echo: Personally reviewed:    Stress Testing:    Cath:    Imaging:    CXR: Personally reviewed    Labs: Personally reviewed                        11.4   12.29 )-----------( 255      ( 09 Mar 2022 08:50 )             37.0     03-09    141  |  105  |  67<H>  ----------------------------<  94  4.1   |  18<L>  |  9.04<H>    Ca    9.0      09 Mar 2022 08:50  Phos  6.0     03-09  Mg     2.5     03-09    TPro  7.0  /  Alb  3.0<L>  /  TBili  0.4  /  DBili  x   /  AST  11  /  ALT  5<L>  /  AlkPhos  121<H>  03-09    PT/INR - ( 09 Mar 2022 08:50 )   PT: 21.7 sec;   INR: 1.88 ratio         PTT - ( 09 Mar 2022 08:50 )  PTT:39.3 sec  Serum Pro-Brain Natriuretic Peptide: 8475 pg/mL (03-08 @ 10:11)

## 2022-03-09 NOTE — CONSULT NOTE ADULT - ATTENDING COMMENTS
Atypical flutter, acute on chronic RF, HF, AIVR.  currently on amio.  Agree with pyrophosphate scan.  May consider for rhythm control strategies when optimized.  Probably will need to hold off on ablation given acute on chronic renal failure.

## 2022-03-09 NOTE — PROGRESS NOTE ADULT - ASSESSMENT
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

## 2022-03-09 NOTE — CONSULT NOTE ADULT - SUBJECTIVE AND OBJECTIVE BOX
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 Nephrology consulted for RUSSELL on CKD.     Pt. follows Dr. Hayes for renal care. Pt. with prior work up for worsening renal failure. 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 Cr at ~1.7-1.9 (04/2019). Last SCr was elevated at 3.25 on 6/8/21. Scr on admission elevated at 8.77 and increased to 9.04. Pt received IV lasix and reports improvement in his symptoms.     Pt. seen today, not in distress.       PAST HISTORY  --------------------------------------------------------------------------------  PAST MEDICAL & SURGICAL HISTORY:  HTN - Hypertension    Arthritis  L arm and hands    CKD (chronic kidney disease)    Gout    CAD (coronary artery disease)  1 stent    HLD (hyperlipidemia)    Atrial fibrillation    CHF (congestive heart failure)    History of cardiomyopathy  LV dysfunction    Thalassemia minor    S/P Arthroscopic Surgery of Left Knee  2001 injury- hit knee on desk    History of Arthroscopy- ankle  left ankle 2003 s/p &quot;something fell on it&quot;    S/P angioplasty with stent  5/2014 2016    S/P hernia surgery    Status post cataract extraction  left eye done 10/18/2018    H/O cardiac radiofrequency ablation      FAMILY HISTORY:  Family history of hypertension (Father, Mother)    Family history of diabetes mellitus (Father, Mother)      PAST SOCIAL HISTORY:    ALLERGIES & MEDICATIONS  --------------------------------------------------------------------------------  Allergies    No Known Allergies    Intolerances      Standing Inpatient Medications  allopurinol 100 milliGRAM(s) Oral daily  aMIOdarone    Tablet 200 milliGRAM(s) Oral daily  amLODIPine   Tablet 10 milliGRAM(s) Oral daily  aspirin enteric coated 81 milliGRAM(s) Oral daily  cholecalciferol 1000 Unit(s) Oral daily  ferrous    sulfate 325 milliGRAM(s) Oral daily  finasteride 5 milliGRAM(s) Oral daily  furosemide   Injectable 80 milliGRAM(s) IV Push two times a day  heparin  Infusion.  Unit(s)/Hr IV Continuous <Continuous>  hydrALAZINE 25 milliGRAM(s) Oral daily  isosorbide   dinitrate Tablet (ISORDIL) 10 milliGRAM(s) Oral three times a day  metoprolol succinate ER 25 milliGRAM(s) Oral daily  pantoprazole    Tablet 40 milliGRAM(s) Oral before breakfast  simvastatin 40 milliGRAM(s) Oral at bedtime    PRN Inpatient Medications  guaiFENesin Oral Liquid (Sugar-Free) 200 milliGRAM(s) Oral every 6 hours PRN  heparin   Injectable 6500 Unit(s) IV Push every 6 hours PRN  heparin   Injectable 3000 Unit(s) IV Push every 6 hours PRN      REVIEW OF SYSTEMS  --------------------------------------------------------------------------------  Gen: malaise+  Skin: No rashes  Head/Eyes/Ears: Normal hearing,   Respiratory: cough+, sob+  CV: No chest pain  GI: No abdominal pain, diarrhea  : as per HPI  MSK: No  edema  Heme: No easy bruising or bleeding    All other systems were reviewed and are negative, except as noted.    VITALS/PHYSICAL EXAM  --------------------------------------------------------------------------------  T(C): 36.7 (03-09-22 @ 16:14), Max: 36.8 (03-08-22 @ 20:12)  HR: 50 (03-09-22 @ 16:14) (45 - 61)  BP: 127/66 (03-09-22 @ 16:14) (126/76 - 170/88)  RR: 18 (03-09-22 @ 16:14) (18 - 18)  SpO2: 94% (03-09-22 @ 16:14) (93% - 100%)  Wt(kg): --  Height (cm): 180.3 (03-09-22 @ 08:34)  Weight (kg): 79.6 (03-09-22 @ 08:34)  BMI (kg/m2): 24.5 (03-09-22 @ 08:34)  BSA (m2): 1.99 (03-09-22 @ 08:34)      03-09-22 @ 07:01  -  03-09-22 @ 18:07  --------------------------------------------------------  IN: 480 mL / OUT: 0 mL / NET: 480 mL      Physical Exam:  	Gen: NAD  	HEENT: Anicteric  	Pulm: fair entry, faint rales and coarse breath sounds+  	CV: S1S2+  	Abd: Soft, +BS   	Ext: No LE edema B/L, no asterixis  	Neuro: Awake  	Skin: Warm and dry    LABS/STUDIES  --------------------------------------------------------------------------------              10.9   11.22 >-----------<  265      [03-09-22 @ 15:27]              34.3     141  |  105  |  67  ----------------------------<  94      [03-09-22 @ 08:50]  4.1   |  18  |  9.04        Ca     9.0     [03-09-22 @ 08:50]      Mg     2.5     [03-09-22 @ 08:50]      Phos  6.0     [03-09-22 @ 08:50]    TPro  7.0  /  Alb  3.0  /  TBili  0.4  /  DBili  x   /  AST  11  /  ALT  5   /  AlkPhos  121  [03-09-22 @ 00:46]    PT/INR: PT 21.7 , INR 1.88       [03-09-22 @ 08:50]  PTT: 73.6       [03-09-22 @ 15:27]          [03-09-22 @ 08:54]    Creatinine Trend:  SCr 9.04 [03-09 @ 08:50]  SCr 9.17 [03-09 @ 00:46]  SCr 8.77 [03-08 @ 10:11]    Urinalysis - [03-09-22 @ 06:37]      Color Light Yellow / Appearance Clear / SG 1.012 / pH 6.0      Gluc 500 mg/dL / Ketone Negative  / Bili Negative / Urobili Negative       Blood Small / Protein 300 mg/dL / Leuk Est Negative / Nitrite Negative      RBC 2 / WBC 3 / Hyaline 1 / Gran  / Sq Epi  / Non Sq Epi 1 / Bacteria Negative    Urine Creatinine 78      [03-09-22 @ 06:37]  Urine Sodium 67      [03-09-22 @ 06:37]  Urine Urea Nitrogen 334      [03-09-22 @ 08:01]  Urine Osmolality 319      [03-09-22 @ 06:37]    HBsAb Nonreact      [05-27-21 @ 08:38]  HBsAg Nonreact      [05-28-21 @ 09:09]  HCV 0.13, Nonreact      [05-28-21 @ 09:09]  HIV Nonreact      [05-28-21 @ 09:09]    NAILA: titer Negative, pattern --      [05-28-21 @ 09:09]  dsDNA <12      [05-28-21 @ 09:09]  C3 Complement 101      [05-28-21 @ 08:50]  C4 Complement 27      [05-28-21 @ 08:50]  ANCA: cANCA Negative, pANCA Negative, atypical ANCA Negative      [05-28-21 @ 09:09]  Free Light Chains: kappa 8.24, lambda 6.56, ratio = 1.26      [05-28 @ 08:50]  Immunofixation Serum:   One IgM Lambda and One IgM Kappa Bands Identified    Reference Range: None Detected      [05-28-21 @ 08:50]

## 2022-03-10 DIAGNOSIS — R06.02 SHORTNESS OF BREATH: ICD-10-CM

## 2022-03-10 DIAGNOSIS — N17.9 ACUTE KIDNEY FAILURE, UNSPECIFIED: ICD-10-CM

## 2022-03-10 DIAGNOSIS — R91.1 SOLITARY PULMONARY NODULE: ICD-10-CM

## 2022-03-10 LAB
ALBUMIN SERPL ELPH-MCNC: 3 G/DL — LOW (ref 3.3–5)
ALP SERPL-CCNC: 119 U/L — SIGNIFICANT CHANGE UP (ref 40–120)
ALT FLD-CCNC: 8 U/L — LOW (ref 10–45)
ANION GAP SERPL CALC-SCNC: 17 MMOL/L — SIGNIFICANT CHANGE UP (ref 5–17)
AST SERPL-CCNC: 19 U/L — SIGNIFICANT CHANGE UP (ref 10–40)
BASOPHILS # BLD AUTO: 0.07 K/UL — SIGNIFICANT CHANGE UP (ref 0–0.2)
BASOPHILS NFR BLD AUTO: 0.7 % — SIGNIFICANT CHANGE UP (ref 0–2)
BILIRUB SERPL-MCNC: 0.3 MG/DL — SIGNIFICANT CHANGE UP (ref 0.2–1.2)
BUN SERPL-MCNC: 72 MG/DL — HIGH (ref 7–23)
CALCIUM SERPL-MCNC: 8.6 MG/DL — SIGNIFICANT CHANGE UP (ref 8.4–10.5)
CHLORIDE SERPL-SCNC: 103 MMOL/L — SIGNIFICANT CHANGE UP (ref 96–108)
CO2 SERPL-SCNC: 18 MMOL/L — LOW (ref 22–31)
CREAT ?TM UR-MCNC: 54 MG/DL — SIGNIFICANT CHANGE UP
CREAT SERPL-MCNC: 8.77 MG/DL — HIGH (ref 0.5–1.3)
EGFR: 6 ML/MIN/1.73M2 — LOW
EOSINOPHIL # BLD AUTO: 0.86 K/UL — HIGH (ref 0–0.5)
EOSINOPHIL NFR BLD AUTO: 8.8 % — HIGH (ref 0–6)
GLUCOSE SERPL-MCNC: 73 MG/DL — SIGNIFICANT CHANGE UP (ref 70–99)
HCT VFR BLD CALC: 30.7 % — LOW (ref 39–50)
HGB BLD-MCNC: 10.1 G/DL — LOW (ref 13–17)
IMM GRANULOCYTES NFR BLD AUTO: 0.3 % — SIGNIFICANT CHANGE UP (ref 0–1.5)
INR BLD: 1.58 RATIO — HIGH (ref 0.88–1.16)
LYMPHOCYTES # BLD AUTO: 1.5 K/UL — SIGNIFICANT CHANGE UP (ref 1–3.3)
LYMPHOCYTES # BLD AUTO: 15.3 % — SIGNIFICANT CHANGE UP (ref 13–44)
MAGNESIUM SERPL-MCNC: 2.2 MG/DL — SIGNIFICANT CHANGE UP (ref 1.6–2.6)
MCHC RBC-ENTMCNC: 19.9 PG — LOW (ref 27–34)
MCHC RBC-ENTMCNC: 32.9 GM/DL — SIGNIFICANT CHANGE UP (ref 32–36)
MCV RBC AUTO: 60.4 FL — LOW (ref 80–100)
MONOCYTES # BLD AUTO: 0.87 K/UL — SIGNIFICANT CHANGE UP (ref 0–0.9)
MONOCYTES NFR BLD AUTO: 8.9 % — SIGNIFICANT CHANGE UP (ref 2–14)
NEUTROPHILS # BLD AUTO: 6.49 K/UL — SIGNIFICANT CHANGE UP (ref 1.8–7.4)
NEUTROPHILS NFR BLD AUTO: 66 % — SIGNIFICANT CHANGE UP (ref 43–77)
NRBC # BLD: 0 /100 WBCS — SIGNIFICANT CHANGE UP (ref 0–0)
NT-PROBNP SERPL-SCNC: 5546 PG/ML — HIGH (ref 0–300)
PHOSPHATE SERPL-MCNC: 5.2 MG/DL — HIGH (ref 2.5–4.5)
PLATELET # BLD AUTO: 219 K/UL — SIGNIFICANT CHANGE UP (ref 150–400)
POTASSIUM SERPL-MCNC: 4.5 MMOL/L — SIGNIFICANT CHANGE UP (ref 3.5–5.3)
POTASSIUM SERPL-SCNC: 4.5 MMOL/L — SIGNIFICANT CHANGE UP (ref 3.5–5.3)
PROT ?TM UR-MCNC: 201 MG/DL — HIGH (ref 0–12)
PROT SERPL-MCNC: 7.1 G/DL — SIGNIFICANT CHANGE UP (ref 6–8.3)
PROT/CREAT UR-RTO: 3.7 RATIO — HIGH (ref 0–0.2)
PROTHROM AB SERPL-ACNC: 18.4 SEC — HIGH (ref 10.5–13.4)
RAPID RVP RESULT: DETECTED
RBC # BLD: 5.08 M/UL — SIGNIFICANT CHANGE UP (ref 4.2–5.8)
RBC # FLD: 18.5 % — HIGH (ref 10.3–14.5)
RV+EV RNA SPEC QL NAA+PROBE: DETECTED
SARS-COV-2 RNA SPEC QL NAA+PROBE: SIGNIFICANT CHANGE UP
SODIUM SERPL-SCNC: 138 MMOL/L — SIGNIFICANT CHANGE UP (ref 135–145)
WBC # BLD: 9.82 K/UL — SIGNIFICANT CHANGE UP (ref 3.8–10.5)
WBC # FLD AUTO: 9.82 K/UL — SIGNIFICANT CHANGE UP (ref 3.8–10.5)

## 2022-03-10 PROCEDURE — 99233 SBSQ HOSP IP/OBS HIGH 50: CPT | Mod: GC

## 2022-03-10 PROCEDURE — 78830 RP LOCLZJ TUM SPECT W/CT 1: CPT | Mod: 26

## 2022-03-10 PROCEDURE — 99233 SBSQ HOSP IP/OBS HIGH 50: CPT

## 2022-03-10 RX ORDER — IPRATROPIUM/ALBUTEROL SULFATE 18-103MCG
3 AEROSOL WITH ADAPTER (GRAM) INHALATION EVERY 6 HOURS
Refills: 0 | Status: DISCONTINUED | OUTPATIENT
Start: 2022-03-10 | End: 2022-03-21

## 2022-03-10 RX ORDER — POLYETHYLENE GLYCOL 3350 17 G/17G
17 POWDER, FOR SOLUTION ORAL DAILY
Refills: 0 | Status: DISCONTINUED | OUTPATIENT
Start: 2022-03-10 | End: 2022-03-21

## 2022-03-10 RX ADMIN — Medication 1200 MILLIGRAM(S): at 18:22

## 2022-03-10 RX ADMIN — Medication 100 MILLIGRAM(S): at 11:35

## 2022-03-10 RX ADMIN — Medication 80 MILLIGRAM(S): at 18:22

## 2022-03-10 RX ADMIN — ISOSORBIDE DINITRATE 10 MILLIGRAM(S): 5 TABLET ORAL at 18:21

## 2022-03-10 RX ADMIN — HEPARIN SODIUM 1400 UNIT(S)/HR: 5000 INJECTION INTRAVENOUS; SUBCUTANEOUS at 21:32

## 2022-03-10 RX ADMIN — Medication 80 MILLIGRAM(S): at 05:12

## 2022-03-10 RX ADMIN — HEPARIN SODIUM 1400 UNIT(S)/HR: 5000 INJECTION INTRAVENOUS; SUBCUTANEOUS at 19:16

## 2022-03-10 RX ADMIN — SIMVASTATIN 40 MILLIGRAM(S): 20 TABLET, FILM COATED ORAL at 21:31

## 2022-03-10 RX ADMIN — Medication 3 MILLILITER(S): at 18:22

## 2022-03-10 RX ADMIN — PANTOPRAZOLE SODIUM 40 MILLIGRAM(S): 20 TABLET, DELAYED RELEASE ORAL at 05:11

## 2022-03-10 RX ADMIN — AMIODARONE HYDROCHLORIDE 200 MILLIGRAM(S): 400 TABLET ORAL at 05:11

## 2022-03-10 RX ADMIN — FINASTERIDE 5 MILLIGRAM(S): 5 TABLET, FILM COATED ORAL at 11:35

## 2022-03-10 RX ADMIN — Medication 325 MILLIGRAM(S): at 11:35

## 2022-03-10 RX ADMIN — Medication 1000 UNIT(S): at 11:35

## 2022-03-10 RX ADMIN — HEPARIN SODIUM 1400 UNIT(S)/HR: 5000 INJECTION INTRAVENOUS; SUBCUTANEOUS at 00:01

## 2022-03-10 RX ADMIN — HEPARIN SODIUM 1400 UNIT(S)/HR: 5000 INJECTION INTRAVENOUS; SUBCUTANEOUS at 07:17

## 2022-03-10 RX ADMIN — Medication 3 MILLILITER(S): at 23:58

## 2022-03-10 RX ADMIN — POLYETHYLENE GLYCOL 3350 17 GRAM(S): 17 POWDER, FOR SOLUTION ORAL at 18:38

## 2022-03-10 RX ADMIN — Medication 81 MILLIGRAM(S): at 11:35

## 2022-03-10 RX ADMIN — Medication 25 MILLIGRAM(S): at 05:12

## 2022-03-10 RX ADMIN — Medication 200 MILLIGRAM(S): at 08:57

## 2022-03-10 RX ADMIN — ISOSORBIDE DINITRATE 10 MILLIGRAM(S): 5 TABLET ORAL at 05:11

## 2022-03-10 RX ADMIN — AMLODIPINE BESYLATE 10 MILLIGRAM(S): 2.5 TABLET ORAL at 05:12

## 2022-03-10 NOTE — CONSULT NOTE ADULT - SUBJECTIVE AND OBJECTIVE BOX
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 Presents as transfer from [facility] for acute heart failure exacerbation. The patient reports that he has been experiencing LIRA and SOB for 7-10 days which was progressive and now occurring at rest. Started on IV diuretics with improving respiratory status. Called to consult for diffuse bronchial wall thickening and impaction and 4mm pulmonary nodules seen on CT chest.               PAST MEDICAL & SURGICAL HISTORY:  HTN - Hypertension  Arthritis L arm and hands  CKD (chronic kidney disease)  Gout  CAD (coronary artery disease) 1 stent  HLD (hyperlipidemia)  Atrial fibrillation  CHF (congestive heart failure)  History of cardiomyopathy  LV dysfunction  Thalassemia minor  S/P Arthroscopic Surgery of Left Knee   injury- hit knee on desk  History of Arthroscopy- ankle  left ankle  s/p &quot;something fell on it&quot;  S/P angioplasty with stent  2014  S/P hernia surgery  Status post cataract extraction  left eye done 10/18/2018  H/O cardiac radiofrequency ablation      Allergies  No Known Allergies        FAMILY HISTORY:  Family history of hypertension (Father, Mother)    Family history of diabetes mellitus (Father, Mother)      Social history:     Review of Systems:  CONSTITUTIONAL: No fever, chills, or fatigue  EYES: No eye pain, visual disturbances, or discharge  ENMT:  No difficulty hearing, tinnitus, vertigo; No sinus or throat pain  NECK: No pain or stiffness  RESPIRATORY: Per above  CARDIOVASCULAR: No chest pain, palpitations, dizziness, or leg swelling  GASTROINTESTINAL: No abdominal or epigastric pain. No nausea, vomiting, or hematemesis; No diarrhea or constipation. No melena or hematochezia.  GENITOURINARY: No dysuria, frequency, hematuria, or incontinence  NEUROLOGICAL: No headaches, memory loss, loss of strength, numbness, or tremors  SKIN: No itching, burning, rashes, or lesions   MUSCULOSKELETAL: No joint pain or swelling; No muscle, back, or extremity pain  PSYCHIATRIC: No depression, anxiety, mood swings, or difficulty sleeping      Medications:  MEDICATIONS  (STANDING):  allopurinol 100 milliGRAM(s) Oral daily  aMIOdarone    Tablet 200 milliGRAM(s) Oral daily  amLODIPine   Tablet 10 milliGRAM(s) Oral daily  aspirin enteric coated 81 milliGRAM(s) Oral daily  cholecalciferol 1000 Unit(s) Oral daily  ferrous    sulfate 325 milliGRAM(s) Oral daily  finasteride 5 milliGRAM(s) Oral daily  furosemide   Injectable 80 milliGRAM(s) IV Push two times a day  heparin  Infusion.  Unit(s)/Hr (14 mL/Hr) IV Continuous <Continuous>  hydrALAZINE 25 milliGRAM(s) Oral daily  isosorbide   dinitrate Tablet (ISORDIL) 10 milliGRAM(s) Oral three times a day  metoprolol succinate ER 25 milliGRAM(s) Oral daily  pantoprazole    Tablet 40 milliGRAM(s) Oral before breakfast  simvastatin 40 milliGRAM(s) Oral at bedtime    MEDICATIONS  (PRN):  guaiFENesin Oral Liquid (Sugar-Free) 200 milliGRAM(s) Oral every 6 hours PRN Cough  heparin   Injectable 6500 Unit(s) IV Push every 6 hours PRN For aPTT less than 40  heparin   Injectable 3000 Unit(s) IV Push every 6 hours PRN For aPTT between 40 - 57            Vital Signs Last 24 Hrs  T(C): 37.1 (10 Mar 2022 11:03), Max: 37.1 (10 Mar 2022 11:03)  T(F): 98.8 (10 Mar 2022 11:03), Max: 98.8 (10 Mar 2022 11:03)  HR: 46 (10 Mar 2022 11:03) (46 - 71)  BP: 115/74 (10 Mar 2022 11:03) (111/63 - 145/87)  BP(mean): --  RR: 18 (10 Mar 2022 11:03) (17 - 18)  SpO2: 97% (10 Mar 2022 11:03) (91% - 97%)      VBG pH 7.30  @ 05:33  VBG pCO2 43  @ 05:33  VBG O2 sat 87.1  @ 05:33  VBG lactate 1.5 03 @ 05:33        03 @ 07:01  -  -10 @ 07:00  --------------------------------------------------------  IN: 480 mL / OUT: 600 mL / NET: -120 mL          LABS:                        10.1   9.82  )-----------( 219      ( 10 Mar 2022 05:30 )             30.7     03-10    138  |  103  |  72<H>  ----------------------------<  73  4.5   |  18<L>  |  8.77<H>    Ca    8.6      10 Mar 2022 05:30  Phos  5.2     03-10  Mg     2.2     03-10    TPro  7.1  /  Alb  3.0<L>  /  TBili  0.3  /  DBili  x   /  AST  19  /  ALT  8<L>  /  AlkPhos  119  03-10      CARDIAC MARKERS ( 09 Mar 2022 08:54 )  x     / x     / 410 U/L / x     / 4.2 ng/mL  CARDIAC MARKERS ( 09 Mar 2022 00:46 )  x     / x     / 364 U/L / x     / 4.1 ng/mL  CARDIAC MARKERS ( 08 Mar 2022 18:17 )  x     / x     / 386 U/L / x     / 2.9 ng/mL      CAPILLARY BLOOD GLUCOSE      POCT Blood Glucose.: 107 mg/dL (08 Mar 2022 17:48)    PT/INR - ( 10 Mar 2022 05:30 )   PT: 18.4 sec;   INR: 1.58 ratio         PTT - ( 09 Mar 2022 23:15 )  PTT:71.0 sec  Urinalysis Basic - ( 09 Mar 2022 06:37 )    Color: Light Yellow / Appearance: Clear / S.012 / pH: x  Gluc: x / Ketone: Negative  / Bili: Negative / Urobili: Negative   Blood: x / Protein: 300 mg/dL / Nitrite: Negative   Leuk Esterase: Negative / RBC: 2 /hpf / WBC 3 /HPF   Sq Epi: x / Non Sq Epi: 1 /hpf / Bacteria: Negative        Serum Pro-Brain Natriuretic Peptide: 5546 pg/mL (03-10-22 @ 05:30)  Serum Pro-Brain Natriuretic Peptide: 8475 pg/mL (22 @ 10:11)                Physical Examination:    General: No acute distress.      HEENT: Pupils equal, reactive to light.  Symmetric.    PULM: Clear to auscultation bilaterally, no significant sputum production    CVS: S1, S2    ABD: Soft, nondistended, nontender, normoactive bowel sounds, no masses    EXT: No edema, nontender    SKIN: Warm and well perfused, no rashes noted.    NEURO: Alert, oriented, interactive, nonfocal        RADIOLOGY REVIEWED    CT chest: < from: CT Chest No Cont (22 @ 16:32) >  FINDINGS:    Lungs/Airways/Pleura: The central airways are patent. No pleural   effusion. There is diffuse bronchial wall thickening with segmental and   subsegmental bronchial impaction at the bases. Few scattered sub-4 mm   pulmonary nodules include right apex series 3 image 29 and left apex   image 28. No consolidation or pulmonary edema.    Mediastinum/Lymph nodes: No thoracic adenopathy.    Heart and Vessels: Cardiomegaly. LAD stent. No pericardial effusion. The   pulmonary artery measures 3.2 cm. Adjacent mid ascending aorta measures   3.2 cm.    Upper Abdomen: Unremarkable.    Osseous structures and Soft Tissues: No aggressive bone lesions.    IMPRESSION:  Diffuse bronchial wall thickening and impaction.    Sub-4 mm pulmonary nodules; if patient is considered high risk for lung   cancer, consider follow-up low dose chest CT in 12 months. If low risk,   no further follow-up is needed as per current Fleischner guidelines.    < end of copied text >      TTE: < from: TTE Echo Complete w/o Contrast w/ Doppler (22 @ 15:44) >  PHYSICIAN INTERPRETATION:  Left Ventricle: The left ventricular internal cavity size is mildly   increased. Left ventricular wall thickness is normal.  Global LV systolic function was mildly decreased. Left ventricular   ejection fraction, by visual estimation, is 50 to 55%. Spectral Doppler   shows restrictive pattern of left ventricular myocardial filling (Grade   III diastolic dysfunction). Elevated mean left atrial pressure. Limited   apical views.  Right Ventricle: Normal right ventricular size and function.  Left Atrium: Severely enlarged left atrium.  Right Atrium: The right atrium is normal in size. Moderately enlarged   right atrium.  Pericardium: There is no evidence of pericardial effusion.  Mitral Valve: Structurally normal mitral valve, with normal leaflet   excursion. Moderate mitral valve regurgitation is seen.  Tricuspid Valve: Structurally normal tricuspid valve, with normal leaflet   excursion. Mild tricuspid regurgitation is visualized. Estimated   pulmonary artery systolic pressure is 44.6 mmHg assuming a right atrial   pressure of 15 mmHg, which is consistent with mild pulmonary hypertension.  Aortic Valve: Normal trileaflet aortic valve with normal opening. No   evidence of aortic valve regurgitation is seen.  Pulmonic Valve: Structurally normal pulmonic valve, with normal leaflet   excursion. Trace pulmonic valve regurgitation.  Aorta: The aortic root and ascending aorta are structurally normal, with   no evidence of dilitation.  Pulmonary Artery: The main pulmonary artery is normal in size.  Venous: The inferior vena cava was dilated, withrespiratory size   variation less than 50%.      Summary:   1. Left ventricular ejection fraction, by visual estimation, is 50 to   55%.   2. Technically limited study.   3. LV apex not well visualized in any view.   4. Mildly decreased global left ventricular systolic function.   5. Severely enlarged left atrium.   6. Elevated mean left atrial pressure.   7. Spectral Doppler shows restrictive pattern of left ventricular   myocardial filling (Grade III diastolic dysfunction).   8. Moderately enlarged right atrium.   9. Moderate mitral valve regurgitation.  10. Mild tricuspid regurgitation.  11. Estimated pulmonary artery systolic pressure is 44.6 mmHg assuming a   right atrial pressure of 15 mmHg, which is consistent with mild pulmonary   hypertension.    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 Presents as transfer from [facility] for acute heart failure exacerbation. The patient reports that he has been experiencing LIRA and SOB for 7-10 days which was progressive and now occurring at rest. Started on IV diuretics with improving respiratory status. Called to consult for diffuse bronchial wall thickening and impaction and 4mm pulmonary nodules seen on CT chest. Endorses congested cough. Denies CP, pleuritic CP.         PAST MEDICAL & SURGICAL HISTORY:  HTN - Hypertension  Arthritis L arm and hands  CKD (chronic kidney disease)  Gout  CAD (coronary artery disease) 1 stent  HLD (hyperlipidemia)  Atrial fibrillation  CHF (congestive heart failure)  History of cardiomyopathy  LV dysfunction  Thalassemia minor  S/P Arthroscopic Surgery of Left Knee   injury- hit knee on desk  History of Arthroscopy- ankle  left ankle  s/p &quot;something fell on it&quot;  S/P angioplasty with stent  2014  S/P hernia surgery  Status post cataract extraction  left eye done 10/18/2018  H/O cardiac radiofrequency ablation      Allergies  No Known Allergies        FAMILY HISTORY:  Family history of hypertension (Father, Mother)    Family history of diabetes mellitus (Father, Mother)      Social history:     Review of Systems:  CONSTITUTIONAL: No fever, chills, or fatigue  EYES: No eye pain, visual disturbances, or discharge  ENMT:  No difficulty hearing, tinnitus, vertigo; No sinus or throat pain  NECK: No pain or stiffness  RESPIRATORY: Per above  CARDIOVASCULAR: No chest pain, palpitations, dizziness, or leg swelling  GASTROINTESTINAL: No abdominal or epigastric pain. No nausea, vomiting, or hematemesis; No diarrhea or constipation. No melena or hematochezia.  GENITOURINARY: No dysuria, frequency, hematuria, or incontinence  NEUROLOGICAL: No headaches, memory loss, loss of strength, numbness, or tremors  SKIN: No itching, burning, rashes, or lesions   MUSCULOSKELETAL: No joint pain or swelling; No muscle, back, or extremity pain  PSYCHIATRIC: No depression, anxiety, mood swings, or difficulty sleeping      Medications:  MEDICATIONS  (STANDING):  allopurinol 100 milliGRAM(s) Oral daily  aMIOdarone    Tablet 200 milliGRAM(s) Oral daily  amLODIPine   Tablet 10 milliGRAM(s) Oral daily  aspirin enteric coated 81 milliGRAM(s) Oral daily  cholecalciferol 1000 Unit(s) Oral daily  ferrous    sulfate 325 milliGRAM(s) Oral daily  finasteride 5 milliGRAM(s) Oral daily  furosemide   Injectable 80 milliGRAM(s) IV Push two times a day  heparin  Infusion.  Unit(s)/Hr (14 mL/Hr) IV Continuous <Continuous>  hydrALAZINE 25 milliGRAM(s) Oral daily  isosorbide   dinitrate Tablet (ISORDIL) 10 milliGRAM(s) Oral three times a day  metoprolol succinate ER 25 milliGRAM(s) Oral daily  pantoprazole    Tablet 40 milliGRAM(s) Oral before breakfast  simvastatin 40 milliGRAM(s) Oral at bedtime    MEDICATIONS  (PRN):  guaiFENesin Oral Liquid (Sugar-Free) 200 milliGRAM(s) Oral every 6 hours PRN Cough  heparin   Injectable 6500 Unit(s) IV Push every 6 hours PRN For aPTT less than 40  heparin   Injectable 3000 Unit(s) IV Push every 6 hours PRN For aPTT between 40 - 57            Vital Signs Last 24 Hrs  T(C): 37.1 (10 Mar 2022 11:03), Max: 37.1 (10 Mar 2022 11:03)  T(F): 98.8 (10 Mar 2022 11:03), Max: 98.8 (10 Mar 2022 11:03)  HR: 46 (10 Mar 2022 11:03) (46 - 71)  BP: 115/74 (10 Mar 2022 11:03) (111/63 - 145/87)  BP(mean): --  RR: 18 (10 Mar 2022 11:03) (17 - 18)  SpO2: 97% (10 Mar 2022 11:03) (91% - 97%)      VBG pH 7.30 03- @ 05:33  VBG pCO2 43  @ 05:33  VBG O2 sat 87.1  @ 05:33  VBG lactate 1.5 03 @ 05:33        03-09 @ 07:01  -  03-10 @ 07:00  --------------------------------------------------------  IN: 480 mL / OUT: 600 mL / NET: -120 mL          LABS:                        10.1   9.82  )-----------( 219      ( 10 Mar 2022 05:30 )             30.7     03-10    138  |  103  |  72<H>  ----------------------------<  73  4.5   |  18<L>  |  8.77<H>    Ca    8.6      10 Mar 2022 05:30  Phos  5.2     03-10  Mg     2.2     03-10    TPro  7.1  /  Alb  3.0<L>  /  TBili  0.3  /  DBili  x   /  AST  19  /  ALT  8<L>  /  AlkPhos  119  03-10      CARDIAC MARKERS ( 09 Mar 2022 08:54 )  x     / x     / 410 U/L / x     / 4.2 ng/mL  CARDIAC MARKERS ( 09 Mar 2022 00:46 )  x     / x     / 364 U/L / x     / 4.1 ng/mL  CARDIAC MARKERS ( 08 Mar 2022 18:17 )  x     / x     / 386 U/L / x     / 2.9 ng/mL      CAPILLARY BLOOD GLUCOSE      POCT Blood Glucose.: 107 mg/dL (08 Mar 2022 17:48)    PT/INR - ( 10 Mar 2022 05:30 )   PT: 18.4 sec;   INR: 1.58 ratio         PTT - ( 09 Mar 2022 23:15 )  PTT:71.0 sec  Urinalysis Basic - ( 09 Mar 2022 06:37 )    Color: Light Yellow / Appearance: Clear / S.012 / pH: x  Gluc: x / Ketone: Negative  / Bili: Negative / Urobili: Negative   Blood: x / Protein: 300 mg/dL / Nitrite: Negative   Leuk Esterase: Negative / RBC: 2 /hpf / WBC 3 /HPF   Sq Epi: x / Non Sq Epi: 1 /hpf / Bacteria: Negative        Serum Pro-Brain Natriuretic Peptide: 5546 pg/mL (03-10-22 @ 05:30)  Serum Pro-Brain Natriuretic Peptide: 8475 pg/mL (22 @ 10:11)                Physical Examination:    General: No acute distress.      HEENT: Pupils equal, reactive to light.  Symmetric.    PULM: scattered rhonchi    CVS: S1, S2    ABD: Soft, nondistended, nontender, normoactive bowel sounds, no masses    EXT: No edema, nontender    SKIN: Warm and well perfused, no rashes noted.    NEURO: Alert, oriented, interactive, nonfocal        RADIOLOGY REVIEWED    CT chest: < from: CT Chest No Cont (22 @ 16:32) >  FINDINGS:    Lungs/Airways/Pleura: The central airways are patent. No pleural   effusion. There is diffuse bronchial wall thickening with segmental and   subsegmental bronchial impaction at the bases. Few scattered sub-4 mm   pulmonary nodules include right apex series 3 image 29 and left apex   image 28. No consolidation or pulmonary edema.    Mediastinum/Lymph nodes: No thoracic adenopathy.    Heart and Vessels: Cardiomegaly. LAD stent. No pericardial effusion. The   pulmonary artery measures 3.2 cm. Adjacent mid ascending aorta measures   3.2 cm.    Upper Abdomen: Unremarkable.    Osseous structures and Soft Tissues: No aggressive bone lesions.    IMPRESSION:  Diffuse bronchial wall thickening and impaction.    Sub-4 mm pulmonary nodules; if patient is considered high risk for lung   cancer, consider follow-up low dose chest CT in 12 months. If low risk,   no further follow-up is needed as per current Fleischner guidelines.    < end of copied text >      TTE: < from: TTE Echo Complete w/o Contrast w/ Doppler (22 @ 15:44) >  PHYSICIAN INTERPRETATION:  Left Ventricle: The left ventricular internal cavity size is mildly   increased. Left ventricular wall thickness is normal.  Global LV systolic function was mildly decreased. Left ventricular   ejection fraction, by visual estimation, is 50 to 55%. Spectral Doppler   shows restrictive pattern of left ventricular myocardial filling (Grade   III diastolic dysfunction). Elevated mean left atrial pressure. Limited   apical views.  Right Ventricle: Normal right ventricular size and function.  Left Atrium: Severely enlarged left atrium.  Right Atrium: The right atrium is normal in size. Moderately enlarged   right atrium.  Pericardium: There is no evidence of pericardial effusion.  Mitral Valve: Structurally normal mitral valve, with normal leaflet   excursion. Moderate mitral valve regurgitation is seen.  Tricuspid Valve: Structurally normal tricuspid valve, with normal leaflet   excursion. Mild tricuspid regurgitation is visualized. Estimated   pulmonary artery systolic pressure is 44.6 mmHg assuming a right atrial   pressure of 15 mmHg, which is consistent with mild pulmonary hypertension.  Aortic Valve: Normal trileaflet aortic valve with normal opening. No   evidence of aortic valve regurgitation is seen.  Pulmonic Valve: Structurally normal pulmonic valve, with normal leaflet   excursion. Trace pulmonic valve regurgitation.  Aorta: The aortic root and ascending aorta are structurally normal, with   no evidence of dilitation.  Pulmonary Artery: The main pulmonary artery is normal in size.  Venous: The inferior vena cava was dilated, withrespiratory size   variation less than 50%.      Summary:   1. Left ventricular ejection fraction, by visual estimation, is 50 to   55%.   2. Technically limited study.   3. LV apex not well visualized in any view.   4. Mildly decreased global left ventricular systolic function.   5. Severely enlarged left atrium.   6. Elevated mean left atrial pressure.   7. Spectral Doppler shows restrictive pattern of left ventricular   myocardial filling (Grade III diastolic dysfunction).   8. Moderately enlarged right atrium.   9. Moderate mitral valve regurgitation.  10. Mild tricuspid regurgitation.  11. Estimated pulmonary artery systolic pressure is 44.6 mmHg assuming a   right atrial pressure of 15 mmHg, which is consistent with mild pulmonary   hypertension.    < end of copied text >

## 2022-03-10 NOTE — PROGRESS NOTE ADULT - SUBJECTIVE AND OBJECTIVE BOX
[facility] DIVISION OF KIDNEY DISEASES AND HYPERTENSION -- FOLLOW UP NOTE  --------------------------------------------------------------------------------  Chief Complaint: RUSSELL on CKD    24 hour events/subjective: Pt. seen and examined today, not in distress. Pt reports feeling better. Scr improved to 8.77 today.    PAST HISTORY  --------------------------------------------------------------------------------  No significant changes to PMH, PSH, FHx, SHx, unless otherwise noted    ALLERGIES & MEDICATIONS  --------------------------------------------------------------------------------  Allergies    No Known Allergies    Intolerances    Standing Inpatient Medications  albuterol/ipratropium for Nebulization 3 milliLiter(s) Nebulizer every 6 hours  allopurinol 100 milliGRAM(s) Oral daily  aMIOdarone    Tablet 200 milliGRAM(s) Oral daily  amLODIPine   Tablet 10 milliGRAM(s) Oral daily  aspirin enteric coated 81 milliGRAM(s) Oral daily  cholecalciferol 1000 Unit(s) Oral daily  ferrous    sulfate 325 milliGRAM(s) Oral daily  finasteride 5 milliGRAM(s) Oral daily  furosemide   Injectable 80 milliGRAM(s) IV Push two times a day  guaiFENesin ER 1200 milliGRAM(s) Oral every 12 hours  heparin  Infusion.  Unit(s)/Hr IV Continuous <Continuous>  hydrALAZINE 25 milliGRAM(s) Oral daily  isosorbide   dinitrate Tablet (ISORDIL) 10 milliGRAM(s) Oral three times a day  metoprolol succinate ER 25 milliGRAM(s) Oral daily  pantoprazole    Tablet 40 milliGRAM(s) Oral before breakfast  simvastatin 40 milliGRAM(s) Oral at bedtime    PRN Inpatient Medications  heparin   Injectable 6500 Unit(s) IV Push every 6 hours PRN  heparin   Injectable 3000 Unit(s) IV Push every 6 hours PRN      REVIEW OF SYSTEMS  --------------------------------------------------------------------------------  Gen: malaise+  Skin: No rashes  Head/Eyes/Ears: Normal hearing,   Respiratory: cough+, sob+  CV: No chest pain  GI: No abdominal pain, diarrhea  : as per HPI  MSK: No  edema  Heme: No easy bruising or bleeding    All other systems were reviewed and are negative, except as noted.    VITALS/PHYSICAL EXAM  --------------------------------------------------------------------------------  T(C): 37.1 (03-10-22 @ 11:03), Max: 37.1 (03-10-22 @ 11:03)  HR: 46 (03-10-22 @ 11:03) (46 - 71)  BP: 115/74 (03-10-22 @ 11:03) (111/63 - 145/87)  RR: 18 (03-10-22 @ 11:03) (17 - 18)  SpO2: 97% (03-10-22 @ 11:03) (91% - 97%)  Wt(kg): --  Height (cm): 180.3 (03-09-22 @ 08:34)  Weight (kg): 79.6 (03-09-22 @ 08:34)  BMI (kg/m2): 24.5 (03-09-22 @ 08:34)  BSA (m2): 1.99 (03-09-22 @ 08:34)    03-09-22 @ 07:01  -  03-10-22 @ 07:00  --------------------------------------------------------  IN: 480 mL / OUT: 600 mL / NET: -120 mL    03-10-22 @ 07:01  -  03-10-22 @ 16:20  --------------------------------------------------------  IN: 480 mL / OUT: 1000 mL / NET: -520 mL    Physical Exam:  	Gen: NAD  	HEENT: Anicteric  	Pulm: fair entry, faint rales and coarse breath sounds+  	CV: S1S2+  	Abd: Soft, +BS   	Ext: No LE edema B/L, no asterixis  	Neuro: Awake  	Skin: Warm and dry    LABS/STUDIES  --------------------------------------------------------------------------------              10.1   9.82  >-----------<  219      [03-10-22 @ 05:30]              30.7     138  |  103  |  72  ----------------------------<  73      [03-10-22 @ 05:30]  4.5   |  18  |  8.77        Ca     8.6     [03-10-22 @ 05:30]      Mg     2.2     [03-10-22 @ 05:30]      Phos  5.2     [03-10-22 @ 05:30]    Creatinine Trend:  SCr 8.77 [03-10 @ 05:30]  SCr 9.04 [03-09 @ 08:50]  SCr 9.17 [03-09 @ 00:46]  SCr 8.77 [03-08 @ 10:11]    Urinalysis - [03-09-22 @ 06:37]      Color Light Yellow / Appearance Clear / SG 1.012 / pH 6.0      Gluc 500 mg/dL / Ketone Negative  / Bili Negative / Urobili Negative       Blood Small / Protein 300 mg/dL / Leuk Est Negative / Nitrite Negative      RBC 2 / WBC 3 / Hyaline 1 / Gran  / Sq Epi  / Non Sq Epi 1 / Bacteria Negative    Urine Creatinine 54      [03-10-22 @ 13:27]  Urine Protein 201      [03-10-22 @ 13:27]  Urine Sodium 67      [03-09-22 @ 06:37]  Urine Urea Nitrogen 334      [03-09-22 @ 08:01]  Urine Osmolality 319      [03-09-22 @ 06:37]

## 2022-03-10 NOTE — PROGRESS NOTE ADULT - PROBLEM SELECTOR PLAN 1
Pt. with advanced RUSSELL on CKD in setting of CHF and secondary FSGS.  SCr at ~1.7-1.9 (04/2019).  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 Last SCr was elevated at 3.25 on 6/8/21. Scr on admission elevated at 8.77 and increased to 9.04. Pt currently on IV lasix 80 mg daily. Scr stable/elevated at 8.7 today.=. Recommend continue IV lasix, pt reports improvement in his symptoms. Labs reviewed. No critical acid-base disturbances noted. NO absolute indication for RRT at this moment. Will need to consider HD if renal failure continues to worsen. Monitor labs and urine output. Avoid nephrotoxins. Dose medications as per eGFR.

## 2022-03-10 NOTE — PROGRESS NOTE ADULT - SUBJECTIVE AND OBJECTIVE BOX
Patient seen and examined at bedside.    Overnight Events:     No AEON     Tele showed atypical flutter in the 40-50s w/ PVCs     Denied any SOB, orthopnea, palpitations chest pain, lightheadedness, dizziness          Current Meds:  allopurinol 100 milliGRAM(s) Oral daily  aMIOdarone    Tablet 200 milliGRAM(s) Oral daily  amLODIPine   Tablet 10 milliGRAM(s) Oral daily  aspirin enteric coated 81 milliGRAM(s) Oral daily  cholecalciferol 1000 Unit(s) Oral daily  ferrous    sulfate 325 milliGRAM(s) Oral daily  finasteride 5 milliGRAM(s) Oral daily  furosemide   Injectable 80 milliGRAM(s) IV Push two times a day  guaiFENesin Oral Liquid (Sugar-Free) 200 milliGRAM(s) Oral every 6 hours PRN  heparin   Injectable 6500 Unit(s) IV Push every 6 hours PRN  heparin   Injectable 3000 Unit(s) IV Push every 6 hours PRN  heparin  Infusion.  Unit(s)/Hr IV Continuous <Continuous>  hydrALAZINE 25 milliGRAM(s) Oral daily  isosorbide   dinitrate Tablet (ISORDIL) 10 milliGRAM(s) Oral three times a day  metoprolol succinate ER 25 milliGRAM(s) Oral daily  pantoprazole    Tablet 40 milliGRAM(s) Oral before breakfast  simvastatin 40 milliGRAM(s) Oral at bedtime      Vitals:  T(F): 98.6 (03-10), Max: 98.6 (03-10)  HR: 71 (03-10) (45 - 71)  BP: 112/63 (03-10) (111/63 - 145/87)  RR: 18 (03-10)  SpO2: 95% (03-10)  I&O's Summary    09 Mar 2022 07:01  -  10 Mar 2022 07:00  --------------------------------------------------------  IN: 480 mL / OUT: 600 mL / NET: -120 mL    10 Mar 2022 07:01  -  10 Mar 2022 08:44  --------------------------------------------------------  IN: 0 mL / OUT: 500 mL / NET: -500 mL        Physical Exam:  Appearance: No acute distress; well appearing  Eyes: EOMI, pink conjunctiva  HEENT: Normal oral mucosa  Cardiovascular: Bradycardic, S1, S2, no murmurs, rubs, or gallops; no edema; no JVD  Respiratory: Clear to auscultation bilaterally  Gastrointestinal: soft, non-tender, non-distende   Musculoskeletal: No clubbing; no joint deformity   Neurologic: Non-focal  Psychiatry: AAOx3, mood & affect appropriate  Skin: No rashes, ecchymoses, or cyanosis                          10.1   9.82  )-----------( 219      ( 10 Mar 2022 05:30 )             30.7     03-10    138  |  103  |  72<H>  ----------------------------<  73  4.5   |  18<L>  |  8.77<H>    Ca    8.6      10 Mar 2022 05:30  Phos  6.0     03-09  Mg     2.2     03-10    TPro  7.1  /  Alb  3.0<L>  /  TBili  0.3  /  DBili  x   /  AST  19  /  ALT  8<L>  /  AlkPhos  119  03-10    PT/INR - ( 10 Mar 2022 05:30 )   PT: 18.4 sec;   INR: 1.58 ratio         PTT - ( 09 Mar 2022 23:15 )  PTT:71.0 sec  CARDIAC MARKERS ( 09 Mar 2022 08:54 )  93 ng/L / x     / x     / 410 U/L / x     / 4.2 ng/mL  CARDIAC MARKERS ( 09 Mar 2022 08:50 )  95 ng/L / x     / x     / x     / x     / x      CARDIAC MARKERS ( 09 Mar 2022 05:28 )  86 ng/L / x     / x     / x     / x     / x      CARDIAC MARKERS ( 09 Mar 2022 00:46 )  89 ng/L / x     / x     / 364 U/L / x     / 4.1 ng/mL  CARDIAC MARKERS ( 08 Mar 2022 18:17 )  x     / x     / x     / 386 U/L / x     / 2.9 ng/mL      Serum Pro-Brain Natriuretic Peptide: 5546 pg/mL (03-10 @ 05:30)  Serum Pro-Brain Natriuretic Peptide: 8475 pg/mL (03-08 @ 10:11)          New ECG(s): Personally reviewed    Echo:    Stress Testing:     Cath:    Imaging:    Interpretation of Telemetry:

## 2022-03-10 NOTE — PROGRESS NOTE ADULT - ASSESSMENT
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     #AIVR   Tele showed runs of AIVR, asymptomatic.   -Continue to monitor at this time, no changes needed in meds   -Continue home amiodarone   -Continue home metoprolol   -K> 4 and Mg >2   -Tele     #Afib   #Atypical flutter   Hx of ablation in the past, EKG shows atypical flutter  -F/u cardiac pyrophosphate scan to rule out cardiac amyloid   -Per nephrology, no need for HD at this time, plan for kidney transplant workup  -Currently on heparin gtt, can transition back to apixaban if no further procedures needed   -Meds as above

## 2022-03-10 NOTE — PROGRESS NOTE ADULT - ATTENDING COMMENTS
Feeling improved, speaking in full sentences with SOB  1.  ARF on CKD5--improvement likely attributable to alleviation of cardiorenal syndrome.  NON oliguric, no HD required.  Reviewed need to get transplant plan in motion if wishes to succeed preemptively  2.  CHF--increased diuretic, hydralazine/nitrate.  NO ACEi, ARB  3.  Hypertension--med titration annd avoid hypotension  discussed with med team

## 2022-03-10 NOTE — PROGRESS NOTE ADULT - SUBJECTIVE AND OBJECTIVE BOX
CARDIOLOGY FOLLOW UP - Dr. Lee  Date of Service: 3/10/22  CC: dyspnea improving  no cp/dizziness     Review of Systems:  Constitutional: No fever, weight loss, or fatigue  Respiratory: No cough, wheezing, or hemoptysis, no shortness of breath  Cardiovascular: No chest pain, palpitations, passing out, dizziness, or leg swelling  Gastrointestinal: No abd or epigastric pain.  No nausea, vomiting, or hematemesis; no diarrhea or constipation, no melena or hematochezia  Vascular: no edema       PHYSICAL EXAM:  T(C): 37 (03-10-22 @ 08:14), Max: 37 (03-10-22 @ 08:14)  HR: 71 (03-10-22 @ 08:14) (45 - 71)  BP: 112/63 (03-10-22 @ 08:14) (111/63 - 145/87)  RR: 18 (03-10-22 @ 08:14) (17 - 18)  SpO2: 95% (03-10-22 @ 08:14) (91% - 98%)  Wt(kg): --  I&O's Summary    09 Mar 2022 07:01  -  10 Mar 2022 07:00  --------------------------------------------------------  IN: 480 mL / OUT: 600 mL / NET: -120 mL    10 Mar 2022 07:01  -  10 Mar 2022 10:35  --------------------------------------------------------  IN: 240 mL / OUT: 500 mL / NET: -260 mL        Appearance: Normal	  Cardiovascular: Normal S1 S2,RRR, No JVD, No murmurs  Respiratory:  coarse 	  Gastrointestinal:  Soft, Non-tender, + BS	  Extremities: Normal range of motion, No clubbing, cyanosis or edema      Home Medications:  allopurinol 100 mg oral tablet: 1 tab(s) orally once a day (09 Mar 2022 02:11)  amiodarone 200 mg oral tablet: 1 tab(s) orally once a day (09 Mar 2022 02:11)  amLODIPine 10 mg oral tablet: 1 tab(s) orally once a day (09 Mar 2022 02:11)  Aspirin Enteric Coated 81 mg oral delayed release tablet: 1 tab(s) orally once a day (09 Mar 2022 02:11)  Eliquis 5 mg oral tablet: 1 tab(s) orally 2 times a day (09 Mar 2022 02:11)  Farxiga 5 mg oral tablet: 1 tab(s) orally once a day (09 Mar 2022 02:11)  ferrous sulfate 325 mg (65 mg elemental iron) oral tablet: 1 tab(s) orally once a day (09 Mar 2022 02:11)  finasteride 5 mg oral tablet: 1 tab(s) orally once a day (09 Mar 2022 02:11)  furosemide 40 mg oral tablet: 1 tab(s) orally once a day (09 Mar 2022 02:11)  hydrALAZINE 25 mg oral tablet: 1 tab(s) orally once a day (09 Mar 2022 02:11)  isosorbide dinitrate 10 mg oral tablet: 1 tab(s) orally 3 times a day (09 Mar 2022 02:11)  metoprolol succinate 25 mg oral tablet, extended release: 1 tab(s) orally once a day (09 Mar 2022 02:11)  pantoprazole 40 mg oral delayed release tablet: 1 tab(s) orally once a day (09 Mar 2022 02:11)  simvastatin 40 mg oral tablet: 1 tab(s) orally once a day (at bedtime) (09 Mar 2022 02:11)  Vitamin D3 25 mcg (1000 intl units) oral capsule: 1 cap(s) orally once a day (09 Mar 2022 02:11)      MEDICATIONS  (STANDING):  allopurinol 100 milliGRAM(s) Oral daily  aMIOdarone    Tablet 200 milliGRAM(s) Oral daily  amLODIPine   Tablet 10 milliGRAM(s) Oral daily  aspirin enteric coated 81 milliGRAM(s) Oral daily  cholecalciferol 1000 Unit(s) Oral daily  ferrous    sulfate 325 milliGRAM(s) Oral daily  finasteride 5 milliGRAM(s) Oral daily  furosemide   Injectable 80 milliGRAM(s) IV Push two times a day  heparin  Infusion.  Unit(s)/Hr (14 mL/Hr) IV Continuous <Continuous>  hydrALAZINE 25 milliGRAM(s) Oral daily  isosorbide   dinitrate Tablet (ISORDIL) 10 milliGRAM(s) Oral three times a day  metoprolol succinate ER 25 milliGRAM(s) Oral daily  pantoprazole    Tablet 40 milliGRAM(s) Oral before breakfast  simvastatin 40 milliGRAM(s) Oral at bedtime      TELEMETRY: 	afib 40-60s,   PVCs   ECG:  	  RADIOLOGY:  < from: CT Chest No Cont (03.09.22 @ 16:32) >  FINDINGS:    Lungs/Airways/Pleura: The central airways are patent. No pleural   effusion. There is diffuse bronchial wall thickening with segmental and   subsegmental bronchial impaction at the bases. Few scattered sub-4 mm   pulmonary nodules include right apex series 3 image 29 and left apex   image 28. No consolidation or pulmonary edema.    Mediastinum/Lymph nodes: No thoracic adenopathy.    Heart and Vessels: Cardiomegaly. LAD stent. No pericardial effusion. The   pulmonary artery measures 3.2 cm. Adjacent mid ascending aorta measures   3.2 cm.    Upper Abdomen: Unremarkable.    Osseous structures and Soft Tissues: No aggressive bone lesions.    IMPRESSION:  Diffuse bronchial wall thickening and impaction.    Sub-4 mm pulmonary nodules; if patient is considered high risk for lung   cancer, consider follow-up low dose chest CT in 12 months. If low risk,   no further follow-up is needed as per current Fleischner guidelines.      < end of copied text >    DIAGNOSTIC TESTING:  [ ] Echocardiogram:  [ ]  Catheterization:  [ ] Stress Test:    OTHER: 	    LABS:	 	    Creatine Kinase, Serum: 410 U/L [30 - 200] (03-09 @ 08:54)  CKMB Units: 4.2 ng/mL [0.0 - 6.7] (03-09 @ 08:54)  Troponin T, High Sensitivity Result: 93 ng/L [0 - 51] (03-09 @ 08:54)  Troponin T, High Sensitivity Result: 95 ng/L [0 - 51] (03-09 @ 08:50)  Troponin T, High Sensitivity Result: 86 ng/L [0 - 51] (03-09 @ 05:28)  Troponin T, High Sensitivity Result: 89 ng/L [0 - 51] (03-09 @ 00:46)  Creatine Kinase, Serum: 364 U/L [30 - 200] (03-09 @ 00:46)  CKMB Units: 4.1 ng/mL [0.0 - 6.7] (03-09 @ 00:46)  Creatine Kinase, Serum: 386 U/L [26 - 308] (03-08 @ 18:17)  CKMB Units: 2.9 ng/mL [0.5 - 3.6] (03-08 @ 18:17)  Creatine Kinase, Serum: 336 U/L [26 - 308] (03-08 @ 10:11)  CKMB Units: 2.5 ng/mL [0.5 - 3.6] (03-08 @ 10:11)                          10.1   9.82  )-----------( 219      ( 10 Mar 2022 05:30 )             30.7     03-10    138  |  103  |  72<H>  ----------------------------<  73  4.5   |  18<L>  |  8.77<H>    Ca    8.6      10 Mar 2022 05:30  Phos  6.0     03-09  Mg     2.2     03-10    TPro  7.1  /  Alb  3.0<L>  /  TBili  0.3  /  DBili  x   /  AST  19  /  ALT  8<L>  /  AlkPhos  119  03-10    PT/INR - ( 10 Mar 2022 05:30 )   PT: 18.4 sec;   INR: 1.58 ratio         PTT - ( 09 Mar 2022 23:15 )  PTT:71.0 sec

## 2022-03-10 NOTE — CONSULT NOTE ADULT - PROBLEM SELECTOR RECOMMENDATION 9
likely cardiac in origin - SOB improved with diuresis  -CT chest read with diffuse bronchial wall thickening. None appreciated on our read - also reviewed with chest radiologist. +Mucous in airways. Start Mucinex 1200mg PO BID x 5 days, Duoneb q6h.  -Normoxic, keep sats >90% with supplemental o2 PRN likely cardiac in origin - SOB improved with diuresis  -CT chest read with diffuse bronchial wall thickening. None appreciated on our read - also reviewed with chest radiologist. +Mucous in airways. Start Mucinex 1200mg PO BID x 5 days, Duoneb q6h.  -Normoxic, keep sats >90% with supplemental o2 PRN  -Check RVP

## 2022-03-10 NOTE — PROGRESS NOTE ADULT - ASSESSMENT
Echo 3/8/22: EF 50-55%, global lv sys fx mildly decreased, mod MR, mild TR, trace AK  Echo 5/28/21: mod MR, severe global lv sys dyxfx, mild TR, min AK  Office Echo 10/2/18: EF 50%, mild-mod MR, min AR, mild lv sys dysfx   LHC 2/21/18: PCI to LAD  NICOLETTE 2/13/18: EF 25%, severe MR, severe segmental lv sys dysfx, mild TR, mild pulm HTN     a/p   66M w/ HFrEF (EF 25%; 2021), CAD s/p stent (likely prev AWMI), CKD5 suspected to be FSGS pending kidney transplant, HTN, Afib on eliquis, PAD s/p LE stenting, enlarged prostate presents as transfer from [facility] for acute heart failure exacerbation    #Acute on Chronic Systolic HF  -dyspnea improving w IV lasix   -c/w iv lasix 80 mg BID for now   -repeat echo with improved LV fx, ef 50-55%, mod MR, mild TR, trace AK  -sbp stable - c/w bb, hydral  -no ace/arb given russell/ckd   -CT chest noted with Diffuse bronchial wall thickening and impaction, sub-4mm pulm nodule -- pending pulm eval   -F/u cardiac pyrophosphate scan to rule out cardiac amyloid -- ep f/u     #RUSSELL on CKD   -s/p renal bx, unrevealing  -no plans for HD now  -being w/u for kidney transplant  -iv lasix  -renal f/u     #Atrial Fibrillation/Flutter. S/P AF Ablation  -rates overall stable  -multiple runs of AIVR on tele noted 3/10  -EP eval noted - c/w  amiodarone 200 mg daily , Toprol  XL 25 mg daily  -continue hep gtt for now   -ep f/u     #Coronary Artery Disease. S/P PCI to LAD  -stable without chest pain or dyspnea  -hsT peaked, likely demand ischemia in setting of CKD and acute HF   -continue toprol, statin, and asa 81mg daily  -Remains off plavix as he completed 3 month course post PCI    #Mitral Regurgitation  -mod on recent echo , continue to monitor    #Hypertension  -Blood pressure controlled  -Continue current medications.    plan discussed with ACP

## 2022-03-11 DIAGNOSIS — N18.5 CHRONIC KIDNEY DISEASE, STAGE 5: ICD-10-CM

## 2022-03-11 DIAGNOSIS — Z79.01 LONG TERM (CURRENT) USE OF ANTICOAGULANTS: ICD-10-CM

## 2022-03-11 DIAGNOSIS — E11.22 TYPE 2 DIABETES MELLITUS WITH DIABETIC CHRONIC KIDNEY DISEASE: ICD-10-CM

## 2022-03-11 DIAGNOSIS — Z95.5 PRESENCE OF CORONARY ANGIOPLASTY IMPLANT AND GRAFT: ICD-10-CM

## 2022-03-11 DIAGNOSIS — E78.5 HYPERLIPIDEMIA, UNSPECIFIED: ICD-10-CM

## 2022-03-11 DIAGNOSIS — I25.10 ATHEROSCLEROTIC HEART DISEASE OF NATIVE CORONARY ARTERY WITHOUT ANGINA PECTORIS: ICD-10-CM

## 2022-03-11 DIAGNOSIS — I50.23 ACUTE ON CHRONIC SYSTOLIC (CONGESTIVE) HEART FAILURE: ICD-10-CM

## 2022-03-11 DIAGNOSIS — I48.91 UNSPECIFIED ATRIAL FIBRILLATION: ICD-10-CM

## 2022-03-11 DIAGNOSIS — B34.8 OTHER VIRAL INFECTIONS OF UNSPECIFIED SITE: ICD-10-CM

## 2022-03-11 DIAGNOSIS — N40.0 BENIGN PROSTATIC HYPERPLASIA WITHOUT LOWER URINARY TRACT SYMPTOMS: ICD-10-CM

## 2022-03-11 DIAGNOSIS — I13.2 HYPERTENSIVE HEART AND CHRONIC KIDNEY DISEASE WITH HEART FAILURE AND WITH STAGE 5 CHRONIC KIDNEY DISEASE, OR END STAGE RENAL DISEASE: ICD-10-CM

## 2022-03-11 DIAGNOSIS — R06.02 SHORTNESS OF BREATH: ICD-10-CM

## 2022-03-11 LAB
ALBUMIN SERPL ELPH-MCNC: 3.4 G/DL — SIGNIFICANT CHANGE UP (ref 3.3–5)
ALP SERPL-CCNC: 121 U/L — HIGH (ref 40–120)
ALT FLD-CCNC: 7 U/L — LOW (ref 10–45)
ANION GAP SERPL CALC-SCNC: 18 MMOL/L — HIGH (ref 5–17)
APTT BLD: 81.3 SEC — HIGH (ref 27.5–35.5)
AST SERPL-CCNC: 9 U/L — LOW (ref 10–40)
BASOPHILS # BLD AUTO: 0.09 K/UL — SIGNIFICANT CHANGE UP (ref 0–0.2)
BASOPHILS NFR BLD AUTO: 0.9 % — SIGNIFICANT CHANGE UP (ref 0–2)
BILIRUB SERPL-MCNC: 0.4 MG/DL — SIGNIFICANT CHANGE UP (ref 0.2–1.2)
BUN SERPL-MCNC: 74 MG/DL — HIGH (ref 7–23)
CALCIUM SERPL-MCNC: 9 MG/DL — SIGNIFICANT CHANGE UP (ref 8.4–10.5)
CHLORIDE SERPL-SCNC: 103 MMOL/L — SIGNIFICANT CHANGE UP (ref 96–108)
CO2 SERPL-SCNC: 20 MMOL/L — LOW (ref 22–31)
CREAT SERPL-MCNC: 9.22 MG/DL — HIGH (ref 0.5–1.3)
EGFR: 6 ML/MIN/1.73M2 — LOW
EOSINOPHIL # BLD AUTO: 0.58 K/UL — HIGH (ref 0–0.5)
EOSINOPHIL NFR BLD AUTO: 5.6 % — SIGNIFICANT CHANGE UP (ref 0–6)
GLUCOSE SERPL-MCNC: 78 MG/DL — SIGNIFICANT CHANGE UP (ref 70–99)
HCT VFR BLD CALC: 33.8 % — LOW (ref 39–50)
HGB BLD-MCNC: 10.7 G/DL — LOW (ref 13–17)
IMM GRANULOCYTES NFR BLD AUTO: 0.4 % — SIGNIFICANT CHANGE UP (ref 0–1.5)
INR BLD: 1.4 RATIO — HIGH (ref 0.88–1.16)
LYMPHOCYTES # BLD AUTO: 1.43 K/UL — SIGNIFICANT CHANGE UP (ref 1–3.3)
LYMPHOCYTES # BLD AUTO: 13.7 % — SIGNIFICANT CHANGE UP (ref 13–44)
MAGNESIUM SERPL-MCNC: 2.2 MG/DL — SIGNIFICANT CHANGE UP (ref 1.6–2.6)
MCHC RBC-ENTMCNC: 19.5 PG — LOW (ref 27–34)
MCHC RBC-ENTMCNC: 31.7 GM/DL — LOW (ref 32–36)
MCV RBC AUTO: 61.7 FL — LOW (ref 80–100)
MONOCYTES # BLD AUTO: 1.15 K/UL — HIGH (ref 0–0.9)
MONOCYTES NFR BLD AUTO: 11 % — SIGNIFICANT CHANGE UP (ref 2–14)
NEUTROPHILS # BLD AUTO: 7.16 K/UL — SIGNIFICANT CHANGE UP (ref 1.8–7.4)
NEUTROPHILS NFR BLD AUTO: 68.4 % — SIGNIFICANT CHANGE UP (ref 43–77)
NRBC # BLD: 0 /100 WBCS — SIGNIFICANT CHANGE UP (ref 0–0)
PHOSPHATE SERPL-MCNC: 5.2 MG/DL — HIGH (ref 2.5–4.5)
PLATELET # BLD AUTO: 232 K/UL — SIGNIFICANT CHANGE UP (ref 150–400)
POTASSIUM SERPL-MCNC: 3.2 MMOL/L — LOW (ref 3.5–5.3)
POTASSIUM SERPL-SCNC: 3.2 MMOL/L — LOW (ref 3.5–5.3)
PROT SERPL-MCNC: 7.5 G/DL — SIGNIFICANT CHANGE UP (ref 6–8.3)
PROTHROM AB SERPL-ACNC: 16.1 SEC — HIGH (ref 10.5–13.4)
RBC # BLD: 5.48 M/UL — SIGNIFICANT CHANGE UP (ref 4.2–5.8)
RBC # FLD: 17.8 % — HIGH (ref 10.3–14.5)
SARS-COV-2 RNA SPEC QL NAA+PROBE: SIGNIFICANT CHANGE UP
SODIUM SERPL-SCNC: 141 MMOL/L — SIGNIFICANT CHANGE UP (ref 135–145)
WBC # BLD: 10.45 K/UL — SIGNIFICANT CHANGE UP (ref 3.8–10.5)
WBC # FLD AUTO: 10.45 K/UL — SIGNIFICANT CHANGE UP (ref 3.8–10.5)

## 2022-03-11 PROCEDURE — 99232 SBSQ HOSP IP/OBS MODERATE 35: CPT

## 2022-03-11 PROCEDURE — 93010 ELECTROCARDIOGRAM REPORT: CPT

## 2022-03-11 PROCEDURE — 99233 SBSQ HOSP IP/OBS HIGH 50: CPT | Mod: GC

## 2022-03-11 RX ORDER — POTASSIUM CHLORIDE 20 MEQ
40 PACKET (EA) ORAL EVERY 4 HOURS
Refills: 0 | Status: COMPLETED | OUTPATIENT
Start: 2022-03-11 | End: 2022-03-11

## 2022-03-11 RX ADMIN — AMIODARONE HYDROCHLORIDE 200 MILLIGRAM(S): 400 TABLET ORAL at 05:59

## 2022-03-11 RX ADMIN — HEPARIN SODIUM 1400 UNIT(S)/HR: 5000 INJECTION INTRAVENOUS; SUBCUTANEOUS at 06:37

## 2022-03-11 RX ADMIN — FINASTERIDE 5 MILLIGRAM(S): 5 TABLET, FILM COATED ORAL at 12:01

## 2022-03-11 RX ADMIN — Medication 40 MILLIEQUIVALENT(S): at 07:39

## 2022-03-11 RX ADMIN — ISOSORBIDE DINITRATE 10 MILLIGRAM(S): 5 TABLET ORAL at 12:01

## 2022-03-11 RX ADMIN — Medication 80 MILLIGRAM(S): at 05:58

## 2022-03-11 RX ADMIN — Medication 3 MILLILITER(S): at 17:30

## 2022-03-11 RX ADMIN — Medication 325 MILLIGRAM(S): at 12:01

## 2022-03-11 RX ADMIN — HEPARIN SODIUM 1400 UNIT(S)/HR: 5000 INJECTION INTRAVENOUS; SUBCUTANEOUS at 07:05

## 2022-03-11 RX ADMIN — Medication 1000 UNIT(S): at 12:01

## 2022-03-11 RX ADMIN — ISOSORBIDE DINITRATE 10 MILLIGRAM(S): 5 TABLET ORAL at 17:30

## 2022-03-11 RX ADMIN — POLYETHYLENE GLYCOL 3350 17 GRAM(S): 17 POWDER, FOR SOLUTION ORAL at 12:01

## 2022-03-11 RX ADMIN — SIMVASTATIN 40 MILLIGRAM(S): 20 TABLET, FILM COATED ORAL at 21:13

## 2022-03-11 RX ADMIN — HEPARIN SODIUM 1400 UNIT(S)/HR: 5000 INJECTION INTRAVENOUS; SUBCUTANEOUS at 19:12

## 2022-03-11 RX ADMIN — PANTOPRAZOLE SODIUM 40 MILLIGRAM(S): 20 TABLET, DELAYED RELEASE ORAL at 06:00

## 2022-03-11 RX ADMIN — Medication 25 MILLIGRAM(S): at 06:00

## 2022-03-11 RX ADMIN — HEPARIN SODIUM 1400 UNIT(S)/HR: 5000 INJECTION INTRAVENOUS; SUBCUTANEOUS at 19:37

## 2022-03-11 RX ADMIN — Medication 3 MILLILITER(S): at 06:00

## 2022-03-11 RX ADMIN — Medication 40 MILLIEQUIVALENT(S): at 12:01

## 2022-03-11 RX ADMIN — Medication 3 MILLILITER(S): at 23:57

## 2022-03-11 RX ADMIN — Medication 100 MILLIGRAM(S): at 13:11

## 2022-03-11 RX ADMIN — Medication 1200 MILLIGRAM(S): at 06:28

## 2022-03-11 RX ADMIN — ISOSORBIDE DINITRATE 10 MILLIGRAM(S): 5 TABLET ORAL at 05:59

## 2022-03-11 RX ADMIN — Medication 1200 MILLIGRAM(S): at 17:30

## 2022-03-11 RX ADMIN — HEPARIN SODIUM 1400 UNIT(S)/HR: 5000 INJECTION INTRAVENOUS; SUBCUTANEOUS at 08:20

## 2022-03-11 RX ADMIN — Medication 25 MILLIGRAM(S): at 05:59

## 2022-03-11 RX ADMIN — Medication 3 MILLILITER(S): at 13:11

## 2022-03-11 RX ADMIN — Medication 80 MILLIGRAM(S): at 17:31

## 2022-03-11 RX ADMIN — HEPARIN SODIUM 1400 UNIT(S)/HR: 5000 INJECTION INTRAVENOUS; SUBCUTANEOUS at 16:08

## 2022-03-11 RX ADMIN — AMLODIPINE BESYLATE 10 MILLIGRAM(S): 2.5 TABLET ORAL at 05:59

## 2022-03-11 RX ADMIN — Medication 81 MILLIGRAM(S): at 12:01

## 2022-03-11 NOTE — PROGRESS NOTE ADULT - SUBJECTIVE AND OBJECTIVE BOX
Patient seen and examined at bedside.    Overnight Events:     No AEON     Tele showed flutter in HR 60s     Denied any SOB, chest pain, palpitations, lightheadedness, dizziness        Current Meds:  albuterol/ipratropium for Nebulization 3 milliLiter(s) Nebulizer every 6 hours  allopurinol 100 milliGRAM(s) Oral daily  aMIOdarone    Tablet 200 milliGRAM(s) Oral daily  amLODIPine   Tablet 10 milliGRAM(s) Oral daily  aspirin enteric coated 81 milliGRAM(s) Oral daily  cholecalciferol 1000 Unit(s) Oral daily  ferrous    sulfate 325 milliGRAM(s) Oral daily  finasteride 5 milliGRAM(s) Oral daily  furosemide   Injectable 80 milliGRAM(s) IV Push two times a day  guaiFENesin ER 1200 milliGRAM(s) Oral every 12 hours  heparin   Injectable 6500 Unit(s) IV Push every 6 hours PRN  heparin   Injectable 3000 Unit(s) IV Push every 6 hours PRN  heparin  Infusion.  Unit(s)/Hr IV Continuous <Continuous>  hydrALAZINE 25 milliGRAM(s) Oral daily  isosorbide   dinitrate Tablet (ISORDIL) 10 milliGRAM(s) Oral three times a day  metoprolol succinate ER 25 milliGRAM(s) Oral daily  pantoprazole    Tablet 40 milliGRAM(s) Oral before breakfast  polyethylene glycol 3350 17 Gram(s) Oral daily  potassium chloride    Tablet ER 40 milliEquivalent(s) Oral every 4 hours  simvastatin 40 milliGRAM(s) Oral at bedtime      Vitals:  T(F): 98 (03-11), Max: 98.8 (03-10)  HR: 62 (03-11) (46 - 65)  BP: 139/80 (03-11) (115/74 - 139/80)  RR: 18 (03-11)  SpO2: 97% (03-11)  I&O's Summary    10 Mar 2022 07:01  -  11 Mar 2022 07:00  --------------------------------------------------------  IN: 840 mL / OUT: 2200 mL / NET: -1360 mL        Physical Exam:  Appearance: No acute distress; well appearing  Eyes: EOMI, pink conjunctiva  HEENT: Normal oral mucosa  Cardiovascular: Bradycardic, S1, S2, no murmurs, rubs, or gallops; no edema; no JVD  Respiratory: Clear to auscultation bilaterally  Gastrointestinal: soft, non-tender, non-distended   Musculoskeletal: No clubbing; no joint deformity   Neurologic: Non-focal  Psychiatry: AAOx3, mood & affect appropriate  Skin: No rashes, ecchymoses, or cyanosis                          10.7   10.45 )-----------( 232      ( 11 Mar 2022 05:46 )             33.8     03-11    141  |  103  |  74<H>  ----------------------------<  78  3.2<L>   |  20<L>  |  9.22<H>    Ca    9.0      11 Mar 2022 05:46  Phos  5.2     03-11  Mg     2.2     03-11    TPro  7.5  /  Alb  3.4  /  TBili  0.4  /  DBili  x   /  AST  9<L>  /  ALT  7<L>  /  AlkPhos  121<H>  03-11    PT/INR - ( 11 Mar 2022 05:46 )   PT: 16.1 sec;   INR: 1.40 ratio         PTT - ( 11 Mar 2022 05:46 )  PTT:81.3 sec  CARDIAC MARKERS ( 09 Mar 2022 08:54 )  93 ng/L / x     / x     / 410 U/L / x     / 4.2 ng/mL  CARDIAC MARKERS ( 09 Mar 2022 08:50 )  95 ng/L / x     / x     / x     / x     / x      CARDIAC MARKERS ( 09 Mar 2022 05:28 )  86 ng/L / x     / x     / x     / x     / x      CARDIAC MARKERS ( 09 Mar 2022 00:46 )  89 ng/L / x     / x     / 364 U/L / x     / 4.1 ng/mL  CARDIAC MARKERS ( 08 Mar 2022 18:17 )  x     / x     / x     / 386 U/L / x     / 2.9 ng/mL      Serum Pro-Brain Natriuretic Peptide: 5546 pg/mL (03-10 @ 05:30)  Serum Pro-Brain Natriuretic Peptide: 8475 pg/mL (03-08 @ 10:11)          New ECG(s): Personally reviewed    Echo:    Stress Testing:     Cath:    Imaging:    Interpretation of Telemetry:

## 2022-03-11 NOTE — PROGRESS NOTE ADULT - PROBLEM SELECTOR PLAN 1
RVP +entero/rhino virus  -CT chest read with diffuse bronchial wall thickening. None appreciated on our read - also reviewed with chest radiologist. +Mucous in airways.   -Continue Mucinex 1200mg PO BID x 5 days, Duoneb q6h.  -Normoxic, keep sats >90% with supplemental o2 PRN  -Supportive care

## 2022-03-11 NOTE — PROGRESS NOTE ADULT - ASSESSMENT
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 Presents as transfer from [facility] for acute heart failure exacerbation. The patient reports that he has been experiencing LIRA and SOB for 7-10 days which was progressive and now occurring at rest. Started on IV diuretics with improving respiratory status. Called to consult for diffuse bronchial wall thickening and impaction and 4mm pulmonary nodules seen on CT chest.

## 2022-03-11 NOTE — PROGRESS NOTE ADULT - ATTENDING COMMENTS
Alert, conversant  1.  ARF on CKD5--acute largely consequent to 2.  NON oliguric, no HD required.  Outpatient planning discussed with pt  2.  CHF--diuretic, vasodilator optimization.  Likely require cardiac cath as part of transplant work up  3.  Hypertension--med,, diuretic  discussed with med team

## 2022-03-11 NOTE — PROGRESS NOTE ADULT - ASSESSMENT
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 S/P AF Ablation  -rates overall stable  -no further runs of AIVR  -EP f/u, amiodarone 200 mg daily , Toprol  XL 25 mg daily  -continue hep gtt for now   -await ESTEVAN/DCCV today   -remains stable on room air with no sig cough concern with enterovirus   d/w eps, estevan team s    #Coronary Artery Disease. S/P PCI to LAD  -stable without chest pain or dyspnea  -hsT peaked, likely demand ischemia in setting of CKD and acute HF   -continue toprol, statin, and asa 81mg daily    #Mitral Regurgitation  -continue to monitor, await Estevan    #Hypertension  -Blood pressure controlled  -Continue current medications.    plan discussed with ACP       45 minutes spent on total encounter; more than 50% of the visit was spent counseling and/or coordinating care by the attending physician.

## 2022-03-11 NOTE — PRE-ANESTHESIA EVALUATION ADULT - NSANTHPMHFT_GEN_ALL_CORE
pt admitted with SOB/cough  CHF exacerbation/rhinovirus  SOB improved s/p diuresis  cough improved per pt

## 2022-03-11 NOTE — PROGRESS NOTE ADULT - SUBJECTIVE AND OBJECTIVE BOX
Follow-up Pulm Progress Note    No new respiratory events overnight.  Denies SOB/CP.   Mucous easier to expectorate     Medications:  MEDICATIONS  (STANDING):  albuterol/ipratropium for Nebulization 3 milliLiter(s) Nebulizer every 6 hours  allopurinol 100 milliGRAM(s) Oral daily  aMIOdarone    Tablet 200 milliGRAM(s) Oral daily  amLODIPine   Tablet 10 milliGRAM(s) Oral daily  aspirin enteric coated 81 milliGRAM(s) Oral daily  cholecalciferol 1000 Unit(s) Oral daily  ferrous    sulfate 325 milliGRAM(s) Oral daily  finasteride 5 milliGRAM(s) Oral daily  furosemide   Injectable 80 milliGRAM(s) IV Push two times a day  guaiFENesin ER 1200 milliGRAM(s) Oral every 12 hours  heparin  Infusion.  Unit(s)/Hr (14 mL/Hr) IV Continuous <Continuous>  hydrALAZINE 25 milliGRAM(s) Oral daily  isosorbide   dinitrate Tablet (ISORDIL) 10 milliGRAM(s) Oral three times a day  metoprolol succinate ER 25 milliGRAM(s) Oral daily  pantoprazole    Tablet 40 milliGRAM(s) Oral before breakfast  polyethylene glycol 3350 17 Gram(s) Oral daily  potassium chloride    Tablet ER 40 milliEquivalent(s) Oral every 4 hours  simvastatin 40 milliGRAM(s) Oral at bedtime    MEDICATIONS  (PRN):  heparin   Injectable 6500 Unit(s) IV Push every 6 hours PRN For aPTT less than 40  heparin   Injectable 3000 Unit(s) IV Push every 6 hours PRN For aPTT between 40 - 57          Vital Signs Last 24 Hrs  T(C): 36.7 (11 Mar 2022 08:44), Max: 37.1 (10 Mar 2022 11:03)  T(F): 98 (11 Mar 2022 08:44), Max: 98.8 (10 Mar 2022 11:03)  HR: 62 (11 Mar 2022 08:44) (46 - 65)  BP: 139/80 (11 Mar 2022 08:44) (115/74 - 139/80)  BP(mean): --  RR: 18 (11 Mar 2022 08:44) (17 - 18)  SpO2: 97% (11 Mar 2022 08:44) (95% - 97%)          03-10 @ 07:01  -  03-11 @ 07:00  --------------------------------------------------------  IN: 840 mL / OUT: 2200 mL / NET: -1360 mL          LABS:                        10.7   10.45 )-----------( 232      ( 11 Mar 2022 05:46 )             33.8     03-11    141  |  103  |  74<H>  ----------------------------<  78  3.2<L>   |  20<L>  |  9.22<H>    Ca    9.0      11 Mar 2022 05:46  Phos  5.2     03-11  Mg     2.2     03-11    TPro  7.5  /  Alb  3.4  /  TBili  0.4  /  DBili  x   /  AST  9<L>  /  ALT  7<L>  /  AlkPhos  121<H>  03-11      CARDIAC MARKERS ( 09 Mar 2022 08:54 )  x     / x     / 410 U/L / x     / 4.2 ng/mL      CAPILLARY BLOOD GLUCOSE        PT/INR - ( 11 Mar 2022 05:46 )   PT: 16.1 sec;   INR: 1.40 ratio         PTT - ( 11 Mar 2022 05:46 )  PTT:81.3 sec      Serum Pro-Brain Natriuretic Peptide: 5546 pg/mL (03-10-22 @ 05:30)  Serum Pro-Brain Natriuretic Peptide: 8475 pg/mL (03-08-22 @ 10:11)          Physical Examination:  PULM: few scattered rhonchi - improving   CVS: S1, S2 heard    RADIOLOGY REVIEWED  CT chest: < from: CT Chest No Cont (03.09.22 @ 16:32) >  FINDINGS:    Lungs/Airways/Pleura: The central airways are patent. No pleural   effusion. There is diffuse bronchial wall thickening with segmental and   subsegmental bronchial impaction at the bases. Few scattered sub-4 mm   pulmonary nodules include right apex series 3 image 29 and left apex   image 28. No consolidation or pulmonary edema.    Mediastinum/Lymph nodes: No thoracic adenopathy.    Heart and Vessels: Cardiomegaly. LAD stent. No pericardial effusion. The   pulmonary artery measures 3.2 cm. Adjacent mid ascending aorta measures   3.2 cm.    Upper Abdomen: Unremarkable.    Osseous structures and Soft Tissues: No aggressive bone lesions.    IMPRESSION:  Diffuse bronchial wall thickening and impaction.    Sub-4 mm pulmonary nodules; if patient is considered high risk for lung   cancer, consider follow-up low dose chest CT in 12 months. If low risk,   no further follow-up is needed as per current Fleischner guidelines.      < end of copied text >

## 2022-03-11 NOTE — PROGRESS NOTE ADULT - ASSESSMENT
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     #AIVR   Tele showed runs of AIVR, asymptomatic.   -Continue to monitor at this time, no changes needed in meds   -Continue home amiodarone   -Continue home metoprolol   -K> 4 and Mg >2   -Tele     #Afib   #Atypical flutter   Hx of ablation in the past, EKG shows atypical flutter  -Cardiac pyrophosphate scan negative   -Per nephrology, no need for HD at this time, plan for kidney transplant workup  -Currently on heparin gtt, can transition back to apixaban if no further procedures needed   -Meds as above   -Planned initially for NICOLETTE/DCCV but given rhino/enterovirus, will defer cardioversion until Monday

## 2022-03-11 NOTE — PROGRESS NOTE ADULT - SUBJECTIVE AND OBJECTIVE BOX
[facility] DIVISION OF KIDNEY DISEASES AND HYPERTENSION -- FOLLOW UP NOTE  --------------------------------------------------------------------------------  Chief Complaint: RUSSELL on CKD    24 hour events/subjective: Pt. seen and examined today, not in distress. Pt reports feeling better. Scr elevated at 9.22 and BUN 74.    PAST HISTORY  --------------------------------------------------------------------------------  No significant changes to PMH, PSH, FHx, SHx, unless otherwise noted    ALLERGIES & MEDICATIONS  --------------------------------------------------------------------------------  Allergies    No Known Allergies    Intolerances    Standing Inpatient Medications  albuterol/ipratropium for Nebulization 3 milliLiter(s) Nebulizer every 6 hours  allopurinol 100 milliGRAM(s) Oral daily  aMIOdarone    Tablet 200 milliGRAM(s) Oral daily  amLODIPine   Tablet 10 milliGRAM(s) Oral daily  aspirin enteric coated 81 milliGRAM(s) Oral daily  cholecalciferol 1000 Unit(s) Oral daily  ferrous    sulfate 325 milliGRAM(s) Oral daily  finasteride 5 milliGRAM(s) Oral daily  furosemide   Injectable 80 milliGRAM(s) IV Push two times a day  guaiFENesin ER 1200 milliGRAM(s) Oral every 12 hours  heparin  Infusion.  Unit(s)/Hr IV Continuous <Continuous>  hydrALAZINE 25 milliGRAM(s) Oral daily  isosorbide   dinitrate Tablet (ISORDIL) 10 milliGRAM(s) Oral three times a day  metoprolol succinate ER 25 milliGRAM(s) Oral daily  pantoprazole    Tablet 40 milliGRAM(s) Oral before breakfast  polyethylene glycol 3350 17 Gram(s) Oral daily  simvastatin 40 milliGRAM(s) Oral at bedtime    PRN Inpatient Medications  heparin   Injectable 3000 Unit(s) IV Push every 6 hours PRN  heparin   Injectable 6500 Unit(s) IV Push every 6 hours PRN      REVIEW OF SYSTEMS  --------------------------------------------------------------------------------  Gen: malaise+  Skin: No rashes  Head/Eyes/Ears: Normal hearing,   Respiratory: cough+, sob+  CV: No chest pain  GI: No abdominal pain, diarrhea  : as per HPI  MSK: No  edema  Heme: No easy bruising or bleeding    All other systems were reviewed and are negative, except as noted.    VITALS/PHYSICAL EXAM  --------------------------------------------------------------------------------  T(C): 36.7 (03-11-22 @ 11:50), Max: 36.9 (03-10-22 @ 20:00)  HR: 63 (03-11-22 @ 11:50) (59 - 65)  BP: 130/83 (03-11-22 @ 11:50) (127/72 - 139/80)  RR: 18 (03-11-22 @ 11:50) (17 - 18)  SpO2: 97% (03-11-22 @ 11:50) (95% - 97%)  Wt(kg): --      03-10-22 @ 07:01  -  03-11-22 @ 07:00  --------------------------------------------------------  IN: 840 mL / OUT: 2200 mL / NET: -1360 mL    03-11-22 @ 07:01  -  03-11-22 @ 15:49  --------------------------------------------------------  IN: 0 mL / OUT: 0 mL / NET: 0 mL      Physical Exam:  	Gen: NAD  	HEENT: Anicteric  	Pulm: fair entry, faint rales   	CV: S1S2+  	Abd: Soft, +BS   	Ext: No LE edema B/L, no asterixis  	Neuro: Awake  	Skin: Warm and dry    LABS/STUDIES  --------------------------------------------------------------------------------              10.7   10.45 >-----------<  232      [03-11-22 @ 05:46]              33.8     141  |  103  |  74  ----------------------------<  78      [03-11-22 @ 05:46]  3.2   |  20  |  9.22        Ca     9.0     [03-11-22 @ 05:46]      Mg     2.2     [03-11-22 @ 05:46]      Phos  5.2     [03-11-22 @ 05:46]    Creatinine Trend:  SCr 9.22 [03-11 @ 05:46]  SCr 8.77 [03-10 @ 05:30]  SCr 9.04 [03-09 @ 08:50]  SCr 9.17 [03-09 @ 00:46]  SCr 8.77 [03-08 @ 10:11]    Urinalysis - [03-09-22 @ 06:37]      Color Light Yellow / Appearance Clear / SG 1.012 / pH 6.0      Gluc 500 mg/dL / Ketone Negative  / Bili Negative / Urobili Negative       Blood Small / Protein 300 mg/dL / Leuk Est Negative / Nitrite Negative      RBC 2 / WBC 3 / Hyaline 1 / Gran  / Sq Epi  / Non Sq Epi 1 / Bacteria Negative    Urine Creatinine 54      [03-10-22 @ 13:27]  Urine Protein 201      [03-10-22 @ 13:27]  Urine Sodium 67      [03-09-22 @ 06:37]  Urine Urea Nitrogen 334      [03-09-22 @ 08:01]  Urine Osmolality 319      [03-09-22 @ 06:37]

## 2022-03-11 NOTE — PHARMACOTHERAPY INTERVENTION NOTE - [facility]SP[facility]AR[facility]MPT[facility]
General Medication
Principal Discharge DX:	Coronary artery disease involving native coronary artery of native heart with unstable angina pectoris  Goal:	less chest pain  Assessment and plan of treatment:	CABG

## 2022-03-11 NOTE — PROGRESS NOTE ADULT - PROBLEM SELECTOR PLAN 1
Pt. with advanced RUSSELL on CKD in setting of CHF and secondary FSGS.  SCr at ~1.7-1.9 (04/2019).  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 Last SCr was elevated at 3.25 on 6/8/21. Scr on admission elevated at 8.77 and increased to 9.04. Pt currently on IV lasix 80 mg daily. Scr increased to 9.2. Pt has no uremic symptoms. Recommend continue IV lasix, pt reports improvement in his symptoms. Labs reviewed. No critical acid-base disturbances noted. NO absolute indication for RRT at this moment. Will need to consider HD if renal failure continues to worsen. Monitor labs and urine output. Avoid nephrotoxins. Dose medications as per eGFR.

## 2022-03-11 NOTE — PROGRESS NOTE ADULT - SUBJECTIVE AND OBJECTIVE BOX
CARDIOLOGY FOLLOW UP NOTE - DR. MANCUSO    Patient Name: SYEDA WEAVER  Date of Service: 03-11-22 @ 11:20    feels better  no dyspnea  on room air  minimal cough  Patient seen and examined    Subjective:    cv: denies chest pain, dyspnea, palpitations, dizziness  pulmonary: denies cough  GI: denies abdominal pain, nausea, vomiting  vascular/legs: no edema   skin: no rash  ROS: otherwise negative   overnight events:      PHYSICAL EXAM:  T(C): 36.7 (03-11-22 @ 08:44), Max: 36.9 (03-10-22 @ 20:00)  HR: 62 (03-11-22 @ 08:44) (59 - 65)  BP: 139/80 (03-11-22 @ 08:44) (127/72 - 139/80)  RR: 18 (03-11-22 @ 08:44) (17 - 18)  SpO2: 97% (03-11-22 @ 08:44) (95% - 97%)  Wt(kg): --  I&O's Summary    10 Mar 2022 07:01  -  11 Mar 2022 07:00  --------------------------------------------------------  IN: 840 mL / OUT: 2200 mL / NET: -1360 mL      Daily     Daily     Appearance: Normal	  Cardiovascular: Normal S1 S2,RRR, No JVD, No murmurs  Respiratory: Lungs clear to auscultation	  Gastrointestinal:  Soft, Non-tender, + BS	  Extremities: Normal range of motion, No clubbing, cyanosis or edema      Home Medications:  allopurinol 100 mg oral tablet: 1 tab(s) orally once a day (09 Mar 2022 02:11)  amiodarone 200 mg oral tablet: 1 tab(s) orally once a day (09 Mar 2022 02:11)  amLODIPine 10 mg oral tablet: 1 tab(s) orally once a day (09 Mar 2022 02:11)  Aspirin Enteric Coated 81 mg oral delayed release tablet: 1 tab(s) orally once a day (09 Mar 2022 02:11)  Eliquis 5 mg oral tablet: 1 tab(s) orally 2 times a day (09 Mar 2022 02:11)  Farxiga 5 mg oral tablet: 1 tab(s) orally once a day (09 Mar 2022 02:11)  ferrous sulfate 325 mg (65 mg elemental iron) oral tablet: 1 tab(s) orally once a day (09 Mar 2022 02:11)  finasteride 5 mg oral tablet: 1 tab(s) orally once a day (09 Mar 2022 02:11)  furosemide 40 mg oral tablet: 1 tab(s) orally once a day (09 Mar 2022 02:11)  hydrALAZINE 25 mg oral tablet: 1 tab(s) orally once a day (09 Mar 2022 02:11)  isosorbide dinitrate 10 mg oral tablet: 1 tab(s) orally 3 times a day (09 Mar 2022 02:11)  metoprolol succinate 25 mg oral tablet, extended release: 1 tab(s) orally once a day (09 Mar 2022 02:11)  pantoprazole 40 mg oral delayed release tablet: 1 tab(s) orally once a day (09 Mar 2022 02:11)  simvastatin 40 mg oral tablet: 1 tab(s) orally once a day (at bedtime) (09 Mar 2022 02:11)  Vitamin D3 25 mcg (1000 intl units) oral capsule: 1 cap(s) orally once a day (09 Mar 2022 02:11)      MEDICATIONS  (STANDING):  albuterol/ipratropium for Nebulization 3 milliLiter(s) Nebulizer every 6 hours  allopurinol 100 milliGRAM(s) Oral daily  aMIOdarone    Tablet 200 milliGRAM(s) Oral daily  amLODIPine   Tablet 10 milliGRAM(s) Oral daily  aspirin enteric coated 81 milliGRAM(s) Oral daily  cholecalciferol 1000 Unit(s) Oral daily  ferrous    sulfate 325 milliGRAM(s) Oral daily  finasteride 5 milliGRAM(s) Oral daily  furosemide   Injectable 80 milliGRAM(s) IV Push two times a day  guaiFENesin ER 1200 milliGRAM(s) Oral every 12 hours  heparin  Infusion.  Unit(s)/Hr (14 mL/Hr) IV Continuous <Continuous>  hydrALAZINE 25 milliGRAM(s) Oral daily  isosorbide   dinitrate Tablet (ISORDIL) 10 milliGRAM(s) Oral three times a day  metoprolol succinate ER 25 milliGRAM(s) Oral daily  pantoprazole    Tablet 40 milliGRAM(s) Oral before breakfast  polyethylene glycol 3350 17 Gram(s) Oral daily  potassium chloride    Tablet ER 40 milliEquivalent(s) Oral every 4 hours  simvastatin 40 milliGRAM(s) Oral at bedtime      TELEMETRY: 	    ECG:  	  RADIOLOGY:   DIAGNOSTIC TESTING:  [ ] Echocardiogram:  [ ] Catheterization:  [ ] Stress Test:    OTHER: 	    LABS:	 	    CARDIAC MARKERS:        Troponin T, High Sensitivity Result: 93 ng/L (03-09 @ 08:54)  Troponin T, High Sensitivity Result: 95 ng/L (03-09 @ 08:50)  Troponin T, High Sensitivity Result: 86 ng/L (03-09 @ 05:28)  Troponin T, High Sensitivity Result: 89 ng/L (03-09 @ 00:46)                                10.7   10.45 )-----------( 232      ( 11 Mar 2022 05:46 )             33.8     03-11    141  |  103  |  74<H>  ----------------------------<  78  3.2<L>   |  20<L>  |  9.22<H>    Ca    9.0      11 Mar 2022 05:46  Phos  5.2     03-11  Mg     2.2     03-11    TPro  7.5  /  Alb  3.4  /  TBili  0.4  /  DBili  x   /  AST  9<L>  /  ALT  7<L>  /  AlkPhos  121<H>  03-11    proBNP:   PT/INR - ( 11 Mar 2022 05:46 )   PT: 16.1 sec;   INR: 1.40 ratio         PTT - ( 11 Mar 2022 05:46 )  PTT:81.3 sec  Lipid Profile:   HgA1c:     Creatinine, Serum: 9.22 mg/dL (03-11-22 @ 05:46)  Creatinine, Serum: 8.77 mg/dL (03-10-22 @ 05:30)  Creatinine, Serum: 9.04 mg/dL (03-09-22 @ 08:50)  Creatinine, Serum: 9.17 mg/dL (03-09-22 @ 00:46)

## 2022-03-12 PROBLEM — M54.50 LOWER BACK PAIN: Status: ACTIVE | Noted: 2018-04-25

## 2022-03-12 PROBLEM — R06.02 SHORTNESS OF BREATH: Status: ACTIVE | Noted: 2021-07-29

## 2022-03-12 LAB
ALBUMIN SERPL ELPH-MCNC: 3.5 G/DL — SIGNIFICANT CHANGE UP (ref 3.3–5)
ALP SERPL-CCNC: 120 U/L — SIGNIFICANT CHANGE UP (ref 40–120)
ALT FLD-CCNC: <5 U/L — LOW (ref 10–45)
ANION GAP SERPL CALC-SCNC: 20 MMOL/L — HIGH (ref 5–17)
APTT BLD: 86.9 SEC — HIGH (ref 27.5–35.5)
AST SERPL-CCNC: 6 U/L — LOW (ref 10–40)
BILIRUB SERPL-MCNC: 0.4 MG/DL — SIGNIFICANT CHANGE UP (ref 0.2–1.2)
BUN SERPL-MCNC: 73 MG/DL — HIGH (ref 7–23)
CALCIUM SERPL-MCNC: 9.7 MG/DL — SIGNIFICANT CHANGE UP (ref 8.4–10.5)
CHLORIDE SERPL-SCNC: 105 MMOL/L — SIGNIFICANT CHANGE UP (ref 96–108)
CO2 SERPL-SCNC: 17 MMOL/L — LOW (ref 22–31)
CREAT SERPL-MCNC: 9.29 MG/DL — HIGH (ref 0.5–1.3)
EGFR: 6 ML/MIN/1.73M2 — LOW
GLUCOSE SERPL-MCNC: 81 MG/DL — SIGNIFICANT CHANGE UP (ref 70–99)
HCT VFR BLD CALC: 34.1 % — LOW (ref 39–50)
HGB BLD-MCNC: 10.8 G/DL — LOW (ref 13–17)
MCHC RBC-ENTMCNC: 19.7 PG — LOW (ref 27–34)
MCHC RBC-ENTMCNC: 31.7 GM/DL — LOW (ref 32–36)
MCV RBC AUTO: 62.1 FL — LOW (ref 80–100)
NRBC # BLD: 0 /100 WBCS — SIGNIFICANT CHANGE UP (ref 0–0)
PLATELET # BLD AUTO: 250 K/UL — SIGNIFICANT CHANGE UP (ref 150–400)
POTASSIUM SERPL-MCNC: 4.1 MMOL/L — SIGNIFICANT CHANGE UP (ref 3.5–5.3)
POTASSIUM SERPL-SCNC: 4.1 MMOL/L — SIGNIFICANT CHANGE UP (ref 3.5–5.3)
PROT SERPL-MCNC: 7.7 G/DL — SIGNIFICANT CHANGE UP (ref 6–8.3)
RBC # BLD: 5.49 M/UL — SIGNIFICANT CHANGE UP (ref 4.2–5.8)
RBC # FLD: 18.3 % — HIGH (ref 10.3–14.5)
SODIUM SERPL-SCNC: 142 MMOL/L — SIGNIFICANT CHANGE UP (ref 135–145)
WBC # BLD: 10.67 K/UL — HIGH (ref 3.8–10.5)
WBC # FLD AUTO: 10.67 K/UL — HIGH (ref 3.8–10.5)

## 2022-03-12 PROCEDURE — 99233 SBSQ HOSP IP/OBS HIGH 50: CPT

## 2022-03-12 RX ORDER — HEPARIN SODIUM 5000 [USP'U]/ML
6500 INJECTION INTRAVENOUS; SUBCUTANEOUS EVERY 6 HOURS
Refills: 0 | Status: DISCONTINUED | OUTPATIENT
Start: 2022-03-12 | End: 2022-03-14

## 2022-03-12 RX ORDER — HEPARIN SODIUM 5000 [USP'U]/ML
INJECTION INTRAVENOUS; SUBCUTANEOUS
Qty: 25000 | Refills: 0 | Status: DISCONTINUED | OUTPATIENT
Start: 2022-03-12 | End: 2022-03-14

## 2022-03-12 RX ORDER — FUROSEMIDE 40 MG
80 TABLET ORAL DAILY
Refills: 0 | Status: DISCONTINUED | OUTPATIENT
Start: 2022-03-12 | End: 2022-03-21

## 2022-03-12 RX ORDER — HEPARIN SODIUM 5000 [USP'U]/ML
3000 INJECTION INTRAVENOUS; SUBCUTANEOUS EVERY 6 HOURS
Refills: 0 | Status: DISCONTINUED | OUTPATIENT
Start: 2022-03-12 | End: 2022-03-14

## 2022-03-12 RX ADMIN — PANTOPRAZOLE SODIUM 40 MILLIGRAM(S): 20 TABLET, DELAYED RELEASE ORAL at 05:23

## 2022-03-12 RX ADMIN — SIMVASTATIN 40 MILLIGRAM(S): 20 TABLET, FILM COATED ORAL at 22:13

## 2022-03-12 RX ADMIN — Medication 1200 MILLIGRAM(S): at 16:50

## 2022-03-12 RX ADMIN — ISOSORBIDE DINITRATE 10 MILLIGRAM(S): 5 TABLET ORAL at 16:50

## 2022-03-12 RX ADMIN — Medication 25 MILLIGRAM(S): at 05:23

## 2022-03-12 RX ADMIN — Medication 1200 MILLIGRAM(S): at 05:23

## 2022-03-12 RX ADMIN — HEPARIN SODIUM 1400 UNIT(S)/HR: 5000 INJECTION INTRAVENOUS; SUBCUTANEOUS at 19:24

## 2022-03-12 RX ADMIN — AMIODARONE HYDROCHLORIDE 200 MILLIGRAM(S): 400 TABLET ORAL at 05:23

## 2022-03-12 RX ADMIN — ISOSORBIDE DINITRATE 10 MILLIGRAM(S): 5 TABLET ORAL at 05:23

## 2022-03-12 RX ADMIN — Medication 3 MILLILITER(S): at 17:05

## 2022-03-12 RX ADMIN — Medication 100 MILLIGRAM(S): at 11:08

## 2022-03-12 RX ADMIN — POLYETHYLENE GLYCOL 3350 17 GRAM(S): 17 POWDER, FOR SOLUTION ORAL at 11:09

## 2022-03-12 RX ADMIN — AMLODIPINE BESYLATE 10 MILLIGRAM(S): 2.5 TABLET ORAL at 05:23

## 2022-03-12 RX ADMIN — HEPARIN SODIUM 1400 UNIT(S)/HR: 5000 INJECTION INTRAVENOUS; SUBCUTANEOUS at 09:12

## 2022-03-12 RX ADMIN — Medication 3 MILLILITER(S): at 05:24

## 2022-03-12 RX ADMIN — ISOSORBIDE DINITRATE 10 MILLIGRAM(S): 5 TABLET ORAL at 11:08

## 2022-03-12 RX ADMIN — Medication 80 MILLIGRAM(S): at 05:23

## 2022-03-12 RX ADMIN — Medication 1000 UNIT(S): at 11:08

## 2022-03-12 RX ADMIN — Medication 81 MILLIGRAM(S): at 11:08

## 2022-03-12 RX ADMIN — HEPARIN SODIUM 1400 UNIT(S)/HR: 5000 INJECTION INTRAVENOUS; SUBCUTANEOUS at 07:16

## 2022-03-12 RX ADMIN — Medication 325 MILLIGRAM(S): at 11:08

## 2022-03-12 RX ADMIN — FINASTERIDE 5 MILLIGRAM(S): 5 TABLET, FILM COATED ORAL at 11:08

## 2022-03-12 RX ADMIN — Medication 3 MILLILITER(S): at 11:07

## 2022-03-12 NOTE — PHYSICAL EXAM
[No Acute Distress] : no acute distress [Normal Conjunctiva] : normal conjunctiva [Normal Venous Pressure] : normal venous pressure [Normal S1, S2] : normal S1, S2 [Clear Lung Fields] : clear lung fields [Non Tender] : non-tender [No Edema] : no edema [No Cyanosis] : no cyanosis [No Clubbing] : no clubbing [No Focal Deficits] : no focal deficits [Alert and Oriented] : alert and oriented [No Rub] : no rub [de-identified] : irregular; 2/6 systolic murmur left lower sternal border [de-identified] : Limping gait

## 2022-03-12 NOTE — DISCUSSION/SUMMARY
[FreeTextEntry1] : Patient is a 56-year-old man with prior history of CAD status post stent, peripheral vascular disease status post stenting, mitral regurgitation on last assessment moderate, previous severely reduced left ventricular function which had improved, hypertension controlled, CKD now again evaluated for transplant.  He has symptoms of shortness of breath as well as fatigue.  His symptoms are not acute but has progressed over the last 6 months.\par \par He had prior atrial fibrillation ablation in 2016.  Patient had severely enlarged left atrium persistent A. fib and moderate to severe MR.  He did well for period time but had recurrent A. fib and was started on amiodarone and maintained sinus rhythm until last year when he has had recurrent atrial arrhythmias.  His EKG shows an atypical flutter and the ventricular response has been controlled.\par \par Patient was referred for consideration of repeat ablation.  He would like to have this done before his potential renal transplant.  I explained he wants ablation procedures done we would prefer not to discontinue anticoagulation acutely at least within the next 3 months.\par \par We will get follow-up evaluation pulmonary and reassessment of his LV function, mild regurgitation, left atrial size and plan for the ablation procedure.  I explained to him given his left atrial size that he may redevelop atrial fibrillation in the future.  We also discussed amiodarone usage and potential for toxicity and need for follow-up pulmonary evaluation including pulmonary function test and possibly chest CT.\par \par For now we will continue her current medications and I will discuss with his  cardiologist further evaluation prior to the AF/atypical flutter ablation procedure.\par \par \par \par \par \par \par \par

## 2022-03-12 NOTE — PROGRESS NOTE ADULT - SUBJECTIVE AND OBJECTIVE BOX
CARDIOLOGY FOLLOW UP - Dr. Lee  Date of Service: 3/12/22  CC: no cp/sob    Review of Systems:  Constitutional: No fever, weight loss, or fatigue  Respiratory: No cough, wheezing, or hemoptysis, no shortness of breath  Cardiovascular: No chest pain, palpitaitons, passing out, dizziness, or leg swelling  Gastrointestinal: No abd or epigastric pain. No nausea, vomitting, or hematemesis; no diarrhea or consiptaiton, no melena or hematochezia  Vascular: No edema     TELEMETRY:AF    PHYSICAL EXAM:  T(C): 36.8 (03-12-22 @ 04:11), Max: 37 (03-11-22 @ 19:58)  HR: 63 (03-12-22 @ 04:11) (62 - 69)  BP: 126/82 (03-12-22 @ 04:11) (119/82 - 149/93)  RR: 17 (03-12-22 @ 04:11) (17 - 18)  SpO2: 97% (03-12-22 @ 04:11) (96% - 97%)  Wt(kg): --  I&O's Summary    11 Mar 2022 07:01  -  12 Mar 2022 07:00  --------------------------------------------------------  IN: 320 mL / OUT: 950 mL / NET: -630 mL        Appearance: Normal	  Cardiovascular: Normal S1 S2,RRR, No JVD, No murmurs  Respiratory: Lungs clear to auscultation	  Gastrointestinal:  Soft, Non-tender, + BS	  Extremities: Normal range of motion, No clubbing, cyanosis or edema  Vascular: Peripheral pulses palpable 2+ bilaterally       Home Medications:  allopurinol 100 mg oral tablet: 1 tab(s) orally once a day (09 Mar 2022 02:11)  amiodarone 200 mg oral tablet: 1 tab(s) orally once a day (09 Mar 2022 02:11)  amLODIPine 10 mg oral tablet: 1 tab(s) orally once a day (09 Mar 2022 02:11)  Aspirin Enteric Coated 81 mg oral delayed release tablet: 1 tab(s) orally once a day (09 Mar 2022 02:11)  Eliquis 5 mg oral tablet: 1 tab(s) orally 2 times a day (09 Mar 2022 02:11)  Farxiga 5 mg oral tablet: 1 tab(s) orally once a day (09 Mar 2022 02:11)  ferrous sulfate 325 mg (65 mg elemental iron) oral tablet: 1 tab(s) orally once a day (09 Mar 2022 02:11)  finasteride 5 mg oral tablet: 1 tab(s) orally once a day (09 Mar 2022 02:11)  furosemide 40 mg oral tablet: 1 tab(s) orally once a day (09 Mar 2022 02:11)  hydrALAZINE 25 mg oral tablet: 1 tab(s) orally once a day (09 Mar 2022 02:11)  isosorbide dinitrate 10 mg oral tablet: 1 tab(s) orally 3 times a day (09 Mar 2022 02:11)  metoprolol succinate 25 mg oral tablet, extended release: 1 tab(s) orally once a day (09 Mar 2022 02:11)  pantoprazole 40 mg oral delayed release tablet: 1 tab(s) orally once a day (09 Mar 2022 02:11)  simvastatin 40 mg oral tablet: 1 tab(s) orally once a day (at bedtime) (09 Mar 2022 02:11)  Vitamin D3 25 mcg (1000 intl units) oral capsule: 1 cap(s) orally once a day (09 Mar 2022 02:11)        MEDICATIONS  (STANDING):  albuterol/ipratropium for Nebulization 3 milliLiter(s) Nebulizer every 6 hours  allopurinol 100 milliGRAM(s) Oral daily  aMIOdarone    Tablet 200 milliGRAM(s) Oral daily  amLODIPine   Tablet 10 milliGRAM(s) Oral daily  aspirin enteric coated 81 milliGRAM(s) Oral daily  cholecalciferol 1000 Unit(s) Oral daily  ferrous    sulfate 325 milliGRAM(s) Oral daily  finasteride 5 milliGRAM(s) Oral daily  furosemide   Injectable 80 milliGRAM(s) IV Push two times a day  guaiFENesin ER 1200 milliGRAM(s) Oral every 12 hours  heparin  Infusion.  Unit(s)/Hr (14 mL/Hr) IV Continuous <Continuous>  hydrALAZINE 25 milliGRAM(s) Oral daily  isosorbide   dinitrate Tablet (ISORDIL) 10 milliGRAM(s) Oral three times a day  metoprolol succinate ER 25 milliGRAM(s) Oral daily  pantoprazole    Tablet 40 milliGRAM(s) Oral before breakfast  polyethylene glycol 3350 17 Gram(s) Oral daily  simvastatin 40 milliGRAM(s) Oral at bedtime        EKG:  RADIOLOGY:  DIAGNOSTIC TESTING:  [ ] Echocardiogram:  [ ] Catherterization:  [ ] Stress Test:  OTHER:     LABS:	 	                          10.7   10.45 )-----------( 232      ( 11 Mar 2022 05:46 )             33.8     03-11    141  |  103  |  74<H>  ----------------------------<  78  3.2<L>   |  20<L>  |  9.22<H>    Ca    9.0      11 Mar 2022 05:46  Phos  5.2     03-11  Mg     2.2     03-11    TPro  7.5  /  Alb  3.4  /  TBili  0.4  /  DBili  x   /  AST  9<L>  /  ALT  7<L>  /  AlkPhos  121<H>  03-11      PT/INR - ( 11 Mar 2022 05:46 )   PT: 16.1 sec;   INR: 1.40 ratio         PTT - ( 11 Mar 2022 05:46 )  PTT:81.3 sec    CARDIAC MARKERS:

## 2022-03-12 NOTE — HISTORY OF PRESENT ILLNESS
[FreeTextEntry1] : The patient is seen in follow-up evaluation regarding his previous atrial fibrillation and question whether repeat catheter ablation should be performed.  He is currently on amiodarone 200 mg/day. He had previous AF ablation done February 2016. He had recurrent atrial fibrillation and was treated with amiodarone and had been on 100 mg/d \par \par Current symptoms: Fatigue and shortness of breath with mild exertion.  Denies PND orthopnea, chest pain, palpitations, cough, fever, abdominal pain.  He has lower back pain and he walks with a limp.\par \par He has a prior history of coronary disease and status post PCI to the LAD 2014.  He has a prior history of PAD and status post lower extremity stenting.  He has had a problem previous cardiomyopathy with ejection fraction 40% and moderate mitral regurgitation.  Patient previously had severe left atrial enlargement as well.  His left atrial diameter is greater than 5.0 cm.\par \par He has a history of hypertension and CKD stage III.  His renal function is worsened and a transplant is being considered.  He is not on dialysis.\par \par He has had a prior history of rectal bleeding.  This was due to hemorrhoids and he had a hemorrhoidectomy.  He has had no recurrence of bleeding.  Patient continues on aspirin as well as Eliquis.

## 2022-03-12 NOTE — PROGRESS NOTE ADULT - ASSESSMENT
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 Presents as transfer from [facility] for acute heart failure exacerbation. The patient reports that he has been experiencing LIRA and SOB for 7-10 days which was progressive and now occurring at rest. Started on IV diuretics with improving respiratory status. Called to consult for diffuse bronchial wall thickening and impaction and 4mm pulmonary nodules seen on CT chest.

## 2022-03-12 NOTE — PROGRESS NOTE ADULT - SUBJECTIVE AND OBJECTIVE BOX
Follow-up Pulm Progress Note    No new respiratory events overnight.  Denies SOB/CP.     Medications:  MEDICATIONS  (STANDING):  albuterol/ipratropium for Nebulization 3 milliLiter(s) Nebulizer every 6 hours  allopurinol 100 milliGRAM(s) Oral daily  aMIOdarone    Tablet 200 milliGRAM(s) Oral daily  amLODIPine   Tablet 10 milliGRAM(s) Oral daily  aspirin enteric coated 81 milliGRAM(s) Oral daily  cholecalciferol 1000 Unit(s) Oral daily  ferrous    sulfate 325 milliGRAM(s) Oral daily  finasteride 5 milliGRAM(s) Oral daily  furosemide    Tablet 80 milliGRAM(s) Oral daily  guaiFENesin ER 1200 milliGRAM(s) Oral every 12 hours  heparin  Infusion.  Unit(s)/Hr (14 mL/Hr) IV Continuous <Continuous>  hydrALAZINE 25 milliGRAM(s) Oral daily  isosorbide   dinitrate Tablet (ISORDIL) 10 milliGRAM(s) Oral three times a day  metoprolol succinate ER 25 milliGRAM(s) Oral daily  pantoprazole    Tablet 40 milliGRAM(s) Oral before breakfast  polyethylene glycol 3350 17 Gram(s) Oral daily  simvastatin 40 milliGRAM(s) Oral at bedtime    MEDICATIONS  (PRN):  heparin   Injectable 6500 Unit(s) IV Push every 6 hours PRN For aPTT less than 40  heparin   Injectable 3000 Unit(s) IV Push every 6 hours PRN For aPTT between 40 - 57          Vital Signs Last 24 Hrs  T(C): 36.6 (12 Mar 2022 10:57), Max: 37 (11 Mar 2022 19:58)  T(F): 97.8 (12 Mar 2022 10:57), Max: 98.6 (11 Mar 2022 19:58)  HR: 63 (12 Mar 2022 10:57) (62 - 69)  BP: 131/86 (12 Mar 2022 10:57) (119/82 - 149/93)  BP(mean): --  RR: 17 (12 Mar 2022 10:57) (17 - 18)  SpO2: 94% (12 Mar 2022 10:57) (94% - 97%)          03-11 @ 07:01  -  03-12 @ 07:00  --------------------------------------------------------  IN: 320 mL / OUT: 950 mL / NET: -630 mL          LABS:                        10.8   10.67 )-----------( 250      ( 12 Mar 2022 07:57 )             34.1     03-12    142  |  105  |  73<H>  ----------------------------<  81  4.1   |  17<L>  |  9.29<H>    Ca    9.7      12 Mar 2022 07:57  Phos  5.2     03-11  Mg     2.2     03-11    TPro  7.7  /  Alb  3.5  /  TBili  0.4  /  DBili  x   /  AST  6<L>  /  ALT  <5<L>  /  AlkPhos  120  03-12          CAPILLARY BLOOD GLUCOSE        PT/INR - ( 11 Mar 2022 05:46 )   PT: 16.1 sec;   INR: 1.40 ratio         PTT - ( 12 Mar 2022 07:57 )  PTT:86.9 sec      Serum Pro-Brain Natriuretic Peptide: 5546 pg/mL (03-10-22 @ 05:30)          Physical Examination:  PULM: CTA bilaterally   CVS: S1, S2 heard    RADIOLOGY REVIEWED  CT chest: < from: CT Chest No Cont (03.09.22 @ 16:32) >  FINDINGS:    Lungs/Airways/Pleura: The central airways are patent. No pleural   effusion. There is diffuse bronchial wall thickening with segmental and   subsegmental bronchial impaction at the bases. Few scattered sub-4 mm   pulmonary nodules include right apex series 3 image 29 and left apex   image 28. No consolidation or pulmonary edema.    Mediastinum/Lymph nodes: No thoracic adenopathy.    Heart and Vessels: Cardiomegaly. LAD stent. No pericardial effusion. The   pulmonary artery measures 3.2 cm. Adjacent mid ascending aorta measures   3.2 cm.    Upper Abdomen: Unremarkable.    Osseous structures and Soft Tissues: No aggressive bone lesions.    IMPRESSION:  Diffuse bronchial wall thickening and impaction.    Sub-4 mm pulmonary nodules; if patient is considered high risk for lung   cancer, consider follow-up low dose chest CT in 12 months. If low risk,   no further follow-up is needed as per current Fleischner guidelines.      < end of copied text >

## 2022-03-12 NOTE — PROGRESS NOTE ADULT - ASSESSMENT
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 S/P AF Ablation  -rates overall stable  -no further runs of AIVR  -EP f/u, amiodarone 200 mg daily , Toprol  XL 25 mg daily  -continue hep gtt for now   -await ESTEVAN/DCCV today   -remains stable on room air with no sig cough concern with enterovirus   d/w eps, estevan team s    #Coronary Artery Disease. S/P PCI to LAD  -stable without chest pain or dyspnea  -hsT peaked, likely demand ischemia in setting of CKD and acute HF   -continue toprol, statin, and asa 81mg daily    #Mitral Regurgitation  -continue to monitor, await Estevan    #Hypertension  -Blood pressure controlled  -Continue current medications.    plan discussed with ACP       45 minutes spent on total encounter; more than 50% of the visit was spent counseling and/or coordinating care by the attending physician.

## 2022-03-12 NOTE — CARDIOLOGY SUMMARY
[de-identified] : Atrial fibrillation /flutter\par Poor R-wave progression  \par  Nonspecific T-abnormality. \par

## 2022-03-12 NOTE — REVIEW OF SYSTEMS
[Blurry Vision] : blurred vision [Feeling Fatigued] : feeling fatigued [Dyspnea on exertion] : dyspnea during exertion [SOB] : shortness of breath [Sore Throat] : no sore throat [Chest Discomfort] : no chest discomfort [Lower Ext Edema] : no extremity edema [Palpitations] : no palpitations [Orthopnea] : no orthopnea [Syncope] : no syncope [Cough] : no cough [Rash] : no rash [Dizziness] : no dizziness [Memory Lapses Or Loss] : no memory lapses or loss [Under Stress] : not under stress [Easy Bleeding] : no tendency for easy bleeding

## 2022-03-13 LAB
APTT BLD: 61 SEC — HIGH (ref 27.5–35.5)
APTT BLD: >200 SEC — CRITICAL HIGH (ref 27.5–35.5)
HCT VFR BLD CALC: 33.4 % — LOW (ref 39–50)
HGB BLD-MCNC: 10.7 G/DL — LOW (ref 13–17)
MCHC RBC-ENTMCNC: 19.9 PG — LOW (ref 27–34)
MCHC RBC-ENTMCNC: 32 GM/DL — SIGNIFICANT CHANGE UP (ref 32–36)
MCV RBC AUTO: 62 FL — LOW (ref 80–100)
NRBC # BLD: 0 /100 WBCS — SIGNIFICANT CHANGE UP (ref 0–0)
PLATELET # BLD AUTO: 237 K/UL — SIGNIFICANT CHANGE UP (ref 150–400)
RBC # BLD: 5.39 M/UL — SIGNIFICANT CHANGE UP (ref 4.2–5.8)
RBC # FLD: 18.5 % — HIGH (ref 10.3–14.5)
SARS-COV-2 RNA SPEC QL NAA+PROBE: SIGNIFICANT CHANGE UP
WBC # BLD: 10.36 K/UL — SIGNIFICANT CHANGE UP (ref 3.8–10.5)
WBC # FLD AUTO: 10.36 K/UL — SIGNIFICANT CHANGE UP (ref 3.8–10.5)

## 2022-03-13 RX ADMIN — Medication 3 MILLILITER(S): at 17:35

## 2022-03-13 RX ADMIN — Medication 325 MILLIGRAM(S): at 11:43

## 2022-03-13 RX ADMIN — Medication 1000 UNIT(S): at 11:43

## 2022-03-13 RX ADMIN — Medication 1200 MILLIGRAM(S): at 06:03

## 2022-03-13 RX ADMIN — ISOSORBIDE DINITRATE 10 MILLIGRAM(S): 5 TABLET ORAL at 06:01

## 2022-03-13 RX ADMIN — HEPARIN SODIUM 1400 UNIT(S)/HR: 5000 INJECTION INTRAVENOUS; SUBCUTANEOUS at 09:15

## 2022-03-13 RX ADMIN — Medication 3 MILLILITER(S): at 06:01

## 2022-03-13 RX ADMIN — Medication 3 MILLILITER(S): at 11:43

## 2022-03-13 RX ADMIN — HEPARIN SODIUM 1400 UNIT(S)/HR: 5000 INJECTION INTRAVENOUS; SUBCUTANEOUS at 07:27

## 2022-03-13 RX ADMIN — Medication 1200 MILLIGRAM(S): at 17:35

## 2022-03-13 RX ADMIN — Medication 80 MILLIGRAM(S): at 06:02

## 2022-03-13 RX ADMIN — FINASTERIDE 5 MILLIGRAM(S): 5 TABLET, FILM COATED ORAL at 11:44

## 2022-03-13 RX ADMIN — Medication 25 MILLIGRAM(S): at 06:02

## 2022-03-13 RX ADMIN — POLYETHYLENE GLYCOL 3350 17 GRAM(S): 17 POWDER, FOR SOLUTION ORAL at 11:44

## 2022-03-13 RX ADMIN — Medication 100 MILLIGRAM(S): at 11:43

## 2022-03-13 RX ADMIN — AMLODIPINE BESYLATE 10 MILLIGRAM(S): 2.5 TABLET ORAL at 06:02

## 2022-03-13 RX ADMIN — PANTOPRAZOLE SODIUM 40 MILLIGRAM(S): 20 TABLET, DELAYED RELEASE ORAL at 06:02

## 2022-03-13 RX ADMIN — Medication 100 MILLIGRAM(S): at 21:26

## 2022-03-13 RX ADMIN — Medication 81 MILLIGRAM(S): at 11:44

## 2022-03-13 RX ADMIN — SIMVASTATIN 40 MILLIGRAM(S): 20 TABLET, FILM COATED ORAL at 21:27

## 2022-03-13 RX ADMIN — HEPARIN SODIUM 1400 UNIT(S)/HR: 5000 INJECTION INTRAVENOUS; SUBCUTANEOUS at 19:16

## 2022-03-13 RX ADMIN — AMIODARONE HYDROCHLORIDE 200 MILLIGRAM(S): 400 TABLET ORAL at 06:02

## 2022-03-13 NOTE — PROGRESS NOTE ADULT - SUBJECTIVE AND OBJECTIVE BOX
CARDIOLOGY FOLLOW UP - Dr. Lee  Date of Service: 3/13/22  CC: no cp/sob, AF rate controlled    Review of Systems:  Constitutional: No fever, weight loss, or fatigue  Respiratory: No cough, wheezing, or hemoptysis, no shortness of breath  Cardiovascular: No chest pain, palpitaitons, passing out, dizziness, or leg swelling  Gastrointestinal: No abd or epigastric pain. No nausea, vomitting, or hematemesis; no diarrhea or consiptaiton, no melena or hematochezia  Vascular: No edema     TELEMETRY:    PHYSICAL EXAM:  T(C): 36.8 (03-13-22 @ 04:59), Max: 36.8 (03-13-22 @ 04:59)  HR: 60 (03-13-22 @ 04:59) (57 - 65)  BP: 126/83 (03-13-22 @ 04:59) (118/73 - 155/90)  RR: 18 (03-13-22 @ 04:59) (17 - 18)  SpO2: 94% (03-13-22 @ 04:59) (94% - 95%)  Wt(kg): --  I&O's Summary    12 Mar 2022 06:01  -  13 Mar 2022 07:00  --------------------------------------------------------  IN: 720 mL / OUT: 450 mL / NET: 270 mL        Appearance: Normal	  Cardiovascular: Normal S1 S2,RRR, No JVD, No murmurs  Respiratory: Lungs clear to auscultation	  Gastrointestinal:  Soft, Non-tender, + BS	  Extremities: Normal range of motion, No clubbing, cyanosis or edema  Vascular: Peripheral pulses palpable 2+ bilaterally       Home Medications:  allopurinol 100 mg oral tablet: 1 tab(s) orally once a day (09 Mar 2022 02:11)  amiodarone 200 mg oral tablet: 1 tab(s) orally once a day (09 Mar 2022 02:11)  amLODIPine 10 mg oral tablet: 1 tab(s) orally once a day (09 Mar 2022 02:11)  Aspirin Enteric Coated 81 mg oral delayed release tablet: 1 tab(s) orally once a day (09 Mar 2022 02:11)  Eliquis 5 mg oral tablet: 1 tab(s) orally 2 times a day (09 Mar 2022 02:11)  Farxiga 5 mg oral tablet: 1 tab(s) orally once a day (09 Mar 2022 02:11)  ferrous sulfate 325 mg (65 mg elemental iron) oral tablet: 1 tab(s) orally once a day (09 Mar 2022 02:11)  finasteride 5 mg oral tablet: 1 tab(s) orally once a day (09 Mar 2022 02:11)  furosemide 40 mg oral tablet: 1 tab(s) orally once a day (09 Mar 2022 02:11)  hydrALAZINE 25 mg oral tablet: 1 tab(s) orally once a day (09 Mar 2022 02:11)  isosorbide dinitrate 10 mg oral tablet: 1 tab(s) orally 3 times a day (09 Mar 2022 02:11)  metoprolol succinate 25 mg oral tablet, extended release: 1 tab(s) orally once a day (09 Mar 2022 02:11)  pantoprazole 40 mg oral delayed release tablet: 1 tab(s) orally once a day (09 Mar 2022 02:11)  simvastatin 40 mg oral tablet: 1 tab(s) orally once a day (at bedtime) (09 Mar 2022 02:11)  Vitamin D3 25 mcg (1000 intl units) oral capsule: 1 cap(s) orally once a day (09 Mar 2022 02:11)        MEDICATIONS  (STANDING):  albuterol/ipratropium for Nebulization 3 milliLiter(s) Nebulizer every 6 hours  allopurinol 100 milliGRAM(s) Oral daily  aMIOdarone    Tablet 200 milliGRAM(s) Oral daily  amLODIPine   Tablet 10 milliGRAM(s) Oral daily  aspirin enteric coated 81 milliGRAM(s) Oral daily  cholecalciferol 1000 Unit(s) Oral daily  ferrous    sulfate 325 milliGRAM(s) Oral daily  finasteride 5 milliGRAM(s) Oral daily  furosemide    Tablet 80 milliGRAM(s) Oral daily  guaiFENesin ER 1200 milliGRAM(s) Oral every 12 hours  heparin  Infusion.  Unit(s)/Hr (14 mL/Hr) IV Continuous <Continuous>  hydrALAZINE 25 milliGRAM(s) Oral daily  isosorbide   dinitrate Tablet (ISORDIL) 10 milliGRAM(s) Oral three times a day  metoprolol succinate ER 25 milliGRAM(s) Oral daily  pantoprazole    Tablet 40 milliGRAM(s) Oral before breakfast  polyethylene glycol 3350 17 Gram(s) Oral daily  simvastatin 40 milliGRAM(s) Oral at bedtime        EKG:  RADIOLOGY:  DIAGNOSTIC TESTING:  [ ] Echocardiogram:  [ ] Catherterization:  [ ] Stress Test:  OTHER:     LABS:	 	                          10.7   10.36 )-----------( 237      ( 13 Mar 2022 06:46 )             33.4     03-12    142  |  105  |  73<H>  ----------------------------<  81  4.1   |  17<L>  |  9.29<H>    Ca    9.7      12 Mar 2022 07:57    TPro  7.7  /  Alb  3.5  /  TBili  0.4  /  DBili  x   /  AST  6<L>  /  ALT  <5<L>  /  AlkPhos  120  03-12      PTT - ( 13 Mar 2022 06:49 )  PTT:>200.0 sec    CARDIAC MARKERS:

## 2022-03-13 NOTE — PROGRESS NOTE ADULT - ASSESSMENT
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 S/P AF Ablation  -rates overall stable  -no further runs of AIVR  -EP f/u, amiodarone 200 mg daily , Toprol  XL 25 mg daily  -continue hep gtt for now   -await ESTEVAN/DCCV today   -remains stable on room air with no sig cough concern with enterovirus   d/w eps, estevan team s    #Coronary Artery Disease. S/P PCI to LAD  -stable without chest pain or dyspnea  -hsT peaked, likely demand ischemia in setting of CKD and acute HF   -continue toprol, statin, and asa 81mg daily    #Mitral Regurgitation  -continue to monitor, await Estevan    #Hypertension  -Blood pressure controlled  -Continue current medications.    plan discussed with ACP       35 minutes spent on total encounter; more than 50% of the visit was spent counseling and/or coordinating care by the attending physician.

## 2022-03-13 NOTE — PROVIDER CONTACT NOTE (CRITICAL VALUE NOTIFICATION) - BACKGROUND
Pt was a direct transfer from [facility] for SOB due to heart failure exacerbation.
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

## 2022-03-14 DIAGNOSIS — I48.92 UNSPECIFIED ATRIAL FLUTTER: ICD-10-CM

## 2022-03-14 LAB
ALBUMIN SERPL ELPH-MCNC: 3.1 G/DL — LOW (ref 3.3–5)
ALP SERPL-CCNC: 112 U/L — SIGNIFICANT CHANGE UP (ref 40–120)
ALT FLD-CCNC: 8 U/L — LOW (ref 10–45)
ANION GAP SERPL CALC-SCNC: 20 MMOL/L — HIGH (ref 5–17)
APTT BLD: 66.9 SEC — HIGH (ref 27.5–35.5)
AST SERPL-CCNC: 10 U/L — SIGNIFICANT CHANGE UP (ref 10–40)
BILIRUB SERPL-MCNC: 0.4 MG/DL — SIGNIFICANT CHANGE UP (ref 0.2–1.2)
BUN SERPL-MCNC: 79 MG/DL — HIGH (ref 7–23)
CALCIUM SERPL-MCNC: 9.4 MG/DL — SIGNIFICANT CHANGE UP (ref 8.4–10.5)
CHLORIDE SERPL-SCNC: 101 MMOL/L — SIGNIFICANT CHANGE UP (ref 96–108)
CO2 SERPL-SCNC: 17 MMOL/L — LOW (ref 22–31)
CREAT SERPL-MCNC: 9.64 MG/DL — HIGH (ref 0.5–1.3)
EGFR: 5 ML/MIN/1.73M2 — LOW
GLUCOSE SERPL-MCNC: 67 MG/DL — LOW (ref 70–99)
HCT VFR BLD CALC: 34.4 % — LOW (ref 39–50)
HGB BLD-MCNC: 10.7 G/DL — LOW (ref 13–17)
INR BLD: 1.19 RATIO — HIGH (ref 0.88–1.16)
MCHC RBC-ENTMCNC: 19.2 PG — LOW (ref 27–34)
MCHC RBC-ENTMCNC: 31.1 GM/DL — LOW (ref 32–36)
MCV RBC AUTO: 61.9 FL — LOW (ref 80–100)
NRBC # BLD: 0 /100 WBCS — SIGNIFICANT CHANGE UP (ref 0–0)
PLATELET # BLD AUTO: 243 K/UL — SIGNIFICANT CHANGE UP (ref 150–400)
POTASSIUM SERPL-MCNC: 4.5 MMOL/L — SIGNIFICANT CHANGE UP (ref 3.5–5.3)
POTASSIUM SERPL-SCNC: 4.5 MMOL/L — SIGNIFICANT CHANGE UP (ref 3.5–5.3)
PROT SERPL-MCNC: 7.3 G/DL — SIGNIFICANT CHANGE UP (ref 6–8.3)
PROTHROM AB SERPL-ACNC: 13.7 SEC — HIGH (ref 10.5–13.4)
RBC # BLD: 5.56 M/UL — SIGNIFICANT CHANGE UP (ref 4.2–5.8)
RBC # FLD: 18.5 % — HIGH (ref 10.3–14.5)
SARS-COV-2 RNA SPEC QL NAA+PROBE: SIGNIFICANT CHANGE UP
SODIUM SERPL-SCNC: 138 MMOL/L — SIGNIFICANT CHANGE UP (ref 135–145)
WBC # BLD: 10.4 K/UL — SIGNIFICANT CHANGE UP (ref 3.8–10.5)
WBC # FLD AUTO: 10.4 K/UL — SIGNIFICANT CHANGE UP (ref 3.8–10.5)

## 2022-03-14 PROCEDURE — 93320 DOPPLER ECHO COMPLETE: CPT | Mod: 26

## 2022-03-14 PROCEDURE — 93010 ELECTROCARDIOGRAM REPORT: CPT | Mod: 76

## 2022-03-14 PROCEDURE — 92960 CARDIOVERSION ELECTRIC EXT: CPT

## 2022-03-14 PROCEDURE — 76377 3D RENDER W/INTRP POSTPROCES: CPT | Mod: 26

## 2022-03-14 PROCEDURE — 93010 ELECTROCARDIOGRAM REPORT: CPT

## 2022-03-14 PROCEDURE — 93312 ECHO TRANSESOPHAGEAL: CPT | Mod: 26

## 2022-03-14 PROCEDURE — 99233 SBSQ HOSP IP/OBS HIGH 50: CPT

## 2022-03-14 PROCEDURE — 93325 DOPPLER ECHO COLOR FLOW MAPG: CPT | Mod: 26

## 2022-03-14 RX ORDER — APIXABAN 2.5 MG/1
5 TABLET, FILM COATED ORAL
Refills: 0 | Status: DISCONTINUED | OUTPATIENT
Start: 2022-03-14 | End: 2022-03-14

## 2022-03-14 RX ORDER — APIXABAN 2.5 MG/1
2.5 TABLET, FILM COATED ORAL
Refills: 0 | Status: DISCONTINUED | OUTPATIENT
Start: 2022-03-14 | End: 2022-03-15

## 2022-03-14 RX ORDER — APIXABAN 2.5 MG/1
5 TABLET, FILM COATED ORAL ONCE
Refills: 0 | Status: COMPLETED | OUTPATIENT
Start: 2022-03-14 | End: 2022-03-14

## 2022-03-14 RX ADMIN — POLYETHYLENE GLYCOL 3350 17 GRAM(S): 17 POWDER, FOR SOLUTION ORAL at 15:14

## 2022-03-14 RX ADMIN — HEPARIN SODIUM 1400 UNIT(S)/HR: 5000 INJECTION INTRAVENOUS; SUBCUTANEOUS at 07:40

## 2022-03-14 RX ADMIN — Medication 1000 UNIT(S): at 15:16

## 2022-03-14 RX ADMIN — ISOSORBIDE DINITRATE 10 MILLIGRAM(S): 5 TABLET ORAL at 15:14

## 2022-03-14 RX ADMIN — AMLODIPINE BESYLATE 10 MILLIGRAM(S): 2.5 TABLET ORAL at 05:19

## 2022-03-14 RX ADMIN — SIMVASTATIN 40 MILLIGRAM(S): 20 TABLET, FILM COATED ORAL at 21:22

## 2022-03-14 RX ADMIN — Medication 100 MILLIGRAM(S): at 15:16

## 2022-03-14 RX ADMIN — ISOSORBIDE DINITRATE 10 MILLIGRAM(S): 5 TABLET ORAL at 23:28

## 2022-03-14 RX ADMIN — FINASTERIDE 5 MILLIGRAM(S): 5 TABLET, FILM COATED ORAL at 15:16

## 2022-03-14 RX ADMIN — Medication 3 MILLILITER(S): at 17:54

## 2022-03-14 RX ADMIN — APIXABAN 2.5 MILLIGRAM(S): 2.5 TABLET, FILM COATED ORAL at 17:54

## 2022-03-14 RX ADMIN — Medication 3 MILLILITER(S): at 15:17

## 2022-03-14 RX ADMIN — Medication 325 MILLIGRAM(S): at 15:17

## 2022-03-14 RX ADMIN — HEPARIN SODIUM 1400 UNIT(S)/HR: 5000 INJECTION INTRAVENOUS; SUBCUTANEOUS at 07:13

## 2022-03-14 RX ADMIN — APIXABAN 5 MILLIGRAM(S): 2.5 TABLET, FILM COATED ORAL at 09:54

## 2022-03-14 RX ADMIN — PANTOPRAZOLE SODIUM 40 MILLIGRAM(S): 20 TABLET, DELAYED RELEASE ORAL at 05:19

## 2022-03-14 RX ADMIN — Medication 25 MILLIGRAM(S): at 06:52

## 2022-03-14 RX ADMIN — HEPARIN SODIUM 1400 UNIT(S)/HR: 5000 INJECTION INTRAVENOUS; SUBCUTANEOUS at 07:45

## 2022-03-14 RX ADMIN — Medication 3 MILLILITER(S): at 23:28

## 2022-03-14 RX ADMIN — Medication 3 MILLILITER(S): at 05:18

## 2022-03-14 RX ADMIN — Medication 1200 MILLIGRAM(S): at 05:18

## 2022-03-14 RX ADMIN — AMIODARONE HYDROCHLORIDE 200 MILLIGRAM(S): 400 TABLET ORAL at 05:19

## 2022-03-14 RX ADMIN — Medication 1200 MILLIGRAM(S): at 17:46

## 2022-03-14 RX ADMIN — Medication 81 MILLIGRAM(S): at 15:16

## 2022-03-14 RX ADMIN — Medication 80 MILLIGRAM(S): at 05:20

## 2022-03-14 NOTE — PROGRESS NOTE ADULT - SUBJECTIVE AND OBJECTIVE BOX
Follow-up Pulm Progress Note    No new respiratory events overnight.  Denies SOB/CP.   Minimal clear sputum production     Medications:  MEDICATIONS  (STANDING):  albuterol/ipratropium for Nebulization 3 milliLiter(s) Nebulizer every 6 hours  allopurinol 100 milliGRAM(s) Oral daily  aMIOdarone    Tablet 200 milliGRAM(s) Oral daily  amLODIPine   Tablet 10 milliGRAM(s) Oral daily  apixaban 5 milliGRAM(s) Oral two times a day  aspirin enteric coated 81 milliGRAM(s) Oral daily  cholecalciferol 1000 Unit(s) Oral daily  ferrous    sulfate 325 milliGRAM(s) Oral daily  finasteride 5 milliGRAM(s) Oral daily  furosemide    Tablet 80 milliGRAM(s) Oral daily  guaiFENesin ER 1200 milliGRAM(s) Oral every 12 hours  hydrALAZINE 25 milliGRAM(s) Oral daily  isosorbide   dinitrate Tablet (ISORDIL) 10 milliGRAM(s) Oral three times a day  metoprolol succinate ER 25 milliGRAM(s) Oral daily  pantoprazole    Tablet 40 milliGRAM(s) Oral before breakfast  polyethylene glycol 3350 17 Gram(s) Oral daily  simvastatin 40 milliGRAM(s) Oral at bedtime        Vital Signs Last 24 Hrs  T(C): 36.9 (14 Mar 2022 08:00), Max: 37.1 (14 Mar 2022 04:59)  T(F): 98.5 (14 Mar 2022 08:00), Max: 98.7 (14 Mar 2022 04:59)  HR: 71 (14 Mar 2022 08:00) (52 - 71)  BP: 121/77 (14 Mar 2022 08:00) (121/77 - 134/82)  BP(mean): --  RR: 18 (14 Mar 2022 08:00) (18 - 18)  SpO2: 94% (14 Mar 2022 08:00) (94% - 96%)          03-13 @ 07:01  -  03-14 @ 07:00  --------------------------------------------------------  IN: 960 mL / OUT: 740 mL / NET: 220 mL          LABS:                        10.7   10.40 )-----------( 243      ( 14 Mar 2022 07:08 )             34.4     03-14    138  |  101  |  79<H>  ----------------------------<  67<L>  4.5   |  17<L>  |  9.64<H>    Ca    9.4      14 Mar 2022 07:08    TPro  7.3  /  Alb  3.1<L>  /  TBili  0.4  /  DBili  x   /  AST  10  /  ALT  8<L>  /  AlkPhos  112  03-14          PT/INR - ( 14 Mar 2022 07:08 )   PT: 13.7 sec;   INR: 1.19 ratio         PTT - ( 14 Mar 2022 07:08 )  PTT:66.9 sec            Physical Examination:  PULM: CTA bilaterally   CVS: S1, S2 heard    RADIOLOGY REVIEWED  CT chest: < from: CT Chest No Cont (03.09.22 @ 16:32) >  FINDINGS:    Lungs/Airways/Pleura: The central airways are patent. No pleural   effusion. There is diffuse bronchial wall thickening with segmental and   subsegmental bronchial impaction at the bases. Few scattered sub-4 mm   pulmonary nodules include right apex series 3 image 29 and left apex   image 28. No consolidation or pulmonary edema.    Mediastinum/Lymph nodes: No thoracic adenopathy.    Heart and Vessels: Cardiomegaly. LAD stent. No pericardial effusion. The   pulmonary artery measures 3.2 cm. Adjacent mid ascending aorta measures   3.2 cm.    Upper Abdomen: Unremarkable.    Osseous structures and Soft Tissues: No aggressive bone lesions.    IMPRESSION:  Diffuse bronchial wall thickening and impaction.    Sub-4 mm pulmonary nodules; if patient is considered high risk for lung   cancer, consider follow-up low dose chest CT in 12 months. If low risk,   no further follow-up is needed as per current Fleischner guidelines.      < end of copied text >

## 2022-03-14 NOTE — PROGRESS NOTE ADULT - SUBJECTIVE AND OBJECTIVE BOX
CARDIOLOGY FOLLOW UP - Dr. Lee  DATE OF SERVICE: 3/14/22     CC no cp or sob   sp john/dccv today- in nsr      REVIEW OF SYSTEMS:  CONSTITUTIONAL: No fever, weight loss, or fatigue  RESPIRATORY: No cough, wheezing, chills or hemoptysis; No Shortness of Breath  CARDIOVASCULAR: No chest pain, palpitations, passing out, dizziness, or leg swelling  GASTROINTESTINAL: No abdominal or epigastric pain. No nausea, vomiting, or hematemesis; No diarrhea or constipation. No melena or hematochezia.  VASCULAR: No edema     PHYSICAL EXAM:  T(C): 36.3 (03-14-22 @ 14:42), Max: 37.1 (03-14-22 @ 04:59)  HR: 74 (03-14-22 @ 14:42) (58 - 74)  BP: 121/74 (03-14-22 @ 14:42) (109/72 - 134/82)  RR: 18 (03-14-22 @ 14:42) (14 - 18)  SpO2: 94% (03-14-22 @ 14:42) (93% - 95%)  Wt(kg): --  I&O's Summary    13 Mar 2022 07:01  -  14 Mar 2022 07:00  --------------------------------------------------------  IN: 960 mL / OUT: 740 mL / NET: 220 mL        Appearance: Normal	  Cardiovascular: Normal S1 S2,RRR, No JVD, No murmurs  Respiratory: Lungs clear to auscultation	  Gastrointestinal:  Soft, Non-tender, + BS	  Extremities: Normal range of motion, No clubbing, cyanosis or edema      Home Medications:  allopurinol 100 mg oral tablet: 1 tab(s) orally once a day (09 Mar 2022 02:11)  amiodarone 200 mg oral tablet: 1 tab(s) orally once a day (09 Mar 2022 02:11)  amLODIPine 10 mg oral tablet: 1 tab(s) orally once a day (09 Mar 2022 02:11)  Aspirin Enteric Coated 81 mg oral delayed release tablet: 1 tab(s) orally once a day (09 Mar 2022 02:11)  Eliquis 5 mg oral tablet: 1 tab(s) orally 2 times a day (09 Mar 2022 02:11)  Farxiga 5 mg oral tablet: 1 tab(s) orally once a day (09 Mar 2022 02:11)  ferrous sulfate 325 mg (65 mg elemental iron) oral tablet: 1 tab(s) orally once a day (09 Mar 2022 02:11)  finasteride 5 mg oral tablet: 1 tab(s) orally once a day (09 Mar 2022 02:11)  furosemide 40 mg oral tablet: 1 tab(s) orally once a day (09 Mar 2022 02:11)  hydrALAZINE 25 mg oral tablet: 1 tab(s) orally once a day (09 Mar 2022 02:11)  isosorbide dinitrate 10 mg oral tablet: 1 tab(s) orally 3 times a day (09 Mar 2022 02:11)  metoprolol succinate 25 mg oral tablet, extended release: 1 tab(s) orally once a day (09 Mar 2022 02:11)  pantoprazole 40 mg oral delayed release tablet: 1 tab(s) orally once a day (09 Mar 2022 02:11)  simvastatin 40 mg oral tablet: 1 tab(s) orally once a day (at bedtime) (09 Mar 2022 02:11)  Vitamin D3 25 mcg (1000 intl units) oral capsule: 1 cap(s) orally once a day (09 Mar 2022 02:11)      MEDICATIONS  (STANDING):  albuterol/ipratropium for Nebulization 3 milliLiter(s) Nebulizer every 6 hours  allopurinol 100 milliGRAM(s) Oral daily  aMIOdarone    Tablet 200 milliGRAM(s) Oral daily  amLODIPine   Tablet 10 milliGRAM(s) Oral daily  apixaban 5 milliGRAM(s) Oral two times a day  aspirin enteric coated 81 milliGRAM(s) Oral daily  cholecalciferol 1000 Unit(s) Oral daily  ferrous    sulfate 325 milliGRAM(s) Oral daily  finasteride 5 milliGRAM(s) Oral daily  furosemide    Tablet 80 milliGRAM(s) Oral daily  guaiFENesin ER 1200 milliGRAM(s) Oral every 12 hours  hydrALAZINE 25 milliGRAM(s) Oral daily  isosorbide   dinitrate Tablet (ISORDIL) 10 milliGRAM(s) Oral three times a day  metoprolol succinate ER 25 milliGRAM(s) Oral daily  pantoprazole    Tablet 40 milliGRAM(s) Oral before breakfast  polyethylene glycol 3350 17 Gram(s) Oral daily  simvastatin 40 milliGRAM(s) Oral at bedtime      TELEMETRY: 	nsr     ECG:  	  RADIOLOGY:   DIAGNOSTIC TESTING:  [ ] Echocardiogram:< from: Transesophageal Echocardiogram w/o TTE (03.14.22 @ 12:17) >  EF (Visual Estimate): 40-45 %  ------------------------------------------------------------------------  Observations:  Mitral Valve: Tethered mitral valve leaflets with normal  opening. Moderate-severe mitral regurgitation.  Aortic Valve/Aorta: Normal trileaflet aortic valve.  Mild atheroma noted in aortic arch/descending aorta.  Left Atrium: No left atrial or left atrialappendage  thrombus; left atrial enlargement.  Left Ventricle: Modarate global left ventricular systolic  dysfunction. Normal left ventricular internal dimensions  and wall thicknesses. Unable to evaluate diastology.  Right Heart: Right atrial enlargement. The right ventricle  is not well visualized; grossly normal right ventricular  systolic function. Normal tricuspid valve. Mild-moderate  tricuspid regurgitation. Normal pulmonic valve. Minimal  pulmonic regurgitation.  Pericardium/Pleura: Normal pericardium with no pericardial  effusion.  Hemodynamic: Estimated right atrial pressure is 8 mm Hg.  Estimated right ventricular systolic pressure equals 39 mm  Hg, assuming right atrial pressure equals 8 mm Hg,  consistent with borderline pulmonary hypertension. Agitated  saline injection and color flow Doppler demonstrates no  evidence of a patent foramen ovale.  ------------------------------------------------------------------------  Conclusions:  1. Tethered mitral valve leaflets with normal opening.  Moderate-severe mitral regurgitation.  2. Normal trileaflet aortic valve.  3. Mild atheroma noted in aortic arch/descending aorta.  4. No left atrial or left atrial appendage thrombus; left  atrial enlargement.  5. Modarate global left ventricular systolic dysfunction.  6. Right atrial enlargement.  7. The right ventricle is not well visualized; grossly  normal right ventricular systolic function.  8. Normal tricuspid valve. Mild-moderate tricuspid  regurgitation.  *** Compared with echocardiogram of 5/28/2021, EF a bit  higher.  ------------------------------------------------------------------------  Confirmed on  3/14/2022 - 13:37:03 by ADRIAN Milligan  ------------------------------------------------------------------------    < end of copied text >    [ ]  Catheterization:  [ ] Stress Test:    OTHER: 	    LABS:	 	    Creatine Kinase, Serum: 410 U/L [30 - 200] (03-09 @ 08:54)  CKMB Units: 4.2 ng/mL [0.0 - 6.7] (03-09 @ 08:54)  Troponin T, High Sensitivity Result: 93 ng/L [0 - 51] (03-09 @ 08:54)  Troponin T, High Sensitivity Result: 95 ng/L [0 - 51] (03-09 @ 08:50)  Troponin T, High Sensitivity Result: 86 ng/L [0 - 51] (03-09 @ 05:28)  Troponin T, High Sensitivity Result: 89 ng/L [0 - 51] (03-09 @ 00:46)  Creatine Kinase, Serum: 364 U/L [30 - 200] (03-09 @ 00:46)  CKMB Units: 4.1 ng/mL [0.0 - 6.7] (03-09 @ 00:46)  Creatine Kinase, Serum: 386 U/L [26 - 308] (03-08 @ 18:17)  CKMB Units: 2.9 ng/mL [0.5 - 3.6] (03-08 @ 18:17)  Creatine Kinase, Serum: 336 U/L [26 - 308] (03-08 @ 10:11)  CKMB Units: 2.5 ng/mL [0.5 - 3.6] (03-08 @ 10:11)                          10.7   10.40 )-----------( 243      ( 14 Mar 2022 07:08 )             34.4     03-14    138  |  101  |  79<H>  ----------------------------<  67<L>  4.5   |  17<L>  |  9.64<H>    Ca    9.4      14 Mar 2022 07:08    TPro  7.3  /  Alb  3.1<L>  /  TBili  0.4  /  DBili  x   /  AST  10  /  ALT  8<L>  /  AlkPhos  112  03-14    PT/INR - ( 14 Mar 2022 07:08 )   PT: 13.7 sec;   INR: 1.19 ratio         PTT - ( 14 Mar 2022 07:08 )  PTT:66.9 sec

## 2022-03-14 NOTE — PROGRESS NOTE ADULT - SUBJECTIVE AND OBJECTIVE BOX
[facility] DIVISION OF KIDNEY DISEASES AND HYPERTENSION -- FOLLOW UP NOTE  --------------------------------------------------------------------------------  Chief Complaint: RUSSELL on CKD    24 hour events/subjective: Pt. seen and examined today, not in distress. Pt reports feeling better. Scr elevated at 9.64 and BUN 79.    PAST HISTORY  --------------------------------------------------------------------------------  No significant changes to PMH, PSH, FHx, SHx, unless otherwise noted    ALLERGIES & MEDICATIONS  --------------------------------------------------------------------------------  Allergies    No Known Allergies    Intolerances      Standing Inpatient Medications  albuterol/ipratropium for Nebulization 3 milliLiter(s) Nebulizer every 6 hours  allopurinol 100 milliGRAM(s) Oral daily  aMIOdarone    Tablet 200 milliGRAM(s) Oral daily  amLODIPine   Tablet 10 milliGRAM(s) Oral daily  apixaban 5 milliGRAM(s) Oral two times a day  aspirin enteric coated 81 milliGRAM(s) Oral daily  cholecalciferol 1000 Unit(s) Oral daily  ferrous    sulfate 325 milliGRAM(s) Oral daily  finasteride 5 milliGRAM(s) Oral daily  furosemide    Tablet 80 milliGRAM(s) Oral daily  guaiFENesin ER 1200 milliGRAM(s) Oral every 12 hours  hydrALAZINE 25 milliGRAM(s) Oral daily  isosorbide   dinitrate Tablet (ISORDIL) 10 milliGRAM(s) Oral three times a day  metoprolol succinate ER 25 milliGRAM(s) Oral daily  pantoprazole    Tablet 40 milliGRAM(s) Oral before breakfast  polyethylene glycol 3350 17 Gram(s) Oral daily  simvastatin 40 milliGRAM(s) Oral at bedtime    PRN Inpatient Medications      REVIEW OF SYSTEMS  --------------------------------------------------------------------------------  Gen: malaise+  Skin: No rashes  Head/Eyes/Ears: Normal hearing,   Respiratory: cough+, sob+  CV: No chest pain  GI: No abdominal pain, diarrhea  : as per HPI  MSK: No  edema  Heme: No easy bruising or bleeding    All other systems were reviewed and are negative, except as noted.    VITALS/PHYSICAL EXAM  --------------------------------------------------------------------------------  T(C): 36.5 (03-14-22 @ 13:05), Max: 37.1 (03-14-22 @ 04:59)  HR: 65 (03-14-22 @ 14:05) (58 - 71)  BP: 127/87 (03-14-22 @ 14:05) (109/72 - 134/82)  RR: 14 (03-14-22 @ 14:05) (14 - 18)  SpO2: 94% (03-14-22 @ 14:05) (93% - 95%)  Wt(kg): --      03-13-22 @ 07:01  -  03-14-22 @ 07:00  --------------------------------------------------------  IN: 960 mL / OUT: 740 mL / NET: 220 mL      Physical Exam:  	Gen: NAD  	HEENT: Anicteric  	Pulm: fair entry, faint rales   	CV: S1S2+  	Abd: Soft, +BS   	Ext: No LE edema B/L, no asterixis  	Neuro: Awake  	Skin: Warm and dry    LABS/STUDIES  --------------------------------------------------------------------------------              10.7   10.40 >-----------<  243      [03-14-22 @ 07:08]              34.4     138  |  101  |  79  ----------------------------<  67      [03-14-22 @ 07:08]  4.5   |  17  |  9.64        Ca     9.4     [03-14-22 @ 07:08]    Creatinine Trend:  SCr 9.64 [03-14 @ 07:08]  SCr 9.29 [03-12 @ 07:57]  SCr 9.22 [03-11 @ 05:46]  SCr 8.77 [03-10 @ 05:30]  SCr 9.04 [03-09 @ 08:50]    Urinalysis - [03-09-22 @ 06:37]      Color Light Yellow / Appearance Clear / SG 1.012 / pH 6.0      Gluc 500 mg/dL / Ketone Negative  / Bili Negative / Urobili Negative       Blood Small / Protein 300 mg/dL / Leuk Est Negative / Nitrite Negative      RBC 2 / WBC 3 / Hyaline 1 / Gran  / Sq Epi  / Non Sq Epi 1 / Bacteria Negative    Urine Creatinine 54      [03-10-22 @ 13:27]  Urine Protein 201      [03-10-22 @ 13:27]  Urine Sodium 67      [03-09-22 @ 06:37]  Urine Urea Nitrogen 334      [03-09-22 @ 08:01]  Urine Osmolality 319      [03-09-22 @ 06:37]

## 2022-03-14 NOTE — PROGRESS NOTE ADULT - PROBLEM SELECTOR PLAN 1
Pt. with advanced RUSSELL on CKD in setting of CHF and secondary FSGS.  SCr at ~1.7-1.9 (04/2019).  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 Last SCr was elevated at 3.25 on 6/8/21. Scr on admission elevated at 8.77 and increased to 9.64 today. Pt currently on PO lasix 80 mg daily. Pt has no uremic symptoms. Labs reviewed. No critical acid-base disturbances noted. Check serum phosphorus today. NO urgent indication for RRT at this moment. Monitor labs and urine output. Avoid nephrotoxins. Dose medications as per eGFR.

## 2022-03-14 NOTE — PROGRESS NOTE ADULT - ASSESSMENT
NICOLETTE 3/14/22: EF (Visual Estimate): 40-45 %  Moderate global left ventricular systolic dysfunction. Tethered mitral valve leaflets with normal opening. Moderate-severe mitral regurgitation.Mild atheroma noted in aortic arch/descending aorta. 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 for acute heart failure exacerbation    #Acute on Chronic Systolic HF  -clinically improved  -volume status improved  -cont lasix to 80mg PO daily  -repeat echo with improved LV fx, ef 50-55%, mod MR, mild TR, trace IN  -c/w bb, hydral, no ace/arb given russell/ckd   -cardiac pyrophosphate scan wnl     #RUSSELL on CKD   -s/p renal bx, unrevealing  -no plans for HD now  -being w/u for kidney transplant  -renal f/u     #Atrial Fibrillation/Flutter. S/P AF Ablation  -rates overall stable  -no further runs of AIVR  -EP f/u, amiodarone 200 mg daily , Toprol  XL 25 mg daily  -sp nicolette/ dccv 3/14 -- in NSR   -nicolette  EF (Visual Estimate): 40-45 %  Moderate global left ventricular systolic dysfunction. Tethered mitral valve leaflets with normal opening. Moderate-severe mitral regurgitation. No left atrial or left atrial appendage thrombus  -on eliquis   -EP f/u       #Coronary Artery Disease. S/P PCI to LAD  -stable without chest pain or dyspnea  -hsT peaked, likely demand ischemia in setting of CKD and acute HF   -continue toprol, statin, and asa 81mg daily    #Mitral Regurgitation  -continue to monitor, nicolette Tethered mitral valve leaflets with normal opening. Moderate-severe mitral regurgitation.    #Hypertension  -Blood pressure controlled  -Continue current medications.

## 2022-03-14 NOTE — PROGRESS NOTE ADULT - ASSESSMENT
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     1. AF/AFL  - Continue to monitor on telemetry  - Keep K>4 and Mg>2  - Continue home amiodarone   - Continue home metoprolol   - Maintain NPO for NICOLETTE/DCCV today  - PYP scan negative  - Eliquis 5mg Q12    SORIN Kruse-C  #[phone]   65 yo M w/ PMH HFrEF/HFpEF now EF recovered, CAD s/p stent, CKD 2/2 suspected FSGS, Afib on apixaban, PAD s/p stent, HTN, moderate MR who presents w/ SOB likely 2/2 HF exacerbation and EP consulted for runs of AIVR.     1. AF/AFL  - Continue to monitor on telemetry  - Keep K>4 and Mg>2  - Continue home amiodarone   - Continue home metoprolol   - Maintain NPO for NICOLETTE/DCCV today  - PYP scan negative  - Eliquis 5mg Q12    Nichole Rapp PA-C  #[phone]    ADDENDUM:  - S/p successful NICOLETTE/DCCV 3/14  - EP will reach out with follow up appointment regarding ablation  - EP will sign off at this time. Reconsult PRN.

## 2022-03-14 NOTE — PROGRESS NOTE ADULT - ATTENDING COMMENTS
I have seen this patient with the fellow and agree with their assessment and plan. I was physically present for significant portions of the evaluation and management (E/M) service provided.  I agree with the above history, physical, and plan which I have reviewed and edited where appropriate.    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 Last SCr was elevated at 3.25 on 6/8/21. Scr on admission elevated at 8.77 and increased to 9.64 today. Pt currently on PO lasix 80 mg daily. Pt has no uremic symptoms. Labs reviewed.   While his CKD is stable, and he may have donors, lot of these processes take time.  I think starting dialysis may help is henrik-cardiac syndrome and CHF as well.   We shall d/w with him potential starting HD while here once I discuss with Cardiology  Given GFR<10, please consider dec apixiban to 2.5mg BID      Omari Mcdonough MD  Pager   Office     Contact me directly via Microsoft Teams     (After 5 pm or on weekends please page the on-call fellow/attending, can check [email or web address] for schedule. Login is anne ramesh, schedule under [facility] medicine, psych, derm)

## 2022-03-14 NOTE — PROGRESS NOTE ADULT - SUBJECTIVE AND OBJECTIVE BOX
24H hour events: No acute overnight events    MEDICATIONS:  aMIOdarone    Tablet 200 milliGRAM(s) Oral daily  amLODIPine   Tablet 10 milliGRAM(s) Oral daily  apixaban 5 milliGRAM(s) Oral two times a day  aspirin enteric coated 81 milliGRAM(s) Oral daily  furosemide    Tablet 80 milliGRAM(s) Oral daily  hydrALAZINE 25 milliGRAM(s) Oral daily  isosorbide   dinitrate Tablet (ISORDIL) 10 milliGRAM(s) Oral three times a day  metoprolol succinate ER 25 milliGRAM(s) Oral daily  albuterol/ipratropium for Nebulization 3 milliLiter(s) Nebulizer every 6 hours  guaiFENesin ER 1200 milliGRAM(s) Oral every 12 hours  pantoprazole    Tablet 40 milliGRAM(s) Oral before breakfast  polyethylene glycol 3350 17 Gram(s) Oral daily  allopurinol 100 milliGRAM(s) Oral daily  finasteride 5 milliGRAM(s) Oral daily  simvastatin 40 milliGRAM(s) Oral at bedtime  cholecalciferol 1000 Unit(s) Oral daily  ferrous    sulfate 325 milliGRAM(s) Oral daily      REVIEW OF SYSTEMS:  See HPI, otherwise ROS negative.    PHYSICAL EXAM:  T(C): 36.9 (03-14-22 @ 09:45), Max: 37.1 (03-14-22 @ 04:59)  HR: 60 (03-14-22 @ 09:45) (52 - 71)  BP: 134/80 (03-14-22 @ 09:45) (121/77 - 134/82)  RR: 18 (03-14-22 @ 09:45) (18 - 18)  SpO2: 95% (03-14-22 @ 09:45) (94% - 96%)  Wt(kg): --  I&O's Summary    13 Mar 2022 07:01  -  14 Mar 2022 07:00  --------------------------------------------------------  IN: 960 mL / OUT: 740 mL / NET: 220 mL        Appearance: Alert. NAD	  Cardiovascular: +S1S2 RRR no m/g/r  Respiratory: Expiratory wheeze B/L  Extremities: No edema BLE  Vascular: Peripheral pulses palpable 2+ bilaterally      LABS:	 	    CBC Full  -  ( 14 Mar 2022 07:08 )  WBC Count : 10.40 K/uL  Hemoglobin : 10.7 g/dL  Hematocrit : 34.4 %  Platelet Count - Automated : 243 K/uL  Mean Cell Volume : 61.9 fl  Mean Cell Hemoglobin : 19.2 pg  Mean Cell Hemoglobin Concentration : 31.1 gm/dL  Auto Neutrophil # : x  Auto Lymphocyte # : x  Auto Monocyte # : x  Auto Eosinophil # : x  Auto Basophil # : x  Auto Neutrophil % : x  Auto Lymphocyte % : x  Auto Monocyte % : x  Auto Eosinophil % : x  Auto Basophil % : x    03-14    138  |  101  |  79<H>  ----------------------------<  67<L>  4.5   |  17<L>  |  9.64<H>    Ca    9.4      14 Mar 2022 07:08    TPro  7.3  /  Alb  3.1<L>  /  TBili  0.4  /  DBili  x   /  AST  10  /  ALT  8<L>  /  AlkPhos  112  03-14      proBNP: Serum Pro-Brain Natriuretic Peptide: 5546 pg/mL (03-10 @ 05:30)    TELEMETRY: AF 50-60s

## 2022-03-14 NOTE — PROGRESS NOTE ADULT - ASSESSMENT
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 Presents as transfer from [facility] for acute heart failure exacerbation. The patient reports that he has been experiencing LIRA and SOB for 7-10 days which was progressive and now occurring at rest. Started on IV diuretics with improving respiratory status. Called to consult for diffuse bronchial wall thickening and impaction and 4mm pulmonary nodules seen on CT chest.

## 2022-03-14 NOTE — PROGRESS NOTE ADULT - PROBLEM SELECTOR PLAN 1
RVP +entero/rhino virus  -CT chest read with diffuse bronchial wall thickening. None appreciated on our read - also reviewed with chest radiologist. +Mucous in airways.   -Continue Mucinex 1200mg PO BID x 5 days, Duoneb q6h.  -Cough improving, minimal clear secretions  -Normoxic, keep sats >90% with supplemental o2 PRN  -Supportive care

## 2022-03-15 LAB
ANION GAP SERPL CALC-SCNC: 18 MMOL/L — HIGH (ref 5–17)
BUN SERPL-MCNC: 81 MG/DL — HIGH (ref 7–23)
CALCIUM SERPL-MCNC: 9.6 MG/DL — SIGNIFICANT CHANGE UP (ref 8.4–10.5)
CHLORIDE SERPL-SCNC: 102 MMOL/L — SIGNIFICANT CHANGE UP (ref 96–108)
CO2 SERPL-SCNC: 18 MMOL/L — LOW (ref 22–31)
CREAT SERPL-MCNC: 9.61 MG/DL — HIGH (ref 0.5–1.3)
EGFR: 5 ML/MIN/1.73M2 — LOW
GLUCOSE SERPL-MCNC: 59 MG/DL — LOW (ref 70–99)
HCT VFR BLD CALC: 33.5 % — LOW (ref 39–50)
HGB BLD-MCNC: 10.5 G/DL — LOW (ref 13–17)
MAGNESIUM SERPL-MCNC: 2.6 MG/DL — SIGNIFICANT CHANGE UP (ref 1.6–2.6)
MCHC RBC-ENTMCNC: 19.8 PG — LOW (ref 27–34)
MCHC RBC-ENTMCNC: 31.3 GM/DL — LOW (ref 32–36)
MCV RBC AUTO: 63.2 FL — LOW (ref 80–100)
NRBC # BLD: 0 /100 WBCS — SIGNIFICANT CHANGE UP (ref 0–0)
PLATELET # BLD AUTO: 245 K/UL — SIGNIFICANT CHANGE UP (ref 150–400)
POTASSIUM SERPL-MCNC: 5 MMOL/L — SIGNIFICANT CHANGE UP (ref 3.5–5.3)
POTASSIUM SERPL-SCNC: 5 MMOL/L — SIGNIFICANT CHANGE UP (ref 3.5–5.3)
RBC # BLD: 5.3 M/UL — SIGNIFICANT CHANGE UP (ref 4.2–5.8)
RBC # FLD: 18.3 % — HIGH (ref 10.3–14.5)
SODIUM SERPL-SCNC: 138 MMOL/L — SIGNIFICANT CHANGE UP (ref 135–145)
WBC # BLD: 9.75 K/UL — SIGNIFICANT CHANGE UP (ref 3.8–10.5)
WBC # FLD AUTO: 9.75 K/UL — SIGNIFICANT CHANGE UP (ref 3.8–10.5)

## 2022-03-15 PROCEDURE — 36556 INSERT NON-TUNNEL CV CATH: CPT

## 2022-03-15 PROCEDURE — 77001 FLUOROGUIDE FOR VEIN DEVICE: CPT | Mod: 26

## 2022-03-15 PROCEDURE — 99232 SBSQ HOSP IP/OBS MODERATE 35: CPT | Mod: GC

## 2022-03-15 PROCEDURE — 93010 ELECTROCARDIOGRAM REPORT: CPT

## 2022-03-15 PROCEDURE — 76937 US GUIDE VASCULAR ACCESS: CPT | Mod: 26

## 2022-03-15 RX ORDER — CHLORHEXIDINE GLUCONATE 213 G/1000ML
1 SOLUTION TOPICAL
Refills: 0 | Status: DISCONTINUED | OUTPATIENT
Start: 2022-03-15 | End: 2022-03-21

## 2022-03-15 RX ORDER — SODIUM CHLORIDE 9 MG/ML
10 INJECTION INTRAMUSCULAR; INTRAVENOUS; SUBCUTANEOUS
Refills: 0 | Status: DISCONTINUED | OUTPATIENT
Start: 2022-03-15 | End: 2022-03-21

## 2022-03-15 RX ORDER — DESMOPRESSIN ACETATE 0.1 MG/1
20 TABLET ORAL ONCE
Refills: 0 | Status: COMPLETED | OUTPATIENT
Start: 2022-03-15 | End: 2022-03-15

## 2022-03-15 RX ORDER — APIXABAN 2.5 MG/1
2.5 TABLET, FILM COATED ORAL
Refills: 0 | Status: DISCONTINUED | OUTPATIENT
Start: 2022-03-15 | End: 2022-03-17

## 2022-03-15 RX ADMIN — PANTOPRAZOLE SODIUM 40 MILLIGRAM(S): 20 TABLET, DELAYED RELEASE ORAL at 05:05

## 2022-03-15 RX ADMIN — Medication 325 MILLIGRAM(S): at 12:19

## 2022-03-15 RX ADMIN — Medication 3 MILLILITER(S): at 07:00

## 2022-03-15 RX ADMIN — AMLODIPINE BESYLATE 10 MILLIGRAM(S): 2.5 TABLET ORAL at 05:04

## 2022-03-15 RX ADMIN — ISOSORBIDE DINITRATE 10 MILLIGRAM(S): 5 TABLET ORAL at 18:09

## 2022-03-15 RX ADMIN — Medication 3 MILLILITER(S): at 18:10

## 2022-03-15 RX ADMIN — SIMVASTATIN 40 MILLIGRAM(S): 20 TABLET, FILM COATED ORAL at 21:57

## 2022-03-15 RX ADMIN — Medication 81 MILLIGRAM(S): at 12:21

## 2022-03-15 RX ADMIN — POLYETHYLENE GLYCOL 3350 17 GRAM(S): 17 POWDER, FOR SOLUTION ORAL at 12:21

## 2022-03-15 RX ADMIN — Medication 25 MILLIGRAM(S): at 05:05

## 2022-03-15 RX ADMIN — AMIODARONE HYDROCHLORIDE 200 MILLIGRAM(S): 400 TABLET ORAL at 05:04

## 2022-03-15 RX ADMIN — Medication 1000 UNIT(S): at 12:19

## 2022-03-15 RX ADMIN — Medication 3 MILLILITER(S): at 12:21

## 2022-03-15 RX ADMIN — FINASTERIDE 5 MILLIGRAM(S): 5 TABLET, FILM COATED ORAL at 12:20

## 2022-03-15 RX ADMIN — Medication 80 MILLIGRAM(S): at 05:05

## 2022-03-15 RX ADMIN — Medication 25 MILLIGRAM(S): at 05:04

## 2022-03-15 RX ADMIN — Medication 100 MILLIGRAM(S): at 12:20

## 2022-03-15 RX ADMIN — DESMOPRESSIN ACETATE 220 MICROGRAM(S): 0.1 TABLET ORAL at 17:16

## 2022-03-15 RX ADMIN — Medication 1200 MILLIGRAM(S): at 05:04

## 2022-03-15 RX ADMIN — ISOSORBIDE DINITRATE 10 MILLIGRAM(S): 5 TABLET ORAL at 12:20

## 2022-03-15 RX ADMIN — ISOSORBIDE DINITRATE 10 MILLIGRAM(S): 5 TABLET ORAL at 05:04

## 2022-03-15 RX ADMIN — APIXABAN 2.5 MILLIGRAM(S): 2.5 TABLET, FILM COATED ORAL at 05:05

## 2022-03-15 NOTE — PROGRESS NOTE ADULT - PROBLEM SELECTOR PLAN 1
Pt. with advanced RUSSELL on CKD in setting of CHF and secondary FSGS.  SCr at ~1.7-1.9 (04/2019).  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 Last SCr was elevated at 3.25 on 6/8/21. Scr on admission elevated at 8.77 and in 9-10 range last few days.  While his CKD is stable, and he may have donors, lot of these processes take time.  I think starting dialysis may help is henrik-cardiac syndrome and CHF as well.   D/w with outpatient nephrologists and cards- plan to start inpatient HD as a bridge to transplantation in few months  Please ask IR for tunnelled cath placement( use ddavp 20mcg 30 min before procedure)  Plan for HD tomorrow   Vascular eval for AVF placement while in house as well      Omari Mcdonough MD  Pager   Office     Contact me directly via Microsoft Teams     (After 5 pm or on weekends please page the on-call fellow/attending, can check [email or web address] for schedule. Login is anne ramesh, schedule under [facility] medicine, psych, derm)

## 2022-03-15 NOTE — CONSULT NOTE ADULT - SUBJECTIVE AND OBJECTIVE BOX
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 Nephrology plan to start inpatient HD as a bridge to transplantation in few months. Plan per nephro for Vascular eval for AVF placement. IR consulted for non tunneled HD catheter.      Allergies:   Medications (Abx/Cardiac/Anticoagulation/Blood Products)  aMIOdarone    Tablet: 200 milliGRAM(s) Oral (03-15 @ 05:04)  amLODIPine   Tablet: 10 milliGRAM(s) Oral (03-15 @ 05:04)  apixaban: 5 milliGRAM(s) Oral (03-14 @ 09:54)  apixaban: 2.5 milliGRAM(s) Oral (03-15 @ 05:05)  aspirin enteric coated: 81 milliGRAM(s) Oral (03-15 @ 12:21)  furosemide    Tablet: 80 milliGRAM(s) Oral (03-15 @ 05:05)  heparin  Infusion.: 1400 Unit(s)/Hr IV Continuous (03-14 @ 07:41)  hydrALAZINE: 25 milliGRAM(s) Oral (03-15 @ 05:04)  isosorbide   dinitrate Tablet (ISORDIL): 10 milliGRAM(s) Oral (03-15 @ 12:20)  metoprolol succinate ER: 25 milliGRAM(s) Oral (03-15 @ 05:05)    Data:  T(C): 36.9  HR: 70  BP: 130/83  RR: 18  SpO2: 96%    -WBC 9.75 / HgB 10.5 / Hct 33.5 / Plt 245  -Na 138 / Cl 102 / BUN 81 / Glucose 59  -K 5.0 / CO2 18 / Cr 9.61  -ALT -- / Alk Phos -- / T.Bili --  -INR 1.19 / PTT 66.9

## 2022-03-15 NOTE — PROGRESS NOTE ADULT - PROBLEM SELECTOR PLAN 1
RVP +entero/rhino virus  -CT chest read with diffuse bronchial wall thickening. None appreciated on our read - also reviewed with chest radiologist. +Mucous in airways.   -S/p Mucinex 1200mg PO BID x 5 days  -Cough improving, minimal clear secretions  -Continue Duoneb q6h, can discontinue on discharge   -Normoxic, keep sats >90% with supplemental o2 PRN  -Supportive care

## 2022-03-15 NOTE — PROCEDURE NOTE - PROCEDURE FINDINGS AND DETAILS
Vascular & Interventional Radiology Post-Procedure Note    Pre-Procedure Diagnosis: ESRD  Post-Procedure Diagnosis: Same as pre.  Indications for Procedure: HD    Attending: Dr. Calero   Resident: Dr. Jones    Procedure Details/Findings: Successful right IJ nontunneled HD catheter placement  Access (if applicable): Right IJ vein    Complications: None  Estimated Blood Loss: Minimal  Specimen: None  Contrast: None  Sedation: None  Patient Condition/Disposition: Stable/ Floor    Plan:  1.) HD catheter ok to use  2.) Keep dressing clean and dry Vascular & Interventional Radiology Post-Procedure Note    Pre-Procedure Diagnosis: ESRD  Post-Procedure Diagnosis: Same as pre.  Indications for Procedure: HD    Attending: Dr. Calero   Resident: Dr. Jones    Procedure Details/Findings: Successful right IJ nontunneled HD catheter placement  Access (if applicable): Right IJ vein    Complications: None  Estimated Blood Loss: Minimal  Specimen: None  Contrast: None  Sedation: None  Patient Condition/Disposition: Stable/ Floor    Plan:  1.) HD catheter ok to use.  2.) Keep dressing clean and dry.

## 2022-03-15 NOTE — PROGRESS NOTE ADULT - ASSESSMENT
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 Presents as transfer from [facility] for acute heart failure exacerbation. The patient reports that he has been experiencing LIRA and SOB for 7-10 days which was progressive and now occurring at rest. Started on IV diuretics with improving respiratory status. Called to consult for diffuse bronchial wall thickening and impaction and 4mm pulmonary nodules seen on CT chest.

## 2022-03-15 NOTE — PROGRESS NOTE ADULT - ASSESSMENT
NICOLETTE 3/14/22: EF (Visual Estimate): 40-45 %  Moderate global left ventricular systolic dysfunction. Tethered mitral valve leaflets with normal opening. Moderate-severe mitral regurgitation.Mild atheroma noted in aortic arch/descending aorta. 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   -renal f/u noted- planning initiating HD this admissions     #Atrial Fibrillation/Flutter. S/P AF Ablation  -sp nicolette/ dccv 3/14 -- in NSR 1 se degree--- some pafb noted last night   -EP f/u, amiodarone 200 mg daily , Toprol  XL 25 mg daily  -nicolette  EF (Visual Estimate): 40-45 %  Moderate global left ventricular systolic dysfunction. Tethered mitral valve leaflets with normal opening. Moderate-severe mitral regurgitation. No left atrial or left atrial appendage thrombus  -on eliquis   -EP f/u     #Coronary Artery Disease. S/P PCI to LAD  -stable without chest pain or dyspnea  -hsT peaked, likely demand ischemia in setting of CKD and acute HF   -continue toprol, statin, and asa 81mg daily    #Mitral Regurgitation  -continue to monitor, nicolette Tethered mitral valve leaflets with normal opening. Moderate-severe mitral regurgitation.    #Hypertension  -Blood pressure controlled  -Continue current medications.

## 2022-03-15 NOTE — PROGRESS NOTE ADULT - SUBJECTIVE AND OBJECTIVE BOX
CARDIOLOGY FOLLOW UP - Dr. Lee  DATE OF SERVICE: 3/15/22     CC no cp or sob        REVIEW OF SYSTEMS:  CONSTITUTIONAL: No fever, weight loss, or fatigue  RESPIRATORY: No cough, wheezing, chills or hemoptysis; No Shortness of Breath  CARDIOVASCULAR: No chest pain, palpitations, passing out, dizziness, or leg swelling  GASTROINTESTINAL: No abdominal or epigastric pain. No nausea, vomiting, or hematemesis; No diarrhea or constipation. No melena or hematochezia.  VASCULAR: No edema     PHYSICAL EXAM:  T(C): 36.9 (03-15-22 @ 12:14), Max: 36.9 (03-15-22 @ 12:14)  HR: 70 (03-15-22 @ 12:14) (63 - 77)  BP: 130/83 (03-15-22 @ 12:14) (109/72 - 130/84)  RR: 18 (03-15-22 @ 12:14) (14 - 18)  SpO2: 96% (03-15-22 @ 12:14) (93% - 97%)  Wt(kg): --  I&O's Summary    14 Mar 2022 07:01  -  15 Mar 2022 07:00  --------------------------------------------------------  IN: 240 mL / OUT: 500 mL / NET: -260 mL    15 Mar 2022 07:01  -  15 Mar 2022 12:34  --------------------------------------------------------  IN: 240 mL / OUT: 0 mL / NET: 240 mL        Appearance: Normal	  Cardiovascular: Normal S1 S2,RRR,  + murmur   Respiratory: Lungs clear to auscultation	  Gastrointestinal:  Soft, Non-tender, + BS	  Extremities: Normal range of motion, No clubbing, cyanosis or edema      Home Medications:  allopurinol 100 mg oral tablet: 1 tab(s) orally once a day (09 Mar 2022 02:11)  amiodarone 200 mg oral tablet: 1 tab(s) orally once a day (09 Mar 2022 02:11)  amLODIPine 10 mg oral tablet: 1 tab(s) orally once a day (09 Mar 2022 02:11)  Aspirin Enteric Coated 81 mg oral delayed release tablet: 1 tab(s) orally once a day (09 Mar 2022 02:11)  Eliquis 5 mg oral tablet: 1 tab(s) orally 2 times a day (09 Mar 2022 02:11)  Farxiga 5 mg oral tablet: 1 tab(s) orally once a day (09 Mar 2022 02:11)  ferrous sulfate 325 mg (65 mg elemental iron) oral tablet: 1 tab(s) orally once a day (09 Mar 2022 02:11)  finasteride 5 mg oral tablet: 1 tab(s) orally once a day (09 Mar 2022 02:11)  furosemide 40 mg oral tablet: 1 tab(s) orally once a day (09 Mar 2022 02:11)  hydrALAZINE 25 mg oral tablet: 1 tab(s) orally once a day (09 Mar 2022 02:11)  isosorbide dinitrate 10 mg oral tablet: 1 tab(s) orally 3 times a day (09 Mar 2022 02:11)  metoprolol succinate 25 mg oral tablet, extended release: 1 tab(s) orally once a day (09 Mar 2022 02:11)  pantoprazole 40 mg oral delayed release tablet: 1 tab(s) orally once a day (09 Mar 2022 02:11)  simvastatin 40 mg oral tablet: 1 tab(s) orally once a day (at bedtime) (09 Mar 2022 02:11)  Vitamin D3 25 mcg (1000 intl units) oral capsule: 1 cap(s) orally once a day (09 Mar 2022 02:11)      MEDICATIONS  (STANDING):  albuterol/ipratropium for Nebulization 3 milliLiter(s) Nebulizer every 6 hours  allopurinol 100 milliGRAM(s) Oral daily  aMIOdarone    Tablet 200 milliGRAM(s) Oral daily  amLODIPine   Tablet 10 milliGRAM(s) Oral daily  apixaban 2.5 milliGRAM(s) Oral two times a day  aspirin enteric coated 81 milliGRAM(s) Oral daily  cholecalciferol 1000 Unit(s) Oral daily  ferrous    sulfate 325 milliGRAM(s) Oral daily  finasteride 5 milliGRAM(s) Oral daily  furosemide    Tablet 80 milliGRAM(s) Oral daily  hydrALAZINE 25 milliGRAM(s) Oral daily  isosorbide   dinitrate Tablet (ISORDIL) 10 milliGRAM(s) Oral three times a day  metoprolol succinate ER 25 milliGRAM(s) Oral daily  pantoprazole    Tablet 40 milliGRAM(s) Oral before breakfast  polyethylene glycol 3350 17 Gram(s) Oral daily  simvastatin 40 milliGRAM(s) Oral at bedtime      TELEMETRY: nsr 1 st degree avb   -yesterday pAfib NSR 	    ECG:  	  RADIOLOGY:   DIAGNOSTIC TESTING:  [ ] Echocardiogram:  [ ]  Catheterization:  [ ] Stress Test:    OTHER: 	    LABS:	 	    Creatine Kinase, Serum: 410 U/L [30 - 200] (03-09 @ 08:54)  CKMB Units: 4.2 ng/mL [0.0 - 6.7] (03-09 @ 08:54)  Troponin T, High Sensitivity Result: 93 ng/L [0 - 51] (03-09 @ 08:54)  Troponin T, High Sensitivity Result: 95 ng/L [0 - 51] (03-09 @ 08:50)  Troponin T, High Sensitivity Result: 86 ng/L [0 - 51] (03-09 @ 05:28)  Troponin T, High Sensitivity Result: 89 ng/L [0 - 51] (03-09 @ 00:46)  Creatine Kinase, Serum: 364 U/L [30 - 200] (03-09 @ 00:46)  CKMB Units: 4.1 ng/mL [0.0 - 6.7] (03-09 @ 00:46)  Creatine Kinase, Serum: 386 U/L [26 - 308] (03-08 @ 18:17)  CKMB Units: 2.9 ng/mL [0.5 - 3.6] (03-08 @ 18:17)                          10.5   9.75  )-----------( 245      ( 15 Mar 2022 06:45 )             33.5     03-15    138  |  102  |  81<H>  ----------------------------<  59<L>  5.0   |  18<L>  |  9.61<H>    Ca    9.6      15 Mar 2022 06:43  Mg     2.6     03-15    TPro  7.3  /  Alb  3.1<L>  /  TBili  0.4  /  DBili  x   /  AST  10  /  ALT  8<L>  /  AlkPhos  112  03-14    PT/INR - ( 14 Mar 2022 07:08 )   PT: 13.7 sec;   INR: 1.19 ratio         PTT - ( 14 Mar 2022 07:08 )  PTT:66.9 sec

## 2022-03-15 NOTE — CONSULT NOTE ADULT - ASSESSMENT
67yo M with Hx HFrEF, CAD, CKD5 2/2 FSGS, HTN, AF (on Eliquis) who presented with acute HF exacerbation 2/2 renal failure. Patient is undergoing evaluation for kidney transplant and will be started on IP HD as a bridge to transplant. Vascular Surgery consulted for AVF planning.     PLAN:   - No acute Sx intervention   - Please obtain bilateral UE vein mapping  - LUE precautions - no blood draws, IVs, blood pressure cuffs   - IR c/s for Shiley/Permacath placement  - HD per Nephrology  - C/w care per primary team      To be discussed with Dr. Anabell Santoyo, PGY-2  Vascular Surgery  #[id number]    67yo M with Hx HFrEF, CAD, CKD5 2/2 FSGS, HTN, AF (on Eliquis) who presented with acute HF exacerbation 2/2 renal failure. Patient is undergoing evaluation for kidney transplant and will be started on IP HD as a bridge to transplant. Vascular Surgery consulted for AVF planning.     PLAN:   - No acute Sx intervention   - Please obtain bilateral UE vein mapping  - LUE precautions - no blood draws, IVs, blood pressure cuffs   - IR c/s for Shiley/Permacath placement  - HD per Nephrology  - C/w care per primary team      Discussed with Dr. Anabell Santoyo, PGY-2  Vascular Surgery  #[id number]

## 2022-03-15 NOTE — CONSULT NOTE ADULT - SUBJECTIVE AND OBJECTIVE BOX
GENERAL SURGERY CONSULT NOTE  Consulting surgical team: Vascular Surgery  Consulting attending: Dr. 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 Patient is improving from HF standpoint and NICOLETTE on 3/14 demonstrated improved EF 40-45%. HF determined to be 2/2 renal failure. A decision was made to start IP HD as bridge to transplant. Vascular Surgery consulted for AVF planning.     Patient states that he is R handed. He denies PPM or other implants.       PAST MEDICAL HISTORY:  HTN - Hypertension  Inguinal Hernia  Childhood Asthma  Keloid  Arthritis  CKD (chronic kidney disease)  Gout  CAD (coronary artery disease)  HLD (hyperlipidemia)  Atrial fibrillation  History of cardioversion  CHF (congestive heart failure)  History of cardiomyopathy  Acid reflux  Thalassemia minor    PAST SURGICAL HISTORY:  S/P Arthroscopic Surgery of Left Knee  History of Arthroscopy- ankle  S/P angioplasty with stent  S/P hernia surgery  Status post cataract extraction  H/O cardiac radiofrequency ablation        MEDICATIONS:  albuterol/ipratropium for Nebulization 3 milliLiter(s) Nebulizer every 6 hours  allopurinol 100 milliGRAM(s) Oral daily  aMIOdarone    Tablet 200 milliGRAM(s) Oral daily  amLODIPine   Tablet 10 milliGRAM(s) Oral daily  aspirin enteric coated 81 milliGRAM(s) Oral daily  cholecalciferol 1000 Unit(s) Oral daily  ferrous    sulfate 325 milliGRAM(s) Oral daily  finasteride 5 milliGRAM(s) Oral daily  furosemide    Tablet 80 milliGRAM(s) Oral daily  hydrALAZINE 25 milliGRAM(s) Oral daily  isosorbide   dinitrate Tablet (ISORDIL) 10 milliGRAM(s) Oral three times a day  metoprolol succinate ER 25 milliGRAM(s) Oral daily  pantoprazole    Tablet 40 milliGRAM(s) Oral before breakfast  polyethylene glycol 3350 17 Gram(s) Oral daily  simvastatin 40 milliGRAM(s) Oral at bedtime      ALLERGIES:  No Known Allergies      VITALS & I/Os:  Vital Signs Last 24 Hrs  T(C): 36.9 (15 Mar 2022 12:14), Max: 36.9 (15 Mar 2022 12:14)  T(F): 98.5 (15 Mar 2022 12:14), Max: 98.5 (15 Mar 2022 12:14)  HR: 70 (15 Mar 2022 12:14) (70 - 77)  BP: 130/83 (15 Mar 2022 12:14) (110/70 - 130/84)  RR: 18 (15 Mar 2022 12:14) (18 - 18)  SpO2: 96% (15 Mar 2022 12:14) (96% - 97%)    I&O's Summary    14 Mar 2022 07:01  -  15 Mar 2022 07:00  --------------------------------------------------------  IN: 240 mL / OUT: 500 mL / NET: -260 mL    15 Mar 2022 07:01  -  15 Mar 2022 14:46  --------------------------------------------------------  IN: 240 mL / OUT: 0 mL / NET: 240 mL        PHYSICAL EXAM:  GEN: resting in bed comfortably in NAD  NEURO: awake, alert  CV: warm, well-perfused  RESP: no increased WOB  ABD: soft, non-distended, non-tender without rebound tenderness or guarding  EXTR: no gross deformities; spontaneous movement in b/l U/L extrem   - RUE: palpable radial + ulnar pulse, sensation intact,  strength 5/5  - LUE: palpable radial + ulnar pulse, sensation intact,  strength 5/5, normal Mick's test      LABS:             10.5   9.75  )-----------( 245      ( 15 Mar 2022 06:45 )             33.5     138  |  102  |  81<H>  ----------------------------<  59<L>  5.0   |  18<L>  |  9.61<H>    Ca    9.6      15 Mar 2022 06:43  Mg     2.6     03-15    TPro  7.3  /  Alb  3.1<L>  /  TBili  0.4  /  DBili  x   /  AST  10  /  ALT  8<L>  /  AlkPhos  112  03-14    Lactate:    PT/INR - ( 14 Mar 2022 07:08 )   PT: 13.7 sec;   INR: 1.19 ratio    PTT - ( 14 Mar 2022 07:08 )  PTT:66.9 sec

## 2022-03-15 NOTE — PROGRESS NOTE ADULT - SUBJECTIVE AND OBJECTIVE BOX
[facility] DIVISION OF KIDNEY DISEASES AND HYPERTENSION -- FOLLOW UP NOTE  --------------------------------------------------------------------------------  Chief Complaint:    24 hour events/subjective:  pt with no major chanegs      PAST HISTORY  --------------------------------------------------------------------------------  No significant changes to PMH, PSH, FHx, SHx, unless otherwise noted    ALLERGIES & MEDICATIONS  --------------------------------------------------------------------------------  Allergies    No Known Allergies    Intolerances      Standing Inpatient Medications  albuterol/ipratropium for Nebulization 3 milliLiter(s) Nebulizer every 6 hours  allopurinol 100 milliGRAM(s) Oral daily  aMIOdarone    Tablet 200 milliGRAM(s) Oral daily  amLODIPine   Tablet 10 milliGRAM(s) Oral daily  apixaban 2.5 milliGRAM(s) Oral two times a day  aspirin enteric coated 81 milliGRAM(s) Oral daily  cholecalciferol 1000 Unit(s) Oral daily  ferrous    sulfate 325 milliGRAM(s) Oral daily  finasteride 5 milliGRAM(s) Oral daily  furosemide    Tablet 80 milliGRAM(s) Oral daily  hydrALAZINE 25 milliGRAM(s) Oral daily  isosorbide   dinitrate Tablet (ISORDIL) 10 milliGRAM(s) Oral three times a day  metoprolol succinate ER 25 milliGRAM(s) Oral daily  pantoprazole    Tablet 40 milliGRAM(s) Oral before breakfast  polyethylene glycol 3350 17 Gram(s) Oral daily  simvastatin 40 milliGRAM(s) Oral at bedtime    PRN Inpatient Medications      REVIEW OF SYSTEMS  --------------------------------------------------------------------------------  Gen: No weight changes, fatigue, fevers/chills, weakness  Skin: No rashes  Head/Eyes/Ears/Mouth: No headache; Normal hearing; Normal vision w/o blurriness; No sinus pain/discomfort, sore throat  Respiratory: No dyspnea, cough, wheezing, hemoptysis  CV: No chest pain, PND, orthopnea  GI: No abdominal pain, diarrhea, constipation, nausea, vomiting, melena, hematochezia  : No increased frequency, dysuria, hematuria, nocturia  MSK: No joint pain/swelling; no back pain; no edema  Neuro: No dizziness/lightheadedness, weakness, seizures, numbness, tingling  Heme: No easy bruising or bleeding  Endo: No heat/cold intolerance  Psych: No significant nervousness, anxiety, stress, depression    All other systems were reviewed and are negative, except as noted.    VITALS/PHYSICAL EXAM  --------------------------------------------------------------------------------  T(C): 36.9 (03-15-22 @ 12:14), Max: 36.9 (03-15-22 @ 12:14)  HR: 70 (03-15-22 @ 12:14) (63 - 77)  BP: 130/83 (03-15-22 @ 12:14) (109/72 - 130/84)  RR: 18 (03-15-22 @ 12:14) (14 - 18)  SpO2: 96% (03-15-22 @ 12:14) (93% - 97%)  Wt(kg): --        03-14-22 @ 07:01  -  03-15-22 @ 07:00  --------------------------------------------------------  IN: 240 mL / OUT: 500 mL / NET: -260 mL    03-15-22 @ 07:01  -  03-15-22 @ 12:38  --------------------------------------------------------  IN: 240 mL / OUT: 0 mL / NET: 240 mL      Physical Exam:  	Gen: NAD, well-appearing  	HEENT: PERRL, supple neck, clear oropharynx  	Pulm: CTA B/L  	CV: RRR, S1S2; no rub  	Back: No spinal or CVA tenderness; no sacral edema  	Abd: +BS, soft, nontender/nondistended  	: No suprapubic tenderness  	UE: Warm, FROM, no clubbing, intact strength; no edema; no asterixis  	LE: Warm, FROM, no clubbing, intact strength; no edema  	Neuro: No focal deficits, intact gait  	Psych: Normal affect and mood  	Skin: Warm, without rashes  	Vascular access:    LABS/STUDIES  --------------------------------------------------------------------------------              10.5   9.75  >-----------<  245      [03-15-22 @ 06:45]              33.5     138  |  102  |  81  ----------------------------<  59      [03-15-22 @ 06:43]  5.0   |  18  |  9.61        Ca     9.6     [03-15-22 @ 06:43]      Mg     2.6     [03-15-22 @ 06:43]    TPro  7.3  /  Alb  3.1  /  TBili  0.4  /  DBili  x   /  AST  10  /  ALT  8   /  AlkPhos  112  [03-14-22 @ 07:08]    PT/INR: PT 13.7 , INR 1.19       [03-14-22 @ 07:08]  PTT: 66.9       [03-14-22 @ 07:08]      Creatinine Trend:  SCr 9.61 [03-15 @ 06:43]  SCr 9.64 [03-14 @ 07:08]  SCr 9.29 [03-12 @ 07:57]  SCr 9.22 [03-11 @ 05:46]  SCr 8.77 [03-10 @ 05:30]    Urinalysis - [03-09-22 @ 06:37]      Color Light Yellow / Appearance Clear / SG 1.012 / pH 6.0      Gluc 500 mg/dL / Ketone Negative  / Bili Negative / Urobili Negative       Blood Small / Protein 300 mg/dL / Leuk Est Negative / Nitrite Negative      RBC 2 / WBC 3 / Hyaline 1 / Gran  / Sq Epi  / Non Sq Epi 1 / Bacteria Negative    Urine Creatinine 54      [03-10-22 @ 13:27]  Urine Protein 201      [03-10-22 @ 13:27]  Urine Sodium 67      [03-09-22 @ 06:37]  Urine Urea Nitrogen 334      [03-09-22 @ 08:01]  Urine Osmolality 319      [03-09-22 @ 06:37]    Iron 22, TIBC 316, %sat 7      [05-28-21 @ 10:50]  Ferritin 26      [05-28-21 @ 09:06]

## 2022-03-15 NOTE — PROGRESS NOTE ADULT - SUBJECTIVE AND OBJECTIVE BOX
Follow-up Pulm Progress Note    No new respiratory events overnight.  Denies SOB/CP.   Minimal cough     Medications:  MEDICATIONS  (STANDING):  albuterol/ipratropium for Nebulization 3 milliLiter(s) Nebulizer every 6 hours  allopurinol 100 milliGRAM(s) Oral daily  aMIOdarone    Tablet 200 milliGRAM(s) Oral daily  amLODIPine   Tablet 10 milliGRAM(s) Oral daily  apixaban 2.5 milliGRAM(s) Oral two times a day  aspirin enteric coated 81 milliGRAM(s) Oral daily  cholecalciferol 1000 Unit(s) Oral daily  ferrous    sulfate 325 milliGRAM(s) Oral daily  finasteride 5 milliGRAM(s) Oral daily  furosemide    Tablet 80 milliGRAM(s) Oral daily  hydrALAZINE 25 milliGRAM(s) Oral daily  isosorbide   dinitrate Tablet (ISORDIL) 10 milliGRAM(s) Oral three times a day  metoprolol succinate ER 25 milliGRAM(s) Oral daily  pantoprazole    Tablet 40 milliGRAM(s) Oral before breakfast  polyethylene glycol 3350 17 Gram(s) Oral daily  simvastatin 40 milliGRAM(s) Oral at bedtime          Vital Signs Last 24 Hrs  T(C): 36.7 (15 Mar 2022 08:43), Max: 36.7 (15 Mar 2022 04:59)  T(F): 98 (15 Mar 2022 08:43), Max: 98.1 (15 Mar 2022 04:59)  HR: 71 (15 Mar 2022 08:43) (63 - 77)  BP: 110/70 (15 Mar 2022 08:43) (109/72 - 130/84)  BP(mean): --  RR: 18 (15 Mar 2022 08:43) (14 - 18)  SpO2: 96% (15 Mar 2022 08:43) (93% - 97%)          03-14 @ 07:01  -  03-15 @ 07:00  --------------------------------------------------------  IN: 240 mL / OUT: 500 mL / NET: -260 mL          LABS:                        10.5   9.75  )-----------( 245      ( 15 Mar 2022 06:45 )             33.5     03-15    138  |  102  |  81<H>  ----------------------------<  59<L>  5.0   |  18<L>  |  9.61<H>    Ca    9.6      15 Mar 2022 06:43  Mg     2.6     03-15    TPro  7.3  /  Alb  3.1<L>  /  TBili  0.4  /  DBili  x   /  AST  10  /  ALT  8<L>  /  AlkPhos  112  03-14          PT/INR - ( 14 Mar 2022 07:08 )   PT: 13.7 sec;   INR: 1.19 ratio         PTT - ( 14 Mar 2022 07:08 )  PTT:66.9 sec                Physical Examination:  PULM: CTA bilaterally   CVS: S1, S2 heard    RADIOLOGY REVIEWED  CT chest: < from: CT Chest No Cont (03.09.22 @ 16:32) >  FINDINGS:    Lungs/Airways/Pleura: The central airways are patent. No pleural   effusion. There is diffuse bronchial wall thickening with segmental and   subsegmental bronchial impaction at the bases. Few scattered sub-4 mm   pulmonary nodules include right apex series 3 image 29 and left apex   image 28. No consolidation or pulmonary edema.    Mediastinum/Lymph nodes: No thoracic adenopathy.    Heart and Vessels: Cardiomegaly. LAD stent. No pericardial effusion. The   pulmonary artery measures 3.2 cm. Adjacent mid ascending aorta measures   3.2 cm.    Upper Abdomen: Unremarkable.    Osseous structures and Soft Tissues: No aggressive bone lesions.    IMPRESSION:  Diffuse bronchial wall thickening and impaction.    Sub-4 mm pulmonary nodules; if patient is considered high risk for lung   cancer, consider follow-up low dose chest CT in 12 months. If low risk,   no further follow-up is needed as per current Fleischner guidelines.      < end of copied text >

## 2022-03-16 DIAGNOSIS — E83.39 OTHER DISORDERS OF PHOSPHORUS METABOLISM: ICD-10-CM

## 2022-03-16 DIAGNOSIS — D64.9 ANEMIA, UNSPECIFIED: ICD-10-CM

## 2022-03-16 LAB
ANION GAP SERPL CALC-SCNC: 16 MMOL/L — SIGNIFICANT CHANGE UP (ref 5–17)
BUN SERPL-MCNC: 85 MG/DL — HIGH (ref 7–23)
CALCIUM SERPL-MCNC: 9.2 MG/DL — SIGNIFICANT CHANGE UP (ref 8.4–10.5)
CHLORIDE SERPL-SCNC: 104 MMOL/L — SIGNIFICANT CHANGE UP (ref 96–108)
CO2 SERPL-SCNC: 17 MMOL/L — LOW (ref 22–31)
CREAT SERPL-MCNC: 9.54 MG/DL — HIGH (ref 0.5–1.3)
EGFR: 6 ML/MIN/1.73M2 — LOW
GLUCOSE SERPL-MCNC: 100 MG/DL — HIGH (ref 70–99)
HCT VFR BLD CALC: 30.1 % — LOW (ref 39–50)
HGB BLD-MCNC: 9.7 G/DL — LOW (ref 13–17)
MAGNESIUM SERPL-MCNC: 2.5 MG/DL — SIGNIFICANT CHANGE UP (ref 1.6–2.6)
MCHC RBC-ENTMCNC: 19.6 PG — LOW (ref 27–34)
MCHC RBC-ENTMCNC: 32.2 GM/DL — SIGNIFICANT CHANGE UP (ref 32–36)
MCV RBC AUTO: 60.7 FL — LOW (ref 80–100)
NRBC # BLD: 0 /100 WBCS — SIGNIFICANT CHANGE UP (ref 0–0)
PHOSPHATE SERPL-MCNC: 6.2 MG/DL — HIGH (ref 2.5–4.5)
PLATELET # BLD AUTO: 236 K/UL — SIGNIFICANT CHANGE UP (ref 150–400)
POTASSIUM SERPL-MCNC: 5.1 MMOL/L — SIGNIFICANT CHANGE UP (ref 3.5–5.3)
POTASSIUM SERPL-SCNC: 5.1 MMOL/L — SIGNIFICANT CHANGE UP (ref 3.5–5.3)
RBC # BLD: 4.96 M/UL — SIGNIFICANT CHANGE UP (ref 4.2–5.8)
RBC # FLD: 17.7 % — HIGH (ref 10.3–14.5)
SODIUM SERPL-SCNC: 137 MMOL/L — SIGNIFICANT CHANGE UP (ref 135–145)
WBC # BLD: 9.19 K/UL — SIGNIFICANT CHANGE UP (ref 3.8–10.5)
WBC # FLD AUTO: 9.19 K/UL — SIGNIFICANT CHANGE UP (ref 3.8–10.5)

## 2022-03-16 PROCEDURE — 99232 SBSQ HOSP IP/OBS MODERATE 35: CPT | Mod: GC

## 2022-03-16 PROCEDURE — 93970 EXTREMITY STUDY: CPT | Mod: 26

## 2022-03-16 PROCEDURE — 99231 SBSQ HOSP IP/OBS SF/LOW 25: CPT

## 2022-03-16 RX ADMIN — SIMVASTATIN 40 MILLIGRAM(S): 20 TABLET, FILM COATED ORAL at 21:26

## 2022-03-16 RX ADMIN — ISOSORBIDE DINITRATE 10 MILLIGRAM(S): 5 TABLET ORAL at 11:24

## 2022-03-16 RX ADMIN — Medication 3 MILLILITER(S): at 05:14

## 2022-03-16 RX ADMIN — Medication 3 MILLILITER(S): at 17:05

## 2022-03-16 RX ADMIN — ISOSORBIDE DINITRATE 10 MILLIGRAM(S): 5 TABLET ORAL at 05:14

## 2022-03-16 RX ADMIN — Medication 3 MILLILITER(S): at 23:12

## 2022-03-16 RX ADMIN — PANTOPRAZOLE SODIUM 40 MILLIGRAM(S): 20 TABLET, DELAYED RELEASE ORAL at 05:14

## 2022-03-16 RX ADMIN — Medication 25 MILLIGRAM(S): at 05:24

## 2022-03-16 RX ADMIN — AMIODARONE HYDROCHLORIDE 200 MILLIGRAM(S): 400 TABLET ORAL at 05:19

## 2022-03-16 RX ADMIN — Medication 81 MILLIGRAM(S): at 11:24

## 2022-03-16 RX ADMIN — ISOSORBIDE DINITRATE 10 MILLIGRAM(S): 5 TABLET ORAL at 16:46

## 2022-03-16 RX ADMIN — Medication 80 MILLIGRAM(S): at 05:14

## 2022-03-16 RX ADMIN — Medication 3 MILLILITER(S): at 11:24

## 2022-03-16 RX ADMIN — Medication 100 MILLIGRAM(S): at 11:24

## 2022-03-16 RX ADMIN — Medication 325 MILLIGRAM(S): at 11:24

## 2022-03-16 RX ADMIN — APIXABAN 2.5 MILLIGRAM(S): 2.5 TABLET, FILM COATED ORAL at 05:13

## 2022-03-16 RX ADMIN — Medication 25 MILLIGRAM(S): at 05:13

## 2022-03-16 RX ADMIN — Medication 1000 UNIT(S): at 11:24

## 2022-03-16 RX ADMIN — CHLORHEXIDINE GLUCONATE 1 APPLICATION(S): 213 SOLUTION TOPICAL at 05:24

## 2022-03-16 RX ADMIN — Medication 1200 MILLIGRAM(S): at 17:04

## 2022-03-16 RX ADMIN — AMLODIPINE BESYLATE 10 MILLIGRAM(S): 2.5 TABLET ORAL at 05:19

## 2022-03-16 RX ADMIN — Medication 3 MILLILITER(S): at 00:55

## 2022-03-16 RX ADMIN — FINASTERIDE 5 MILLIGRAM(S): 5 TABLET, FILM COATED ORAL at 11:24

## 2022-03-16 RX ADMIN — APIXABAN 2.5 MILLIGRAM(S): 2.5 TABLET, FILM COATED ORAL at 17:04

## 2022-03-16 NOTE — PROGRESS NOTE ADULT - SUBJECTIVE AND OBJECTIVE BOX
CARDIOLOGY FOLLOW UP - Dr. Lee  DATE OF SERVICE: 3/16/22     CC no cp or sob    s/p non tunneled HD catheter on 3/15/22       REVIEW OF SYSTEMS:  CONSTITUTIONAL: No fever, weight loss, or fatigue  RESPIRATORY: No cough, wheezing, chills or hemoptysis; No Shortness of Breath  CARDIOVASCULAR: No chest pain, palpitations, passing out, dizziness, or leg swelling  GASTROINTESTINAL: No abdominal or epigastric pain. No nausea, vomiting, or hematemesis; No diarrhea or constipation. No melena or hematochezia.  VASCULAR: No edema     PHYSICAL EXAM:  T(C): 36.5 (03-16-22 @ 11:07), Max: 36.9 (03-15-22 @ 12:14)  HR: 60 (03-16-22 @ 11:07) (58 - 72)  BP: 123/83 (03-16-22 @ 11:07) (108/73 - 142/90)  RR: 18 (03-16-22 @ 11:07) (18 - 18)  SpO2: 96% (03-16-22 @ 11:07) (94% - 98%)  Wt(kg): --  I&O's Summary    15 Mar 2022 07:01  -  16 Mar 2022 07:00  --------------------------------------------------------  IN: 480 mL / OUT: 400 mL / NET: 80 mL        Appearance: Normal	  Cardiovascular: Normal S1 S2,RRR + murmurs  Respiratory: Lungs clear to auscultation	  Gastrointestinal:  Soft, Non-tender, + BS	  Extremities: Normal range of motion, No clubbing, cyanosis or edema      Home Medications:  allopurinol 100 mg oral tablet: 1 tab(s) orally once a day (09 Mar 2022 02:11)  amiodarone 200 mg oral tablet: 1 tab(s) orally once a day (09 Mar 2022 02:11)  amLODIPine 10 mg oral tablet: 1 tab(s) orally once a day (09 Mar 2022 02:11)  Aspirin Enteric Coated 81 mg oral delayed release tablet: 1 tab(s) orally once a day (09 Mar 2022 02:11)  Eliquis 5 mg oral tablet: 1 tab(s) orally 2 times a day (09 Mar 2022 02:11)  Farxiga 5 mg oral tablet: 1 tab(s) orally once a day (09 Mar 2022 02:11)  ferrous sulfate 325 mg (65 mg elemental iron) oral tablet: 1 tab(s) orally once a day (09 Mar 2022 02:11)  finasteride 5 mg oral tablet: 1 tab(s) orally once a day (09 Mar 2022 02:11)  furosemide 40 mg oral tablet: 1 tab(s) orally once a day (09 Mar 2022 02:11)  hydrALAZINE 25 mg oral tablet: 1 tab(s) orally once a day (09 Mar 2022 02:11)  isosorbide dinitrate 10 mg oral tablet: 1 tab(s) orally 3 times a day (09 Mar 2022 02:11)  metoprolol succinate 25 mg oral tablet, extended release: 1 tab(s) orally once a day (09 Mar 2022 02:11)  pantoprazole 40 mg oral delayed release tablet: 1 tab(s) orally once a day (09 Mar 2022 02:11)  simvastatin 40 mg oral tablet: 1 tab(s) orally once a day (at bedtime) (09 Mar 2022 02:11)  Vitamin D3 25 mcg (1000 intl units) oral capsule: 1 cap(s) orally once a day (09 Mar 2022 02:11)      MEDICATIONS  (STANDING):  albuterol/ipratropium for Nebulization 3 milliLiter(s) Nebulizer every 6 hours  allopurinol 100 milliGRAM(s) Oral daily  aMIOdarone    Tablet 200 milliGRAM(s) Oral daily  amLODIPine   Tablet 10 milliGRAM(s) Oral daily  apixaban 2.5 milliGRAM(s) Oral two times a day  aspirin enteric coated 81 milliGRAM(s) Oral daily  chlorhexidine 4% Liquid 1 Application(s) Topical <User Schedule>  cholecalciferol 1000 Unit(s) Oral daily  ferrous    sulfate 325 milliGRAM(s) Oral daily  finasteride 5 milliGRAM(s) Oral daily  furosemide    Tablet 80 milliGRAM(s) Oral daily  guaiFENesin ER 1200 milliGRAM(s) Oral every 12 hours  hydrALAZINE 25 milliGRAM(s) Oral daily  isosorbide   dinitrate Tablet (ISORDIL) 10 milliGRAM(s) Oral three times a day  metoprolol succinate ER 25 milliGRAM(s) Oral daily  pantoprazole    Tablet 40 milliGRAM(s) Oral before breakfast  polyethylene glycol 3350 17 Gram(s) Oral daily  simvastatin 40 milliGRAM(s) Oral at bedtime      TELEMETRY: NSR 1st degree  	    ECG:  	  RADIOLOGY:   DIAGNOSTIC TESTING:  [ ] Echocardiogram:  [ ]  Catheterization:  [ ] Stress Test:    OTHER: 	    LABS:	 	                            9.7    9.19  )-----------( 236      ( 16 Mar 2022 06:18 )             30.1     03-16    137  |  104  |  85<H>  ----------------------------<  100<H>  5.1   |  17<L>  |  9.54<H>    Ca    9.2      16 Mar 2022 06:18  Phos  6.2     03-16  Mg     2.5     03-16               CARDIOLOGY FOLLOW UP - Dr. Lee  DATE OF SERVICE: 3/16/22     CC no cp or sob    s/p non tunneled HD catheter on 3/15/22       REVIEW OF SYSTEMS:  CONSTITUTIONAL: No fever, weight loss, or fatigue  RESPIRATORY: No cough, wheezing, chills or hemoptysis; No Shortness of Breath  CARDIOVASCULAR: No chest pain, palpitations, passing out, dizziness, or leg swelling  GASTROINTESTINAL: No abdominal or epigastric pain. No nausea, vomiting, or hematemesis; No diarrhea or constipation. No melena or hematochezia.  VASCULAR: No edema     PHYSICAL EXAM:  T(C): 36.5 (03-16-22 @ 11:07), Max: 36.9 (03-15-22 @ 12:14)  HR: 60 (03-16-22 @ 11:07) (58 - 72)  BP: 123/83 (03-16-22 @ 11:07) (108/73 - 142/90)  RR: 18 (03-16-22 @ 11:07) (18 - 18)  SpO2: 96% (03-16-22 @ 11:07) (94% - 98%)  Wt(kg): --  I&O's Summary    15 Mar 2022 07:01  -  16 Mar 2022 07:00  --------------------------------------------------------  IN: 480 mL / OUT: 400 mL / NET: 80 mL        Appearance: Normal	  Cardiovascular: Normal S1 S2,RRR + murmurs  Respiratory: Lungs clear to auscultation	  Gastrointestinal:  Soft, Non-tender, + BS	  Extremities: Normal range of motion, No clubbing, cyanosis or edema      Home Medications:  allopurinol 100 mg oral tablet: 1 tab(s) orally once a day (09 Mar 2022 02:11)  amiodarone 200 mg oral tablet: 1 tab(s) orally once a day (09 Mar 2022 02:11)  amLODIPine 10 mg oral tablet: 1 tab(s) orally once a day (09 Mar 2022 02:11)  Aspirin Enteric Coated 81 mg oral delayed release tablet: 1 tab(s) orally once a day (09 Mar 2022 02:11)  Eliquis 5 mg oral tablet: 1 tab(s) orally 2 times a day (09 Mar 2022 02:11)  Farxiga 5 mg oral tablet: 1 tab(s) orally once a day (09 Mar 2022 02:11)  ferrous sulfate 325 mg (65 mg elemental iron) oral tablet: 1 tab(s) orally once a day (09 Mar 2022 02:11)  finasteride 5 mg oral tablet: 1 tab(s) orally once a day (09 Mar 2022 02:11)  furosemide 40 mg oral tablet: 1 tab(s) orally once a day (09 Mar 2022 02:11)  hydrALAZINE 25 mg oral tablet: 1 tab(s) orally once a day (09 Mar 2022 02:11)  isosorbide dinitrate 10 mg oral tablet: 1 tab(s) orally 3 times a day (09 Mar 2022 02:11)  metoprolol succinate 25 mg oral tablet, extended release: 1 tab(s) orally once a day (09 Mar 2022 02:11)  pantoprazole 40 mg oral delayed release tablet: 1 tab(s) orally once a day (09 Mar 2022 02:11)  simvastatin 40 mg oral tablet: 1 tab(s) orally once a day (at bedtime) (09 Mar 2022 02:11)  Vitamin D3 25 mcg (1000 intl units) oral capsule: 1 cap(s) orally once a day (09 Mar 2022 02:11)      MEDICATIONS  (STANDING):  albuterol/ipratropium for Nebulization 3 milliLiter(s) Nebulizer every 6 hours  allopurinol 100 milliGRAM(s) Oral daily  aMIOdarone    Tablet 200 milliGRAM(s) Oral daily  amLODIPine   Tablet 10 milliGRAM(s) Oral daily  apixaban 2.5 milliGRAM(s) Oral two times a day  aspirin enteric coated 81 milliGRAM(s) Oral daily  chlorhexidine 4% Liquid 1 Application(s) Topical <User Schedule>  cholecalciferol 1000 Unit(s) Oral daily  ferrous    sulfate 325 milliGRAM(s) Oral daily  finasteride 5 milliGRAM(s) Oral daily  furosemide    Tablet 80 milliGRAM(s) Oral daily  guaiFENesin ER 1200 milliGRAM(s) Oral every 12 hours  hydrALAZINE 25 milliGRAM(s) Oral daily  isosorbide   dinitrate Tablet (ISORDIL) 10 milliGRAM(s) Oral three times a day  metoprolol succinate ER 25 milliGRAM(s) Oral daily  pantoprazole    Tablet 40 milliGRAM(s) Oral before breakfast  polyethylene glycol 3350 17 Gram(s) Oral daily  simvastatin 40 milliGRAM(s) Oral at bedtime      TELEMETRY: NSR 1st degree  	    ECG:  	  RADIOLOGY:   < from: [facility] Duplex Upper Ext Vein Scan, Bilat (03.16.22 @ 09:43) >  IMPRESSION: There is focal THROMBOSIS of the right cephalic vein at the   antecubital fossa.    The diameters of the right and left cephalic and basilic veins are   entered in the table above.    --- End of Report ---        < end of copied text >    DIAGNOSTIC TESTING:  [ ] Echocardiogram:  [ ]  Catheterization:  [ ] Stress Test:    OTHER: 	    LABS:	 	                            9.7    9.19  )-----------( 236      ( 16 Mar 2022 06:18 )             30.1     03-16    137  |  104  |  85<H>  ----------------------------<  100<H>  5.1   |  17<L>  |  9.54<H>    Ca    9.2      16 Mar 2022 06:18  Phos  6.2     03-16  Mg     2.5     03-16

## 2022-03-16 NOTE — PROGRESS NOTE ADULT - PROBLEM SELECTOR PLAN 1
RVP +entero/rhino virus  -CT chest read with diffuse bronchial wall thickening. None appreciated on our read - also reviewed with chest radiologist. +Mucous in airways.   -S/p Mucinex 1200mg PO BID x 5 days, extended for another 5 days.   -Cough improving  -Continue Duoneb q6h, can discontinue on discharge   -Normoxic, keep sats >90% with supplemental o2 PRN  -Supportive care

## 2022-03-16 NOTE — PROGRESS NOTE ADULT - PROBLEM SELECTOR PLAN 1
Pt. with advanced RUSSELL on CKD in setting of CHF and secondary FSGS.  SCr at ~1.7-1.9 (04/2019).  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 Last SCr was elevated at 3.25 on 6/8/21. Scr on admission elevated at 8.77 and in 9-10 range last few days. Discussed with pt need for starting on HD given worsening renal functions and optimizing electrolytes and volume status. Pt agreed and HD consent obtained. Pt underwent right IJ non tunelled HD catheter placement on 3/15/22. Plan for 1st HD session today. HD orders placed. Dose meds as per egfr.

## 2022-03-16 NOTE — PROGRESS NOTE ADULT - SUBJECTIVE AND OBJECTIVE BOX
VASCULAR SURGERY PROGRESS NOTE   ___________________________________________________________________    SYEDA WEAVER | 66y Male   [facility] 6TOW 612 D1   1955 | [id number]     LOS 8d    Attending: Pernell Lee    ___________________________________________________________________      Allergies:  NKDA    OBJECTIVE:  Vitals:    T(C): 36.4 (22 @ 18:27), Max: 36.9 (22 @ 04:34)  HR: 78 (22 @ 18:27) (58 - 78)  BP: 149/83 (22 @ 18:27) (108/73 - 159/71)  RR: 18 (22 @ 18:27) (18 - 18)  SpO2: 97% (22 @ 18:27) (95% - 97%)      OUT:    Voided (mL): 400 mL  Total OUT: 400 mL      OUT:  Total OUT: 0 mL          Medications:  albuterol/ipratropium for Nebulization 3 milliLiter(s) Nebulizer every 6 hours  aMIOdarone    Tablet 200 milliGRAM(s) Oral daily  amLODIPine   Tablet 10 milliGRAM(s) Oral daily  furosemide    Tablet 80 milliGRAM(s) Oral daily  guaiFENesin ER 1200 milliGRAM(s) Oral every 12 hours  hydrALAZINE 25 milliGRAM(s) Oral daily  isosorbide   dinitrate Tablet (ISORDIL) 10 milliGRAM(s) Oral three times a day  metoprolol succinate ER 25 milliGRAM(s) Oral daily  pantoprazole    Tablet 40 milliGRAM(s) Oral before breakfast  polyethylene glycol 3350 17 Gram(s) Oral daily          Laboratory:  WBC: 9.19 H&H: 9.7/30.1 Plt: 236  WBC: 9.75 H&H: 10.5/33.5 Plt: 245    Chemistry:                               Phos: 6.2 M.5  137  |  104  |  85  ----------------------------<  100  5.1   |  17  |  9.54        , 03-15                             Phos: xx M.6  138  |  102  |  81  ----------------------------<  59  5.0   |  18  |  9.61             TPro 7.3 / Alb 3.1<L> / TBili 0.4 / DBili x  / AST/AST 10/8<L> / AlkPhos 112  PTT 66.9 PT/INR 13.7/1.19          Reviewed laboratory and imaging    apixaban 2.5 Oral two times a day  aspirin enteric coated 81 Oral daily      COVID-19 PCR: Lizeth (13 Mar 2022 21:58)        The right and the left internal jugular, subclavian, axillary and   brachial veins are patent and free of thrombus.    The right basilic and the left basilic and cephalic veins, are patent and   free of clot.    There is focal THROMBOSIS of the right cephalic vein at the antecubital   fossa.    The diameters of the visualized veins are tabulated below:    Right Basilic Vein  *  prox upper arm  0.50 cm  *  mid   0.50  *  distal   0.46  *  acf  0.36  *  prox forearm  0.21  *  mid   0.21  *  distal   0.23    Right Cephalic Vein  *  prox upper arm  0.61 cm  *  mid   0.52  *  distal   0.53  *  acf  THROMBOSED  *  prox forearm  0.37  *  mid   0.38  *  distal   0.31    Left Basilic Vein  *  prox upper arm  0.44 cm  *  mid   0.55  *  distal   0.43  *  acf  0.57  *  prox forearm  0.29  *  mid   0.14  *  distal   0.12    Left Cephalic Vein  *  prox upper arm  0.35 cm  *  mid   0.36  *  distal   0.42  *  acf  0.53  *  prox forearm  0.25  *  mid   0.18  *  distal   0.19        IMPRESSION: There is focal THROMBOSIS of the right cephalic vein at the   antecubital fossa.    The diameters of the right and left cephalic and basilic veins are   entered in the table above.

## 2022-03-16 NOTE — PROGRESS NOTE ADULT - SUBJECTIVE AND OBJECTIVE BOX
Interventional Radiology Follow-Up Note    Patient seen and examined @ bedside     This is a 66y Male s/p non tunneled HD catheter on 3/15/22 in Interventional Radiology.    No complaint offered.      Medication:  aMIOdarone    Tablet: (03-16)  amLODIPine   Tablet: (03-16)  apixaban: (03-14)  apixaban: (03-15)  apixaban: (03-16)  aspirin enteric coated: (03-15)  furosemide    Tablet: (03-16)  heparin  Infusion.: (03-14)  hydrALAZINE: (03-16)  isosorbide   dinitrate Tablet (ISORDIL): (03-16)  metoprolol succinate ER: (03-16)    Vitals:  T(F): 98.4, Max: 98.5 (12:14)  HR: 70  BP: 117/73  RR: 18  SpO2: 97%    Physical Exam:  General: Nontoxic, in NAD.  Neck: right neck HD catheter site dressing c/d/i. No hematoma noted. No ttp            LABS:  Na: 137 (03-16 @ 06:18), 138 (03-15 @ 06:43), 138 (03-14 @ 07:08)  K: 5.1 (03-16 @ 06:18), 5.0 (03-15 @ 06:43), 4.5 (03-14 @ 07:08)  Cl: 104 (03-16 @ 06:18), 102 (03-15 @ 06:43), 101 (03-14 @ 07:08)  CO2: 17 (03-16 @ 06:18), 18 (03-15 @ 06:43), 17 (03-14 @ 07:08)  BUN: 85 (03-16 @ 06:18), 81 (03-15 @ 06:43), 79 (03-14 @ 07:08)  Cr: 9.54 (03-16 @ 06:18), 9.61 (03-15 @ 06:43), 9.64 (03-14 @ 07:08)  Glu: 100(03-16 @ 06:18), 59(03-15 @ 06:43), 67(03-14 @ 07:08)  Hgb: 9.7 (03-16 @ 06:18), 10.5 (03-15 @ 06:45), 10.7 (03-14 @ 07:08)  Hct: 30.1 (03-16 @ 06:18), 33.5 (03-15 @ 06:45), 34.4 (03-14 @ 07:08)  WBC: 9.19 (03-16 @ 06:18), 9.75 (03-15 @ 06:45), 10.40 (03-14 @ 07:08)  Plt: 236 (03-16 @ 06:18), 245 (03-15 @ 06:45), 243 (03-14 @ 07:08)  INR: 1.19 03-14-22 @ 07:08  PTT: 66.9 03-14-22 @ 07:08, 61.0 03-13-22 @ 08:45                     Assessment/Plan: This is a 66y Male s/p non tunneled HD catheter on 3/15/22 in Interventional Radiology.       - Okay to use catheter.  - IR will sign off.     Please call IR at  [phone] with any questions, concerns, or issues regarding above.    Also available on Teams.

## 2022-03-16 NOTE — PROGRESS NOTE ADULT - PROBLEM SELECTOR PLAN 3
Patient with anemia in the setting of ESRD. Hemoglobin below target range. Check iron profile and ferritin. Monitor hemoglobin.

## 2022-03-16 NOTE — PROGRESS NOTE ADULT - ATTENDING COMMENTS
I have seen this patient with the fellow and agree with their assessment and plan. I was physically present for significant portions of the evaluation and management (E/M) service provided.  I agree with the above history, physical, and plan which I have reviewed and edited where appropriate.    FSGS ( adaptive) and now ESRD  Starting HD today  Non tunnelled cath placed  Needs tunnelled cath this week and AVF planning started  Appreciate IR and vascular eval  please ask [facility] mgt to arrange for dialysis spot placements for pt  He follows Dr Jamal Hayes- prefer [facility] or [facility]    Omari Mcdonough MD  Pager   Office     Contact me directly via Microsoft Teams     (After 5 pm or on weekends please page the on-call fellow/attending, can check [email or web address] for schedule. Login is anne ramesh, schedule under [facility] medicine, psych, derm)

## 2022-03-16 NOTE — PROGRESS NOTE ADULT - SUBJECTIVE AND OBJECTIVE BOX
[facility] DIVISION OF KIDNEY DISEASES AND HYPERTENSION -- FOLLOW UP NOTE  --------------------------------------------------------------------------------  Chief Complaint: Advanced CKD, now on HD    24 hour events/subjective: Pt, seen and examined. Pt. underwent non tunelled HD catheter placement on 3/15/22. Plan for HD 1st session today.      PAST HISTORY  --------------------------------------------------------------------------------  No significant changes to PMH, PSH, FHx, SHx, unless otherwise noted    ALLERGIES & MEDICATIONS  --------------------------------------------------------------------------------  Allergies    No Known Allergies    Intolerances      Standing Inpatient Medications  albuterol/ipratropium for Nebulization 3 milliLiter(s) Nebulizer every 6 hours  allopurinol 100 milliGRAM(s) Oral daily  aMIOdarone    Tablet 200 milliGRAM(s) Oral daily  amLODIPine   Tablet 10 milliGRAM(s) Oral daily  apixaban 2.5 milliGRAM(s) Oral two times a day  aspirin enteric coated 81 milliGRAM(s) Oral daily  chlorhexidine 4% Liquid 1 Application(s) Topical <User Schedule>  cholecalciferol 1000 Unit(s) Oral daily  ferrous    sulfate 325 milliGRAM(s) Oral daily  finasteride 5 milliGRAM(s) Oral daily  furosemide    Tablet 80 milliGRAM(s) Oral daily  guaiFENesin ER 1200 milliGRAM(s) Oral every 12 hours  hydrALAZINE 25 milliGRAM(s) Oral daily  isosorbide   dinitrate Tablet (ISORDIL) 10 milliGRAM(s) Oral three times a day  metoprolol succinate ER 25 milliGRAM(s) Oral daily  pantoprazole    Tablet 40 milliGRAM(s) Oral before breakfast  polyethylene glycol 3350 17 Gram(s) Oral daily  simvastatin 40 milliGRAM(s) Oral at bedtime    PRN Inpatient Medications  sodium chloride 0.9% lock flush 10 milliLiter(s) IV Push every 1 hour PRN      REVIEW OF SYSTEMS  --------------------------------------------------------------------------------  Gen: No malaise  Skin: No rashes  Head/Eyes/Ears: Normal hearing,   Respiratory: cough+,  CV: No chest pain  GI: No abdominal pain, diarrhea  : as per HPI  MSK: No  edema  Heme: No easy bruising or bleedin    All other systems were reviewed and are negative, except as noted.    VITALS/PHYSICAL EXAM  --------------------------------------------------------------------------------  T(C): 36.5 (03-16-22 @ 11:07), Max: 36.9 (03-16-22 @ 04:34)  HR: 60 (03-16-22 @ 11:07) (58 - 72)  BP: 123/83 (03-16-22 @ 11:07) (108/73 - 142/90)  RR: 18 (03-16-22 @ 11:07) (18 - 18)  SpO2: 96% (03-16-22 @ 11:07) (94% - 98%)  Wt(kg): --    03-15-22 @ 07:01  -  03-16-22 @ 07:00  --------------------------------------------------------  IN: 480 mL / OUT: 400 mL / NET: 80 mL    03-16-22 @ 07:01  -  03-16-22 @ 13:47  --------------------------------------------------------  IN: 480 mL / OUT: 0 mL / NET: 480 mL    Physical Exam:  	Gen: NAD  	HEENT: Anicteric  	Pulm: fair entry, faint rales   	CV: S1S2+  	Abd: Soft, +BS   	Ext: No LE edema B/L, no asterixis  	Neuro: Awake  	Skin: Warm and dry              Vascular: right non tunneled HD catheter+      LABS/STUDIES  --------------------------------------------------------------------------------              9.7    9.19  >-----------<  236      [03-16-22 @ 06:18]              30.1     137  |  104  |  85  ----------------------------<  100      [03-16-22 @ 06:18]  5.1   |  17  |  9.54        Ca     9.2     [03-16-22 @ 06:18]      Mg     2.5     [03-16-22 @ 06:18]      Phos  6.2     [03-16-22 @ 06:18]    Creatinine Trend:  SCr 9.54 [03-16 @ 06:18]  SCr 9.61 [03-15 @ 06:43]  SCr 9.64 [03-14 @ 07:08]  SCr 9.29 [03-12 @ 07:57]  SCr 9.22 [03-11 @ 05:46]    Urinalysis - [03-09-22 @ 06:37]      Color Light Yellow / Appearance Clear / SG 1.012 / pH 6.0      Gluc 500 mg/dL / Ketone Negative  / Bili Negative / Urobili Negative       Blood Small / Protein 300 mg/dL / Leuk Est Negative / Nitrite Negative      RBC 2 / WBC 3 / Hyaline 1 / Gran  / Sq Epi  / Non Sq Epi 1 / Bacteria NegativeUrine Creatinine 54      [03-10-22 @ 13:27]  Urine Protein 201      [03-10-22 @ 13:27]

## 2022-03-16 NOTE — PROGRESS NOTE ADULT - SUBJECTIVE AND OBJECTIVE BOX
Follow-up Pulm Progress Note    states mucous thicker today   denies SOB/CP  Sats 96% RA    Medications:  MEDICATIONS  (STANDING):  albuterol/ipratropium for Nebulization 3 milliLiter(s) Nebulizer every 6 hours  allopurinol 100 milliGRAM(s) Oral daily  aMIOdarone    Tablet 200 milliGRAM(s) Oral daily  amLODIPine   Tablet 10 milliGRAM(s) Oral daily  apixaban 2.5 milliGRAM(s) Oral two times a day  aspirin enteric coated 81 milliGRAM(s) Oral daily  chlorhexidine 4% Liquid 1 Application(s) Topical <User Schedule>  cholecalciferol 1000 Unit(s) Oral daily  ferrous    sulfate 325 milliGRAM(s) Oral daily  finasteride 5 milliGRAM(s) Oral daily  furosemide    Tablet 80 milliGRAM(s) Oral daily  guaiFENesin ER 1200 milliGRAM(s) Oral every 12 hours  hydrALAZINE 25 milliGRAM(s) Oral daily  isosorbide   dinitrate Tablet (ISORDIL) 10 milliGRAM(s) Oral three times a day  metoprolol succinate ER 25 milliGRAM(s) Oral daily  pantoprazole    Tablet 40 milliGRAM(s) Oral before breakfast  polyethylene glycol 3350 17 Gram(s) Oral daily  simvastatin 40 milliGRAM(s) Oral at bedtime    MEDICATIONS  (PRN):  sodium chloride 0.9% lock flush 10 milliLiter(s) IV Push every 1 hour PRN Pre/post blood products, medications, blood draw, and to maintain line patency          Vital Signs Last 24 Hrs  T(C): 36.5 (16 Mar 2022 11:07), Max: 36.9 (15 Mar 2022 12:14)  T(F): 97.7 (16 Mar 2022 11:07), Max: 98.5 (15 Mar 2022 12:14)  HR: 60 (16 Mar 2022 11:07) (58 - 72)  BP: 123/83 (16 Mar 2022 11:07) (108/73 - 142/90)  BP(mean): --  RR: 18 (16 Mar 2022 11:07) (18 - 18)  SpO2: 96% (16 Mar 2022 11:07) (94% - 98%)          03-15 @ 07:01  -  03-16 @ 07:00  --------------------------------------------------------  IN: 480 mL / OUT: 400 mL / NET: 80 mL          LABS:                        9.7    9.19  )-----------( 236      ( 16 Mar 2022 06:18 )             30.1     03-16    137  |  104  |  85<H>  ----------------------------<  100<H>  5.1   |  17<L>  |  9.54<H>    Ca    9.2      16 Mar 2022 06:18  Phos  6.2     03-16  Mg     2.5     03-16          Physical Examination:  PULM: CTA bilaterally   CVS: S1, S2 heard    RADIOLOGY REVIEWED  CT chest: < from: CT Chest No Cont (03.09.22 @ 16:32) >  FINDINGS:    Lungs/Airways/Pleura: The central airways are patent. No pleural   effusion. There is diffuse bronchial wall thickening with segmental and   subsegmental bronchial impaction at the bases. Few scattered sub-4 mm   pulmonary nodules include right apex series 3 image 29 and left apex   image 28. No consolidation or pulmonary edema.    Mediastinum/Lymph nodes: No thoracic adenopathy.    Heart and Vessels: Cardiomegaly. LAD stent. No pericardial effusion. The   pulmonary artery measures 3.2 cm. Adjacent mid ascending aorta measures   3.2 cm.    Upper Abdomen: Unremarkable.    Osseous structures and Soft Tissues: No aggressive bone lesions.    IMPRESSION:  Diffuse bronchial wall thickening and impaction.    Sub-4 mm pulmonary nodules; if patient is considered high risk for lung   cancer, consider follow-up low dose chest CT in 12 months. If low risk,   no further follow-up is needed as per current Fleischner guidelines.      < end of copied text >

## 2022-03-16 NOTE — PROGRESS NOTE ADULT - ASSESSMENT
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 Presents as transfer from [facility] for acute heart failure exacerbation. The patient reports that he has been experiencing LIRA and SOB for 7-10 days which was progressive and now occurring at rest. Started on IV diuretics with improving respiratory status. Called to consult for diffuse bronchial wall thickening and impaction and 4mm pulmonary nodules seen on CT chest.

## 2022-03-16 NOTE — PROGRESS NOTE ADULT - ASSESSMENT
67yo M with Hx HFrEF, CAD, CKD5 2/2 FSGS, HTN, AF (on Eliquis) who presented with acute HF exacerbation 2/2 renal failure. Patient is undergoing evaluation for kidney transplant and will be started on IP HD as a bridge to transplant. Vascular Surgery consulted for AVF planning.     PLAN:   - OR planning tentative date for Monday depending on medical and cardiac optimization and clearance; will follow up  - Vein mapping noted  - LUE precautions - no blood draws, IVs, blood pressure cuffs   - Appreciate IR s/p Permcath placement   - HD per Nephrology  - C/w care per primary team  - Will follow with you     Narinder Mckeon MD PGY2  Vascular Surgery Team  [phone]

## 2022-03-16 NOTE — PROGRESS NOTE ADULT - ASSESSMENT
NICOLETTE 3/14/22: EF (Visual Estimate): 40-45 %  Moderate global left ventricular systolic dysfunction. Tethered mitral valve leaflets with normal opening. Moderate-severe mitral regurgitation.Mild atheroma noted in aortic arch/descending aorta. No left atrial or left atrial appendage thrombus  Echo 3/8/22: EF 50-55%, global lv sys fx mildly decreased, mod MR, mild TR, trace UT  Echo 5/28/21: mod MR, severe global lv sys dyxfx, mild TR, min UT  Office Echo 10/2/18: EF 50%, mild-mod MR, min AR, mild lv sys dysfx   LHC 2/21/18: PCI to LAD  NICOLETTE 2/13/18: EF 25%, severe MR, severe segmental lv sys dysfx, mild TR, mild pulm HTN     a/p   66M w/ HFrEF (EF 25%; 2021), CAD s/p stent (likely prev AWMI), CKD5 suspected to be FSGS pending kidney transplant, HTN, Afib on eliquis, PAD s/p LE stenting, enlarged prostate presents as transfer from [facility] for acute heart failure exacerbation    #Acute on Chronic Systolic HF  -clinically improved  -volume status improved  -cont lasix to 80mg PO daily per renal  -repeat echo with improved LV fx, ef 50-55%, mod MR, mild TR, trace UT  -c/w bb, hydral  -no ace/arb given russell/ckd   -cardiac pyrophosphate scan wnl     #RUSSELL on CKD   -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   -renal f/u noted- planning initiating HD this admission- s/p non tunneled HD catheter on 3/15/22     #Atrial Fibrillation/Flutter. S/P AF Ablation  -sp nicolette/ dccv 3/14 -- in NSR 1 st degree  -EP f/u, amiodarone 200 mg daily , Toprol  XL 25 mg daily  -nicolette  EF (Visual Estimate): 40-45 %  Moderate global left ventricular systolic dysfunction. Tethered mitral valve leaflets with normal opening. Moderate-severe mitral regurgitation. No left atrial or left atrial appendage thrombus  -on eliquis   -EP f/u     #Coronary Artery Disease. S/P PCI to LAD  -stable without chest pain or dyspnea  -hsT peaked, likely demand ischemia in setting of CKD and acute HF   -continue toprol, statin, and asa 81mg daily    #Mitral Regurgitation  -continue to monitor, nicolette Tethered mitral valve leaflets with normal opening. Moderate-severe mitral regurgitation.    #Hypertension  -Blood pressure controlled  -Continue current medications.     NICOLETTE 3/14/22: EF (Visual Estimate): 40-45 %  Moderate global left ventricular systolic dysfunction. Tethered mitral valve leaflets with normal opening. Moderate-severe mitral regurgitation.Mild atheroma noted in aortic arch/descending aorta. No left atrial or left atrial appendage thrombus  Echo 3/8/22: EF 50-55%, global lv sys fx mildly decreased, mod MR, mild TR, trace MI  Echo 5/28/21: mod MR, severe global lv sys dyxfx, mild TR, min MI  Office Echo 10/2/18: EF 50%, mild-mod MR, min AR, mild lv sys dysfx   LHC 2/21/18: PCI to LAD  NICOLETTE 2/13/18: EF 25%, severe MR, severe segmental lv sys dysfx, mild TR, mild pulm HTN     a/p   66M w/ HFrEF (EF 25%; 2021), CAD s/p stent (likely prev AWMI), CKD5 suspected to be FSGS pending kidney transplant, HTN, Afib on eliquis, PAD s/p LE stenting, enlarged prostate presents as transfer from [facility] for acute heart failure exacerbation    #Acute on Chronic Systolic HF  -clinically improved  -volume status improved  -cont lasix to 80mg PO daily per renal  -repeat echo with improved LV fx, ef 50-55%, mod MR, mild TR, trace MI  -c/w bb, hydral  -no ace/arb given russell/ckd   -cardiac pyrophosphate scan wnl     #RUSSELL on CKD   -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   -renal f/u noted- planning initiating HD this admission- s/p non tunneled HD catheter on 3/15/22   -UE dopplers noted focal THROMBOSIS of the right cephalic vein at the  antecubital fossa.  -vascular f/u     #THROMBOSIS of the right cephalic vein   -on a.c , vascular following    #Atrial Fibrillation/Flutter. S/P AF Ablation  -sp nicolette/ dccv 3/14 -- in NSR 1 st degree  -EP f/u, amiodarone 200 mg daily , Toprol  XL 25 mg daily  -nicolette  EF (Visual Estimate): 40-45 %  Moderate global left ventricular systolic dysfunction. Tethered mitral valve leaflets with normal opening. Moderate-severe mitral regurgitation. No left atrial or left atrial appendage thrombus  -on eliquis   -EP f/u     #Coronary Artery Disease. S/P PCI to LAD  -stable without chest pain or dyspnea  -hsT peaked, likely demand ischemia in setting of CKD and acute HF   -continue toprol, statin, and asa 81mg daily    #Mitral Regurgitation  -continue to monitor, nicolette Tethered mitral valve leaflets with normal opening. Moderate-severe mitral regurgitation.    #Hypertension  -Blood pressure controlled  -Continue current medications.

## 2022-03-17 LAB
24R-OH-CALCIDIOL SERPL-MCNC: 23.5 NG/ML — LOW (ref 30–80)
ALBUMIN SERPL ELPH-MCNC: 3.5 G/DL — SIGNIFICANT CHANGE UP (ref 3.3–5)
ALP SERPL-CCNC: 111 U/L — SIGNIFICANT CHANGE UP (ref 40–120)
ALT FLD-CCNC: 8 U/L — LOW (ref 10–45)
ANION GAP SERPL CALC-SCNC: 15 MMOL/L — SIGNIFICANT CHANGE UP (ref 5–17)
ANISOCYTOSIS BLD QL: SLIGHT — SIGNIFICANT CHANGE UP
APTT BLD: 32.9 SEC — SIGNIFICANT CHANGE UP (ref 27.5–35.5)
AST SERPL-CCNC: 11 U/L — SIGNIFICANT CHANGE UP (ref 10–40)
BASOPHILS # BLD AUTO: 0.09 K/UL — SIGNIFICANT CHANGE UP (ref 0–0.2)
BASOPHILS NFR BLD AUTO: 0.9 % — SIGNIFICANT CHANGE UP (ref 0–2)
BILIRUB SERPL-MCNC: 0.3 MG/DL — SIGNIFICANT CHANGE UP (ref 0.2–1.2)
BUN SERPL-MCNC: 60 MG/DL — HIGH (ref 7–23)
CALCIUM SERPL-MCNC: 9 MG/DL — SIGNIFICANT CHANGE UP (ref 8.4–10.5)
CALCIUM SERPL-MCNC: 9.2 MG/DL — SIGNIFICANT CHANGE UP (ref 8.4–10.5)
CHLORIDE SERPL-SCNC: 98 MMOL/L — SIGNIFICANT CHANGE UP (ref 96–108)
CO2 SERPL-SCNC: 21 MMOL/L — LOW (ref 22–31)
CREAT SERPL-MCNC: 7.71 MG/DL — HIGH (ref 0.5–1.3)
DACRYOCYTES BLD QL SMEAR: SLIGHT — SIGNIFICANT CHANGE UP
EGFR: 7 ML/MIN/1.73M2 — LOW
ELLIPTOCYTES BLD QL SMEAR: SLIGHT — SIGNIFICANT CHANGE UP
EOSINOPHIL # BLD AUTO: 0.77 K/UL — HIGH (ref 0–0.5)
EOSINOPHIL NFR BLD AUTO: 7.3 % — HIGH (ref 0–6)
FERRITIN SERPL-MCNC: 268 NG/ML — SIGNIFICANT CHANGE UP (ref 30–400)
GLUCOSE SERPL-MCNC: 103 MG/DL — HIGH (ref 70–99)
HBV SURFACE AB SER-ACNC: <3 MIU/ML — LOW
HBV SURFACE AB SER-ACNC: SIGNIFICANT CHANGE UP
HBV SURFACE AG SER-ACNC: SIGNIFICANT CHANGE UP
HCT VFR BLD CALC: 33.3 % — LOW (ref 39–50)
HCV AB S/CO SERPL IA: 0.09 S/CO — SIGNIFICANT CHANGE UP (ref 0–0.99)
HCV AB SERPL-IMP: SIGNIFICANT CHANGE UP
HGB BLD-MCNC: 10.4 G/DL — LOW (ref 13–17)
HYPOCHROMIA BLD QL: SLIGHT — SIGNIFICANT CHANGE UP
INR BLD: 1.33 RATIO — HIGH (ref 0.88–1.16)
IRON SATN MFR SERPL: 16 % — SIGNIFICANT CHANGE UP (ref 16–55)
IRON SATN MFR SERPL: 45 UG/DL — SIGNIFICANT CHANGE UP (ref 45–165)
LYMPHOCYTES # BLD AUTO: 0.87 K/UL — LOW (ref 1–3.3)
LYMPHOCYTES # BLD AUTO: 8.3 % — LOW (ref 13–44)
MACROCYTES BLD QL: SLIGHT — SIGNIFICANT CHANGE UP
MAGNESIUM SERPL-MCNC: 2.3 MG/DL — SIGNIFICANT CHANGE UP (ref 1.6–2.6)
MANUAL SMEAR VERIFICATION: SIGNIFICANT CHANGE UP
MCHC RBC-ENTMCNC: 19.5 PG — LOW (ref 27–34)
MCHC RBC-ENTMCNC: 31.2 GM/DL — LOW (ref 32–36)
MCV RBC AUTO: 62.6 FL — LOW (ref 80–100)
MONOCYTES # BLD AUTO: 0.77 K/UL — SIGNIFICANT CHANGE UP (ref 0–0.9)
MONOCYTES NFR BLD AUTO: 7.3 % — SIGNIFICANT CHANGE UP (ref 2–14)
NEUTROPHILS # BLD AUTO: 8.02 K/UL — HIGH (ref 1.8–7.4)
NEUTROPHILS NFR BLD AUTO: 76.2 % — SIGNIFICANT CHANGE UP (ref 43–77)
PHOSPHATE SERPL-MCNC: 5.8 MG/DL — HIGH (ref 2.5–4.5)
PLAT MORPH BLD: NORMAL — SIGNIFICANT CHANGE UP
PLATELET # BLD AUTO: 232 K/UL — SIGNIFICANT CHANGE UP (ref 150–400)
POIKILOCYTOSIS BLD QL AUTO: SIGNIFICANT CHANGE UP
POLYCHROMASIA BLD QL SMEAR: SLIGHT — SIGNIFICANT CHANGE UP
POTASSIUM SERPL-MCNC: 4.4 MMOL/L — SIGNIFICANT CHANGE UP (ref 3.5–5.3)
POTASSIUM SERPL-SCNC: 4.4 MMOL/L — SIGNIFICANT CHANGE UP (ref 3.5–5.3)
PROT SERPL-MCNC: 7.4 G/DL — SIGNIFICANT CHANGE UP (ref 6–8.3)
PROTHROM AB SERPL-ACNC: 15.5 SEC — HIGH (ref 10.5–13.4)
PTH-INTACT FLD-MCNC: 172 PG/ML — HIGH (ref 15–65)
RBC # BLD: 5.32 M/UL — SIGNIFICANT CHANGE UP (ref 4.2–5.8)
RBC # FLD: 18.2 % — HIGH (ref 10.3–14.5)
RBC BLD AUTO: ABNORMAL
SODIUM SERPL-SCNC: 134 MMOL/L — LOW (ref 135–145)
TARGETS BLD QL SMEAR: SIGNIFICANT CHANGE UP
TIBC SERPL-MCNC: 277 UG/DL — SIGNIFICANT CHANGE UP (ref 220–430)
UIBC SERPL-MCNC: 232 UG/DL — SIGNIFICANT CHANGE UP (ref 110–370)
VIT D25+D1,25 OH+D1,25 PNL SERPL-MCNC: 12.5 PG/ML — LOW (ref 19.9–79.3)
WBC # BLD: 10.53 K/UL — HIGH (ref 3.8–10.5)
WBC # FLD AUTO: 10.53 K/UL — HIGH (ref 3.8–10.5)

## 2022-03-17 PROCEDURE — 99232 SBSQ HOSP IP/OBS MODERATE 35: CPT | Mod: GC

## 2022-03-17 RX ORDER — HEPARIN SODIUM 5000 [USP'U]/ML
6500 INJECTION INTRAVENOUS; SUBCUTANEOUS EVERY 6 HOURS
Refills: 0 | Status: DISCONTINUED | OUTPATIENT
Start: 2022-03-17 | End: 2022-03-21

## 2022-03-17 RX ORDER — HEPARIN SODIUM 5000 [USP'U]/ML
3000 INJECTION INTRAVENOUS; SUBCUTANEOUS EVERY 6 HOURS
Refills: 0 | Status: DISCONTINUED | OUTPATIENT
Start: 2022-03-17 | End: 2022-03-21

## 2022-03-17 RX ORDER — HEPARIN SODIUM 5000 [USP'U]/ML
INJECTION INTRAVENOUS; SUBCUTANEOUS
Qty: 25000 | Refills: 0 | Status: DISCONTINUED | OUTPATIENT
Start: 2022-03-17 | End: 2022-03-21

## 2022-03-17 RX ADMIN — PANTOPRAZOLE SODIUM 40 MILLIGRAM(S): 20 TABLET, DELAYED RELEASE ORAL at 06:43

## 2022-03-17 RX ADMIN — HEPARIN SODIUM 1400 UNIT(S)/HR: 5000 INJECTION INTRAVENOUS; SUBCUTANEOUS at 21:07

## 2022-03-17 RX ADMIN — Medication 1000 UNIT(S): at 11:53

## 2022-03-17 RX ADMIN — ISOSORBIDE DINITRATE 10 MILLIGRAM(S): 5 TABLET ORAL at 11:54

## 2022-03-17 RX ADMIN — APIXABAN 2.5 MILLIGRAM(S): 2.5 TABLET, FILM COATED ORAL at 06:43

## 2022-03-17 RX ADMIN — ISOSORBIDE DINITRATE 10 MILLIGRAM(S): 5 TABLET ORAL at 06:43

## 2022-03-17 RX ADMIN — Medication 25 MILLIGRAM(S): at 06:44

## 2022-03-17 RX ADMIN — AMLODIPINE BESYLATE 10 MILLIGRAM(S): 2.5 TABLET ORAL at 06:44

## 2022-03-17 RX ADMIN — Medication 325 MILLIGRAM(S): at 11:53

## 2022-03-17 RX ADMIN — AMIODARONE HYDROCHLORIDE 200 MILLIGRAM(S): 400 TABLET ORAL at 06:44

## 2022-03-17 RX ADMIN — Medication 81 MILLIGRAM(S): at 11:53

## 2022-03-17 RX ADMIN — Medication 3 MILLILITER(S): at 06:44

## 2022-03-17 RX ADMIN — HEPARIN SODIUM 1400 UNIT(S)/HR: 5000 INJECTION INTRAVENOUS; SUBCUTANEOUS at 17:29

## 2022-03-17 RX ADMIN — Medication 3 MILLILITER(S): at 11:54

## 2022-03-17 RX ADMIN — Medication 1200 MILLIGRAM(S): at 17:29

## 2022-03-17 RX ADMIN — Medication 25 MILLIGRAM(S): at 06:43

## 2022-03-17 RX ADMIN — FINASTERIDE 5 MILLIGRAM(S): 5 TABLET, FILM COATED ORAL at 11:53

## 2022-03-17 RX ADMIN — CHLORHEXIDINE GLUCONATE 1 APPLICATION(S): 213 SOLUTION TOPICAL at 06:52

## 2022-03-17 RX ADMIN — SIMVASTATIN 40 MILLIGRAM(S): 20 TABLET, FILM COATED ORAL at 21:14

## 2022-03-17 RX ADMIN — ISOSORBIDE DINITRATE 10 MILLIGRAM(S): 5 TABLET ORAL at 16:18

## 2022-03-17 RX ADMIN — Medication 1200 MILLIGRAM(S): at 06:44

## 2022-03-17 RX ADMIN — Medication 100 MILLIGRAM(S): at 11:52

## 2022-03-17 RX ADMIN — Medication 3 MILLILITER(S): at 17:38

## 2022-03-17 RX ADMIN — Medication 80 MILLIGRAM(S): at 06:44

## 2022-03-17 NOTE — PROGRESS NOTE ADULT - PROBLEM SELECTOR PLAN 1
Pt. with advanced RUSSELL on CKD in setting of CHF and secondary FSGS.  SCr at ~1.7-1.9 (04/2019).  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 Last SCr was elevated at 3.25 on 6/8/21. Scr on admission elevated at 8.77 and in 9-10 range. Pt underwent right IJ non tunelled HD catheter placement on 3/15/22. Pt. underwent HD 1st session on 3/16/22 and tolerated well. Plan for 2nd HD session today. Vascular following for AVF. Will need tunnelled HD catheter prior to discharge. OP HD set up in progress. Pt prefer to continue care with his OP nephrologist  and prefers [facility] satellite HD center. Dose meds as per egfr. Pt. with advanced RUSSELL on CKD in setting of CHF and secondary FSGS.  SCr at ~1.7-1.9 (04/2019).  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 Last SCr was elevated at 3.25 on 6/8/21. Scr on admission elevated at 8.77 and in 9-10 range. Pt underwent right IJ non tunelled HD catheter placement on 3/15/22. Pt. underwent HD 1st session on 3/16/22 and tolerated well. Plan for 2nd HD session today. Vascular following for AVF. Will need tunnelled HD catheter prior to discharge. OP HD set up in progress.     Pt prefer to continue care with his OP nephrologist  and prefers [facility] satellite HD center. Dose meds as per egfr.

## 2022-03-17 NOTE — PROGRESS NOTE ADULT - SUBJECTIVE AND OBJECTIVE BOX
VASCULAR SURGERY PROGRESS NOTE   ___________________________________________________________________    SYEDA WEAVER | 66y Male   [facility] 6TOW 612 D1   1955 | [id number]     LOS 9d    Attending: Pernell Lee    ___________________________________________________________________      Allergies:  NKDA    OBJECTIVE:  Vitals:    T(C): 36.8 (22 @ 08:34), Max: 36.8 (22 @ 08:34)  HR: 68 (22 @ 08:34) (60 - 78)  BP: 121/76 (22 @ 08:34) (121/76 - 163/91)  RR: 18 (22 @ 08:34) (18 - 18)  SpO2: 95% (22 @ 08:34) (95% - 99%)      OUT:  Total OUT: 0 mL          Medications:  albuterol/ipratropium for Nebulization 3 milliLiter(s) Nebulizer every 6 hours  aMIOdarone    Tablet 200 milliGRAM(s) Oral daily  amLODIPine   Tablet 10 milliGRAM(s) Oral daily  furosemide    Tablet 80 milliGRAM(s) Oral daily  guaiFENesin ER 1200 milliGRAM(s) Oral every 12 hours  hydrALAZINE 25 milliGRAM(s) Oral daily  isosorbide   dinitrate Tablet (ISORDIL) 10 milliGRAM(s) Oral three times a day  metoprolol succinate ER 25 milliGRAM(s) Oral daily  pantoprazole    Tablet 40 milliGRAM(s) Oral before breakfast  polyethylene glycol 3350 17 Gram(s) Oral daily          Laboratory:  WBC: 9.19 H&H: 9.7/30.1 Plt: 236  WBC: 9.75 H&H: 10.5/33.5 Plt: 245    Chemistry:  0316                             Phos: 6.2 M.5  137  |  104  |  85  ----------------------------<  100  5.1   |  17  |  9.54        , 03-15                             Phos: xx M.6  138  |  102  |  81  ----------------------------<  59  5.0   |  18  |  9.61          PTT 66.9 PT/INR 13.7/1.19          Reviewed laboratory and imaging    apixaban 2.5 Oral two times a day  aspirin enteric coated 81 Oral daily      COVID-19 PCR: Devon (13 Mar 2022 21:58)

## 2022-03-17 NOTE — PROGRESS NOTE ADULT - ASSESSMENT
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 Patient is undergoing evaluation for kidney transplant and will be started on IP HD as a bridge to transplant. Vascular Surgery consulted for AVF planning.     PLAN:   - OR planning for Left AVF with Dr Hung date pending medical and cardiac optimization and clearance; will follow up  - Please document medical risk stratification and clearance  - Please document cardiology risk stratification and clearance   - Vein mapping noted  - LUE precautions - no blood draws, IVs, blood pressure cuffs   - Appreciate IR s/p Permcath placement   - HD per Nephrology  - C/w care per primary team  - Preoperative workup:     O COVID within 72 hours of OR (Collect one day prior to OR)     O NPO at Midnight day before OR     O PTT and PT/INR within 72 hours of OR (collect one day prior to OR)     O CBC, BMP, Mg, Phos drawn at 4AM to provide time for correction if needed (STAT activatable)     O Type and Screen X2 (One within 72 hours of OR and at least one other this admission)     O Documented optimized/cleared for OR by primary team     O No HD prior to surgery (can be postop)   - Will follow with you     Narinedr Mckeon MD PGY2  Vascular Surgery Team  [phone]

## 2022-03-17 NOTE — PROGRESS NOTE ADULT - PROBLEM SELECTOR PLAN 3
Patient with anemia in the setting of ESRD. Hemoglobin in target range. Monitor hemoglobin. Patient with anemia in the setting of ESRD. Hemoglobin in target range. Tsat 16 and ferritin 268. Recommend venofer 200 mg x 5 days. Monitor hemoglobin.

## 2022-03-17 NOTE — PROGRESS NOTE ADULT - SUBJECTIVE AND OBJECTIVE BOX
Follow-up Pulm Progress Note    No new respiratory events overnight.  Denies SOB/CP.     Medications:  MEDICATIONS  (STANDING):  albuterol/ipratropium for Nebulization 3 milliLiter(s) Nebulizer every 6 hours  allopurinol 100 milliGRAM(s) Oral daily  aMIOdarone    Tablet 200 milliGRAM(s) Oral daily  amLODIPine   Tablet 10 milliGRAM(s) Oral daily  aspirin enteric coated 81 milliGRAM(s) Oral daily  chlorhexidine 4% Liquid 1 Application(s) Topical <User Schedule>  cholecalciferol 1000 Unit(s) Oral daily  ferrous    sulfate 325 milliGRAM(s) Oral daily  finasteride 5 milliGRAM(s) Oral daily  furosemide    Tablet 80 milliGRAM(s) Oral daily  guaiFENesin ER 1200 milliGRAM(s) Oral every 12 hours  heparin  Infusion.  Unit(s)/Hr (14 mL/Hr) IV Continuous <Continuous>  hydrALAZINE 25 milliGRAM(s) Oral daily  isosorbide   dinitrate Tablet (ISORDIL) 10 milliGRAM(s) Oral three times a day  metoprolol succinate ER 25 milliGRAM(s) Oral daily  pantoprazole    Tablet 40 milliGRAM(s) Oral before breakfast  polyethylene glycol 3350 17 Gram(s) Oral daily  simvastatin 40 milliGRAM(s) Oral at bedtime    MEDICATIONS  (PRN):  heparin   Injectable 6500 Unit(s) IV Push every 6 hours PRN For aPTT less than 40  heparin   Injectable 3000 Unit(s) IV Push every 6 hours PRN For aPTT between 40 - 57  sodium chloride 0.9% lock flush 10 milliLiter(s) IV Push every 1 hour PRN Pre/post blood products, medications, blood draw, and to maintain line patency          Vital Signs Last 24 Hrs  T(C): 36.8 (17 Mar 2022 08:34), Max: 36.8 (17 Mar 2022 08:34)  T(F): 98.3 (17 Mar 2022 08:34), Max: 98.3 (17 Mar 2022 08:34)  HR: 68 (17 Mar 2022 08:34) (60 - 78)  BP: 121/76 (17 Mar 2022 08:34) (121/76 - 163/91)  BP(mean): --  RR: 18 (17 Mar 2022 08:34) (18 - 18)  SpO2: 95% (17 Mar 2022 08:34) (95% - 99%)          03-16 @ 07:01  -  03-17 @ 07:00  --------------------------------------------------------  IN: 720 mL / OUT: 500 mL / NET: 220 mL          LABS:                        10.4   10.53 )-----------( 232      ( 17 Mar 2022 09:42 )             33.3     03-17    134<L>  |  98  |  60<H>  ----------------------------<  103<H>  4.4   |  21<L>  |  7.71<H>    Ca    9.2      17 Mar 2022 09:42  Phos  5.8     03-17  Mg     2.3     03-17    TPro  7.4  /  Alb  3.5  /  TBili  0.3  /  DBili  x   /  AST  11  /  ALT  8<L>  /  AlkPhos  111  03-17              Physical Examination:  PULM: CTA bilaterally   CVS: S1, S2 heard    RADIOLOGY REVIEWED  CT chest: < from: CT Chest No Cont (03.09.22 @ 16:32) >  FINDINGS:    Lungs/Airways/Pleura: The central airways are patent. No pleural   effusion. There is diffuse bronchial wall thickening with segmental and   subsegmental bronchial impaction at the bases. Few scattered sub-4 mm   pulmonary nodules include right apex series 3 image 29 and left apex   image 28. No consolidation or pulmonary edema.    Mediastinum/Lymph nodes: No thoracic adenopathy.    Heart and Vessels: Cardiomegaly. LAD stent. No pericardial effusion. The   pulmonary artery measures 3.2 cm. Adjacent mid ascending aorta measures   3.2 cm.    Upper Abdomen: Unremarkable.    Osseous structures and Soft Tissues: No aggressive bone lesions.    IMPRESSION:  Diffuse bronchial wall thickening and impaction.    Sub-4 mm pulmonary nodules; if patient is considered high risk for lung   cancer, consider follow-up low dose chest CT in 12 months. If low risk,   no further follow-up is needed as per current Fleischner guidelines.      < end of copied text >

## 2022-03-17 NOTE — PROGRESS NOTE ADULT - ATTENDING COMMENTS
I have seen this patient with the fellow and agree with their assessment and plan. I was physically present for significant portions of the evaluation and management (E/M) service provided.  I agree with the above history, physical, and plan which I have reviewed and edited where appropriate.    tolerating first HD well  Second Hd today and 3rd tomorrow  Awaiting tunnelled hd catheter before dc by IR  AVF planning ongoing and can get AVF even outpatient  Pt prefer to continue care with his OP nephrologist  and prefers [facility] HD center on discharge  Dose meds as per egfr.    Omari Mcdonough MD  Pager   Office     Contact me directly via [employer] Teams     (After 5 pm or on weekends please page the on-call fellow/attending, can check [email or web address] for schedule. Login is anne [email or web address], schedule under [facility] medicine, psych, derm)

## 2022-03-17 NOTE — PROGRESS NOTE ADULT - SUBJECTIVE AND OBJECTIVE BOX
[facility] DIVISION OF KIDNEY DISEASES AND HYPERTENSION -- FOLLOW UP NOTE  --------------------------------------------------------------------------------  Chief Complaint: Advanced CKD, now on HD    24 hour events/subjective: Pt, seen and examined., reports feeling well. Pt. underwent non tunelled HD catheter placement on 3/15/22. Pt. underwent HD 1st session on 3/16/22 and tolerated well. Plan for 2nd HD session today.      PAST HISTORY  --------------------------------------------------------------------------------  No significant changes to PMH, PSH, FHx, SHx, unless otherwise noted    ALLERGIES & MEDICATIONS  --------------------------------------------------------------------------------  Allergies    No Known Allergies    Intolerances      Standing Inpatient Medications  albuterol/ipratropium for Nebulization 3 milliLiter(s) Nebulizer every 6 hours  allopurinol 100 milliGRAM(s) Oral daily  aMIOdarone    Tablet 200 milliGRAM(s) Oral daily  amLODIPine   Tablet 10 milliGRAM(s) Oral daily  aspirin enteric coated 81 milliGRAM(s) Oral daily  chlorhexidine 4% Liquid 1 Application(s) Topical <User Schedule>  cholecalciferol 1000 Unit(s) Oral daily  ferrous    sulfate 325 milliGRAM(s) Oral daily  finasteride 5 milliGRAM(s) Oral daily  furosemide    Tablet 80 milliGRAM(s) Oral daily  guaiFENesin ER 1200 milliGRAM(s) Oral every 12 hours  heparin  Infusion.  Unit(s)/Hr IV Continuous <Continuous>  hydrALAZINE 25 milliGRAM(s) Oral daily  isosorbide   dinitrate Tablet (ISORDIL) 10 milliGRAM(s) Oral three times a day  metoprolol succinate ER 25 milliGRAM(s) Oral daily  pantoprazole    Tablet 40 milliGRAM(s) Oral before breakfast  polyethylene glycol 3350 17 Gram(s) Oral daily  simvastatin 40 milliGRAM(s) Oral at bedtime    PRN Inpatient Medications  heparin   Injectable 3000 Unit(s) IV Push every 6 hours PRN  heparin   Injectable 6500 Unit(s) IV Push every 6 hours PRN  sodium chloride 0.9% lock flush 10 milliLiter(s) IV Push every 1 hour PRN      REVIEW OF SYSTEMS  --------------------------------------------------------------------------------  Gen: No malaise  Skin: No rashes  Head/Eyes/Ears: Normal hearing,   Respiratory: cough+,  CV: No chest pain  GI: No abdominal pain, diarrhea  : as per HPI  MSK: No  edema  Heme: No easy bruising or bleedin  All other systems were reviewed and are negative, except as noted.    VITALS/PHYSICAL EXAM  --------------------------------------------------------------------------------  T(C): 36.7 (03-17-22 @ 11:56), Max: 36.8 (03-17-22 @ 08:34)  HR: 62 (03-17-22 @ 11:56) (60 - 78)  BP: 136/81 (03-17-22 @ 11:56) (121/76 - 163/91)  RR: 18 (03-17-22 @ 11:56) (18 - 18)  SpO2: 96% (03-17-22 @ 11:56) (95% - 99%)  Wt(kg): -      03-16-22 @ 07:01  -  03-17-22 @ 07:00  --------------------------------------------------------  IN: 720 mL / OUT: 500 mL / NET: 220 mL    Physical Exam:  	Gen: NAD  	HEENT: Anicteric  	Pulm: fair entry, faint rales   	CV: S1S2+  	Abd: Soft, +BS   	Ext: No LE edema B/L, no asterixis  	Neuro: Awake  	Skin: Warm and dry              Vascular: right non tunneled HD catheter+    LABS/STUDIES  --------------------------------------------------------------------------------              10.4   10.53 >-----------<  232      [03-17-22 @ 09:42]              33.3     134  |  98  |  60  ----------------------------<  103      [03-17-22 @ 09:42]  4.4   |  21  |  7.71        Ca     9.2     [03-17-22 @ 09:42]      Mg     2.3     [03-17-22 @ 09:42]      Phos  5.8     [03-17-22 @ 09:42]    Creatinine Trend:  SCr 7.71 [03-17 @ 09:42]  SCr 9.54 [03-16 @ 06:18]  SCr 9.61 [03-15 @ 06:43]  SCr 9.64 [03-14 @ 07:08]  SCr 9.29 [03-12 @ 07:57]    Urinalysis - [03-09-22 @ 06:37]      Color Light Yellow / Appearance Clear / SG 1.012 / pH 6.0      Gluc 500 mg/dL / Ketone Negative  / Bili Negative / Urobili Negative       Blood Small / Protein 300 mg/dL / Leuk Est Negative / Nitrite Negative      RBC 2 / WBC 3 / Hyaline 1 / Gran  / Sq Epi  / Non Sq Epi 1 / Bacteria Negative    Urine Creatinine 54      [03-10-22 @ 13:27]  Urine Protein 201      [03-10-22 @ 13:27]    HBsAb <3.0      [03-17-22 @ 00:04]  HBsAb Nonreact      [03-17-22 @ 00:04]  HBsAg Nonreact      [03-17-22 @ 00:04]  HCV 0.09, Nonreact      [03-17-22 @ 00:04]

## 2022-03-17 NOTE — PROGRESS NOTE ADULT - SUBJECTIVE AND OBJECTIVE BOX
CARDIOLOGY FOLLOW UP - Dr. Lee  DATE OF SERVICE: 3/17/22     CC no cp or sob        REVIEW OF SYSTEMS:  CONSTITUTIONAL: No fever, weight loss, or fatigue  RESPIRATORY: No cough, wheezing, chills or hemoptysis; No Shortness of Breath  CARDIOVASCULAR: No chest pain, palpitations, passing out, dizziness, or leg swelling  GASTROINTESTINAL: No abdominal or epigastric pain. No nausea, vomiting, or hematemesis; No diarrhea or constipation. No melena or hematochezia.  VASCULAR: No edema     PHYSICAL EXAM:  T(C): 36.7 (03-17-22 @ 11:56), Max: 36.8 (03-17-22 @ 08:34)  HR: 62 (03-17-22 @ 11:56) (60 - 78)  BP: 136/81 (03-17-22 @ 11:56) (121/76 - 163/91)  RR: 18 (03-17-22 @ 11:56) (18 - 18)  SpO2: 96% (03-17-22 @ 11:56) (95% - 99%)  Wt(kg): --  I&O's Summary    16 Mar 2022 07:01  -  17 Mar 2022 07:00  --------------------------------------------------------  IN: 720 mL / OUT: 500 mL / NET: 220 mL        Appearance: Normal	  Cardiovascular: Normal S1 S2,RRR, No JVD, No murmurs  Respiratory: Lungs clear to auscultation	  Gastrointestinal:  Soft, Non-tender, + BS	  Extremities: Normal range of motion, No clubbing, cyanosis or edema      Home Medications:  allopurinol 100 mg oral tablet: 1 tab(s) orally once a day (09 Mar 2022 02:11)  amiodarone 200 mg oral tablet: 1 tab(s) orally once a day (09 Mar 2022 02:11)  amLODIPine 10 mg oral tablet: 1 tab(s) orally once a day (09 Mar 2022 02:11)  Aspirin Enteric Coated 81 mg oral delayed release tablet: 1 tab(s) orally once a day (09 Mar 2022 02:11)  Eliquis 5 mg oral tablet: 1 tab(s) orally 2 times a day (09 Mar 2022 02:11)  Farxiga 5 mg oral tablet: 1 tab(s) orally once a day (09 Mar 2022 02:11)  ferrous sulfate 325 mg (65 mg elemental iron) oral tablet: 1 tab(s) orally once a day (09 Mar 2022 02:11)  finasteride 5 mg oral tablet: 1 tab(s) orally once a day (09 Mar 2022 02:11)  furosemide 40 mg oral tablet: 1 tab(s) orally once a day (09 Mar 2022 02:11)  hydrALAZINE 25 mg oral tablet: 1 tab(s) orally once a day (09 Mar 2022 02:11)  isosorbide dinitrate 10 mg oral tablet: 1 tab(s) orally 3 times a day (09 Mar 2022 02:11)  metoprolol succinate 25 mg oral tablet, extended release: 1 tab(s) orally once a day (09 Mar 2022 02:11)  pantoprazole 40 mg oral delayed release tablet: 1 tab(s) orally once a day (09 Mar 2022 02:11)  simvastatin 40 mg oral tablet: 1 tab(s) orally once a day (at bedtime) (09 Mar 2022 02:11)  Vitamin D3 25 mcg (1000 intl units) oral capsule: 1 cap(s) orally once a day (09 Mar 2022 02:11)      MEDICATIONS  (STANDING):  albuterol/ipratropium for Nebulization 3 milliLiter(s) Nebulizer every 6 hours  allopurinol 100 milliGRAM(s) Oral daily  aMIOdarone    Tablet 200 milliGRAM(s) Oral daily  amLODIPine   Tablet 10 milliGRAM(s) Oral daily  aspirin enteric coated 81 milliGRAM(s) Oral daily  chlorhexidine 4% Liquid 1 Application(s) Topical <User Schedule>  cholecalciferol 1000 Unit(s) Oral daily  ferrous    sulfate 325 milliGRAM(s) Oral daily  finasteride 5 milliGRAM(s) Oral daily  furosemide    Tablet 80 milliGRAM(s) Oral daily  guaiFENesin ER 1200 milliGRAM(s) Oral every 12 hours  heparin  Infusion.  Unit(s)/Hr (14 mL/Hr) IV Continuous <Continuous>  hydrALAZINE 25 milliGRAM(s) Oral daily  isosorbide   dinitrate Tablet (ISORDIL) 10 milliGRAM(s) Oral three times a day  metoprolol succinate ER 25 milliGRAM(s) Oral daily  pantoprazole    Tablet 40 milliGRAM(s) Oral before breakfast  polyethylene glycol 3350 17 Gram(s) Oral daily  simvastatin 40 milliGRAM(s) Oral at bedtime      TELEMETRY: nsr   brief AIVR, Brief ? 2nd type 1	    ECG:  	  RADIOLOGY:   DIAGNOSTIC TESTING:  [ ] Echocardiogram:  [ ]  Catheterization:  [ ] Stress Test:    OTHER: 	    LABS:	 	                            10.4   10.53 )-----------( 232      ( 17 Mar 2022 09:42 )             33.3     03-17    134<L>  |  98  |  60<H>  ----------------------------<  103<H>  4.4   |  21<L>  |  7.71<H>    Ca    9.2      17 Mar 2022 09:42  Phos  5.8     03-17  Mg     2.3     03-17    TPro  7.4  /  Alb  3.5  /  TBili  0.3  /  DBili  x   /  AST  11  /  ALT  8<L>  /  AlkPhos  111  03-17    PT/INR - ( 17 Mar 2022 10:57 )   PT: 15.5 sec;   INR: 1.33 ratio         PTT - ( 17 Mar 2022 10:57 )  PTT:32.9 sec

## 2022-03-17 NOTE — PROGRESS NOTE ADULT - ASSESSMENT
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 Presents as transfer from [facility] for acute heart failure exacerbation. The patient reports that he has been experiencing LIRA and SOB for 7-10 days which was progressive and now occurring at rest. Started on IV diuretics with improving respiratory status. Called to consult for diffuse bronchial wall thickening and impaction and 4mm pulmonary nodules seen on CT chest.

## 2022-03-18 LAB
ALBUMIN SERPL ELPH-MCNC: 3.1 G/DL — LOW (ref 3.3–5)
ALP SERPL-CCNC: 111 U/L — SIGNIFICANT CHANGE UP (ref 40–120)
ALT FLD-CCNC: 9 U/L — LOW (ref 10–45)
ANION GAP SERPL CALC-SCNC: 16 MMOL/L — SIGNIFICANT CHANGE UP (ref 5–17)
APTT BLD: 49.1 SEC — HIGH (ref 27.5–35.5)
APTT BLD: 63.6 SEC — HIGH (ref 27.5–35.5)
APTT BLD: 81.3 SEC — HIGH (ref 27.5–35.5)
AST SERPL-CCNC: 11 U/L — SIGNIFICANT CHANGE UP (ref 10–40)
BASOPHILS # BLD AUTO: 0.08 K/UL — SIGNIFICANT CHANGE UP (ref 0–0.2)
BASOPHILS NFR BLD AUTO: 0.7 % — SIGNIFICANT CHANGE UP (ref 0–2)
BILIRUB SERPL-MCNC: 0.4 MG/DL — SIGNIFICANT CHANGE UP (ref 0.2–1.2)
BUN SERPL-MCNC: 45 MG/DL — HIGH (ref 7–23)
CALCIUM SERPL-MCNC: 9.1 MG/DL — SIGNIFICANT CHANGE UP (ref 8.4–10.5)
CHLORIDE SERPL-SCNC: 96 MMOL/L — SIGNIFICANT CHANGE UP (ref 96–108)
CO2 SERPL-SCNC: 20 MMOL/L — LOW (ref 22–31)
CREAT SERPL-MCNC: 6.36 MG/DL — HIGH (ref 0.5–1.3)
EGFR: 9 ML/MIN/1.73M2 — LOW
EOSINOPHIL # BLD AUTO: 0.86 K/UL — HIGH (ref 0–0.5)
EOSINOPHIL NFR BLD AUTO: 7.6 % — HIGH (ref 0–6)
GLUCOSE SERPL-MCNC: 89 MG/DL — SIGNIFICANT CHANGE UP (ref 70–99)
HCT VFR BLD CALC: 32.9 % — LOW (ref 39–50)
HCT VFR BLD CALC: 32.9 % — LOW (ref 39–50)
HGB BLD-MCNC: 10.4 G/DL — LOW (ref 13–17)
HGB BLD-MCNC: 10.5 G/DL — LOW (ref 13–17)
IMM GRANULOCYTES NFR BLD AUTO: 0.6 % — SIGNIFICANT CHANGE UP (ref 0–1.5)
LYMPHOCYTES # BLD AUTO: 1.5 K/UL — SIGNIFICANT CHANGE UP (ref 1–3.3)
LYMPHOCYTES # BLD AUTO: 13.2 % — SIGNIFICANT CHANGE UP (ref 13–44)
MAGNESIUM SERPL-MCNC: 2 MG/DL — SIGNIFICANT CHANGE UP (ref 1.6–2.6)
MCHC RBC-ENTMCNC: 19.3 PG — LOW (ref 27–34)
MCHC RBC-ENTMCNC: 19.5 PG — LOW (ref 27–34)
MCHC RBC-ENTMCNC: 31.6 GM/DL — LOW (ref 32–36)
MCHC RBC-ENTMCNC: 31.9 GM/DL — LOW (ref 32–36)
MCV RBC AUTO: 60.9 FL — LOW (ref 80–100)
MCV RBC AUTO: 61.2 FL — LOW (ref 80–100)
MONOCYTES # BLD AUTO: 1.13 K/UL — HIGH (ref 0–0.9)
MONOCYTES NFR BLD AUTO: 10 % — SIGNIFICANT CHANGE UP (ref 2–14)
NEUTROPHILS # BLD AUTO: 7.71 K/UL — HIGH (ref 1.8–7.4)
NEUTROPHILS NFR BLD AUTO: 67.9 % — SIGNIFICANT CHANGE UP (ref 43–77)
NRBC # BLD: 0 /100 WBCS — SIGNIFICANT CHANGE UP (ref 0–0)
NRBC # BLD: 0 /100 WBCS — SIGNIFICANT CHANGE UP (ref 0–0)
PHOSPHATE SERPL-MCNC: 5.3 MG/DL — HIGH (ref 2.5–4.5)
PLATELET # BLD AUTO: 218 K/UL — SIGNIFICANT CHANGE UP (ref 150–400)
PLATELET # BLD AUTO: 228 K/UL — SIGNIFICANT CHANGE UP (ref 150–400)
POTASSIUM SERPL-MCNC: 4.3 MMOL/L — SIGNIFICANT CHANGE UP (ref 3.5–5.3)
POTASSIUM SERPL-SCNC: 4.3 MMOL/L — SIGNIFICANT CHANGE UP (ref 3.5–5.3)
PROT SERPL-MCNC: 7.3 G/DL — SIGNIFICANT CHANGE UP (ref 6–8.3)
RBC # BLD: 5.38 M/UL — SIGNIFICANT CHANGE UP (ref 4.2–5.8)
RBC # BLD: 5.4 M/UL — SIGNIFICANT CHANGE UP (ref 4.2–5.8)
RBC # FLD: 17.6 % — HIGH (ref 10.3–14.5)
RBC # FLD: 18 % — HIGH (ref 10.3–14.5)
SODIUM SERPL-SCNC: 132 MMOL/L — LOW (ref 135–145)
WBC # BLD: 11.35 K/UL — HIGH (ref 3.8–10.5)
WBC # BLD: 9.27 K/UL — SIGNIFICANT CHANGE UP (ref 3.8–10.5)
WBC # FLD AUTO: 11.35 K/UL — HIGH (ref 3.8–10.5)
WBC # FLD AUTO: 9.27 K/UL — SIGNIFICANT CHANGE UP (ref 3.8–10.5)

## 2022-03-18 PROCEDURE — 99233 SBSQ HOSP IP/OBS HIGH 50: CPT | Mod: GC

## 2022-03-18 RX ADMIN — Medication 100 MILLIGRAM(S): at 17:15

## 2022-03-18 RX ADMIN — HEPARIN SODIUM 1600 UNIT(S)/HR: 5000 INJECTION INTRAVENOUS; SUBCUTANEOUS at 16:01

## 2022-03-18 RX ADMIN — AMIODARONE HYDROCHLORIDE 200 MILLIGRAM(S): 400 TABLET ORAL at 05:02

## 2022-03-18 RX ADMIN — HEPARIN SODIUM 3000 UNIT(S): 5000 INJECTION INTRAVENOUS; SUBCUTANEOUS at 00:55

## 2022-03-18 RX ADMIN — Medication 3 MILLILITER(S): at 00:01

## 2022-03-18 RX ADMIN — FINASTERIDE 5 MILLIGRAM(S): 5 TABLET, FILM COATED ORAL at 17:15

## 2022-03-18 RX ADMIN — Medication 80 MILLIGRAM(S): at 05:02

## 2022-03-18 RX ADMIN — HEPARIN SODIUM 1600 UNIT(S)/HR: 5000 INJECTION INTRAVENOUS; SUBCUTANEOUS at 08:21

## 2022-03-18 RX ADMIN — ISOSORBIDE DINITRATE 10 MILLIGRAM(S): 5 TABLET ORAL at 05:02

## 2022-03-18 RX ADMIN — AMLODIPINE BESYLATE 10 MILLIGRAM(S): 2.5 TABLET ORAL at 05:02

## 2022-03-18 RX ADMIN — CHLORHEXIDINE GLUCONATE 1 APPLICATION(S): 213 SOLUTION TOPICAL at 05:27

## 2022-03-18 RX ADMIN — HEPARIN SODIUM 1600 UNIT(S)/HR: 5000 INJECTION INTRAVENOUS; SUBCUTANEOUS at 08:10

## 2022-03-18 RX ADMIN — HEPARIN SODIUM 1600 UNIT(S)/HR: 5000 INJECTION INTRAVENOUS; SUBCUTANEOUS at 19:47

## 2022-03-18 RX ADMIN — Medication 25 MILLIGRAM(S): at 05:01

## 2022-03-18 RX ADMIN — Medication 3 MILLILITER(S): at 23:19

## 2022-03-18 RX ADMIN — Medication 3 MILLILITER(S): at 05:13

## 2022-03-18 RX ADMIN — ISOSORBIDE DINITRATE 10 MILLIGRAM(S): 5 TABLET ORAL at 17:15

## 2022-03-18 RX ADMIN — HEPARIN SODIUM 1600 UNIT(S)/HR: 5000 INJECTION INTRAVENOUS; SUBCUTANEOUS at 00:54

## 2022-03-18 RX ADMIN — Medication 1000 UNIT(S): at 17:15

## 2022-03-18 RX ADMIN — ISOSORBIDE DINITRATE 10 MILLIGRAM(S): 5 TABLET ORAL at 12:08

## 2022-03-18 RX ADMIN — PANTOPRAZOLE SODIUM 40 MILLIGRAM(S): 20 TABLET, DELAYED RELEASE ORAL at 05:02

## 2022-03-18 RX ADMIN — Medication 325 MILLIGRAM(S): at 17:15

## 2022-03-18 RX ADMIN — Medication 25 MILLIGRAM(S): at 05:02

## 2022-03-18 RX ADMIN — SIMVASTATIN 40 MILLIGRAM(S): 20 TABLET, FILM COATED ORAL at 21:16

## 2022-03-18 RX ADMIN — Medication 3 MILLILITER(S): at 12:33

## 2022-03-18 RX ADMIN — Medication 81 MILLIGRAM(S): at 17:16

## 2022-03-18 RX ADMIN — Medication 1200 MILLIGRAM(S): at 17:15

## 2022-03-18 RX ADMIN — Medication 1200 MILLIGRAM(S): at 05:01

## 2022-03-18 RX ADMIN — Medication 3 MILLILITER(S): at 17:17

## 2022-03-18 NOTE — PROGRESS NOTE ADULT - ATTENDING COMMENTS
I have seen this patient with the fellow and agree with their assessment and plan. I was physically present for significant portions of the evaluation and management (E/M) service provided.  I agree with the above history, physical, and plan which I have reviewed and edited where appropriate.    Pt underwent right IJ non tunelled HD catheter placement on 3/15/22. Pt. underwent HD 1st session on 3/16/22 and 2nd session on 3/17 and tolerated well. Plan for 3rd HD session tomorrow. Vascular following for AVF, plan for 3/21 afternoon.   Will do 3rd HD session tomorrow AM and then first shift 3/21 AM ( 3 hours) to optimize before OR on 3/21 for AVF  Please arrange for tunnelled Hd catheter by IR for 3/22 then  Pt to go either to [facility] unit or [facility] for outpatient dialysis per his choice.  working on the options    Omari Mcdonough MD  Pager   Office     Contact me directly via [employer] Teams     (After 5 pm or on weekends please page the on-call fellow/attending, can check [email or web address] for schedule. Login is [email or web address], schedule under [facility] medicine, psych, derm)    For weekend please contact Dr. Nolan Solares( fellow) and Dr. Katie Valles ( Attending)

## 2022-03-18 NOTE — PROGRESS NOTE ADULT - PROBLEM SELECTOR PLAN 3
Patient with anemia in the setting of ESRD. Hemoglobin in target range. Tsat 16 and ferritin 268. Recommend venofer 200 mg x 5 days. Monitor hemoglobin.

## 2022-03-18 NOTE — PROGRESS NOTE ADULT - SUBJECTIVE AND OBJECTIVE BOX
[facility] DIVISION OF KIDNEY DISEASES AND HYPERTENSION -- FOLLOW UP NOTE  --------------------------------------------------------------------------------  Chief Complaint: Advanced CKD, now on HD    24 hour events/subjective: Pt, seen and examined., reports feeling well. Pt. underwent non tunelled HD catheter placement on 3/15/22. Pt. underwent HD 1st session on 3/16/22 and 2nd session on 3/17 and tolerated well. Plan for 3rd HD session today. Plan for AVF next week noted.       PAST HISTORY  --------------------------------------------------------------------------------  No significant changes to PMH, PSH, FHx, SHx, unless otherwise noted    ALLERGIES & MEDICATIONS  --------------------------------------------------------------------------------  Allergies    No Known Allergies    Intolerances      Standing Inpatient Medications  albuterol/ipratropium for Nebulization 3 milliLiter(s) Nebulizer every 6 hours  allopurinol 100 milliGRAM(s) Oral daily  aMIOdarone    Tablet 200 milliGRAM(s) Oral daily  amLODIPine   Tablet 10 milliGRAM(s) Oral daily  aspirin enteric coated 81 milliGRAM(s) Oral daily  chlorhexidine 4% Liquid 1 Application(s) Topical <User Schedule>  cholecalciferol 1000 Unit(s) Oral daily  ferrous    sulfate 325 milliGRAM(s) Oral daily  finasteride 5 milliGRAM(s) Oral daily  furosemide    Tablet 80 milliGRAM(s) Oral daily  guaiFENesin ER 1200 milliGRAM(s) Oral every 12 hours  heparin  Infusion.  Unit(s)/Hr IV Continuous <Continuous>  hydrALAZINE 25 milliGRAM(s) Oral daily  isosorbide   dinitrate Tablet (ISORDIL) 10 milliGRAM(s) Oral three times a day  metoprolol succinate ER 25 milliGRAM(s) Oral daily  pantoprazole    Tablet 40 milliGRAM(s) Oral before breakfast  polyethylene glycol 3350 17 Gram(s) Oral daily  simvastatin 40 milliGRAM(s) Oral at bedtime    PRN Inpatient Medications  heparin   Injectable 6500 Unit(s) IV Push every 6 hours PRN  heparin   Injectable 3000 Unit(s) IV Push every 6 hours PRN  sodium chloride 0.9% lock flush 10 milliLiter(s) IV Push every 1 hour PRN      REVIEW OF SYSTEMS  --------------------------------------------------------------------------------  Gen: No malaise  Skin: No rashes  Head/Eyes/Ears: Normal hearing,   Respiratory: cough+,  CV: No chest pain  GI: No abdominal pain, diarrhea  : as per HPI  MSK: No  edema  Heme: No easy bruising or bleedin    All other systems were reviewed and are negative, except as noted.    VITALS/PHYSICAL EXAM  --------------------------------------------------------------------------------  T(C): 37.1 (03-18-22 @ 08:18), Max: 37.1 (03-18-22 @ 08:18)  HR: 68 (03-18-22 @ 08:18) (57 - 68)  BP: 150/78 (03-18-22 @ 08:18) (136/81 - 158/73)  RR: 18 (03-18-22 @ 08:18) (18 - 18)  SpO2: 98% (03-18-22 @ 08:18) (94% - 98%)  Wt(kg): --      03-17-22 @ 07:01  -  03-18-22 @ 07:00  --------------------------------------------------------  IN: 1400 mL / OUT: 1100 mL / NET: 300 mL    03-18-22 @ 07:01  -  03-18-22 @ 09:54  --------------------------------------------------------  IN: 360 mL / OUT: 0 mL / NET: 360 mL    Physical Exam:  		Gen: NAD  	HEENT: Anicteric  	Pulm: fair entry, faint rales   	CV: S1S2+  	Abd: Soft, +BS   	Ext: No LE edema B/L, no asterixis  	Neuro: Awake  	Skin: Warm and dry              Vascular: right non tunneled HD catheter+    LABS/STUDIES  --------------------------------------------------------------------------------              10.5   11.35 >-----------<  228      [03-18-22 @ 07:18]              32.9     132  |  96  |  45  ----------------------------<  89      [03-18-22 @ 07:16]  4.3   |  20  |  6.36        Ca     9.1     [03-18-22 @ 07:16]      Mg     2.0     [03-18-22 @ 07:16]      Phos  5.3     [03-18-22 @ 07:16]  Creatinine Trend:  SCr 6.36 [03-18 @ 07:16]  SCr 7.71 [03-17 @ 09:42]  SCr 9.54 [03-16 @ 06:18]  SCr 9.61 [03-15 @ 06:43]  SCr 9.64 [03-14 @ 07:08]    Urinalysis - [03-09-22 @ 06:37]      Color Light Yellow / Appearance Clear / SG 1.012 / pH 6.0      Gluc 500 mg/dL / Ketone Negative  / Bili Negative / Urobili Negative       Blood Small / Protein 300 mg/dL / Leuk Est Negative / Nitrite Negative      RBC 2 / WBC 3 / Hyaline 1 / Gran  / Sq Epi  / Non Sq Epi 1 / Bacteria Negative      HBsAb <3.0      [03-17-22 @ 00:04]  HBsAb Nonreact      [03-17-22 @ 00:04]  HBsAg Nonreact      [03-17-22 @ 00:04]  HCV 0.09, Nonreact      [03-17-22 @ 00:04]

## 2022-03-18 NOTE — PROGRESS NOTE ADULT - PROBLEM SELECTOR PLAN 1
Pt. with advanced RUSSELL on CKD in setting of CHF and secondary FSGS.  SCr at ~1.7-1.9 (04/2019).  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 Last SCr was elevated at 3.25 on 6/8/21. Scr on admission elevated at 8.77 and in 9-10 range. Pt underwent right IJ non tunelled HD catheter placement on 3/15/22. Pt. underwent HD 1st session on 3/16/22 and 2nd session on 3/17 and tolerated well. Plan for 3rd HD session today. Vascular following for AVF, plan for next week. Will need tunnelled HD catheter prior to discharge. OP HD set up in progress.     Pt prefer to continue care with his OP nephrologist  and prefers [facility] HD center. Dose meds as per egfr. Pt. with advanced RUSSELL on CKD in setting of CHF and secondary FSGS.  SCr at ~1.7-1.9 (04/2019).  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 Last SCr was elevated at 3.25 on 6/8/21. Scr on admission elevated at 8.77 and in 9-10 range. Pt underwent right IJ non tunelled HD catheter placement on 3/15/22. Pt. underwent HD 1st session on 3/16/22 and 2nd session on 3/17 and tolerated well. Plan for 3rd HD session tomorrow. Vascular following for AVF, plan for next week. Will need tunnelled HD catheter prior to discharge. OP HD set up in progress.     Pt prefer to continue care with his OP nephrologist  and prefers [facility] HD center. Dose meds as per egfr.

## 2022-03-18 NOTE — PROGRESS NOTE ADULT - ASSESSMENT
NICOLETTE 3/14/22: EF (Visual Estimate): 40-45 %  Moderate global left ventricular systolic dysfunction. Tethered mitral valve leaflets with normal opening. Moderate-severe mitral regurgitation.Mild atheroma noted in aortic arch/descending aorta. 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   -renal f/u noted   -s/p non tunneled HD catheter on 3/15/22   -started on HD  --cv stable   -UE dopplers noted focal THROMBOSIS of the right cephalic vein at the  antecubital fossa.-- already on ac   -vascular f/u pending AVF --- pt can proceed from a cv standpoint      #THROMBOSIS of the right cephalic vein   -on a.c , vascular following    #Atrial Fibrillation/Flutter. S/P AF Ablation  -sp nicolette/ dccv 3/14   -remains in NSR 1st degree - no events over night   -EP to f/u, amiodarone 200 mg daily , Toprol  XL 25 mg daily  -nicolette  EF (Visual Estimate): 40-45 %  Moderate global left ventricular systolic dysfunction. Tethered mitral valve leaflets with normal opening. Moderate-severe mitral regurgitation. No left atrial or left atrial appendage thrombus  -on eliquis -- currently on HOLD for avf - currently on hep gtt       #Coronary Artery Disease. S/P PCI to LAD  -stable without chest pain or dyspnea  -hsT peaked, likely demand ischemia in setting of CKD and acute HF   -continue toprol, statin, and asa 81mg daily    #Mitral Regurgitation  -continue to monitor, nicolette Tethered mitral valve leaflets with normal opening. Moderate-severe mitral regurgitation.    #Hypertension  -Blood pressure controlled  -Continue current medications.

## 2022-03-18 NOTE — PROGRESS NOTE ADULT - SUBJECTIVE AND OBJECTIVE BOX
Follow-up Pulm Progress Note    No new respiratory events overnight.  Denies SOB/CP.     Medications:  MEDICATIONS  (STANDING):  albuterol/ipratropium for Nebulization 3 milliLiter(s) Nebulizer every 6 hours  allopurinol 100 milliGRAM(s) Oral daily  aMIOdarone    Tablet 200 milliGRAM(s) Oral daily  amLODIPine   Tablet 10 milliGRAM(s) Oral daily  aspirin enteric coated 81 milliGRAM(s) Oral daily  chlorhexidine 4% Liquid 1 Application(s) Topical <User Schedule>  cholecalciferol 1000 Unit(s) Oral daily  ferrous    sulfate 325 milliGRAM(s) Oral daily  finasteride 5 milliGRAM(s) Oral daily  furosemide    Tablet 80 milliGRAM(s) Oral daily  guaiFENesin ER 1200 milliGRAM(s) Oral every 12 hours  heparin  Infusion.  Unit(s)/Hr (14 mL/Hr) IV Continuous <Continuous>  hydrALAZINE 25 milliGRAM(s) Oral daily  isosorbide   dinitrate Tablet (ISORDIL) 10 milliGRAM(s) Oral three times a day  metoprolol succinate ER 25 milliGRAM(s) Oral daily  pantoprazole    Tablet 40 milliGRAM(s) Oral before breakfast  polyethylene glycol 3350 17 Gram(s) Oral daily  simvastatin 40 milliGRAM(s) Oral at bedtime    MEDICATIONS  (PRN):  heparin   Injectable 6500 Unit(s) IV Push every 6 hours PRN For aPTT less than 40  heparin   Injectable 3000 Unit(s) IV Push every 6 hours PRN For aPTT between 40 - 57  sodium chloride 0.9% lock flush 10 milliLiter(s) IV Push every 1 hour PRN Pre/post blood products, medications, blood draw, and to maintain line patency          Vital Signs Last 24 Hrs  T(C): 37.1 (18 Mar 2022 08:18), Max: 37.1 (18 Mar 2022 08:18)  T(F): 98.7 (18 Mar 2022 08:18), Max: 98.7 (18 Mar 2022 08:18)  HR: 68 (18 Mar 2022 08:18) (57 - 68)  BP: 150/78 (18 Mar 2022 08:18) (136/81 - 158/73)  BP(mean): --  RR: 18 (18 Mar 2022 08:18) (18 - 18)  SpO2: 98% (18 Mar 2022 08:18) (94% - 98%)          03-17 @ 07:01  -  03-18 @ 07:00  --------------------------------------------------------  IN: 1400 mL / OUT: 1100 mL / NET: 300 mL          LABS:                        10.5   11.35 )-----------( 228      ( 18 Mar 2022 07:18 )             32.9     03-18    132<L>  |  96  |  45<H>  ----------------------------<  89  4.3   |  20<L>  |  6.36<H>    Ca    9.1      18 Mar 2022 07:16  Phos  5.3     03-18  Mg     2.0     03-18    TPro  7.3  /  Alb  3.1<L>  /  TBili  0.4  /  DBili  x   /  AST  11  /  ALT  9<L>  /  AlkPhos  111  03-18          CAPILLARY BLOOD GLUCOSE        PT/INR - ( 17 Mar 2022 10:57 )   PT: 15.5 sec;   INR: 1.33 ratio         PTT - ( 18 Mar 2022 07:19 )  PTT:81.3 sec                Physical Examination:  PULM: CTA bilaterally   CVS: S1, S2 heard    RADIOLOGY REVIEWED  CT chest: < from: CT Chest No Cont (03.09.22 @ 16:32) >  FINDINGS:    Lungs/Airways/Pleura: The central airways are patent. No pleural   effusion. There is diffuse bronchial wall thickening with segmental and   subsegmental bronchial impaction at the bases. Few scattered sub-4 mm   pulmonary nodules include right apex series 3 image 29 and left apex   image 28. No consolidation or pulmonary edema.    Mediastinum/Lymph nodes: No thoracic adenopathy.    Heart and Vessels: Cardiomegaly. LAD stent. No pericardial effusion. The   pulmonary artery measures 3.2 cm. Adjacent mid ascending aorta measures   3.2 cm.    Upper Abdomen: Unremarkable.    Osseous structures and Soft Tissues: No aggressive bone lesions.    IMPRESSION:  Diffuse bronchial wall thickening and impaction.    Sub-4 mm pulmonary nodules; if patient is considered high risk for lung   cancer, consider follow-up low dose chest CT in 12 months. If low risk,   no further follow-up is needed as per current Fleischner guidelines.      < end of copied text >

## 2022-03-18 NOTE — PROGRESS NOTE ADULT - ASSESSMENT
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 Patient is undergoing evaluation for kidney transplant and will be started on IP HD as a bridge to transplant. Vascular Surgery consulted for AVF planning.     PLAN:   - OR planning for Left AVF with Dr Hung date tentatively for Monday   - Appreciate medical and cardiology risk optimization and clearance documentation   - Recommend HD Saturday; HD prior to Monday OR will result in case cancellation; per nephrology  - Vein mapping noted  - LUE precautions - no blood draws, IVs, blood pressure cuffs   - Appreciate IR s/p Permcath placement   - HD per Nephrology  - C/w care per primary team  - Preoperative workup:     O COVID within 72 hours of OR (Collect one day prior to OR)     O NPO at Midnight day before OR     O PTT and PT/INR within 72 hours of OR (collect one day prior to OR)     O CBC, BMP, Mg, Phos drawn at 4AM to provide time for correction if needed (STAT activatable)     O Type and Screen X2 (One within 72 hours of OR and at least one other this admission)     O Documented optimized/cleared for OR by primary team     O No HD prior to surgery (can be postop)   - Will follow with you     Narinder Mckeon MD PGY2  Vascular Surgery Team  [phone]

## 2022-03-18 NOTE — PROGRESS NOTE ADULT - ASSESSMENT
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 Presents as transfer from [facility] for acute heart failure exacerbation. The patient reports that he has been experiencing LIRA and SOB for 7-10 days which was progressive and now occurring at rest. Started on IV diuretics with improving respiratory status. Called to consult for diffuse bronchial wall thickening and impaction and 4mm pulmonary nodules seen on CT chest.

## 2022-03-18 NOTE — PROGRESS NOTE ADULT - SUBJECTIVE AND OBJECTIVE BOX
CARDIOLOGY FOLLOW UP - Dr. Lee  DATE OF SERVICE: 3/18/22     CC no cp or sob       REVIEW OF SYSTEMS:  CONSTITUTIONAL: No fever, weight loss, or fatigue  RESPIRATORY: No cough, wheezing, chills or hemoptysis; No Shortness of Breath  CARDIOVASCULAR: No chest pain, palpitations, passing out, dizziness, or leg swelling  GASTROINTESTINAL: No abdominal or epigastric pain. No nausea, vomiting, or hematemesis; No diarrhea or constipation. No melena or hematochezia.  VASCULAR: No edema     PHYSICAL EXAM:  T(C): 37 (03-18-22 @ 11:22), Max: 37.1 (03-18-22 @ 08:18)  HR: 70 (03-18-22 @ 11:22) (57 - 70)  BP: 162/92 (03-18-22 @ 11:22) (142/87 - 162/92)  RR: 18 (03-18-22 @ 11:22) (18 - 18)  SpO2: 98% (03-18-22 @ 11:22) (94% - 98%)  Wt(kg): --  I&O's Summary    17 Mar 2022 07:01  -  18 Mar 2022 07:00  --------------------------------------------------------  IN: 1400 mL / OUT: 1100 mL / NET: 300 mL    18 Mar 2022 07:01  -  18 Mar 2022 13:26  --------------------------------------------------------  IN: 360 mL / OUT: 0 mL / NET: 360 mL        Appearance: Normal	  Cardiovascular: Normal S1 S2,RRR, + murmurs  Respiratory: Lungs clear to auscultation	  Gastrointestinal:  Soft, Non-tender, + BS	  Extremities: Normal range of motion, No clubbing, cyanosis or edema      Home Medications:  allopurinol 100 mg oral tablet: 1 tab(s) orally once a day (09 Mar 2022 02:11)  amiodarone 200 mg oral tablet: 1 tab(s) orally once a day (09 Mar 2022 02:11)  amLODIPine 10 mg oral tablet: 1 tab(s) orally once a day (09 Mar 2022 02:11)  Aspirin Enteric Coated 81 mg oral delayed release tablet: 1 tab(s) orally once a day (09 Mar 2022 02:11)  Eliquis 5 mg oral tablet: 1 tab(s) orally 2 times a day (09 Mar 2022 02:11)  Farxiga 5 mg oral tablet: 1 tab(s) orally once a day (09 Mar 2022 02:11)  ferrous sulfate 325 mg (65 mg elemental iron) oral tablet: 1 tab(s) orally once a day (09 Mar 2022 02:11)  finasteride 5 mg oral tablet: 1 tab(s) orally once a day (09 Mar 2022 02:11)  furosemide 40 mg oral tablet: 1 tab(s) orally once a day (09 Mar 2022 02:11)  hydrALAZINE 25 mg oral tablet: 1 tab(s) orally once a day (09 Mar 2022 02:11)  isosorbide dinitrate 10 mg oral tablet: 1 tab(s) orally 3 times a day (09 Mar 2022 02:11)  metoprolol succinate 25 mg oral tablet, extended release: 1 tab(s) orally once a day (09 Mar 2022 02:11)  pantoprazole 40 mg oral delayed release tablet: 1 tab(s) orally once a day (09 Mar 2022 02:11)  simvastatin 40 mg oral tablet: 1 tab(s) orally once a day (at bedtime) (09 Mar 2022 02:11)  Vitamin D3 25 mcg (1000 intl units) oral capsule: 1 cap(s) orally once a day (09 Mar 2022 02:11)      MEDICATIONS  (STANDING):  albuterol/ipratropium for Nebulization 3 milliLiter(s) Nebulizer every 6 hours  allopurinol 100 milliGRAM(s) Oral daily  aMIOdarone    Tablet 200 milliGRAM(s) Oral daily  amLODIPine   Tablet 10 milliGRAM(s) Oral daily  aspirin enteric coated 81 milliGRAM(s) Oral daily  chlorhexidine 4% Liquid 1 Application(s) Topical <User Schedule>  cholecalciferol 1000 Unit(s) Oral daily  ferrous    sulfate 325 milliGRAM(s) Oral daily  finasteride 5 milliGRAM(s) Oral daily  furosemide    Tablet 80 milliGRAM(s) Oral daily  guaiFENesin ER 1200 milliGRAM(s) Oral every 12 hours  heparin  Infusion.  Unit(s)/Hr (14 mL/Hr) IV Continuous <Continuous>  hydrALAZINE 25 milliGRAM(s) Oral daily  isosorbide   dinitrate Tablet (ISORDIL) 10 milliGRAM(s) Oral three times a day  metoprolol succinate ER 25 milliGRAM(s) Oral daily  pantoprazole    Tablet 40 milliGRAM(s) Oral before breakfast  polyethylene glycol 3350 17 Gram(s) Oral daily  simvastatin 40 milliGRAM(s) Oral at bedtime      TELEMETRY: 	    ECG:  	  RADIOLOGY:   DIAGNOSTIC TESTING:  [ ] Echocardiogram:  [ ]  Catheterization:  [ ] Stress Test:    OTHER: 	    LABS:	 	                            10.5   11.35 )-----------( 228      ( 18 Mar 2022 07:18 )             32.9     03-18    132<L>  |  96  |  45<H>  ----------------------------<  89  4.3   |  20<L>  |  6.36<H>    Ca    9.1      18 Mar 2022 07:16  Phos  5.3     03-18  Mg     2.0     03-18    TPro  7.3  /  Alb  3.1<L>  /  TBili  0.4  /  DBili  x   /  AST  11  /  ALT  9<L>  /  AlkPhos  111  03-18    PT/INR - ( 17 Mar 2022 10:57 )   PT: 15.5 sec;   INR: 1.33 ratio         PTT - ( 18 Mar 2022 07:19 )  PTT:81.3 sec

## 2022-03-18 NOTE — PROGRESS NOTE ADULT - SUBJECTIVE AND OBJECTIVE BOX
VASCULAR SURGERY PROGRESS NOTE   ___________________________________________________________________    SYEDA WEAVER | 66y Male   [facility] 6TOW 612 D1   1955 | [id number]     LOS 10d    Attending: Pernell Lee    ___________________________________________________________________      Allergies:  NKDA    OBJECTIVE:  Vitals:    T(C): 37.1 (22 @ 08:18), Max: 37.1 (22 @ 08:18)  HR: 68 (22 @ 08:18) (57 - 68)  BP: 150/78 (22 @ 08:18) (136/81 - 158/73)  RR: 18 (22 @ 08:18) (18 - 18)  SpO2: 98% (22 @ 08:18) (94% - 98%)      OUT:  Total OUT: 0 mL      OUT:  Total OUT: 0 mL          Medications:  albuterol/ipratropium for Nebulization 3 milliLiter(s) Nebulizer every 6 hours  aMIOdarone    Tablet 200 milliGRAM(s) Oral daily  amLODIPine   Tablet 10 milliGRAM(s) Oral daily  furosemide    Tablet 80 milliGRAM(s) Oral daily  guaiFENesin ER 1200 milliGRAM(s) Oral every 12 hours  hydrALAZINE 25 milliGRAM(s) Oral daily  isosorbide   dinitrate Tablet (ISORDIL) 10 milliGRAM(s) Oral three times a day  metoprolol succinate ER 25 milliGRAM(s) Oral daily  pantoprazole    Tablet 40 milliGRAM(s) Oral before breakfast  polyethylene glycol 3350 17 Gram(s) Oral daily          Laboratory:  WBC: 11.35 H&H: 10.5/32.9 Plt: 228  WBC: 9.27 H&H: 10.4/32.9 Plt: 218    Chemistry:                               Phos: 5.3 M.0  132  |  96  |  45  ----------------------------<  89  4.3   |  20  |  6.36        ,                              Phos: 5.8 M.3  134  |  98  |  60  ----------------------------<  103  4.4   |  21  |  7.71             TPro 7.3 / Alb 3.1<L> / TBili 0.4 / DBili x  / AST/AST 11/9<L> / AlkPhos 111     TPro 7.4 / Alb 3.5 / TBili 0.3 / DBili x  / AST/AST 11/8<L> / AlkPhos 111  PTT 81.3 PT/INR ---/---          Reviewed laboratory and imaging    aspirin enteric coated 81 Oral daily  heparin   Injectable 6500 IV Push every 6 hours PRN  heparin   Injectable 3000 IV Push every 6 hours PRN  heparin  Infusion.  IV Continuous <Continuous>      COVID-19 PCR: Devon (13 Mar 2022 21:58)

## 2022-03-19 LAB
ALBUMIN SERPL ELPH-MCNC: 3.3 G/DL — SIGNIFICANT CHANGE UP (ref 3.3–5)
ALP SERPL-CCNC: 110 U/L — SIGNIFICANT CHANGE UP (ref 40–120)
ALT FLD-CCNC: 9 U/L — LOW (ref 10–45)
ANION GAP SERPL CALC-SCNC: 16 MMOL/L — SIGNIFICANT CHANGE UP (ref 5–17)
APTT BLD: 107.7 SEC — HIGH (ref 27.5–35.5)
APTT BLD: 63.6 SEC — HIGH (ref 27.5–35.5)
APTT BLD: 82.9 SEC — HIGH (ref 27.5–35.5)
AST SERPL-CCNC: 10 U/L — SIGNIFICANT CHANGE UP (ref 10–40)
BILIRUB SERPL-MCNC: 0.4 MG/DL — SIGNIFICANT CHANGE UP (ref 0.2–1.2)
BUN SERPL-MCNC: 62 MG/DL — HIGH (ref 7–23)
CALCIUM SERPL-MCNC: 9.4 MG/DL — SIGNIFICANT CHANGE UP (ref 8.4–10.5)
CHLORIDE SERPL-SCNC: 96 MMOL/L — SIGNIFICANT CHANGE UP (ref 96–108)
CO2 SERPL-SCNC: 20 MMOL/L — LOW (ref 22–31)
CREAT SERPL-MCNC: 7.6 MG/DL — HIGH (ref 0.5–1.3)
EGFR: 7 ML/MIN/1.73M2 — LOW
GLUCOSE SERPL-MCNC: 75 MG/DL — SIGNIFICANT CHANGE UP (ref 70–99)
HCT VFR BLD CALC: 34.5 % — LOW (ref 39–50)
HCT VFR BLD CALC: 37.6 % — LOW (ref 39–50)
HGB BLD-MCNC: 10.8 G/DL — LOW (ref 13–17)
HGB BLD-MCNC: 11.9 G/DL — LOW (ref 13–17)
MCHC RBC-ENTMCNC: 19.3 PG — LOW (ref 27–34)
MCHC RBC-ENTMCNC: 19.4 PG — LOW (ref 27–34)
MCHC RBC-ENTMCNC: 31.3 GM/DL — LOW (ref 32–36)
MCHC RBC-ENTMCNC: 31.6 GM/DL — LOW (ref 32–36)
MCV RBC AUTO: 61.4 FL — LOW (ref 80–100)
MCV RBC AUTO: 61.7 FL — LOW (ref 80–100)
NRBC # BLD: 0 /100 WBCS — SIGNIFICANT CHANGE UP (ref 0–0)
NRBC # BLD: 0 /100 WBCS — SIGNIFICANT CHANGE UP (ref 0–0)
PLATELET # BLD AUTO: 229 K/UL — SIGNIFICANT CHANGE UP (ref 150–400)
PLATELET # BLD AUTO: 229 K/UL — SIGNIFICANT CHANGE UP (ref 150–400)
POTASSIUM SERPL-MCNC: 4.5 MMOL/L — SIGNIFICANT CHANGE UP (ref 3.5–5.3)
POTASSIUM SERPL-SCNC: 4.5 MMOL/L — SIGNIFICANT CHANGE UP (ref 3.5–5.3)
PROT SERPL-MCNC: 7.5 G/DL — SIGNIFICANT CHANGE UP (ref 6–8.3)
RBC # BLD: 5.59 M/UL — SIGNIFICANT CHANGE UP (ref 4.2–5.8)
RBC # BLD: 6.12 M/UL — HIGH (ref 4.2–5.8)
RBC # FLD: 17.4 % — HIGH (ref 10.3–14.5)
RBC # FLD: 18.3 % — HIGH (ref 10.3–14.5)
SODIUM SERPL-SCNC: 132 MMOL/L — LOW (ref 135–145)
WBC # BLD: 10.94 K/UL — HIGH (ref 3.8–10.5)
WBC # BLD: 12.28 K/UL — HIGH (ref 3.8–10.5)
WBC # FLD AUTO: 10.94 K/UL — HIGH (ref 3.8–10.5)
WBC # FLD AUTO: 12.28 K/UL — HIGH (ref 3.8–10.5)

## 2022-03-19 PROCEDURE — 99233 SBSQ HOSP IP/OBS HIGH 50: CPT | Mod: GC

## 2022-03-19 RX ORDER — ONDANSETRON 8 MG/1
4 TABLET, FILM COATED ORAL ONCE
Refills: 0 | Status: DISCONTINUED | OUTPATIENT
Start: 2022-03-19 | End: 2022-03-21

## 2022-03-19 RX ORDER — IRON SUCROSE 20 MG/ML
200 INJECTION, SOLUTION INTRAVENOUS EVERY 24 HOURS
Refills: 0 | Status: DISCONTINUED | OUTPATIENT
Start: 2022-03-19 | End: 2022-03-21

## 2022-03-19 RX ADMIN — FINASTERIDE 5 MILLIGRAM(S): 5 TABLET, FILM COATED ORAL at 11:38

## 2022-03-19 RX ADMIN — Medication 325 MILLIGRAM(S): at 11:38

## 2022-03-19 RX ADMIN — ISOSORBIDE DINITRATE 10 MILLIGRAM(S): 5 TABLET ORAL at 11:47

## 2022-03-19 RX ADMIN — HEPARIN SODIUM 1400 UNIT(S)/HR: 5000 INJECTION INTRAVENOUS; SUBCUTANEOUS at 15:55

## 2022-03-19 RX ADMIN — Medication 100 MILLIGRAM(S): at 11:38

## 2022-03-19 RX ADMIN — ISOSORBIDE DINITRATE 10 MILLIGRAM(S): 5 TABLET ORAL at 16:54

## 2022-03-19 RX ADMIN — HEPARIN SODIUM 1600 UNIT(S)/HR: 5000 INJECTION INTRAVENOUS; SUBCUTANEOUS at 07:43

## 2022-03-19 RX ADMIN — Medication 1200 MILLIGRAM(S): at 17:28

## 2022-03-19 RX ADMIN — CHLORHEXIDINE GLUCONATE 1 APPLICATION(S): 213 SOLUTION TOPICAL at 07:18

## 2022-03-19 RX ADMIN — Medication 3 MILLILITER(S): at 13:09

## 2022-03-19 RX ADMIN — PANTOPRAZOLE SODIUM 40 MILLIGRAM(S): 20 TABLET, DELAYED RELEASE ORAL at 05:32

## 2022-03-19 RX ADMIN — POLYETHYLENE GLYCOL 3350 17 GRAM(S): 17 POWDER, FOR SOLUTION ORAL at 11:48

## 2022-03-19 RX ADMIN — Medication 80 MILLIGRAM(S): at 05:33

## 2022-03-19 RX ADMIN — Medication 25 MILLIGRAM(S): at 05:33

## 2022-03-19 RX ADMIN — Medication 3 MILLILITER(S): at 17:28

## 2022-03-19 RX ADMIN — HEPARIN SODIUM 1400 UNIT(S)/HR: 5000 INJECTION INTRAVENOUS; SUBCUTANEOUS at 22:48

## 2022-03-19 RX ADMIN — Medication 1000 UNIT(S): at 11:39

## 2022-03-19 RX ADMIN — AMLODIPINE BESYLATE 10 MILLIGRAM(S): 2.5 TABLET ORAL at 05:32

## 2022-03-19 RX ADMIN — AMIODARONE HYDROCHLORIDE 200 MILLIGRAM(S): 400 TABLET ORAL at 05:32

## 2022-03-19 RX ADMIN — Medication 81 MILLIGRAM(S): at 11:38

## 2022-03-19 RX ADMIN — IRON SUCROSE 110 MILLIGRAM(S): 20 INJECTION, SOLUTION INTRAVENOUS at 22:08

## 2022-03-19 RX ADMIN — HEPARIN SODIUM 1400 UNIT(S)/HR: 5000 INJECTION INTRAVENOUS; SUBCUTANEOUS at 16:52

## 2022-03-19 RX ADMIN — HEPARIN SODIUM 1400 UNIT(S)/HR: 5000 INJECTION INTRAVENOUS; SUBCUTANEOUS at 08:08

## 2022-03-19 RX ADMIN — Medication 1200 MILLIGRAM(S): at 05:33

## 2022-03-19 RX ADMIN — Medication 3 MILLILITER(S): at 05:32

## 2022-03-19 RX ADMIN — ISOSORBIDE DINITRATE 10 MILLIGRAM(S): 5 TABLET ORAL at 05:32

## 2022-03-19 RX ADMIN — SIMVASTATIN 40 MILLIGRAM(S): 20 TABLET, FILM COATED ORAL at 22:08

## 2022-03-19 NOTE — PROGRESS NOTE ADULT - PROBLEM SELECTOR PLAN 3
Patient with anemia in the setting of ESRD. Hemoglobin in target range.   Tsat 16 and ferritin 268.   please start patient on venofer 200 mg daily x 5 days.   Monitor hemoglobin    If any questions, please feel free to contact me     Nolan Solares  Nephrology Fellow  [facility] Pager: [phone]

## 2022-03-19 NOTE — PROGRESS NOTE ADULT - SUBJECTIVE AND OBJECTIVE BOX
[facility] DIVISION OF KIDNEY DISEASES AND HYPERTENSION -- FOLLOW UP NOTE  --------------------------------------------------------------------------------  Chief Complaint: Charleen now ESRD     24 hour events/subjective:  seen and examined this morning   reports feeling well without any acute issues   no acute events noted overnight       PAST HISTORY  --------------------------------------------------------------------------------  No significant changes to PMH, PSH, FHx, SHx, unless otherwise noted    ALLERGIES & MEDICATIONS  --------------------------------------------------------------------------------  Allergies    No Known Allergies    Intolerances      Standing Inpatient Medications  albuterol/ipratropium for Nebulization 3 milliLiter(s) Nebulizer every 6 hours  allopurinol 100 milliGRAM(s) Oral daily  aMIOdarone    Tablet 200 milliGRAM(s) Oral daily  amLODIPine   Tablet 10 milliGRAM(s) Oral daily  aspirin enteric coated 81 milliGRAM(s) Oral daily  chlorhexidine 4% Liquid 1 Application(s) Topical <User Schedule>  cholecalciferol 1000 Unit(s) Oral daily  ferrous    sulfate 325 milliGRAM(s) Oral daily  finasteride 5 milliGRAM(s) Oral daily  furosemide    Tablet 80 milliGRAM(s) Oral daily  guaiFENesin ER 1200 milliGRAM(s) Oral every 12 hours  heparin  Infusion.  Unit(s)/Hr IV Continuous <Continuous>  hydrALAZINE 25 milliGRAM(s) Oral daily  isosorbide   dinitrate Tablet (ISORDIL) 10 milliGRAM(s) Oral three times a day  metoprolol succinate ER 25 milliGRAM(s) Oral daily  pantoprazole    Tablet 40 milliGRAM(s) Oral before breakfast  polyethylene glycol 3350 17 Gram(s) Oral daily  simvastatin 40 milliGRAM(s) Oral at bedtime    PRN Inpatient Medications  heparin   Injectable 6500 Unit(s) IV Push every 6 hours PRN  heparin   Injectable 3000 Unit(s) IV Push every 6 hours PRN  sodium chloride 0.9% lock flush 10 milliLiter(s) IV Push every 1 hour PRN      REVIEW OF SYSTEMS      All other systems were reviewed and are negative, except as noted.    VITALS/PHYSICAL EXAM  --------------------------------------------------------------------------------  T(C): 36.8 (03-19-22 @ 04:06), Max: 37.2 (03-19-22 @ 00:12)  HR: 65 (03-19-22 @ 04:06) (65 - 75)  BP: 151/83 (03-19-22 @ 04:06) (143/80 - 162/92)  RR: 18 (03-19-22 @ 04:06) (18 - 18)  SpO2: 98% (03-19-22 @ 04:06) (96% - 98%)  Wt(kg): --        03-18-22 @ 07:01  -  03-19-22 @ 07:00  --------------------------------------------------------  IN: 720 mL / OUT: 300 mL / NET: 420 mL        Physical Exam:  	Gen: NAD  	HEENT: MMM  	Pulm: CTA B/L  	CV: S1S2  	Abd: Soft, +BS   	Ext: No LE edema B/L  	Neuro: Awake  	Skin: Warm and dry  	Vascular access: RIJ nontunneled      LABS/STUDIES  --------------------------------------------------------------------------------              10.8   10.94 >-----------<  229      [03-19-22 @ 06:44]              34.5     132  |  96  |  62  ----------------------------<  75      [03-19-22 @ 06:44]  4.5   |  20  |  7.60        Ca     9.4     [03-19-22 @ 06:44]      Mg     2.0     [03-18-22 @ 07:16]      Phos  5.3     [03-18-22 @ 07:16]    TPro  7.5  /  Alb  3.3  /  TBili  0.4  /  DBili  x   /  AST  10  /  ALT  9   /  AlkPhos  110  [03-19-22 @ 06:44]    PT/INR: PT 15.5 , INR 1.33       [03-17-22 @ 10:57]  PTT: 107.7      [03-19-22 @ 06:44]      Creatinine Trend:  SCr 7.60 [03-19 @ 06:44]  SCr 6.36 [03-18 @ 07:16]  SCr 7.71 [03-17 @ 09:42]  SCr 9.54 [03-16 @ 06:18]  SCr 9.61 [03-15 @ 06:43]    Urinalysis - [03-09-22 @ 06:37]      Color Light Yellow / Appearance Clear / SG 1.012 / pH 6.0      Gluc 500 mg/dL / Ketone Negative  / Bili Negative / Urobili Negative       Blood Small / Protein 300 mg/dL / Leuk Est Negative / Nitrite Negative      RBC 2 / WBC 3 / Hyaline 1 / Gran  / Sq Epi  / Non Sq Epi 1 / Bacteria Negative      Iron 45, TIBC 277, %sat 16      [03-17-22 @ 12:05]  Ferritin 268      [03-17-22 @ 12:05]  PTH -- (Ca 9.0)      [03-17-22 @ 12:05]   172  Vitamin D (25OH) 23.5      [03-17-22 @ 12:05]    HBsAb <3.0      [03-17-22 @ 00:04]  HBsAb Nonreact      [03-17-22 @ 00:04]  HBsAg Nonreact      [03-17-22 @ 00:04]  HCV 0.09, Nonreact      [03-17-22 @ 00:04]

## 2022-03-19 NOTE — PROGRESS NOTE ADULT - SUBJECTIVE AND OBJECTIVE BOX
VASCULAR SURGERY PROGRESS NOTE   ___________________________________________________________________    SYEDA WEAVER | 66y Male   [facility] 6TOW 612 D1   1955 | [id number]     LOS 11d    Attending: Pernell Lee    ___________________________________________________________________      Allergies:  NKDA    OBJECTIVE:  Vitals:    T(C): 36.9 (22 @ 16:52), Max: 37.4 (22 @ 13:08)  HR: 82 (22 @ 16:52) (60 - 82)  BP: 159/83 (22 @ 16:52) (143/80 - 162/97)  RR: 18 (22 @ 16:52) (18 - 18)  SpO2: 96% (22 @ 16:52) (96% - 98%)      OUT:    Voided (mL): 300 mL  Total OUT: 300 mL      OUT:  Total OUT: 0 mL          Medications:  albuterol/ipratropium for Nebulization 3 milliLiter(s) Nebulizer every 6 hours  aMIOdarone    Tablet 200 milliGRAM(s) Oral daily  amLODIPine   Tablet 10 milliGRAM(s) Oral daily  furosemide    Tablet 80 milliGRAM(s) Oral daily  guaiFENesin ER 1200 milliGRAM(s) Oral every 12 hours  hydrALAZINE 25 milliGRAM(s) Oral daily  isosorbide   dinitrate Tablet (ISORDIL) 10 milliGRAM(s) Oral three times a day  metoprolol succinate ER 25 milliGRAM(s) Oral daily  ondansetron Injectable 4 milliGRAM(s) IV Push once  pantoprazole    Tablet 40 milliGRAM(s) Oral before breakfast  polyethylene glycol 3350 17 Gram(s) Oral daily          Laboratory:  WBC: 10.94 H&H: 10.8/34.5 Plt: 229  WBC: 11.35 H&H: 10.5/32.9 Plt: 228    Chemistry:                               Phos: xx Mg: xx  132  |  96  |  62  ----------------------------<  75  4.5   |  20  |  7.60        ,                              Phos: 5.3 M.0  132  |  96  |  45  ----------------------------<  89  4.3   |  20  |  6.36             TPro 7.5 / Alb 3.3 / TBili 0.4 / DBili x  / AST/AST 10/9<L> / AlkPhos 110     TPro 7.3 / Alb 3.1<L> / TBili 0.4 / DBili x  / AST/AST 11/9<L> / AlkPhos 111  PTT 82.9 PT/INR ---/---          Reviewed laboratory and imaging    aspirin enteric coated 81 Oral daily  heparin   Injectable 6500 IV Push every 6 hours PRN  heparin   Injectable 3000 IV Push every 6 hours PRN  heparin  Infusion.  IV Continuous <Continuous>      COVID-19 PCR: Devon (13 Mar 2022 21:58)

## 2022-03-19 NOTE — CONSULT NOTE ADULT - SUBJECTIVE AND OBJECTIVE BOX
Vascular & Interventional Radiology Brief Consult Note    Evaluate for Procedure: Tunneled HD catheter placement    HPI: 66y Male with PMH HFrEF, CAD, CKD, HTN, AF on Eliquis, admitted with acute HF exacerbation and worsening renal failure now requiring HD. Patient s/p right IJ nontunneled HD catheter by IR 3/15. Vascular surgery planning for OR for AVF creation Monday 3/21. IR consult for tunneled HD catheter placement.     Allergies:   Medications (Abx/Cardiac/Anticoagulation/Blood Products)  aMIOdarone    Tablet: 200 milliGRAM(s) Oral (03-19 @ 05:32)  amLODIPine   Tablet: 10 milliGRAM(s) Oral (03-19 @ 05:32)  aspirin enteric coated: 81 milliGRAM(s) Oral (03-19 @ 11:38)  furosemide    Tablet: 80 milliGRAM(s) Oral (03-19 @ 05:33)  heparin  Infusion.: 1400 Unit(s)/Hr IV Continuous (03-19 @ 07:44)  hydrALAZINE: 25 milliGRAM(s) Oral (03-19 @ 05:33)  isosorbide   dinitrate Tablet (ISORDIL): 10 milliGRAM(s) Oral (03-19 @ 11:47)  metoprolol succinate ER: 25 milliGRAM(s) Oral (03-19 @ 05:33)    Data:    T(C): 36.3  HR: 73  BP: 161/95  RR: 18  SpO2: 97%    -WBC 10.94 / HgB 10.8 / Hct 34.5 / Plt 229  -Na 132 / Cl 96 / BUN 62 / Glucose 75  -K 4.5 / CO2 20 / Cr 7.60  -ALT 9 / Alk Phos 110 / T.Bili 0.4  -INR1.33    Imaging: Reviewed

## 2022-03-19 NOTE — PROGRESS NOTE ADULT - ASSESSMENT
Pt. with advanced CKD in setting of secondary FSGS    {[id number]} Problem/Plan - 1:  ·  Problem: Acute kidney injury superimposed on CKD.   ·  Plan: Pt. with advanced RUSSELL on CKD in setting of CHF and secondary FSGS.  SCr at ~1.7-1.9 (04/2019).  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 Last SCr was elevated at 3.25 on 6/8/21. Scr on admission elevated at 8.77 and in 9-10 range. Pt underwent right IJ non tunelled HD catheter placement on 3/15/22. Pt. underwent HD 1st session on 3/16/22 and 2nd session on 3/17 and tolerated well. Plan for 3rd HD session tomorrow. Vascular following for AVF, plan for next week. Will need tunnelled HD catheter prior to discharge. OP HD set up in progress.     Pt prefer to continue care with his OP nephrologist  and prefers [facility] HD center. Dose meds as per egfr.    {[id number]} Problem/Plan - 2:  ·  Problem: Hyperphosphatemia.   ·  Plan: Pt. with hyperphosphatemia in setting of advanced CKD now ESRD. Serum phosphorus in acceptable range. Low phosphorus diet. Monitor serum phosphorus.    {[id number]} Problem/Plan - 3:  ·  Problem: Anemia.   ·  Plan: Patient with anemia in the setting of ESRD. Hemoglobin in target range. Tsat 16 and ferritin 268. Recommend venofer 200 mg x 5 days. Monitor hemoglobin.

## 2022-03-19 NOTE — CONSULT NOTE ADULT - ASSESSMENT
Assessment/Plan:   66y Male with PMH HFrEF, CAD, CKD, HTN, AF on Eliquis, admitted with acute HF exacerbation and worsening renal failure now requiring HD currently with right IJ nontunneled HD catheter by IR 3/15. Vascular surgery planning for OR for AVF creation Monday 3/21.   - Can plan for tunneled HD catheter conversion on Wednesday 3/23, after AVF creation  - Place IR procedure order under Dr. Calero  - NPO after midnight Tues  - Hold heparin gtt Wed AM on call from IR  - AM labs cbc, chem and coags Wed  - COVID PCR within 5 days of procedure    d/w Dr. Calero Assessment/Plan:   66y Male with PMH HFrEF, CAD, CKD, HTN, AF on Eliquis, admitted with acute HF exacerbation and worsening renal failure now requiring HD currently with right IJ nontunneled HD catheter by IR 3/15. Vascular surgery planning for OR for AVF creation Monday 3/21.   - Can plan for tunneled HD catheter conversion on Wednesday 3/23  - Place IR procedure order under Dr. Calero  - NPO after midnight Tues.  - Hold heparin gtt Wed AM on call from IR  - AM labs cbc, chem and coags Wed  - COVID PCR within 5 days of procedure    d/w Dr. Calero

## 2022-03-19 NOTE — PROGRESS NOTE ADULT - ATTENDING COMMENTS
I have seen this patient with the fellow and agree with their assessment and plan. I was physically present for significant portions of the evaluation and management (E/M) service provided.  I agree with the above history, physical, and plan which I have reviewed and edited where appropriate.    Pt underwent right IJ non tunelled HD catheter placement on 3/15/22. Pt. underwent HD 1st session on 3/16/22 and 2nd session on 3/17   HD today and then first shift 3/21 AM ( 3 hours) to optimize before OR on 3/21 for AVF  AVF planned for 3/21 afternoon by vascular  Please arrange for tunnelled Hd catheter by IR for 3/22 then  Pt to go either to [facility] unit or [facility] satellite for outpatient dialysis per his choice.  working on the options    Rest per Dr. Beck Valles MD  O:   C: [phone] I have seen this patient with the fellow and agree with their assessment and plan. I was physically present for significant portions of the evaluation and management (E/M) service provided.  I agree with the above history, physical, and plan which I have reviewed and edited where appropriate.    Pt underwent right IJ non tunelled HD catheter placement on 3/15/22. Pt. underwent HD 1st session on 3/16/22 and 2nd session on 3/17   HD today.   AVF planned for 3/21 afternoon by vascular. To do HD after AVF placement  IR planned for  tunnelled Hd catheter on  3/23  Pt to go either to [facility] unit or [facility] for outpatient dialysis per his choice.  working on the options    Rest per Dr. Beck Valles MD  O:   C: [phone]

## 2022-03-19 NOTE — PROGRESS NOTE ADULT - SUBJECTIVE AND OBJECTIVE BOX
CARDIOLOGY FOLLOW UP NOTE - DR. MANCUSO    Patient Name: SYEDA WEAVER  Date of Service: 22    Patient seen and examined  no new complaints    Subjective:    cv: denies chest pain, dyspnea, palpitations, dizziness  pulmonary: denies cough  GI: denies abdominal pain, nausea, vomiting  vascular/legs: no edema   skin: no rash  ROS: otherwise negative   overnight events:      PHYSICAL EXAM:  T(C): 37 (22 @ 08:00), Max: 37.2 (22 @ 00:12)  HR: 64 (22 @ 08:00) (64 - 75)  BP: 154/89 (22 @ 08:00) (143/80 - 162/92)  RR: 18 (22 @ 08:00) (18 - 18)  SpO2: 98% (22 @ 08:00) (96% - 98%)  Wt(kg): --  I&O's Summary    18 Mar 2022 07:01  -  19 Mar 2022 07:00  --------------------------------------------------------  IN: 720 mL / OUT: 300 mL / NET: 420 mL      Daily     Daily Weight in k.2 (19 Mar 2022 07:40)    Appearance: Normal	  Cardiovascular: Normal S1 S2,RRR, sm  Respiratory: Lungs clear to auscultation	  Gastrointestinal:  Soft, Non-tender, + BS	  Extremities: Normal range of motion, No clubbing, cyanosis or edema      Home Medications:  allopurinol 100 mg oral tablet: 1 tab(s) orally once a day (09 Mar 2022 02:11)  amiodarone 200 mg oral tablet: 1 tab(s) orally once a day (09 Mar 2022 02:11)  amLODIPine 10 mg oral tablet: 1 tab(s) orally once a day (09 Mar 2022 02:11)  Aspirin Enteric Coated 81 mg oral delayed release tablet: 1 tab(s) orally once a day (09 Mar 2022 02:11)  Eliquis 5 mg oral tablet: 1 tab(s) orally 2 times a day (09 Mar 2022 02:11)  Farxiga 5 mg oral tablet: 1 tab(s) orally once a day (09 Mar 2022 02:11)  ferrous sulfate 325 mg (65 mg elemental iron) oral tablet: 1 tab(s) orally once a day (09 Mar 2022 02:11)  finasteride 5 mg oral tablet: 1 tab(s) orally once a day (09 Mar 2022 02:11)  furosemide 40 mg oral tablet: 1 tab(s) orally once a day (09 Mar 2022 02:)  hydrALAZINE 25 mg oral tablet: 1 tab(s) orally once a day (09 Mar 2022 02:)  isosorbide dinitrate 10 mg oral tablet: 1 tab(s) orally 3 times a day (09 Mar 2022 02:)  metoprolol succinate 25 mg oral tablet, extended release: 1 tab(s) orally once a day (09 Mar 2022 02:)  pantoprazole 40 mg oral delayed release tablet: 1 tab(s) orally once a day (09 Mar 2022 02:)  simvastatin 40 mg oral tablet: 1 tab(s) orally once a day (at bedtime) (09 Mar 2022 02:)  Vitamin D3 25 mcg (1000 intl units) oral capsule: 1 cap(s) orally once a day (09 Mar 2022 02:)      MEDICATIONS  (STANDING):  albuterol/ipratropium for Nebulization 3 milliLiter(s) Nebulizer every 6 hours  allopurinol 100 milliGRAM(s) Oral daily  aMIOdarone    Tablet 200 milliGRAM(s) Oral daily  amLODIPine   Tablet 10 milliGRAM(s) Oral daily  aspirin enteric coated 81 milliGRAM(s) Oral daily  chlorhexidine 4% Liquid 1 Application(s) Topical <User Schedule>  cholecalciferol 1000 Unit(s) Oral daily  ferrous    sulfate 325 milliGRAM(s) Oral daily  finasteride 5 milliGRAM(s) Oral daily  furosemide    Tablet 80 milliGRAM(s) Oral daily  guaiFENesin ER 1200 milliGRAM(s) Oral every 12 hours  heparin  Infusion.  Unit(s)/Hr (14 mL/Hr) IV Continuous <Continuous>  hydrALAZINE 25 milliGRAM(s) Oral daily  iron sucrose IVPB 200 milliGRAM(s) IV Intermittent every 24 hours  isosorbide   dinitrate Tablet (ISORDIL) 10 milliGRAM(s) Oral three times a day  metoprolol succinate ER 25 milliGRAM(s) Oral daily  pantoprazole    Tablet 40 milliGRAM(s) Oral before breakfast  polyethylene glycol 3350 17 Gram(s) Oral daily  simvastatin 40 milliGRAM(s) Oral at bedtime      TELEMETRY: 	    ECG:  	  RADIOLOGY:   DIAGNOSTIC TESTING:  [ ] Echocardiogram:  [ ] Catheterization:  [ ] Stress Test:    OTHER: 	    LABS:	 	    CARDIAC MARKERS:                                      10.8   10.94 )-----------( 229      ( 19 Mar 2022 06:44 )             34.5     03-    132<L>  |  96  |  62<H>  ----------------------------<  75  4.5   |  20<L>  |  7.60<H>    Ca    9.4      19 Mar 2022 06:44  Phos  5.3     18  Mg     2.0         TPro  7.5  /  Alb  3.3  /  TBili  0.4  /  DBili  x   /  AST  10  /  ALT  9<L>  /  AlkPhos  110  03-19    proBNP:   PT/INR - ( 17 Mar 2022 10:57 )   PT: 15.5 sec;   INR: 1.33 ratio         PTT - ( 19 Mar 2022 06:44 )  PTT:107.7 sec  Lipid Profile:   HgA1c:     Creatinine, Serum: 7.60 mg/dL (22 @ 06:44)  Creatinine, Serum: 6.36 mg/dL (22 @ 07:16)  Creatinine, Serum: 7.71 mg/dL (22 @ 09:42)

## 2022-03-19 NOTE — PROGRESS NOTE ADULT - ASSESSMENT
NICOLETTE 3/14/22: EF (Visual Estimate): 40-45 %  Moderate global left ventricular systolic dysfunction. Tethered mitral valve leaflets with normal opening. Moderate-severe mitral regurgitation.Mild atheroma noted in aortic arch/descending aorta. 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   -renal f/u noted   -s/p non tunneled HD catheter on 3/15/22   -started on HD  --cv stable   -UE dopplers noted focal THROMBOSIS of the right cephalic vein at the  antecubital fossa.-- already on ac   -vascular f/u pending AVF, also needs tunn cath,  pt can proceed from a cv standpoint with acceptable cardiac risk     #THROMBOSIS of the right cephalic vein   -on a.c , vascular following    #Atrial Fibrillation/Flutter. S/P AF Ablation  -sp nicolette/ dccv 3/14   -remains in SR  -cont amiodarone 200 mg daily , Toprol  XL 25 mg daily  -on eliquis -- currently on HOLD for avf - currently on hep gtt   -eventual af ablation    #Coronary Artery Disease. S/P PCI to LAD  -stable without chest pain or dyspnea  -hsT peaked, likely demand ischemia in setting of CKD and acute HF   -continue toprol, statin, and asa 81mg daily    #Mitral Regurgitation  -continue to monitor, nicolette Tethered mitral valve leaflets with normal opening. Moderate-severe mitral regurgitation.  -mr should improved with maint of SR and decrease in lv size/improved lv sys fxn    #Hypertension  -Blood pressure controlled  -Continue current medications.      35 minutes spent on total encounter; more than 50% of the visit was spent counseling and/or coordinating care by the attending physician.

## 2022-03-19 NOTE — PROGRESS NOTE ADULT - ASSESSMENT
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 Patient is undergoing evaluation for kidney transplant and will be started on IP HD as a bridge to transplant. Vascular Surgery consulted for AVF planning.     PLAN:   - OR planning for Left AVF with Dr Hung date tentatively for Monday   - Appreciate medical and cardiology risk optimization and clearance documentation   - Recommend HD Saturday; HD prior to Monday OR will result in case cancellation; per nephrology  - Vein mapping noted  - LUE precautions - no blood draws, IVs, blood pressure cuffs   - Appreciate IR s/p Permcath placement   - HD per Nephrology  - C/w care per primary team  - Preoperative workup:     O COVID within 72 hours of OR (Collect one day prior to OR)     O NPO at Midnight day before OR     O PTT and PT/INR within 72 hours of OR (collect one day prior to OR)     O CBC, BMP, Mg, Phos drawn at 4AM to provide time for correction if needed (STAT activatable)     O Type and Screen X2 (One within 72 hours of OR and at least one other this admission)     O Documented optimized/cleared for OR by primary team     O No HD prior to surgery (can be postop)   - Will follow with you     Narinder Mckeon MD PGY2  Vascular Surgery Team  [phone]

## 2022-03-19 NOTE — PROGRESS NOTE ADULT - PROBLEM SELECTOR PLAN 1
Pt. with advanced RUSSELL on CKD in setting of CHF and secondary FSGS.  SCr at ~1.7-1.9 (04/2019).  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 Last SCr was elevated at 3.25 on 6/8/21. Scr on admission elevated at 8.77 and in 9-10 range.   Pt underwent right IJ non tunelled HD catheter placement on 3/15/22. Pt. underwent HD 1st session on 3/16/22 and 2nd session on 3/17 and tolerated well. Plan for 3rd HD session today.   Vascular following for AVF with tentative plan for 3/21.   Will need tunnelled HD catheter prior to discharge.   OP HD set up in progress.   please dose medications as per ESRD     Pt prefer to continue care with his OP nephrologist  and prefers [facility] HD center.

## 2022-03-20 ENCOUNTER — TRANSCRIPTION ENCOUNTER (OUTPATIENT)
Age: 67
End: 2022-03-20

## 2022-03-20 LAB
ANION GAP SERPL CALC-SCNC: 20 MMOL/L — HIGH (ref 5–17)
APTT BLD: 65.3 SEC — HIGH (ref 27.5–35.5)
BLD GP AB SCN SERPL QL: NEGATIVE — SIGNIFICANT CHANGE UP
BUN SERPL-MCNC: 41 MG/DL — HIGH (ref 7–23)
CALCIUM SERPL-MCNC: 9.8 MG/DL — SIGNIFICANT CHANGE UP (ref 8.4–10.5)
CHLORIDE SERPL-SCNC: 95 MMOL/L — LOW (ref 96–108)
CO2 SERPL-SCNC: 20 MMOL/L — LOW (ref 22–31)
CREAT SERPL-MCNC: 6.4 MG/DL — HIGH (ref 0.5–1.3)
EGFR: 9 ML/MIN/1.73M2 — LOW
GLUCOSE SERPL-MCNC: 88 MG/DL — SIGNIFICANT CHANGE UP (ref 70–99)
HCT VFR BLD CALC: 34.4 % — LOW (ref 39–50)
HGB BLD-MCNC: 11 G/DL — LOW (ref 13–17)
INR BLD: 1.41 RATIO — HIGH (ref 0.88–1.16)
MCHC RBC-ENTMCNC: 19.4 PG — LOW (ref 27–34)
MCHC RBC-ENTMCNC: 32 GM/DL — SIGNIFICANT CHANGE UP (ref 32–36)
MCV RBC AUTO: 60.8 FL — LOW (ref 80–100)
NRBC # BLD: 0 /100 WBCS — SIGNIFICANT CHANGE UP (ref 0–0)
PLATELET # BLD AUTO: 246 K/UL — SIGNIFICANT CHANGE UP (ref 150–400)
POTASSIUM SERPL-MCNC: 4.3 MMOL/L — SIGNIFICANT CHANGE UP (ref 3.5–5.3)
POTASSIUM SERPL-SCNC: 4.3 MMOL/L — SIGNIFICANT CHANGE UP (ref 3.5–5.3)
PROTHROM AB SERPL-ACNC: 16.4 SEC — HIGH (ref 10.5–13.4)
RBC # BLD: 5.66 M/UL — SIGNIFICANT CHANGE UP (ref 4.2–5.8)
RBC # FLD: 17.5 % — HIGH (ref 10.3–14.5)
RH IG SCN BLD-IMP: POSITIVE — SIGNIFICANT CHANGE UP
SARS-COV-2 RNA SPEC QL NAA+PROBE: SIGNIFICANT CHANGE UP
SODIUM SERPL-SCNC: 135 MMOL/L — SIGNIFICANT CHANGE UP (ref 135–145)
WBC # BLD: 13.15 K/UL — HIGH (ref 3.8–10.5)
WBC # FLD AUTO: 13.15 K/UL — HIGH (ref 3.8–10.5)

## 2022-03-20 RX ADMIN — FINASTERIDE 5 MILLIGRAM(S): 5 TABLET, FILM COATED ORAL at 12:38

## 2022-03-20 RX ADMIN — IRON SUCROSE 110 MILLIGRAM(S): 20 INJECTION, SOLUTION INTRAVENOUS at 21:59

## 2022-03-20 RX ADMIN — ISOSORBIDE DINITRATE 10 MILLIGRAM(S): 5 TABLET ORAL at 12:36

## 2022-03-20 RX ADMIN — AMIODARONE HYDROCHLORIDE 200 MILLIGRAM(S): 400 TABLET ORAL at 05:16

## 2022-03-20 RX ADMIN — PANTOPRAZOLE SODIUM 40 MILLIGRAM(S): 20 TABLET, DELAYED RELEASE ORAL at 05:18

## 2022-03-20 RX ADMIN — Medication 1200 MILLIGRAM(S): at 17:19

## 2022-03-20 RX ADMIN — Medication 25 MILLIGRAM(S): at 05:16

## 2022-03-20 RX ADMIN — AMLODIPINE BESYLATE 10 MILLIGRAM(S): 2.5 TABLET ORAL at 05:16

## 2022-03-20 RX ADMIN — POLYETHYLENE GLYCOL 3350 17 GRAM(S): 17 POWDER, FOR SOLUTION ORAL at 12:38

## 2022-03-20 RX ADMIN — SIMVASTATIN 40 MILLIGRAM(S): 20 TABLET, FILM COATED ORAL at 21:59

## 2022-03-20 RX ADMIN — Medication 3 MILLILITER(S): at 17:19

## 2022-03-20 RX ADMIN — Medication 80 MILLIGRAM(S): at 05:17

## 2022-03-20 RX ADMIN — Medication 1000 UNIT(S): at 12:37

## 2022-03-20 RX ADMIN — Medication 325 MILLIGRAM(S): at 12:38

## 2022-03-20 RX ADMIN — Medication 100 MILLIGRAM(S): at 12:37

## 2022-03-20 RX ADMIN — HEPARIN SODIUM 1400 UNIT(S)/HR: 5000 INJECTION INTRAVENOUS; SUBCUTANEOUS at 06:41

## 2022-03-20 RX ADMIN — ISOSORBIDE DINITRATE 10 MILLIGRAM(S): 5 TABLET ORAL at 05:16

## 2022-03-20 RX ADMIN — HEPARIN SODIUM 1400 UNIT(S)/HR: 5000 INJECTION INTRAVENOUS; SUBCUTANEOUS at 16:14

## 2022-03-20 RX ADMIN — ISOSORBIDE DINITRATE 10 MILLIGRAM(S): 5 TABLET ORAL at 17:18

## 2022-03-20 RX ADMIN — CHLORHEXIDINE GLUCONATE 1 APPLICATION(S): 213 SOLUTION TOPICAL at 05:27

## 2022-03-20 RX ADMIN — Medication 3 MILLILITER(S): at 05:17

## 2022-03-20 RX ADMIN — Medication 81 MILLIGRAM(S): at 12:38

## 2022-03-20 RX ADMIN — Medication 3 MILLILITER(S): at 12:37

## 2022-03-20 RX ADMIN — HEPARIN SODIUM 1400 UNIT(S)/HR: 5000 INJECTION INTRAVENOUS; SUBCUTANEOUS at 19:16

## 2022-03-20 RX ADMIN — HEPARIN SODIUM 1400 UNIT(S)/HR: 5000 INJECTION INTRAVENOUS; SUBCUTANEOUS at 07:27

## 2022-03-20 NOTE — CHART NOTE - NSCHARTNOTEFT_GEN_A_CORE
Preop note  Patient: Nils Sawyer  Procedure: LUE AVF on 3/21  Surgeon: Dr. Hung    Vital Signs Last 24 Hrs  T(C): 36.5 (20 Mar 2022 05:14), Max: 37.5 (19 Mar 2022 20:06)  T(F): 97.7 (20 Mar 2022 05:14), Max: 99.5 (19 Mar 2022 20:06)  HR: 74 (20 Mar 2022 05:14) (60 - 82)  BP: 152/89 (20 Mar 2022 05:14) (139/79 - 162/97)  BP(mean): --  RR: 18 (20 Mar 2022 05:14) (18 - 20)  SpO2: 98% (20 Mar 2022 05:14) (96% - 99%)      LABS:                        11.0   13.15 )-----------( 246      ( 20 Mar 2022 06:12 )             34.4     03-20    135  |  95<L>  |  41<H>  ----------------------------<  88  4.3   |  20<L>  |  6.40<H>    Ca    9.8      20 Mar 2022 06:12    TPro  7.5  /  Alb  3.3  /  TBili  0.4  /  DBili  x   /  AST  10  /  ALT  9<L>  /  AlkPhos  110  03-19    PT/INR - ( 20 Mar 2022 06:12 )   PT: 16.4 sec;   INR: 1.41 ratio         PTT - ( 20 Mar 2022 06:12 )  PTT:65.3 sec      INs and OUTs:    03-19-22 @ 07:01  -  03-20-22 @ 07:00  --------------------------------------------------------  IN: 808 mL / OUT: 1250 mL / NET: -442 mL    Recommendations:    -NPO at midnight  -Please obtain CBC, BMP, Mg, Phos, Covid, Type and screenx2, coags at 4AM on 3/21  -Appreciate documented perioperative risk optimization  -OR tomorrow  -Consent to be obtained by surgical team    Vascular Surgery   [phone]

## 2022-03-20 NOTE — PROGRESS NOTE ADULT - ASSESSMENT
NICOLETTE 3/14/22: EF (Visual Estimate): 40-45 %  Moderate global left ventricular systolic dysfunction. Tethered mitral valve leaflets with normal opening. Moderate-severe mitral regurgitation.Mild atheroma noted in aortic arch/descending aorta. 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   -renal f/u noted   -s/p non tunneled HD catheter on 3/15/22   -started on HD  --cv stable   -UE dopplers noted focal THROMBOSIS of the right cephalic vein at the  antecubital fossa.-- already on ac   -vascular f/u pending AVF, also needs tunn cath,  pt can proceed from a cv standpoint with acceptable cardiac risk     #THROMBOSIS of the right cephalic vein   -on a.c , vascular following    #Atrial Fibrillation/Flutter. S/P AF Ablation  -sp nicolette/ dccv 3/14   -remains in SR  -cont amiodarone 200 mg daily , Toprol  XL 25 mg daily  -on eliquis -- currently on HOLD for avf - currently on hep gtt   -eventual af ablation    #Coronary Artery Disease. S/P PCI to LAD  -stable without chest pain or dyspnea  -hsT peaked, likely demand ischemia in setting of CKD and acute HF   -continue toprol, statin, and asa 81mg daily    #Mitral Regurgitation  -continue to monitor, nicolette Tethered mitral valve leaflets with normal opening. Moderate-severe mitral regurgitation.  -mr should improved with maint of SR and decrease in lv size/improved lv sys fxn    #Hypertension  -Blood pressure controlled  -Continue current medications.      35 minutes spent on total encounter; more than 50% of the visit was spent counseling and/or coordinating care by the attending physician.

## 2022-03-20 NOTE — PROGRESS NOTE ADULT - SUBJECTIVE AND OBJECTIVE BOX
CARDIOLOGY FOLLOW UP NOTE - DR. MANCUSO    Patient Name: SYEDA WEAVER  Date of Service: 22     Patient seen and examined  no new complaints    Subjective:    cv: denies chest pain, dyspnea, palpitations, dizziness  pulmonary: denies cough  GI: denies abdominal pain, nausea, vomiting  vascular/legs: no edema   skin: no rash  ROS: otherwise negative   overnight events:      PHYSICAL EXAM:  T(C): 36.7 (22 @ 11:21), Max: 37.5 (22 @ 20:06)  HR: 69 (22 @ 11:21) (60 - 82)  BP: 139/82 (22 @ 11:21) (139/79 - 159/83)  RR: 18 (22 @ 11:21) (18 - 20)  SpO2: 96% (22 @ 11:21) (96% - 99%)  Wt(kg): --  I&O's Summary    19 Mar 2022 07:01  -  20 Mar 2022 07:00  --------------------------------------------------------  IN: 808 mL / OUT: 1250 mL / NET: -442 mL      Daily     Daily Weight in k.7 (20 Mar 2022 07:20)    Appearance: Normal	  Cardiovascular: Normal S1 S2,RRR, sm  Respiratory: Lungs clear to auscultation	  Gastrointestinal:  Soft, Non-tender, + BS	  Extremities: Normal range of motion, No clubbing, cyanosis or edema      Home Medications:  allopurinol 100 mg oral tablet: 1 tab(s) orally once a day (09 Mar 2022 02:11)  amiodarone 200 mg oral tablet: 1 tab(s) orally once a day (09 Mar 2022 02:11)  amLODIPine 10 mg oral tablet: 1 tab(s) orally once a day (09 Mar 2022 02:11)  Aspirin Enteric Coated 81 mg oral delayed release tablet: 1 tab(s) orally once a day (09 Mar 2022 02:11)  Eliquis 5 mg oral tablet: 1 tab(s) orally 2 times a day (09 Mar 2022 02:11)  Farxiga 5 mg oral tablet: 1 tab(s) orally once a day (09 Mar 2022 02:11)  ferrous sulfate 325 mg (65 mg elemental iron) oral tablet: 1 tab(s) orally once a day (09 Mar 2022 02:11)  finasteride 5 mg oral tablet: 1 tab(s) orally once a day (09 Mar 2022 02:11)  furosemide 40 mg oral tablet: 1 tab(s) orally once a day (09 Mar 2022 02:)  hydrALAZINE 25 mg oral tablet: 1 tab(s) orally once a day (09 Mar 2022 02:)  isosorbide dinitrate 10 mg oral tablet: 1 tab(s) orally 3 times a day (09 Mar 2022 02:)  metoprolol succinate 25 mg oral tablet, extended release: 1 tab(s) orally once a day (09 Mar 2022 02:)  pantoprazole 40 mg oral delayed release tablet: 1 tab(s) orally once a day (09 Mar 2022 02:)  simvastatin 40 mg oral tablet: 1 tab(s) orally once a day (at bedtime) (09 Mar 2022 02:)  Vitamin D3 25 mcg (1000 intl units) oral capsule: 1 cap(s) orally once a day (09 Mar 2022 02:11)      MEDICATIONS  (STANDING):  albuterol/ipratropium for Nebulization 3 milliLiter(s) Nebulizer every 6 hours  allopurinol 100 milliGRAM(s) Oral daily  aMIOdarone    Tablet 200 milliGRAM(s) Oral daily  amLODIPine   Tablet 10 milliGRAM(s) Oral daily  aspirin enteric coated 81 milliGRAM(s) Oral daily  chlorhexidine 4% Liquid 1 Application(s) Topical <User Schedule>  cholecalciferol 1000 Unit(s) Oral daily  ferrous    sulfate 325 milliGRAM(s) Oral daily  finasteride 5 milliGRAM(s) Oral daily  furosemide    Tablet 80 milliGRAM(s) Oral daily  guaiFENesin ER 1200 milliGRAM(s) Oral every 12 hours  heparin  Infusion.  Unit(s)/Hr (14 mL/Hr) IV Continuous <Continuous>  hydrALAZINE 25 milliGRAM(s) Oral daily  iron sucrose IVPB 200 milliGRAM(s) IV Intermittent every 24 hours  isosorbide   dinitrate Tablet (ISORDIL) 10 milliGRAM(s) Oral three times a day  metoprolol succinate ER 25 milliGRAM(s) Oral daily  ondansetron Injectable 4 milliGRAM(s) IV Push once  pantoprazole    Tablet 40 milliGRAM(s) Oral before breakfast  polyethylene glycol 3350 17 Gram(s) Oral daily  simvastatin 40 milliGRAM(s) Oral at bedtime      TELEMETRY: 	    ECG:  	  RADIOLOGY:   DIAGNOSTIC TESTING:  [ ] Echocardiogram:  [ ] Catheterization:  [ ] Stress Test:    OTHER: 	    LABS:	 	    CARDIAC MARKERS:                                      11.0   13.15 )-----------( 246      ( 20 Mar 2022 06:12 )             34.4         135  |  95<L>  |  41<H>  ----------------------------<  88  4.3   |  20<L>  |  6.40<H>    Ca    9.8      20 Mar 2022 06:12    TPro  7.5  /  Alb  3.3  /  TBili  0.4  /  DBili  x   /  AST  10  /  ALT  9<L>  /  AlkPhos  110      proBNP:   PT/INR - ( 20 Mar 2022 06:12 )   PT: 16.4 sec;   INR: 1.41 ratio         PTT - ( 20 Mar 2022 06:12 )  PTT:65.3 sec  Lipid Profile:   HgA1c:     Creatinine, Serum: 6.40 mg/dL (22 @ 06:12)  Creatinine, Serum: 7.60 mg/dL (22 @ 06:44)  Creatinine, Serum: 6.36 mg/dL (22 @ 07:16)

## 2022-03-21 ENCOUNTER — APPOINTMENT (OUTPATIENT)
Dept: VASCULAR SURGERY | Facility: HOSPITAL | Age: 67
End: 2022-03-21

## 2022-03-21 LAB
ANION GAP SERPL CALC-SCNC: 20 MMOL/L — HIGH (ref 5–17)
APTT BLD: 109.2 SEC — HIGH (ref 27.5–35.5)
BUN SERPL-MCNC: 56 MG/DL — HIGH (ref 7–23)
CALCIUM SERPL-MCNC: 9.3 MG/DL — SIGNIFICANT CHANGE UP (ref 8.4–10.5)
CHLORIDE SERPL-SCNC: 95 MMOL/L — LOW (ref 96–108)
CO2 SERPL-SCNC: 19 MMOL/L — LOW (ref 22–31)
CREAT SERPL-MCNC: 8.48 MG/DL — HIGH (ref 0.5–1.3)
EGFR: 6 ML/MIN/1.73M2 — LOW
GLUCOSE SERPL-MCNC: 89 MG/DL — SIGNIFICANT CHANGE UP (ref 70–99)
HCT VFR BLD CALC: 33.2 % — LOW (ref 39–50)
HGB BLD-MCNC: 10.5 G/DL — LOW (ref 13–17)
INR BLD: 1.34 RATIO — HIGH (ref 0.88–1.16)
MAGNESIUM SERPL-MCNC: 2.4 MG/DL — SIGNIFICANT CHANGE UP (ref 1.6–2.6)
MCHC RBC-ENTMCNC: 19.4 PG — LOW (ref 27–34)
MCHC RBC-ENTMCNC: 31.6 GM/DL — LOW (ref 32–36)
MCV RBC AUTO: 61.3 FL — LOW (ref 80–100)
NRBC # BLD: 0 /100 WBCS — SIGNIFICANT CHANGE UP (ref 0–0)
PHOSPHATE SERPL-MCNC: 7.5 MG/DL — HIGH (ref 2.5–4.5)
PLATELET # BLD AUTO: 228 K/UL — SIGNIFICANT CHANGE UP (ref 150–400)
POTASSIUM SERPL-MCNC: 4.5 MMOL/L — SIGNIFICANT CHANGE UP (ref 3.5–5.3)
POTASSIUM SERPL-SCNC: 4.5 MMOL/L — SIGNIFICANT CHANGE UP (ref 3.5–5.3)
PROTHROM AB SERPL-ACNC: 15.5 SEC — HIGH (ref 10.5–13.4)
RBC # BLD: 5.42 M/UL — SIGNIFICANT CHANGE UP (ref 4.2–5.8)
RBC # FLD: 16.8 % — HIGH (ref 10.3–14.5)
SODIUM SERPL-SCNC: 134 MMOL/L — LOW (ref 135–145)
WBC # BLD: 10.66 K/UL — HIGH (ref 3.8–10.5)
WBC # FLD AUTO: 10.66 K/UL — HIGH (ref 3.8–10.5)

## 2022-03-21 PROCEDURE — 99233 SBSQ HOSP IP/OBS HIGH 50: CPT | Mod: GC

## 2022-03-21 DEVICE — CLIP APPLIER COVIDIEN SURGICLIP III 9" SM: Type: IMPLANTABLE DEVICE | Status: FUNCTIONAL

## 2022-03-21 DEVICE — CLIP APPLIER COVIDIEN SURGICLIP 11.5" MEDIUM: Type: IMPLANTABLE DEVICE | Status: FUNCTIONAL

## 2022-03-21 DEVICE — SURGIFOAM PAD 8CM X 12.5CM X 10MM (100): Type: IMPLANTABLE DEVICE | Status: FUNCTIONAL

## 2022-03-21 RX ORDER — ISOSORBIDE DINITRATE 5 MG/1
10 TABLET ORAL THREE TIMES A DAY
Refills: 0 | Status: DISCONTINUED | OUTPATIENT
Start: 2022-03-21 | End: 2022-03-28

## 2022-03-21 RX ORDER — ASPIRIN/CALCIUM CARB/MAGNESIUM 324 MG
81 TABLET ORAL DAILY
Refills: 0 | Status: DISCONTINUED | OUTPATIENT
Start: 2022-03-21 | End: 2022-03-28

## 2022-03-21 RX ORDER — AMIODARONE HYDROCHLORIDE 400 MG/1
200 TABLET ORAL DAILY
Refills: 0 | Status: DISCONTINUED | OUTPATIENT
Start: 2022-03-21 | End: 2022-03-28

## 2022-03-21 RX ORDER — FERROUS SULFATE 325(65) MG
325 TABLET ORAL DAILY
Refills: 0 | Status: DISCONTINUED | OUTPATIENT
Start: 2022-03-21 | End: 2022-03-28

## 2022-03-21 RX ORDER — CHOLECALCIFEROL (VITAMIN D3) 125 MCG
1000 CAPSULE ORAL DAILY
Refills: 0 | Status: DISCONTINUED | OUTPATIENT
Start: 2022-03-21 | End: 2022-03-28

## 2022-03-21 RX ORDER — ONDANSETRON 8 MG/1
4 TABLET, FILM COATED ORAL ONCE
Refills: 0 | Status: DISCONTINUED | OUTPATIENT
Start: 2022-03-21 | End: 2022-03-21

## 2022-03-21 RX ORDER — POLYETHYLENE GLYCOL 3350 17 G/17G
17 POWDER, FOR SOLUTION ORAL DAILY
Refills: 0 | Status: DISCONTINUED | OUTPATIENT
Start: 2022-03-21 | End: 2022-03-28

## 2022-03-21 RX ORDER — ALLOPURINOL 300 MG
100 TABLET ORAL DAILY
Refills: 0 | Status: DISCONTINUED | OUTPATIENT
Start: 2022-03-21 | End: 2022-03-28

## 2022-03-21 RX ORDER — FUROSEMIDE 40 MG
80 TABLET ORAL DAILY
Refills: 0 | Status: DISCONTINUED | OUTPATIENT
Start: 2022-03-21 | End: 2022-03-28

## 2022-03-21 RX ORDER — AMLODIPINE BESYLATE 2.5 MG/1
10 TABLET ORAL DAILY
Refills: 0 | Status: DISCONTINUED | OUTPATIENT
Start: 2022-03-21 | End: 2022-03-28

## 2022-03-21 RX ORDER — HYDROMORPHONE HYDROCHLORIDE 2 MG/ML
0.25 INJECTION INTRAMUSCULAR; INTRAVENOUS; SUBCUTANEOUS
Refills: 0 | Status: DISCONTINUED | OUTPATIENT
Start: 2022-03-21 | End: 2022-03-21

## 2022-03-21 RX ORDER — ONDANSETRON 8 MG/1
4 TABLET, FILM COATED ORAL ONCE
Refills: 0 | Status: DISCONTINUED | OUTPATIENT
Start: 2022-03-21 | End: 2022-03-28

## 2022-03-21 RX ORDER — FINASTERIDE 5 MG/1
5 TABLET, FILM COATED ORAL DAILY
Refills: 0 | Status: DISCONTINUED | OUTPATIENT
Start: 2022-03-21 | End: 2022-03-28

## 2022-03-21 RX ORDER — CHLORHEXIDINE GLUCONATE 213 G/1000ML
1 SOLUTION TOPICAL
Refills: 0 | Status: DISCONTINUED | OUTPATIENT
Start: 2022-03-21 | End: 2022-03-28

## 2022-03-21 RX ADMIN — FINASTERIDE 5 MILLIGRAM(S): 5 TABLET, FILM COATED ORAL at 17:32

## 2022-03-21 RX ADMIN — ISOSORBIDE DINITRATE 10 MILLIGRAM(S): 5 TABLET ORAL at 17:33

## 2022-03-21 RX ADMIN — Medication 3 MILLILITER(S): at 05:12

## 2022-03-21 RX ADMIN — Medication 81 MILLIGRAM(S): at 17:31

## 2022-03-21 RX ADMIN — Medication 80 MILLIGRAM(S): at 05:13

## 2022-03-21 RX ADMIN — Medication 1200 MILLIGRAM(S): at 05:13

## 2022-03-21 RX ADMIN — Medication 100 MILLIGRAM(S): at 17:31

## 2022-03-21 RX ADMIN — ISOSORBIDE DINITRATE 10 MILLIGRAM(S): 5 TABLET ORAL at 05:13

## 2022-03-21 RX ADMIN — AMLODIPINE BESYLATE 10 MILLIGRAM(S): 2.5 TABLET ORAL at 17:31

## 2022-03-21 RX ADMIN — Medication 325 MILLIGRAM(S): at 17:32

## 2022-03-21 RX ADMIN — CHLORHEXIDINE GLUCONATE 1 APPLICATION(S): 213 SOLUTION TOPICAL at 05:14

## 2022-03-21 RX ADMIN — Medication 25 MILLIGRAM(S): at 05:13

## 2022-03-21 RX ADMIN — Medication 3 MILLILITER(S): at 00:35

## 2022-03-21 RX ADMIN — Medication 1000 UNIT(S): at 17:32

## 2022-03-21 RX ADMIN — PANTOPRAZOLE SODIUM 40 MILLIGRAM(S): 20 TABLET, DELAYED RELEASE ORAL at 05:13

## 2022-03-21 RX ADMIN — AMIODARONE HYDROCHLORIDE 200 MILLIGRAM(S): 400 TABLET ORAL at 05:13

## 2022-03-21 NOTE — PROGRESS NOTE ADULT - ASSESSMENT
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 Presents as transfer from [facility] for acute heart failure exacerbation. The patient reports that he has been experiencing LIRA and SOB for 7-10 days which was progressive and now occurring at rest. Started on IV diuretics with improving respiratory status. Called to consult for diffuse bronchial wall thickening and impaction and 4mm pulmonary nodules seen on CT chest.

## 2022-03-21 NOTE — PROGRESS NOTE ADULT - ASSESSMENT
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 Patient is undergoing evaluation for kidney transplant and will be started on IP HD as a bridge to transplant. Vascular Surgery consulted for AVF planning.     PLAN:   - OR planning for Left AVF with Dr Hung Today @ 11:30  - Appreciate medical and cardiology risk optimization and clearance documentation   - HD prior to OR will result in case cancellation  - Vein mapping noted  - LUE precautions - no blood draws, IVs, blood pressure cuffs   - HD per Nephrology  - C/w care per primary team  - Preoperative workup: Complete     O COVID within 72 hours of OR (Collect one day prior to OR)     O NPO at Midnight day before OR     O PTT and PT/INR within 72 hours of OR (collect one day prior to OR)     O CBC, BMP, Mg, Phos drawn at 4AM to provide time for correction if needed (STAT activatable)     O Type and Screen X2 (One within 72 hours of OR and at least one other this admission)     O Documented optimized/cleared for OR by primary team     O No HD prior to surgery (can be postop)   - Will follow with you     Narinder Mckeon MD PGY2  Vascular Surgery Team  [phone]

## 2022-03-21 NOTE — PROGRESS NOTE ADULT - SUBJECTIVE AND OBJECTIVE BOX
CARDIOLOGY FOLLOW UP - Dr. Lee  DATE OF SERVICE: 3/21/22     CC no cp or sob    sp avf     REVIEW OF SYSTEMS:  CONSTITUTIONAL: No fever, weight loss, or fatigue  RESPIRATORY: No cough, wheezing, chills or hemoptysis; No Shortness of Breath  CARDIOVASCULAR: No chest pain, palpitations, passing out, dizziness, or leg swelling  GASTROINTESTINAL: No abdominal or epigastric pain. No nausea, vomiting, or hematemesis; No diarrhea or constipation. No melena or hematochezia.  VASCULAR: No edema     PHYSICAL EXAM:  T(C): 36.4 (03-21-22 @ 16:06), Max: 37.1 (03-21-22 @ 04:57)  HR: 67 (03-21-22 @ 16:06) (63 - 77)  BP: 130/79 (03-21-22 @ 16:06) (103/67 - 149/79)  RR: 18 (03-21-22 @ 16:06) (14 - 18)  SpO2: 96% (03-21-22 @ 16:06) (95% - 99%)  Wt(kg): --  I&O's Summary    20 Mar 2022 07:01  -  21 Mar 2022 07:00  --------------------------------------------------------  IN: 1306 mL / OUT: 450 mL / NET: 856 mL        Appearance: Normal	  Cardiovascular: Normal S1 S2,RRR, No JVD, No murmurs  Respiratory: Lungs clear to auscultation	  Gastrointestinal:  Soft, Non-tender, + BS	  Extremities: Normal range of motion, No clubbing, cyanosis or edema      Home Medications:  allopurinol 100 mg oral tablet: 1 tab(s) orally once a day (09 Mar 2022 02:11)  amiodarone 200 mg oral tablet: 1 tab(s) orally once a day (09 Mar 2022 02:11)  amLODIPine 10 mg oral tablet: 1 tab(s) orally once a day (09 Mar 2022 02:11)  Aspirin Enteric Coated 81 mg oral delayed release tablet: 1 tab(s) orally once a day (09 Mar 2022 02:11)  Eliquis 5 mg oral tablet: 1 tab(s) orally 2 times a day (09 Mar 2022 02:11)  Farxiga 5 mg oral tablet: 1 tab(s) orally once a day (09 Mar 2022 02:11)  ferrous sulfate 325 mg (65 mg elemental iron) oral tablet: 1 tab(s) orally once a day (09 Mar 2022 02:11)  finasteride 5 mg oral tablet: 1 tab(s) orally once a day (09 Mar 2022 02:11)  furosemide 40 mg oral tablet: 1 tab(s) orally once a day (09 Mar 2022 02:11)  hydrALAZINE 25 mg oral tablet: 1 tab(s) orally once a day (09 Mar 2022 02:11)  isosorbide dinitrate 10 mg oral tablet: 1 tab(s) orally 3 times a day (09 Mar 2022 02:11)  metoprolol succinate 25 mg oral tablet, extended release: 1 tab(s) orally once a day (09 Mar 2022 02:11)  pantoprazole 40 mg oral delayed release tablet: 1 tab(s) orally once a day (09 Mar 2022 02:11)  simvastatin 40 mg oral tablet: 1 tab(s) orally once a day (at bedtime) (09 Mar 2022 02:11)  Vitamin D3 25 mcg (1000 intl units) oral capsule: 1 cap(s) orally once a day (09 Mar 2022 02:11)      MEDICATIONS  (STANDING):  allopurinol 100 milliGRAM(s) Oral daily  aMIOdarone    Tablet 200 milliGRAM(s) Oral daily  amLODIPine   Tablet 10 milliGRAM(s) Oral daily  aspirin enteric coated 81 milliGRAM(s) Oral daily  chlorhexidine 4% Liquid 1 Application(s) Topical <User Schedule>  cholecalciferol 1000 Unit(s) Oral daily  ferrous    sulfate 325 milliGRAM(s) Oral daily  finasteride 5 milliGRAM(s) Oral daily  furosemide    Tablet 80 milliGRAM(s) Oral daily  isosorbide   dinitrate Tablet (ISORDIL) 10 milliGRAM(s) Oral three times a day  ondansetron Injectable 4 milliGRAM(s) IV Push once  polyethylene glycol 3350 17 Gram(s) Oral daily      TELEMETRY:  sr 1st degree avb 	    ECG:  	  RADIOLOGY:   DIAGNOSTIC TESTING:  [ ] Echocardiogram:  [ ]  Catheterization:  [ ] Stress Test:    OTHER: 	    LABS:	 	                            10.5   10.66 )-----------( 228      ( 21 Mar 2022 04:45 )             33.2     03-21    134<L>  |  95<L>  |  56<H>  ----------------------------<  89  4.5   |  19<L>  |  8.48<H>    Ca    9.3      21 Mar 2022 04:45  Phos  7.5     03-21  Mg     2.4     03-21      PT/INR - ( 21 Mar 2022 04:45 )   PT: 15.5 sec;   INR: 1.34 ratio         PTT - ( 21 Mar 2022 04:45 )  PTT:109.2 sec

## 2022-03-21 NOTE — PROGRESS NOTE ADULT - ASSESSMENT
NICOLETTE 3/14/22: EF (Visual Estimate): 40-45 %  Moderate global left ventricular systolic dysfunction. Tethered mitral valve leaflets with normal opening. Moderate-severe mitral regurgitation.Mild atheroma noted in aortic arch/descending aorta. 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 pending kidney transplant, HTN, Afib on eliquis, PAD s/p LE stenting, enlarged prostate presents as transfer from [facility] for acute heart failure exacerbation    #Acute on Chronic Systolic HF  -clinically improved  -volume status improved  -cont lasix 80mg PO daily per renal/fluid removal with HD    -echo with improved LV fx, ef 50-55%, mod MR, mild TR, trace NM  -NICOLETTE with EF 40-45%  -c/w bb, hydral  -low dose ACE before D/C if ok with renal   -cardiac pyrophosphate scan wnl     #RUSSELL on CKD, new HD   -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   -renal f/u noted   -s/p non tunneled HD catheter on 3/15/22   -started on HD  --cv stable   -UE dopplers noted focal THROMBOSIS of the right cephalic vein at the  antecubital fossa.-- already on ac   -sp avf 3/21-- vascular fu     #THROMBOSIS of the right cephalic vein   -on a.c , vascular following    #Atrial Fibrillation/Flutter. S/P AF Ablation  -sp nicolette/ dccv 3/14   -remains in SR  -cont amiodarone 200 mg daily , Toprol  XL 25 mg daily  -RESUME AC sp avf- per vascular recommendation   -eventual af ablation    #Coronary Artery Disease. S/P PCI to LAD  -stable without chest pain or dyspnea  -hsT peaked, likely demand ischemia in setting of CKD and acute HF   -continue toprol, statin, and asa 81mg daily    #Mitral Regurgitation  -continue to monitor, nicolette Tethered mitral valve leaflets with normal opening. Moderate-severe mitral regurgitation.  -mr should improved with maint of SR and decrease in lv size/improved lv sys fxn    #Hypertension  -Blood pressure controlled  -Continue current medications.

## 2022-03-21 NOTE — PROGRESS NOTE ADULT - PROBLEM SELECTOR PLAN 3
Patient with anemia in the setting of ESRD. Hemoglobin in target range.   Tsat 16 and ferritin 268.   Venofer started on 3/19 and needs 5 doses total of 200mg  Monitor hemoglobin    Omari Mcdonough MD  Pager   Office     Contact me directly via [employer] Teams     (After 5 pm or on weekends please page the on-call fellow/attending, can check [email or web address] for schedule. Login is anne ramesh, schedule under [facility] medicine, psych, derm)

## 2022-03-21 NOTE — PROGRESS NOTE ADULT - SUBJECTIVE AND OBJECTIVE BOX
[facility] DIVISION OF KIDNEY DISEASES AND HYPERTENSION -- FOLLOW UP NOTE  --------------------------------------------------------------------------------  Chief Complaint:    24 hour events/subjective:  Pt feels well  has had 3 HD sessions now      PAST HISTORY  --------------------------------------------------------------------------------  No significant changes to PMH, PSH, FHx, SHx, unless otherwise noted    ALLERGIES & MEDICATIONS  --------------------------------------------------------------------------------  Allergies      Intolerances    Seafood (Other)    Standing Inpatient Medications  albuterol/ipratropium for Nebulization 3 milliLiter(s) Nebulizer every 6 hours  allopurinol 100 milliGRAM(s) Oral daily  aMIOdarone    Tablet 200 milliGRAM(s) Oral daily  amLODIPine   Tablet 10 milliGRAM(s) Oral daily  aspirin enteric coated 81 milliGRAM(s) Oral daily  chlorhexidine 4% Liquid 1 Application(s) Topical <User Schedule>  cholecalciferol 1000 Unit(s) Oral daily  ferrous    sulfate 325 milliGRAM(s) Oral daily  finasteride 5 milliGRAM(s) Oral daily  furosemide    Tablet 80 milliGRAM(s) Oral daily  hydrALAZINE 25 milliGRAM(s) Oral daily  iron sucrose IVPB 200 milliGRAM(s) IV Intermittent every 24 hours  isosorbide   dinitrate Tablet (ISORDIL) 10 milliGRAM(s) Oral three times a day  metoprolol succinate ER 25 milliGRAM(s) Oral daily  ondansetron Injectable 4 milliGRAM(s) IV Push once  pantoprazole    Tablet 40 milliGRAM(s) Oral before breakfast  polyethylene glycol 3350 17 Gram(s) Oral daily  simvastatin 40 milliGRAM(s) Oral at bedtime    PRN Inpatient Medications  sodium chloride 0.9% lock flush 10 milliLiter(s) IV Push every 1 hour PRN      REVIEW OF SYSTEMS  --------------------------------------------------------------------------------  Gen: No weight changes, fatigue, fevers/chills, weakness  Skin: No rashes  Head/Eyes/Ears/Mouth: No headache; Normal hearing; Normal vision w/o blurriness; No sinus pain/discomfort, sore throat  Respiratory: No dyspnea, cough, wheezing, hemoptysis  CV: No chest pain, PND, orthopnea  GI: No abdominal pain, diarrhea, constipation, nausea, vomiting, melena, hematochezia  : No increased frequency, dysuria, hematuria, nocturia  MSK: No joint pain/swelling; no back pain; no edema  Neuro: No dizziness/lightheadedness, weakness, seizures, numbness, tingling  Heme: No easy bruising or bleeding  Endo: No heat/cold intolerance  Psych: No significant nervousness, anxiety, stress, depression    All other systems were reviewed and are negative, except as noted.    VITALS/PHYSICAL EXAM  --------------------------------------------------------------------------------  T(C): 36.7 (03-21-22 @ 11:43), Max: 37.1 (03-21-22 @ 04:57)  HR: 73 (03-21-22 @ 11:43) (67 - 75)  BP: 146/83 (03-21-22 @ 11:43) (122/81 - 149/79)  RR: 18 (03-21-22 @ 11:43) (18 - 18)  SpO2: 99% (03-21-22 @ 11:43) (96% - 99%)  Wt(kg): --  Height (cm): 180.3 (03-21-22 @ 11:43)  Weight (kg): 77.1 (03-21-22 @ 11:43)  BMI (kg/m2): 23.7 (03-21-22 @ 11:43)  BSA (m2): 1.97 (03-21-22 @ 11:43)      03-20-22 @ 07:01  -  03-21-22 @ 07:00  --------------------------------------------------------  IN: 1306 mL / OUT: 450 mL / NET: 856 mL      Physical Exam:  	Gen: NAD, well-appearing  	HEENT: PERRL, supple neck, clear oropharynx  	Pulm: CTA B/L  	CV: RRR, S1S2; no rub  	Back: No spinal or CVA tenderness; no sacral edema  	Abd: +BS, soft, nontender/nondistended  	Psych: Normal affect and mood  	Skin: Warm, without rashes  	Vascular access: R IJ non tunnelled HD cath    LABS/STUDIES  --------------------------------------------------------------------------------              10.5   10.66 >-----------<  228      [03-21-22 @ 04:45]              33.2     134  |  95  |  56  ----------------------------<  89      [03-21-22 @ 04:45]  4.5   |  19  |  8.48        Ca     9.3     [03-21-22 @ 04:45]      Mg     2.4     [03-21-22 @ 04:45]      Phos  7.5     [03-21-22 @ 04:45]      PT/INR: PT 15.5 , INR 1.34       [03-21-22 @ 04:45]  PTT: 109.2      [03-21-22 @ 04:45]      Creatinine Trend:  SCr 8.48 [03-21 @ 04:45]  SCr 6.40 [03-20 @ 06:12]  SCr 7.60 [03-19 @ 06:44]  SCr 6.36 [03-18 @ 07:16]  SCr 7.71 [03-17 @ 09:42]    Urinalysis - [03-09-22 @ 06:37]      Color Light Yellow / Appearance Clear / SG 1.012 / pH 6.0      Gluc 500 mg/dL / Ketone Negative  / Bili Negative / Urobili Negative       Blood Small / Protein 300 mg/dL / Leuk Est Negative / Nitrite Negative      RBC 2 / WBC 3 / Hyaline 1 / Gran  / Sq Epi  / Non Sq Epi 1 / Bacteria Negative      Iron 45, TIBC 277, %sat 16      [03-17-22 @ 12:05]  Ferritin 268      [03-17-22 @ 12:05]  PTH -- (Ca 9.0)      [03-17-22 @ 12:05]   172  Vitamin D (25OH) 23.5      [03-17-22 @ 12:05]    HBsAb <3.0      [03-17-22 @ 00:04]  HBsAb Nonreact      [03-17-22 @ 00:04]  HBsAg Nonreact      [03-17-22 @ 00:04]  HCV 0.09, Nonreact      [03-17-22 @ 00:04]

## 2022-03-21 NOTE — PROGRESS NOTE ADULT - SUBJECTIVE AND OBJECTIVE BOX
Follow-up Pulm Progress Note    No new respiratory events overnight.    Denies SOB/CP/cough/sputum production.     Medications:  MEDICATIONS  (STANDING):  albuterol/ipratropium for Nebulization 3 milliLiter(s) Nebulizer every 6 hours  allopurinol 100 milliGRAM(s) Oral daily  aMIOdarone    Tablet 200 milliGRAM(s) Oral daily  amLODIPine   Tablet 10 milliGRAM(s) Oral daily  aspirin enteric coated 81 milliGRAM(s) Oral daily  chlorhexidine 4% Liquid 1 Application(s) Topical <User Schedule>  cholecalciferol 1000 Unit(s) Oral daily  ferrous    sulfate 325 milliGRAM(s) Oral daily  finasteride 5 milliGRAM(s) Oral daily  furosemide    Tablet 80 milliGRAM(s) Oral daily  hydrALAZINE 25 milliGRAM(s) Oral daily  iron sucrose IVPB 200 milliGRAM(s) IV Intermittent every 24 hours  isosorbide   dinitrate Tablet (ISORDIL) 10 milliGRAM(s) Oral three times a day  metoprolol succinate ER 25 milliGRAM(s) Oral daily  ondansetron Injectable 4 milliGRAM(s) IV Push once  pantoprazole    Tablet 40 milliGRAM(s) Oral before breakfast  polyethylene glycol 3350 17 Gram(s) Oral daily  simvastatin 40 milliGRAM(s) Oral at bedtime    MEDICATIONS  (PRN):  sodium chloride 0.9% lock flush 10 milliLiter(s) IV Push every 1 hour PRN Pre/post blood products, medications, blood draw, and to maintain line patency          Vital Signs Last 24 Hrs  T(C): 36.3 (21 Mar 2022 08:33), Max: 37.1 (21 Mar 2022 04:57)  T(F): 97.3 (21 Mar 2022 08:33), Max: 98.7 (21 Mar 2022 04:57)  HR: 71 (21 Mar 2022 08:33) (67 - 75)  BP: 149/79 (21 Mar 2022 08:33) (122/81 - 149/79)  BP(mean): --  RR: 18 (21 Mar 2022 08:33) (18 - 18)  SpO2: 96% (21 Mar 2022 08:33) (96% - 98%)          03-20 @ 07:01  -  03-21 @ 07:00  --------------------------------------------------------  IN: 1306 mL / OUT: 450 mL / NET: 856 mL          LABS:                        10.5   10.66 )-----------( 228      ( 21 Mar 2022 04:45 )             33.2     03-21    134<L>  |  95<L>  |  56<H>  ----------------------------<  89  4.5   |  19<L>  |  8.48<H>    Ca    9.3      21 Mar 2022 04:45  Phos  7.5     03-21  Mg     2.4     03-21            PT/INR - ( 21 Mar 2022 04:45 )   PT: 15.5 sec;   INR: 1.34 ratio         PTT - ( 21 Mar 2022 04:45 )  PTT:109.2 sec              Physical Examination:  PULM: CTA bilaterally   CVS: S1, S2 heard    RADIOLOGY REVIEWED  CT chest: < from: CT Chest No Cont (03.09.22 @ 16:32) >  FINDINGS:    Lungs/Airways/Pleura: The central airways are patent. No pleural   effusion. There is diffuse bronchial wall thickening with segmental and   subsegmental bronchial impaction at the bases. Few scattered sub-4 mm   pulmonary nodules include right apex series 3 image 29 and left apex   image 28. No consolidation or pulmonary edema.    Mediastinum/Lymph nodes: No thoracic adenopathy.    Heart and Vessels: Cardiomegaly. LAD stent. No pericardial effusion. The   pulmonary artery measures 3.2 cm. Adjacent mid ascending aorta measures   3.2 cm.    Upper Abdomen: Unremarkable.    Osseous structures and Soft Tissues: No aggressive bone lesions.    IMPRESSION:  Diffuse bronchial wall thickening and impaction.    Sub-4 mm pulmonary nodules; if patient is considered high risk for lung   cancer, consider follow-up low dose chest CT in 12 months. If low risk,   no further follow-up is needed as per current Fleischner guidelines.      < end of copied text >

## 2022-03-21 NOTE — PROGRESS NOTE ADULT - SUBJECTIVE AND OBJECTIVE BOX
VASCULAR SURGERY PROGRESS NOTE   ___________________________________________________________________    SYEDA WEAVER | 66y Male   [facility] [id number]   1955 | [id number]     LOS 13d    Attending: Pernell Lee    ___________________________________________________________________      SUBJECTIVE:   Patient seen today during morning rounds at bedside and found to be without acute distress. Denies chest pain, fever, severe pain, or SOB.     Allergies:  NKDA    OBJECTIVE:  Vitals:    T(C): 36.3 (22 @ 08:33), Max: 37.1 (22 @ 04:57)  HR: 71 (22 @ 08:33) (67 - 75)  BP: 149/79 (22 @ 08:33) (122/81 - 149/79)  RR: 18 (22 @ 08:33) (18 - 18)  SpO2: 96% (22 @ 08:33) (96% - 98%)      OUT:    Voided (mL): 450 mL  Total OUT: 450 mL          Medications:  albuterol/ipratropium for Nebulization 3 milliLiter(s) Nebulizer every 6 hours  aMIOdarone    Tablet 200 milliGRAM(s) Oral daily  amLODIPine   Tablet 10 milliGRAM(s) Oral daily  furosemide    Tablet 80 milliGRAM(s) Oral daily  hydrALAZINE 25 milliGRAM(s) Oral daily  isosorbide   dinitrate Tablet (ISORDIL) 10 milliGRAM(s) Oral three times a day  metoprolol succinate ER 25 milliGRAM(s) Oral daily  ondansetron Injectable 4 milliGRAM(s) IV Push once  pantoprazole    Tablet 40 milliGRAM(s) Oral before breakfast  polyethylene glycol 3350 17 Gram(s) Oral daily          Laboratory:  WBC: 10.66 H&H: 10.5/33.2 Plt: 228  WBC: 13.15 H&H: 11.0/34.4 Plt: 246    Chemistry:                               Phos: 7.5 M.4  134  |  95  |  56  ----------------------------<  89  4.5   |  19  |  8.48        ,                              Phos: xx Mg: xx  135  |  95  |  41  ----------------------------<  88  4.3   |  20  |  6.40             TPro 7.5 / Alb 3.3 / TBili 0.4 / DBili x  / AST/AST 10/9<L> / AlkPhos 110  .2 PT/INR 15.5/1.34          Reviewed laboratory and imaging    aspirin enteric coated 81 Oral daily      COVID-19 PCR: NotDetec (20 Mar 2022 06:12)        Physical Exam:   Constitutional: resting in bed with no acute distress  Respiratory: unlabored breathing, clear respiration  Gastrointestinal: Abdomen soft, non distended, non-tender  Vascular:  RUE: LUE with pink band, no IVs, BP cuffs, radial pulse palpable

## 2022-03-21 NOTE — PROGRESS NOTE ADULT - ASSESSMENT
Pt. with advanced CKD in setting of secondary FSGS    {[id number]} Problem/Plan - 1:  ·  Problem: Acute kidney injury superimposed on CKD.   ·  Plan: Pt. with advanced RUSSELL on CKD in setting of CHF and secondary FSGS.  SCr at ~1.7-1.9 (04/2019).  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 Last SCr was elevated at 3.25 on 6/8/21. Scr on admission elevated at 8.77 and in 9-10 range. Pt underwent right IJ non tunelled HD catheter placement on 3/15/22. Pt. underwent HD 1st session on 3/16/22 and 2nd session on 3/17 and tolerated well. Plan for 3rd HD session tomorrow. Vascular following for AVF, plan for next week. Will need tunnelled HD catheter prior to discharge. OP HD set up in progress.     Pt prefer to continue care with his OP nephrologist  and prefers [facility] HD center. Dose meds as per egfr.    {[id number]} Problem/Plan - 2:  ·  Problem: Hyperphosphatemia.   ·  Plan: Pt. with hyperphosphatemia in setting of advanced CKD now ESRD. Serum phosphorus in acceptable range. Low phosphorus diet. Monitor serum phosphorus.    {[id number]} Problem/Plan - 3:  ·  Problem: Anemia.   ·  Plan: Patient with anemia in the setting of ESRD. Hemoglobin in target range. Tsat 16 and ferritin 268. Recommend venofer 200 mg x 5 days. Monitor hemoglobin.

## 2022-03-21 NOTE — PROGRESS NOTE ADULT - PROBLEM SELECTOR PLAN 1
RVP +entero/rhino virus  -CT chest read with diffuse bronchial wall thickening. None appreciated on our read - also reviewed with chest radiologist. +Mucous in airways.   -S/p Mucinex   -Cough resolved   -Continue Duoneb q6h, can discontinue on discharge   -Normoxic, keep sats >90% with supplemental o2 PRN  -Supportive care

## 2022-03-21 NOTE — CHART NOTE - NSCHARTNOTEFT_GEN_A_CORE
POST-OPERATIVE NOTE    Subjective:  Patient is s/p LUE radiocephalic AVF creation. Recovering appropriately. States pain is well controlled.     Vital Signs Last 24 Hrs  T(C): 36.4 (21 Mar 2022 17:29), Max: 37.1 (21 Mar 2022 04:57)  T(F): 97.6 (21 Mar 2022 17:29), Max: 98.7 (21 Mar 2022 04:57)  HR: 67 (21 Mar 2022 17:29) (63 - 77)  BP: 144/80 (21 Mar 2022 17:29) (103/67 - 149/79)  BP(mean): 83 (21 Mar 2022 15:30) (80 - 89)  RR: 18 (21 Mar 2022 17:29) (14 - 18)  SpO2: 97% (21 Mar 2022 17:29) (95% - 99%)  I&O's Detail    20 Mar 2022 07:01  -  21 Mar 2022 07:00  --------------------------------------------------------  IN:    Heparin Infusion: 266 mL    IV PiggyBack: 100 mL    Oral Fluid: 940 mL  Total IN: 1306 mL    OUT:    Voided (mL): 450 mL  Total OUT: 450 mL    Total NET: 856 mL      21 Mar 2022 07:01  -  21 Mar 2022 18:26  --------------------------------------------------------  IN:    Oral Fluid: 240 mL  Total IN: 240 mL    OUT:    Voided (mL): 200 mL  Total OUT: 200 mL    Total NET: 40 mL        aMIOdarone    Tablet 200  amLODIPine   Tablet 10  aspirin enteric coated 81  furosemide    Tablet 80  isosorbide   dinitrate Tablet (ISORDIL) 10    PAST MEDICAL & SURGICAL HISTORY:  HTN - Hypertension    Arthritis  L arm and hands    CKD (chronic kidney disease)    Gout    CAD (coronary artery disease)  1 stent    HLD (hyperlipidemia)    Atrial fibrillation    CHF (congestive heart failure)    History of cardiomyopathy  LV dysfunction    Thalassemia minor    S/P Arthroscopic Surgery of Left Knee  2001 injury- hit knee on desk    History of Arthroscopy- ankle  left ankle 2003 s/p &quot;something fell on it&quot;    S/P angioplasty with stent  5/2014 2016    S/P hernia surgery    Status post cataract extraction  left eye done 10/18/2018    H/O cardiac radiofrequency ablation          Physical Exam:  General: NAD, resting comfortably in bed  Pulmonary: Nonlabored breathing, no respiratory distress  Extremities: WWP, LUE dressing c/d/i, palpable radial pulse, 5/5 motor, sensory intact       LABS:                        10.5   10.66 )-----------( 228      ( 21 Mar 2022 04:45 )             33.2     03-21    134<L>  |  95<L>  |  56<H>  ----------------------------<  89  4.5   |  19<L>  |  8.48<H>    Ca    9.3      21 Mar 2022 04:45  Phos  7.5     03-21  Mg     2.4     03-21      PT/INR - ( 21 Mar 2022 04:45 )   PT: 15.5 sec;   INR: 1.34 ratio         PTT - ( 21 Mar 2022 04:45 )  PTT:109.2 sec  CAPILLARY BLOOD GLUCOSE          Radiology and Additional Studies:    Assessment:  The patient is a 66y Male who is now several hours post-op from a LUE radiocephalic AVF    Plan:  - Pain control as needed  - DVT ppx  - OOB and ambulating as tolerated  - F/u AM labs    Vascular Surgery   [id number]

## 2022-03-21 NOTE — PROGRESS NOTE ADULT - PROBLEM SELECTOR PLAN 1
Pt. with advanced RUSSELL on CKD in setting of CHF and secondary FSGS.  SCr at ~1.7-1.9 (04/2019).  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 Last SCr was elevated at 3.25 on 6/8/21. Scr on admission elevated at 8.77 and in 9-10 range.   Pt underwent right IJ non tunelled HD catheter placement on 3/15/22. Pt. underwent HD 1st session on 3/16/22 and 2nd session on 3/17 and tolerated well and third on 3/19  Vascular following for AVF with tentative plan for later today.  Next HD tomorrow am  Will need tunnelled HD catheter prior to discharge - please recontact IR  OP HD set up in progress.   please dose medications as per ESRD     Pt prefer to continue care with his OP nephrologist  and prefers [facility] HD unit for dialysis. Case mgt aware.

## 2022-03-22 LAB
ANION GAP SERPL CALC-SCNC: 22 MMOL/L — HIGH (ref 5–17)
BUN SERPL-MCNC: 73 MG/DL — HIGH (ref 7–23)
CALCIUM SERPL-MCNC: 8.8 MG/DL — SIGNIFICANT CHANGE UP (ref 8.4–10.5)
CHLORIDE SERPL-SCNC: 94 MMOL/L — LOW (ref 96–108)
CO2 SERPL-SCNC: 14 MMOL/L — LOW (ref 22–31)
CREAT SERPL-MCNC: 10.26 MG/DL — HIGH (ref 0.5–1.3)
EGFR: 5 ML/MIN/1.73M2 — LOW
GLUCOSE SERPL-MCNC: 77 MG/DL — SIGNIFICANT CHANGE UP (ref 70–99)
HCT VFR BLD CALC: 35.6 % — LOW (ref 39–50)
HGB BLD-MCNC: 11 G/DL — LOW (ref 13–17)
MAGNESIUM SERPL-MCNC: 2.7 MG/DL — HIGH (ref 1.6–2.6)
MCHC RBC-ENTMCNC: 19.8 PG — LOW (ref 27–34)
MCHC RBC-ENTMCNC: 30.9 GM/DL — LOW (ref 32–36)
MCV RBC AUTO: 64.1 FL — LOW (ref 80–100)
NRBC # BLD: 0 /100 WBCS — SIGNIFICANT CHANGE UP (ref 0–0)
PHOSPHATE SERPL-MCNC: 6.3 MG/DL — HIGH (ref 2.5–4.5)
PLATELET # BLD AUTO: 210 K/UL — SIGNIFICANT CHANGE UP (ref 150–400)
POTASSIUM SERPL-MCNC: 5.5 MMOL/L — HIGH (ref 3.5–5.3)
POTASSIUM SERPL-SCNC: 5.5 MMOL/L — HIGH (ref 3.5–5.3)
RBC # BLD: 5.55 M/UL — SIGNIFICANT CHANGE UP (ref 4.2–5.8)
RBC # FLD: 17.9 % — HIGH (ref 10.3–14.5)
SODIUM SERPL-SCNC: 130 MMOL/L — LOW (ref 135–145)
WBC # BLD: 13.62 K/UL — HIGH (ref 3.8–10.5)
WBC # FLD AUTO: 13.62 K/UL — HIGH (ref 3.8–10.5)

## 2022-03-22 PROCEDURE — 99232 SBSQ HOSP IP/OBS MODERATE 35: CPT | Mod: GC

## 2022-03-22 RX ORDER — HEPARIN SODIUM 5000 [USP'U]/ML
3000 INJECTION INTRAVENOUS; SUBCUTANEOUS EVERY 6 HOURS
Refills: 0 | Status: DISCONTINUED | OUTPATIENT
Start: 2022-03-22 | End: 2022-03-23

## 2022-03-22 RX ORDER — HEPARIN SODIUM 5000 [USP'U]/ML
6500 INJECTION INTRAVENOUS; SUBCUTANEOUS EVERY 6 HOURS
Refills: 0 | Status: DISCONTINUED | OUTPATIENT
Start: 2022-03-22 | End: 2022-03-23

## 2022-03-22 RX ORDER — HEPARIN SODIUM 5000 [USP'U]/ML
INJECTION INTRAVENOUS; SUBCUTANEOUS
Qty: 25000 | Refills: 0 | Status: DISCONTINUED | OUTPATIENT
Start: 2022-03-22 | End: 2022-03-23

## 2022-03-22 RX ADMIN — ISOSORBIDE DINITRATE 10 MILLIGRAM(S): 5 TABLET ORAL at 11:01

## 2022-03-22 RX ADMIN — Medication 325 MILLIGRAM(S): at 11:01

## 2022-03-22 RX ADMIN — Medication 81 MILLIGRAM(S): at 11:00

## 2022-03-22 RX ADMIN — AMIODARONE HYDROCHLORIDE 200 MILLIGRAM(S): 400 TABLET ORAL at 05:11

## 2022-03-22 RX ADMIN — Medication 1000 UNIT(S): at 11:00

## 2022-03-22 RX ADMIN — HEPARIN SODIUM 1400 UNIT(S)/HR: 5000 INJECTION INTRAVENOUS; SUBCUTANEOUS at 20:37

## 2022-03-22 RX ADMIN — Medication 100 MILLIGRAM(S): at 11:01

## 2022-03-22 RX ADMIN — FINASTERIDE 5 MILLIGRAM(S): 5 TABLET, FILM COATED ORAL at 11:01

## 2022-03-22 RX ADMIN — AMLODIPINE BESYLATE 10 MILLIGRAM(S): 2.5 TABLET ORAL at 05:10

## 2022-03-22 RX ADMIN — ISOSORBIDE DINITRATE 10 MILLIGRAM(S): 5 TABLET ORAL at 05:11

## 2022-03-22 RX ADMIN — CHLORHEXIDINE GLUCONATE 1 APPLICATION(S): 213 SOLUTION TOPICAL at 05:11

## 2022-03-22 RX ADMIN — Medication 80 MILLIGRAM(S): at 05:10

## 2022-03-22 NOTE — DIETITIAN INITIAL EVALUATION ADULT. - PERSON TAUGHT/METHOD
Provided education on renal diet for HD. Provided education on increased demand for kcal and protein intake to help prevent muscle/weight loss, reviewed foods high in potassium, phosphorus, and sodium, discussed foods recommended within therapeutic diet and protein portion sizing. Pt made aware RD remains available for any questions/concerns/reinforcement as needed./verbal instruction/written material/patient instructed

## 2022-03-22 NOTE — PROGRESS NOTE ADULT - ATTENDING COMMENTS
I have seen this patient with the fellow and agree with their assessment and plan. I was physically present for significant portions of the evaluation and management (E/M) service provided.  I agree with the above history, physical, and plan which I have reviewed and edited where appropriate.    Pt s/p AVF now  awaiting IR for tunnelled cath now before dc  Cont IV iron as above  Case mgt working on dc to [facility] HD unit

## 2022-03-22 NOTE — DIETITIAN INITIAL EVALUATION ADULT. - OTHER INFO
Pt reports good appetite with good PO intake in-house on admission, but feels he hasn't been able to tolerate solids since he started HD (3/16). Flowsheets indicate consistent % PO intake.    Reports vomiting yesterday and recently since he started HD 3/16. Denies constipation and diarrhea. Reports last BM 3/21. Pt currently on bowel regimen (miralax).     Per EMR, pt currently ordered for Vitamin D3, ferrous sulfate, and lasix in-house.    Reports  lbs. Denies wt changes.   Dosing wt: 170 lbs (03-21)   Wt history per chart: 166.4 lbs (03-22), 169 lbs (03-17), 175.4 lbs (03-09), 198 lbs (06/06/21). Would indicate 16% wt loss x 9 months. Noted pt now on HD and with history of CHF, ordered for lasix in-house and PTA, therefore fluid shifts may be involved. RD to continue to monitor weight trends as able/available.     Pt started on HD 3/16, with last HD session 3/19 with 1L fluid removed.  Food preferences explored and documented. Pt amenable to addition of Nepro 3x/day to optimize PO intake. Pt made aware RD remains available.

## 2022-03-22 NOTE — PROGRESS NOTE ADULT - ASSESSMENT
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 Patient is undergoing evaluation for kidney transplant and will be started on IP HD as a bridge to transplant. Vascular Surgery consulted for AVF planning.     PLAN:   - S/P LUE radiocephalic AVF; well tolerated  - Pain control as needed  - C/W HD via non-tunneled HD catheter; plan for conversion to PermCath tomorrow with IR; appreciated  - No further planned vascular surgery intervention  - Please follow up with Dr. Hung as outpatient; can call upon discharge to schedule appointment  - Can call with questions    Narinder Mckeon MD PGY2  Vascular Surgery Team  [phone]

## 2022-03-22 NOTE — PROGRESS NOTE ADULT - ASSESSMENT
Pt. with advanced CKD in setting of secondary FSGS, now on HD    [id number]}

## 2022-03-22 NOTE — PROGRESS NOTE ADULT - ASSESSMENT
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 Presents as transfer from [facility] for acute heart failure exacerbation. The patient reports that he has been experiencing LIRA and SOB for 7-10 days which was progressive and now occurring at rest. Started on IV diuretics with improving respiratory status. Called to consult for diffuse bronchial wall thickening and impaction and 4mm pulmonary nodules seen on CT chest.

## 2022-03-22 NOTE — DIETITIAN INITIAL EVALUATION ADULT. - CHIEF COMPLAINT
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 Patient is undergoing evaluation for kidney transplant and will be started on IP HD as a bridge to transplant"

## 2022-03-22 NOTE — PROGRESS NOTE ADULT - SUBJECTIVE AND OBJECTIVE BOX
CARDIOLOGY FOLLOW UP - Dr. Lee  DATE OF SERVICE: 3/22/22     CC no cp or sob      REVIEW OF SYSTEMS:  CONSTITUTIONAL: No fever, weight loss, or fatigue  RESPIRATORY: No cough, wheezing, chills or hemoptysis; No Shortness of Breath  CARDIOVASCULAR: No chest pain, palpitations, passing out, dizziness, or leg swelling  GASTROINTESTINAL: No abdominal or epigastric pain. No nausea, vomiting, or hematemesis; No diarrhea or constipation. No melena or hematochezia.  VASCULAR: No edema     PHYSICAL EXAM:  T(C): 36.4 (03-22-22 @ 13:58), Max: 36.8 (03-22-22 @ 04:57)  HR: 64 (03-22-22 @ 13:58) (64 - 77)  BP: 133/62 (03-22-22 @ 13:58) (127/72 - 144/80)  RR: 18 (03-22-22 @ 13:58) (17 - 18)  SpO2: 98% (03-22-22 @ 13:58) (97% - 99%)  Wt(kg): --  I&O's Summary    21 Mar 2022 07:01  -  22 Mar 2022 07:00  --------------------------------------------------------  IN: 720 mL / OUT: 200 mL / NET: 520 mL        Appearance: Normal	  Cardiovascular: Normal S1 S2,RRR, No JVD, No murmurs  Respiratory: Lungs clear to auscultation	  Gastrointestinal:  Soft, Non-tender, + BS	  Extremities: Normal range of motion, No clubbing, cyanosis or edema      Home Medications:  allopurinol 100 mg oral tablet: 1 tab(s) orally once a day (09 Mar 2022 02:11)  amiodarone 200 mg oral tablet: 1 tab(s) orally once a day (09 Mar 2022 02:11)  amLODIPine 10 mg oral tablet: 1 tab(s) orally once a day (09 Mar 2022 02:11)  Aspirin Enteric Coated 81 mg oral delayed release tablet: 1 tab(s) orally once a day (09 Mar 2022 02:11)  Eliquis 5 mg oral tablet: 1 tab(s) orally 2 times a day (09 Mar 2022 02:11)  Farxiga 5 mg oral tablet: 1 tab(s) orally once a day (09 Mar 2022 02:11)  ferrous sulfate 325 mg (65 mg elemental iron) oral tablet: 1 tab(s) orally once a day (09 Mar 2022 02:11)  finasteride 5 mg oral tablet: 1 tab(s) orally once a day (09 Mar 2022 02:11)  furosemide 40 mg oral tablet: 1 tab(s) orally once a day (09 Mar 2022 02:11)  hydrALAZINE 25 mg oral tablet: 1 tab(s) orally once a day (09 Mar 2022 02:11)  isosorbide dinitrate 10 mg oral tablet: 1 tab(s) orally 3 times a day (09 Mar 2022 02:11)  metoprolol succinate 25 mg oral tablet, extended release: 1 tab(s) orally once a day (09 Mar 2022 02:11)  pantoprazole 40 mg oral delayed release tablet: 1 tab(s) orally once a day (09 Mar 2022 02:11)  simvastatin 40 mg oral tablet: 1 tab(s) orally once a day (at bedtime) (09 Mar 2022 02:11)  Vitamin D3 25 mcg (1000 intl units) oral capsule: 1 cap(s) orally once a day (09 Mar 2022 02:11)      MEDICATIONS  (STANDING):  allopurinol 100 milliGRAM(s) Oral daily  aMIOdarone    Tablet 200 milliGRAM(s) Oral daily  amLODIPine   Tablet 10 milliGRAM(s) Oral daily  aspirin enteric coated 81 milliGRAM(s) Oral daily  chlorhexidine 4% Liquid 1 Application(s) Topical <User Schedule>  cholecalciferol 1000 Unit(s) Oral daily  ferrous    sulfate 325 milliGRAM(s) Oral daily  finasteride 5 milliGRAM(s) Oral daily  furosemide    Tablet 80 milliGRAM(s) Oral daily  isosorbide   dinitrate Tablet (ISORDIL) 10 milliGRAM(s) Oral three times a day  ondansetron Injectable 4 milliGRAM(s) IV Push once  polyethylene glycol 3350 17 Gram(s) Oral daily      TELEMETRY: nsr 1st degree 	    ECG:  	  RADIOLOGY:   DIAGNOSTIC TESTING:  [ ] Echocardiogram:  [ ]  Catheterization:  [ ] Stress Test:    OTHER: 	    LABS:	 	                            11.0   13.62 )-----------( 210      ( 22 Mar 2022 06:52 )             35.6     03-22    130<L>  |  94<L>  |  73<H>  ----------------------------<  77  5.5<H>   |  14<L>  |  10.26<H>    Ca    8.8      22 Mar 2022 06:52  Phos  6.3     03-22  Mg     2.7     03-22      PT/INR - ( 21 Mar 2022 04:45 )   PT: 15.5 sec;   INR: 1.34 ratio         PTT - ( 21 Mar 2022 04:45 )  PTT:109.2 sec

## 2022-03-22 NOTE — PROGRESS NOTE ADULT - PROBLEM SELECTOR PLAN 3
Patient with anemia in the setting of ESRD. Hemoglobin in target range.   Tsat 16 and ferritin 268.   Venofer started on 3/19 and needs 5 doses total of 200mg.  Monitor hemoglobin

## 2022-03-22 NOTE — DIETITIAN INITIAL EVALUATION ADULT. - PROBLEM SELECTOR PLAN 4
Nephrology consult in am  - per outpatient chart there is concern for FSGS. The patient is reportedly pending kidney transplant  urine studies ordered  - may require HD given presentation and therefore type and screen ordered and eliquis will be held for now (dosed 3/8/22 at [facility] at 18:28

## 2022-03-22 NOTE — PROGRESS NOTE ADULT - PROBLEM SELECTOR PLAN 1
Pt. with advanced RUSSELL on CKD in setting of CHF and secondary FSGS.  SCr at ~1.7-1.9 (04/2019).  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 Last SCr was elevated at 3.25 on 6/8/21. Scr on admission elevated at 8.77 and in 9-10 range.   Pt underwent right IJ non tunelled HD catheter placement on 3/15/22. Pt. underwent HD 1st session on 3/16/22 and 2nd session on 3/17 and tolerated well and third on 3/19. Pt underwent LUE AVF placement on 3/21 and tolerated well. Plan for HD today.   Will need tunnelled HD catheter prior to discharge - please recontact IR  OP HD set up in progress.   please dose medications as per ESRD     Pt prefer to continue care with his OP nephrologist  and prefers [address] [place] [address] HD unit for dialysis. Case mgt aware. Pt. with advanced RUSSELL on CKD in setting of CHF and secondary FSGS.  SCr at ~1.7-1.9 (04/2019).  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 Last SCr was elevated at 3.25 on 6/8/21. Scr on admission elevated at 8.77 and in 9-10 range.     Pt underwent right IJ non tunelled HD catheter placement on 3/15/22. Pt. underwent HD 1st session on 3/16/22 and 2nd session on 3/17 and tolerated well and third on 3/19. Pt underwent LUE AVF placement on 3/21 and tolerated well. Plan for HD today.     Will need tunnelled HD catheter prior to discharge - please recontact IR  OP HD set up in progress.     please dose medications as per ESRD

## 2022-03-22 NOTE — PROGRESS NOTE ADULT - SUBJECTIVE AND OBJECTIVE BOX
VASCULAR SURGERY PROGRESS NOTE   ___________________________________________________________________    SYEDA WEAVER | 66y Male   [facility] [id number] D1   1955 | [id number]     LOS 14d    Attending: Pernell Lee    ___________________________________________________________________      SUBJECTIVE:   Patient seen today during morning rounds at bedside and found to be without acute distress. Denies chest pain, fever, severe pain, or SOB.     Allergies: Seafood (Other)   NKDA    OBJECTIVE:  Vitals:  Height (cm): 180.3  Weight (kg): 77.1  BMI (kg/m2): 23.7  T(C): 36.8 (22 @ 04:57), Max: 36.8 (22 @ 04:57)  HR: 77 (22 @ 04:57) (63 - 77)  BP: 134/76 (22 @ 04:57) (103/67 - 146/83)  RR: 18 (22 @ 04:57) (14 - 18)  SpO2: 99% (22 @ 04:57) (95% - 99%)      OUT:    Voided (mL): 200 mL  Total OUT: 200 mL          Medications:  aMIOdarone    Tablet 200 milliGRAM(s) Oral daily  amLODIPine   Tablet 10 milliGRAM(s) Oral daily  furosemide    Tablet 80 milliGRAM(s) Oral daily  isosorbide   dinitrate Tablet (ISORDIL) 10 milliGRAM(s) Oral three times a day  ondansetron Injectable 4 milliGRAM(s) IV Push once  polyethylene glycol 3350 17 Gram(s) Oral daily          Laboratory:  WBC: 13.62 H&H: 11.0/35.6 Plt: 210  WBC: 10.66 H&H: 10.5/33.2 Plt: 228    Chemistry:                               Phos: 6.3 M.7  130  |  94  |  73  ----------------------------<  77  5.5   |  14  |  10.26        ,                              Phos: 7.5 M.4  134  |  95  |  56  ----------------------------<  89  4.5   |  19  |  8.48          .2 PT/INR 15.5/1.34          Reviewed laboratory and imaging    aspirin enteric coated 81 Oral daily      COVID-19 PCR: NotDetec (20 Mar 2022 06:12)        Physical Exam:   Constitutional: resting in bed with no acute distress  Respiratory: unlabored breathing, clear respiration  Gastrointestinal: Abdomen soft, non distended, non-tender  Vascular:  LUE AVF dressing CDI, ulnar and radial palpable, no thrill appreciated, sensory and motor intact, extremity is warm and adequate cap refill

## 2022-03-22 NOTE — DIETITIAN INITIAL EVALUATION ADULT. - PERTINENT MEDS FT
MEDICATIONS  (STANDING):  allopurinol 100 milliGRAM(s) Oral daily  aMIOdarone    Tablet 200 milliGRAM(s) Oral daily  amLODIPine   Tablet 10 milliGRAM(s) Oral daily  aspirin enteric coated 81 milliGRAM(s) Oral daily  chlorhexidine 4% Liquid 1 Application(s) Topical <User Schedule>  cholecalciferol 1000 Unit(s) Oral daily  ferrous    sulfate 325 milliGRAM(s) Oral daily  finasteride 5 milliGRAM(s) Oral daily  furosemide    Tablet 80 milliGRAM(s) Oral daily  isosorbide   dinitrate Tablet (ISORDIL) 10 milliGRAM(s) Oral three times a day  ondansetron Injectable 4 milliGRAM(s) IV Push once  polyethylene glycol 3350 17 Gram(s) Oral daily

## 2022-03-22 NOTE — PROGRESS NOTE ADULT - SUBJECTIVE AND OBJECTIVE BOX
Follow-up Pulm Progress Note    No new respiratory events overnight.  Denies SOB/CP.     Medications:  MEDICATIONS  (STANDING):  allopurinol 100 milliGRAM(s) Oral daily  aMIOdarone    Tablet 200 milliGRAM(s) Oral daily  amLODIPine   Tablet 10 milliGRAM(s) Oral daily  aspirin enteric coated 81 milliGRAM(s) Oral daily  chlorhexidine 4% Liquid 1 Application(s) Topical <User Schedule>  cholecalciferol 1000 Unit(s) Oral daily  ferrous    sulfate 325 milliGRAM(s) Oral daily  finasteride 5 milliGRAM(s) Oral daily  furosemide    Tablet 80 milliGRAM(s) Oral daily  isosorbide   dinitrate Tablet (ISORDIL) 10 milliGRAM(s) Oral three times a day  ondansetron Injectable 4 milliGRAM(s) IV Push once  polyethylene glycol 3350 17 Gram(s) Oral daily        Vital Signs Last 24 Hrs  T(C): 36.3 (22 Mar 2022 11:20), Max: 36.8 (22 Mar 2022 04:57)  T(F): 97.3 (22 Mar 2022 11:20), Max: 98.2 (22 Mar 2022 04:57)  HR: 67 (22 Mar 2022 11:20) (63 - 77)  BP: 132/72 (22 Mar 2022 11:20) (103/67 - 146/83)  BP(mean): 83 (21 Mar 2022 15:30) (80 - 89)  RR: 18 (22 Mar 2022 11:20) (14 - 18)  SpO2: 98% (22 Mar 2022 11:20) (95% - 99%)          03-21 @ 07:01  -  03-22 @ 07:00  --------------------------------------------------------  IN: 720 mL / OUT: 200 mL / NET: 520 mL          LABS:                        11.0   13.62 )-----------( 210      ( 22 Mar 2022 06:52 )             35.6     03-22    130<L>  |  94<L>  |  73<H>  ----------------------------<  77  5.5<H>   |  14<L>  |  10.26<H>    Ca    8.8      22 Mar 2022 06:52  Phos  6.3     03-22  Mg     2.7     03-22        PT/INR - ( 21 Mar 2022 04:45 )   PT: 15.5 sec;   INR: 1.34 ratio         PTT - ( 21 Mar 2022 04:45 )  PTT:109.2 sec                  Physical Examination:  PULM: CTA bilaterally   CVS: S1, S2 heard    RADIOLOGY REVIEWED  CT chest: < from: CT Chest No Cont (03.09.22 @ 16:32) >  FINDINGS:    Lungs/Airways/Pleura: The central airways are patent. No pleural   effusion. There is diffuse bronchial wall thickening with segmental and   subsegmental bronchial impaction at the bases. Few scattered sub-4 mm   pulmonary nodules include right apex series 3 image 29 and left apex   image 28. No consolidation or pulmonary edema.    Mediastinum/Lymph nodes: No thoracic adenopathy.    Heart and Vessels: Cardiomegaly. LAD stent. No pericardial effusion. The   pulmonary artery measures 3.2 cm. Adjacent mid ascending aorta measures   3.2 cm.    Upper Abdomen: Unremarkable.    Osseous structures and Soft Tissues: No aggressive bone lesions.    IMPRESSION:  Diffuse bronchial wall thickening and impaction.    Sub-4 mm pulmonary nodules; if patient is considered high risk for lung   cancer, consider follow-up low dose chest CT in 12 months. If low risk,   no further follow-up is needed as per current Fleischner guidelines.      < end of copied text >

## 2022-03-22 NOTE — PROGRESS NOTE ADULT - PROBLEM SELECTOR PLAN 1
RVP +entero/rhino virus  -CT chest read with diffuse bronchial wall thickening. None appreciated on our read - also reviewed with chest radiologist. +Mucous in airways.   -S/p Mucinex   -Cough resolved   -Duoneb q6h PRN   -Normoxic, keep sats >90% with supplemental o2 PRN  -Supportive care

## 2022-03-22 NOTE — DIETITIAN INITIAL EVALUATION ADULT. - ADD RECOMMEND
1) Recommend DASH/Renal diet.        2) Recommend Nepro 3x/day and Nephro-Madelaine supplement pending no medical contraindications.         3) Monitor PO intake, GI tolerance, skin integrity, labs, weight, and bowel movement regularity.          4) Honor food preferences as feasible. Assist with meals PRN and encourage PO intake.        5) RD remains available upon request and will follow-up per protocol.

## 2022-03-22 NOTE — DIETITIAN INITIAL EVALUATION ADULT. - REASON
Nutrition-focused physical examination deferred at this time per pt preference. No overt signs of fat/muscle wasting visually observed.

## 2022-03-22 NOTE — PROGRESS NOTE ADULT - SUBJECTIVE AND OBJECTIVE BOX
[facility] DIVISION OF KIDNEY DISEASES AND HYPERTENSION -- FOLLOW UP NOTE  --------------------------------------------------------------------------------  HPI: Pt. with advanced CKD in setting of biopsy proven secondary FSGS in 06/21 started on HD for volume overload and uremic symptoms. Pt. underwent non tunelled HD catheter placement on 3/15/22. Pt. underwent HD 1st session on 3/16/22. Pt underwent LUE AVF placement on 3/21/22 and tolerated well. Last HD done on Saturday, 3/19/22.     Pt. seen and examined today, reports feeling well.     PAST HISTORY  --------------------------------------------------------------------------------  No significant changes to PMH, PSH, FHx, SHx, unless otherwise noted    ALLERGIES & MEDICATIONS  --------------------------------------------------------------------------------  Allergies    No Known Allergies    Intolerances    Seafood (Other)    Standing Inpatient Medications  allopurinol 100 milliGRAM(s) Oral daily  aMIOdarone    Tablet 200 milliGRAM(s) Oral daily  amLODIPine   Tablet 10 milliGRAM(s) Oral daily  aspirin enteric coated 81 milliGRAM(s) Oral daily  chlorhexidine 4% Liquid 1 Application(s) Topical <User Schedule>  cholecalciferol 1000 Unit(s) Oral daily  ferrous    sulfate 325 milliGRAM(s) Oral daily  finasteride 5 milliGRAM(s) Oral daily  furosemide    Tablet 80 milliGRAM(s) Oral daily  isosorbide   dinitrate Tablet (ISORDIL) 10 milliGRAM(s) Oral three times a day  ondansetron Injectable 4 milliGRAM(s) IV Push once  polyethylene glycol 3350 17 Gram(s) Oral daily    PRN Inpatient Medications      REVIEW OF SYSTEMS  --------------------------------------------------------------------------------  Gen: No malaise  Skin: No rashes  Head/Eyes/Ears: Normal hearing,   Respiratory: cough+,  CV: No chest pain  GI: No abdominal pain, diarrhea  : as per HPI  MSK: No  edema  Heme: No easy bruising or bleedin    All other systems were reviewed and are negative, except as noted.    VITALS/PHYSICAL EXAM  --------------------------------------------------------------------------------  T(C): 36.8 (03-22-22 @ 04:57), Max: 36.8 (03-22-22 @ 04:57)  HR: 77 (03-22-22 @ 04:57) (63 - 77)  BP: 134/76 (03-22-22 @ 04:57) (103/67 - 146/83)  RR: 18 (03-22-22 @ 04:57) (14 - 18)  SpO2: 99% (03-22-22 @ 04:57) (95% - 99%)  Wt(kg): --  Height (cm): 180.3 (03-21-22 @ 11:43)  Weight (kg): 77.1 (03-21-22 @ 11:43)  BMI (kg/m2): 23.7 (03-21-22 @ 11:43)  BSA (m2): 1.97 (03-21-22 @ 11:43)      03-21-22 @ 07:01  -  03-22-22 @ 07:00  --------------------------------------------------------  IN: 720 mL / OUT: 200 mL / NET: 520 mL      Physical Exam:  	Gen: NAD  	HEENT: Anicteric  	Pulm: good entry, no rales   	CV: S1S2+  	Abd: Soft, +BS   	Ext: No LE edema B/L, no asterixis  	Neuro: Awake  	Skin: Warm and dry              Vascular: right non tunneled HD catheter+, LUE AVF thrill+      LABS/STUDIES  --------------------------------------------------------------------------------              11.0   13.62 >-----------<  210      [03-22-22 @ 06:52]              35.6     130  |  94  |  73  ----------------------------<  77      [03-22-22 @ 06:52]  5.5   |  14  |  10.26        Ca     8.8     [03-22-22 @ 06:52]      Mg     2.7     [03-22-22 @ 06:52]      Phos  6.3     [03-22-22 @ 06:52    Creatinine Trend:  SCr 10.26 [03-22 @ 06:52]  SCr 8.48 [03-21 @ 04:45]  SCr 6.40 [03-20 @ 06:12]  SCr 7.60 [03-19 @ 06:44]  SCr 6.36 [03-18 @ 07:16]    Urinalysis - [03-09-22 @ 06:37]      Color Light Yellow / Appearance Clear / SG 1.012 / pH 6.0      Gluc 500 mg/dL / Ketone Negative  / Bili Negative / Urobili Negative       Blood Small / Protein 300 mg/dL / Leuk Est Negative / Nitrite Negative      RBC 2 / WBC 3 / Hyaline 1 / Gran  / Sq Epi  / Non Sq Epi 1 / Bacteria Negative      HBsAb <3.0      [03-17-22 @ 00:04]  HBsAb Nonreact      [03-17-22 @ 00:04]  HBsAg Nonreact      [03-17-22 @ 00:04]  HCV 0.09, Nonreact      [03-17-22 @ 00:04]

## 2022-03-22 NOTE — DIETITIAN INITIAL EVALUATION ADULT. - PROBLEM SELECTOR PLAN 1
- cardiology consult in am  - tele monitoring. TTE at [facility] appears to have improved EF to 50% from last charted 25% 2021, no reported wall motion abnormalities, moderate MVR reported  - elevated troponin however plateaued. doubt acs as elevated troponin suspected to be from worsening CKD4 (patient pending kidney transplant)  - received lasix 40mg IV at [facility] with some improvement. However given degree of kidney failure will provide dose of lasix 80mg IV this morning. pending response will need cardiology to titrate. strict I/o  - c/w reported home dosing of hydralazine, isosorbide dinitrate, metoprolol for now

## 2022-03-22 NOTE — DIETITIAN INITIAL EVALUATION ADULT. - PERTINENT LABORATORY DATA
03-22 Na130 mmol/L<L> Glu 77 mg/dL K+ 5.5 mmol/L<H> Cr  10.26 mg/dL<H> BUN 73 mg/dL<H> Phos 6.3 mg/dL<H>

## 2022-03-22 NOTE — PROGRESS NOTE ADULT - ASSESSMENT
NICOLETTE 3/14/22: EF (Visual Estimate): 40-45 %  Moderate global left ventricular systolic dysfunction. Tethered mitral valve leaflets with normal opening. Moderate-severe mitral regurgitation.Mild atheroma noted in aortic arch/descending aorta. 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   -renal f/u noted   -s/p non tunneled HD catheter on 3/15/22   -started on HD  --cv stable   -UE dopplers noted focal THROMBOSIS of the right cephalic vein at the  antecubital fossa.-- already on ac   -sp avf 3/21-- vascular fu     #THROMBOSIS of the right cephalic vein   -on a.c , vascular following    #Atrial Fibrillation/Flutter. S/P AF Ablation  -sp nicolette/ dccv 3/14   -remains in SR  -cont amiodarone 200 mg daily , Toprol  XL 25 mg daily  -RESUME AC sp avf- per vascular recommendation   -eventual af ablation    #Coronary Artery Disease. S/P PCI to LAD  -stable without chest pain or dyspnea  -hsT peaked, likely demand ischemia in setting of CKD and acute HF   -continue toprol, statin, and asa 81mg daily    #Mitral Regurgitation  -continue to monitor, nicolette Tethered mitral valve leaflets with normal opening. Moderate-severe mitral regurgitation.  -mr should improved with maint of SR and decrease in lv size/improved lv sys fxn    #Hypertension  -Blood pressure controlled  -Continue current medications.      DCP - awaiting IR for tunnelled cath now before dc-- dcp once cleared by renal/ vascular       NICOLETTE 3/14/22: EF (Visual Estimate): 40-45 %  Moderate global left ventricular systolic dysfunction. Tethered mitral valve leaflets with normal opening. Moderate-severe mitral regurgitation.Mild atheroma noted in aortic arch/descending aorta. 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   -renal f/u noted   -s/p non tunneled HD catheter on 3/15/22   -started on HD  --cv stable   -UE dopplers noted focal THROMBOSIS of the right cephalic vein at the  antecubital fossa.-- already on ac   -sp avf 3/21-- vascular fu     #THROMBOSIS of the right cephalic vein   -RESUME a.c , vascular following    #Atrial Fibrillation/Flutter. S/P AF Ablation  -sp nicolette/ dccv 3/14   -remains in SR  -cont amiodarone 200 mg daily , Toprol  XL 25 mg daily  -RESUME AC sp avf- per vascular recommendation   -eventual af ablation    #Coronary Artery Disease. S/P PCI to LAD  -stable without chest pain or dyspnea  -hsT peaked, likely demand ischemia in setting of CKD and acute HF   -continue toprol, statin, and asa 81mg daily    #Mitral Regurgitation  -continue to monitor, nicolette Tethered mitral valve leaflets with normal opening. Moderate-severe mitral regurgitation.  -mr should improved with maint of SR and decrease in lv size/improved lv sys fxn    #Hypertension  -Blood pressure controlled  -Continue current medications.      DCP - awaiting IR for tunnelled cath now before dc-- dcp once cleared by renal/ vascular

## 2022-03-22 NOTE — DIETITIAN INITIAL EVALUATION ADULT. - ORAL INTAKE PTA/DIET HISTORY
Pt interviewed at bedside. Reports good appetite PTA, not strictly following any therapeutic diet, consumes Gene foods. States shellfish allergy (shrimp, crab, lobster), states he can eat fish (tuna, salmon, etc.). Pt reports use of  Vitamin D and Ensure dietary supplements PTA. Ferrous sulfate additionally noted per H&P. Pt denies history of chewing/swallowing difficulty.

## 2022-03-23 LAB
ANION GAP SERPL CALC-SCNC: 17 MMOL/L — SIGNIFICANT CHANGE UP (ref 5–17)
APTT BLD: 50.6 SEC — HIGH (ref 27.5–35.5)
APTT BLD: 96.2 SEC — HIGH (ref 27.5–35.5)
BUN SERPL-MCNC: 42 MG/DL — HIGH (ref 7–23)
CALCIUM SERPL-MCNC: 8.8 MG/DL — SIGNIFICANT CHANGE UP (ref 8.4–10.5)
CHLORIDE SERPL-SCNC: 95 MMOL/L — LOW (ref 96–108)
CO2 SERPL-SCNC: 22 MMOL/L — SIGNIFICANT CHANGE UP (ref 22–31)
CREAT SERPL-MCNC: 7.24 MG/DL — HIGH (ref 0.5–1.3)
EGFR: 8 ML/MIN/1.73M2 — LOW
GLUCOSE SERPL-MCNC: 86 MG/DL — SIGNIFICANT CHANGE UP (ref 70–99)
HCT VFR BLD CALC: 29.4 % — LOW (ref 39–50)
HCT VFR BLD CALC: 30.5 % — LOW (ref 39–50)
HGB BLD-MCNC: 9.4 G/DL — LOW (ref 13–17)
HGB BLD-MCNC: 9.8 G/DL — LOW (ref 13–17)
INR BLD: 1.16 RATIO — SIGNIFICANT CHANGE UP (ref 0.88–1.16)
MAGNESIUM SERPL-MCNC: 2.3 MG/DL — SIGNIFICANT CHANGE UP (ref 1.6–2.6)
MCHC RBC-ENTMCNC: 19.3 PG — LOW (ref 27–34)
MCHC RBC-ENTMCNC: 19.6 PG — LOW (ref 27–34)
MCHC RBC-ENTMCNC: 32 GM/DL — SIGNIFICANT CHANGE UP (ref 32–36)
MCHC RBC-ENTMCNC: 32.1 GM/DL — SIGNIFICANT CHANGE UP (ref 32–36)
MCV RBC AUTO: 60.2 FL — LOW (ref 80–100)
MCV RBC AUTO: 61.4 FL — LOW (ref 80–100)
NRBC # BLD: 0 /100 WBCS — SIGNIFICANT CHANGE UP (ref 0–0)
NRBC # BLD: 0 /100 WBCS — SIGNIFICANT CHANGE UP (ref 0–0)
PHOSPHATE SERPL-MCNC: 5.1 MG/DL — HIGH (ref 2.5–4.5)
PLATELET # BLD AUTO: 235 K/UL — SIGNIFICANT CHANGE UP (ref 150–400)
PLATELET # BLD AUTO: 236 K/UL — SIGNIFICANT CHANGE UP (ref 150–400)
POTASSIUM SERPL-MCNC: 4 MMOL/L — SIGNIFICANT CHANGE UP (ref 3.5–5.3)
POTASSIUM SERPL-SCNC: 4 MMOL/L — SIGNIFICANT CHANGE UP (ref 3.5–5.3)
PROTHROM AB SERPL-ACNC: 13.5 SEC — HIGH (ref 10.5–13.4)
RBC # BLD: 4.79 M/UL — SIGNIFICANT CHANGE UP (ref 4.2–5.8)
RBC # BLD: 5.07 M/UL — SIGNIFICANT CHANGE UP (ref 4.2–5.8)
RBC # FLD: 16.5 % — HIGH (ref 10.3–14.5)
RBC # FLD: 16.6 % — HIGH (ref 10.3–14.5)
SODIUM SERPL-SCNC: 134 MMOL/L — LOW (ref 135–145)
WBC # BLD: 11.07 K/UL — HIGH (ref 3.8–10.5)
WBC # BLD: 13.46 K/UL — HIGH (ref 3.8–10.5)
WBC # FLD AUTO: 11.07 K/UL — HIGH (ref 3.8–10.5)
WBC # FLD AUTO: 13.46 K/UL — HIGH (ref 3.8–10.5)

## 2022-03-23 PROCEDURE — 36558 INSERT TUNNELED CV CATH: CPT

## 2022-03-23 PROCEDURE — 99232 SBSQ HOSP IP/OBS MODERATE 35: CPT | Mod: GC

## 2022-03-23 RX ORDER — SODIUM CHLORIDE 9 MG/ML
1000 INJECTION, SOLUTION INTRAVENOUS
Refills: 0 | Status: DISCONTINUED | OUTPATIENT
Start: 2022-03-23 | End: 2022-03-23

## 2022-03-23 RX ORDER — LANOLIN ALCOHOL/MO/W.PET/CERES
3 CREAM (GRAM) TOPICAL ONCE
Refills: 0 | Status: COMPLETED | OUTPATIENT
Start: 2022-03-23 | End: 2022-03-23

## 2022-03-23 RX ORDER — IRON SUCROSE 20 MG/ML
200 INJECTION, SOLUTION INTRAVENOUS EVERY 24 HOURS
Refills: 0 | Status: COMPLETED | OUTPATIENT
Start: 2022-03-23 | End: 2022-03-24

## 2022-03-23 RX ORDER — APIXABAN 2.5 MG/1
2.5 TABLET, FILM COATED ORAL
Refills: 0 | Status: DISCONTINUED | OUTPATIENT
Start: 2022-03-24 | End: 2022-03-27

## 2022-03-23 RX ORDER — ACETAMINOPHEN 500 MG
650 TABLET ORAL ONCE
Refills: 0 | Status: COMPLETED | OUTPATIENT
Start: 2022-03-23 | End: 2022-03-23

## 2022-03-23 RX ADMIN — ISOSORBIDE DINITRATE 10 MILLIGRAM(S): 5 TABLET ORAL at 11:20

## 2022-03-23 RX ADMIN — Medication 1000 UNIT(S): at 11:21

## 2022-03-23 RX ADMIN — Medication 100 MILLIGRAM(S): at 11:21

## 2022-03-23 RX ADMIN — Medication 3 MILLIGRAM(S): at 23:29

## 2022-03-23 RX ADMIN — Medication 80 MILLIGRAM(S): at 05:38

## 2022-03-23 RX ADMIN — IRON SUCROSE 110 MILLIGRAM(S): 20 INJECTION, SOLUTION INTRAVENOUS at 11:20

## 2022-03-23 RX ADMIN — AMIODARONE HYDROCHLORIDE 200 MILLIGRAM(S): 400 TABLET ORAL at 05:38

## 2022-03-23 RX ADMIN — Medication 325 MILLIGRAM(S): at 11:20

## 2022-03-23 RX ADMIN — Medication 81 MILLIGRAM(S): at 11:20

## 2022-03-23 RX ADMIN — FINASTERIDE 5 MILLIGRAM(S): 5 TABLET, FILM COATED ORAL at 11:21

## 2022-03-23 RX ADMIN — ISOSORBIDE DINITRATE 10 MILLIGRAM(S): 5 TABLET ORAL at 05:44

## 2022-03-23 RX ADMIN — CHLORHEXIDINE GLUCONATE 1 APPLICATION(S): 213 SOLUTION TOPICAL at 05:39

## 2022-03-23 RX ADMIN — HEPARIN SODIUM 3000 UNIT(S): 5000 INJECTION INTRAVENOUS; SUBCUTANEOUS at 04:18

## 2022-03-23 RX ADMIN — HEPARIN SODIUM 1600 UNIT(S)/HR: 5000 INJECTION INTRAVENOUS; SUBCUTANEOUS at 10:15

## 2022-03-23 RX ADMIN — AMLODIPINE BESYLATE 10 MILLIGRAM(S): 2.5 TABLET ORAL at 05:38

## 2022-03-23 RX ADMIN — HEPARIN SODIUM 1600 UNIT(S)/HR: 5000 INJECTION INTRAVENOUS; SUBCUTANEOUS at 04:11

## 2022-03-23 RX ADMIN — Medication 650 MILLIGRAM(S): at 23:29

## 2022-03-23 NOTE — PROCEDURE NOTE - PROCEDURE FINDINGS AND DETAILS
indwelling non tunneled dialysis cathter removed over wire. new tunneled right ij dialysis catheter placed. tip in svc. ok to access.

## 2022-03-23 NOTE — PROGRESS NOTE ADULT - SUBJECTIVE AND OBJECTIVE BOX
CARDIOLOGY FOLLOW UP - Dr. Lee  Date of Service: 3/23/22  CC: no events, awaiting tunneled HD cath placement today, remains in nsr    Review of Systems:  Constitutional: No fever, weight loss, or fatigue  Respiratory: No cough, wheezing, or hemoptysis, no shortness of breath  Cardiovascular: No chest pain, palpitaitons, passing out, dizziness, or leg swelling  Gastrointestinal: No abd or epigastric pain. No nausea, vomitting, or hematemesis; no diarrhea or consiptaiton, no melena or hematochezia  Vascular: No edema     TELEMETRY:    PHYSICAL EXAM:  T(C): 36.6 (03-23-22 @ 13:35), Max: 37 (03-23-22 @ 11:31)  HR: 74 (03-23-22 @ 13:35) (68 - 74)  BP: 138/72 (03-23-22 @ 13:35) (135/75 - 156/77)  RR: 16 (03-23-22 @ 13:35) (16 - 18)  SpO2: 99% (03-23-22 @ 13:35) (96% - 100%)  Wt(kg): --  I&O's Summary    22 Mar 2022 07:01  -  23 Mar 2022 07:00  --------------------------------------------------------  IN: 600 mL / OUT: 1200 mL / NET: -600 mL        Appearance: Normal	  Cardiovascular: Normal S1 S2,RRR, No JVD, No murmurs  Respiratory: Lungs clear to auscultation	  Gastrointestinal:  Soft, Non-tender, + BS	  Extremities: Normal range of motion, No clubbing, cyanosis or edema  Vascular: Peripheral pulses palpable 2+ bilaterally       Home Medications:  allopurinol 100 mg oral tablet: 1 tab(s) orally once a day (09 Mar 2022 02:11)  amiodarone 200 mg oral tablet: 1 tab(s) orally once a day (09 Mar 2022 02:11)  amLODIPine 10 mg oral tablet: 1 tab(s) orally once a day (09 Mar 2022 02:11)  Aspirin Enteric Coated 81 mg oral delayed release tablet: 1 tab(s) orally once a day (09 Mar 2022 02:11)  Eliquis 5 mg oral tablet: 1 tab(s) orally 2 times a day (09 Mar 2022 02:11)  Farxiga 5 mg oral tablet: 1 tab(s) orally once a day (09 Mar 2022 02:11)  ferrous sulfate 325 mg (65 mg elemental iron) oral tablet: 1 tab(s) orally once a day (09 Mar 2022 02:11)  finasteride 5 mg oral tablet: 1 tab(s) orally once a day (09 Mar 2022 02:11)  furosemide 40 mg oral tablet: 1 tab(s) orally once a day (09 Mar 2022 02:11)  hydrALAZINE 25 mg oral tablet: 1 tab(s) orally once a day (09 Mar 2022 02:11)  isosorbide dinitrate 10 mg oral tablet: 1 tab(s) orally 3 times a day (09 Mar 2022 02:11)  metoprolol succinate 25 mg oral tablet, extended release: 1 tab(s) orally once a day (09 Mar 2022 02:11)  pantoprazole 40 mg oral delayed release tablet: 1 tab(s) orally once a day (09 Mar 2022 02:11)  simvastatin 40 mg oral tablet: 1 tab(s) orally once a day (at bedtime) (09 Mar 2022 02:11)  Vitamin D3 25 mcg (1000 intl units) oral capsule: 1 cap(s) orally once a day (09 Mar 2022 02:11)        MEDICATIONS  (STANDING):  allopurinol 100 milliGRAM(s) Oral daily  aMIOdarone    Tablet 200 milliGRAM(s) Oral daily  amLODIPine   Tablet 10 milliGRAM(s) Oral daily  aspirin enteric coated 81 milliGRAM(s) Oral daily  chlorhexidine 4% Liquid 1 Application(s) Topical <User Schedule>  cholecalciferol 1000 Unit(s) Oral daily  ferrous    sulfate 325 milliGRAM(s) Oral daily  finasteride 5 milliGRAM(s) Oral daily  furosemide    Tablet 80 milliGRAM(s) Oral daily  iron sucrose IVPB 200 milliGRAM(s) IV Intermittent every 24 hours  isosorbide   dinitrate Tablet (ISORDIL) 10 milliGRAM(s) Oral three times a day  ondansetron Injectable 4 milliGRAM(s) IV Push once  polyethylene glycol 3350 17 Gram(s) Oral daily        EKG:  RADIOLOGY:  DIAGNOSTIC TESTING:  [ ] Echocardiogram:  [ ] Catherterization:  [ ] Stress Test:  OTHER:     LABS:	 	                          9.8    11.07 )-----------( 235      ( 23 Mar 2022 06:33 )             30.5     03-23    134<L>  |  95<L>  |  42<H>  ----------------------------<  86  4.0   |  22  |  7.24<H>    Ca    8.8      23 Mar 2022 06:33  Phos  5.1     03-23  Mg     2.3     03-23        PT/INR - ( 23 Mar 2022 06:33 )   PT: 13.5 sec;   INR: 1.16 ratio         PTT - ( 23 Mar 2022 10:11 )  PTT:96.2 sec    CARDIAC MARKERS:

## 2022-03-23 NOTE — PROGRESS NOTE ADULT - ASSESSMENT
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 Presents as transfer from [facility] for acute heart failure exacerbation. The patient reports that he has been experiencing LIRA and SOB for 7-10 days which was progressive and now occurring at rest. Started on IV diuretics with improving respiratory status. Called to consult for diffuse bronchial wall thickening and impaction and 4mm pulmonary nodules seen on CT chest.

## 2022-03-23 NOTE — PRE-OP CHECKLIST - AS BP NONINV SITE
[facility] Hospital Medicine  Progress Note    Patient Name: Todd Quevedo  MRN: [id number]  Patient Class: IP- Inpatient   Admission Date: 1/25/2018  Length of Stay: 36 days  Attending Physician: Augustine Hollins MD  Primary Care Provider: [facility] Medicine Team: Networked reference to record PCT  Celine Zepeda MD    Subjective:     Principal Problem:Embolic stroke involving left middle cerebral artery    HPI:  48yoM with PMHx HLD, poorly controlled Dm, and LOYDA (not currently on home CPAP) s/p large L MCA stroke 1/25 s/p thrombectomy with episode of acute hypoxic respiratory failure. Currently patient was febrile to 101 with tachycardia into the 120s with WBC of 16. For CVA ppx patient is on ASA/plavix and statin therapy, also patient has been on DVT ppx with SQH. Patient was placed on [facility] Course:  No notes on file    Interval Hx: No acute events overnight.Patient currently on 1L NC for saturation 90%.   Review of Systems   Unable to perform ROS: Patient nonverbal     Objective:     Vital Signs (Most Recent):  Temp: 98.7 °F (37.1 °C) (03/02/18 0820)  Pulse: 94 (03/02/18 0820)  Resp: 16 (03/02/18 0820)  BP: (!) 143/96 (03/02/18 0820)  SpO2: 95 % (03/02/18 0820) Vital Signs (24h Range):  Temp:  [96.9 °F (36.1 °C)-99.2 °F (37.3 °C)] 98.7 °F (37.1 °C)  Pulse:  [72-97] 94  Resp:  [14-88] 16  SpO2:  [90 %-99 %] 95 %  BP: (130-175)/() 143/96     Weight: 105.6 kg (232 lb 12.9 oz)  Body mass index is 33.4 kg/m².    Physical Exam   HENT:   Head: Normocephalic.   Eyes: EOM are normal. Pupils are equal, round, and reactive to light. No scleral icterus.   Neck: No JVD present. No tracheal deviation present.   Cardiovascular: Normal rate, regular rhythm and normal heart sounds.    No murmur heard.  Pulmonary/Chest: Effort normal and breath sounds normal. He has no wheezes. He has no rales.   Abdominal: Soft. There is no guarding.   Peg site with minimal  purulent drainage, no erythema or induration appreciated   Musculoskeletal: He exhibits no edema.   contracturing in RUE   Neurological:   Alert, does not track or follow commands, left eye deviation, flaccid R. Paralysis, moving left side spontaneously  BS reflexes intact       Skin: Skin is warm. He is not diaphoretic.       Significant Labs:   ABGs:     Recent Labs  Lab 03/01/18  1006   PH 7.535*   PCO2 36.3   HCO3 30.7*   POCSATURATED 95   BE 8     Blood Culture: No results for input(s): LABBLOO in the last 48 hours.  CBC:     Recent Labs  Lab 03/01/18  0521 03/02/18  0524   WBC 7.40 7.43   HGB 11.1* 12.1*   HCT 33.1* 35.1*   * 469*     CMP:     Recent Labs  Lab 03/01/18  0521 03/02/18  0524   * 136   K 3.5 3.3*   CL 97 98   CO2 32* 28   * 197*   BUN 13 15   CREATININE 0.8 0.8   CALCIUM 8.9 9.1   PROT 7.1 7.2   ALBUMIN 2.3* 2.4*   BILITOT 0.4 0.5   ALKPHOS 138* 133   AST 41* 43*   ALT 35 44   ANIONGAP 6* 10   EGFRNONAA >60.0 >60.0     Lactic Acid: No results for input(s): LACTATE in the last 48 hours.  Urine Culture: No results for input(s): LABURIN in the last 48 hours.    Significant Imaging: I have reviewed all pertinent imaging results/findings within the past 24 hours.    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, patient with recent stroke and reported sleep apnea  daily CMP            Acute respiratory failure with hypoxia    48yoM with recent L MCA stroke with acute hypoxic respiratory failure and previous concern for sepsis    -Likely hypoxia is Aspiration pna vs pneumonitis (as CXR improved after a day) 2/2 to superimposed encephalopathy due to sepsis  -Urine culture +klebsiella >100,000  right leg CFU  -Wound/skin culture with Enterobacter also provotella and fusobacterium  -Abscess from IR aspiration Gram stain with GPC, Culture with Enterobacter and Eikenella; enterobacter sensitive to rocephin  -Blood Cx NGTD  -Would continue to follow IR abscess culture for sensitivities  -Would treat with rocephin for 7 days from its initiation (last dose 3/5) and PO flagyl 500 q8hr for 7 days (last dose 3/7)  -Discontinued vancomycin 3/1  -AM CXR 3/2 with mild bibasilar atelectasis   -Continue O2 sats >90%, okay with current requirements of 1LNC, patient has what appears as Cheyne espinal pattern and reported LOYDA  -Sister requested continuous pulse ox as it causes her significant distress without monitoring with patient's current pattern of breathing and recent hypoxia; ordered            VTE Risk Mitigation         Ordered     heparin (porcine) injection 5,000 Units  Every 8 hours     Route:  Subcutaneous        02/20/18 1010     heparin (porcine) injection 5,000 Units  Every 8 hours     Route:  Subcutaneous        02/14/18 1002     High Risk of VTE  Once      01/27/18 1406              Celine Zepeda MD  Department of Hospital Medicine   [facility]

## 2022-03-23 NOTE — PROGRESS NOTE ADULT - ASSESSMENT
Pt. with advanced CKD in setting of secondary FSGS, now on HD    {[id number]}

## 2022-03-23 NOTE — PROGRESS NOTE ADULT - SUBJECTIVE AND OBJECTIVE BOX
[facility] DIVISION OF KIDNEY DISEASES AND HYPERTENSION -- FOLLOW UP NOTE  --------------------------------------------------------------------------------  Chief Complaint:    24 hour events/subjective:  pt appears euvolemic  s/p AVF now      PAST HISTORY  --------------------------------------------------------------------------------  No significant changes to PMH, PSH, FHx, SHx, unless otherwise noted    ALLERGIES & MEDICATIONS  --------------------------------------------------------------------------------  Allergies    No Known Allergies    Intolerances    Seafood (Other)    Standing Inpatient Medications  allopurinol 100 milliGRAM(s) Oral daily  aMIOdarone    Tablet 200 milliGRAM(s) Oral daily  amLODIPine   Tablet 10 milliGRAM(s) Oral daily  aspirin enteric coated 81 milliGRAM(s) Oral daily  chlorhexidine 4% Liquid 1 Application(s) Topical <User Schedule>  cholecalciferol 1000 Unit(s) Oral daily  ferrous    sulfate 325 milliGRAM(s) Oral daily  finasteride 5 milliGRAM(s) Oral daily  furosemide    Tablet 80 milliGRAM(s) Oral daily  iron sucrose IVPB 200 milliGRAM(s) IV Intermittent every 24 hours  isosorbide   dinitrate Tablet (ISORDIL) 10 milliGRAM(s) Oral three times a day  ondansetron Injectable 4 milliGRAM(s) IV Push once  polyethylene glycol 3350 17 Gram(s) Oral daily    PRN Inpatient Medications      REVIEW OF SYSTEMS  --------------------------------------------------------------------------------  Gen: No weight changes, fatigue, fevers/chills, weakness  Skin: No rashes  Head/Eyes/Ears/Mouth: No headache; Normal hearing; Normal vision w/o blurriness; No sinus pain/discomfort, sore throat  Respiratory: No dyspnea, cough, wheezing, hemoptysis  CV: No chest pain, PND, orthopnea  GI: No abdominal pain, diarrhea, constipation, nausea, vomiting, melena, hematochezia  : No increased frequency, dysuria, hematuria, nocturia  MSK: No joint pain/swelling; no back pain; no edema  Neuro: No dizziness/lightheadedness, weakness, seizures, numbness, tingling  Heme: No easy bruising or bleeding  Endo: No heat/cold intolerance  Psych: No significant nervousness, anxiety, stress, depression    All other systems were reviewed and are negative, except as noted.    VITALS/PHYSICAL EXAM  --------------------------------------------------------------------------------  T(C): 37 (03-23-22 @ 11:31), Max: 37 (03-23-22 @ 11:31)  HR: 70 (03-23-22 @ 11:31) (64 - 71)  BP: 145/71 (03-23-22 @ 11:31) (133/62 - 156/77)  RR: 18 (03-23-22 @ 11:31) (17 - 18)  SpO2: 100% (03-23-22 @ 11:31) (96% - 100%)  Wt(kg): --        03-22-22 @ 07:01  -  03-23-22 @ 07:00  --------------------------------------------------------  IN: 600 mL / OUT: 1200 mL / NET: -600 mL      PHYSICAL EXAM: vital signs as above  in no apparent distress  Neck: Supple, no JVD,    Lungs: no rhonchi, no wheeze, no crackles  CVS: S1 S2 no M/R/G  Abdomen: no tenderness, no organomegaly, BS present  Neuro: Grossly intact  Skin: warm, dry  Ext: no cyanosis or clubbing, no edema  Access:  LUE radiocephalic AVF , non tunnelled R IJ catheter      LABS/STUDIES  --------------------------------------------------------------------------------              9.8    11.07 >-----------<  235      [03-23-22 @ 06:33]              30.5     134  |  95  |  42  ----------------------------<  86      [03-23-22 @ 06:33]  4.0   |  22  |  7.24        Ca     8.8     [03-23-22 @ 06:33]      Mg     2.3     [03-23-22 @ 06:33]      Phos  5.1     [03-23-22 @ 06:33]      PT/INR: PT 13.5 , INR 1.16       [03-23-22 @ 06:33]  PTT: 96.2       [03-23-22 @ 10:11]      Creatinine Trend:  SCr 7.24 [03-23 @ 06:33]  SCr 10.26 [03-22 @ 06:52]  SCr 8.48 [03-21 @ 04:45]  SCr 6.40 [03-20 @ 06:12]  SCr 7.60 [03-19 @ 06:44]    Urinalysis - [03-09-22 @ 06:37]      Color Light Yellow / Appearance Clear / SG 1.012 / pH 6.0      Gluc 500 mg/dL / Ketone Negative  / Bili Negative / Urobili Negative       Blood Small / Protein 300 mg/dL / Leuk Est Negative / Nitrite Negative      RBC 2 / WBC 3 / Hyaline 1 / Gran  / Sq Epi  / Non Sq Epi 1 / Bacteria Negative      Iron 45, TIBC 277, %sat 16      [03-17-22 @ 12:05]  Ferritin 268      [03-17-22 @ 12:05]  PTH -- (Ca 9.0)      [03-17-22 @ 12:05]   172  Vitamin D (25OH) 23.5      [03-17-22 @ 12:05]    HBsAb <3.0      [03-17-22 @ 00:04]  HBsAb Nonreact      [03-17-22 @ 00:04]  HBsAg Nonreact      [03-17-22 @ 00:04]  HCV 0.09, Nonreact      [03-17-22 @ 00:04]

## 2022-03-23 NOTE — PROGRESS NOTE ADULT - PROBLEM SELECTOR PLAN 3
Patient with anemia in the setting of ESRD. Hemoglobin in target range.   Tsat 16 and ferritin 268.   Venofer started on 3/19 and needs 5 doses total of 200mg.  Monitor hemoglobin    Omari Mcdonough MD  Pager   Office     Contact me directly via Microsoft Teams     (After 5 pm or on weekends please page the on-call fellow/attending, can check [email or web address] for schedule. Login is anne ramesh, schedule under [facility] medicine, psych, derm)

## 2022-03-23 NOTE — PROGRESS NOTE ADULT - PROBLEM SELECTOR PLAN 1
Pt. with advanced RUSSELL on CKD in setting of CHF and secondary FSGS.  SCr at ~1.7-1.9 (04/2019).  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 Last SCr was elevated at 3.25 on 6/8/21. Scr on admission elevated at 8.77 and in 9-10 range.     Pt underwent right IJ non tunelled HD catheter placement on 3/15/22. Pt. underwent HD 1st session on 3/16/22 and 2nd session on 3/17 and tolerated well and third on 3/19. Pt underwent LUE AVF placement on 3/21 and tolerated well. Plan for HD tomorrow, no indication today    Will need tunnelled HD catheter   OP HD set up in progress at [facility] per pt and per their medical director- accepted  please dose medications as per ESRD

## 2022-03-23 NOTE — PRE-ANESTHESIA EVALUATION ADULT - NSANTHOSAYNRD_GEN_A_CORE
No. BOBBY screening performed.  STOP BANG Legend: 0-2 = LOW Risk; 3-4 = INTERMEDIATE Risk; 5-8 = HIGH Risk
No. BOBBY screening performed.  STOP BANG Legend: 0-2 = LOW Risk; 3-4 = INTERMEDIATE Risk; 5-8 = HIGH Risk

## 2022-03-23 NOTE — PROGRESS NOTE ADULT - SUBJECTIVE AND OBJECTIVE BOX
Follow-up Pulm Progress Note    No new respiratory events overnight.  Denies SOB/CP.     Medications:  MEDICATIONS  (STANDING):  allopurinol 100 milliGRAM(s) Oral daily  aMIOdarone    Tablet 200 milliGRAM(s) Oral daily  amLODIPine   Tablet 10 milliGRAM(s) Oral daily  aspirin enteric coated 81 milliGRAM(s) Oral daily  chlorhexidine 4% Liquid 1 Application(s) Topical <User Schedule>  cholecalciferol 1000 Unit(s) Oral daily  ferrous    sulfate 325 milliGRAM(s) Oral daily  finasteride 5 milliGRAM(s) Oral daily  furosemide    Tablet 80 milliGRAM(s) Oral daily  iron sucrose IVPB 200 milliGRAM(s) IV Intermittent every 24 hours  isosorbide   dinitrate Tablet (ISORDIL) 10 milliGRAM(s) Oral three times a day  ondansetron Injectable 4 milliGRAM(s) IV Push once  polyethylene glycol 3350 17 Gram(s) Oral daily        Vital Signs Last 24 Hrs  T(C): 36.7 (23 Mar 2022 05:00), Max: 36.7 (23 Mar 2022 05:00)  T(F): 98.1 (23 Mar 2022 05:00), Max: 98.1 (23 Mar 2022 05:00)  HR: 68 (23 Mar 2022 05:00) (64 - 71)  BP: 135/75 (23 Mar 2022 05:00) (133/62 - 156/77)  BP(mean): --  RR: 18 (23 Mar 2022 05:00) (17 - 18)  SpO2: 96% (23 Mar 2022 05:00) (96% - 98%)          03-22 @ 07:01  -  03-23 @ 07:00  --------------------------------------------------------  IN: 600 mL / OUT: 1200 mL / NET: -600 mL          LABS:                        9.8    11.07 )-----------( 235      ( 23 Mar 2022 06:33 )             30.5     03-23    134<L>  |  95<L>  |  42<H>  ----------------------------<  86  4.0   |  22  |  7.24<H>    Ca    8.8      23 Mar 2022 06:33  Phos  5.1     03-23  Mg     2.3     03-23            PT/INR - ( 23 Mar 2022 06:33 )   PT: 13.5 sec;   INR: 1.16 ratio         PTT - ( 23 Mar 2022 10:11 )  PTT:96.2 sec              Physical Examination:  PULM: CTA bilaterally   CVS: S1, S2 heard    RADIOLOGY REVIEWED  CT chest: < from: CT Chest No Cont (03.09.22 @ 16:32) >  FINDINGS:    Lungs/Airways/Pleura: The central airways are patent. No pleural   effusion. There is diffuse bronchial wall thickening with segmental and   subsegmental bronchial impaction at the bases. Few scattered sub-4 mm   pulmonary nodules include right apex series 3 image 29 and left apex   image 28. No consolidation or pulmonary edema.    Mediastinum/Lymph nodes: No thoracic adenopathy.    Heart and Vessels: Cardiomegaly. LAD stent. No pericardial effusion. The   pulmonary artery measures 3.2 cm. Adjacent mid ascending aorta measures   3.2 cm.    Upper Abdomen: Unremarkable.    Osseous structures and Soft Tissues: No aggressive bone lesions.    IMPRESSION:  Diffuse bronchial wall thickening and impaction.    Sub-4 mm pulmonary nodules; if patient is considered high risk for lung   cancer, consider follow-up low dose chest CT in 12 months. If low risk,   no further follow-up is needed as per current Fleischner guidelines.      < end of copied text >

## 2022-03-23 NOTE — PRE PROCEDURE NOTE - PRE PROCEDURE EVALUATION
Interventional Radiology    HPI: 66y Male with hx CHF, ESRD s/p non tunneled HD Catheter placement on 3/15 here for tunneled HD catheter placement.     NPO: NPO past midnight.     Allergies:   Medications (Abx/Cardiac/Anticoagulation/Blood Products)    aMIOdarone    Tablet: 200 milliGRAM(s) Oral (03-23 @ 05:38)  amLODIPine   Tablet: 10 milliGRAM(s) Oral (03-23 @ 05:38)  aspirin enteric coated: 81 milliGRAM(s) Oral (03-23 @ 11:20)  furosemide    Tablet: 80 milliGRAM(s) Oral (03-23 @ 05:38)  heparin  Infusion.: 1600 Unit(s)/Hr IV Continuous (03-23 @ 04:11)  isosorbide   dinitrate Tablet (ISORDIL): 10 milliGRAM(s) Oral (03-23 @ 11:20)    Data:    T(C): 36.6  HR: 74  BP: 138/72  RR: 16  SpO2: 99%    Heart failure    Systolic congestive heart failure    H/o or current diagnosis of HF- no contraindication to ACEI/ARBs    H/o or current diagnosis of HF- Contraindication to ACEI/ARBs    H/o or current diagnosis of HF- ACEI/ARB contraindication unknown    Family history of hypertension (Father, Mother)    Family history of diabetes mellitus (Father, Mother)    Handoff    MEWS Score    HTN - Hypertension    Inguinal Hernia    Childhood Asthma    Keloid    Arthritis    CKD (chronic kidney disease)    Gout    CAD (coronary artery disease)    HLD (hyperlipidemia)    Atrial fibrillation    History of cardioversion    CHF (congestive heart failure)    History of cardiomyopathy    Acid reflux    Thalassemia minor    ESRD on hemodialysis    ESRD on dialysis    Acute on chronic heart failure    Acute respiratory failure with hypoxia    Acute renal failure superimposed on stage 4 chronic kidney disease    Chronic atrial fibrillation    Elevated troponin    HTN (hypertension)    BPH associated with nocturia    Enlarged prostate    Microcytic anemia    Prophylactic measure    Stage 5 chronic kidney disease not on chronic dialysis    SOB (shortness of breath)    Pulmonary nodule    Acute kidney injury superimposed on CKD    Rhinovirus    Atrial fibrillation and flutter    Hyperphosphatemia    Anemia    Creation, AV fistula, radiocephalic    S/P Arthroscopic Surgery of Left Knee    History of Arthroscopy- ankle    S/P angioplasty with stent    S/P hernia surgery    Status post cataract extraction    H/O cardiac radiofrequency ablation    HEART FAILURE, UNSPECIFIED    [email or web address]        Exam  General: No acute distress  Chest: Non labored breathing    -WBC 11.07 / HgB 9.8 / Hct 30.5 / Plt 235  -Na 134 / Cl 95 / BUN 42 / Glucose 86  -K 4.0 / CO2 22 / Cr 7.24  -ALT -- / Alk Phos -- / T.Bili --  -INR1.16    Imaging:     Plan: 66y Male presents for tunneled HD Catheter placement.   -Risks/Benefits/alternatives explained with the patient and witnessed informed consent obtained.   
Interventional Radiology  HPI: 66y Male with ESRD requiring venous access for HD presents to IR for non-tunneled HD catheter placement.    Allergies: NKDA  Medications (Abx/Cardiac/Anticoagulation/Blood Products)  aMIOdarone    Tablet: 200 milliGRAM(s) Oral (03-15 @ 05:04)  amLODIPine   Tablet: 10 milliGRAM(s) Oral (03-15 @ 05:04)  apixaban: 5 milliGRAM(s) Oral (03-14 @ 09:54)  apixaban: 2.5 milliGRAM(s) Oral (03-15 @ 05:05)  aspirin enteric coated: 81 milliGRAM(s) Oral (03-15 @ 12:21)  furosemide    Tablet: 80 milliGRAM(s) Oral (03-15 @ 05:05)  heparin  Infusion.: 1400 Unit(s)/Hr IV Continuous (03-14 @ 07:41)  hydrALAZINE: 25 milliGRAM(s) Oral (03-15 @ 05:04)  isosorbide   dinitrate Tablet (ISORDIL): 10 milliGRAM(s) Oral (03-15 @ 12:20)  metoprolol succinate ER: 25 milliGRAM(s) Oral (03-15 @ 05:05)    Data:  T(C): 36.7  HR: 67  BP: 122/77  RR: 18  SpO2: 98%    Exam  General: No acute distress  Chest: Non labored breathing  Abdomen: Non-distended  Extremities: No swelling, warm    -WBC 9.75 / HgB 10.5 / Hct 33.5 / Plt 245  -Na 138 / Cl 102 / BUN 81 / Glucose 59  -K 5.0 / CO2 18 / Cr 9.61  -ALT -- / Alk Phos -- / T.Bili --  -INR1.19    Plan: 66y Male presents for non-tunneled HD catheter placement  -Risks/Benefits/alternatives explained with the patient and witnessed informed consent obtained.

## 2022-03-23 NOTE — PROGRESS NOTE ADULT - ASSESSMENT
NICOLETTE 3/14/22: EF (Visual Estimate): 40-45 %  Moderate global left ventricular systolic dysfunction. Tethered mitral valve leaflets with normal opening. Moderate-severe mitral regurgitation.Mild atheroma noted in aortic arch/descending aorta. 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 pending kidney transplant, HTN, Afib on eliquis, PAD s/p LE stenting, enlarged prostate presents as transfer from [facility] for acute heart failure exacerbation    #Acute on Chronic Systolic HF  -clinically improved  -volume status improved  -cont lasix 80mg PO daily per renal/fluid removal with HD    -echo with improved LV fx, ef 50-55%, mod MR, mild TR, trace MD  -NICOLETTE with EF 40-45%  -c/w bb, hydral  -low dose ACE before D/C if ok with renal   -cardiac pyrophosphate scan wnl     #RUSSELL on CKD, new HD   -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   -renal f/u noted   -s/p non tunneled HD catheter on 3/15/22   -tunnled catheter today  -started on HD  --cv stable   -UE dopplers noted focal THROMBOSIS of the right cephalic vein at the  antecubital fossa.-- already on ac   -sp avf 3/21-- vascular fu     #THROMBOSIS of the right cephalic vein   -RESUME a.c , vascular following    #Atrial Fibrillation/Flutter. S/P AF Ablation  -sp nicolette/ dccv 3/14   -remains in SR  -cont amiodarone 200 mg daily , Toprol  XL 25 mg daily  -RESUME AC sp avf- per vascular recommendation   -eventual af ablation    #Coronary Artery Disease. S/P PCI to LAD  -stable without chest pain or dyspnea  -hsT peaked, likely demand ischemia in setting of CKD and acute HF   -continue toprol, statin, and asa 81mg daily    #Mitral Regurgitation  -continue to monitor, nicolette Tethered mitral valve leaflets with normal opening. Moderate-severe mitral regurgitation.  -mr should improved with maint of SR and decrease in lv size/improved lv sys fxn    #Hypertension  -Blood pressure controlled  -Continue current medications.     dcp once cleared by renal/ vascular

## 2022-03-24 ENCOUNTER — APPOINTMENT (OUTPATIENT)
Dept: INFECTIOUS DISEASE | Facility: CLINIC | Age: 67
End: 2022-03-24

## 2022-03-24 LAB
ANION GAP SERPL CALC-SCNC: 14 MMOL/L — SIGNIFICANT CHANGE UP (ref 5–17)
BUN SERPL-MCNC: 25 MG/DL — HIGH (ref 7–23)
CALCIUM SERPL-MCNC: 9 MG/DL — SIGNIFICANT CHANGE UP (ref 8.4–10.5)
CHLORIDE SERPL-SCNC: 96 MMOL/L — SIGNIFICANT CHANGE UP (ref 96–108)
CO2 SERPL-SCNC: 26 MMOL/L — SIGNIFICANT CHANGE UP (ref 22–31)
CREAT SERPL-MCNC: 5.12 MG/DL — HIGH (ref 0.5–1.3)
EGFR: 12 ML/MIN/1.73M2 — LOW
GLUCOSE SERPL-MCNC: 124 MG/DL — HIGH (ref 70–99)
HCT VFR BLD CALC: 33.5 % — LOW (ref 39–50)
HGB BLD-MCNC: 10.6 G/DL — LOW (ref 13–17)
MAGNESIUM SERPL-MCNC: 2 MG/DL — SIGNIFICANT CHANGE UP (ref 1.6–2.6)
MCHC RBC-ENTMCNC: 19.7 PG — LOW (ref 27–34)
MCHC RBC-ENTMCNC: 31.6 GM/DL — LOW (ref 32–36)
MCV RBC AUTO: 62.2 FL — LOW (ref 80–100)
NRBC # BLD: 0 /100 WBCS — SIGNIFICANT CHANGE UP (ref 0–0)
PHOSPHATE SERPL-MCNC: 3.3 MG/DL — SIGNIFICANT CHANGE UP (ref 2.5–4.5)
PLATELET # BLD AUTO: 229 K/UL — SIGNIFICANT CHANGE UP (ref 150–400)
POTASSIUM SERPL-MCNC: 3.9 MMOL/L — SIGNIFICANT CHANGE UP (ref 3.5–5.3)
POTASSIUM SERPL-SCNC: 3.9 MMOL/L — SIGNIFICANT CHANGE UP (ref 3.5–5.3)
RBC # BLD: 5.39 M/UL — SIGNIFICANT CHANGE UP (ref 4.2–5.8)
RBC # FLD: 17 % — HIGH (ref 10.3–14.5)
SARS-COV-2 RNA SPEC QL NAA+PROBE: SIGNIFICANT CHANGE UP
SODIUM SERPL-SCNC: 136 MMOL/L — SIGNIFICANT CHANGE UP (ref 135–145)
WBC # BLD: 13.17 K/UL — HIGH (ref 3.8–10.5)
WBC # FLD AUTO: 13.17 K/UL — HIGH (ref 3.8–10.5)

## 2022-03-24 PROCEDURE — 99232 SBSQ HOSP IP/OBS MODERATE 35: CPT | Mod: GC

## 2022-03-24 RX ADMIN — Medication 100 MILLIGRAM(S): at 13:21

## 2022-03-24 RX ADMIN — AMIODARONE HYDROCHLORIDE 200 MILLIGRAM(S): 400 TABLET ORAL at 05:33

## 2022-03-24 RX ADMIN — ISOSORBIDE DINITRATE 10 MILLIGRAM(S): 5 TABLET ORAL at 17:53

## 2022-03-24 RX ADMIN — Medication 325 MILLIGRAM(S): at 13:21

## 2022-03-24 RX ADMIN — Medication 1000 UNIT(S): at 13:22

## 2022-03-24 RX ADMIN — AMLODIPINE BESYLATE 10 MILLIGRAM(S): 2.5 TABLET ORAL at 13:22

## 2022-03-24 RX ADMIN — Medication 650 MILLIGRAM(S): at 00:22

## 2022-03-24 RX ADMIN — FINASTERIDE 5 MILLIGRAM(S): 5 TABLET, FILM COATED ORAL at 13:21

## 2022-03-24 RX ADMIN — IRON SUCROSE 110 MILLIGRAM(S): 20 INJECTION, SOLUTION INTRAVENOUS at 11:34

## 2022-03-24 RX ADMIN — CHLORHEXIDINE GLUCONATE 1 APPLICATION(S): 213 SOLUTION TOPICAL at 05:29

## 2022-03-24 RX ADMIN — Medication 81 MILLIGRAM(S): at 13:21

## 2022-03-24 RX ADMIN — ISOSORBIDE DINITRATE 10 MILLIGRAM(S): 5 TABLET ORAL at 13:24

## 2022-03-24 RX ADMIN — Medication 100 MILLIGRAM(S): at 22:28

## 2022-03-24 RX ADMIN — Medication 100 MILLIGRAM(S): at 14:41

## 2022-03-24 RX ADMIN — Medication 80 MILLIGRAM(S): at 13:21

## 2022-03-24 RX ADMIN — APIXABAN 2.5 MILLIGRAM(S): 2.5 TABLET, FILM COATED ORAL at 05:33

## 2022-03-24 RX ADMIN — APIXABAN 2.5 MILLIGRAM(S): 2.5 TABLET, FILM COATED ORAL at 22:28

## 2022-03-24 NOTE — PROGRESS NOTE ADULT - PROBLEM SELECTOR PLAN 1
Amira Arreola   7/18/2018   Anticoagulation Therapy Visit    Description:  86 year old female   Provider:  Addy Frias MD   Department:  [facility]           INR as of 7/18/2018     Today's INR 2.52      Anticoagulation Summary as of 7/18/2018     INR goal 2.0-3.0   Today's INR 2.52   Full warfarin instructions 4 mg every day   Next INR check 7/25/2018    Indications   Long-term (current) use of anticoagulants [Z79.01] [Z79.01]  Atrial fibrillation (H) [I48.91] (Resolved) [I48.91]         July 2018 Details    Sun Mon Tue Wed Thu Fri Sat     1               2               3               4               5               6               7                 8               9               10               11               12               13               14                 15               16               17               18      4 mg   See details      19      4 mg         20      4 mg         21      4 mg           22      4 mg         23      4 mg         24      4 mg         25            26               27               28                 29               30               31                    Date Details   07/18 This INR check       Date of next INR:  7/25/2018         How to take your warfarin dose     To take:  4 mg Take 1 of the 4 mg tablets.            RVP +entero/rhino virus  -CT chest read with diffuse bronchial wall thickening. None appreciated on our read - also reviewed with chest radiologist. +Mucous in airways.   -S/p Mucinex   -Cough resolved   -Duoneb q6h PRN   -Normoxic, keep sats >90% with supplemental o2 PRN  -Supportive care

## 2022-03-24 NOTE — PROGRESS NOTE ADULT - SUBJECTIVE AND OBJECTIVE BOX
[facility] DIVISION OF KIDNEY DISEASES AND HYPERTENSION -- FOLLOW UP NOTE  --------------------------------------------------------------------------------  HPI: Pt. with advanced CKD in setting of biopsy proven secondary FSGS in 06/21 started on HD for volume overload and uremic symptoms. Pt. underwent non tunelled HD catheter placement on 3/15/22. Pt. underwent HD 1st session on 3/16/22. Pt underwent LUE AVF placement on 3/21/22 and tolerated well. Last HD done on Tuesday 3/22/22. Pt. underwent right IJ tunnelled HD catheter placement on 3/23/22.     Pt. seen and examined today, reports feeling well.     PAST HISTORY  --------------------------------------------------------------------------------  No significant changes to PMH, PSH, FHx, SHx, unless otherwise noted    ALLERGIES & MEDICATIONS  --------------------------------------------------------------------------------  Allergies    No Known Allergies    Intolerances    Seafood (Other)    Standing Inpatient Medications  allopurinol 100 milliGRAM(s) Oral daily  aMIOdarone    Tablet 200 milliGRAM(s) Oral daily  amLODIPine   Tablet 10 milliGRAM(s) Oral daily  apixaban 2.5 milliGRAM(s) Oral two times a day  aspirin enteric coated 81 milliGRAM(s) Oral daily  chlorhexidine 4% Liquid 1 Application(s) Topical <User Schedule>  cholecalciferol 1000 Unit(s) Oral daily  ferrous    sulfate 325 milliGRAM(s) Oral daily  finasteride 5 milliGRAM(s) Oral daily  furosemide    Tablet 80 milliGRAM(s) Oral daily  iron sucrose IVPB 200 milliGRAM(s) IV Intermittent every 24 hours  isosorbide   dinitrate Tablet (ISORDIL) 10 milliGRAM(s) Oral three times a day  ondansetron Injectable 4 milliGRAM(s) IV Push once  polyethylene glycol 3350 17 Gram(s) Oral daily    PRN Inpatient Medications      REVIEW OF SYSTEMS  --------------------------------------------------------------------------------  Gen: No malaise  Skin: No rashes  Head/Eyes/Ears: Normal hearing,   Respiratory: cough+,  CV: No chest pain  GI: No abdominal pain, diarrhea  : as per HPI  MSK: No  edema  Heme: No easy bruising or bleedin    All other systems were reviewed and are negative, except as noted.    VITALS/PHYSICAL EXAM  --------------------------------------------------------------------------------  T(C): 36.4 (03-24-22 @ 08:50), Max: 37 (03-23-22 @ 11:31)  HR: 75 (03-24-22 @ 08:50) (66 - 75)  BP: 118/72 (03-24-22 @ 08:50) (107/77 - 145/71)  RR: 18 (03-24-22 @ 08:50) (11 - 18)  SpO2: 96% (03-24-22 @ 08:50) (95% - 100%)  Wt(kg): --  Height (cm): 180.3 (03-23-22 @ 13:35)  Weight (kg): 77.1 (03-23-22 @ 13:35)  BMI (kg/m2): 23.7 (03-23-22 @ 13:35)  BSA (m2): 1.97 (03-23-22 @ 13:35)      03-24-22 @ 07:01  -  03-24-22 @ 10:24  --------------------------------------------------------  IN: 0 mL / OUT: 575 mL / NET: -575 mL      Physical Exam:  	Gen: NAD  	HEENT: Anicteric  	Pulm: good entry, no rales   	CV: S1S2+  	Abd: Soft, +BS   	Ext: No LE edema B/L, no asterixis  	Neuro: Awake  	Skin: Warm and dry              Vascular: right tunneled HD catheter+, LUE AVF thrill+    LABS/STUDIES  --------------------------------------------------------------------------------              9.8    11.07 >-----------<  235      [03-23-22 @ 06:33]              30.5     134  |  95  |  42  ----------------------------<  86      [03-23-22 @ 06:33]  4.0   |  22  |  7.24        Ca     8.8     [03-23-22 @ 06:33]      Mg     2.3     [03-23-22 @ 06:33]      Phos  5.1     [03-23-22 @ 06:33]    Creatinine Trend:  SCr 7.24 [03-23 @ 06:33]  SCr 10.26 [03-22 @ 06:52]  SCr 8.48 [03-21 @ 04:45]  SCr 6.40 [03-20 @ 06:12]  SCr 7.60 [03-19 @ 06:44]    Urinalysis - [03-09-22 @ 06:37]      Color Light Yellow / Appearance Clear / SG 1.012 / pH 6.0      Gluc 500 mg/dL / Ketone Negative  / Bili Negative / Urobili Negative       Blood Small / Protein 300 mg/dL / Leuk Est Negative / Nitrite Negative      RBC 2 / WBC 3 / Hyaline 1 / Gran  / Sq Epi  / Non Sq Epi 1 / Bacteria Negative      HBsAb <3.0      [03-17-22 @ 00:04]  HBsAb Nonreact      [03-17-22 @ 00:04]  HBsAg Nonreact      [03-17-22 @ 00:04]  HCV 0.09, Nonreact      [03-17-22 @ 00:04]

## 2022-03-24 NOTE — PROGRESS NOTE ADULT - ASSESSMENT
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 Presents as transfer from [facility] for acute heart failure exacerbation. The patient reports that he has been experiencing LIRA and SOB for 7-10 days which was progressive and now occurring at rest. Started on IV diuretics with improving respiratory status. Called to consult for diffuse bronchial wall thickening and impaction and 4mm pulmonary nodules seen on CT chest.

## 2022-03-24 NOTE — PROGRESS NOTE ADULT - ATTENDING COMMENTS
I have seen this patient with the fellow and agree with their assessment and plan. I was physically present for significant portions of the evaluation and management (E/M) service provided.  I agree with the above history, physical, and plan which I have reviewed and edited where appropriate.    FSGS on renal bx, failed overall and with CHF, now on dialysis started this admission  Feels well, has s/p AVF and tunnelled HD cath   Pt. underwent tunnelled HD catheter placement on 3/23.   Plan for HD today and maybe tomorrow if planning dc as his scheduled start date is a t/thus/sat at [facility]      Omari Mcdonough MD  Pager   Office     Contact me directly via Microsoft Teams     (After 5 pm or on weekends please page the on-call fellow/attending, can check [email or web address] for schedule. Login is anne ramesh, schedule under [facility] medicine, psych, derm)

## 2022-03-24 NOTE — PROGRESS NOTE ADULT - ASSESSMENT
Pt. with advanced CKD in setting of secondary FSGS, now on HD    {[id number]}

## 2022-03-24 NOTE — PROGRESS NOTE ADULT - SUBJECTIVE AND OBJECTIVE BOX
CARDIOLOGY FOLLOW UP - Dr. Lee  DATE OF SERVICE: 3/24/22     CC no cp or sob      REVIEW OF SYSTEMS:  CONSTITUTIONAL: No fever, weight loss, or fatigue  RESPIRATORY: No cough, wheezing, chills or hemoptysis; No Shortness of Breath  CARDIOVASCULAR: No chest pain, palpitations, passing out, dizziness, or leg swelling  GASTROINTESTINAL: No abdominal or epigastric pain. No nausea, vomiting, or hematemesis; No diarrhea or constipation. No melena or hematochezia.  VASCULAR: No edema     PHYSICAL EXAM:  T(C): 37.1 (03-24-22 @ 12:20), Max: 37.1 (03-24-22 @ 12:20)  HR: 74 (03-24-22 @ 12:20) (66 - 81)  BP: 151/80 (03-24-22 @ 12:20) (107/77 - 151/80)  RR: 18 (03-24-22 @ 12:20) (11 - 18)  SpO2: 94% (03-24-22 @ 12:20) (94% - 99%)  Wt(kg): --  I&O's Summary    24 Mar 2022 07:01  -  24 Mar 2022 13:24  --------------------------------------------------------  IN: 800 mL / OUT: 2875 mL / NET: -2075 mL        Appearance: Normal	  Cardiovascular: Normal S1 S2,RRR, No JVD, No murmurs  Respiratory: Lungs clear to auscultation	  Gastrointestinal:  Soft, Non-tender, + BS	  Extremities: Normal range of motion, No clubbing, cyanosis or edema      Home Medications:  allopurinol 100 mg oral tablet: 1 tab(s) orally once a day (09 Mar 2022 02:11)  amiodarone 200 mg oral tablet: 1 tab(s) orally once a day (09 Mar 2022 02:11)  amLODIPine 10 mg oral tablet: 1 tab(s) orally once a day (09 Mar 2022 02:11)  Aspirin Enteric Coated 81 mg oral delayed release tablet: 1 tab(s) orally once a day (09 Mar 2022 02:11)  Eliquis 5 mg oral tablet: 1 tab(s) orally 2 times a day (09 Mar 2022 02:11)  Farxiga 5 mg oral tablet: 1 tab(s) orally once a day (09 Mar 2022 02:11)  ferrous sulfate 325 mg (65 mg elemental iron) oral tablet: 1 tab(s) orally once a day (09 Mar 2022 02:11)  finasteride 5 mg oral tablet: 1 tab(s) orally once a day (09 Mar 2022 02:11)  furosemide 40 mg oral tablet: 1 tab(s) orally once a day (09 Mar 2022 02:11)  hydrALAZINE 25 mg oral tablet: 1 tab(s) orally once a day (09 Mar 2022 02:11)  isosorbide dinitrate 10 mg oral tablet: 1 tab(s) orally 3 times a day (09 Mar 2022 02:11)  metoprolol succinate 25 mg oral tablet, extended release: 1 tab(s) orally once a day (09 Mar 2022 02:11)  pantoprazole 40 mg oral delayed release tablet: 1 tab(s) orally once a day (09 Mar 2022 02:11)  simvastatin 40 mg oral tablet: 1 tab(s) orally once a day (at bedtime) (09 Mar 2022 02:11)  Vitamin D3 25 mcg (1000 intl units) oral capsule: 1 cap(s) orally once a day (09 Mar 2022 02:11)      MEDICATIONS  (STANDING):  allopurinol 100 milliGRAM(s) Oral daily  aMIOdarone    Tablet 200 milliGRAM(s) Oral daily  amLODIPine   Tablet 10 milliGRAM(s) Oral daily  apixaban 2.5 milliGRAM(s) Oral two times a day  aspirin enteric coated 81 milliGRAM(s) Oral daily  chlorhexidine 4% Liquid 1 Application(s) Topical <User Schedule>  cholecalciferol 1000 Unit(s) Oral daily  ferrous    sulfate 325 milliGRAM(s) Oral daily  finasteride 5 milliGRAM(s) Oral daily  furosemide    Tablet 80 milliGRAM(s) Oral daily  isosorbide   dinitrate Tablet (ISORDIL) 10 milliGRAM(s) Oral three times a day  ondansetron Injectable 4 milliGRAM(s) IV Push once  polyethylene glycol 3350 17 Gram(s) Oral daily      TELEMETRY: nsr  	    ECG:  	  RADIOLOGY:   DIAGNOSTIC TESTING:  [ ] Echocardiogram:  [ ]  Catheterization:  [ ] Stress Test:    OTHER: 	    LABS:	 	                            9.8    11.07 )-----------( 235      ( 23 Mar 2022 06:33 )             30.5     03-23    134<L>  |  95<L>  |  42<H>  ----------------------------<  86  4.0   |  22  |  7.24<H>    Ca    8.8      23 Mar 2022 06:33  Phos  5.1     03-23  Mg     2.3     03-23      PT/INR - ( 23 Mar 2022 06:33 )   PT: 13.5 sec;   INR: 1.16 ratio         PTT - ( 23 Mar 2022 10:11 )  PTT:96.2 sec

## 2022-03-24 NOTE — PROGRESS NOTE ADULT - SUBJECTIVE AND OBJECTIVE BOX
Follow-up Pulm Progress Note    No new respiratory events overnight.  Denies SOB/CP.     Medications:  MEDICATIONS  (STANDING):  allopurinol 100 milliGRAM(s) Oral daily  aMIOdarone    Tablet 200 milliGRAM(s) Oral daily  amLODIPine   Tablet 10 milliGRAM(s) Oral daily  apixaban 2.5 milliGRAM(s) Oral two times a day  aspirin enteric coated 81 milliGRAM(s) Oral daily  chlorhexidine 4% Liquid 1 Application(s) Topical <User Schedule>  cholecalciferol 1000 Unit(s) Oral daily  ferrous    sulfate 325 milliGRAM(s) Oral daily  finasteride 5 milliGRAM(s) Oral daily  furosemide    Tablet 80 milliGRAM(s) Oral daily  isosorbide   dinitrate Tablet (ISORDIL) 10 milliGRAM(s) Oral three times a day  ondansetron Injectable 4 milliGRAM(s) IV Push once  polyethylene glycol 3350 17 Gram(s) Oral daily        Vital Signs Last 24 Hrs  T(C): 37.1 (24 Mar 2022 12:20), Max: 37.1 (24 Mar 2022 12:20)  T(F): 98.7 (24 Mar 2022 12:20), Max: 98.7 (24 Mar 2022 12:20)  HR: 74 (24 Mar 2022 12:20) (66 - 81)  BP: 151/80 (24 Mar 2022 12:20) (107/77 - 151/80)  BP(mean): 84 (23 Mar 2022 14:41) (84 - 90)  RR: 18 (24 Mar 2022 12:20) (11 - 18)  SpO2: 94% (24 Mar 2022 12:20) (94% - 99%)                LABS:                        9.8    11.07 )-----------( 235      ( 23 Mar 2022 06:33 )             30.5     03-23    134<L>  |  95<L>  |  42<H>  ----------------------------<  86  4.0   |  22  |  7.24<H>    Ca    8.8      23 Mar 2022 06:33  Phos  5.1     03-23  Mg     2.3     03-23            PT/INR - ( 23 Mar 2022 06:33 )   PT: 13.5 sec;   INR: 1.16 ratio         PTT - ( 23 Mar 2022 10:11 )  PTT:96.2 sec          Physical Examination:  PULM: CTA bilaterally   CVS: S1, S2 heard    RADIOLOGY REVIEWED  CT chest: < from: CT Chest No Cont (03.09.22 @ 16:32) >  FINDINGS:    Lungs/Airways/Pleura: The central airways are patent. No pleural   effusion. There is diffuse bronchial wall thickening with segmental and   subsegmental bronchial impaction at the bases. Few scattered sub-4 mm   pulmonary nodules include right apex series 3 image 29 and left apex   image 28. No consolidation or pulmonary edema.    Mediastinum/Lymph nodes: No thoracic adenopathy.    Heart and Vessels: Cardiomegaly. LAD stent. No pericardial effusion. The   pulmonary artery measures 3.2 cm. Adjacent mid ascending aorta measures   3.2 cm.    Upper Abdomen: Unremarkable.    Osseous structures and Soft Tissues: No aggressive bone lesions.    IMPRESSION:  Diffuse bronchial wall thickening and impaction.    Sub-4 mm pulmonary nodules; if patient is considered high risk for lung   cancer, consider follow-up low dose chest CT in 12 months. If low risk,   no further follow-up is needed as per current Fleischner guidelines.      < end of copied text >

## 2022-03-24 NOTE — PROGRESS NOTE ADULT - PROBLEM SELECTOR PLAN 3
Patient with anemia in the setting of ESRD. Hemoglobin in target range.   Tsat 16 and ferritin 268.   Venofer started on 3/19 and needs 5 doses total of 200mg.  Monitor hemoglobin.

## 2022-03-24 NOTE — PROGRESS NOTE ADULT - PROBLEM SELECTOR PLAN 1
Pt. with advanced RUSSELL on CKD in setting of CHF and secondary FSGS.  SCr at ~1.7-1.9 (04/2019).  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 Last SCr was elevated at 3.25 on 6/8/21. Scr on admission elevated at 8.77 and in 9-10 range.     Pt underwent right IJ non tunelled HD catheter placement on 3/15/22. Pt. underwent HD 1st session on 3/16/22 and 2nd session on 3/17 and tolerated well and third on 3/19. Pt underwent LUE AVF placement on 3/21 and tolerated well. Plan for HD today  Pt. underwent tunnelled HD catheter placement on 3/23.   Plan for HD today.   Will do another session of HD tomorrow and pt can be discharged home.  Pt. to resume HD as per TTS schedule at [facility].  please dose medications as per ESRD

## 2022-03-24 NOTE — PROGRESS NOTE ADULT - ASSESSMENT
NICOLETTE 3/14/22: EF (Visual Estimate): 40-45 %  Moderate global left ventricular systolic dysfunction. Tethered mitral valve leaflets with normal opening. Moderate-severe mitral regurgitation.Mild atheroma noted in aortic arch/descending aorta. 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 pending kidney transplant, HTN, Afib on eliquis, PAD s/p LE stenting, enlarged prostate presents as transfer from [facility] for acute heart failure exacerbation    #Acute on Chronic Systolic HF  -clinically improved  -volume status improved  -cont lasix 80mg PO daily per renal/fluid removal with HD    -echo with improved LV fx, ef 50-55%, mod MR, mild TR, trace MA  -NICOLETTE with EF 40-45%  -c/w bb, hydral  -low dose ACE before D/C if ok with renal   -cardiac pyrophosphate scan wnl     #RUSSELL on CKD, new HD   -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   -renal f/u noted   -s/p non tunneled HD catheter on 3/15/22   -sp avf 3/21-- vascular fu   -sp tunnled catheter 3/23  -started on HD  --cv stable   -UE dopplers noted focal THROMBOSIS of the right cephalic vein at the  antecubital fossa.-- already on ac     #THROMBOSIS of the right cephalic vein   -c/w a.c , vascular following    #Atrial Fibrillation/Flutter. S/P AF Ablation  -sp nicolette/ dccv 3/14   -remains in SR  -cont amiodarone 200 mg daily , Toprol  XL 25 mg daily  -c/w a/c eliquis    -eventual af ablation    #Coronary Artery Disease. S/P PCI to LAD  -stable without chest pain or dyspnea  -hsT peaked, likely demand ischemia in setting of CKD and acute HF   -continue toprol, statin, and asa 81mg daily    #Mitral Regurgitation  -continue to monitor, nicolette Tethered mitral valve leaflets with normal opening. Moderate-severe mitral regurgitation.  -mr should improved with maint of SR and decrease in lv size/improved lv sys fxn    #Hypertension  -Blood pressure controlled  -Continue current medications.     dcp once cleared by renal/ vascular

## 2022-03-25 LAB
ANION GAP SERPL CALC-SCNC: 15 MMOL/L — SIGNIFICANT CHANGE UP (ref 5–17)
APTT BLD: 34.7 SEC — SIGNIFICANT CHANGE UP (ref 27.5–35.5)
BLD GP AB SCN SERPL QL: NEGATIVE — SIGNIFICANT CHANGE UP
BUN SERPL-MCNC: 37 MG/DL — HIGH (ref 7–23)
CALCIUM SERPL-MCNC: 9 MG/DL — SIGNIFICANT CHANGE UP (ref 8.4–10.5)
CHLORIDE SERPL-SCNC: 97 MMOL/L — SIGNIFICANT CHANGE UP (ref 96–108)
CO2 SERPL-SCNC: 24 MMOL/L — SIGNIFICANT CHANGE UP (ref 22–31)
CREAT SERPL-MCNC: 7.15 MG/DL — HIGH (ref 0.5–1.3)
EGFR: 8 ML/MIN/1.73M2 — LOW
GLUCOSE SERPL-MCNC: 159 MG/DL — HIGH (ref 70–99)
HCT VFR BLD CALC: 30.2 % — LOW (ref 39–50)
HGB BLD-MCNC: 9.7 G/DL — LOW (ref 13–17)
INR BLD: 1.37 RATIO — HIGH (ref 0.88–1.16)
MAGNESIUM SERPL-MCNC: 2.1 MG/DL — SIGNIFICANT CHANGE UP (ref 1.6–2.6)
MCHC RBC-ENTMCNC: 19.7 PG — LOW (ref 27–34)
MCHC RBC-ENTMCNC: 32.1 GM/DL — SIGNIFICANT CHANGE UP (ref 32–36)
MCV RBC AUTO: 61.3 FL — LOW (ref 80–100)
NRBC # BLD: 0 /100 WBCS — SIGNIFICANT CHANGE UP (ref 0–0)
PHOSPHATE SERPL-MCNC: 4 MG/DL — SIGNIFICANT CHANGE UP (ref 2.5–4.5)
PLATELET # BLD AUTO: 238 K/UL — SIGNIFICANT CHANGE UP (ref 150–400)
POTASSIUM SERPL-MCNC: 3.8 MMOL/L — SIGNIFICANT CHANGE UP (ref 3.5–5.3)
POTASSIUM SERPL-SCNC: 3.8 MMOL/L — SIGNIFICANT CHANGE UP (ref 3.5–5.3)
PROTHROM AB SERPL-ACNC: 15.9 SEC — HIGH (ref 10.5–13.4)
RBC # BLD: 4.93 M/UL — SIGNIFICANT CHANGE UP (ref 4.2–5.8)
RBC # FLD: 16.8 % — HIGH (ref 10.3–14.5)
RH IG SCN BLD-IMP: POSITIVE — SIGNIFICANT CHANGE UP
SODIUM SERPL-SCNC: 136 MMOL/L — SIGNIFICANT CHANGE UP (ref 135–145)
WBC # BLD: 12.43 K/UL — HIGH (ref 3.8–10.5)
WBC # FLD AUTO: 12.43 K/UL — HIGH (ref 3.8–10.5)

## 2022-03-25 PROCEDURE — 90935 HEMODIALYSIS ONE EVALUATION: CPT | Mod: GC

## 2022-03-25 RX ADMIN — Medication 325 MILLIGRAM(S): at 12:54

## 2022-03-25 RX ADMIN — Medication 100 MILLIGRAM(S): at 12:55

## 2022-03-25 RX ADMIN — FINASTERIDE 5 MILLIGRAM(S): 5 TABLET, FILM COATED ORAL at 12:54

## 2022-03-25 RX ADMIN — CHLORHEXIDINE GLUCONATE 1 APPLICATION(S): 213 SOLUTION TOPICAL at 06:40

## 2022-03-25 RX ADMIN — Medication 100 MILLIGRAM(S): at 20:23

## 2022-03-25 RX ADMIN — ISOSORBIDE DINITRATE 10 MILLIGRAM(S): 5 TABLET ORAL at 12:54

## 2022-03-25 RX ADMIN — Medication 81 MILLIGRAM(S): at 12:54

## 2022-03-25 RX ADMIN — ISOSORBIDE DINITRATE 10 MILLIGRAM(S): 5 TABLET ORAL at 17:59

## 2022-03-25 RX ADMIN — Medication 1000 UNIT(S): at 12:54

## 2022-03-25 NOTE — PROGRESS NOTE ADULT - SUBJECTIVE AND OBJECTIVE BOX
CARDIOLOGY FOLLOW UP - Dr. Lee  DATE OF SERVICE: 3/25/22     CC no cp or sob   events noted  active bleeding at [facility] site-- awaiting to go to  IR today       REVIEW OF SYSTEMS:  CONSTITUTIONAL: No fever, weight loss, or fatigue  RESPIRATORY: No cough, wheezing, chills or hemoptysis; No Shortness of Breath  CARDIOVASCULAR: No chest pain, palpitations, passing out, dizziness, or leg swelling  GASTROINTESTINAL: No abdominal or epigastric pain. No nausea, vomiting, or hematemesis; No diarrhea or constipation. No melena or hematochezia.  VASCULAR: No edema     PHYSICAL EXAM:  T(C): 36.7 (03-25-22 @ 12:20), Max: 37.2 (03-24-22 @ 17:54)  HR: 78 (03-25-22 @ 12:20) (72 - 84)  BP: 144/76 (03-25-22 @ 12:20) (132/78 - 156/79)  RR: 18 (03-25-22 @ 12:20) (18 - 18)  SpO2: 95% (03-25-22 @ 12:20) (94% - 96%)  Wt(kg): --  I&O's Summary    24 Mar 2022 07:01  -  25 Mar 2022 07:00  --------------------------------------------------------  IN: 1380 mL / OUT: 2875 mL / NET: -1495 mL    25 Mar 2022 07:01  -  25 Mar 2022 13:41  --------------------------------------------------------  IN: 240 mL / OUT: 1000 mL / NET: -760 mL        Appearance: Normal	  Cardiovascular: Normal S1 S2,RRR, No JVD, No murmurs  Respiratory: Lungs clear to auscultation	  Gastrointestinal:  Soft, Non-tender, + BS	  Extremities: Normal range of motion, No clubbing, cyanosis or edema      Home Medications:  allopurinol 100 mg oral tablet: 1 tab(s) orally once a day (09 Mar 2022 02:11)  amiodarone 200 mg oral tablet: 1 tab(s) orally once a day (09 Mar 2022 02:11)  amLODIPine 10 mg oral tablet: 1 tab(s) orally once a day (09 Mar 2022 02:11)  Aspirin Enteric Coated 81 mg oral delayed release tablet: 1 tab(s) orally once a day (09 Mar 2022 02:11)  Eliquis 5 mg oral tablet: 1 tab(s) orally 2 times a day (09 Mar 2022 02:11)  Farxiga 5 mg oral tablet: 1 tab(s) orally once a day (09 Mar 2022 02:11)  ferrous sulfate 325 mg (65 mg elemental iron) oral tablet: 1 tab(s) orally once a day (09 Mar 2022 02:11)  finasteride 5 mg oral tablet: 1 tab(s) orally once a day (09 Mar 2022 02:11)  furosemide 40 mg oral tablet: 1 tab(s) orally once a day (09 Mar 2022 02:11)  hydrALAZINE 25 mg oral tablet: 1 tab(s) orally once a day (09 Mar 2022 02:11)  isosorbide dinitrate 10 mg oral tablet: 1 tab(s) orally 3 times a day (09 Mar 2022 02:11)  metoprolol succinate 25 mg oral tablet, extended release: 1 tab(s) orally once a day (09 Mar 2022 02:11)  pantoprazole 40 mg oral delayed release tablet: 1 tab(s) orally once a day (09 Mar 2022 02:11)  simvastatin 40 mg oral tablet: 1 tab(s) orally once a day (at bedtime) (09 Mar 2022 02:11)  Vitamin D3 25 mcg (1000 intl units) oral capsule: 1 cap(s) orally once a day (09 Mar 2022 02:11)      MEDICATIONS  (STANDING):  allopurinol 100 milliGRAM(s) Oral daily  aMIOdarone    Tablet 200 milliGRAM(s) Oral daily  amLODIPine   Tablet 10 milliGRAM(s) Oral daily  apixaban 2.5 milliGRAM(s) Oral two times a day  aspirin enteric coated 81 milliGRAM(s) Oral daily  chlorhexidine 4% Liquid 1 Application(s) Topical <User Schedule>  cholecalciferol 1000 Unit(s) Oral daily  ferrous    sulfate 325 milliGRAM(s) Oral daily  finasteride 5 milliGRAM(s) Oral daily  furosemide    Tablet 80 milliGRAM(s) Oral daily  isosorbide   dinitrate Tablet (ISORDIL) 10 milliGRAM(s) Oral three times a day  ondansetron Injectable 4 milliGRAM(s) IV Push once  polyethylene glycol 3350 17 Gram(s) Oral daily      TELEMETRY: 	    ECG:  	  RADIOLOGY:   DIAGNOSTIC TESTING:  [ ] Echocardiogram:  [ ]  Catheterization:  [ ] Stress Test:    OTHER: 	    LABS:	 	                            9.7    12.43 )-----------( 238      ( 25 Mar 2022 09:42 )             30.2     03-25    136  |  97  |  37<H>  ----------------------------<  159<H>  3.8   |  24  |  7.15<H>    Ca    9.0      25 Mar 2022 09:42  Phos  4.0     03-25  Mg     2.1     03-25      PT/INR - ( 25 Mar 2022 09:42 )   PT: 15.9 sec;   INR: 1.37 ratio         PTT - ( 25 Mar 2022 09:42 )  PTT:34.7 sec

## 2022-03-25 NOTE — CHART NOTE - NSCHARTNOTEFT_GEN_A_CORE
IR informed of Right chest wall tunneled HD catheter bleeding from tunnel site. Dressing removed, site cleaned. Pressure held at RIJ and tunneled catheter exit site for 20 minutes. No bleeding noted. Catheter dressed. Primary team aware.

## 2022-03-25 NOTE — PROGRESS NOTE ADULT - SUBJECTIVE AND OBJECTIVE BOX
Follow-up Pulm Progress Note    Pt noted to have bleeding from permacath site overnight   No new respiratory events overnight.  Denies SOB/CP.     Medications:  MEDICATIONS  (STANDING):  allopurinol 100 milliGRAM(s) Oral daily  aMIOdarone    Tablet 200 milliGRAM(s) Oral daily  amLODIPine   Tablet 10 milliGRAM(s) Oral daily  apixaban 2.5 milliGRAM(s) Oral two times a day  aspirin enteric coated 81 milliGRAM(s) Oral daily  chlorhexidine 4% Liquid 1 Application(s) Topical <User Schedule>  cholecalciferol 1000 Unit(s) Oral daily  ferrous    sulfate 325 milliGRAM(s) Oral daily  finasteride 5 milliGRAM(s) Oral daily  furosemide    Tablet 80 milliGRAM(s) Oral daily  isosorbide   dinitrate Tablet (ISORDIL) 10 milliGRAM(s) Oral three times a day  ondansetron Injectable 4 milliGRAM(s) IV Push once  polyethylene glycol 3350 17 Gram(s) Oral daily    MEDICATIONS  (PRN):  guaiFENesin Oral Liquid (Sugar-Free) 100 milliGRAM(s) Oral every 6 hours PRN Cough          Vital Signs Last 24 Hrs  T(C): 36.4 (25 Mar 2022 09:10), Max: 37.2 (24 Mar 2022 13:17)  T(F): 97.5 (25 Mar 2022 09:10), Max: 98.9 (24 Mar 2022 13:17)  HR: 79 (25 Mar 2022 09:10) (72 - 90)  BP: 144/79 (25 Mar 2022 09:10) (123/79 - 156/79)  BP(mean): --  RR: 18 (25 Mar 2022 09:10) (18 - 18)  SpO2: 96% (25 Mar 2022 09:10) (94% - 98%)          03-24 @ 07:01  -  03-25 @ 07:00  --------------------------------------------------------  IN: 1380 mL / OUT: 2875 mL / NET: -1495 mL          LABS:                        9.7    12.43 )-----------( 238      ( 25 Mar 2022 09:42 )             30.2     03-25    136  |  97  |  37<H>  ----------------------------<  159<H>  3.8   |  24  |  7.15<H>    Ca    9.0      25 Mar 2022 09:42  Phos  4.0     03-25  Mg     2.1     03-25            PT/INR - ( 25 Mar 2022 09:42 )   PT: 15.9 sec;   INR: 1.37 ratio         PTT - ( 25 Mar 2022 09:42 )  PTT:34.7 sec              Physical Examination:  PULM: CTA bilaterally   CVS: S1, S2 heard    RADIOLOGY REVIEWED  CT chest: < from: CT Chest No Cont (03.09.22 @ 16:32) >  FINDINGS:    Lungs/Airways/Pleura: The central airways are patent. No pleural   effusion. There is diffuse bronchial wall thickening with segmental and   subsegmental bronchial impaction at the bases. Few scattered sub-4 mm   pulmonary nodules include right apex series 3 image 29 and left apex   image 28. No consolidation or pulmonary edema.    Mediastinum/Lymph nodes: No thoracic adenopathy.    Heart and Vessels: Cardiomegaly. LAD stent. No pericardial effusion. The   pulmonary artery measures 3.2 cm. Adjacent mid ascending aorta measures   3.2 cm.    Upper Abdomen: Unremarkable.    Osseous structures and Soft Tissues: No aggressive bone lesions.    IMPRESSION:  Diffuse bronchial wall thickening and impaction.    Sub-4 mm pulmonary nodules; if patient is considered high risk for lung   cancer, consider follow-up low dose chest CT in 12 months. If low risk,   no further follow-up is needed as per current Fleischner guidelines.      < end of copied text >

## 2022-03-25 NOTE — PROGRESS NOTE ADULT - ASSESSMENT
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 Presents as transfer from [facility] for acute heart failure exacerbation. The patient reports that he has been experiencing LIRA and SOB for 7-10 days which was progressive and now occurring at rest. Started on IV diuretics with improving respiratory status. Called to consult for diffuse bronchial wall thickening and impaction and 4mm pulmonary nodules seen on CT chest.

## 2022-03-25 NOTE — PROGRESS NOTE ADULT - ATTENDING COMMENTS
I have seen this patient with the fellow and agree with their assessment and plan. I was physically present for significant portions of the evaluation and management (E/M) service provided.  I agree with the above history, physical, and plan which I have reviewed and edited where appropriate.    seen on hd  bleeding near tunnelled hd cath, IR called  dc planning once cleared re bleeding  Next HD tuesday at [facility] outpatient unit     Omari Mcdonough MD  Pager   Office     Contact me directly via [employer] Teams     (After 5 pm or on weekends please page the on-call fellow/attending, can check [email or web address] for schedule. Login is anne ramesh, schedule under [facility] medicine, psych, derm)

## 2022-03-25 NOTE — CHART NOTE - NSCHARTNOTEFT_GEN_A_CORE
Medicine PA Note     Notified by RN that patient's permacath site is oozing at bedside. Pt is evaluated at bedside, no acute distress is noted. Pt denies chest pain, palpitations, sob, nausea/vomiting, abdominal pain an diaphoresis. Vitals are stable at bedside.   Hgb stable at this time- repeat CBC in AM    Placed pressure on the site at this time   IR paged overnight, awaiting callback   Endorse/sign out to day team on overnight event   RN aware of management     Viktoriya PELAEZ  Dept of Medicine   [id number] Medicine PA Note     Notified by RN of active bleeding at permacath site. Pt is evaluated at bedside, no acute distress is noted. Pt denies chest pain, palpitations, sob, nausea/vomiting, abdominal pain, headaches, visual changes and diaphoresis. Vitals are stable at bedside.   Placed pressure over permacath site for 30 minutes, noted that bleeding stopped, dressing was removed and replaced in sterile environment    IR paged overnight, awaiting callback   Hgb stable - repeat CBC/coags in AM   Will hold Eliquis at this time    Endorse/sign out to day team on overnight event   RN aware of management     Viktoriya PELAEZ  Dept of Medicine   [id number]

## 2022-03-25 NOTE — PROGRESS NOTE ADULT - ASSESSMENT
NICOLETTE 3/14/22: EF (Visual Estimate): 40-45 %  Moderate global left ventricular systolic dysfunction. Tethered mitral valve leaflets with normal opening. Moderate-severe mitral regurgitation.Mild atheroma noted in aortic arch/descending aorta. 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   -renal f/u noted   -s/p non tunneled HD catheter on 3/15/22   -sp avf 3/21-- vascular fu   -sp tunnled catheter 3/23  -started on HD  --cv stable   -UE dopplers noted focal THROMBOSIS of the right cephalic vein at the  antecubital fossa.-- already on ac   -events noted active bleeding to tunnel cath today --- awaiting IR     #THROMBOSIS of the right cephalic vein   -c/w a.c , vascular following    #Atrial Fibrillation/Flutter. S/P AF Ablation  -sp nicolette/ dccv 3/14   -remains in SR  -cont amiodarone 200 mg daily , Toprol  XL 25 mg daily  -c/w a/c eliquis    -eventual af ablation    #Coronary Artery Disease. S/P PCI to LAD  -stable without chest pain or dyspnea  -hsT peaked, likely demand ischemia in setting of CKD and acute HF   -continue toprol, statin, and asa 81mg daily    #Mitral Regurgitation  -continue to monitor, nicolette Tethered mitral valve leaflets with normal opening. Moderate-severe mitral regurgitation.  -mr should improved with maint of SR and decrease in lv size/improved lv sys fxn    #Hypertension  -Blood pressure controlled  -Continue current medications.     dcp once cleared by renal/ IR likely tomorrow

## 2022-03-25 NOTE — PROGRESS NOTE ADULT - SUBJECTIVE AND OBJECTIVE BOX
[facility] DIVISION OF KIDNEY DISEASES AND HYPERTENSION -- FOLLOW UP NOTE  --------------------------------------------------------------------------------  HPI: Pt. with advanced CKD in setting of biopsy proven secondary FSGS in 06/21 started on HD for volume overload and uremic symptoms. Pt. underwent non tunelled HD catheter placement on 3/15/22. Pt. underwent HD 1st session on 3/16/22. Pt underwent LUE AVF placement on 3/21/22 and tolerated well. Last HD done on Tuesday 3/22/22. Pt. underwent right IJ tunnelled HD catheter placement on 3/23/22.     Pt. seen and examined today, reports feeling well. Overnight pt with bleeding from tunnelled catheter site. Pt seen during HD bleeding persists from tunnelled HD site.      PAST HISTORY  --------------------------------------------------------------------------------  No significant changes to PMH, PSH, FHx, SHx, unless otherwise noted    ALLERGIES & MEDICATIONS  --------------------------------------------------------------------------------  Allergies    No Known Allergies    Intolerances    Seafood (Other)    Standing Inpatient Medications  allopurinol 100 milliGRAM(s) Oral daily  aMIOdarone    Tablet 200 milliGRAM(s) Oral daily  amLODIPine   Tablet 10 milliGRAM(s) Oral daily  apixaban 2.5 milliGRAM(s) Oral two times a day  aspirin enteric coated 81 milliGRAM(s) Oral daily  chlorhexidine 4% Liquid 1 Application(s) Topical <User Schedule>  cholecalciferol 1000 Unit(s) Oral daily  ferrous    sulfate 325 milliGRAM(s) Oral daily  finasteride 5 milliGRAM(s) Oral daily  furosemide    Tablet 80 milliGRAM(s) Oral daily  isosorbide   dinitrate Tablet (ISORDIL) 10 milliGRAM(s) Oral three times a day  ondansetron Injectable 4 milliGRAM(s) IV Push once  polyethylene glycol 3350 17 Gram(s) Oral daily    PRN Inpatient Medications  guaiFENesin Oral Liquid (Sugar-Free) 100 milliGRAM(s) Oral every 6 hours PRN      REVIEW OF SYSTEMS  --------------------------------------------------------------------------------  Gen: No malaise  Skin: No rashes  Head/Eyes/Ears: Normal hearing,   Respiratory: cough+,  CV: No chest pain  GI: No abdominal pain, diarrhea  : as per HPI  MSK: No  edema    All other systems were reviewed and are negative, except as noted.    VITALS/PHYSICAL EXAM  --------------------------------------------------------------------------------  T(C): 36.4 (03-25-22 @ 09:10), Max: 37.2 (03-24-22 @ 13:17)  HR: 79 (03-25-22 @ 09:10) (72 - 90)  BP: 144/79 (03-25-22 @ 09:10) (123/79 - 156/79)  RR: 18 (03-25-22 @ 09:10) (18 - 18)  SpO2: 96% (03-25-22 @ 09:10) (94% - 98%)  Wt(kg): --  Height (cm): 180.3 (03-23-22 @ 13:35)  Weight (kg): 77.1 (03-23-22 @ 13:35)  BMI (kg/m2): 23.7 (03-23-22 @ 13:35)  BSA (m2): 1.97 (03-23-22 @ 13:35)      03-24-22 @ 07:01  -  03-25-22 @ 07:00  --------------------------------------------------------  IN: 1380 mL / OUT: 2875 mL / NET: -1495 mL        Physical Exam:  	Gen: NAD  	HEENT: Anicteric  	Pulm: good entry, no rales   	CV: S1S2+  	Abd: Soft, +BS   	Ext: No LE edema B/L, no asterixis  	Neuro: Awake  	Skin: Warm and dry              Vascular: right tunneled HD catheter+, LUE TREVERF thrill+    LABS/STUDIES  --------------------------------------------------------------------------------              9.7    12.43 >-----------<  238      [03-25-22 @ 09:42]              30.2     136  |  97  |  37  ----------------------------<  159      [03-25-22 @ 09:42]  3.8   |  24  |  7.15        Ca     9.0     [03-25-22 @ 09:42]      Mg     2.1     [03-25-22 @ 09:42]      Phos  4.0     [03-25-22 @ 09:42]    Creatinine Trend:  SCr 7.15 [03-25 @ 09:42]  SCr 5.12 [03-24 @ 15:03]  SCr 7.24 [03-23 @ 06:33]  SCr 10.26 [03-22 @ 06:52]  SCr 8.48 [03-21 @ 04:45]    Urinalysis - [03-09-22 @ 06:37]      Color Light Yellow / Appearance Clear / SG 1.012 / pH 6.0      Gluc 500 mg/dL / Ketone Negative  / Bili Negative / Urobili Negative       Blood Small / Protein 300 mg/dL / Leuk Est Negative / Nitrite Negative      RBC 2 / WBC 3 / Hyaline 1 / Gran  / Sq Epi  / Non Sq Epi 1 / Bacteria Negative      HBsAb <3.0      [03-17-22 @ 00:04]  HBsAb Nonreact      [03-17-22 @ 00:04]  HBsAg Nonreact      [03-17-22 @ 00:04]  HCV 0.09, Nonreact      [03-17-22 @ 00:04]

## 2022-03-25 NOTE — PROGRESS NOTE ADULT - PROBLEM SELECTOR PLAN 1
Pt. with advanced RUSSELL on CKD in setting of CHF and secondary FSGS.  SCr at ~1.7-1.9 (04/2019).  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 Last SCr was elevated at 3.25 on 6/8/21. Scr on admission elevated at 8.77 and in 9-10 range.     Pt underwent right IJ non tunelled HD catheter placement on 3/15/22. Pt. underwent HD 1st session on 3/16/22 and 2nd session on 3/17 and tolerated well and third on 3/19. Pt underwent LUE AVF placement on 3/21 and tolerated well.   Pt. underwent tunnelled HD catheter placement on 3/23.   Last HD done on 3/24/22 and tolerated well. Pt with bleeding from HD catheter site overnight.   Plan for HD today.   Monitor post HD for bleeding from HD catheter site.   Pt. to resume HD as per TTS schedule at [facility].  please dose medications as per ESRD

## 2022-03-25 NOTE — PROGRESS NOTE ADULT - ASSESSMENT
Pt. with advanced CKD in setting of secondary FSGS, now on HD    {[id number]}

## 2022-03-26 ENCOUNTER — TRANSCRIPTION ENCOUNTER (OUTPATIENT)
Age: 67
End: 2022-03-26

## 2022-03-26 LAB
ANION GAP SERPL CALC-SCNC: 16 MMOL/L — SIGNIFICANT CHANGE UP (ref 5–17)
BUN SERPL-MCNC: 33 MG/DL — HIGH (ref 7–23)
CALCIUM SERPL-MCNC: 9.4 MG/DL — SIGNIFICANT CHANGE UP (ref 8.4–10.5)
CHLORIDE SERPL-SCNC: 98 MMOL/L — SIGNIFICANT CHANGE UP (ref 96–108)
CO2 SERPL-SCNC: 24 MMOL/L — SIGNIFICANT CHANGE UP (ref 22–31)
CREAT SERPL-MCNC: 6.45 MG/DL — HIGH (ref 0.5–1.3)
EGFR: 9 ML/MIN/1.73M2 — LOW
GLUCOSE SERPL-MCNC: 85 MG/DL — SIGNIFICANT CHANGE UP (ref 70–99)
HCT VFR BLD CALC: 33.2 % — LOW (ref 39–50)
HGB BLD-MCNC: 10.3 G/DL — LOW (ref 13–17)
MAGNESIUM SERPL-MCNC: 2.2 MG/DL — SIGNIFICANT CHANGE UP (ref 1.6–2.6)
MCHC RBC-ENTMCNC: 19.6 PG — LOW (ref 27–34)
MCHC RBC-ENTMCNC: 31 GM/DL — LOW (ref 32–36)
MCV RBC AUTO: 63.1 FL — LOW (ref 80–100)
NRBC # BLD: 0 /100 WBCS — SIGNIFICANT CHANGE UP (ref 0–0)
PHOSPHATE SERPL-MCNC: 4.3 MG/DL — SIGNIFICANT CHANGE UP (ref 2.5–4.5)
PLATELET # BLD AUTO: 232 K/UL — SIGNIFICANT CHANGE UP (ref 150–400)
POTASSIUM SERPL-MCNC: 4.2 MMOL/L — SIGNIFICANT CHANGE UP (ref 3.5–5.3)
POTASSIUM SERPL-SCNC: 4.2 MMOL/L — SIGNIFICANT CHANGE UP (ref 3.5–5.3)
RBC # BLD: 5.26 M/UL — SIGNIFICANT CHANGE UP (ref 4.2–5.8)
RBC # FLD: 16.6 % — HIGH (ref 10.3–14.5)
SODIUM SERPL-SCNC: 138 MMOL/L — SIGNIFICANT CHANGE UP (ref 135–145)
WBC # BLD: 13.76 K/UL — HIGH (ref 3.8–10.5)
WBC # FLD AUTO: 13.76 K/UL — HIGH (ref 3.8–10.5)

## 2022-03-26 PROCEDURE — 99232 SBSQ HOSP IP/OBS MODERATE 35: CPT | Mod: GC

## 2022-03-26 RX ORDER — FUROSEMIDE 40 MG
1 TABLET ORAL
Qty: 0 | Refills: 0 | DISCHARGE

## 2022-03-26 RX ORDER — POLYETHYLENE GLYCOL 3350 17 G/17G
17 POWDER, FOR SOLUTION ORAL
Qty: 0 | Refills: 0 | DISCHARGE
Start: 2022-03-26

## 2022-03-26 RX ORDER — FUROSEMIDE 40 MG
1 TABLET ORAL
Qty: 0 | Refills: 0 | DISCHARGE
Start: 2022-03-26

## 2022-03-26 RX ORDER — APIXABAN 2.5 MG/1
1 TABLET, FILM COATED ORAL
Qty: 0 | Refills: 0 | DISCHARGE

## 2022-03-26 RX ADMIN — APIXABAN 2.5 MILLIGRAM(S): 2.5 TABLET, FILM COATED ORAL at 05:15

## 2022-03-26 RX ADMIN — Medication 1000 UNIT(S): at 11:23

## 2022-03-26 RX ADMIN — FINASTERIDE 5 MILLIGRAM(S): 5 TABLET, FILM COATED ORAL at 11:23

## 2022-03-26 RX ADMIN — AMIODARONE HYDROCHLORIDE 200 MILLIGRAM(S): 400 TABLET ORAL at 05:14

## 2022-03-26 RX ADMIN — Medication 100 MILLIGRAM(S): at 17:30

## 2022-03-26 RX ADMIN — ISOSORBIDE DINITRATE 10 MILLIGRAM(S): 5 TABLET ORAL at 11:22

## 2022-03-26 RX ADMIN — APIXABAN 2.5 MILLIGRAM(S): 2.5 TABLET, FILM COATED ORAL at 02:51

## 2022-03-26 RX ADMIN — Medication 100 MILLIGRAM(S): at 05:17

## 2022-03-26 RX ADMIN — ISOSORBIDE DINITRATE 10 MILLIGRAM(S): 5 TABLET ORAL at 05:14

## 2022-03-26 RX ADMIN — Medication 81 MILLIGRAM(S): at 11:23

## 2022-03-26 RX ADMIN — Medication 100 MILLIGRAM(S): at 11:23

## 2022-03-26 RX ADMIN — Medication 325 MILLIGRAM(S): at 11:23

## 2022-03-26 RX ADMIN — ISOSORBIDE DINITRATE 10 MILLIGRAM(S): 5 TABLET ORAL at 17:30

## 2022-03-26 RX ADMIN — Medication 80 MILLIGRAM(S): at 05:14

## 2022-03-26 RX ADMIN — CHLORHEXIDINE GLUCONATE 1 APPLICATION(S): 213 SOLUTION TOPICAL at 05:18

## 2022-03-26 RX ADMIN — AMLODIPINE BESYLATE 10 MILLIGRAM(S): 2.5 TABLET ORAL at 05:15

## 2022-03-26 NOTE — PROGRESS NOTE ADULT - PROBLEM SELECTOR PLAN 1
Pt. with advanced RUSSELL on CKD in setting of CHF and secondary FSGS.  SCr at ~1.7-1.9 (04/2019).  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 Last SCr was elevated at 3.25 on 6/8/21. Scr on admission elevated at 8.77 and in 9-10 range.     Pt underwent right IJ non tunelled HD catheter placement on 3/15/22. Pt. underwent HD 1st session on 3/16/22 and 2nd session on 3/17 and tolerated well and third on 3/19. Pt underwent LUE AVF placement on 3/21 and tolerated well.   Pt. underwent tunnelled HD catheter placement on 3/23.   Last HD done on 3/25/22 and tolerated well. Pt with bleeding from HD catheter site overnight.   No HD today  If no more bleeding can be dced.   Pt. to resume HD as per TTS schedule at [facility].  please dose medications as per ESRD

## 2022-03-26 NOTE — PROGRESS NOTE ADULT - SUBJECTIVE AND OBJECTIVE BOX
Follow-up Pulm Progress Note    No new respiratory events overnight.  Denies SOB/CP.     Medications:  MEDICATIONS  (STANDING):  allopurinol 100 milliGRAM(s) Oral daily  aMIOdarone    Tablet 200 milliGRAM(s) Oral daily  amLODIPine   Tablet 10 milliGRAM(s) Oral daily  apixaban 2.5 milliGRAM(s) Oral two times a day  aspirin enteric coated 81 milliGRAM(s) Oral daily  chlorhexidine 4% Liquid 1 Application(s) Topical <User Schedule>  cholecalciferol 1000 Unit(s) Oral daily  ferrous    sulfate 325 milliGRAM(s) Oral daily  finasteride 5 milliGRAM(s) Oral daily  furosemide    Tablet 80 milliGRAM(s) Oral daily  isosorbide   dinitrate Tablet (ISORDIL) 10 milliGRAM(s) Oral three times a day  ondansetron Injectable 4 milliGRAM(s) IV Push once  polyethylene glycol 3350 17 Gram(s) Oral daily    MEDICATIONS  (PRN):  guaiFENesin Oral Liquid (Sugar-Free) 100 milliGRAM(s) Oral every 6 hours PRN Cough    Vital Signs Last 24 Hrs  T(C): 36.6 (26 Mar 2022 03:33), Max: 36.9 (25 Mar 2022 20:09)  T(F): 97.9 (26 Mar 2022 03:33), Max: 98.4 (25 Mar 2022 20:09)  HR: 85 (26 Mar 2022 03:33) (63 - 85)  BP: 166/89 (26 Mar 2022 03:33) (105/67 - 166/89)  BP(mean): --  RR: 19 (26 Mar 2022 03:33) (18 - 19)  SpO2: 93% (26 Mar 2022 03:33) (93% - 96%)    03-25 @ 07:01  -  03-26 @ 07:00  --------------------------------------------------------  IN: 240 mL / OUT: 1000 mL / NET: -760 mL    LABS:                        10.3   13.76 )-----------( 232      ( 26 Mar 2022 06:48 )             33.2     03-26    138  |  98  |  33<H>  ----------------------------<  85  4.2   |  24  |  6.45<H>    Ca    9.4      26 Mar 2022 06:44  Phos  4.3     03-26  Mg     2.2     03-26    PT/INR - ( 25 Mar 2022 09:42 )   PT: 15.9 sec;   INR: 1.37 ratio    PTT - ( 25 Mar 2022 09:42 )  PTT:34.7 sec    Physical Examination:  PULM: Clear to auscultation bilaterally, no significant sputum production  CVS: RRR    RADIOLOGY REVIEWED    CT chest: < from: CT Chest No Cont (03.09.22 @ 16:32) >  FINDINGS:    Lungs/Airways/Pleura: The central airways are patent. No pleural   effusion. There is diffuse bronchial wall thickening with segmental and   subsegmental bronchial impaction at the bases. Few scattered sub-4 mm   pulmonary nodules include right apex series 3 image 29 and left apex   image 28. No consolidation or pulmonary edema.    Mediastinum/Lymph nodes: No thoracic adenopathy.    Heart and Vessels: Cardiomegaly. LAD stent. No pericardial effusion. The   pulmonary artery measures 3.2 cm. Adjacent mid ascending aorta measures   3.2 cm.    Upper Abdomen: Unremarkable.    Osseous structures and Soft Tissues: No aggressive bone lesions.    IMPRESSION:  Diffuse bronchial wall thickening and impaction.    Sub-4 mm pulmonary nodules; if patient is considered high risk for lung   cancer, consider follow-up low dose chest CT in 12 months. If low risk,   no further follow-up is needed as per current Fleischner guidelines.    --- End of Report ---        < end of copied text >

## 2022-03-26 NOTE — DISCHARGE NOTE PROVIDER - NSDCMRMEDTOKEN_GEN_ALL_CORE_FT
allopurinol 100 mg oral tablet: 1 tab(s) orally once a day  amiodarone 200 mg oral tablet: 1 tab(s) orally once a day  amLODIPine 10 mg oral tablet: 1 tab(s) orally once a day  apixaban 2.5 mg oral tablet: 1 tab(s) orally 2 times a day  Aspirin Enteric Coated 81 mg oral delayed release tablet: 1 tab(s) orally once a day  Farxiga 5 mg oral tablet: 1 tab(s) orally once a day  ferrous sulfate 325 mg (65 mg elemental iron) oral tablet: 1 tab(s) orally once a day  finasteride 5 mg oral tablet: 1 tab(s) orally once a day  furosemide 80 mg oral tablet: 1 tab(s) orally once a day  guaiFENesin 100 mg/5 mL oral liquid: 5 milliliter(s) orally every 6 hours, As needed, Cough  isosorbide dinitrate 10 mg oral tablet: 1 tab(s) orally 3 times a day  metoprolol succinate 25 mg oral tablet, extended release: 1 tab(s) orally once a day  pantoprazole 40 mg oral delayed release tablet: 1 tab(s) orally once a day  polyethylene glycol 3350 oral powder for reconstitution: 17 gram(s) orally once a day  simvastatin 40 mg oral tablet: 1 tab(s) orally once a day (at bedtime)  Vitamin D3 25 mcg (1000 intl units) oral capsule: 1 cap(s) orally once a day   allopurinol 100 mg oral tablet: 1 tab(s) orally once a day  amiodarone 200 mg oral tablet: 1 tab(s) orally once a day  amLODIPine 10 mg oral tablet: 1 tab(s) orally once a day  apixaban 2.5 mg oral tablet: 1 tab(s) orally 2 times a day  Aspirin Enteric Coated 81 mg oral delayed release tablet: 1 tab(s) orally once a day  Farxiga 5 mg oral tablet: 1 tab(s) orally once a day  ferrous sulfate 325 mg (65 mg elemental iron) oral tablet: 1 tab(s) orally once a day  finasteride 5 mg oral tablet: 1 tab(s) orally once a day  furosemide 80 mg oral tablet: 1 tab(s) orally once a day  guaiFENesin 100 mg/5 mL oral liquid: 5 milliliter(s) orally every 6 hours, As needed, Cough  isosorbide dinitrate 10 mg oral tablet: 1 tab(s) orally 3 times a day  metoprolol succinate 25 mg oral tablet, extended release: 1 tab(s) orally once a day  pantoprazole 40 mg oral delayed release tablet: 1 tab(s) orally once a day  polyethylene glycol 3350 oral powder for reconstitution: 17 gram(s) orally once a day  Pt discharged from [facility] today 3/28/22 and cleared to return to work. Due to medical treatment patient will need to be off between the hours of 0800 and 1000 am on Tuesday&#x27;s and Thursday&#x27;s.:   simvastatin 40 mg oral tablet: 1 tab(s) orally once a day (at bedtime)  Vitamin D3 25 mcg (1000 intl units) oral capsule: 1 cap(s) orally once a day

## 2022-03-26 NOTE — PROVIDER CONTACT NOTE (MEDICATION) - BACKGROUND
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

## 2022-03-26 NOTE — DISCHARGE NOTE NURSING/CASE MANAGEMENT/SOCIAL WORK - NSDCPEFALRISK_GEN_ALL_CORE
For information on Fall & Injury Prevention, visit: [email or web address] OR  [email or web address] OR  https://www.cdc.gov/steadi/patient.html

## 2022-03-26 NOTE — DISCHARGE NOTE PROVIDER - PROVIDER TOKENS
PROVIDER:[TOKEN:[[id number]],FOLLOWUP:[2 weeks]],PROVIDER:[TOKEN:[[id number]],FOLLOWUP:[1 month]],PROVIDER:[TOKEN:[[id number]],FOLLOWUP:[1 week]],PROVIDER:[TOKEN:[[id number]],FOLLOWUP:[Routine]]

## 2022-03-26 NOTE — PROGRESS NOTE ADULT - PROBLEM SELECTOR PLAN 3
Patient with anemia in the setting of ESRD. Hemoglobin in target range.   Tsat 16 and ferritin 268.   Venofer started on 3/19 and needs 5 doses total of 200mg.  Monitor hemoglobin.    Omari Mcdonough MD  Pager   Office     Contact me directly via [employer] Teams     (After 5 pm or on weekends please page the on-call fellow/attending, can check [email or web address] for schedule. Login is anne ramesh, schedule under [facility] medicine, psych, derm)

## 2022-03-26 NOTE — DISCHARGE NOTE PROVIDER - NSDCFUADDAPPT_GEN_ALL_CORE_FT
APPTS ARE READY TO BE MADE: [x] YES    Best Family or Patient Contact (if needed):    Additional Information about above appointments (if needed):    1:   2:   3:     Other comments or requests:    APPTS ARE READY TO BE MADE: [x] YES    Best Family or Patient Contact (if needed):    Additional Information about above appointments (if needed):    1:   2:   3:     Other comments or requests:    Patient was previously scheduled with Dr. London Lara APPTS ARE READY TO BE MADE: [x] YES    Best Family or Patient Contact (if needed):    Additional Information about above appointments (if needed):    1:   2:   3:     Other comments or requests:    Patient was previously scheduled with Dr. London Lara.														                         Patient was previously scheduled with Dr. Pernell Lee on 04/21 2pm at [address] [facility], [address].														                                                Patient was previously scheduled with Dr. Jamal Hayes on 04/13 2pm.												  Patient was previously scheduled with Dr. Kofi Florez on 04/13 11:45am at [address] [facility], [address].

## 2022-03-26 NOTE — DISCHARGE NOTE NURSING/CASE MANAGEMENT/SOCIAL WORK - PATIENT PORTAL LINK FT
You can access the FollowMyHealth Patient Portal offered by [facility] by registering at the following website: [email or web address]. By joining [facility]’s FollowMyHealth portal, you will also be able to view your health information using other applications (apps) compatible with our system.

## 2022-03-26 NOTE — PROGRESS NOTE ADULT - ASSESSMENT
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

## 2022-03-26 NOTE — PROGRESS NOTE ADULT - ASSESSMENT
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 Presents as transfer from [facility] for acute heart failure exacerbation. The patient reports that he has been experiencing LIRA and SOB for 7-10 days which was progressive and now occurring at rest. Started on IV diuretics with improving respiratory status. Called to consult for diffuse bronchial wall thickening and impaction and 4mm pulmonary nodules seen on CT chest.

## 2022-03-26 NOTE — PROGRESS NOTE ADULT - SUBJECTIVE AND OBJECTIVE BOX
CARDIOLOGY FOLLOW UP - Dr. Lee  Date of Service: 3/2/22  CC: no events    Review of Systems:  Constitutional: No fever, weight loss, or fatigue  Respiratory: No cough, wheezing, or hemoptysis, no shortness of breath  Cardiovascular: No chest pain, palpitaitons, passing out, dizziness, or leg swelling  Gastrointestinal: No abd or epigastric pain. No nausea, vomitting, or hematemesis; no diarrhea or consiptaiton, no melena or hematochezia  Vascular: No edema     TELEMETRY:    PHYSICAL EXAM:  T(C): 36.6 (03-26-22 @ 03:33), Max: 36.9 (03-25-22 @ 20:09)  HR: 85 (03-26-22 @ 03:33) (63 - 85)  BP: 166/89 (03-26-22 @ 03:33) (105/67 - 166/89)  RR: 19 (03-26-22 @ 03:33) (18 - 19)  SpO2: 93% (03-26-22 @ 03:33) (93% - 96%)  Wt(kg): --  I&O's Summary    25 Mar 2022 07:01  -  26 Mar 2022 07:00  --------------------------------------------------------  IN: 240 mL / OUT: 1000 mL / NET: -760 mL        Appearance: Normal	  Cardiovascular: Normal S1 S2,RRR, No JVD, No murmurs  Respiratory: Lungs clear to auscultation	  Gastrointestinal:  Soft, Non-tender, + BS	  Extremities: Normal range of motion, No clubbing, cyanosis or edema  Vascular: Peripheral pulses palpable 2+ bilaterally       Home Medications:  allopurinol 100 mg oral tablet: 1 tab(s) orally once a day (09 Mar 2022 02:11)  amiodarone 200 mg oral tablet: 1 tab(s) orally once a day (09 Mar 2022 02:11)  amLODIPine 10 mg oral tablet: 1 tab(s) orally once a day (09 Mar 2022 02:11)  Aspirin Enteric Coated 81 mg oral delayed release tablet: 1 tab(s) orally once a day (09 Mar 2022 02:11)  Eliquis 5 mg oral tablet: 1 tab(s) orally 2 times a day (09 Mar 2022 02:11)  Farxiga 5 mg oral tablet: 1 tab(s) orally once a day (09 Mar 2022 02:11)  ferrous sulfate 325 mg (65 mg elemental iron) oral tablet: 1 tab(s) orally once a day (09 Mar 2022 02:11)  finasteride 5 mg oral tablet: 1 tab(s) orally once a day (09 Mar 2022 02:11)  furosemide 40 mg oral tablet: 1 tab(s) orally once a day (09 Mar 2022 02:11)  hydrALAZINE 25 mg oral tablet: 1 tab(s) orally once a day (09 Mar 2022 02:11)  isosorbide dinitrate 10 mg oral tablet: 1 tab(s) orally 3 times a day (09 Mar 2022 02:11)  metoprolol succinate 25 mg oral tablet, extended release: 1 tab(s) orally once a day (09 Mar 2022 02:11)  pantoprazole 40 mg oral delayed release tablet: 1 tab(s) orally once a day (09 Mar 2022 02:11)  simvastatin 40 mg oral tablet: 1 tab(s) orally once a day (at bedtime) (09 Mar 2022 02:11)  Vitamin D3 25 mcg (1000 intl units) oral capsule: 1 cap(s) orally once a day (09 Mar 2022 02:11)        MEDICATIONS  (STANDING):  allopurinol 100 milliGRAM(s) Oral daily  aMIOdarone    Tablet 200 milliGRAM(s) Oral daily  amLODIPine   Tablet 10 milliGRAM(s) Oral daily  apixaban 2.5 milliGRAM(s) Oral two times a day  aspirin enteric coated 81 milliGRAM(s) Oral daily  chlorhexidine 4% Liquid 1 Application(s) Topical <User Schedule>  cholecalciferol 1000 Unit(s) Oral daily  ferrous    sulfate 325 milliGRAM(s) Oral daily  finasteride 5 milliGRAM(s) Oral daily  furosemide    Tablet 80 milliGRAM(s) Oral daily  isosorbide   dinitrate Tablet (ISORDIL) 10 milliGRAM(s) Oral three times a day  ondansetron Injectable 4 milliGRAM(s) IV Push once  polyethylene glycol 3350 17 Gram(s) Oral daily        EKG:  RADIOLOGY:  DIAGNOSTIC TESTING:  [ ] Echocardiogram:  [ ] Catherterization:  [ ] Stress Test:  OTHER:     LABS:	 	                          10.3   13.76 )-----------( 232      ( 26 Mar 2022 06:48 )             33.2     03-26    138  |  98  |  33<H>  ----------------------------<  85  4.2   |  24  |  6.45<H>    Ca    9.4      26 Mar 2022 06:44  Phos  4.3     03-26  Mg     2.2     03-26        PT/INR - ( 25 Mar 2022 09:42 )   PT: 15.9 sec;   INR: 1.37 ratio         PTT - ( 25 Mar 2022 09:42 )  PTT:34.7 sec    CARDIAC MARKERS:

## 2022-03-26 NOTE — DISCHARGE NOTE PROVIDER - HOSPITAL COURSE
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   -renal f/u noted   -s/p non tunneled HD catheter on 3/15/22   -sp avf 3/21-- vascular fu   -sp tunnled catheter 3/23  -started on HD  --cv stable   -UE dopplers noted focal THROMBOSIS of the right cephalic vein at the  antecubital fossa.-- already on Eliquis  -Bleeding at permacath site now resolved post IR intervention    #THROMBOSIS of the right cephalic vein   -c/w a.c , vascular following    #Atrial Fibrillation/Flutter.  -S/p successful NICOLETTE/DCCV 3/14  -remains in SR  -cont amiodarone 200 mg daily , Toprol  XL 25 mg daily  -c/w a/c eliquis    -eventual plan for af ablation    #Coronary Artery Disease. S/P PCI to LAD  -stable without chest pain or dyspnea  -hsT peaked, likely demand ischemia in setting of CKD and acute HF   -continue toprol, statin, and asa 81mg daily    #Mitral Regurgitation  -continue to monitor, nicolette Tethered mitral valve leaflets with normal opening. Moderate-severe mitral regurgitation.  -MR should improved with maintenance of SR and decrease in lv size/improved lv sys fxn    #Hypertension  -Blood pressure controlled  -Continue current medications.     Rhinovirus.   -RVP +entero/rhino virus  -CT chest read with diffuse bronchial wall thickening. None appreciated on our read - also reviewed with chest radiologist. +Mucous in airways.   -S/p Mucinex   -Cough resolved   -Duoneb q6h PRN   -Normoxic, keep sats >90% with supplemental o2 PRN  -Supportive care    SOB (shortness of breath).   ·  Plan: likely cardiac in origin - SOB improved with diuresis  -Also with viral URI  -SOB, cough resolved.    Pulmonary nodule.   ·  Plan: CT chest with dew scattered sub-4 mm pulm nodules  -Repeat CT chest non contrast 1 year.

## 2022-03-26 NOTE — DISCHARGE NOTE PROVIDER - NSDCFUSCHEDAPPT_GEN_ALL_CORE_FT
SYEDA WEAVER  ; 04/08/2022 ; NPP Med GenInt [address] [facility]  SYEDA WEAVER  ; 04/13/2022 ; NPP Med Nephro [address] SYEDA Bañuelos ; 04/22/2022 ; NPP Cardio Electro [address] SYEDA Bañuelos ; 04/27/2022 ; NPP Ophthal [address] [facility]  SYEDA WEAVER  ; 04/28/2022 ; NPP OrthoSurg [address] [facility]  SYEDA WEAVER  ; 05/03/2022 ; NPP Orthosurg [address]A Maldonado  ; 05/18/2022 ; NPP OrthoSurg [address] [facility] SYEDA WEAVER ; 04/13/2022 ; SY[facility] PAT-Pre-Surgical Testing  SYEDA WEAVER ; 04/13/2022 ; NPP Cardio Electro [address] SYEDA Bañuelos ; 04/13/2022 ; NPP Med Nephro [address] SYEDA Bañuelos ; 04/27/2022 ; NPP Ophthal [address] [facility]  SYEDA WEAVER ; 04/28/2022 ; NPP OrthoSurg [address] [facility]  SYEDA WEAVER ; 05/03/2022 ; [place] [facility]  SYEDA WEAVER ; 05/03/2022 ; NPP Orthosurg [address] SYEDA Naranjo ; 05/18/2022 ; NP OrthoSurg [address] [facility] SYEDA WEAVER ; 04/13/2022 ; [facility] PAT-Pre-Surgical Testing  SYEDA WEAVER ; 04/13/2022 ; NPP Cardio Electro [address] SYEDA Bañuelos ; 04/13/2022 ; NPP Med Nephro [address] SYEDA Bañuelos ; 04/21/2022 ; NPP Surg Vasc [address] SYEDA Arroyo ; 04/21/2022 ; NPP Surg Vasc [address] SYEDA Arroyo ; 04/27/2022 ; NPP Ophthal [address]  SYEDA WEAVER ; 04/28/2022 ; NPP OrthoSurg [address] [facility]  SYEDA WEAVER ; 05/03/2022 ; [place] [facility]  SYEDA WEAVER ; 05/03/2022 ; NPP Orthosurg [address]  SYEDA WEAVER ; 05/18/2022 ; NPP OrthoSurg [address] [facility]

## 2022-03-26 NOTE — PROGRESS NOTE ADULT - SUBJECTIVE AND OBJECTIVE BOX
[facility] DIVISION OF KIDNEY DISEASES AND HYPERTENSION -- FOLLOW UP NOTE  --------------------------------------------------------------------------------  Chief Complaint:    24 hour events/subjective:  pt had bleeding from perm cath site area  Now appears to have stopped  HD yesterday last 3/25      PAST HISTORY  --------------------------------------------------------------------------------  No significant changes to PMH, PSH, FHx, SHx, unless otherwise noted    ALLERGIES & MEDICATIONS  --------------------------------------------------------------------------------  Allergies    No Known Allergies    Intolerances    Seafood (Other)    Standing Inpatient Medications  allopurinol 100 milliGRAM(s) Oral daily  aMIOdarone    Tablet 200 milliGRAM(s) Oral daily  amLODIPine   Tablet 10 milliGRAM(s) Oral daily  apixaban 2.5 milliGRAM(s) Oral two times a day  aspirin enteric coated 81 milliGRAM(s) Oral daily  chlorhexidine 4% Liquid 1 Application(s) Topical <User Schedule>  cholecalciferol 1000 Unit(s) Oral daily  ferrous    sulfate 325 milliGRAM(s) Oral daily  finasteride 5 milliGRAM(s) Oral daily  furosemide    Tablet 80 milliGRAM(s) Oral daily  isosorbide   dinitrate Tablet (ISORDIL) 10 milliGRAM(s) Oral three times a day  ondansetron Injectable 4 milliGRAM(s) IV Push once  polyethylene glycol 3350 17 Gram(s) Oral daily    PRN Inpatient Medications  guaiFENesin Oral Liquid (Sugar-Free) 100 milliGRAM(s) Oral every 6 hours PRN      REVIEW OF SYSTEMS  --------------------------------------------------------------------------------  Gen: No weight changes, fatigue, fevers/chills, weakness  Skin: No rashes  Head/Eyes/Ears/Mouth: No headache; Normal hearing; Normal vision w/o blurriness; No sinus pain/discomfort, sore throat  Respiratory: No dyspnea, cough, wheezing, hemoptysis  CV: No chest pain, PND, orthopnea  GI: No abdominal pain, diarrhea, constipation, nausea, vomiting, melena, hematochezia  : No increased frequency, dysuria, hematuria, nocturia  MSK: No joint pain/swelling; no back pain; no edema  Neuro: No dizziness/lightheadedness, weakness, seizures, numbness, tingling  Heme: No easy bruising or bleeding  Endo: No heat/cold intolerance  Psych: No significant nervousness, anxiety, stress, depression    All other systems were reviewed and are negative, except as noted.    VITALS/PHYSICAL EXAM  --------------------------------------------------------------------------------  T(C): 37.1 (03-26-22 @ 11:18), Max: 37.1 (03-26-22 @ 11:18)  HR: 85 (03-26-22 @ 11:18) (63 - 85)  BP: 185/99 (03-26-22 @ 11:18) (105/67 - 185/99)  RR: 18 (03-26-22 @ 11:18) (18 - 19)  SpO2: 98% (03-26-22 @ 11:18) (93% - 98%)  Wt(kg): --        03-25-22 @ 07:01  -  03-26-22 @ 07:00  --------------------------------------------------------  IN: 240 mL / OUT: 1000 mL / NET: -760 mL    03-26-22 @ 07:01  -  03-26-22 @ 12:27  --------------------------------------------------------  IN: 480 mL / OUT: 0 mL / NET: 480 mL      PHYSICAL EXAM: vital signs as above  in no apparent distress  Neck: Supple, no JVD,    Lungs: no rhonchi, no wheeze, no crackles  CVS: S1 S2 no M/R/G  Skin: warm, dry  Ext: no cyanosis or clubbing, no edema  Access: R IJ tunnelled HD catheter, AVF noted with good thrill as well      LABS/STUDIES  --------------------------------------------------------------------------------              10.3   13.76 >-----------<  232      [03-26-22 @ 06:48]              33.2     138  |  98  |  33  ----------------------------<  85      [03-26-22 @ 06:44]  4.2   |  24  |  6.45        Ca     9.4     [03-26-22 @ 06:44]      Mg     2.2     [03-26-22 @ 06:44]      Phos  4.3     [03-26-22 @ 06:44]      PT/INR: PT 15.9 , INR 1.37       [03-25-22 @ 09:42]  PTT: 34.7       [03-25-22 @ 09:42]      Creatinine Trend:  SCr 6.45 [03-26 @ 06:44]  SCr 7.15 [03-25 @ 09:42]  SCr 5.12 [03-24 @ 15:03]  SCr 7.24 [03-23 @ 06:33]  SCr 10.26 [03-22 @ 06:52]    Urinalysis - [03-09-22 @ 06:37]      Color Light Yellow / Appearance Clear / SG 1.012 / pH 6.0      Gluc 500 mg/dL / Ketone Negative  / Bili Negative / Urobili Negative       Blood Small / Protein 300 mg/dL / Leuk Est Negative / Nitrite Negative      RBC 2 / WBC 3 / Hyaline 1 / Gran  / Sq Epi  / Non Sq Epi 1 / Bacteria Negative      Iron 45, TIBC 277, %sat 16      [03-17-22 @ 12:05]  Ferritin 268      [03-17-22 @ 12:05]  PTH -- (Ca 9.0)      [03-17-22 @ 12:05]   172  Vitamin D (25OH) 23.5      [03-17-22 @ 12:05]    HBsAb <3.0      [03-17-22 @ 00:04]  HBsAb Nonreact      [03-17-22 @ 00:04]  HBsAg Nonreact      [03-17-22 @ 00:04]  HCV 0.09, Nonreact      [03-17-22 @ 00:04]

## 2022-03-26 NOTE — DISCHARGE NOTE PROVIDER - CARE PROVIDERS DIRECT ADDRESSES
,DirectAddress_Unknown,DirectAddress_Unknown,[email or web address] [email or web address] [email or web address]

## 2022-03-26 NOTE — PROGRESS NOTE ADULT - ASSESSMENT
NICOLETTE 3/14/22: EF (Visual Estimate): 40-45 %  Moderate global left ventricular systolic dysfunction. Tethered mitral valve leaflets with normal opening. Moderate-severe mitral regurgitation.Mild atheroma noted in aortic arch/descending aorta. 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   -renal f/u noted   -s/p non tunneled HD catheter on 3/15/22   -sp avf 3/21-- vascular fu   -sp tunnled catheter 3/23  -started on HD  --cv stable   -UE dopplers noted focal THROMBOSIS of the right cephalic vein at the  antecubital fossa.-- already on ac   -pressure dressing applied to cath site, ir followup    #THROMBOSIS of the right cephalic vein   -c/w a.c , vascular following    #Atrial Fibrillation/Flutter. S/P AF Ablation  -sp nicolette/ dccv 3/14   -remains in SR  -cont amiodarone 200 mg daily , Toprol  XL 25 mg daily  -c/w a/c eliquis    -eventual af ablation    #Coronary Artery Disease. S/P PCI to LAD  -stable without chest pain or dyspnea  -hsT peaked, likely demand ischemia in setting of CKD and acute HF   -continue toprol, statin, and asa 81mg daily    #Mitral Regurgitation  -continue to monitor, nicolette Tethered mitral valve leaflets with normal opening. Moderate-severe mitral regurgitation.  -mr should improved with maint of SR and decrease in lv size/improved lv sys fxn    #Hypertension  -Blood pressure controlled  -Continue current medications.     dcp once cleared by renal/ IR

## 2022-03-26 NOTE — DISCHARGE NOTE PROVIDER - CARE PROVIDER_API CALL
Pernell Lee (MD)  Cardiovascular Disease; Interventional Cardiology; Nuclear Cardiology  [address] [facility], [address]  [place] [address]  Phone: [phone]  Fax: [phone]  Follow Up Time: 2 weeks    Kofi Florez; PhD)  Cardiac Electrophysiology; Cardiovascular Disease; Internal Medicine  [facility] - Dept of Cardiology, [address] [place] [address]  Phone: [phone]  Fax: [phone]  Follow Up Time: 1 month    Omari Mcdonough)  Nephrology  [address], [address] [place] [address]  Phone: [phone]  Fax: [phone]  Follow Up Time: 1 week    London Lara)  Internal Medicine  [address] [facility], [address]  [place] [address]  Phone: [phone]  Fax: [phone]  Follow Up Time: Routine

## 2022-03-26 NOTE — DISCHARGE NOTE PROVIDER - NSDCCPCAREPLAN_GEN_ALL_CORE_FT
PRINCIPAL DISCHARGE DIAGNOSIS  Diagnosis: Acute on chronic heart failure  Assessment and Plan of Treatment: Weigh yourself daily.  If you gain 3lbs in 3 days, or 5lbs in a week call your Health Care Provider.  Do not eat or drink foods containing more than 2000mg of salt (sodium) in your diet every day.  Call your Health Care Provider if you have any swelling or increased swelling in your feet, ankles, and/or stomach.  Take all of your medication as directed.  If you become dizzy call your Health Care Provider.        SECONDARY DISCHARGE DIAGNOSES  Diagnosis: Acute kidney injury superimposed on CKD  Assessment and Plan of Treatment: Dialysis initiated during this admission, now via Permacath  AV fistula created  Continue with dialysis schedule Tuesday, Thursday, and Saturday  Follow up with kidney doctor  Avoid taking (NSAIDs) - (ex: Ibuprofen, Advil, Celebrex, Naprosyn)  Avoid taking any nephrotoxic agents (can harm kidneys) - Intravenous contrast for diagnostic testing, combination cold medications.  Have all medications adjusted for your renal function by your Health Care Provider.  Blood pressure control is important.  Take all medication as prescribed.      Diagnosis: Chronic atrial fibrillation  Assessment and Plan of Treatment: Post cardioversion, now in sinus rhythm  Plan for eventual afib ablation  Follow up with cardiologist and electrophydiologist  Atrial fibrillation is the most common heart rhythm problem.  The condition puts you at risk for has stroke and heart attack  It helps if you control your blood pressure, not drink more than 1-2 alcohol drinks per day, cut down on caffeine, getting treatment for over active thyroid gland, and get regular exercise  Call your doctor if you feel your heart racing or beating unusually, chest tightness or pain, lightheaded, faint, shortness of breath especially with exercise  It is important to take your heart medication as prescribed  You may be on anticoagulation which is very important to take as directed - you may need blood work to monitor drug levels      Diagnosis: Anemia  Assessment and Plan of Treatment: Improved  Follow up with kidney doctor    Diagnosis: HTN (hypertension)  Assessment and Plan of Treatment: Low salt diet  Activity as tolerated.  Take all medication as prescribed.  Follow up with your medical doctor for routine blood pressure monitoring at your next visit.  Notify your doctor if you have any of the following symptoms:   Dizziness, Lightheadedness, Blurry vision, Headache, Chest pain, Shortness of breath      Diagnosis: CAD (coronary artery disease)  Assessment and Plan of Treatment: Coronary artery disease is a condition where the arteries the supply the heart muscle get clogges with fatty deposits & puts you at risk for a heart attack  Call your doctor if you have any new pain, pressure, or discomfort in the center of your chest, pain, tingling or discomfort in arms, back, neck, jaw, or stomach, shortness of breath, nausea, vomiting, burping or heartburn, sweating, cold and clammy skin, racing or abnormal heartbeat for more than 10 minutes or if they keep coming & going.  Call 911 and do not tr to get to hospital by care  You can help yourself with lefestyle changes (quitting smoking if you smoke), eat lots of fruits & vegetables & low fat dairy products, not a lot of meat & fatty foods, walk or some form of physical activity most days of the week, lose weight if you are overweight  Take your cardiac medication as prescribed to lower cholesterol, to lower blood pressure, aspirin to prevent blood clots, and diabetes control  Make sure to keep appointments with doctor for cardiac follow up care      Diagnosis: S/P coronary artery stent placement  Assessment and Plan of Treatment: Angioplasty or coronary stenting are procedures to open up narrowed or blocked coronary arteries in the heart.  A stent is a tiny metal tube that helps to prop open an artery in the heart muscle.    Your doctor will instruct you when you can drive or resume usual physical activities  You MUST take aspirin & another agent Plavix, Brilinta) to help prevent clots inside the stent.  It is VERY important to take these medications as directed unless your cardiologist says it is OK to stop.  If another physican advises you to stop them, call cardiologist to discuss this advise since there is a risk of a heart attack or even death stopping these medications earlier than they should be.  Do NOT take more than 81 mg aspirin with Brilinta  The most common problems after coronary stenting is bleeding, bruising, & soreness at the tube insertion site - you can use tylenol for discomfort if not contraindicated  Call your doctor if you have chest pain, fever, pain, swelling, or redness where the tube went in      Diagnosis: Rhinovirus  Assessment and Plan of Treatment: Cough resolved   Supportive care    Diagnosis: Pulmonary nodule  Assessment and Plan of Treatment: CT chest with few scattered sub-4 mm pulmonary nodules  Repeat CT chest non contrast in 1 year.    Diagnosis: Thrombosis of right cephalic vein  Assessment and Plan of Treatment: Continue Eliquis

## 2022-03-27 LAB
ANION GAP SERPL CALC-SCNC: 17 MMOL/L — SIGNIFICANT CHANGE UP (ref 5–17)
APTT BLD: 36.4 SEC — HIGH (ref 27.5–35.5)
BUN SERPL-MCNC: 48 MG/DL — HIGH (ref 7–23)
CALCIUM SERPL-MCNC: 9.3 MG/DL — SIGNIFICANT CHANGE UP (ref 8.4–10.5)
CHLORIDE SERPL-SCNC: 97 MMOL/L — SIGNIFICANT CHANGE UP (ref 96–108)
CO2 SERPL-SCNC: 23 MMOL/L — SIGNIFICANT CHANGE UP (ref 22–31)
CREAT SERPL-MCNC: 8.35 MG/DL — HIGH (ref 0.5–1.3)
EGFR: 6 ML/MIN/1.73M2 — LOW
GLUCOSE SERPL-MCNC: 75 MG/DL — SIGNIFICANT CHANGE UP (ref 70–99)
HCT VFR BLD CALC: 31.8 % — LOW (ref 39–50)
HGB BLD-MCNC: 10 G/DL — LOW (ref 13–17)
INR BLD: 1.35 RATIO — HIGH (ref 0.88–1.16)
MCHC RBC-ENTMCNC: 19.6 PG — LOW (ref 27–34)
MCHC RBC-ENTMCNC: 31.4 GM/DL — LOW (ref 32–36)
MCV RBC AUTO: 62.2 FL — LOW (ref 80–100)
NRBC # BLD: 0 /100 WBCS — SIGNIFICANT CHANGE UP (ref 0–0)
PLATELET # BLD AUTO: 234 K/UL — SIGNIFICANT CHANGE UP (ref 150–400)
POTASSIUM SERPL-MCNC: 4.1 MMOL/L — SIGNIFICANT CHANGE UP (ref 3.5–5.3)
POTASSIUM SERPL-SCNC: 4.1 MMOL/L — SIGNIFICANT CHANGE UP (ref 3.5–5.3)
PROTHROM AB SERPL-ACNC: 15.7 SEC — HIGH (ref 10.5–13.4)
RBC # BLD: 5.11 M/UL — SIGNIFICANT CHANGE UP (ref 4.2–5.8)
RBC # FLD: 16.5 % — HIGH (ref 10.3–14.5)
SODIUM SERPL-SCNC: 137 MMOL/L — SIGNIFICANT CHANGE UP (ref 135–145)
WBC # BLD: 12.6 K/UL — HIGH (ref 3.8–10.5)
WBC # FLD AUTO: 12.6 K/UL — HIGH (ref 3.8–10.5)

## 2022-03-27 PROCEDURE — 99222 1ST HOSP IP/OBS MODERATE 55: CPT | Mod: GC

## 2022-03-27 PROCEDURE — 99232 SBSQ HOSP IP/OBS MODERATE 35: CPT | Mod: GC

## 2022-03-27 RX ADMIN — ISOSORBIDE DINITRATE 10 MILLIGRAM(S): 5 TABLET ORAL at 05:03

## 2022-03-27 RX ADMIN — Medication 325 MILLIGRAM(S): at 11:22

## 2022-03-27 RX ADMIN — Medication 80 MILLIGRAM(S): at 05:03

## 2022-03-27 RX ADMIN — FINASTERIDE 5 MILLIGRAM(S): 5 TABLET, FILM COATED ORAL at 11:22

## 2022-03-27 RX ADMIN — Medication 1000 UNIT(S): at 11:22

## 2022-03-27 RX ADMIN — CHLORHEXIDINE GLUCONATE 1 APPLICATION(S): 213 SOLUTION TOPICAL at 05:39

## 2022-03-27 RX ADMIN — Medication 100 MILLIGRAM(S): at 11:21

## 2022-03-27 RX ADMIN — ISOSORBIDE DINITRATE 10 MILLIGRAM(S): 5 TABLET ORAL at 11:21

## 2022-03-27 RX ADMIN — AMIODARONE HYDROCHLORIDE 200 MILLIGRAM(S): 400 TABLET ORAL at 05:03

## 2022-03-27 RX ADMIN — Medication 100 MILLIGRAM(S): at 11:22

## 2022-03-27 RX ADMIN — ISOSORBIDE DINITRATE 10 MILLIGRAM(S): 5 TABLET ORAL at 17:13

## 2022-03-27 RX ADMIN — AMLODIPINE BESYLATE 10 MILLIGRAM(S): 2.5 TABLET ORAL at 05:02

## 2022-03-27 NOTE — PROGRESS NOTE ADULT - SUBJECTIVE AND OBJECTIVE BOX
Order for Trimix faxed to [facility] pharmacy now per Dr Pierson's order.   [facility] DIVISION OF KIDNEY DISEASES AND HYPERTENSION -- FOLLOW UP NOTE  --------------------------------------------------------------------------------  Chief Complaint:    24 hour events/subjective:  Pt with ongoing bleeding and frustrated a bit      PAST HISTORY  --------------------------------------------------------------------------------  No significant changes to PMH, PSH, FHx, SHx, unless otherwise noted    ALLERGIES & MEDICATIONS  --------------------------------------------------------------------------------  Allergies    No Known Allergies    Intolerances    Seafood (Other)    Standing Inpatient Medications  allopurinol 100 milliGRAM(s) Oral daily  aMIOdarone    Tablet 200 milliGRAM(s) Oral daily  amLODIPine   Tablet 10 milliGRAM(s) Oral daily  apixaban 2.5 milliGRAM(s) Oral two times a day  aspirin enteric coated 81 milliGRAM(s) Oral daily  chlorhexidine 4% Liquid 1 Application(s) Topical <User Schedule>  cholecalciferol 1000 Unit(s) Oral daily  ferrous    sulfate 325 milliGRAM(s) Oral daily  finasteride 5 milliGRAM(s) Oral daily  furosemide    Tablet 80 milliGRAM(s) Oral daily  isosorbide   dinitrate Tablet (ISORDIL) 10 milliGRAM(s) Oral three times a day  ondansetron Injectable 4 milliGRAM(s) IV Push once  polyethylene glycol 3350 17 Gram(s) Oral daily    PRN Inpatient Medications  guaiFENesin Oral Liquid (Sugar-Free) 100 milliGRAM(s) Oral every 6 hours PRN      REVIEW OF SYSTEMS  --------------------------------------------------------------------------------  Gen: No weight changes, fatigue, fevers/chills, weakness  Skin: No rashes  Head/Eyes/Ears/Mouth: No headache; Normal hearing; Normal vision w/o blurriness; No sinus pain/discomfort, sore throat  Respiratory: No dyspnea, cough, wheezing, hemoptysis  CV: No chest pain, PND, orthopnea  GI: No abdominal pain, diarrhea, constipation, nausea, vomiting, melena, hematochezia  : No increased frequency, dysuria, hematuria, nocturia  MSK: No joint pain/swelling; no back pain; no edema  Neuro: No dizziness/lightheadedness, weakness, seizures, numbness, tingling  Heme: No easy bruising or bleeding  Endo: No heat/cold intolerance  Psych: No significant nervousness, anxiety, stress, depression    All other systems were reviewed and are negative, except as noted.    VITALS/PHYSICAL EXAM  --------------------------------------------------------------------------------  T(C): 36.8 (03-27-22 @ 11:17), Max: 36.9 (03-26-22 @ 20:05)  HR: 76 (03-27-22 @ 11:17) (76 - 83)  BP: 134/86 (03-27-22 @ 11:17) (134/86 - 174/73)  RR: 18 (03-27-22 @ 11:17) (18 - 18)  SpO2: 100% (03-27-22 @ 11:17) (99% - 100%)  Wt(kg): --        03-26-22 @ 07:01  -  03-27-22 @ 07:00  --------------------------------------------------------  IN: 480 mL / OUT: 0 mL / NET: 480 mL    03-27-22 @ 07:01  -  03-27-22 @ 12:07  --------------------------------------------------------  IN: 200 mL / OUT: 0 mL / NET: 200 mL      Physical Exam:  	Gen: NAD, well-appearing  	HEENT: PERRL, supple neck, clear oropharynx  	Pulm: CTA B/L  	CV: RRR, S1S2; no rub  	Back: No spinal or CVA tenderness; no sacral edema  	Abd: +BS, soft, nontender/nondistended  	: No suprapubic tenderness  	Skin: Warm, without rashes  	Vascular access: R IJ tunnelled HD catheter, AVF +thrill    LABS/STUDIES  --------------------------------------------------------------------------------              10.0   12.60 >-----------<  234      [03-27-22 @ 06:19]              31.8     137  |  97  |  48  ----------------------------<  75      [03-27-22 @ 06:18]  4.1   |  23  |  8.35        Ca     9.3     [03-27-22 @ 06:18]      Mg     2.2     [03-26-22 @ 06:44]      Phos  4.3     [03-26-22 @ 06:44]      PT/INR: PT 15.7 , INR 1.35       [03-27-22 @ 11:51]  PTT: 36.4       [03-27-22 @ 11:51]      Creatinine Trend:  SCr 8.35 [03-27 @ 06:18]  SCr 6.45 [03-26 @ 06:44]  SCr 7.15 [03-25 @ 09:42]  SCr 5.12 [03-24 @ 15:03]  SCr 7.24 [03-23 @ 06:33]    Urinalysis - [03-09-22 @ 06:37]      Color Light Yellow / Appearance Clear / SG 1.012 / pH 6.0      Gluc 500 mg/dL / Ketone Negative  / Bili Negative / Urobili Negative       Blood Small / Protein 300 mg/dL / Leuk Est Negative / Nitrite Negative      RBC 2 / WBC 3 / Hyaline 1 / Gran  / Sq Epi  / Non Sq Epi 1 / Bacteria Negative      Iron 45, TIBC 277, %sat 16      [03-17-22 @ 12:05]  Ferritin 268      [03-17-22 @ 12:05]  PTH -- (Ca 9.0)      [03-17-22 @ 12:05]   172  Vitamin D (25OH) 23.5      [03-17-22 @ 12:05]    HBsAb <3.0      [03-17-22 @ 00:04]  HBsAb Nonreact      [03-17-22 @ 00:04]  HBsAg Nonreact      [03-17-22 @ 00:04]  HCV 0.09, Nonreact      [03-17-22 @ 00:04]

## 2022-03-27 NOTE — PROVIDER CONTACT NOTE (OTHER) - SITUATION
Pt noted with bleeding from [facility]
Heparin gtt: discrepancy with patient's weight/drug dosing weight, however PTT therapeutic x 4 at current rate of 14ml/hr.
Pt converts between AFIB and NSR
patient bleeding @ [facility] site
patient bleeding at [facility] site

## 2022-03-27 NOTE — CHART NOTE - NSCHARTNOTEFT_GEN_A_CORE
IR consulted for bleeding permcath. Patient seen and examined at bedside. Dressing removed and catheter site cleaned and prepped. Large clot burden under dressing but no active bleeding. Clot around catheter removed while preserving clot at dermatotomy site to maintain hemostasis. Pressure dressing with hemostatic agents (quick clot / thrombidisk) applied.    - Continue to hold eliquis overnight to maintain hemostasis. Can restart in the morning.  - Keep patients head of bed elevated.   - No contraindication to discharge from IR standpoint  - Case discussed with primary team IR consulted for bleeding permcath. Patient seen and examined at bedside. Dressing removed and catheter site cleaned and prepped. Large clot burden under dressing but no active bleeding. Clot around catheter removed while preserving clot at dermatotomy site to maintain hemostasis. Pressure dressing with hemostatic agents (quick clot / thrombidisk) applied.    - Continue to hold eliquis overnight to maintain hemostasis. Can restart in the morning.  - Keep patients head of bed elevated.   - No contraindication to discharge from IR standpoint  - Case discussed with primary team    IR attending    AS above, would recommend continued hold of ac until no bleeding for 12-24 hrs to minimize recurrence.  Pt not actively bleeding at this time of examination.

## 2022-03-27 NOTE — PROVIDER CONTACT NOTE (OTHER) - RECOMMENDATIONS
pressure dressing applied, cbc drawn
Notify provider. Re-order heparin gtt at therapeutic rate of 14ml/hr, draw next PTT 3/13 routine AM labs.
NP made aware.
Notify provider
abd pads and pressure applied. PA aware , recc to consult IR. Patient refusing eliquis

## 2022-03-27 NOTE — CONSULT NOTE ADULT - PROVIDER SPECIALTY LIST ADULT
Pulmonology
Vascular Surgery
Electrophysiology
Heme/Onc
Intervent Radiology
Cardiology
Intervent Radiology
Nephrology

## 2022-03-27 NOTE — PROGRESS NOTE ADULT - SUBJECTIVE AND OBJECTIVE BOX
CARDIOLOGY FOLLOW UP - Dr. Lee  Date of Service: 3/27/22  CC: pt frustrated,  continues to ooze from catheter site    Review of Systems:  Constitutional: No fever, weight loss, or fatigue  Respiratory: No cough, wheezing, or hemoptysis, no shortness of breath  Cardiovascular: No chest pain, palpitaitons, passing out, dizziness, or leg swelling  Gastrointestinal: No abd or epigastric pain. No nausea, vomitting, or hematemesis; no diarrhea or consiptaiton, no melena or hematochezia  Vascular: No edema     TELEMETRY:    PHYSICAL EXAM:  T(C): 36.7 (03-27-22 @ 04:21), Max: 37.1 (03-26-22 @ 11:18)  HR: 78 (03-27-22 @ 04:21) (78 - 85)  BP: 174/73 (03-27-22 @ 04:21) (143/53 - 185/99)  RR: 18 (03-27-22 @ 04:21) (18 - 18)  SpO2: 100% (03-27-22 @ 04:21) (98% - 100%)  Wt(kg): --  I&O's Summary    26 Mar 2022 07:01  -  27 Mar 2022 07:00  --------------------------------------------------------  IN: 480 mL / OUT: 0 mL / NET: 480 mL        Appearance: Normal	  Cardiovascular: Normal S1 S2,RRR, No JVD, No murmurs  Respiratory: Lungs clear to auscultation	  Gastrointestinal:  Soft, Non-tender, + BS	  Extremities: Normal range of motion, No clubbing, cyanosis or edema  Vascular: Peripheral pulses palpable 2+ bilaterally       Home Medications:  allopurinol 100 mg oral tablet: 1 tab(s) orally once a day (09 Mar 2022 02:11)  amiodarone 200 mg oral tablet: 1 tab(s) orally once a day (09 Mar 2022 02:11)  amLODIPine 10 mg oral tablet: 1 tab(s) orally once a day (09 Mar 2022 02:11)  Aspirin Enteric Coated 81 mg oral delayed release tablet: 1 tab(s) orally once a day (09 Mar 2022 02:11)  Farxiga 5 mg oral tablet: 1 tab(s) orally once a day (09 Mar 2022 02:11)  ferrous sulfate 325 mg (65 mg elemental iron) oral tablet: 1 tab(s) orally once a day (09 Mar 2022 02:11)  finasteride 5 mg oral tablet: 1 tab(s) orally once a day (09 Mar 2022 02:11)  furosemide 80 mg oral tablet: 1 tab(s) orally once a day (26 Mar 2022 10:23)  guaiFENesin 100 mg/5 mL oral liquid: 5 milliliter(s) orally every 6 hours, As needed, Cough (26 Mar 2022 10:23)  hydrALAZINE 25 mg oral tablet: 1 tab(s) orally once a day (09 Mar 2022 02:11)  isosorbide dinitrate 10 mg oral tablet: 1 tab(s) orally 3 times a day (09 Mar 2022 02:11)  metoprolol succinate 25 mg oral tablet, extended release: 1 tab(s) orally once a day (09 Mar 2022 02:11)  pantoprazole 40 mg oral delayed release tablet: 1 tab(s) orally once a day (09 Mar 2022 02:11)  polyethylene glycol 3350 oral powder for reconstitution: 17 gram(s) orally once a day (26 Mar 2022 10:23)  simvastatin 40 mg oral tablet: 1 tab(s) orally once a day (at bedtime) (09 Mar 2022 02:11)  Vitamin D3 25 mcg (1000 intl units) oral capsule: 1 cap(s) orally once a day (09 Mar 2022 02:11)        MEDICATIONS  (STANDING):  allopurinol 100 milliGRAM(s) Oral daily  aMIOdarone    Tablet 200 milliGRAM(s) Oral daily  amLODIPine   Tablet 10 milliGRAM(s) Oral daily  apixaban 2.5 milliGRAM(s) Oral two times a day  aspirin enteric coated 81 milliGRAM(s) Oral daily  chlorhexidine 4% Liquid 1 Application(s) Topical <User Schedule>  cholecalciferol 1000 Unit(s) Oral daily  ferrous    sulfate 325 milliGRAM(s) Oral daily  finasteride 5 milliGRAM(s) Oral daily  furosemide    Tablet 80 milliGRAM(s) Oral daily  isosorbide   dinitrate Tablet (ISORDIL) 10 milliGRAM(s) Oral three times a day  ondansetron Injectable 4 milliGRAM(s) IV Push once  polyethylene glycol 3350 17 Gram(s) Oral daily        EKG:  RADIOLOGY:  DIAGNOSTIC TESTING:  [ ] Echocardiogram:  [ ] Catherterization:  [ ] Stress Test:  OTHER:     LABS:	 	                          10.0   12.60 )-----------( 234      ( 27 Mar 2022 06:19 )             31.8     03-27    137  |  97  |  48<H>  ----------------------------<  75  4.1   |  23  |  8.35<H>    Ca    9.3      27 Mar 2022 06:18  Phos  4.3     03-26  Mg     2.2     03-26        PT/INR - ( 25 Mar 2022 09:42 )   PT: 15.9 sec;   INR: 1.37 ratio         PTT - ( 25 Mar 2022 09:42 )  PTT:34.7 sec    CARDIAC MARKERS:

## 2022-03-27 NOTE — PROGRESS NOTE ADULT - PROBLEM SELECTOR PLAN 1
Pt. with advanced RUSSELL on CKD in setting of CHF and secondary FSGS.  SCr at ~1.7-1.9 (04/2019).  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 Last SCr was elevated at 3.25 on 6/8/21. Scr on admission elevated at 8.77 and in 9-10 range.     Pt underwent right IJ non tunelled HD catheter placement on 3/15/22. Pt. underwent HD 1st session on 3/16/22 and 2nd session on 3/17 and tolerated well and third on 3/19. Pt underwent LUE AVF placement on 3/21 and tolerated well.   Pt. underwent tunnelled HD catheter placement on 3/23.   Last HD done on 3/25/22 and tolerated well. Pt with bleeding from HD catheter site last 2 days  No HD today  I spoke with IR fellow- who will d/w with his attending and see patient today for ongoing bleed and need for suture  I asked primary team to call heme to see if we can use K-centra or reversal agents ( although hgb is stable)  Pt. to resume HD as per TTS schedule at [facility] but if here still, will have to re-adjust his dc plans accordingly

## 2022-03-27 NOTE — PROGRESS NOTE ADULT - ASSESSMENT
Pt. with advanced CKD in setting of secondary FSGS, now on HD    {[id number]}

## 2022-03-27 NOTE — CONSULT NOTE ADULT - CONSULT REASON
sob
AVF planning
AIVR
Non tunneled HD Catheter
bleeding
CKD
Tunneled HD catheter placement
abnormal CT chest

## 2022-03-27 NOTE — CONSULT NOTE ADULT - CONSULT REQUESTED DATE/TIME
Patient is here today for consult for hx UPJ obstruction, former patient of Dr MARTINA Chaves. Last seen 2017.139/81    Discuss imaging CT urogram and NM renogram    Concerns include frequency, urgency, incontinence at night \"dribbles\" and nocturia 2-3x. Pad worn only at night.    Denies symptoms intermittency, weak stream, hematuria, dysuria and straining.    Denies known Latex allergy or symptoms of Latex sensitivity.  Medications reviewed and updated.    REVIEW OF SYSTEMS:    GENERAL:  Patient denies fever, chills, tiredness, malaise.  EYES:  Patient denies blurred vision, double vision, pain, burning and itching.   NEUROLOGIC:  Patient denies tremors, dizzy spells, numbness, tingling, vision change, loss of balance.  ENDOCRINE:  Patient denies excessive thirst, heat intolerance, lymphnode enlargement.  GASTROINTESTINAL:  Patient denies abdominal pain, nausea/vomiting, indigestion/heartburn, diarrhea, constipation.  CARDIOVASCULAR:  Patient denies chest pain, varicose veins, high blood pressure.  SKIN:  Patient denies skin rashes, boils, persistent itching, acne.  MUSCULOSKELETAL:  Patient denies joint pain, neck pain, back pain, leg swelling.  ENT/MOUTH:  Patient denies ear infections, sore throats, sinus problems, hearing loss.  RESPIRATORY:  Patient denies wheezing, frequent cough, shortness of breath.  GENITOURINARY:  Patient denies urine retention, painful urination, urinary frequency, blood in urine, nocturia.  HEMATOLOGIC/LYMPHATIC:  Patient denies swollen glands, blood clotting problems.   PSYCHOLOGICAL:  Patient denies anxiety, depression.    
15-Mar-2022 14:45
09-Mar-2022
15-Mar-2022 14:30
19-Mar-2022 13:46
27-Mar-2022 11:12
09-Mar-2022 18:07
09-Mar-2022 12:40
10-Mar-2022 14:30

## 2022-03-27 NOTE — PROGRESS NOTE ADULT - ASSESSMENT
NICOLETTE 3/14/22: EF (Visual Estimate): 40-45 %  Moderate global left ventricular systolic dysfunction. Tethered mitral valve leaflets with normal opening. Moderate-severe mitral regurgitation.Mild atheroma noted in aortic arch/descending aorta. No left atrial or left atrial appendage thrombus  Echo 3/8/22: EF 50-55%, global lv sys fx mildly decreased, mod MR, mild TR, trace MN  Echo 5/28/21: mod MR, severe global lv sys dyxfx, mild TR, min MN  Office Echo 10/2/18: EF 50%, mild-mod MR, min AR, mild lv sys dysfx   LHC 2/21/18: PCI to LAD  NICOLETTE 2/13/18: EF 25%, severe MR, severe segmental lv sys dysfx, mild TR, mild pulm HTN     a/p   66M w/ HFrEF (EF 25%; 2021), CAD s/p stent (likely prev AWMI), CKD5 suspected to be FSGS pending kidney transplant, HTN, Afib on eliquis, PAD s/p LE stenting, enlarged prostate presents as transfer from [facility] for acute heart failure exacerbation    #Acute on Chronic Systolic HF  -clinically improved  -volume status improved  -cont lasix 80mg PO daily per renal/fluid removal with HD    -echo with improved LV fx, ef 50-55%, mod MR, mild TR, trace MN  -NICOLETTE with EF 40-45%  -c/w bb, hydral  -low dose ACE before D/C if ok with renal   -cardiac pyrophosphate scan wnl     #RUSSELL on CKD, new HD   -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   -renal f/u noted   -s/p non tunneled HD catheter on 3/15/22   -sp avf 3/21-- vascular fu   -sp tunnled catheter 3/23  -started on HD  --cv stable   -UE dopplers noted focal THROMBOSIS of the right cephalic vein at the  antecubital fossa.-- already on ac   -pt continues to bleed from catheter site, was told by IR yesterday he needed more stiches but it was not done. Pt is extremely frustrated. Please contact IR to address the issue ASAP    #THROMBOSIS of the right cephalic vein   -c/w a.c , vascular following    #Atrial Fibrillation/Flutter. S/P AF Ablation  -sp nicolette/ dccv 3/14   -remains in SR  -cont amiodarone 200 mg daily , Toprol  XL 25 mg daily  -c/w a/c eliquis    -eventual af ablation    #Coronary Artery Disease. S/P PCI to LAD  -stable without chest pain or dyspnea  -hsT peaked, likely demand ischemia in setting of CKD and acute HF   -continue toprol, statin, and asa 81mg daily    #Mitral Regurgitation  -continue to monitor, nicolette Tethered mitral valve leaflets with normal opening. Moderate-severe mitral regurgitation.  -mr should improved with maint of SR and decrease in lv size/improved lv sys fxn    #Hypertension  -Blood pressure controlled  -Continue current medications.     dcp once cleared by renal/ IR

## 2022-03-27 NOTE — CONSULT NOTE ADULT - SUBJECTIVE AND OBJECTIVE BOX
HPI:  This patient is a 67yo gentleman with PMH of HFrEF, CAD s/p stent, CKD stage 5 suspected 2/2 FSGS pending kidney transplant, htn, afib on eliquis, PAD s/p LE stenting, enlarged prostate who presented on 3/8/22 from [facility] for heart failure exacerbation.    The patient had kidney biopsy on 6/15/21 which reportedly identified secondary FSGS? TTE identified EF 40-45%, with mod-severe mitral regurg, moderate global LV systolic dysfunction. He underwent NICOLETTE on 3/14/22 and had synchronized DC cardioversion. The patient had temporary R IJ catheter placement on 3/15/22, which converted for permacath on 3/23/22, and he has been continued on hemodialysis. He also had diuresis with lasix, with improvement in volume status. He had LUE AV fistula created on 3/21/22.     The patient was found to be anemic in setting of ESRD. Tsat 16, with ferritin of 268. He was startedon venofer on 3/19 and continued for planned 5 doses total of 200mg.     He had vascular duplex US on 3/16 which identified focal thrombosis of R cephalic vein at the antecubital fossa.     He had been continued on anticoagulation with eliquis due to afib/aflutter. He had underwent ablation in the past, and is currently managed on amiodarone and toprol XL. He was also continued on aspirin, statin and toprol to manage CAD.      Hematology team was consulted due to patient having persistent bleeding from permacath site in setting of eliquis use since 3/25/22. Pressure dressing was applied.         PAST MEDICAL & SURGICAL HISTORY:  HTN - Hypertension  Arthritis  L arm and hands  CKD (chronic kidney disease)  Gout  CAD (coronary artery disease)  1 stent  HLD (hyperlipidemia)  Atrial fibrillation  CHF (congestive heart failure)  History of cardiomyopathy  LV dysfunction  Thalassemia minor    S/P Arthroscopic Surgery of Left Knee  2001 injury- hit knee on desk    History of Arthroscopy- ankle  left ankle 2003 s/p &quot;something fell on it&quot;    S/P angioplasty with stent   5/2014 2016    S/P hernia surgery    Status post cataract extraction  left eye done 10/18/2018    H/O cardiac radiofrequency ablation      FAMILY HISTORY:  Family history of hypertension (Father, Mother)    Family history of diabetes mellitus (Father, Mother)      Social History:  denies alcohol use (08 Mar 2022 23:56)      REVIEW OF SYSTEMS  CONSTITUTIONAL: No fever, no chills, no fatigue  EYES: No eye pain, no vision changes  ENMT:  No difficulty hearing, no throat pain  RESPIRATORY: No cough,  No shortness of breath  CARDIOVASCULAR: No chest pain, no palpitations  GASTROINTESTINAL: No abdominal pain, no nausea, no vomiting, no diarrhea, no constipation,  GENITOURINARY: No dysuria, no hematuria  NEUROLOGICAL: No numbness , no loss of strength  SKIN: No itching, no rashes,  HEME/LYMPH: No easy bruising, bleeding      >>> <<<>>> <<<  >>> <<<  Allergies  Allergies    No Known Allergies    Intolerances    Seafood (Other)      Medications  MEDICATIONS  (STANDING):  allopurinol 100 milliGRAM(s) Oral daily  aMIOdarone    Tablet 200 milliGRAM(s) Oral daily  amLODIPine   Tablet 10 milliGRAM(s) Oral daily  apixaban 2.5 milliGRAM(s) Oral two times a day  aspirin enteric coated 81 milliGRAM(s) Oral daily  chlorhexidine 4% Liquid 1 Application(s) Topical <User Schedule>  cholecalciferol 1000 Unit(s) Oral daily  ferrous    sulfate 325 milliGRAM(s) Oral daily  finasteride 5 milliGRAM(s) Oral daily  furosemide    Tablet 80 milliGRAM(s) Oral daily  isosorbide   dinitrate Tablet (ISORDIL) 10 milliGRAM(s) Oral three times a day  ondansetron Injectable 4 milliGRAM(s) IV Push once  polyethylene glycol 3350 17 Gram(s) Oral daily    MEDICATIONS  (PRN):  guaiFENesin Oral Liquid (Sugar-Free) 100 milliGRAM(s) Oral every 6 hours PRN Cough      PHYSICAL EXAM:  GENERAL: NAD, well-groomed  HEAD:  Atraumatic, Normocephalic  EYES: EOMI, PERRLA, conjunctiva and sclera clear  ENMT: No oropharyngeal exudates, Moist mucous membranes  NECK: Supple, no cervical lymphadenopathy  NERVOUS SYSTEM:  alert and conversant, moving all extremities spontaneously   CHEST/LUNG: Clear to auscultation bilaterally; no rhonchi  HEART: Regular rate and rhythm; No murmurs  ABDOMEN: Soft, Nontender, Nondistended  EXTREMITIES:  2+ radial Pulses, No cyanosis or edema  SKIN: warm, dry    LABS:                        10.0   12.60 )-----------( 234      ( 27 Mar 2022 06:19 )             31.8     03-27    137  |  97  |  48<H>  ----------------------------<  75  4.1   |  23  |  8.35<H>    Ca    9.3      27 Mar 2022 06:18  Phos  4.3     03-26  Mg     2.2     03-26      RADIOLOGY & ADDITIONAL STUDIES:  SEE FULL REPORT  < from: [facility] Duplex Upper Ext Vein Scan, Elana (03.16.22 @ 09:43) >  IMPRESSION: There is focal THROMBOSIS of the right cephalic vein at the   antecubital fossa.    The diameters of the right and left cephalic and basilic veins are   entered in the table above.    --- End of Report ---    < end of copied text >     HPI:  This patient is a 65yo gentleman with PMH of HFrEF, CAD s/p stent, CKD stage 5 suspected 2/2 FSGS pending kidney transplant, htn, afib on eliquis, PAD s/p LE stenting, enlarged prostate who presented on 3/8/22 from [facility] for heart failure exacerbation.    The patient had kidney biopsy on 6/15/21 which reportedly identified secondary FSGS? TTE identified EF 40-45%, with mod-severe mitral regurg, moderate global LV systolic dysfunction. He underwent NICOLETTE on 3/14/22 and had synchronized DC cardioversion. The patient had temporary R IJ catheter placement on 3/15/22, which converted for permacath on 3/23/22, and he has been continued on hemodialysis. He also had diuresis with lasix, with improvement in volume status. He had LUE AV fistula created on 3/21/22.     The patient was found to be anemic in setting of ESRD. Tsat 16, with ferritin of 268. He was startedon venofer on 3/19 and continued for planned 5 doses total of 200mg. He is continued on daily iron supplement 325mg PO qdaily.     He had vascular duplex US on 3/16 which identified focal thrombosis of R cephalic vein at the antecubital fossa.     He had been continued on anticoagulation with eliquis due to afib/aflutter. He had underwent ablation in the past, and is currently managed on amiodarone and toprol XL. He was also continued on aspirin, statin and toprol to manage CAD.      Hematology team was consulted due to patient having intermittent bleeding from permacath site in setting of eliquis use since 3/25/22. Pressure dressing was applied, but site continues to ooze. Dressings have been replaced. Primary team called IR, but no intervention performed yet.  Patient denies any history of bleeding disorders. He denies epistaxis, gingival bleeding, bleeding from AVF site, hematuria or visible blood in stool. He expresses frustration that his discharge has been delayed due to his bleeding. Aspiring dose was held today. Eliquis dose was held last night and this morning.     PAST MEDICAL & SURGICAL HISTORY:  HTN - Hypertension  Arthritis  L arm and hands  CKD (chronic kidney disease)  Gout  CAD (coronary artery disease)  1 stent  HLD (hyperlipidemia)  Atrial fibrillation  CHF (congestive heart failure)  History of cardiomyopathy  LV dysfunction  Thalassemia minor    S/P Arthroscopic Surgery of Left Knee  2001 injury- hit knee on desk    History of Arthroscopy- ankle  left ankle 2003 s/p &quot;something fell on it&quot;    S/P angioplasty with stent   5/2014 2016    S/P hernia surgery    Status post cataract extraction  left eye done 10/18/2018    H/O cardiac radiofrequency ablation      FAMILY HISTORY:  Family history of hypertension (Father, Mother)  Family history of diabetes mellitus (Father, Mother)      Social History:  denies alcohol use (08 Mar 2022 23:56)    REVIEW OF SYSTEMS  CONSTITUTIONAL: No fever, no chills, no fatigue  EYES: No eye pain, no vision changes  ENMT:  No difficulty hearing, no throat pain  RESPIRATORY: mild cough,  No shortness of breath  CARDIOVASCULAR: No chest pain, no palpitations  GASTROINTESTINAL: No abdominal pain, no nausea, no vomiting  GENITOURINARY: No dysuria, no hematuria  NEUROLOGICAL: No numbness , no loss of strength  SKIN: No itching, no rashes,  HEME/LYMPH: + bleeding from permacath site    >>> <<<>>> <<<  >>> <<<  Allergies  Allergies    No Known Allergies    Intolerances    Seafood (Other)      Medications  MEDICATIONS  (STANDING):  allopurinol 100 milliGRAM(s) Oral daily  aMIOdarone    Tablet 200 milliGRAM(s) Oral daily  amLODIPine   Tablet 10 milliGRAM(s) Oral daily  apixaban 2.5 milliGRAM(s) Oral two times a day  aspirin enteric coated 81 milliGRAM(s) Oral daily  chlorhexidine 4% Liquid 1 Application(s) Topical <User Schedule>  cholecalciferol 1000 Unit(s) Oral daily  ferrous    sulfate 325 milliGRAM(s) Oral daily  finasteride 5 milliGRAM(s) Oral daily  furosemide    Tablet 80 milliGRAM(s) Oral daily  isosorbide   dinitrate Tablet (ISORDIL) 10 milliGRAM(s) Oral three times a day  ondansetron Injectable 4 milliGRAM(s) IV Push once  polyethylene glycol 3350 17 Gram(s) Oral daily    MEDICATIONS  (PRN):  guaiFENesin Oral Liquid (Sugar-Free) 100 milliGRAM(s) Oral every 6 hours PRN Cough      PHYSICAL EXAM:  GENERAL: NAD, well-groomed  HEAD:  Atraumatic, Normocephalic  EYES: EOMI, PERRLA, conjunctiva and sclera clear  ENMT: No oropharyngeal exudates, Moist mucous membranes  NECK: Supple, no cervical lymphadenopathy  NERVOUS SYSTEM:  alert and conversant, moving all extremities spontaneously   CHEST/LUNG: mild cough, clear to auscultation bilaterally anteriorly, no wheezing  HEART: Regular rate and rhythm; No murmurs  ABDOMEN: Soft, Nontender, Nondistended  EXTREMITIES:  2+ radial Pulses, No cyanosis or edema  SKIN: warm, R sided permacath site covered in bandages, slowly oozing blood    LABS:                        10.0   12.60 )-----------( 234      ( 27 Mar 2022 06:19 )             31.8     03-27    137  |  97  |  48<H>  ----------------------------<  75  4.1   |  23  |  8.35<H>    Ca    9.3      27 Mar 2022 06:18  Phos  4.3     03-26  Mg     2.2     03-26      RADIOLOGY & ADDITIONAL STUDIES:  SEE FULL REPORT  < from: [facility] Duplex Upper Ext Vein Scan, Elana (03.16.22 @ 09:43) >  IMPRESSION: There is focal THROMBOSIS of the right cephalic vein at the   antecubital fossa.    The diameters of the right and left cephalic and basilic veins are   entered in the table above.    --- End of Report ---    < end of copied text >

## 2022-03-27 NOTE — PROVIDER CONTACT NOTE (OTHER) - ACTION/TREATMENT ORDERED:
dialysis nurse changed dressing; no active bleeding noted at this time will continue to monitor pt.
pressure dressing applied by SAGRARIO Ramirez. monitored for further bleeding.
Provider notified, heparin gtt re-ordered at therapeutic rate of 14ml/hr, draw next PTT 3/13 routine AM labs.
Provider notified, ordered EKG. Will continue to monitor pt.
eliquis held, IR consulted.

## 2022-03-27 NOTE — CONSULT NOTE ADULT - ATTENDING COMMENTS
Management of bleeding and anemia as above.     Vince Mccloud MD (Weekend Coverage) Management of bleeding and anemia as above.   Bleeding likely multifactorial:  catheter issues, renal failure and anticoagulation among them.     Vince Mccloud MD (Weekend Coverage)

## 2022-03-27 NOTE — CONSULT NOTE ADULT - REASON FOR ADMISSION
66M presenting as transfer from [facility] for acute heart failure
66M presenting as transfer from [facility] for acute heart failure
66 year old male presenting as transfer from [facility] for acute heart failure
66M presenting as transfer from [facility] for acute heart failure
66M presenting as transfer from [facility] for acute heart failure
66M presenting as transfer from [facility] for acute heart failure
66M presenting as transfer from [facility] for acute heart failure
66M presenting as transfer from [facility] for acute heart failure

## 2022-03-27 NOTE — PROVIDER CONTACT NOTE (OTHER) - REASON
Heparin gtt
bleeding from [facility]
Pt converts between AFIB and NSR
patient bleeding @ [facility] site
patient bleeding at [facility] site

## 2022-03-27 NOTE — PROVIDER CONTACT NOTE (OTHER) - ASSESSMENT
Pt is A&O x4, no complaints of SOB, dizziness or chest pain noted. Pt remains asymptomatic during time of event. VSS, see flowsheet.
a&ox4, VSS, no signs of distress noted. bleeding noted on gauze at [facility] site.
bleeding noted, no lightheadedness dizziness  or sob
patient a&ox4. vss. patient in no signs of distress, bleeding noted at [facility] site.
a&o x 4. VSS. No signs of bleeding

## 2022-03-27 NOTE — PROGRESS NOTE ADULT - PROBLEM SELECTOR PLAN 3
Patient with anemia in the setting of ESRD. Hemoglobin in target range.   Tsat 16 and ferritin 268.   Venofer started on 3/19 and needs 5 doses total of 200mg.  Now complete  Ongoing concern re catheter related bleeding- maybe if we can give ddavp 20mcg for now till heme and IR aby Mcdonough MD  Pager   Office     d.w with Med NP, Cards, IR and Heme

## 2022-03-27 NOTE — PROVIDER CONTACT NOTE (OTHER) - BACKGROUND
66M w/ HFrEF (EF 25%; 2021), CAD s/p stent (likely prev AWMI), CKD5 suspected to be FSGS pending kidney transplant, HTN, Afib on eliquis
Dx: systolic CHF, Hx: CKD, SOB, HTN, enlarged prostate,
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
Patient admitted for HF and fluid overload. HX of Afib, on eliquis at home, CKD, CHF, and CAD.
acute heart failure, afib on heparin, emmanuel pending transplant

## 2022-03-28 ENCOUNTER — TRANSCRIPTION ENCOUNTER (OUTPATIENT)
Age: 67
End: 2022-03-28

## 2022-03-28 VITALS
DIASTOLIC BLOOD PRESSURE: 84 MMHG | SYSTOLIC BLOOD PRESSURE: 171 MMHG | HEART RATE: 85 BPM | OXYGEN SATURATION: 99 % | TEMPERATURE: 98 F

## 2022-03-28 LAB
ALBUMIN SERPL ELPH-MCNC: 3.2 G/DL — LOW (ref 3.3–5)
ALP SERPL-CCNC: 112 U/L — SIGNIFICANT CHANGE UP (ref 40–120)
ALT FLD-CCNC: <5 U/L — LOW (ref 10–45)
ANION GAP SERPL CALC-SCNC: 19 MMOL/L — HIGH (ref 5–17)
ANISOCYTOSIS BLD QL: SLIGHT — SIGNIFICANT CHANGE UP
APTT 50/50 2HOUR INCUB: SIGNIFICANT CHANGE UP SEC (ref 27.5–36.5)
APTT BLD: 32.6 SEC — SIGNIFICANT CHANGE UP (ref 27.5–35.5)
APTT BLD: 32.7 SEC — SIGNIFICANT CHANGE UP (ref 27.5–36.5)
AST SERPL-CCNC: 10 U/L — SIGNIFICANT CHANGE UP (ref 10–40)
BASOPHILS # BLD AUTO: 0.1 K/UL — SIGNIFICANT CHANGE UP (ref 0–0.2)
BASOPHILS NFR BLD AUTO: 0.9 % — SIGNIFICANT CHANGE UP (ref 0–2)
BILIRUB SERPL-MCNC: 0.4 MG/DL — SIGNIFICANT CHANGE UP (ref 0.2–1.2)
BLD GP AB SCN SERPL QL: NEGATIVE — SIGNIFICANT CHANGE UP
BUN SERPL-MCNC: 63 MG/DL — HIGH (ref 7–23)
CALCIUM SERPL-MCNC: 9 MG/DL — SIGNIFICANT CHANGE UP (ref 8.4–10.5)
CHLORIDE SERPL-SCNC: 95 MMOL/L — LOW (ref 96–108)
CO2 SERPL-SCNC: 22 MMOL/L — SIGNIFICANT CHANGE UP (ref 22–31)
CREAT SERPL-MCNC: 9.74 MG/DL — HIGH (ref 0.5–1.3)
DACRYOCYTES BLD QL SMEAR: SLIGHT — SIGNIFICANT CHANGE UP
EGFR: 5 ML/MIN/1.73M2 — LOW
ELLIPTOCYTES BLD QL SMEAR: SIGNIFICANT CHANGE UP
EOSINOPHIL # BLD AUTO: 0.9 K/UL — HIGH (ref 0–0.5)
EOSINOPHIL NFR BLD AUTO: 7.9 % — HIGH (ref 0–6)
FIBRINOGEN PPP-MCNC: 638 MG/DL — HIGH (ref 330–520)
GIANT PLATELETS BLD QL SMEAR: PRESENT — SIGNIFICANT CHANGE UP
GLUCOSE SERPL-MCNC: 87 MG/DL — SIGNIFICANT CHANGE UP (ref 70–99)
HCT VFR BLD CALC: 29.3 % — LOW (ref 39–50)
HGB BLD-MCNC: 9.6 G/DL — LOW (ref 13–17)
INR BLD: 1.25 RATIO — HIGH (ref 0.88–1.16)
LYMPHOCYTES # BLD AUTO: 1 K/UL — SIGNIFICANT CHANGE UP (ref 1–3.3)
LYMPHOCYTES # BLD AUTO: 8.8 % — LOW (ref 13–44)
MACROCYTES BLD QL: SLIGHT — SIGNIFICANT CHANGE UP
MAGNESIUM SERPL-MCNC: 2.3 MG/DL — SIGNIFICANT CHANGE UP (ref 1.6–2.6)
MANUAL SMEAR VERIFICATION: SIGNIFICANT CHANGE UP
MCHC RBC-ENTMCNC: 19.8 PG — LOW (ref 27–34)
MCHC RBC-ENTMCNC: 32.8 GM/DL — SIGNIFICANT CHANGE UP (ref 32–36)
MCV RBC AUTO: 60.5 FL — LOW (ref 80–100)
MONOCYTES # BLD AUTO: 0.79 K/UL — SIGNIFICANT CHANGE UP (ref 0–0.9)
MONOCYTES NFR BLD AUTO: 7 % — SIGNIFICANT CHANGE UP (ref 2–14)
NEUTROPHILS # BLD AUTO: 8.56 K/UL — HIGH (ref 1.8–7.4)
NEUTROPHILS NFR BLD AUTO: 75.4 % — SIGNIFICANT CHANGE UP (ref 43–77)
OVALOCYTES BLD QL SMEAR: SIGNIFICANT CHANGE UP
PAT CTL 2H: SIGNIFICANT CHANGE UP SEC (ref 27.5–36.5)
PLAT MORPH BLD: NORMAL — SIGNIFICANT CHANGE UP
PLATELET # BLD AUTO: 239 K/UL — SIGNIFICANT CHANGE UP (ref 150–400)
POIKILOCYTOSIS BLD QL AUTO: SLIGHT — SIGNIFICANT CHANGE UP
POTASSIUM SERPL-MCNC: 4.1 MMOL/L — SIGNIFICANT CHANGE UP (ref 3.5–5.3)
POTASSIUM SERPL-SCNC: 4.1 MMOL/L — SIGNIFICANT CHANGE UP (ref 3.5–5.3)
PROT SERPL-MCNC: 7 G/DL — SIGNIFICANT CHANGE UP (ref 6–8.3)
PROTHROM AB SERPL-ACNC: 14.5 SEC — HIGH (ref 10.5–13.4)
PT 100%: 14.8 SEC — HIGH (ref 10.5–13.4)
PT 50/50: 13.4 SEC — SIGNIFICANT CHANGE UP (ref 10.5–14.5)
RBC # BLD: 4.84 M/UL — SIGNIFICANT CHANGE UP (ref 4.2–5.8)
RBC # FLD: 16.4 % — HIGH (ref 10.3–14.5)
RBC BLD AUTO: ABNORMAL
RH IG SCN BLD-IMP: POSITIVE — SIGNIFICANT CHANGE UP
SARS-COV-2 RNA SPEC QL NAA+PROBE: SIGNIFICANT CHANGE UP
SODIUM SERPL-SCNC: 136 MMOL/L — SIGNIFICANT CHANGE UP (ref 135–145)
TARGETS BLD QL SMEAR: SIGNIFICANT CHANGE UP
WBC # BLD: 11.35 K/UL — HIGH (ref 3.8–10.5)
WBC # FLD AUTO: 11.35 K/UL — HIGH (ref 3.8–10.5)

## 2022-03-28 PROCEDURE — 82803 BLOOD GASES ANY COMBINATION: CPT

## 2022-03-28 PROCEDURE — 84540 ASSAY OF URINE/UREA-N: CPT

## 2022-03-28 PROCEDURE — 82550 ASSAY OF CK (CPK): CPT

## 2022-03-28 PROCEDURE — 78830 RP LOCLZJ TUM SPECT W/CT 1: CPT

## 2022-03-28 PROCEDURE — 86901 BLOOD TYPING SEROLOGIC RH(D): CPT

## 2022-03-28 PROCEDURE — 83880 ASSAY OF NATRIURETIC PEPTIDE: CPT

## 2022-03-28 PROCEDURE — 77001 FLUOROGUIDE FOR VEIN DEVICE: CPT

## 2022-03-28 PROCEDURE — 85610 PROTHROMBIN TIME: CPT

## 2022-03-28 PROCEDURE — U0003: CPT

## 2022-03-28 PROCEDURE — C1752: CPT

## 2022-03-28 PROCEDURE — 84300 ASSAY OF URINE SODIUM: CPT

## 2022-03-28 PROCEDURE — 0225U NFCT DS DNA&RNA 21 SARSCOV2: CPT

## 2022-03-28 PROCEDURE — 83970 ASSAY OF PARATHORMONE: CPT

## 2022-03-28 PROCEDURE — 82728 ASSAY OF FERRITIN: CPT

## 2022-03-28 PROCEDURE — 85730 THROMBOPLASTIN TIME PARTIAL: CPT

## 2022-03-28 PROCEDURE — C1750: CPT

## 2022-03-28 PROCEDURE — 36558 INSERT TUNNELED CV CATH: CPT

## 2022-03-28 PROCEDURE — 85611 PROTHROMBIN TEST: CPT

## 2022-03-28 PROCEDURE — 85670 THROMBIN TIME PLASMA: CPT

## 2022-03-28 PROCEDURE — 83735 ASSAY OF MAGNESIUM: CPT

## 2022-03-28 PROCEDURE — 90935 HEMODIALYSIS ONE EVALUATION: CPT | Mod: GC

## 2022-03-28 PROCEDURE — 87340 HEPATITIS B SURFACE AG IA: CPT

## 2022-03-28 PROCEDURE — 83540 ASSAY OF IRON: CPT

## 2022-03-28 PROCEDURE — 92960 CARDIOVERSION ELECTRIC EXT: CPT

## 2022-03-28 PROCEDURE — 94640 AIRWAY INHALATION TREATMENT: CPT

## 2022-03-28 PROCEDURE — 85027 COMPLETE CBC AUTOMATED: CPT

## 2022-03-28 PROCEDURE — 86850 RBC ANTIBODY SCREEN: CPT

## 2022-03-28 PROCEDURE — C1894: CPT

## 2022-03-28 PROCEDURE — 82310 ASSAY OF CALCIUM: CPT

## 2022-03-28 PROCEDURE — 80053 COMPREHEN METABOLIC PANEL: CPT

## 2022-03-28 PROCEDURE — 83550 IRON BINDING TEST: CPT

## 2022-03-28 PROCEDURE — C1769: CPT

## 2022-03-28 PROCEDURE — 80048 BASIC METABOLIC PNL TOTAL CA: CPT

## 2022-03-28 PROCEDURE — U0005: CPT

## 2022-03-28 PROCEDURE — 84100 ASSAY OF PHOSPHORUS: CPT

## 2022-03-28 PROCEDURE — A9538: CPT

## 2022-03-28 PROCEDURE — 36556 INSERT NON-TUNNEL CV CATH: CPT

## 2022-03-28 PROCEDURE — 86706 HEP B SURFACE ANTIBODY: CPT

## 2022-03-28 PROCEDURE — 84560 ASSAY OF URINE/URIC ACID: CPT

## 2022-03-28 PROCEDURE — 93970 EXTREMITY STUDY: CPT

## 2022-03-28 PROCEDURE — 76937 US GUIDE VASCULAR ACCESS: CPT

## 2022-03-28 PROCEDURE — C1889: CPT

## 2022-03-28 PROCEDURE — 36415 COLL VENOUS BLD VENIPUNCTURE: CPT

## 2022-03-28 PROCEDURE — 83605 ASSAY OF LACTIC ACID: CPT

## 2022-03-28 PROCEDURE — 84484 ASSAY OF TROPONIN QUANT: CPT

## 2022-03-28 PROCEDURE — 83935 ASSAY OF URINE OSMOLALITY: CPT

## 2022-03-28 PROCEDURE — 85025 COMPLETE CBC W/AUTO DIFF WBC: CPT

## 2022-03-28 PROCEDURE — 93320 DOPPLER ECHO COMPLETE: CPT

## 2022-03-28 PROCEDURE — 82306 VITAMIN D 25 HYDROXY: CPT

## 2022-03-28 PROCEDURE — 86900 BLOOD TYPING SEROLOGIC ABO: CPT

## 2022-03-28 PROCEDURE — 93312 ECHO TRANSESOPHAGEAL: CPT

## 2022-03-28 PROCEDURE — 82570 ASSAY OF URINE CREATININE: CPT

## 2022-03-28 PROCEDURE — 82553 CREATINE MB FRACTION: CPT

## 2022-03-28 PROCEDURE — 93005 ELECTROCARDIOGRAM TRACING: CPT

## 2022-03-28 PROCEDURE — 85384 FIBRINOGEN ACTIVITY: CPT

## 2022-03-28 PROCEDURE — 71250 CT THORAX DX C-: CPT

## 2022-03-28 PROCEDURE — 81001 URINALYSIS AUTO W/SCOPE: CPT

## 2022-03-28 PROCEDURE — 93325 DOPPLER ECHO COLOR FLOW MAPG: CPT

## 2022-03-28 PROCEDURE — 84156 ASSAY OF PROTEIN URINE: CPT

## 2022-03-28 PROCEDURE — 82652 VIT D 1 25-DIHYDROXY: CPT

## 2022-03-28 PROCEDURE — 86803 HEPATITIS C AB TEST: CPT

## 2022-03-28 PROCEDURE — 85732 THROMBOPLASTIN TIME PARTIAL: CPT

## 2022-03-28 PROCEDURE — 76377 3D RENDER W/INTRP POSTPROCES: CPT

## 2022-03-28 PROCEDURE — 99261: CPT

## 2022-03-28 RX ORDER — HYDRALAZINE HCL 50 MG
1 TABLET ORAL
Qty: 0 | Refills: 0 | DISCHARGE

## 2022-03-28 RX ORDER — APIXABAN 2.5 MG/1
1 TABLET, FILM COATED ORAL
Qty: 60 | Refills: 0
Start: 2022-03-28 | End: 2022-04-26

## 2022-03-28 RX ORDER — APIXABAN 2.5 MG/1
2.5 TABLET, FILM COATED ORAL
Refills: 0 | Status: DISCONTINUED | OUTPATIENT
Start: 2022-03-28 | End: 2022-03-28

## 2022-03-28 RX ADMIN — Medication 1000 UNIT(S): at 12:14

## 2022-03-28 RX ADMIN — AMIODARONE HYDROCHLORIDE 200 MILLIGRAM(S): 400 TABLET ORAL at 06:09

## 2022-03-28 RX ADMIN — ISOSORBIDE DINITRATE 10 MILLIGRAM(S): 5 TABLET ORAL at 06:09

## 2022-03-28 RX ADMIN — AMLODIPINE BESYLATE 10 MILLIGRAM(S): 2.5 TABLET ORAL at 06:12

## 2022-03-28 RX ADMIN — CHLORHEXIDINE GLUCONATE 1 APPLICATION(S): 213 SOLUTION TOPICAL at 06:21

## 2022-03-28 RX ADMIN — Medication 81 MILLIGRAM(S): at 12:14

## 2022-03-28 RX ADMIN — Medication 100 MILLIGRAM(S): at 12:14

## 2022-03-28 RX ADMIN — ISOSORBIDE DINITRATE 10 MILLIGRAM(S): 5 TABLET ORAL at 12:14

## 2022-03-28 RX ADMIN — Medication 325 MILLIGRAM(S): at 12:14

## 2022-03-28 RX ADMIN — APIXABAN 2.5 MILLIGRAM(S): 2.5 TABLET, FILM COATED ORAL at 06:10

## 2022-03-28 RX ADMIN — Medication 80 MILLIGRAM(S): at 06:09

## 2022-03-28 NOTE — PROGRESS NOTE ADULT - SUBJECTIVE AND OBJECTIVE BOX
CARDIOLOGY FOLLOW UP - Dr. Lee  DATE OF SERVICE: 3/28/22     CC no cp or sob \  no bleeding to HD site       REVIEW OF SYSTEMS:  CONSTITUTIONAL: No fever, weight loss, or fatigue  RESPIRATORY: No cough, wheezing, chills or hemoptysis; No Shortness of Breath  CARDIOVASCULAR: No chest pain, palpitations, passing out, dizziness, or leg swelling  GASTROINTESTINAL: No abdominal or epigastric pain. No nausea, vomiting, or hematemesis; No diarrhea or constipation. No melena or hematochezia.  VASCULAR: No edema     PHYSICAL EXAM:  T(C): 36.5 (03-28-22 @ 11:15), Max: 36.8 (03-27-22 @ 19:54)  HR: 82 (03-28-22 @ 12:05) (75 - 82)  BP: 154/88 (03-28-22 @ 12:05) (125/82 - 154/88)  RR: 18 (03-28-22 @ 11:15) (17 - 18)  SpO2: 100% (03-28-22 @ 11:15) (97% - 100%)  Wt(kg): --  I&O's Summary    27 Mar 2022 07:01  -  28 Mar 2022 07:00  --------------------------------------------------------  IN: 400 mL / OUT: 500 mL / NET: -100 mL    28 Mar 2022 07:01  -  28 Mar 2022 13:28  --------------------------------------------------------  IN: 0 mL / OUT: 1000 mL / NET: -1000 mL        Appearance: Normal	  Cardiovascular: Normal S1 S2,RRR, No JVD, No murmurs  Respiratory: Lungs clear to auscultation	  Gastrointestinal:  Soft, Non-tender, + BS	  Extremities: Normal range of motion, No clubbing, cyanosis or edema      Home Medications:  allopurinol 100 mg oral tablet: 1 tab(s) orally once a day (09 Mar 2022 02:11)  amiodarone 200 mg oral tablet: 1 tab(s) orally once a day (09 Mar 2022 02:11)  amLODIPine 10 mg oral tablet: 1 tab(s) orally once a day (09 Mar 2022 02:11)  Aspirin Enteric Coated 81 mg oral delayed release tablet: 1 tab(s) orally once a day (09 Mar 2022 02:11)  Farxiga 5 mg oral tablet: 1 tab(s) orally once a day (09 Mar 2022 02:11)  ferrous sulfate 325 mg (65 mg elemental iron) oral tablet: 1 tab(s) orally once a day (09 Mar 2022 02:11)  finasteride 5 mg oral tablet: 1 tab(s) orally once a day (09 Mar 2022 02:11)  furosemide 80 mg oral tablet: 1 tab(s) orally once a day (26 Mar 2022 10:23)  guaiFENesin 100 mg/5 mL oral liquid: 5 milliliter(s) orally every 6 hours, As needed, Cough (26 Mar 2022 10:23)  hydrALAZINE 25 mg oral tablet: 1 tab(s) orally once a day (09 Mar 2022 02:11)  isosorbide dinitrate 10 mg oral tablet: 1 tab(s) orally 3 times a day (09 Mar 2022 02:11)  metoprolol succinate 25 mg oral tablet, extended release: 1 tab(s) orally once a day (09 Mar 2022 02:11)  pantoprazole 40 mg oral delayed release tablet: 1 tab(s) orally once a day (09 Mar 2022 02:11)  polyethylene glycol 3350 oral powder for reconstitution: 17 gram(s) orally once a day (26 Mar 2022 10:23)  simvastatin 40 mg oral tablet: 1 tab(s) orally once a day (at bedtime) (09 Mar 2022 02:11)  Vitamin D3 25 mcg (1000 intl units) oral capsule: 1 cap(s) orally once a day (09 Mar 2022 02:11)      MEDICATIONS  (STANDING):  allopurinol 100 milliGRAM(s) Oral daily  aMIOdarone    Tablet 200 milliGRAM(s) Oral daily  amLODIPine   Tablet 10 milliGRAM(s) Oral daily  apixaban 2.5 milliGRAM(s) Oral two times a day  aspirin enteric coated 81 milliGRAM(s) Oral daily  chlorhexidine 4% Liquid 1 Application(s) Topical <User Schedule>  cholecalciferol 1000 Unit(s) Oral daily  ferrous    sulfate 325 milliGRAM(s) Oral daily  finasteride 5 milliGRAM(s) Oral daily  furosemide    Tablet 80 milliGRAM(s) Oral daily  isosorbide   dinitrate Tablet (ISORDIL) 10 milliGRAM(s) Oral three times a day  ondansetron Injectable 4 milliGRAM(s) IV Push once  polyethylene glycol 3350 17 Gram(s) Oral daily      TELEMETRY: off tele 	    ECG:  	  RADIOLOGY:   DIAGNOSTIC TESTING:  [ ] Echocardiogram:  [ ]  Catheterization:  [ ] Stress Test:    OTHER: 	    LABS:	 	                            9.6    11.35 )-----------( 239      ( 28 Mar 2022 06:02 )             29.3     03-28    136  |  95<L>  |  63<H>  ----------------------------<  87  4.1   |  22  |  9.74<H>    Ca    9.0      28 Mar 2022 06:02  Mg     2.3     03-28    TPro  7.0  /  Alb  3.2<L>  /  TBili  0.4  /  DBili  x   /  AST  10  /  ALT  <5<L>  /  AlkPhos  112  03-28    PT/INR - ( 28 Mar 2022 06:02 )   PT: 14.5 sec;   INR: 1.25 ratio         PTT - ( 28 Mar 2022 06:02 )  PTT:32.6 sec         complains of pain/discomfort

## 2022-03-28 NOTE — PROGRESS NOTE ADULT - ATTENDING COMMENTS
I have seen this patient with the fellow and agree with their assessment and plan. I was physically present for significant portions of the evaluation and management (E/M) service provided.  I agree with the above history, physical, and plan which I have reviewed and edited where appropriate.    Seen on HD  Tolerating well  No bleeding  Dc planning     Omari Mcdonough MD  Pager   Office     Contact me directly via [employer] Teams     (After 5 pm or on weekends please page the on-call fellow/attending, can check [email or web address] for schedule. Login is anne ramesh, schedule under [facility] medicine, psych, derm)

## 2022-03-28 NOTE — PROGRESS NOTE ADULT - PROBLEM SELECTOR PLAN 2
likely cardiac in origin - SOB improved with diuresis  -Also with viral URI  -SOB resolved
likely cardiac in origin - SOB improved with diuresis  -Also with viral URI  -SOB, cough resolved
Pt. with hyperphosphatemia in setting of advanced CKD now ESRD. Serum phosphorus above target range. Sevelamer 800 mg TID with meals. On PO Vit D. Low phosphorus diet. Monitor serum phosphorus.
likely cardiac in origin - SOB improved with diuresis  -Also with viral URI  -SOB resolved
likely cardiac in origin - SOB improved with diuresis  -Also with viral URI  -SOB resolved   -Pending NICOLETTE
likely cardiac in origin - SOB improved with diuresis  -Also with viral URI  -SOB, cough resolved
likely cardiac in origin - SOB improved with diuresis  -Also with viral URI  -SOB resolved
Pt. with hyperphosphatemia in setting of advanced CKD now ESRD. Serum phosphorus above target range. Sevelamer 800 mg TID with meals. On PO Vit D. Low phosphorus diet. Monitor serum phosphorus.
Pt. with hyperphosphatemia in setting of advanced CKD now ESRD. Serum phosphorus above target range. Sevelamer 800 mg TID with meals. On PO Vit D. Low phosphorus diet. Monitor serum phosphorus.
Pt. with hyperphosphatemia in setting of advanced CKD now ESRD. Serum phosphorus above target range. Start Sevelamer 800 mg TID with meals. On PO Vit D. Low phosphorus diet. Monitor serum phosphorus.
likely cardiac in origin - SOB improved with diuresis  -Also with viral URI  -SOB resolved
likely cardiac in origin - SOB improved with diuresis  -Also with viral URI  -SOB resolved
Pt. with hyperphosphatemia in setting of advanced CKD now ESRD. Serum phosphorus above target range. Sevelamer 800 mg TID with meals. On PO Vit D. Low phosphorus diet. Monitor serum phosphorus.
Pt. with hyperphosphatemia in setting of advanced CKD now ESRD. Serum phosphorus above target range. Start Sevelamer 800 mg TID with meals. On PO Vit D. Low phosphorus diet. Monitor serum phosphorus.
likely cardiac in origin - SOB improved with diuresis  -Also with viral URI
Pt. with hyperphosphatemia in setting of advanced CKD now ESRD. Serum phosphorus above target range. Sevelamer 800 mg TID with meals. On PO Vit D. Low phosphorus diet. Monitor serum phosphorus.
Pt. with hyperphosphatemia in setting of advanced CKD now ESRD. Serum phosphorus in acceptable range. Low phosphorus diet. Monitor serum phosphorus.
Pt. with hyperphosphatemia in setting of advanced CKD now ESRD. Serum phosphorus levels above goal. Check intact pth and vit D. Low phosphorus diet. Monitor serum phosphorus.
Pt. with hyperphosphatemia in setting of advanced CKD now ESRD. Serum phosphorus in acceptable range. Low phosphorus diet. Monitor serum phosphorus.
likely cardiac in origin - SOB improved with diuresis  -Also with viral URI  -SOB resolved
likely cardiac in origin - SOB improved with diuresis  -Also with viral URI  -SOB, cough resolved
Likely due to CKD and CHF, has peaked so no need to trend further
likely cardiac in origin - SOB improved with diuresis  -Also with viral URI  -SOB resolved

## 2022-03-28 NOTE — CHART NOTE - NSCHARTNOTEFT_GEN_A_CORE
HEMATOLOGY FELLOW NOTE    Hematology was initially consulted for oozing near permacath site and concern for possible coagulopathy. Per primary team, bleeding has now stopped. PT/INR elevated (likely nutritional deficiency) and Fibrinogen >600, making DIC less likely.    As bleeding is now controlled and Hgb is stable, hematology will sign off. Please page or call with questions.     Jennifer Emanuel MD  Hematology/Oncology Fellow, PGY-5  Pager: [phone]  After 5pm and on weekends please page on-call fellow

## 2022-03-28 NOTE — PROGRESS NOTE ADULT - SUBJECTIVE AND OBJECTIVE BOX
[facility] DIVISION OF KIDNEY DISEASES AND HYPERTENSION -- FOLLOW UP NOTE  --------------------------------------------------------------------------------  Chief Complaint: ESRD on HD    24 hour events/subjective:    Pt. seen today during HD. Bleeding from HD catheter site stopped after IR intervention.   Pt tolerating HD.     PAST HISTORY  --------------------------------------------------------------------------------  No significant changes to PMH, PSH, FHx, SHx, unless otherwise noted    ALLERGIES & MEDICATIONS  --------------------------------------------------------------------------------  Allergies    No Known Allergies    Intolerances    Seafood (Other)    Standing Inpatient Medications  allopurinol 100 milliGRAM(s) Oral daily  aMIOdarone    Tablet 200 milliGRAM(s) Oral daily  amLODIPine   Tablet 10 milliGRAM(s) Oral daily  apixaban 2.5 milliGRAM(s) Oral two times a day  aspirin enteric coated 81 milliGRAM(s) Oral daily  chlorhexidine 4% Liquid 1 Application(s) Topical <User Schedule>  cholecalciferol 1000 Unit(s) Oral daily  ferrous    sulfate 325 milliGRAM(s) Oral daily  finasteride 5 milliGRAM(s) Oral daily  furosemide    Tablet 80 milliGRAM(s) Oral daily  isosorbide   dinitrate Tablet (ISORDIL) 10 milliGRAM(s) Oral three times a day  ondansetron Injectable 4 milliGRAM(s) IV Push once  polyethylene glycol 3350 17 Gram(s) Oral daily    PRN Inpatient Medications  guaiFENesin Oral Liquid (Sugar-Free) 100 milliGRAM(s) Oral every 6 hours PRN      REVIEW OF SYSTEMS  --------------------------------------------------------------------------------  Gen: No fevers/chills  Skin: No rashes  Head/Eyes/Ears: Normal hearing,   Respiratory: No dyspnea, cough  CV: No chest pain  GI: No abdominal pain, diarrhea  : No dysuria, hematuria  MSK: No  edema  Heme: No easy bruising or bleeding    All other systems were reviewed and are negative, except as noted.    VITALS/PHYSICAL EXAM  --------------------------------------------------------------------------------  T(C): 36.5 (03-28-22 @ 11:15), Max: 36.8 (03-27-22 @ 19:54)  HR: 82 (03-28-22 @ 12:05) (75 - 82)  BP: 154/88 (03-28-22 @ 12:05) (125/82 - 154/88)  RR: 18 (03-28-22 @ 11:15) (17 - 18)  SpO2: 100% (03-28-22 @ 11:15) (97% - 100%)  Wt(kg): --      03-27-22 @ 07:01  -  03-28-22 @ 07:00  --------------------------------------------------------  IN: 400 mL / OUT: 500 mL / NET: -100 mL    03-28-22 @ 07:01 - 03-28-22 @ 12:13  --------------------------------------------------------  IN: 0 mL / OUT: 1000 mL / NET: -1000 mL      Physical Exam:  	Physical Exam:  	Gen: NAD, well-appearing  	HEENT: PERRL, supple neck, clear oropharynx  	Pulm: CTA B/L  	CV: RRR, S1S2; no rub  	Back: No spinal or CVA tenderness; no sacral edema  	Abd: +BS, soft, nontender/nondistended  	: No suprapubic tenderness  	Skin: Warm, without rashes  	Vascular access: R IJ tunnelled HD catheter, AVF +thrill    LABS/STUDIES  --------------------------------------------------------------------------------              9.6    11.35 >-----------<  239      [03-28-22 @ 06:02]              29.3     136  |  95  |  63  ----------------------------<  87      [03-28-22 @ 06:02]  4.1   |  22  |  9.74        Ca     9.0     [03-28-22 @ 06:02]      Mg     2.3     [03-28-22 @ 06:02]    Creatinine Trend:  SCr 9.74 [03-28 @ 06:02]  SCr 8.35 [03-27 @ 06:18]  SCr 6.45 [03-26 @ 06:44]  SCr 7.15 [03-25 @ 09:42]  SCr 5.12 [03-24 @ 15:03]    Urinalysis - [03-09-22 @ 06:37]      Color Light Yellow / Appearance Clear / SG 1.012 / pH 6.0      Gluc 500 mg/dL / Ketone Negative  / Bili Negative / Urobili Negative       Blood Small / Protein 300 mg/dL / Leuk Est Negative / Nitrite Negative      RBC 2 / WBC 3 / Hyaline 1 / Gran  / Sq Epi  / Non Sq Epi 1 / Bacteria Negative      HBsAb <3.0      [03-17-22 @ 00:04]  HBsAb Nonreact      [03-17-22 @ 00:04]  HBsAg Nonreact      [03-17-22 @ 00:04]  HCV 0.09, Nonreact      [03-17-22 @ 00:04]

## 2022-03-28 NOTE — PROGRESS NOTE ADULT - PROBLEM SELECTOR PLAN 4
-Per cards  -Cardiac pyrophosphate scan WNL
-Per cards  -F/u amyloid w/u.
-Per cards  -Cardiac pyrophosphate scan WNL
-Per cards  -F/u amyloid w/u.
-Per cards  -Cardiac pyrophosphate scan WNL
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 The patient is reportedly pending kidney transplant  urine studies ordered  - may require HD given presentation and therefore type and screen ordered and eliquis will be held for now (dosed 3/8/22 at [facility] at 18:28), placed on heparin drip

## 2022-03-28 NOTE — PROGRESS NOTE ADULT - REASON FOR ADMISSION
66M presenting as transfer from [facility] for acute heart failure
66M presenting as transfer from [facility] for acute heart failure
66M presenting as transfer from [facility] for acute heart failure
66M presenting as transfer from [facility] for acute heart failure
66M presenting as transfer from [facility] for acute heart failure
66M presenting as transfer from [facility] for acute heart failure
66M presenting as transfer from [facility] for acute heart failure
66M presenting as transfer from [facility] for acute heart failure
66M presenting as transfer from [facility] for acute heart failure
66M presenting as transfer from [facility] for acute heart failure
66M presenting as transfer from [facility] for acute heart failure
66M presenting as transfer from [facility] for acute heart failure
66M presenting as transfer from [facility] for acute heart failure
66M presenting as transfer from [facility] for acute heart failure
66M presenting as transfer from [facility] for acute heart failure
66M presenting as transfer from [facility] for acute heart failure
66M presenting as transfer from [facility] for acute heart failure
66M presenting as transfer from [facility] for acute heart failure
66M presenting as transfer from [facility] for acute heart failure
66M presenting as transfer from [facility] for acute heart failure
66M presenting as transfer from [facility] for acute heart failure
66M presenting as transfer from [facility] for acute heart failure
66M presenting as transfer from [facility] for acute heart failure
66M presenting as transfer from [facility] for acute heart failure
66M presenting as transfer from [facility] for acute heart failure
66M presenting as transfer from [facility] for acute heart failure
66M presenting as transfer from [facility] for acute heart failure
66M presenting as transfer from [facility] for acute heart failure
66M presenting as transfer from [facility] for acute heart failure
66M presenting as transfer from [facility] for acute heart failure
66M presenting as transfer from [facility] for acute heart failure
66M presenting as transfer from [facility] for acute heart failure
66M presenting as transfer from [facility] for acute heart failure
66M presenting as transfer from [facility] for acute heart failure
66M presenting as transfer from [facility] for acute heart failure
66M presenting as transfer from [facility] for acute heart failure
66M presenting as transfer from [facility] for acute heart failure
66M presenting as transfer from [facility] for acute heart failure
66M presenting as transfer from [facility] for acute heart failure
66M presenting as transfer from [facility] for acute heart failure
66M presenting as transfer from [facility] for acute heart failure
66M presenting as transfer from [facility] for acute heart failure
66M presenting as transfer from [facility] for acute heart failure
66M presenting as transfer from [facility] for acute heart failure
66M presenting as transfer from [facility] for acute heart failure
66M presenting as transfer from [facility] for acute heart failure
66M presenting as transfer from [facility] for acute heart failure
66M presenting as transfer from [facility] for acute heart failure
66M presenting as transfer from [facility] for acute heart failure
66M presenting as transfer from [facility] for acute heart failure
66M presenting as transfer from [facility] for acute heart failure
66M presenting as transfer from [facility] for acute heart failure
66M presenting as transfer from [facility] for acute heart failure
66M presenting as transfer from [facility] for acute heart failure

## 2022-03-28 NOTE — PROGRESS NOTE ADULT - PROBLEM SELECTOR PROBLEM 3
Anemia
Sherry 45 Transitions Follow Up Call    2022    Patient: Sun Duran  Patient : 1960   MRN: <[id number]>  Reason for Admission: closed nondisplaced fracture of second cervical vertebra, initial encounte  Discharge Date: 22 RARS: Readmission Risk Score: 10.9 ( )         Spoke with: SUBSEQUENT CALL. No answer. Left HIPPA compliant message to return call to this writer. Care Transitions Subsequent and Final Call    Subsequent and Final Calls  Care Transitions Interventions  Other Interventions:            Follow Up  Future Appointments   Date Time Provider Indira Florez   2022  3:00 PM YOLANDA Bourne NP [facility]   2022  2:45 PM Deangelo Pearson MD [facility]   2022  1:30 PM Margarita Drew, [address] [place] [address] Care Transitions Nurse   [phone]
Pulmonary nodule
Anemia
Anemia
Pulmonary nodule
Anemia
Pulmonary nodule
Anemia
Pulmonary nodule
Anemia
Pulmonary nodule
Anemia
Pulmonary nodule
Acute respiratory failure with hypoxia

## 2022-03-28 NOTE — PROGRESS NOTE ADULT - PROBLEM SELECTOR PROBLEM 5
Atrial fibrillation and flutter
Chronic atrial fibrillation
Stage 5 chronic kidney disease not on chronic dialysis
Atrial fibrillation and flutter
Stage 5 chronic kidney disease not on chronic dialysis
Atrial fibrillation and flutter

## 2022-03-28 NOTE — PROGRESS NOTE ADULT - ASSESSMENT
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 Presents as transfer from [facility] for acute heart failure exacerbation. The patient reports that he has been experiencing LIRA and SOB for 7-10 days which was progressive and now occurring at rest. Started on IV diuretics with improving respiratory status. Called to consult for diffuse bronchial wall thickening and impaction and 4mm pulmonary nodules seen on CT chest.

## 2022-03-28 NOTE — PROGRESS NOTE ADULT - TIME BILLING
Patient seen and examined, agree with the above assessment and plan by BRENDA Tate.  CV stable s/p successful DCCV  cont AC  cont lasix  renal followup
Agree with above NP note.  cv stable  cont current tx  cont cv meds  d/c planning
Agree with above NP note.  cv stable  cont current tx  events noted  dc/ plan for tye if stable
Agree with above NP note.  cv stable  s/p AVF  cont cv meds  cont bb, amio  oral A/C with low dose eliquis if no vasc CI  d/c planning
Agree with above NP note.  cv stable for d/c planning  cont current tx
Patient seen and examined, agree with the above assessment and plan by BRENDA Tate.  CV stable s/p successful DCCV  cont AC  cont lasix  renal planning on initiating HD  plan for permacath today
Patient seen and examined.  Agree with above NP note.  66M w/ HFrEF (EF 25%; 2021), CAD s/p stent (likely prev AWMI), CKD5 suspected to be FSGS pending kidney transplant, HTN, Afib on eliquis, PAD s/p LE stenting, transfer from [facility] to [place] for mgmt of an acute heart failure exacerbation, emmanuel  -clinically improving post iv diuresis  -continue iv lasix as ordered  -renal fxn stable  -cont bb, amio for AFl, cmp  -would repeat limited echo after further optimization to re-eval EF, prev EF 35%  -renal/eps f/u  -sbp stable, cont bb, hydral, isordil   -rates controlled  -continue A/C for now with iv hep  -cont asa for cad/pci   -cardiac amyloid w/u
Patient seen and examined.  Agree with above NP note.  cv stable s/p NEW HD  continue volume removal/diuretics per renal   s/p successful DCCV-->  continue a/c, now on hold for AVF  cont amio, bb  eventual ace/arb per renal if no renal CI for cmp  cont med tx of MR for now   if lv fxn improves/LV size decreases with SR, MR burden will decrease   cont asa for CAD/PCI  eventual af ablation, arrhythmia likely to recur again, each time recurs lv deteriorates
Patient seen and examined.  Agree with above NP note.  cv stable s/p NEW HD  continue volume removal/diuretics per renal   s/p successful DCCV-->  continue a/c, oral a/c now on hold for AVF  cont amio, bb  eventual ace/arb per renal if no renal CI for cmp  cont med tx of MR for now   if lv fxn improves/LV size decreases with SR, MR burden will decrease   cont asa for CAD/PCI  eventual af ablation, arrhythmia likely to recur again, each time recurs lv deteriorates  remains cv stable to proceed for AVF with acceptable cardiac risk
Patient seen and examined, agree with the above assessment and plan by BRENDA Tate.  CV stable s/p successful DCCV  cont AC  cont lasix  renal planning on initiating HD  plan for permacath today
Agree with above NP note.  cv stable  s/p AVF  cont cv meds  cont bb, amio  oral A/C with low dose eliquis if no vasc CI  d/c planning

## 2022-03-28 NOTE — PROGRESS NOTE ADULT - PROBLEM SELECTOR PLAN 3
Patient with anemia in the setting of ESRD.   Tsat 16 and ferritin 268.   Venofer started on 3/19 and needs 5 doses total of 200mg.  Now complete.

## 2022-03-28 NOTE — PROGRESS NOTE ADULT - ASSESSMENT
NICOLETTE 3/14/22: EF (Visual Estimate): 40-45 %  Moderate global left ventricular systolic dysfunction. Tethered mitral valve leaflets with normal opening. Moderate-severe mitral regurgitation.Mild atheroma noted in aortic arch/descending aorta. No left atrial or left atrial appendage thrombus  Echo 3/8/22: EF 50-55%, global lv sys fx mildly decreased, mod MR, mild TR, trace FL  Echo 5/28/21: mod MR, severe global lv sys dyxfx, mild TR, min FL  Office Echo 10/2/18: EF 50%, mild-mod MR, min AR, mild lv sys dysfx   LHC 2/21/18: PCI to LAD  NICOLETTE 2/13/18: EF 25%, severe MR, severe segmental lv sys dysfx, mild TR, mild pulm HTN     a/p   66M w/ HFrEF (EF 25%; 2021), CAD s/p stent (likely prev AWMI), CKD5 suspected to be FSGS pending kidney transplant, HTN, Afib on eliquis, PAD s/p LE stenting, enlarged prostate presents as transfer from [facility] for acute heart failure exacerbation    #Acute on Chronic Systolic HF  -clinically improved  -volume status improved  -cont lasix 80mg PO daily per renal/fluid removal with HD    -echo with improved LV fx, ef 50-55%, mod MR, mild TR, trace FL  -NICOLETTE with EF 40-45%  -c/w bb, hydral  -low dose ACE before D/C if ok with renal   -cardiac pyrophosphate scan wnl     #RUSSELL on CKD, new HD   -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   -s/p non tunneled HD catheter on 3/15/22   -sp avf 3/21-- vascular fu   -sp tunnled catheter 3/23  -started on HD  --cv stable   -UE dopplers noted focal THROMBOSIS of the right cephalic vein at the  antecubital fossa.-- already on ac   -IR f.u noted- ac restarted. HD site CDI /h/h stable-- cleared for dc per IR    #THROMBOSIS of the right cephalic vein   -c/w a.c , vascular following    #Atrial Fibrillation/Flutter. S/P AF Ablation  -sp nicolette/ dccv 3/14   -remains in SR  -cont amiodarone 200 mg daily , Toprol  XL 25 mg daily  -c/w a/c eliquis    -eventual af ablation    #Coronary Artery Disease. S/P PCI to LAD  -stable without chest pain or dyspnea  -hsT peaked, likely demand ischemia in setting of CKD and acute HF   -continue toprol, statin, and asa 81mg daily    #Mitral Regurgitation  -continue to monitor, nicolette Tethered mitral valve leaflets with normal opening. Moderate-severe mitral regurgitation.  -mr should improved with maint of SR and decrease in lv size/improved lv sys fxn    #Hypertension  -Blood pressure controlled  -Continue current medications.    DCP planning      NICOLETTE 3/14/22: EF (Visual Estimate): 40-45 %  Moderate global left ventricular systolic dysfunction. Tethered mitral valve leaflets with normal opening. Moderate-severe mitral regurgitation.Mild atheroma noted in aortic arch/descending aorta. 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 pending kidney transplant, HTN, Afib on eliquis, PAD s/p LE stenting, enlarged prostate presents as transfer from [facility] for acute heart failure exacerbation    #Acute on Chronic Systolic HF  -clinically improved  -volume status improved  -cont lasix 80mg PO daily per renal/fluid removal with HD    -echo with improved LV fx, ef 50-55%, mod MR, mild TR, trace CA  -NICOLETTE with EF 40-45%  -resume bb, c/w  hydral isordil  -low dose ACE before D/C if ok with renal   -cardiac pyrophosphate scan wnl     #RUSSELL on CKD, new HD   -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   -s/p non tunneled HD catheter on 3/15/22   -sp avf 3/21-- vascular fu   -sp tunnled catheter 3/23  -started on HD  --cv stable   -UE dopplers noted focal THROMBOSIS of the right cephalic vein at the  antecubital fossa.-- already on ac   -IR f.u noted- ac restarted. HD site CDI /h/h stable-- cleared for dc per IR    #THROMBOSIS of the right cephalic vein   -c/w a.c , vascular following    #Atrial Fibrillation/Flutter. S/P AF Ablation  -sp nicolette/ dccv 3/14   -remains in SR  -cont amiodarone 200 mg daily , resume  Toprol  XL 25 mg daily  -c/w a/c eliquis    -eventual af ablation    #Coronary Artery Disease. S/P PCI to LAD  -stable without chest pain or dyspnea  -hsT peaked, likely demand ischemia in setting of CKD and acute HF   -continue toprol, statin, and asa 81mg daily    #Mitral Regurgitation  -continue to monitor, nicolette Tethered mitral valve leaflets with normal opening. Moderate-severe mitral regurgitation.  -mr should improved with maint of SR and decrease in lv size/improved lv sys fxn    #Hypertension  -Blood pressure controlled  -Continue current medications.    DCP planning

## 2022-03-28 NOTE — PROGRESS NOTE ADULT - PROBLEM SELECTOR PLAN 1
RVP +entero/rhino virus  -CT chest read with diffuse bronchial wall thickening. None appreciated on our read - also reviewed with chest radiologist. +Mucous in airways.   -S/p Mucinex   -Cough resolved   -Duoneb q6h PRN   -Normoxic, keep sats >90% with supplemental o2 PRN  -Supportive care  -D/c planning per primary team

## 2022-03-28 NOTE — PROGRESS NOTE ADULT - PROBLEM SELECTOR PROBLEM 6
Stage 5 chronic kidney disease not on chronic dialysis
HTN (hypertension)
Stage 5 chronic kidney disease not on chronic dialysis

## 2022-03-28 NOTE — PROGRESS NOTE ADULT - PROBLEM SELECTOR PROBLEM 2
Hyperphosphatemia
Hyperphosphatemia
SOB (shortness of breath)
Hyperphosphatemia
SOB (shortness of breath)
SOB (shortness of breath)
Hyperphosphatemia
SOB (shortness of breath)
Hyperphosphatemia
Hyperphosphatemia
SOB (shortness of breath)
Hyperphosphatemia
SOB (shortness of breath)
Hyperphosphatemia
Hyperphosphatemia
SOB (shortness of breath)
Elevated troponin

## 2022-03-28 NOTE — PROGRESS NOTE ADULT - ASSESSMENT
Pt. with advanced CKD in setting of secondary FSGS, now on HD    {[id number]}

## 2022-03-28 NOTE — PROGRESS NOTE ADULT - PROBLEM SELECTOR PLAN 1
Pt. with advanced RUSSELL on CKD in setting of CHF and secondary FSGS.  SCr at ~1.7-1.9 (04/2019).  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 Last SCr was elevated at 3.25 on 6/8/21. Scr on admission elevated at 8.77 and in 9-10 range.     Pt underwent right IJ non tunelled HD catheter placement on 3/15/22. Pt. underwent HD 1st session on 3/16/22 and 2nd session on 3/17 and tolerated well and third on 3/19. Pt underwent LUE AVF placement on 3/21 and tolerated well.   Pt. underwent tunnelled HD catheter placement on 3/23.   Pt with bleeding from HD catheter site last 2 days now stopped.   Pt. seen today on HD, no bleeding from HD catheter site noted.+  Okay from renal stand point to be discharged.   Pt to resume HD tomorrow 9 AM at [facility] on 3/29/22. Regular HD schedule TTS.   I spoke with IR fellow- who will d/w with his attending and see patient today for ongoing bleed and need for suture  I asked primary team to call heme to see if we can use K-centra or reversal agents ( although hgb is stable)  Pt. to resume HD as per TTS schedule at [facility] but if here still, will have to re-adjust his dc plans accordingly.

## 2022-03-28 NOTE — PROGRESS NOTE ADULT - PROBLEM SELECTOR PROBLEM 4
Acute on chronic heart failure
Acute renal failure superimposed on stage 4 chronic kidney disease

## 2022-03-28 NOTE — PROGRESS NOTE ADULT - PROBLEM SELECTOR PLAN 6
-Now on HD as per renal  -S/p Shiley placement in IR, now with tunneled HD catheter  -S/p AVF creation 3/21
-Now on HD as per renal  -S/p Shiley placement in IR, plans for tunneled HD catheter  -Plans for AVF creation today
-Now on HD as per renal  -S/p Vibha placement in IR, plans for tunneled HD catheter
-Now on HD as per renal  -S/p Shiley placement in IR, now with tunneled HD catheter   -S/p AVF creation 3/21
-Now on HD as per renal  -S/p Shiley placement in IR, now with tunneled HD catheter  -S/p AVF creation 3/21
-Now on HD as per renal  -S/p Shiley placement in IR, plans for tunneled HD catheter today  -S/p AVF creation 3/21
-Per renal
-Per renal
-Now on HD as per renal  -S/p Shiley placement in IR, plans for tunneled HD catheter  -S/p AVF creation 3/21
-Now on HD as per renal  -S/p Shiley placement in IR, now with tunneled HD catheter  -S/p AVF creation 3/21
-Now on HD as per renal  -S/p Shiley placement in IR, plans for tunneled HD catheter  -Plans for AVF creation
-Now starting HD as per renal
c/w home antihypertensives

## 2022-03-28 NOTE — PROGRESS NOTE ADULT - PROBLEM SELECTOR PROBLEM 1
Acute kidney injury superimposed on CKD
Rhinovirus
Acute kidney injury superimposed on CKD
Acute kidney injury superimposed on CKD
Rhinovirus
Rhinovirus
Acute kidney injury superimposed on CKD
Rhinovirus
Acute kidney injury superimposed on CKD
Rhinovirus
Acute kidney injury superimposed on CKD
Acute kidney injury superimposed on CKD
Rhinovirus
Acute kidney injury superimposed on CKD
Rhinovirus
Acute kidney injury superimposed on CKD
Rhinovirus
Acute kidney injury superimposed on CKD
Acute kidney injury superimposed on CKD
Rhinovirus
Acute kidney injury superimposed on CKD
Rhinovirus
Acute on chronic heart failure

## 2022-03-28 NOTE — PROGRESS NOTE ADULT - SUBJECTIVE AND OBJECTIVE BOX
Follow-up Pulm Progress Note    Seen in HD   No new respiratory events overnight.  Denies SOB/CP.     Medications:  MEDICATIONS  (STANDING):  allopurinol 100 milliGRAM(s) Oral daily  aMIOdarone    Tablet 200 milliGRAM(s) Oral daily  amLODIPine   Tablet 10 milliGRAM(s) Oral daily  apixaban 2.5 milliGRAM(s) Oral two times a day  aspirin enteric coated 81 milliGRAM(s) Oral daily  chlorhexidine 4% Liquid 1 Application(s) Topical <User Schedule>  cholecalciferol 1000 Unit(s) Oral daily  ferrous    sulfate 325 milliGRAM(s) Oral daily  finasteride 5 milliGRAM(s) Oral daily  furosemide    Tablet 80 milliGRAM(s) Oral daily  isosorbide   dinitrate Tablet (ISORDIL) 10 milliGRAM(s) Oral three times a day  ondansetron Injectable 4 milliGRAM(s) IV Push once  polyethylene glycol 3350 17 Gram(s) Oral daily    MEDICATIONS  (PRN):  guaiFENesin Oral Liquid (Sugar-Free) 100 milliGRAM(s) Oral every 6 hours PRN Cough          Vital Signs Last 24 Hrs  T(C): 36.4 (28 Mar 2022 09:15), Max: 36.8 (27 Mar 2022 19:54)  T(F): 97.5 (28 Mar 2022 09:15), Max: 98.2 (27 Mar 2022 19:54)  HR: 77 (28 Mar 2022 09:15) (75 - 82)  BP: 143/81 (28 Mar 2022 09:15) (125/82 - 149/82)  BP(mean): --  RR: 17 (28 Mar 2022 09:15) (17 - 18)  SpO2: 100% (28 Mar 2022 09:15) (97% - 100%)          03-27 @ 07:01  -  03-28 @ 07:00  --------------------------------------------------------  IN: 400 mL / OUT: 500 mL / NET: -100 mL          LABS:                        9.6    11.35 )-----------( 239      ( 28 Mar 2022 06:02 )             29.3     03-28    136  |  95<L>  |  63<H>  ----------------------------<  87  4.1   |  22  |  9.74<H>    Ca    9.0      28 Mar 2022 06:02  Mg     2.3     03-28    TPro  7.0  /  Alb  3.2<L>  /  TBili  0.4  /  DBili  x   /  AST  10  /  ALT  <5<L>  /  AlkPhos  112  03-28            PT/INR - ( 28 Mar 2022 06:02 )   PT: 14.5 sec;   INR: 1.25 ratio         PTT - ( 28 Mar 2022 06:02 )  PTT:32.6 sec        Physical Examination:  PULM: Clear to auscultation bilaterally, no significant sputum production  CVS: RRR    RADIOLOGY REVIEWED    CT chest: < from: CT Chest No Cont (03.09.22 @ 16:32) >  FINDINGS:    Lungs/Airways/Pleura: The central airways are patent. No pleural   effusion. There is diffuse bronchial wall thickening with segmental and   subsegmental bronchial impaction at the bases. Few scattered sub-4 mm   pulmonary nodules include right apex series 3 image 29 and left apex   image 28. No consolidation or pulmonary edema.    Mediastinum/Lymph nodes: No thoracic adenopathy.    Heart and Vessels: Cardiomegaly. LAD stent. No pericardial effusion. The   pulmonary artery measures 3.2 cm. Adjacent mid ascending aorta measures   3.2 cm.    Upper Abdomen: Unremarkable.    Osseous structures and Soft Tissues: No aggressive bone lesions.    IMPRESSION:  Diffuse bronchial wall thickening and impaction.    Sub-4 mm pulmonary nodules; if patient is considered high risk for lung   cancer, consider follow-up low dose chest CT in 12 months. If low risk,   no further follow-up is needed as per current Fleischner guidelines.    --- End of Report ---        < end of copied text >

## 2022-03-28 NOTE — PROGRESS NOTE ADULT - PROBLEM SELECTOR PLAN 5
-Per renal
S/p successful NICOLETTE/DCCV 3/14
-Plans for NICOLETTE/DCCV today
S/p successful NICOLETTE/DCCV 3/14
S/p successful NICOLETTE/DCCV 3/14
-Per renal
S/p successful NICOLETTE/DCCV 3/14
hold eliquis for now as noted above-switched to heparin drip  c/w metoprolol and amiodarone home dosing  cardiology consulted as noted above

## 2022-03-28 NOTE — PROGRESS NOTE ADULT - PROVIDER SPECIALTY LIST ADULT
Cardiology
Nephrology
Vascular Surgery
Vascular Surgery
Cardiology
Vascular Surgery
Cardiology
Electrophysiology
Electrophysiology
Nephrology
Nephrology
Cardiology
Electrophysiology
Pulmonology
Intervent Radiology
Nephrology
Pulmonology
Pulmonology
Nephrology
Pulmonology
Pulmonology
Nephrology
Pulmonology
Hospitalist
Pulmonology

## 2022-03-29 ENCOUNTER — NON-APPOINTMENT (OUTPATIENT)
Age: 67
End: 2022-03-29

## 2022-03-29 LAB
APTT BLD: 32.7 SEC — SIGNIFICANT CHANGE UP (ref 27.5–35.5)
FIBRINOGEN PPP-MCNC: 616 MG/DL — HIGH (ref 330–520)
THROMBIN TIME: 20.5 SEC — SIGNIFICANT CHANGE UP (ref 16–25)

## 2022-03-30 NOTE — CHART NOTE - NSCHARTNOTEFT_GEN_A_CORE
Left 1 message(s) for patient in regards to follow up care with callback information. [phone] IS OUT OF SERVICE.

## 2022-04-07 ENCOUNTER — APPOINTMENT (OUTPATIENT)
Dept: INTERNAL MEDICINE | Facility: CLINIC | Age: 67
End: 2022-04-07
Payer: COMMERCIAL

## 2022-04-07 DIAGNOSIS — I51.9 HEART DISEASE, UNSPECIFIED: ICD-10-CM

## 2022-04-07 DIAGNOSIS — I25.10 ATHEROSCLEROTIC HEART DISEASE OF NATIVE CORONARY ARTERY W/OUT ANGINA PECTORIS: ICD-10-CM

## 2022-04-07 DIAGNOSIS — N17.9 ACUTE KIDNEY FAILURE, UNSPECIFIED: ICD-10-CM

## 2022-04-07 DIAGNOSIS — Z87.898 PERSONAL HISTORY OF OTHER SPECIFIED CONDITIONS: ICD-10-CM

## 2022-04-07 DIAGNOSIS — I48.0 PAROXYSMAL ATRIAL FIBRILLATION: ICD-10-CM

## 2022-04-07 PROCEDURE — 99213 OFFICE O/P EST LOW 20 MIN: CPT

## 2022-04-08 VITALS — SYSTOLIC BLOOD PRESSURE: 86 MMHG | DIASTOLIC BLOOD PRESSURE: 20 MMHG | HEART RATE: 60 BPM | RESPIRATION RATE: 14 BRPM

## 2022-04-08 PROBLEM — I48.0 PAROXYSMAL ATRIAL FIBRILLATION: Noted: 2022-02-23

## 2022-04-08 PROBLEM — I51.9 SYSTOLIC DYSFUNCTION: Status: ACTIVE | Noted: 2018-06-07

## 2022-04-08 PROBLEM — N17.9 AKI (ACUTE KIDNEY INJURY): Status: RESOLVED | Noted: 2021-06-09 | Resolved: 2022-04-08

## 2022-04-08 RX ORDER — AMLODIPINE BESYLATE 10 MG/1
10 TABLET ORAL
Qty: 90 | Refills: 3 | Status: DISCONTINUED | COMMUNITY
Start: 2021-06-30 | End: 2022-04-08

## 2022-04-08 RX ORDER — HYDRALAZINE HYDROCHLORIDE 25 MG/1
25 TABLET ORAL
Qty: 90 | Refills: 3 | Status: DISCONTINUED | COMMUNITY
Start: 2021-08-11 | End: 2022-04-08

## 2022-04-08 NOTE — PHYSICAL EXAM
[Normal] : no respiratory distress, lungs were clear to auscultation bilaterally and no accessory muscle use [Normal Rate] : normal rate  [Regular Rhythm] : with a regular rhythm [de-identified] : slight edema

## 2022-04-08 NOTE — HISTORY OF PRESENT ILLNESS
[de-identified] : Presents after recent hospitalization.  \par HAd developed acute CHF due to atrial fibrillation and fluid retention due to ESRD.\par Underwent an ablation and is now in NSR.  \par Also now on HD via  a port.  On list for kidney transplant. \par \par Feels well. No chest pain, palpitations, LIRA, orthopnea, PND, lightheadedness.

## 2022-04-13 ENCOUNTER — APPOINTMENT (OUTPATIENT)
Dept: NEPHROLOGY | Facility: CLINIC | Age: 67
End: 2022-04-13

## 2022-04-13 ENCOUNTER — APPOINTMENT (OUTPATIENT)
Dept: ELECTROPHYSIOLOGY | Facility: CLINIC | Age: 67
End: 2022-04-13

## 2022-04-13 NOTE — CONSULT NOTE ADULT - TIME BILLING
Spine program-    Pt was contacted for Spine referral and [facility] intake was completed.  Pt was scheduled with Shruti Burciaga PA-C on 4/15/22 at 10:00 (9:45 arrival).    
Patient seen and examined.  Agree with above NP note.  Patient is a 64 year old man well known to my office practice with history of Hypertension, Coronary Artery Disease, S/P PCI to LAD (5/2014, [facility], [facility]), S/P PCI to LAD 2/21/2018, History of Severe Mixed Cardiomyopathy, Atrial Fibrillation/Flutter, S/P NICOLETTE/DCCV 1/2016, S/P Ablation 2/2016, PADS/P Lower Extremity Stenting, Mitral Regurgitation , CKD stage 3, presents with acute rise in serum Cr from outpatient nephrology clinic.    RUSSELL on CKD  -Cr noted  -acei, eliqius, hctz remain on hold  -renal f/u and workup in progress    Coronary Artery Disease. S/P PCI to LAD  -stable without chest pain or dyspnea  -continue toprol, statin, and asa 81mg daily    Atrial Fibrillation/Flutter. S/P AF Ablation  -remains in sinus rhythm on amiodarone and metoprolol  -Eliquis on hold for now given russell and possible renal bx     Chronic Systolic Heart Failure/Cardiomyopathy  -last echo revealed improvement in LV function with sinus rhythm  -Continue beta blocker  -acei held for russell  -vol status stable - no diuretic need     Mitral Regurgitation  -Most recent echo revealed improvement in mitral regurgitation with improvement in LV function  -Continue to monitor    Hypertension  -bp elevated now that he is off ace/hctz  -cont bb  -if sbp remains elevated can start Norvasc 5mg and inc as needed     dvt ppx

## 2022-04-21 ENCOUNTER — APPOINTMENT (OUTPATIENT)
Dept: VASCULAR SURGERY | Facility: CLINIC | Age: 67
End: 2022-04-21
Payer: COMMERCIAL

## 2022-04-21 PROCEDURE — 99024 POSTOP FOLLOW-UP VISIT: CPT

## 2022-04-21 PROCEDURE — 93990 DOPPLER FLOW TESTING: CPT

## 2022-04-26 ENCOUNTER — RX RENEWAL (OUTPATIENT)
Age: 67
End: 2022-04-26

## 2022-04-26 NOTE — PHYSICAL EXAM
[2+] : left 2+ [Alert] : alert [Calm] : calm [JVD] : no jugular venous distention  [de-identified] : appears well [de-identified] : intact

## 2022-04-26 NOTE — REASON FOR VISIT
[de-identified] : left upper extremity radial cephalic avf [de-identified] : pt doing well without any complaints at this time \par pt on hd via permcath

## 2022-04-26 NOTE — DISCUSSION/SUMMARY
[FreeTextEntry1] : 67 yo male with history of esrd on hd via permcath s/p left upper extremity avf \par \par duplex shows patent avf with flow rate of 867\par \par will continue to monitor pt to follow up in 1 month with repeat duplex

## 2022-04-27 ENCOUNTER — NON-APPOINTMENT (OUTPATIENT)
Age: 67
End: 2022-04-27

## 2022-04-27 ENCOUNTER — APPOINTMENT (OUTPATIENT)
Dept: OPHTHALMOLOGY | Facility: CLINIC | Age: 67
End: 2022-04-27
Payer: COMMERCIAL

## 2022-04-27 PROCEDURE — 92014 COMPRE OPH EXAM EST PT 1/>: CPT

## 2022-04-28 ENCOUNTER — APPOINTMENT (OUTPATIENT)
Dept: ORTHOPEDIC SURGERY | Facility: CLINIC | Age: 67
End: 2022-04-28

## 2022-05-03 ENCOUNTER — APPOINTMENT (OUTPATIENT)
Dept: ORTHOPEDIC SURGERY | Facility: HOSPITAL | Age: 67
End: 2022-05-03

## 2022-05-18 ENCOUNTER — APPOINTMENT (OUTPATIENT)
Dept: ORTHOPEDIC SURGERY | Facility: CLINIC | Age: 67
End: 2022-05-18

## 2022-05-23 RX ORDER — APIXABAN 2.5 MG/1
2.5 TABLET, FILM COATED ORAL
Qty: 120 | Refills: 5 | Status: DISCONTINUED | COMMUNITY
Start: 2018-03-28 | End: 2022-05-23

## 2022-05-24 ENCOUNTER — APPOINTMENT (OUTPATIENT)
Dept: VASCULAR SURGERY | Facility: CLINIC | Age: 67
End: 2022-05-24
Payer: COMMERCIAL

## 2022-05-24 PROCEDURE — 93990 DOPPLER FLOW TESTING: CPT

## 2022-05-24 PROCEDURE — 99024 POSTOP FOLLOW-UP VISIT: CPT

## 2022-05-24 NOTE — PHYSICAL EXAM
[JVD] : no jugular venous distention  [2+] : left 2+ [Alert] : alert [Calm] : calm [de-identified] : appears well [de-identified] : intact

## 2022-05-24 NOTE — REASON FOR VISIT
[de-identified] : left upper extremity radial cephalic avf [de-identified] : pt doing well without any complaints at this time \par pt on hd via permcath

## 2022-05-24 NOTE — DISCUSSION/SUMMARY
[FreeTextEntry1] : 65 yo male with history of esrd on hd via permcath s/p left upper extremity avf \par \par duplex shows patent avf with flow rate of 909 \par \par vein still small\par recommend maturation

## 2022-05-29 ENCOUNTER — RX RENEWAL (OUTPATIENT)
Age: 67
End: 2022-05-29

## 2022-06-01 ENCOUNTER — RESULT CHARGE (OUTPATIENT)
Age: 67
End: 2022-06-01

## 2022-06-01 ENCOUNTER — APPOINTMENT (OUTPATIENT)
Dept: INTERNAL MEDICINE | Facility: CLINIC | Age: 67
End: 2022-06-01
Payer: COMMERCIAL

## 2022-06-01 LAB
INR PPP: 1.3 RATIO
POCT-PROTHROMBIN TIME: 15.2 SECS
QUALITY CONTROL: YES

## 2022-06-01 PROCEDURE — 85610 PROTHROMBIN TIME: CPT | Mod: QW

## 2022-06-01 PROCEDURE — 93793 ANTICOAG MGMT PT WARFARIN: CPT

## 2022-06-10 ENCOUNTER — APPOINTMENT (OUTPATIENT)
Dept: ENDOVASCULAR SURGERY | Facility: CLINIC | Age: 67
End: 2022-06-10
Payer: COMMERCIAL

## 2022-06-10 DIAGNOSIS — Z11.59 ENCOUNTER FOR SCREENING FOR OTHER VIRAL DISEASES: ICD-10-CM

## 2022-06-13 ENCOUNTER — NON-APPOINTMENT (OUTPATIENT)
Age: 67
End: 2022-06-13

## 2022-06-17 ENCOUNTER — RESULT REVIEW (OUTPATIENT)
Age: 67
End: 2022-06-17

## 2022-06-17 ENCOUNTER — APPOINTMENT (OUTPATIENT)
Dept: ENDOVASCULAR SURGERY | Facility: CLINIC | Age: 67
End: 2022-06-17
Payer: COMMERCIAL

## 2022-06-17 VITALS
BODY MASS INDEX: 26.6 KG/M2 | SYSTOLIC BLOOD PRESSURE: 124 MMHG | HEART RATE: 63 BPM | HEIGHT: 71 IN | DIASTOLIC BLOOD PRESSURE: 73 MMHG | OXYGEN SATURATION: 100 % | RESPIRATION RATE: 16 BRPM | TEMPERATURE: 97.3 F | WEIGHT: 190 LBS

## 2022-06-17 PROCEDURE — 36902Z: CUSTOM | Mod: 58

## 2022-06-17 PROCEDURE — 36215Z: CUSTOM | Mod: 59,58

## 2022-06-17 RX ORDER — ALLOPURINOL 100 MG/1
100 TABLET ORAL
Qty: 90 | Refills: 3 | Status: DISCONTINUED | COMMUNITY
Start: 2021-12-17 | End: 2022-06-17

## 2022-06-17 RX ORDER — WARFARIN 5 MG/1
5 TABLET ORAL DAILY
Qty: 90 | Refills: 0 | Status: DISCONTINUED | COMMUNITY
Start: 2022-05-23 | End: 2022-06-17

## 2022-06-17 RX ORDER — ISOSORBIDE DINITRATE 10 MG/1
10 TABLET ORAL EVERY 8 HOURS
Refills: 0 | Status: DISCONTINUED | COMMUNITY
Start: 2021-08-11 | End: 2022-06-17

## 2022-06-22 ENCOUNTER — APPOINTMENT (OUTPATIENT)
Dept: ELECTROPHYSIOLOGY | Facility: CLINIC | Age: 67
End: 2022-06-22

## 2022-06-22 VITALS
HEART RATE: 48 BPM | DIASTOLIC BLOOD PRESSURE: 64 MMHG | OXYGEN SATURATION: 97 % | WEIGHT: 179 LBS | SYSTOLIC BLOOD PRESSURE: 108 MMHG | HEIGHT: 71 IN | BODY MASS INDEX: 25.06 KG/M2 | TEMPERATURE: 97.5 F

## 2022-06-22 PROCEDURE — 93000 ELECTROCARDIOGRAM COMPLETE: CPT

## 2022-06-22 PROCEDURE — 99214 OFFICE O/P EST MOD 30 MIN: CPT

## 2022-06-22 RX ORDER — LIDOCAINE AND PRILOCAINE 25; 25 MG/G; MG/G
2.5-2.5 CREAM TOPICAL
Qty: 3 | Refills: 3 | Status: ACTIVE | COMMUNITY
Start: 2022-06-22 | End: 1900-01-01

## 2022-07-01 NOTE — HISTORY OF PRESENT ILLNESS
[FreeTextEntry1] : Patient is a 66-year-old man who is seen in follow-up evaluation regarding his recurrent atrial fibs/flutter at times slow ventricular response.   He is currently on amiodarone 200 mg/day. He had previous AF ablation done February 2016. He had recurrent atrial fibrillation and was treated with amiodarone and had been on 100 mg/d\par \par He developed renal failure worsening requiring dialysis and he currently has a catheter left subclavian for dialysis and fistula placed.  He had significant shortness of breath and weakness but since dialysis he is felt better.\par \par \par He has a prior history of coronary disease and status post PCI to the LAD 2014.  He has a prior history of PAD and status post lower extremity stenting.  He has had a  previous cardiomyopathy with ejection fraction 40% and moderate mitral regurgitation.  Patient previously had severe left atrial enlargement as well.  His left atrial diameter is greater than 5.0 cm.\par \par He has a history of hypertension and CKD stage III.  His renal function is worsened and a transplant is being considered.  He now on dialysis.\par \par He has had a prior history of rectal bleeding.  This was due to hemorrhoids and he had a hemorrhoidectomy.  He has had no recurrence of bleeding.  Patient continues on aspirin as well as Eliquis.

## 2022-07-01 NOTE — PHYSICAL EXAM
[No Acute Distress] : no acute distress [Normal Conjunctiva] : normal conjunctiva [Normal Venous Pressure] : normal venous pressure [Normal S1, S2] : normal S1, S2 [No Rub] : no rub [Clear Lung Fields] : clear lung fields [Non Tender] : non-tender [No Edema] : no edema [No Cyanosis] : no cyanosis [No Clubbing] : no clubbing [No Focal Deficits] : no focal deficits [Alert and Oriented] : alert and oriented [de-identified] : irregular; 2/6 systolic murmur left lower sternal border [de-identified] : Limping gait [de-identified] : Dialysis catheter right subclavian

## 2022-07-01 NOTE — REVIEW OF SYSTEMS
[Feeling Fatigued] : feeling fatigued [Blurry Vision] : blurred vision [Sore Throat] : no sore throat [SOB] : shortness of breath [Dyspnea on exertion] : dyspnea during exertion [Chest Discomfort] : no chest discomfort [Lower Ext Edema] : no extremity edema [Palpitations] : no palpitations [Orthopnea] : no orthopnea [Syncope] : no syncope [Cough] : no cough [Rash] : no rash [Dizziness] : no dizziness [Memory Lapses Or Loss] : no memory lapses or loss [Under Stress] : not under stress [Easy Bleeding] : no tendency for easy bleeding

## 2022-07-01 NOTE — DISCUSSION/SUMMARY
[FreeTextEntry1] : Patient is a 68-year-old man who has had prior catheter ablation of A. fib and did well until he had recurrent A. fib and has been on amiodarone he continues to have A. fib/flutter with slow ventricular response and is symptomatic.  He may benefit from restoration of sinus rhythm.  Patient does have enlarged left atrium in terms of diameter as well as volume and also has moderate mitral regurgitation.  The likelihood that he would maintain sinus rhythm is reduced given these findings.  We discussed restoration of sinus rhythm versus rate control and anticoagulation and felt that he would be better off in sinus rhythm.  We discussed the options and since he has failed amiodarone he should be considered for catheter ablation procedure.  Procedure was discussed in detail including the risk benefits success rate, recurrence rate and potential complications.  The patient understands and would like to undergo the procedure.  Also explained to the has had bradycardia and may require pacing.\par \par He expressed understanding and would like to have the procedure done once the dialysis catheter is removed from the right subclavian.\par \par We will recommend implantation of an implantable loop monitor after the ablation procedure.  He is on Xarelto 15 g and will hold the day prior to procedure and get a NICOLETTE prior to the ablation.\par \par

## 2022-07-11 DIAGNOSIS — Z01.818 ENCOUNTER FOR OTHER PREPROCEDURAL EXAMINATION: ICD-10-CM

## 2022-07-20 ENCOUNTER — APPOINTMENT (OUTPATIENT)
Dept: OPHTHALMOLOGY | Facility: CLINIC | Age: 67
End: 2022-07-20

## 2022-07-20 ENCOUNTER — NON-APPOINTMENT (OUTPATIENT)
Age: 67
End: 2022-07-20

## 2022-07-20 PROCEDURE — 92012 INTRM OPH EXAM EST PATIENT: CPT

## 2022-07-20 PROCEDURE — 92015 DETERMINE REFRACTIVE STATE: CPT

## 2022-07-25 ENCOUNTER — RX RENEWAL (OUTPATIENT)
Age: 67
End: 2022-07-25

## 2022-07-27 ENCOUNTER — APPOINTMENT (OUTPATIENT)
Dept: NEPHROLOGY | Facility: CLINIC | Age: 67
End: 2022-07-27

## 2022-07-27 VITALS
HEART RATE: 61 BPM | SYSTOLIC BLOOD PRESSURE: 125 MMHG | OXYGEN SATURATION: 99 % | TEMPERATURE: 97.6 F | DIASTOLIC BLOOD PRESSURE: 81 MMHG | HEIGHT: 71 IN | BODY MASS INDEX: 24.38 KG/M2 | WEIGHT: 174.16 LBS

## 2022-07-27 PROCEDURE — 99213 OFFICE O/P EST LOW 20 MIN: CPT

## 2022-07-27 RX ORDER — TORSEMIDE 20 MG/1
20 TABLET ORAL DAILY
Qty: 180 | Refills: 0 | Status: DISCONTINUED | COMMUNITY
Start: 2022-04-26 | End: 2022-07-27

## 2022-07-28 NOTE — HISTORY OF PRESENT ILLNESS
[FreeTextEntry1] : he has been on HD for Cardiorenal syndrome.\par he wants to go over medications, vascular access, transplant issues \par 1. his vascular access has been an issue as fistula needs maturation and cannulation if intermittently unsuccessful as the segment is short and the veins are small. I spoke with Dr. Hung. please see below. \par 2. he has donors awaiting to be called to the [facility]., he will provide me with their names so i could communicate their names to the transplant team \par 3- he is asking if he should continue with oral iron and Farxiga

## 2022-07-28 NOTE — ASSESSMENT
[FreeTextEntry1] : ESRD on HD with multiple issues as above \par 1. vascular access. I called Dr. Hung who will take him this week for BAM\par \par 2. will communicate to transplant Kusum Huffman is interested in Donor. [phone]\par 3. he was advised to continue with Farxiga as it is proven to improve outcome in CAD \par 4. he was advised to stop Po iron. will notify dialysis unit of such\par otherwise he will visit [facility] and decide if he wants to transfer

## 2022-07-28 NOTE — PHYSICAL EXAM
[General Appearance - Alert] : alert [General Appearance - In No Acute Distress] : in no acute distress [Extraocular Movements] : extraocular movements were intact [Jugular Venous Distention Increased] : there was no jugular-venous distention [Respiration, Rhythm And Depth] : normal respiratory rhythm and effort [Auscultation Breath Sounds / Voice Sounds] : lungs were clear to auscultation bilaterally [Heart Rate And Rhythm] : heart rate was normal and rhythm regular [Heart Sounds] : normal S1 and S2 [Edema] : there was no peripheral edema [Abdomen Soft] : soft [___ (cm) Fistula] : [unfilled] (cm) fistula [Bruit] : a bruit was present [Thrill] : a thrill was present [No Focal Deficits] : no focal deficits [Oriented To Time, Place, And Person] : oriented to person, place, and time [FreeTextEntry1] : deep and short

## 2022-07-29 ENCOUNTER — NON-APPOINTMENT (OUTPATIENT)
Age: 67
End: 2022-07-29

## 2022-08-04 ENCOUNTER — OUTPATIENT (OUTPATIENT)
Dept: OUTPATIENT SERVICES | Facility: HOSPITAL | Age: 67
LOS: 1 days | End: 2022-08-04
Payer: COMMERCIAL

## 2022-08-04 VITALS
WEIGHT: 172.84 LBS | HEART RATE: 62 BPM | RESPIRATION RATE: 18 BRPM | HEIGHT: 71 IN | SYSTOLIC BLOOD PRESSURE: 111 MMHG | DIASTOLIC BLOOD PRESSURE: 70 MMHG | TEMPERATURE: 97 F | OXYGEN SATURATION: 97 %

## 2022-08-04 DIAGNOSIS — Z98.49 CATARACT EXTRACTION STATUS, UNSPECIFIED EYE: Chronic | ICD-10-CM

## 2022-08-04 DIAGNOSIS — E11.9 TYPE 2 DIABETES MELLITUS WITHOUT COMPLICATIONS: ICD-10-CM

## 2022-08-04 DIAGNOSIS — Z29.9 ENCOUNTER FOR PROPHYLACTIC MEASURES, UNSPECIFIED: ICD-10-CM

## 2022-08-04 DIAGNOSIS — N18.4 CHRONIC KIDNEY DISEASE, STAGE 4 (SEVERE): ICD-10-CM

## 2022-08-04 DIAGNOSIS — Z98.890 OTHER SPECIFIED POSTPROCEDURAL STATES: Chronic | ICD-10-CM

## 2022-08-04 DIAGNOSIS — Z01.818 ENCOUNTER FOR OTHER PREPROCEDURAL EXAMINATION: ICD-10-CM

## 2022-08-04 DIAGNOSIS — I10 ESSENTIAL (PRIMARY) HYPERTENSION: ICD-10-CM

## 2022-08-04 DIAGNOSIS — Z95.9 PRESENCE OF CARDIAC AND VASCULAR IMPLANT AND GRAFT, UNSPECIFIED: Chronic | ICD-10-CM

## 2022-08-04 DIAGNOSIS — I48.19 OTHER PERSISTENT ATRIAL FIBRILLATION: ICD-10-CM

## 2022-08-04 LAB
ANION GAP SERPL CALC-SCNC: 16 MMOL/L — SIGNIFICANT CHANGE UP (ref 5–17)
ANISOCYTOSIS BLD QL: SLIGHT — SIGNIFICANT CHANGE UP
APTT BLD: 38.2 SEC — HIGH (ref 27.5–35.5)
BASOPHILS # BLD AUTO: 0.08 K/UL — SIGNIFICANT CHANGE UP (ref 0–0.2)
BASOPHILS NFR BLD AUTO: 1 % — SIGNIFICANT CHANGE UP (ref 0–2)
BLD GP AB SCN SERPL QL: SIGNIFICANT CHANGE UP
BUN SERPL-MCNC: 32.1 MG/DL — HIGH (ref 8–20)
CALCIUM SERPL-MCNC: 9.3 MG/DL — SIGNIFICANT CHANGE UP (ref 8.4–10.5)
CHLORIDE SERPL-SCNC: 91 MMOL/L — LOW (ref 98–107)
CO2 SERPL-SCNC: 29 MMOL/L — SIGNIFICANT CHANGE UP (ref 22–29)
CREAT SERPL-MCNC: 6.13 MG/DL — HIGH (ref 0.5–1.3)
DACRYOCYTES BLD QL SMEAR: SLIGHT — SIGNIFICANT CHANGE UP
EGFR: 9 ML/MIN/1.73M2 — LOW
EOSINOPHIL # BLD AUTO: 0.27 K/UL — SIGNIFICANT CHANGE UP (ref 0–0.5)
EOSINOPHIL NFR BLD AUTO: 3.5 % — SIGNIFICANT CHANGE UP (ref 0–6)
GLUCOSE SERPL-MCNC: 142 MG/DL — HIGH (ref 70–99)
HCT VFR BLD CALC: 39.7 % — SIGNIFICANT CHANGE UP (ref 39–50)
HGB BLD-MCNC: 12.9 G/DL — LOW (ref 13–17)
IMM GRANULOCYTES NFR BLD AUTO: 0.4 % — SIGNIFICANT CHANGE UP (ref 0–1.5)
INR BLD: 1.36 RATIO — HIGH (ref 0.88–1.16)
LYMPHOCYTES # BLD AUTO: 1.33 K/UL — SIGNIFICANT CHANGE UP (ref 1–3.3)
LYMPHOCYTES # BLD AUTO: 17.1 % — SIGNIFICANT CHANGE UP (ref 13–44)
MACROCYTES BLD QL: SLIGHT — SIGNIFICANT CHANGE UP
MAGNESIUM SERPL-MCNC: 2.2 MG/DL — SIGNIFICANT CHANGE UP (ref 1.6–2.6)
MANUAL SMEAR VERIFICATION: SIGNIFICANT CHANGE UP
MCHC RBC-ENTMCNC: 21.4 PG — LOW (ref 27–34)
MCHC RBC-ENTMCNC: 32.5 GM/DL — SIGNIFICANT CHANGE UP (ref 32–36)
MCV RBC AUTO: 65.9 FL — LOW (ref 80–100)
MICROCYTES BLD QL: SLIGHT — SIGNIFICANT CHANGE UP
MONOCYTES # BLD AUTO: 0.87 K/UL — SIGNIFICANT CHANGE UP (ref 0–0.9)
MONOCYTES NFR BLD AUTO: 11.2 % — SIGNIFICANT CHANGE UP (ref 2–14)
NEUTROPHILS # BLD AUTO: 5.22 K/UL — SIGNIFICANT CHANGE UP (ref 1.8–7.4)
NEUTROPHILS NFR BLD AUTO: 66.8 % — SIGNIFICANT CHANGE UP (ref 43–77)
OVALOCYTES BLD QL SMEAR: SLIGHT — SIGNIFICANT CHANGE UP
PLAT MORPH BLD: NORMAL — SIGNIFICANT CHANGE UP
PLATELET # BLD AUTO: 226 K/UL — SIGNIFICANT CHANGE UP (ref 150–400)
POIKILOCYTOSIS BLD QL AUTO: SLIGHT — SIGNIFICANT CHANGE UP
POLYCHROMASIA BLD QL SMEAR: SLIGHT — SIGNIFICANT CHANGE UP
POTASSIUM SERPL-MCNC: 3.2 MMOL/L — LOW (ref 3.5–5.3)
POTASSIUM SERPL-SCNC: 3.2 MMOL/L — LOW (ref 3.5–5.3)
PROTHROM AB SERPL-ACNC: 15.8 SEC — HIGH (ref 10.5–13.4)
RBC # BLD: 6.02 M/UL — HIGH (ref 4.2–5.8)
RBC # FLD: 17.6 % — HIGH (ref 10.3–14.5)
RBC BLD AUTO: ABNORMAL
SODIUM SERPL-SCNC: 136 MMOL/L — SIGNIFICANT CHANGE UP (ref 135–145)
TARGETS BLD QL SMEAR: SLIGHT — SIGNIFICANT CHANGE UP
WBC # BLD: 7.8 K/UL — SIGNIFICANT CHANGE UP (ref 3.8–10.5)
WBC # FLD AUTO: 7.8 K/UL — SIGNIFICANT CHANGE UP (ref 3.8–10.5)

## 2022-08-04 PROCEDURE — 93010 ELECTROCARDIOGRAM REPORT: CPT

## 2022-08-04 PROCEDURE — 93005 ELECTROCARDIOGRAM TRACING: CPT

## 2022-08-04 RX ORDER — METOPROLOL TARTRATE 50 MG
1 TABLET ORAL
Qty: 0 | Refills: 0 | DISCHARGE

## 2022-08-04 RX ORDER — ALLOPURINOL 300 MG
1 TABLET ORAL
Qty: 0 | Refills: 0 | DISCHARGE

## 2022-08-04 RX ORDER — AMLODIPINE BESYLATE 2.5 MG/1
1 TABLET ORAL
Qty: 0 | Refills: 0 | DISCHARGE

## 2022-08-04 RX ORDER — ISOSORBIDE DINITRATE 5 MG/1
1 TABLET ORAL
Qty: 0 | Refills: 0 | DISCHARGE

## 2022-08-04 NOTE — H&P PST ADULT - NSICDXFAMILYHX_GEN_ALL_CORE_FT
FAMILY HISTORY:  Mother  Still living? Unknown  Family history of diabetes mellitus, Age at diagnosis: Age Unknown  Family history of hypertension, Age at diagnosis: Age Unknown FAMILY HISTORY:  Father  Still living? Unknown  Family history of diabetes mellitus, Age at diagnosis: Age Unknown  Family history of hypertension, Age at diagnosis: Age Unknown    Mother  Still living? Unknown  Family history of diabetes mellitus, Age at diagnosis: Age Unknown  Family history of hypertension, Age at diagnosis: Age Unknown

## 2022-08-04 NOTE — H&P PST ADULT - MUSCULOSKELETAL
negative arthritic deformities of bl fingers/ROM intact/normal gait/strength 5/5 bilateral upper extremities/strength 5/5 bilateral lower extremities

## 2022-08-04 NOTE — H&P PST ADULT - NSICDXPASTMEDICALHX_GEN_ALL_CORE_FT
PAST MEDICAL HISTORY:  Acid reflux     Arthritis L arm and hands    Atrial fibrillation     CAD (coronary artery disease) 1 stent    CHF (congestive heart failure)     Childhood Asthma     CKD (chronic kidney disease)     Gout     History of cardiomyopathy LV dysfunction    History of cardioversion     HLD (hyperlipidemia)     HTN - Hypertension     Inguinal Hernia left    Keloid multiple on chest and arms      Thalassemia minor PAST MEDICAL HISTORY:  Arthritis L arm and hands    Atrial fibrillation     AV fistula     CAD (coronary artery disease) 1 stent    CHF (congestive heart failure)     CKD (chronic kidney disease)     Gout     History of cardiomyopathy LV dysfunction    HLD (hyperlipidemia)     HTN - Hypertension     Thalassemia minor

## 2022-08-04 NOTE — H&P PST ADULT - OTHER CARE PROVIDERS
Dr. Pernell Love [phone], nephrologist Dr. Pernell Love [phone] Cardiologist, nephrologist Dr Abigail Camara

## 2022-08-04 NOTE — H&P PST ADULT - NS MD HP INPLANTS MED DEV
stents x2 stents x2, yaakov stents x2, shiley/Lens implant stents x2, dialysis catheter right chest/Lens implant

## 2022-08-04 NOTE — H&P PST ADULT - TRANSFUSION PREMEDICATION REQUIRED

## 2022-08-04 NOTE — H&P PST ADULT - ACTIVITY
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

## 2022-08-04 NOTE — H&P PST ADULT - EKG AND INTERPRETATION
Atrial flutter with variable AV block with premature ventricular or aberrantly conducted complexes, left axis deviation, nonspecific intraventriular block 61

## 2022-08-04 NOTE — H&P PST ADULT - HISTORY OF PRESENT ILLNESS
Patient is a 66-year-old man who is seen in follow-up evaluation regarding his recurrent atrial fibs/flutter at times slow ventricular response. He is currently on amiodarone 200 mg/day. He had previous AF ablation done February 2016. He had recurrent atrial fibrillation and was treated with amiodarone and had been on 100 mg/d    He developed renal failure worsening requiring dialysis and he currently has a catheter left subclavian for dialysis and fistula placed. He had significant shortness of breath and weakness but since dialysis he is felt better.      He has a prior history of coronary disease and status post PCI to the LAD 2014. He has a prior history of PAD and status post lower extremity stenting. He has had a previous cardiomyopathy with ejection fraction 40% and moderate mitral regurgitation. Patient previously had severe left atrial enlargement as well. His left atrial diameter is greater than 5.0 cm.    He has a history of hypertension and CKD stage III. His renal function is worsened and a transplant is being considered. He now on dialysis.    He has had a prior history of rectal bleeding. This was due to hemorrhoids and he had a hemorrhoidectomy. He has had no recurrence of bleeding. Patient continues on aspirin as well as Eliquis.    Patient is a 66-year-old man who is seen in follow-up evaluation regarding his recurrent atrial fibs/flutter at times slow ventricular response. He is currently on amiodarone 200 mg/day. He had previous AF ablation done February 2016. He had recurrent atrial fibrillation and was treated with amiodarone and had been on 100 mg/d    He developed renal failure worsening requiring dialysis and he currently has a catheter left subclavian for dialysis and fistula placed. He had significant shortness of breath and weakness but since dialysis he is felt better.      He has a prior history of coronary disease and status post PCI to the LAD 2014. He has a prior history of PAD and status post lower extremity stenting. He has had a previous cardiomyopathy with ejection fraction 40% and moderate mitral regurgitation. Patient previously had severe left atrial enlargement as well. His left atrial diameter is greater than 5.0 cm.    He has a history of hypertension and CKD stage III. His renal function is worsened and a transplant is being considered. He now on dialysis.  AV fistula last few weeks has not bryn functioning properly, has shiley in place for dialysis, scheduled for [facility] [place] tomorrow 8/5/22    He has had a prior history of rectal bleeding. This was due to hemorrhoids and he had a hemorrhoidectomy. He has had no recurrence of bleeding. Patient continues on aspirin as well as Eliquis.    66 year old male with a pmhx of CAD with PCI to LAD 2014, cardiomyopathy EF 40%, moderate MR, CKD stage III with AV fistula requiring BAM intervention scheduled for 8/5/22, right chest shiley being used for dialysis Tu/Th/Sat last had dialysis this morning 8/4/22 prior to PST visit, PAD with LE stenting, HTN, and persistent Afib/flutter at times slow ventricular response.  Patient is on amiodorone 200mg daily, history of AF ablation 2/2016. Patient is on Xarelto 15mg daily and aspirin. Patient denies chest pain, palpitations, dyspnea on exertion, dizziness, near syncope or syncope today at PST.      Male now presents in anticipation of elective radiofrequency ablation of atrial fibrillation, loop recorder to be placed following ablation      Echocardiogram 3/2022 Mod to sev MR, LAE, mild to mod TR, EF 40-45%  Stress Test: 2014 followed by cardiac cath   Cardiac Cath: 2014 with PCI   Cardiac surgery: Previous ablation see HPI      66 year old male with a pmhx of CAD with PCI to LAD 2014, cardiomyopathy EF 40%, moderate MR, CKD stage 4 with left arm AV fistula requiring BAM intervention scheduled for 8/5/22, right chest HD catether, pt has dialysis Tu/Th/Sat last had dialysis this morning 8/4/22 prior to PST visit, DM2, PAD with LE stenting, HTN, and persistent Afib/flutter at times slow ventricular response.  Patient is on amiodarone 200mg daily, history of AF ablation 2/2016. Patient is on Xarelto 15mg daily and aspirin. Patient reports dyspnea with exertion, but denies chest pain, palpitations, dizziness, near syncope or syncope today at PST.      Male now presents in anticipation of elective radiofrequency ablation of atrial fibrillation, loop recorder to be placed following ablation      Echocardiogram 3/2022 Mod to sev MR, LAE, mild to mod TR, EF 40-45%  Stress Test: 2014 followed by cardiac cath   Cardiac Cath: 2014 with PCI   Cardiac surgery: Previous ablation see HPI      66 year old male with a pmhx of CAD with PCI to LAD 2014, cardiomyopathy EF 40%, moderate MR, CKD stage 4 with left arm AV fistula requiring BAM intervention scheduled for 8/5/22, right chest HD catether, pt has dialysis Tu/Th/Sat last had dialysis this morning 8/4/22 prior to PST visit, DM2, PAD with LE stenting, HTN, and persistent Afib/flutter at times slow ventricular response.  Patient is on amiodarone 200mg daily, history of AF ablation 2/2016. Patient is on Xarelto 15mg daily and aspirin. Patient reports dyspnea with exertion, but denies chest pain, palpitations, dizziness, near syncope or syncope today at PST.     Male now presents in anticipation of elective radiofrequency ablation of atrial fibrillation, loop recorder to be placed following ablation      Echocardiogram 3/2022 Mod to sev MR, LAE, mild to mod TR, EF 40-45%  Stress Test: 2014 followed by cardiac cath   Cardiac Cath: 2014 with PCI   Cardiac surgery: Previous ablation see HPI

## 2022-08-04 NOTE — H&P PST ADULT - NSICDXPASTSURGICALHX_GEN_ALL_CORE_FT
PAST SURGICAL HISTORY:  H/O cardiac radiofrequency ablation     History of Arthroscopy- ankle left ankle 2003 s/p "something fell on it"    S/P angioplasty with stent 5/2014 2016    S/P Arthroscopic Surgery of Left Knee 2001 injury- hit knee on desk    S/P hernia surgery     Status post cataract extraction left eye done 10/18/2018 PAST SURGICAL HISTORY:  H/O cardiac radiofrequency ablation     History of Arthroscopy- ankle left ankle 2003 s/p "something fell on it"    S/P angioplasty with stent 5/2014 2016    S/P Arthroscopic Surgery of Left Knee 2001 injury- hit knee on desk    S/P hernia surgery     Status post cataract extraction left eye done 10/18/2018

## 2022-08-04 NOTE — H&P PST ADULT - ASSESSMENT
Plan:   Labs pending.   Pre-procedure instructions provided (verbal & written) as follows:  Ablation */*/   - Last dose Eliquis/Pradaxa/Xarelto/Savaysa */*/ PM (NO a/c day of ablation).   OR - Continue warfarin without interruption.   - NPO after midnight prior except meds w/ sips of water.    - Hold the following medications the morning of the procedure: ***Check MD note for possible instructions re: holding antiarrhythmic medications***    CAPRINI SCORE    AGE RELATED RISK FACTORS                                                             [ ] Age 41-60 years                                            (1 Point)  [ ] Age: 61-74 years                                           (2 Points)                 [ ] Age= 75 years                                                (3 Points)             DISEASE RELATED RISK FACTORS                                                       [ ] Edema in the lower extremities                 (1 Point)                     [ ] Varicose veins                                               (1 Point)                                 [ ] BMI > 25 Kg/m2                                            (1 Point)                                  [ ] Serious infection (ie PNA)                            (1 Point)                     [ ] Lung disease ( COPD, Emphysema)            (1 Point)                                                                          [ ] Acute myocardial infarction                         (1 Point)                  [ ] Congestive heart failure (in the previous month)  (1 Point)         [ ] Inflammatory bowel disease                            (1 Point)                  [ ] Central venous access, PICC or Port               (2 points)       (within the last month)                                                                [ ] Stroke (in the previous month)                        (5 Points)    [ ] Previous or present malignancy                       (2 points)                                                                                                                                                         HEMATOLOGY RELATED FACTORS                                                         [ ] Prior episodes of VTE                                     (3 Points)                     [ ] Positive family history for VTE                      (3 Points)                  [ ] Prothrombin 37889 A                                     (3 Points)                     [ ] Factor V Leiden                                                (3 Points)                        [ ] Lupus anticoagulants                                      (3 Points)                                                           [ ] Anticardiolipin antibodies                              (3 Points)                                                       [ ] High homocysteine in the blood                   (3 Points)                                             [ ] Other congenital or acquired thrombophilia      (3 Points)                                                [ ] Heparin induced thrombocytopenia                  (3 Points)                                        MOBILITY RELATED FACTORS  [ ] Bed rest                                                         (1 Point)  [ ] Plaster cast                                                    (2 points)  [ ] Bed bound for more than 72 hours           (2 Points)    GENDER SPECIFIC FACTORS  [ ] Pregnancy or had a baby within the last month   (1 Point)  [ ] Post-partum < 6 weeks                                   (1 Point)  [ ] Hormonal therapy  or oral contraception   (1 Point)  [ ] History of pregnancy complications              (1 point)  [ ] Unexplained or recurrent              (1 Point)    OTHER RISK FACTORS                                           (1 Point)  [ ] BMI >40, smoking, diabetes requiring insulin, chemotherapy  blood transfusions and length of surgery over 2 hours    SURGERY RELATED RISK FACTORS  [ ]  Section within the last month     (1 Point)  [ ] Minor surgery                                                  (1 Point)  [ ] Arthroscopic surgery                                       (2 Points)  [ ] Planned major surgery lasting more            (2 Points)      than 45 minutes     [ ] Elective hip or knee joint replacement       (5 points)       surgery                                                TRAUMA RELATED RISK FACTORS  [ ] Fracture of the hip, pelvis, or leg                       (5 Points)  [ ] Spinal cord injury resulting in paralysis             (5 points)       (in the previous month)    [ ] Paralysis  (less than 1 month)                             (5 Points)  [ ] Multiple Trauma within 1 month                        (5 Points)    Total Score [        ]    Caprini Score 0-2: Low Risk, NO VTE prophylaxis required for most patients, encourage ambulation  Caprini Score 3-6: Moderate Risk , pharmacologic VTE prophylaxis is indicated for most patients (in the absence of contraindications)  Caprini Score Greater than or =7: High risk, pharmocologic VTE prophylaxis indicated for most patients (in the absence of contraindications)              OPIOID RISK TOOL    CHAVA EACH BOX THAT APPLIES AND ADD TOTALS AT THE END    FAMILY HISTORY OF SUBSTANCE ABUSE                 FEMALE         MALE                                                Alcohol                             [  ]1 pt          [  ]3pts                                               Illegal Durgs                     [  ]2 pts        [  ]3pts                                               Rx Drugs                           [  ]4 pts        [  ]4 pts    PERSONAL HISTORY OF SUBSTANCE ABUSE                                                                                          Alcohol                             [  ]3 pts       [  ]3 pts                                               Illegal Durgs                     [  ]4 pts        [  ]4 pts                                               Rx Drugs                           [  ]5 pts        [  ]5 pts    AGE BETWEEN 16-45 YEARS                                      [  ]1 pt         [  ]1 pt    HISTORY OF PREADOLESCENT   SEXUAL ABUSE                                                             [  ]3 pts        [  ]0pts    PSYCHOLOGICAL DISEASE                     ADD, OCD, Bipolar, Schizophrenia        [  ]2 pts         [  ]2 pts                      Depression                                               [  ]1 pt           [  ]1 pt           SCORING TOTAL   (add numbers and type here)              (***)                                     A score of 3 or lower indicated LOW risk for future opiod abuse  A score of 4 to 7 indicated moderate risk for future opiod abuse  A score of 8 or higher indicates a high risk for opiod abuse                 Plan:   Labs pending.   Pre-procedure instructions provided (verbal & written) as follows: Patient educated on surgical scrub, COVID testing, preadmission instructions, medical clearance and day of procedure medications, verbalizes understanding. Pt instructed to stop vitamins/supplements/herbal medications/ASA/NSAIDS for one week prior to surgery and discuss with PMD.   Ablation 22  - Last dose Xarelto 22 PM (NO a/c 8/10 or day of ablation).    - NPO after midnight prior except meds w/ sips of water.    - Continue aspirin     CAPRINI SCORE    AGE RELATED RISK FACTORS                                                             [ ] Age 41-60 years                                            (1 Point)  [ ] Age: 61-74 years                                           (2 Points)                 [ ] Age= 75 years                                                (3 Points)             DISEASE RELATED RISK FACTORS                                                       [ ] Edema in the lower extremities                 (1 Point)                     [ ] Varicose veins                                               (1 Point)                                 [ ] BMI > 25 Kg/m2                                            (1 Point)                                  [ ] Serious infection (ie PNA)                            (1 Point)                     [ ] Lung disease ( COPD, Emphysema)            (1 Point)                                                                          [ ] Acute myocardial infarction                         (1 Point)                  [ ] Congestive heart failure (in the previous month)  (1 Point)         [ ] Inflammatory bowel disease                            (1 Point)                  [ ] Central venous access, PICC or Port               (2 points)       (within the last month)                                                                [ ] Stroke (in the previous month)                        (5 Points)    [ ] Previous or present malignancy                       (2 points)                                                                                                                                                         HEMATOLOGY RELATED FACTORS                                                         [ ] Prior episodes of VTE                                     (3 Points)                     [ ] Positive family history for VTE                      (3 Points)                  [ ] Prothrombin 21373 A                                     (3 Points)                     [ ] Factor V Leiden                                                (3 Points)                        [ ] Lupus anticoagulants                                      (3 Points)                                                           [ ] Anticardiolipin antibodies                              (3 Points)                                                       [ ] High homocysteine in the blood                   (3 Points)                                             [ ] Other congenital or acquired thrombophilia      (3 Points)                                                [ ] Heparin induced thrombocytopenia                  (3 Points)                                        MOBILITY RELATED FACTORS  [ ] Bed rest                                                         (1 Point)  [ ] Plaster cast                                                    (2 points)  [ ] Bed bound for more than 72 hours           (2 Points)    GENDER SPECIFIC FACTORS  [ ] Pregnancy or had a baby within the last month   (1 Point)  [ ] Post-partum < 6 weeks                                   (1 Point)  [ ] Hormonal therapy  or oral contraception   (1 Point)  [ ] History of pregnancy complications              (1 point)  [ ] Unexplained or recurrent              (1 Point)    OTHER RISK FACTORS                                           (1 Point)  [ ] BMI >40, smoking, diabetes requiring insulin, chemotherapy  blood transfusions and length of surgery over 2 hours    SURGERY RELATED RISK FACTORS  [ ]  Section within the last month     (1 Point)  [ ] Minor surgery                                                  (1 Point)  [ ] Arthroscopic surgery                                       (2 Points)  [ ] Planned major surgery lasting more            (2 Points)      than 45 minutes     [ ] Elective hip or knee joint replacement       (5 points)       surgery                                                TRAUMA RELATED RISK FACTORS  [ ] Fracture of the hip, pelvis, or leg                       (5 Points)  [ ] Spinal cord injury resulting in paralysis             (5 points)       (in the previous month)    [ ] Paralysis  (less than 1 month)                             (5 Points)  [ ] Multiple Trauma within 1 month                        (5 Points)    Total Score [        ]    Caprini Score 0-2: Low Risk, NO VTE prophylaxis required for most patients, encourage ambulation  Caprini Score 3-6: Moderate Risk , pharmacologic VTE prophylaxis is indicated for most patients (in the absence of contraindications)  Caprini Score Greater than or =7: High risk, pharmocologic VTE prophylaxis indicated for most patients (in the absence of contraindications)              OPIOID RISK TOOL    CHAVA EACH BOX THAT APPLIES AND ADD TOTALS AT THE END    FAMILY HISTORY OF SUBSTANCE ABUSE                 FEMALE         MALE                                                Alcohol                             [  ]1 pt          [  ]3pts                                               Illegal Durgs                     [  ]2 pts        [  ]3pts                                               Rx Drugs                           [  ]4 pts        [  ]4 pts    PERSONAL HISTORY OF SUBSTANCE ABUSE                                                                                          Alcohol                             [  ]3 pts       [  ]3 pts                                               Illegal Durgs                     [  ]4 pts        [  ]4 pts                                               Rx Drugs                           [  ]5 pts        [  ]5 pts    AGE BETWEEN 16-45 YEARS                                      [  ]1 pt         [  ]1 pt    HISTORY OF PREADOLESCENT   SEXUAL ABUSE                                                             [  ]3 pts        [  ]0pts    PSYCHOLOGICAL DISEASE                     ADD, OCD, Bipolar, Schizophrenia        [  ]2 pts         [  ]2 pts                      Depression                                               [  ]1 pt           [  ]1 pt           SCORING TOTAL   (add numbers and type here)              (***)                                     A score of 3 or lower indicated LOW risk for future opiod abuse  A score of 4 to 7 indicated moderate risk for future opiod abuse  A score of 8 or higher indicates a high risk for opiod abuse                 Plan: NICOLETTE/AFIB ablation/Cart[facility] on 22 with Dr Jacob Goncalves pending.   Pre-procedure instructions provided (verbal & written) as follows: Patient educated on COVID testing, preadmission instructions, and day of procedure medications, verbalizes understanding. Pt instructed to stop vitamins/supplements/herbal medications/NSAIDS for one week prior to surgery and discuss with PMD.   Ablation 22  - Last dose Xarelto 22 PM (NO a/c 8/10 or day of ablation).    - NPO after midnight prior except meds w/ sips of water.     -Hold farxiga     CAPRINI SCORE    AGE RELATED RISK FACTORS                                                             [ ] Age 41-60 years                                            (1 Point)  x[ ] Age: 61-74 years                                           (2 Points)                 [ ] Age= 75 years                                                (3 Points)             DISEASE RELATED RISK FACTORS                                                       [ ] Edema in the lower extremities                 (1 Point)                     [ ] Varicose veins                                               (1 Point)                                 [ ] BMI > 25 Kg/m2                                            (1 Point)                                  [ ] Serious infection (ie PNA)                            (1 Point)                     [ ] Lung disease ( COPD, Emphysema)            (1 Point)                                                                          [ ] Acute myocardial infarction                         (1 Point)                  [x ] Congestive heart failure (in the previous month)  (1 Point)         [ ] Inflammatory bowel disease                            (1 Point)                  [ x] Central venous access, PICC or Port               (2 points)       (within the last month)                                                                [ ] Stroke (in the previous month)                        (5 Points)    [ ] Previous or present malignancy                       (2 points)                                                                                                                                                         HEMATOLOGY RELATED FACTORS                                                         [ ] Prior episodes of VTE                                     (3 Points)                     [ ] Positive family history for VTE                      (3 Points)                  [ ] Prothrombin 99386 A                                     (3 Points)                     [ ] Factor V Leiden                                                (3 Points)                        [ ] Lupus anticoagulants                                      (3 Points)                                                           [ ] Anticardiolipin antibodies                              (3 Points)                                                       [ ] High homocysteine in the blood                   (3 Points)                                             [ ] Other congenital or acquired thrombophilia      (3 Points)                                                [ ] Heparin induced thrombocytopenia                  (3 Points)                                        MOBILITY RELATED FACTORS  [ ] Bed rest                                                         (1 Point)  [ ] Plaster cast                                                    (2 points)  [ ] Bed bound for more than 72 hours           (2 Points)    GENDER SPECIFIC FACTORS  [ ] Pregnancy or had a baby within the last month   (1 Point)  [ ] Post-partum < 6 weeks                                   (1 Point)  [ ] Hormonal therapy  or oral contraception   (1 Point)  [ ] History of pregnancy complications              (1 point)  [ ] Unexplained or recurrent              (1 Point)    OTHER RISK FACTORS                                           (1 Point)  [ ] BMI >40, smoking, diabetes requiring insulin, chemotherapy  blood transfusions and length of surgery over 2 hours    SURGERY RELATED RISK FACTORS  [ ]  Section within the last month     (1 Point)  [ ] Minor surgery                                                  (1 Point)  [ ] Arthroscopic surgery                                       (2 Points)  [x ] Planned major surgery lasting more            (2 Points)      than 45 minutes     [ ] Elective hip or knee joint replacement       (5 points)       surgery                                                TRAUMA RELATED RISK FACTORS  [ ] Fracture of the hip, pelvis, or leg                       (5 Points)  [ ] Spinal cord injury resulting in paralysis             (5 points)       (in the previous month)    [ ] Paralysis  (less than 1 month)                             (5 Points)  [ ] Multiple Trauma within 1 month                        (5 Points)    Total Score [    7    ]    Caprini Score 0-2: Low Risk, NO VTE prophylaxis required for most patients, encourage ambulation  Caprini Score 3-6: Moderate Risk , pharmacologic VTE prophylaxis is indicated for most patients (in the absence of contraindications)  Caprini Score Greater than or =7: High risk, pharmocologic VTE prophylaxis indicated for most patients (in the absence of contraindications)              OPIOID RISK TOOL    CHAVA EACH BOX THAT APPLIES AND ADD TOTALS AT THE END    FAMILY HISTORY OF SUBSTANCE ABUSE                 FEMALE         MALE                                                Alcohol                             [  ]1 pt          [  ]3pts                                               Illegal Durgs                     [  ]2 pts        [  ]3pts                                               Rx Drugs                           [  ]4 pts        [  ]4 pts    PERSONAL HISTORY OF SUBSTANCE ABUSE                                                                                          Alcohol                             [  ]3 pts       [  ]3 pts                                               Illegal Durgs                     [  ]4 pts        [  ]4 pts                                               Rx Drugs                           [  ]5 pts        [  ]5 pts    AGE BETWEEN 16-45 YEARS                                      [  ]1 pt         [  ]1 pt    HISTORY OF PREADOLESCENT   SEXUAL ABUSE                                                             [  ]3 pts        [  ]0pts    PSYCHOLOGICAL DISEASE                     ADD, OCD, Bipolar, Schizophrenia        [  ]2 pts         [  ]2 pts                      Depression                                               [  ]1 pt           [  ]1 pt           SCORING TOTAL  0                                  A score of 3 or lower indicated LOW risk for future opiod abuse  A score of 4 to 7 indicated moderate risk for future opiod abuse  A score of 8 or higher indicates a high risk for opiod abuse

## 2022-08-05 ENCOUNTER — RESULT REVIEW (OUTPATIENT)
Age: 67
End: 2022-08-05

## 2022-08-05 ENCOUNTER — NON-APPOINTMENT (OUTPATIENT)
Age: 67
End: 2022-08-05

## 2022-08-05 ENCOUNTER — APPOINTMENT (OUTPATIENT)
Dept: ENDOVASCULAR SURGERY | Facility: CLINIC | Age: 67
End: 2022-08-05

## 2022-08-05 VITALS
SYSTOLIC BLOOD PRESSURE: 113 MMHG | BODY MASS INDEX: 27.3 KG/M2 | TEMPERATURE: 97.1 F | WEIGHT: 195 LBS | HEIGHT: 71 IN | DIASTOLIC BLOOD PRESSURE: 74 MMHG | RESPIRATION RATE: 18 BRPM | OXYGEN SATURATION: 98 % | HEART RATE: 63 BPM

## 2022-08-05 PROBLEM — I77.0 ARTERIOVENOUS FISTULA, ACQUIRED: Chronic | Status: ACTIVE | Noted: 2022-08-04

## 2022-08-05 PROCEDURE — 36909Z: CUSTOM

## 2022-08-05 PROCEDURE — 36902Z: CUSTOM | Mod: 59

## 2022-08-05 RX ORDER — PYRIDOXINE HCL (VITAMIN B6) 25 MG
25 TABLET ORAL DAILY
Qty: 90 | Refills: 3 | Status: DISCONTINUED | COMMUNITY
Start: 2019-10-03 | End: 2022-08-05

## 2022-08-05 NOTE — PRE-ANESTHESIA EVALUATION ADULT - BSA (M2)
Relevant Problems   No relevant active problems       Anesthetic History   No history of anesthetic complications            Review of Systems / Medical History  Patient summary reviewed and pertinent labs reviewed    Pulmonary  Within defined limits                 Neuro/Psych         Psychiatric history (Depression)     Cardiovascular                  Exercise tolerance: >4 METS     GI/Hepatic/Renal               Comments: abdo pain ? Acute appendicitis.  Endo/Other        Obesity     Other Findings              Physical Exam    Airway  Mallampati: II  TM Distance: 4 - 6 cm  Neck ROM: normal range of motion   Mouth opening: Normal     Cardiovascular    Rhythm: regular  Rate: normal         Dental  No notable dental hx       Pulmonary  Breath sounds clear to auscultation               Abdominal  GI exam deferred       Other Findings            Anesthetic Plan    ASA: 2  Anesthesia type: MAC          Induction: Intravenous  Anesthetic plan and risks discussed with: Patient
1.97
1.99

## 2022-08-05 NOTE — PAST MEDICAL HISTORY
[No therapy indicated for cases scheduled for less than one hour] : No therapy indicated for cases scheduled for less than one hour. [FreeTextEntry1] : \par \par Malignant Hyperthermis (MH) Screening Tool and Risk of Bleeding Assessement\par Mr. SYEDA WEAVER  denies family history of unexpected death following Anesthesia or Exercise.\par Denies Family history of Malignant Hyperthermia, Muscle or Neuromuscular disorder and High Temperature following exercise.\par \par Mr. SYEDA WEAVER denies history of Muscle Spasm, Dark or Chocolate - Colored urine and Unanticipated fever immediately following anesthesia or serious exercise. \par Mr. WEAVER  also denies bleeding tendencies/ Risks of Bleeding\par

## 2022-08-05 NOTE — HISTORY OF PRESENT ILLNESS
[FreeTextEntry1] : Creation Dr Hung 3-21-22 [FreeTextEntry2] : 3/2022 [FreeTextEntry4] : yesterday [FreeTextEntry5] : yesterday 915pm  [FreeTextEntry6] : Dr Clayton

## 2022-08-11 DIAGNOSIS — E83.39 OTHER DISORDERS OF PHOSPHORUS METABOLISM: ICD-10-CM

## 2022-08-19 ENCOUNTER — APPOINTMENT (OUTPATIENT)
Dept: ENDOVASCULAR SURGERY | Facility: CLINIC | Age: 67
End: 2022-08-19

## 2022-08-19 ENCOUNTER — NON-APPOINTMENT (OUTPATIENT)
Age: 67
End: 2022-08-19

## 2022-08-19 PROCEDURE — 36589 REMOVAL TUNNELED CV CATH: CPT

## 2022-08-25 ENCOUNTER — APPOINTMENT (OUTPATIENT)
Dept: VASCULAR SURGERY | Facility: CLINIC | Age: 67
End: 2022-08-25

## 2022-08-25 VITALS
TEMPERATURE: 97 F | DIASTOLIC BLOOD PRESSURE: 68 MMHG | HEIGHT: 71 IN | WEIGHT: 195 LBS | HEART RATE: 68 BPM | SYSTOLIC BLOOD PRESSURE: 101 MMHG | BODY MASS INDEX: 27.3 KG/M2

## 2022-08-25 PROCEDURE — 99213 OFFICE O/P EST LOW 20 MIN: CPT

## 2022-08-25 PROCEDURE — 93990 DOPPLER FLOW TESTING: CPT

## 2022-08-25 NOTE — HISTORY OF PRESENT ILLNESS
[FreeTextEntry1] : 67-year-old gentleman currently on hemodialysis via left radiocephalic AV fistula.  Patient recently had his right-sided permacath removed.  This was in preparation for an upcoming cardiac ablation.  Patient now presents for routine follow-up. [] : left radiocephalic fistula

## 2022-08-25 NOTE — PHYSICAL EXAM
[Thrill] : thrill [Pulsatile Thrill] : no pulsatile thrill [Aneurysm] : no aneurysm [Hand well perfused] : hand well perfused [2+] : left 2+ [Normal] : coordination grossly intact, no focal deficits

## 2022-08-25 NOTE — DISCUSSION/SUMMARY
[FreeTextEntry1] : In the office today patient underwent a left fistula duplex which shows a well-functioning fistula with a volume flow rate of 1332 cc/min.  At this time no intervention is necessary.  Patient may follow-up in 3 to 4 months.  Patient may proceed with ablation.

## 2022-08-26 ENCOUNTER — OUTPATIENT (OUTPATIENT)
Dept: OUTPATIENT SERVICES | Facility: HOSPITAL | Age: 67
LOS: 1 days | Discharge: HOME | End: 2022-08-26

## 2022-08-26 DIAGNOSIS — Z98.890 OTHER SPECIFIED POSTPROCEDURAL STATES: Chronic | ICD-10-CM

## 2022-08-26 DIAGNOSIS — Z95.9 PRESENCE OF CARDIAC AND VASCULAR IMPLANT AND GRAFT, UNSPECIFIED: Chronic | ICD-10-CM

## 2022-08-26 DIAGNOSIS — Z98.49 CATARACT EXTRACTION STATUS, UNSPECIFIED EYE: Chronic | ICD-10-CM

## 2022-08-26 LAB
HEMOLYSIS INDEX: 3 — SIGNIFICANT CHANGE UP
POTASSIUM SERPL-MCNC: 3.6 MMOL/L — SIGNIFICANT CHANGE UP (ref 3.5–5.3)
POTASSIUM SERPL-SCNC: 3.6 MMOL/L — SIGNIFICANT CHANGE UP (ref 3.5–5.3)

## 2022-08-29 ENCOUNTER — NON-APPOINTMENT (OUTPATIENT)
Age: 67
End: 2022-08-29

## 2022-09-01 ENCOUNTER — APPOINTMENT (OUTPATIENT)
Dept: INTERNAL MEDICINE | Facility: CLINIC | Age: 67
End: 2022-09-01

## 2022-09-14 ENCOUNTER — APPOINTMENT (OUTPATIENT)
Dept: INTERNAL MEDICINE | Facility: CLINIC | Age: 67
End: 2022-09-14

## 2022-09-14 ENCOUNTER — APPOINTMENT (OUTPATIENT)
Dept: NEPHROLOGY | Facility: CLINIC | Age: 67
End: 2022-09-14

## 2022-09-14 VITALS — DIASTOLIC BLOOD PRESSURE: 60 MMHG | RESPIRATION RATE: 14 BRPM | SYSTOLIC BLOOD PRESSURE: 110 MMHG | HEART RATE: 74 BPM

## 2022-09-14 PROCEDURE — 99213 OFFICE O/P EST LOW 20 MIN: CPT

## 2022-09-14 NOTE — HISTORY OF PRESENT ILLNESS
[de-identified] : Mr. WEAVER comes in for reassessment of hypertension. He denies edema, chest pain, dizziness, LIRA, PND and orthopnea.  He  has had no problems with his medications.\par Doing well on HD.  On transplant list.

## 2022-09-26 ENCOUNTER — NON-APPOINTMENT (OUTPATIENT)
Age: 67
End: 2022-09-26

## 2022-09-27 LAB — SARS-COV-2 N GENE NPH QL NAA+PROBE: NOT DETECTED

## 2022-09-28 ENCOUNTER — FORM ENCOUNTER (OUTPATIENT)
Age: 67
End: 2022-09-28

## 2022-09-29 ENCOUNTER — INPATIENT (INPATIENT)
Facility: HOSPITAL | Age: 67
LOS: 0 days | Discharge: HOME | DRG: 273 | End: 2022-09-30
Attending: INTERNAL MEDICINE | Admitting: INTERNAL MEDICINE
Payer: COMMERCIAL

## 2022-09-29 ENCOUNTER — TRANSCRIPTION ENCOUNTER (OUTPATIENT)
Age: 67
End: 2022-09-29

## 2022-09-29 VITALS
RESPIRATION RATE: 16 BRPM | SYSTOLIC BLOOD PRESSURE: 115 MMHG | HEART RATE: 54 BPM | OXYGEN SATURATION: 100 % | TEMPERATURE: 97 F | DIASTOLIC BLOOD PRESSURE: 74 MMHG

## 2022-09-29 DIAGNOSIS — Z98.890 OTHER SPECIFIED POSTPROCEDURAL STATES: Chronic | ICD-10-CM

## 2022-09-29 DIAGNOSIS — Z95.9 PRESENCE OF CARDIAC AND VASCULAR IMPLANT AND GRAFT, UNSPECIFIED: Chronic | ICD-10-CM

## 2022-09-29 DIAGNOSIS — I77.0 ARTERIOVENOUS FISTULA, ACQUIRED: Chronic | ICD-10-CM

## 2022-09-29 DIAGNOSIS — Z98.49 CATARACT EXTRACTION STATUS, UNSPECIFIED EYE: Chronic | ICD-10-CM

## 2022-09-29 DIAGNOSIS — Z90.89 ACQUIRED ABSENCE OF OTHER ORGANS: Chronic | ICD-10-CM

## 2022-09-29 DIAGNOSIS — I48.91 UNSPECIFIED ATRIAL FIBRILLATION: ICD-10-CM

## 2022-09-29 LAB
ANION GAP SERPL CALC-SCNC: 16 MMOL/L — SIGNIFICANT CHANGE UP (ref 5–17)
APTT BLD: 37.5 SEC — HIGH (ref 27.5–35.5)
BLD GP AB SCN SERPL QL: SIGNIFICANT CHANGE UP
BUN SERPL-MCNC: 36.8 MG/DL — HIGH (ref 8–20)
CALCIUM SERPL-MCNC: 9.1 MG/DL — SIGNIFICANT CHANGE UP (ref 8.4–10.5)
CHLORIDE SERPL-SCNC: 99 MMOL/L — SIGNIFICANT CHANGE UP (ref 98–107)
CO2 SERPL-SCNC: 27 MMOL/L — SIGNIFICANT CHANGE UP (ref 22–29)
CREAT SERPL-MCNC: 6.91 MG/DL — HIGH (ref 0.5–1.3)
EGFR: 8 ML/MIN/1.73M2 — LOW
GLUCOSE SERPL-MCNC: 79 MG/DL — SIGNIFICANT CHANGE UP (ref 70–99)
HCT VFR BLD CALC: 36.2 % — LOW (ref 39–50)
HGB BLD-MCNC: 11.9 G/DL — LOW (ref 13–17)
INR BLD: 1.37 RATIO — HIGH (ref 0.88–1.16)
MAGNESIUM SERPL-MCNC: 2 MG/DL — SIGNIFICANT CHANGE UP (ref 1.8–2.6)
MCHC RBC-ENTMCNC: 21.6 PG — LOW (ref 27–34)
MCHC RBC-ENTMCNC: 32.9 GM/DL — SIGNIFICANT CHANGE UP (ref 32–36)
MCV RBC AUTO: 65.7 FL — LOW (ref 80–100)
PLATELET # BLD AUTO: 197 K/UL — SIGNIFICANT CHANGE UP (ref 150–400)
POTASSIUM SERPL-MCNC: 4.1 MMOL/L — SIGNIFICANT CHANGE UP (ref 3.5–5.3)
POTASSIUM SERPL-SCNC: 4.1 MMOL/L — SIGNIFICANT CHANGE UP (ref 3.5–5.3)
PROTHROM AB SERPL-ACNC: 16 SEC — HIGH (ref 10.5–13.4)
RBC # BLD: 5.51 M/UL — SIGNIFICANT CHANGE UP (ref 4.2–5.8)
RBC # FLD: 16 % — HIGH (ref 10.3–14.5)
SODIUM SERPL-SCNC: 142 MMOL/L — SIGNIFICANT CHANGE UP (ref 135–145)
WBC # BLD: 10.51 K/UL — HIGH (ref 3.8–10.5)
WBC # FLD AUTO: 10.51 K/UL — HIGH (ref 3.8–10.5)

## 2022-09-29 PROCEDURE — 93010 ELECTROCARDIOGRAM REPORT: CPT | Mod: 76

## 2022-09-29 RX ORDER — PANTOPRAZOLE SODIUM 20 MG/1
40 TABLET, DELAYED RELEASE ORAL
Refills: 0 | Status: DISCONTINUED | OUTPATIENT
Start: 2022-09-29 | End: 2022-09-30

## 2022-09-29 RX ORDER — SIMVASTATIN 20 MG/1
40 TABLET, FILM COATED ORAL AT BEDTIME
Refills: 0 | Status: DISCONTINUED | OUTPATIENT
Start: 2022-09-29 | End: 2022-09-30

## 2022-09-29 RX ORDER — FINASTERIDE 5 MG/1
5 TABLET, FILM COATED ORAL DAILY
Refills: 0 | Status: DISCONTINUED | OUTPATIENT
Start: 2022-09-29 | End: 2022-09-30

## 2022-09-29 RX ORDER — AMIODARONE HYDROCHLORIDE 400 MG/1
200 TABLET ORAL DAILY
Refills: 0 | Status: DISCONTINUED | OUTPATIENT
Start: 2022-09-29 | End: 2022-09-30

## 2022-09-29 RX ORDER — FUROSEMIDE 40 MG
20 TABLET ORAL ONCE
Refills: 0 | Status: COMPLETED | OUTPATIENT
Start: 2022-09-29 | End: 2022-09-29

## 2022-09-29 RX ORDER — FERROUS SULFATE 325(65) MG
325 TABLET ORAL DAILY
Refills: 0 | Status: DISCONTINUED | OUTPATIENT
Start: 2022-09-29 | End: 2022-09-30

## 2022-09-29 RX ORDER — SUCRALFATE 1 G
1 TABLET ORAL
Refills: 0 | Status: DISCONTINUED | OUTPATIENT
Start: 2022-09-29 | End: 2022-09-30

## 2022-09-29 RX ORDER — BENZOCAINE AND MENTHOL 5; 1 G/100ML; G/100ML
1 LIQUID ORAL
Refills: 0 | Status: DISCONTINUED | OUTPATIENT
Start: 2022-09-29 | End: 2022-09-30

## 2022-09-29 RX ORDER — ASPIRIN/CALCIUM CARB/MAGNESIUM 324 MG
1 TABLET ORAL
Qty: 0 | Refills: 0 | DISCHARGE

## 2022-09-29 RX ORDER — CHOLECALCIFEROL (VITAMIN D3) 125 MCG
1000 CAPSULE ORAL DAILY
Refills: 0 | Status: DISCONTINUED | OUTPATIENT
Start: 2022-09-29 | End: 2022-09-30

## 2022-09-29 RX ORDER — ACETAMINOPHEN 500 MG
650 TABLET ORAL EVERY 6 HOURS
Refills: 0 | Status: DISCONTINUED | OUTPATIENT
Start: 2022-09-29 | End: 2022-09-30

## 2022-09-29 RX ORDER — DAPAGLIFLOZIN 10 MG/1
5 TABLET, FILM COATED ORAL DAILY
Refills: 0 | Status: DISCONTINUED | OUTPATIENT
Start: 2022-09-29 | End: 2022-09-30

## 2022-09-29 RX ORDER — RIVAROXABAN 15 MG-20MG
15 KIT ORAL
Refills: 0 | Status: DISCONTINUED | OUTPATIENT
Start: 2022-09-29 | End: 2022-09-30

## 2022-09-29 RX ADMIN — RIVAROXABAN 15 MILLIGRAM(S): KIT at 17:43

## 2022-09-29 RX ADMIN — PANTOPRAZOLE SODIUM 40 MILLIGRAM(S): 20 TABLET, DELAYED RELEASE ORAL at 17:44

## 2022-09-29 RX ADMIN — Medication 1 GRAM(S): at 17:43

## 2022-09-29 RX ADMIN — Medication 20 MILLIGRAM(S): at 17:44

## 2022-09-29 RX ADMIN — SIMVASTATIN 40 MILLIGRAM(S): 20 TABLET, FILM COATED ORAL at 21:09

## 2022-09-29 NOTE — H&P PST ADULT - HISTORY OF PRESENT ILLNESS
67 year old male with PMH of CAD s/p PCI to LAD 2014, HTN, DM type 2, ESRD on HD *, PAD s/p LE stenting, cardiomyopathy EF 40%, MR, and atrial fibrillation s/p ablation 2016. He now has recurrent symptomatic atrial fibrillation, with slow ventricular response at times. He presents today for elective radiofrequency ablation of atrial fibrillation.      Cardiology summary:   Echocardiogram 3/2022 Mod to sev MR, LAE, mild to mod TR, EF 40-45%  Stress Test: 2014 followed by cardiac cath   Cardiac Cath: 2014 with PCI   Cardiac surgery: Previous ablation see HPI 67 year old male with PMH of CAD s/p PCI to LAD 2014, HTN, RA, ESRD on HD MWF via LUE AV fistula, PAD s/p LE stenting, cardiomyopathy EF 40%, MR, and atrial fibrillation s/p ablation 2016. He now has recurrent symptomatic atrial fibrillation, with slow ventricular response at times. He presents today for elective radiofrequency ablation of atrial fibrillation.  Denies complaint today.     Cardiology summary:   Echocardiogram 3/2022 Mod to sev MR, LAE, mild to mod TR, EF 40-45%  Stress Test: 2014 followed by cardiac cath   Cardiac Cath: 2014 with PCI   Cardiac surgery: Previous ablation see HPI 67 year old male with PMH of CAD s/p PCI to LAD 2014, HTN, RA, ESRD on HD MWF via LUE AV fistula, PAD s/p LE stenting, cardiomyopathy EF 40%, MR, and atrial fibrillation s/p ablation 2016 s/p cardioversion 3/2022. He now has recurrent symptomatic atrial fibrillation, with slow ventricular response at times. He presents today for elective radiofrequency ablation of atrial fibrillation.  Denies complaint today.     Cardiology summary:   NICOLETTE: 3/14/22:   1. Tethered mitral valve leaflets with normal opening. Moderate-severe mitral regurgitation.  2. Normal trileaflet aortic valve.  3. Mild atheroma noted in aortic arch/descending aorta.  4. No left atrial or left atrial appendage thrombus; left atrial enlargement.  5. Moderate global left ventricular systolic dysfunction.  6. Right atrial enlargement.  7. The right ventricle is not well visualized; grossly normal right ventricular systolic function.  8. Normal tricuspid valve. Mild-moderate tricuspid regurgitation.  *** Compared with echocardiogram of 5/28/2021, EF a bit higher.  ------------------------------------------------------------------------  Confirmed on  3/14/2022 - 13:37:03 by ADRIAN Milligan  ------------------------------------------------------------------------    TTE: 3/8/22:   Summary:   1. Left ventricular ejection fraction, by visual estimation, is 50 to 55%.   2. Technically limited study.   3. LV apex not well visualized in any view.   4. Mildly decreased global left ventricular systolic function.   5. Severely enlarged left atrium.   6. Elevated mean left atrial pressure.   7. Spectral Doppler shows restrictive pattern of left ventricular myocardial filling (Grade III diastolic dysfunction).   8. Moderately enlarged right atrium.   9. Moderate mitral valve regurgitation.  10. Mild tricuspid regurgitation.  11. Estimated pulmonary artery systolic pressure is 44.6 mmHg assuming a right atrial pressure of 15 mmHg, which is consistent with mild pulmonary hypertension.  [id number] Dung Reece MD, [facility]  Electronically signed on 3/8/2022 at 9:58:57 PM    Nuclear stress test: 5/2014:   IMPRESSIONS:Abnormal Study  * Chest Pain: No chest pain with exercise.  * HR Response: Appropriate.  * BP Response: Appropriate.  * Heart Rhythm: normal sinus.  * ECG Abnormalities: None.  * Arrhythmia: Frequent VPD's, Ventricular Couplets.  * Review of raw data shows: The study is of good technical  quality.  * The left ventricle was mildly dilated at baseline.There  is a medium sized, moderate to severe defect in inferior  wall that is fixed consistent with infarction with minimal  mauro-infarct ischemia.  * Post-stress gated wall motion analysis was performed  (LVEF = 32 %;LVEDV = 161 ml.), revealing moderate  hypokinesis in inferior and inferolateral wall(s).  * No previous Nuclear/Stress exam.  ------------------------------------------------------------------------  Revised on 5/16/2014 - 18:05:41 by Horace Lemus M.D.  ------------------------------------------------------------------------

## 2022-09-29 NOTE — H&P PST ADULT - ASSESSMENT
67 year old male with PMH of CAD s/p PCI to LAD 2014, HTN, RA, ESRD on HD MWF via LUE AV fistula, PAD s/p LE stenting, cardiomyopathy EF 40%, MR, and atrial fibrillation s/p ablation 2016. He now has recurrent symptomatic atrial fibrillation, with slow ventricular response at times. He presents today for elective radiofrequency ablation of atrial fibrillation.     Confirms NPO > 8 hrs, last dose Xarelto 15mg 9/27/22 PM, last dose Farxiga 9/26/22.     - iv heplock  - stat labs, ECG  - consent w/MD    67 year old male with PMH of CAD s/p PCI to LAD 2014, HTN, RA, ESRD on HD MWF via LUE AV fistula, PAD s/p LE stenting, cardiomyopathy EF 40%, MR, and atrial fibrillation s/p ablation 2016. He now has recurrent symptomatic atrial fibrillation, with slow ventricular response at times. He presents today for elective radiofrequency ablation of atrial fibrillation.     Confirms NPO > 8 hrs, last dose Xarelto 15mg 9/27/22 PM, last dose Farxiga 9/26/22.     - iv heplock  - stat labs, ECG  - consent w/MD   - Mr. Sawyer will require HD on 9/30/22 prior to d/c home. Renal consult requested Dr. Edgar [phone]

## 2022-09-29 NOTE — CONSULT NOTE ADULT - ASSESSMENT
67 year old male with PMH of CAD s/p PCI to LAD 2014, HTN, RA, ESRD on HD MWF via LUE AV fistula, PAD s/p LE stenting, cardiomyopathy EF 40%, MR, and atrial fibrillation s/p ablation 2016 s/p cardioversion 3/2022. He now has recurrent symptomatic atrial arrhythmias. He presented electively and is now status post uncomplicated radiofrequency ablation of atrial fibrillation & atypical atrial flutter (PW & CTI touch-up, and atypical flutter rotating around the MAYNOR). Resting comfortably.     S/P Ablation,     ESRD - HD in AM,    EP Cardiology Plans: ( Reviewed )     Bedrest x 4 hours, then OOB with assistance and progress as tolerated.   Groin sutures to be removed by EP service in AM.   Radial art line to be removed once pt fully awake with stable vitals >1 hour.    Xarelto 15mg daily to resume w/dinner  Start Protonix 40mg BID x 2 weeks then daily X 6 weeks.     Start Carafate 1gm BID x 2 weeks.   PO analgesic prn.   On lasix 80mg on Non HD days,    HD - No Heparin in AM,     Will Resume ESRD Care ( Maintain Post - HD euvolemia, Anemia, MBD , Acid - Base Milieu &   K + Management )

## 2022-09-29 NOTE — DISCHARGE NOTE PROVIDER - NSDCMRMEDTOKEN_GEN_ALL_CORE_FT
amiodarone 200 mg oral tablet: 1 tab(s) orally once a day  Farxiga 5 mg oral tablet: 1 tab(s) orally once a day  ferrous sulfate 325 mg (65 mg elemental iron) oral tablet: 1 tab(s) orally once a day  finasteride 5 mg oral tablet: 1 tab(s) orally once a day  furosemide 80 mg oral tablet: 1 tab(s) orally  Sun, Mon-Wed-Fri Only  HOLD ON dialysis days Tu Th Sat  pantoprazole 40 mg oral delayed release tablet: 1 tab(s) orally once a day  simvastatin 40 mg oral tablet: 1 tab(s) orally once a day (at bedtime)  Vitamin D3 25 mcg (1000 intl units) oral capsule: 1 cap(s) orally once a day  Xarelto 15 mg oral tablet: 1 tab(s) orally once a day (in the evening)   amiodarone 200 mg oral tablet: 1 tab(s) orally once a day  Farxiga 5 mg oral tablet: 1 tab(s) orally once a day  ferrous sulfate 325 mg (65 mg elemental iron) oral tablet: 1 tab(s) orally once a day  finasteride 5 mg oral tablet: 1 tab(s) orally once a day  furosemide 80 mg oral tablet: 1 tab(s) orally  Sun, Mon-Wed-Fri Only  HOLD ON dialysis days Tu Th Sat  pantoprazole 40 mg oral delayed release tablet: 1 tab(s) orally 2 times a day x 14 days   pantoprazole 40 mg oral delayed release tablet: 1 tab(s) orally once a day. To be resumed following twice daily dosing.   simvastatin 40 mg oral tablet: 1 tab(s) orally once a day (at bedtime)  sucralfate 1 g/10 mL oral suspension: 10 milliliter(s) orally 2 times a day x 14 days   Vitamin D3 25 mcg (1000 intl units) oral capsule: 1 cap(s) orally once a day  Xarelto 15 mg oral tablet: 1 tab(s) orally once a day (in the evening)

## 2022-09-29 NOTE — H&P PST ADULT - NSICDXPASTMEDICALHX_GEN_ALL_CORE_FT
PAST MEDICAL HISTORY:  Atrial fibrillation     Atrial fibrillation     AV fistula     CAD (coronary artery disease) PCI to LAD 2014    CHF (congestive heart failure)     CKD (chronic kidney disease)     ESRD on dialysis     Gout     History of cardiomyopathy LV dysfunction    HLD (hyperlipidemia)     HTN - Hypertension     Rheumatoid arthritis     Thalassemia minor

## 2022-09-29 NOTE — PROGRESS NOTE ADULT - SUBJECTIVE AND OBJECTIVE BOX
PROCEDURE(S): Radiofrequency Ablation of Atypical Atrial Flutter     ELECTROPHYSIOLOGIST(S): Ephraim James MD         COMPLICATIONS:  none        DISPOSITION: observation      CONDITION: stable    Pt doing well s/p elective radiofrequency atypical atrial flutter ablation (PW and CTI touch-up, *) via RFV access. Pt denies complaint post procedure.     I/O's: 1455 cc in/ 0 cc out     MEDICATIONS  (STANDING):  aMIOdarone    Tablet 200 milliGRAM(s) Oral daily  cholecalciferol 1000 Unit(s) Oral daily  ferrous    sulfate 325 milliGRAM(s) Oral daily  finasteride 5 milliGRAM(s) Oral daily  furosemide   Injectable 20 milliGRAM(s) IV Push once  pantoprazole    Tablet 40 milliGRAM(s) Oral two times a day  rivaroxaban 15 milliGRAM(s) Oral with dinner  simvastatin 40 milliGRAM(s) Oral at bedtime  sucralfate suspension 1 Gram(s) Oral two times a day    MEDICATIONS  (PRN):  acetaminophen     Tablet .. 650 milliGRAM(s) Oral every 6 hours PRN Mild Pain (1 - 3), Moderate Pain (4 - 6)  benzocaine 15 mG/menthol 3.6 mG Lozenge 1 Lozenge Oral every 2 hours PRN Sore Throat    Allergies:  No Known Allergies    Intolerances:  Seafood (Other)    VS:   T(C): 36.3 (09-29-22 @ 07:15), Max: 36.3 (09-29-22 @ 07:15)  HR: 70 (09-29-22 @ 14:15) (54 - 72)  BP: 128/76 (09-29-22 @ 13:54) (115/74 - 128/76)  RR: 15 (09-29-22 @ 14:15) (15 - 16)  SpO2: 100% (09-29-22 @ 14:15) (100% - 100%)  Post-procedure VS: /61 HR 71 O2 sat 100% RR 16     Physical exam:   awake, alert, no obvious distress  Card: S1/S2, RRR, no m/g/r  Resp: lungs CTA b/l  Abd: S/NT/ND  Groin (right): hemostatic sutures in place; sites C/D/I; no bleeding, hematoma, erythema, exudate or edema  Ext: no edema; distal pulses intact    NICOLETTE:   Summary:   1. Left ventricular ejection fraction, by visual estimation, is 45 to 50%.   2. Mildly decreased global left ventricular systolic function.   3. Normal right ventricular size and function, RVSP least 21 mmHg.   4. Moderately enlarged left atrium.   5. Mild mitral valve regurgitation.   6. Mild tricuspid regurgitation.   7. Color flow doppler and intravenous injection of agitated saline demonstrates the presence of an intact intra atrial septum.   8. Mobile intra-atrial septum.   9. No left atrial appendage thrombus, left atrial appendage enlargement and normal left atrial appendage velocities.  10. There is no evidence of pericardial effusion.  Sanjiv Mallory DO Electronically signed on 9/29/2022 at 10:15:44 AM    ECG: sinus rhythm, 1st degree AV block, LAD, QRS 130ms     Assessment:   67 year old male with PMH of CAD s/p PCI to LAD 2014, HTN, RA, ESRD on HD MWF via LUE AV fistula, PAD s/p LE stenting, cardiomyopathy EF 40%, MR, and atrial fibrillation s/p ablation 2016 s/p cardioversion 3/2022. He now has recurrent symptomatic atrial arrhythmias. He presented electively and is now status post uncomplicated radiofrequency ablation of atypical atrial flutter & fibrillation (PWI & CTI touch-up, *).    Plan:   Admit to telemetry/ONU  Bedrest x 4 hours, then OOB with assistance and progress as tolerated.   Groin sutures to be removed by EP service in AM.   Radial art line to be removed once pt fully awake with stable vitals >1 hour.    Pending groin status: Xarelto 15mg daily to resume w/dinner  Start Protonix 40mg BID x 2 weeks then daily X 6 weeks.     Start Carafate 1gm BID x 2 weeks.   PO analgesic prn.   Lasix 20mg IV push x 1 upon ambulation. Will clarify home lasix dose.   Continue other home medications.   Labs and EKG in AM.   Awaiting renal consult. Will need HD prior to d/c on 9/30/22.   Strict I/Os.  Please encourage incentive spirometry and ambulation once able.  Observation and monitoring on telemetry overnight with anticipated discharge in the AM and outpt follow up in 1 month.   PROCEDURE(S): Radiofrequency Ablation of Atypical Atrial Flutter     ELECTROPHYSIOLOGIST(S): Ephraim James MD         COMPLICATIONS:  none        DISPOSITION: observation      CONDITION: stable    Pt doing well s/p elective radiofrequency atypical atrial flutter ablation (PW and CTI touch-up, *) via RFV access. Pt denies complaint post procedure.     I/O's: 1455 cc in/ 0 cc out     MEDICATIONS  (STANDING):  aMIOdarone    Tablet 200 milliGRAM(s) Oral daily  cholecalciferol 1000 Unit(s) Oral daily  ferrous    sulfate 325 milliGRAM(s) Oral daily  finasteride 5 milliGRAM(s) Oral daily  furosemide   Injectable 20 milliGRAM(s) IV Push once  pantoprazole    Tablet 40 milliGRAM(s) Oral two times a day  rivaroxaban 15 milliGRAM(s) Oral with dinner  simvastatin 40 milliGRAM(s) Oral at bedtime  sucralfate suspension 1 Gram(s) Oral two times a day    MEDICATIONS  (PRN):  acetaminophen     Tablet .. 650 milliGRAM(s) Oral every 6 hours PRN Mild Pain (1 - 3), Moderate Pain (4 - 6)  benzocaine 15 mG/menthol 3.6 mG Lozenge 1 Lozenge Oral every 2 hours PRN Sore Throat    Allergies:  No Known Allergies    Intolerances:  Seafood (Other)    VS:   T(C): 36.3 (09-29-22 @ 07:15), Max: 36.3 (09-29-22 @ 07:15)  HR: 70 (09-29-22 @ 14:15) (54 - 72)  BP: 128/76 (09-29-22 @ 13:54) (115/74 - 128/76)  RR: 15 (09-29-22 @ 14:15) (15 - 16)  SpO2: 100% (09-29-22 @ 14:15) (100% - 100%)  Post-procedure VS: /61 HR 71 O2 sat 100% RR 16     Physical exam:   awake, alert, no obvious distress  Card: S1/S2, RRR, no m/g/r  Resp: lungs CTA b/l  Abd: S/NT/ND  Groin (right): hemostatic sutures in place; sites C/D/I; no bleeding, hematoma, erythema, exudate or edema  Ext: no edema; distal pulses intact    NICOLETTE:   Summary:   1. Left ventricular ejection fraction, by visual estimation, is 45 to 50%.   2. Mildly decreased global left ventricular systolic function.   3. Normal right ventricular size and function, RVSP least 21 mmHg.   4. Moderately enlarged left atrium.   5. Mild mitral valve regurgitation.   6. Mild tricuspid regurgitation.   7. Color flow doppler and intravenous injection of agitated saline demonstrates the presence of an intact intra atrial septum.   8. Mobile intra-atrial septum.   9. No left atrial appendage thrombus, left atrial appendage enlargement and normal left atrial appendage velocities.  10. There is no evidence of pericardial effusion.  Sanjiv Mallory DO Electronically signed on 9/29/2022 at 10:15:44 AM    ECG: sinus rhythm, 1st degree AV block, LAD, QRS 130ms     Assessment:   67 year old male with PMH of CAD s/p PCI to LAD 2014, HTN, RA, ESRD on HD MWF via LUE AV fistula, PAD s/p LE stenting, cardiomyopathy EF 40%, MR, and atrial fibrillation s/p ablation 2016 s/p cardioversion 3/2022. He now has recurrent symptomatic atrial arrhythmias. He presented electively and is now status post uncomplicated radiofrequency ablation of atypical atrial flutter & fibrillation (PWI & CTI touch-up, *).    Plan:   Admit to telemetry/ONU  Bedrest x 4 hours, then OOB with assistance and progress as tolerated.   Groin sutures to be removed by EP service in AM.   Radial art line to be removed once pt fully awake with stable vitals >1 hour.    Pending groin status: Xarelto 15mg daily to resume w/dinner  Start Protonix 40mg BID x 2 weeks then daily X 6 weeks.     Start Carafate 1gm BID x 2 weeks.   PO analgesic prn.   Lasix 20mg IV push x 1 upon ambulation. Takes lasix 80mg on Tues, Thurs, Sat (opposite days of HD) as per pt. Last dose this AM.   GDMT: lasix  Continue other home medications.   Labs and EKG in AM.   Awaiting renal consult. Will need HD prior to d/c on 9/30/22.   Strict I/Os.  Please encourage incentive spirometry and ambulation once able.  Observation and monitoring on telemetry overnight with anticipated discharge in the AM and outpt follow up in 1 month.   PROCEDURE(S): Radiofrequency Ablation of Atypical Atrial Flutter     ELECTROPHYSIOLOGIST(S): Ephraim James MD         COMPLICATIONS:  none        DISPOSITION: observation      CONDITION: stable    Pt doing well s/p elective radiofrequency atypical atrial flutter ablation (PW and CTI touch-up, and atypical flutter rotating around the MAYNOR) via RFV access. Pt denies complaint post procedure.     I/O's: 1455 cc in/ 0 cc out     MEDICATIONS  (STANDING):  aMIOdarone    Tablet 200 milliGRAM(s) Oral daily  cholecalciferol 1000 Unit(s) Oral daily  ferrous    sulfate 325 milliGRAM(s) Oral daily  finasteride 5 milliGRAM(s) Oral daily  furosemide   Injectable 20 milliGRAM(s) IV Push once  pantoprazole    Tablet 40 milliGRAM(s) Oral two times a day  rivaroxaban 15 milliGRAM(s) Oral with dinner  simvastatin 40 milliGRAM(s) Oral at bedtime  sucralfate suspension 1 Gram(s) Oral two times a day    MEDICATIONS  (PRN):  acetaminophen     Tablet .. 650 milliGRAM(s) Oral every 6 hours PRN Mild Pain (1 - 3), Moderate Pain (4 - 6)  benzocaine 15 mG/menthol 3.6 mG Lozenge 1 Lozenge Oral every 2 hours PRN Sore Throat    Allergies:  No Known Allergies    Intolerances:  Seafood (Other)    VS:   T(C): 36.3 (09-29-22 @ 07:15), Max: 36.3 (09-29-22 @ 07:15)  HR: 70 (09-29-22 @ 14:15) (54 - 72)  BP: 128/76 (09-29-22 @ 13:54) (115/74 - 128/76)  RR: 15 (09-29-22 @ 14:15) (15 - 16)  SpO2: 100% (09-29-22 @ 14:15) (100% - 100%)  Post-procedure VS: /61 HR 71 O2 sat 100% RR 16     Physical exam:   awake, alert, no obvious distress  Card: S1/S2, RRR, no m/g/r  Resp: lungs CTA b/l  Abd: S/NT/ND  Groin (right): hemostatic sutures in place; sites C/D/I; no bleeding, hematoma, erythema, exudate or edema  Ext: no edema; distal pulses intact    NICOLETTE:   Summary:   1. Left ventricular ejection fraction, by visual estimation, is 45 to 50%.   2. Mildly decreased global left ventricular systolic function.   3. Normal right ventricular size and function, RVSP least 21 mmHg.   4. Moderately enlarged left atrium.   5. Mild mitral valve regurgitation.   6. Mild tricuspid regurgitation.   7. Color flow doppler and intravenous injection of agitated saline demonstrates the presence of an intact intra atrial septum.   8. Mobile intra-atrial septum.   9. No left atrial appendage thrombus, left atrial appendage enlargement and normal left atrial appendage velocities.  10. There is no evidence of pericardial effusion.  Sanjiv Mallory DO Electronically signed on 9/29/2022 at 10:15:44 AM    ECG: sinus rhythm, 1st degree AV block, LAD, QRS 130ms     Assessment:   67 year old male with PMH of CAD s/p PCI to LAD 2014, HTN, RA, ESRD on HD MWF via LUE AV fistula, PAD s/p LE stenting, cardiomyopathy EF 40%, MR, and atrial fibrillation s/p ablation 2016 s/p cardioversion 3/2022. He now has recurrent symptomatic atrial arrhythmias. He presented electively and is now status post uncomplicated radiofrequency ablation of atypical atrial flutter & fibrillation (PW & CTI touch-up, and atypical flutter rotating around the MAYNOR). Resting comfortably.     Plan:   Admit to telemetry/ONU  Bedrest x 4 hours, then OOB with assistance and progress as tolerated.   Groin sutures to be removed by EP service in AM.   Radial art line to be removed once pt fully awake with stable vitals >1 hour.    Pending groin status: Xarelto 15mg daily to resume w/dinner  Start Protonix 40mg BID x 2 weeks then daily X 6 weeks.     Start Carafate 1gm BID x 2 weeks.   PO analgesic prn.   Lasix 20mg IV push x 1 upon ambulation. Takes lasix 80mg on Tues, Thurs, Sat (opposite days of HD) as per pt. Last dose this AM.   GDMT: lasix  Continue other home medications.   Labs and EKG in AM.   Awaiting renal consult. Will need HD prior to d/c on 9/30/22.   Strict I/Os.  Please encourage incentive spirometry and ambulation once able.  Observation and monitoring on telemetry overnight with anticipated discharge in the AM and outpt follow up in 1 month.   PROCEDURE(S): Radiofrequency Ablation of Atrial Fibrillation & Atypical Atrial Flutter     ELECTROPHYSIOLOGIST(S): Ephraim James MD         COMPLICATIONS:  none        DISPOSITION: observation      CONDITION: stable    Pt doing well s/p elective radiofrequency atrial fibrillation & atypical atrial flutter ablation (PW and CTI touch-up, and atypical flutter rotating around the MAYNOR) via RFV access. Pt denies complaint post procedure.     I/O's: 1455 cc in/ 0 cc out     MEDICATIONS  (STANDING):  aMIOdarone    Tablet 200 milliGRAM(s) Oral daily  cholecalciferol 1000 Unit(s) Oral daily  ferrous    sulfate 325 milliGRAM(s) Oral daily  finasteride 5 milliGRAM(s) Oral daily  furosemide   Injectable 20 milliGRAM(s) IV Push once  pantoprazole    Tablet 40 milliGRAM(s) Oral two times a day  rivaroxaban 15 milliGRAM(s) Oral with dinner  simvastatin 40 milliGRAM(s) Oral at bedtime  sucralfate suspension 1 Gram(s) Oral two times a day    MEDICATIONS  (PRN):  acetaminophen     Tablet .. 650 milliGRAM(s) Oral every 6 hours PRN Mild Pain (1 - 3), Moderate Pain (4 - 6)  benzocaine 15 mG/menthol 3.6 mG Lozenge 1 Lozenge Oral every 2 hours PRN Sore Throat    Allergies:  No Known Allergies    Intolerances:  Seafood (Other)    VS:   T(C): 36.3 (09-29-22 @ 07:15), Max: 36.3 (09-29-22 @ 07:15)  HR: 70 (09-29-22 @ 14:15) (54 - 72)  BP: 128/76 (09-29-22 @ 13:54) (115/74 - 128/76)  RR: 15 (09-29-22 @ 14:15) (15 - 16)  SpO2: 100% (09-29-22 @ 14:15) (100% - 100%)  Post-procedure VS: /61 HR 71 O2 sat 100% RR 16     Physical exam:   awake, alert, no obvious distress  Card: S1/S2, RRR, no m/g/r  Resp: lungs CTA b/l  Abd: S/NT/ND  Groin (right): hemostatic sutures in place; sites C/D/I; no bleeding, hematoma, erythema, exudate or edema  Ext: no edema; distal pulses intact    NICOLETTE:   Summary:   1. Left ventricular ejection fraction, by visual estimation, is 45 to 50%.   2. Mildly decreased global left ventricular systolic function.   3. Normal right ventricular size and function, RVSP least 21 mmHg.   4. Moderately enlarged left atrium.   5. Mild mitral valve regurgitation.   6. Mild tricuspid regurgitation.   7. Color flow doppler and intravenous injection of agitated saline demonstrates the presence of an intact intra atrial septum.   8. Mobile intra-atrial septum.   9. No left atrial appendage thrombus, left atrial appendage enlargement and normal left atrial appendage velocities.  10. There is no evidence of pericardial effusion.  Sanjiv Mallory DO Electronically signed on 9/29/2022 at 10:15:44 AM    ECG: sinus rhythm, 1st degree AV block, LAD, QRS 130ms     Assessment:   67 year old male with PMH of CAD s/p PCI to LAD 2014, HTN, RA, ESRD on HD MWF via LUE AV fistula, PAD s/p LE stenting, cardiomyopathy EF 40%, MR, and atrial fibrillation s/p ablation 2016 s/p cardioversion 3/2022. He now has recurrent symptomatic atrial arrhythmias. He presented electively and is now status post uncomplicated radiofrequency ablation of atrial fibrillation & atypical atrial flutter (PW & CTI touch-up, and atypical flutter rotating around the MAY[place]). Resting comfortably.     Plan:   Admit to telemetry/ONU  Bedrest x 4 hours, then OOB with assistance and progress as tolerated.   Groin sutures to be removed by EP service in AM.   Radial art line to be removed once pt fully awake with stable vitals >1 hour.    Pending groin status: Xarelto 15mg daily to resume w/dinner  Start Protonix 40mg BID x 2 weeks then daily X 6 weeks.     Start Carafate 1gm BID x 2 weeks.   PO analgesic prn.   Lasix 20mg IV push x 1 upon ambulation. Takes lasix 80mg on Tues, Thurs, Sat (opposite days of HD) as per pt. Last dose this AM.   GDMT: lasix  Continue other home medications.   Labs and EKG in AM.   Awaiting renal consult. Will need HD prior to d/c on 9/30/22.   Strict I/Os.  Please encourage incentive spirometry and ambulation once able.  Observation and monitoring on telemetry overnight with anticipated discharge in the AM and outpt follow up in 1 month.   PROCEDURE(S): Radiofrequency Ablation of Atrial Fibrillation & Atypical Atrial Flutter     ELECTROPHYSIOLOGIST(S): Ephraim James MD         COMPLICATIONS:  none        DISPOSITION: observation      CONDITION: stable    Pt doing well s/p elective radiofrequency atrial fibrillation & atypical atrial flutter ablation (PW and CTI touch-up, and atypical flutter rotating around the MAY[place]) via RFV access. Pt denies complaint post procedure.     I/O's: 1455 cc in/ 0 cc out     MEDICATIONS  (STANDING):  aMIOdarone    Tablet 200 milliGRAM(s) Oral daily  cholecalciferol 1000 Unit(s) Oral daily  ferrous    sulfate 325 milliGRAM(s) Oral daily  finasteride 5 milliGRAM(s) Oral daily  furosemide   Injectable 20 milliGRAM(s) IV Push once  pantoprazole    Tablet 40 milliGRAM(s) Oral two times a day  rivaroxaban 15 milliGRAM(s) Oral with dinner  simvastatin 40 milliGRAM(s) Oral at bedtime  sucralfate suspension 1 Gram(s) Oral two times a day    MEDICATIONS  (PRN):  acetaminophen     Tablet .. 650 milliGRAM(s) Oral every 6 hours PRN Mild Pain (1 - 3), Moderate Pain (4 - 6)  benzocaine 15 mG/menthol 3.6 mG Lozenge 1 Lozenge Oral every 2 hours PRN Sore Throat    Allergies:  No Known Allergies    Intolerances:  Seafood (Other)    VS:   T(C): 36.3 (09-29-22 @ 07:15), Max: 36.3 (09-29-22 @ 07:15)  HR: 70 (09-29-22 @ 14:15) (54 - 72)  BP: 128/76 (09-29-22 @ 13:54) (115/74 - 128/76)  RR: 15 (09-29-22 @ 14:15) (15 - 16)  SpO2: 100% (09-29-22 @ 14:15) (100% - 100%)  Post-procedure VS: /61 HR 71 O2 sat 100% RR 16     Physical exam:   awake, alert, no obvious distress  Card: S1/S2, RRR, no m/g/r  Resp: lungs CTA b/l  Abd: S/NT/ND  Groin (right): hemostatic sutures in place; sites C/D/I; no bleeding, hematoma, erythema, exudate or edema  Ext: no edema; distal pulses intact    NICOLETTE:   Summary:   1. Left ventricular ejection fraction, by visual estimation, is 45 to 50%.   2. Mildly decreased global left ventricular systolic function.   3. Normal right ventricular size and function, RVSP least 21 mmHg.   4. Moderately enlarged left atrium.   5. Mild mitral valve regurgitation.   6. Mild tricuspid regurgitation.   7. Color flow doppler and intravenous injection of agitated saline demonstrates the presence of an intact intra atrial septum.   8. Mobile intra-atrial septum.   9. No left atrial appendage thrombus, left atrial appendage enlargement and normal left atrial appendage velocities.  10. There is no evidence of pericardial effusion.  Snajiv Mallory DO Electronically signed on 9/29/2022 at 10:15:44 AM    ECG: sinus rhythm, 1st degree AV block, LAD, QRS 130ms     Assessment:   67 year old male with PMH of CAD s/p PCI to LAD 2014, HTN, RA, ESRD on HD MWF via LUE AV fistula, PAD s/p LE stenting, cardiomyopathy EF 40%, MR, and atrial fibrillation s/p ablation 2016 s/p cardioversion 3/2022. He now has recurrent symptomatic atrial arrhythmias. He presented electively and is now status post uncomplicated radiofrequency ablation of atrial fibrillation & atypical atrial flutter (PW & CTI touch-up, and atypical flutter rotating around the MAY[place]). Resting comfortably.     Plan:   Admit to telemetry/ONU  Bedrest x 4 hours, then OOB with assistance and progress as tolerated.   Groin sutures to be removed by EP service in AM.   Radial art line to be removed once pt fully awake with stable vitals >1 hour.    Pending groin status: Xarelto 15mg daily to resume w/dinner  Start Protonix 40mg BID x 2 weeks then daily X 6 weeks.     Start Carafate 1gm BID x 2 weeks.   PO analgesic prn.   Lasix 20mg IV push x 1 upon ambulation. Takes lasix 80mg on Tues, Thurs, Sat (opposite days of HD) as per pt. Last dose this AM.   GDMT: lasix; unclear why pt is not on bb as he was d/c'd on toprol after 3/2022 hospitalization; not previously on ACE due to renal disease   Continue other home medications.   Labs and EKG in AM.   Awaiting renal consult. Will need HD prior to d/c on 9/30/22.   Strict I/Os.  Please encourage incentive spirometry and ambulation once able.  Observation and monitoring on telemetry overnight with anticipated discharge in the AM and outpt follow up in 1 month.   PROCEDURE(S): Radiofrequency Ablation of Atrial Fibrillation & Atypical Atrial Flutter     ELECTROPHYSIOLOGIST(S): Ephraim James MD         COMPLICATIONS:  none        DISPOSITION: observation      CONDITION: stable    Pt doing well s/p elective radiofrequency atrial fibrillation & atypical atrial flutter ablation (PW and CTI touch-up, and atypical flutter rotating around the MAY[place]) via RFV access. Pt denies complaint post procedure.     I/O's: 1455 cc in/ 0 cc out     MEDICATIONS  (STANDING):  aMIOdarone    Tablet 200 milliGRAM(s) Oral daily  cholecalciferol 1000 Unit(s) Oral daily  ferrous    sulfate 325 milliGRAM(s) Oral daily  finasteride 5 milliGRAM(s) Oral daily  furosemide   Injectable 20 milliGRAM(s) IV Push once  pantoprazole    Tablet 40 milliGRAM(s) Oral two times a day  rivaroxaban 15 milliGRAM(s) Oral with dinner  simvastatin 40 milliGRAM(s) Oral at bedtime  sucralfate suspension 1 Gram(s) Oral two times a day    MEDICATIONS  (PRN):  acetaminophen     Tablet .. 650 milliGRAM(s) Oral every 6 hours PRN Mild Pain (1 - 3), Moderate Pain (4 - 6)  benzocaine 15 mG/menthol 3.6 mG Lozenge 1 Lozenge Oral every 2 hours PRN Sore Throat    Allergies:  No Known Allergies    Intolerances:  Seafood (Other)    VS:   T(C): 36.3 (09-29-22 @ 07:15), Max: 36.3 (09-29-22 @ 07:15)  HR: 70 (09-29-22 @ 14:15) (54 - 72)  BP: 128/76 (09-29-22 @ 13:54) (115/74 - 128/76)  RR: 15 (09-29-22 @ 14:15) (15 - 16)  SpO2: 100% (09-29-22 @ 14:15) (100% - 100%)  Post-procedure VS: /61 HR 71 O2 sat 100% RR 16     Physical exam:   awake, alert, no obvious distress  Card: S1/S2, RRR, no m/g/r  Resp: lungs CTA b/l  Abd: S/NT/ND  Groin (right): hemostatic sutures in place; sites C/D/I; no bleeding, hematoma, erythema, exudate or edema  Ext: no edema; distal pulses intact    NICOLETTE:   Summary:   1. Left ventricular ejection fraction, by visual estimation, is 45 to 50%.   2. Mildly decreased global left ventricular systolic function.   3. Normal right ventricular size and function, RVSP least 21 mmHg.   4. Moderately enlarged left atrium.   5. Mild mitral valve regurgitation.   6. Mild tricuspid regurgitation.   7. Color flow doppler and intravenous injection of agitated saline demonstrates the presence of an intact intra atrial septum.   8. Mobile intra-atrial septum.   9. No left atrial appendage thrombus, left atrial appendage enlargement and normal left atrial appendage velocities.  10. There is no evidence of pericardial effusion.  Sanjiv Mallory DO Electronically signed on 9/29/2022 at 10:15:44 AM    ECG: sinus rhythm, 1st degree AV block, LAD, QRS 130ms     Assessment:   67 year old male with PMH of CAD s/p PCI to LAD 2014, HTN, RA, ESRD on HD MWF via LUE AV fistula, PAD s/p LE stenting, cardiomyopathy EF 40%, MR, and atrial fibrillation s/p ablation 2016 s/p cardioversion 3/2022. He now has recurrent symptomatic atrial arrhythmias. He presented electively and is now status post uncomplicated radiofrequency ablation of atrial fibrillation & atypical atrial flutter (PW & CTI touch-up, and atypical flutter rotating around the MAY[place]). Resting comfortably.     Plan:   Admit to telemetry/ONU  Bedrest x 4 hours, then OOB with assistance and progress as tolerated.   Groin sutures to be removed by EP service in AM.   Radial art line to be removed once pt fully awake with stable vitals >1 hour.    Pending groin status: Xarelto 15mg daily to resume w/dinner  Start Protonix 40mg BID x 2 weeks then daily X 6 weeks.     Start Carafate 1gm BID x 2 weeks.   PO analgesic prn.   Lasix 20mg IV push x 1 upon ambulation. Takes lasix 80mg on Tues, Thurs, Sat (opposite days of HD) as per pt. Last dose this AM.   GDMT: lasix; per pt, metoprolol d/c'd by renal due to hypotension; not previously on ACE due to renal disease   Continue other home medications.   Labs and EKG in AM.   Awaiting renal consult. Will need HD prior to d/c on 9/30/22.   Strict I/Os.  Please encourage incentive spirometry and ambulation once able.  Observation and monitoring on telemetry overnight with anticipated discharge in the AM and outpt follow up in 1 month.

## 2022-09-29 NOTE — CONSULT NOTE ADULT - SUBJECTIVE AND OBJECTIVE BOX
Patient is a 67y old  Male who presents with a chief complaint of AFib ablation (29 Sep 2022 14:51)      HPI:  67 year old male with PMH of CAD s/p PCI to LAD 2014, HTN, RA, ESRD on HD MWF via LUE AV fistula, PAD s/p LE stenting, cardiomyopathy EF 40%, MR, and atrial fibrillation s/p ablation 2016 s/p cardioversion 3/2022. He now has recurrent symptomatic atrial fibrillation, with slow ventricular response at times. He presents today for elective radiofrequency ablation of atrial fibrillation.  Denies complaint today.     Cardiology summary:   NICOLETTE: 3/14/22:   1. Tethered mitral valve leaflets with normal opening. Moderate-severe mitral regurgitation.  2. Normal trileaflet aortic valve.  3. Mild atheroma noted in aortic arch/descending aorta.  4. No left atrial or left atrial appendage thrombus; left atrial enlargement.  5. Moderate global left ventricular systolic dysfunction.  6. Right atrial enlargement.  7. The right ventricle is not well visualized; grossly normal right ventricular systolic function.  8. Normal tricuspid valve. Mild-moderate tricuspid regurgitation.  *** Compared with echocardiogram of 5/28/2021, EF a bit higher.  ------------------------------------------------------------------------  Confirmed on  3/14/2022 - 13:37:03 by ADRIAN Milligan  ------------------------------------------------------------------------    TTE: 3/8/22:   Summary:   1. Left ventricular ejection fraction, by visual estimation, is 50 to 55%.   2. Technically limited study.   3. LV apex not well visualized in any view.   4. Mildly decreased global left ventricular systolic function.   5. Severely enlarged left atrium.   6. Elevated mean left atrial pressure.   7. Spectral Doppler shows restrictive pattern of left ventricular myocardial filling (Grade III diastolic dysfunction).   8. Moderately enlarged right atrium.   9. Moderate mitral valve regurgitation.  10. Mild tricuspid regurgitation.  11. Estimated pulmonary artery systolic pressure is 44.6 mmHg assuming a right atrial pressure of 15 mmHg, which is consistent with mild pulmonary hypertension.  [id number] Dung Reece MD, [facility]  Electronically signed on 3/8/2022 at 9:58:57 PM    Nuclear stress test: 5/2014:   IMPRESSIONS:Abnormal Study  * Chest Pain: No chest pain with exercise.  * HR Response: Appropriate.  * BP Response: Appropriate.  * Heart Rhythm: normal sinus.  * ECG Abnormalities: None.  * Arrhythmia: Frequent VPD's, Ventricular Couplets.  * Review of raw data shows: The study is of good technical  quality.  * The left ventricle was mildly dilated at baseline.There  is a medium sized, moderate to severe defect in inferior  wall that is fixed consistent with infarction with minimal  mauro-infarct ischemia.  * Post-stress gated wall motion analysis was performed  (LVEF = 32 %;LVEDV = 161 ml.), revealing moderate  hypokinesis in inferior and inferolateral wall(s).  * No previous Nuclear/Stress exam.  ------------------------------------------------------------------------  Revised on 5/16/2014 - 18:05:41 by Horace Lemus M.D.  ------------------------------------------------------------------------   (29 Sep 2022 07:33)      PAST MEDICAL & SURGICAL HISTORY:  HTN - Hypertension      CKD (chronic kidney disease)      Gout      CAD (coronary artery disease)  PCI to LAD 2014      HLD (hyperlipidemia)      Atrial fibrillation      CHF (congestive heart failure)      History of cardiomyopathy  LV dysfunction      Thalassemia minor      AV fistula      ESRD on dialysis      Atrial fibrillation      Rheumatoid arthritis      S/P Arthroscopic Surgery of Left Knee  2001      History of Arthroscopy- ankle  2003      S/P angioplasty with stent  5/2014 2016      S/P hernia surgery      Status post cataract extraction  left eye done 10/18/2018      H/O cardiac radiofrequency ablation  atrial fibrillation      History of tonsillectomy      H/O hernia repair      AV fistula          FAMILY HISTORY:  Family history of hypertension (Father, Mother)    Family history of diabetes mellitus (Father, Mother)        Social History:    MEDICATIONS  (STANDING):  aMIOdarone    Tablet 200 milliGRAM(s) Oral daily  cholecalciferol 1000 Unit(s) Oral daily  dapagliflozin 5 milliGRAM(s) Oral daily  ferrous    sulfate 325 milliGRAM(s) Oral daily  finasteride 5 milliGRAM(s) Oral daily  pantoprazole    Tablet 40 milliGRAM(s) Oral two times a day  rivaroxaban 15 milliGRAM(s) Oral with dinner  simvastatin 40 milliGRAM(s) Oral at bedtime  sucralfate suspension 1 Gram(s) Oral two times a day    MEDICATIONS  (PRN):  acetaminophen     Tablet .. 650 milliGRAM(s) Oral every 6 hours PRN Mild Pain (1 - 3), Moderate Pain (4 - 6)  benzocaine 15 mG/menthol 3.6 mG Lozenge 1 Lozenge Oral every 2 hours PRN Sore Throat      Allergies    No Known Allergies    Intolerances    Seafood (Other)      REVIEW OF SYSTEMS:    CONSTITUTIONAL: No fever, weight loss, or fatigue  EYES: No eye pain, visual disturbances, or discharge  ENMT:  No difficulty hearing, tinnitus, vertigo; No sinus or throat pain  NECK: No pain or stiffness  BREASTS: No pain, masses, or nipple discharge  RESPIRATORY: No cough, wheezing, chills or hemoptysis; No shortness of breath  CARDIOVASCULAR: No chest pain, palpitations, dizziness, or leg swelling  GASTROINTESTINAL: No abdominal or epigastric pain. No nausea, vomiting, or hematemesis; No diarrhea or constipation. No melena or hematochezia.  GENITOURINARY: No dysuria, frequency, hematuria, or incontinence  NEUROLOGICAL: No headaches, memory loss, loss of strength, numbness, or tremors  SKIN: No itching, burning, rashes, or lesions   LYMPH NODES: No enlarged glands  ENDOCRINE: No heat or cold intolerance; No hair loss  MUSCULOSKELETAL: No joint pain or swelling; No muscle, back, or extremity pain  PSYCHIATRIC: No depression, anxiety, mood swings, or difficulty sleeping  HEME/LYMPH: No easy bruising, or bleeding gums  ALLERY AND IMMUNOLOGIC: No hives or eczema      Vital Signs Last 24 Hrs  T(C): 36.4 (30 Sep 2022 06:10), Max: 36.4 (30 Sep 2022 06:10)  T(F): 97.5 (30 Sep 2022 06:10), Max: 97.5 (30 Sep 2022 06:10)  HR: 67 (30 Sep 2022 06:10) (54 - 86)  BP: 115/66 (30 Sep 2022 06:10) (115/66 - 142/81)  BP(mean): 87 (29 Sep 2022 07:33) (87 - 87)  RR: 20 (30 Sep 2022 06:10) (14 - 20)  SpO2: 97% (30 Sep 2022 06:10) (96% - 100%)    Parameters below as of 30 Sep 2022 06:10  Patient On (Oxygen Delivery Method): room air        PHYSICAL EXAM:    GENERAL: NAD, well-groomed, well-developed  HEAD:  Atraumatic, Normocephalic  EYES: EOMI, PERRLA, conjunctiva and sclera clear  ENMT: No tonsillar erythema, exudates, or enlargement; Moist mucous membranes, Good dentition, No lesions  NECK: Supple, No JVD, Normal thyroid  NERVOUS SYSTEM:  Alert & Oriented X3, Good concentration; Motor Strength 5/5 B/L upper and lower extremities; DTRs 2+ intact and symmetric  CHEST/LUNG: Clear to percussion bilaterally; No rales, rhonchi, wheezing, or rubs  HEART: Regular rate and rhythm; No murmurs, rubs, or gallops  ABDOMEN: Soft, Nontender, Nondistended; Bowel sounds present  EXTREMITIES:  2+ Peripheral Pulses, No clubbing, cyanosis, or edema  LYMPH: No lymphadenopathy noted  SKIN: No rashes or lesions    LABS:                        9.6    7.96  )-----------( 162      ( 30 Sep 2022 04:55 )             28.8     09-30    140  |  101  |  40.1<H>  ----------------------------<  91  3.7   |  27.0  |  8.13<H>    Ca    8.7      30 Sep 2022 04:55  Mg     2.0     09-30    PT/INR - ( 29 Sep 2022 06:45 )   PT: 16.0 sec;   INR: 1.37 ratio      PTT - ( 29 Sep 2022 06:45 )  PTT:37.5 sec    Magnesium, Serum: 2.0 mg/dL (09-30 @ 04:55)  Magnesium, Serum: 2.0 mg/dL (09-29 @ 06:45)    RADIOLOGY & ADDITIONAL TESTS:    IR Procedure (03.23.22 @ 14:02)   Successful conversion of a right internal jugular temporary dialysis   catheter to a tunneled dialysis catheter as described above.    Potassium, Serum: 3.7 mmol/L (09.30.22 @ 04:55)   Historical Values  Potassium, Serum: 3.7 mmol/L (09.30.22 @ 04:55)   Potassium, Serum: 4.1 mmol/L (09.29.22 @ 06:45)   Potassium, Serum: 3.6 mmol/L (08.26.22 @ 05:40)   Potassium, Serum: 3.2 mmol/L (08.04.22 @ 13:20)Hemoglobin: 9.6 g/dL (09.30.22 @ 04:55)       Historical Values  Hemoglobin: 9.6 g/dL (09.30.22 @ 04:55)   Hemoglobin: 11.9 g/dL (09.29.22 @ 06:45) < from: NICOLETTE Echo Doppler (09.29.22 @ 08:43) >    ACC: [id number] EXAM:  NICOLETTE COMP W SPECT AND COLOR#                          PROCEDURE DATE:  09/29/2022          INTERPRETATION:  TRANSESOPHAGEAL ECHOCARDIOGRAM REPORT        Patient Name:   SYEDA WEAVER Patient Location: Inpatient  Medical Rec #:  [id number]                  Accession #:      [id number]  Account #:      [id number]                  Height:           71.0 in 180.3   cm  YOB: 1955                 Weight:           222.0 lb   100.70 kg  Patient Age:    67 years                  BSA:              2.20 m²  Patient Gender: M                         BP:               110/60 mmHg      Date of Exam:        9/29/2022 8:43:48 AM  Sonographer:         Dianelys Woods  Referring Physician: Ephraim James MD    Procedure:   Transesophageal Echocardiogram and Saline Contrast (Bubble   Study).  Indications: Paroxysmal atrial fibrillation - I48.0  Diagnosis:   Paroxysmal atrial fibrillation - I48.0    SPECTRAL DOPPLER ANALYSIS (where applicable):      PROCEDURE: Intravenous sedation was performed by anesthesia. Images were   obtained with the patient in a supine position. The patient's vital   signs; including heart rate, blood pressure, and oxygen saturation;   remained stable throughout the procedure. NICOLETTE done on table pre-Afib   ablation.    PHYSICIAN INTERPRETATION:  Left Ventricle:  Global LV systolic function was mildly decreased. Left ventricular   ejection fraction, by visual estimation, is 45 to 50%.  Right Ventricle: Normal right ventricular size and function.  Left Atrium: Moderately enlarged left atrium. No left atrial appendage   thrombus is seen, the left atrial appendage is enlarged and normal left   atrial appendage velocities. Mobile intra-atrial septum. Color flow   doppler and intravenous injection of agitated saline demonstrates the   presence of an intact intra atrial septum.  Pericardium: There is no evidence of pericardial effusion.  Mitral Valve: Structurally normal mitral valve, with normal leaflet   excursion. Mild mitral valve regurgitation is seen.  Tricuspid Valve: Structurally normal tricuspid valve, with normal leaflet   excursion. Mild tricuspid regurgitation is visualized.  Aortic Valve: The aortic valve is trileaflet. No evidence of aortic   stenosis. No evidence of aortic valve regurgitation is seen.  Pulmonic Valve: Structurally normal pulmonic valve, with normal leaflet   excursion. No indication of pulmonic valve regurgitation.  Aorta: The aortic root, ascending aorta, aortic arch and descending aorta   are all structurally normal, with no evidence of dilitation or   obstruction.  Venous: The pulmonary veins appear normal.  Shunts: Agitated saline contrast was given intravenously to evaluate for   intracardiac shunting.  In comparison to the previousechocardiogram(s): There are no prior   studies on this patient for comparison purposes.      Summary:   1. Left ventricular ejection fraction, by visual estimation, is 45 to   50%.   2. Mildly decreased global left ventricular systolic function.   3. Normal right ventricular size and function, RVSP least 21 mmHg.   4. Moderately enlarged left atrium.   5. Mild mitral valve regurgitation.   6. Mild tricuspid regurgitation.   7. Color flow doppler and intravenous injection of agitated saline   demonstrates the presence of an intact intra atrial septum.   8. Mobile intra-atrial septum.   9. No left atrial appendage thrombus, left atrial appendage enlargement   and normal left atrial appendage velocities.  10. There is no evidence of pericardialeffusion.    Sanjiv Holliday DO Electronically signed on 9/29/2022 at 10:15:44 AM    AV fistula: Entered Date: 29-Sep-2022 08:01, Status: Active, Scope: General, Entered By: Becky Roger, Last Modified By: Becky Roger

## 2022-09-29 NOTE — DISCHARGE NOTE PROVIDER - NSDCFUSCHEDAPPT_GEN_ALL_CORE_FT
Ephraim James  [facility]  ELECTROPH [address]  Scheduled Appointment: 10/05/2022    [facility]  VASCULAR [address]  Scheduled Appointment: 11/29/2022    Noman Hung  [facility]  VASCULAR [address]  Scheduled Appointment: 11/29/2022    London Lara  [facility]  INTMED [address] [facility]  Scheduled Appointment: 12/12/2022

## 2022-09-29 NOTE — DISCHARGE NOTE PROVIDER - HOSPITAL COURSE
67 year old male with PMH of CAD s/p PCI to LAD 2014, HTN, RA, ESRD on HD MWF via LUE AV fistula, PAD s/p LE stenting, cardiomyopathy EF 40%, MR, and atrial fibrillation s/p ablation 2016 s/p cardioversion 3/2022. He now has recurrent symptomatic atrial arrhythmias. He presented electively and is now status post uncomplicated radiofrequency ablation of atrial fibrillation & atypical atrial flutter (PW & CTI touch-up, and atypical flutter rotating around the MAYNOR). Resting comfortably.

## 2022-09-29 NOTE — DISCHARGE NOTE PROVIDER - NSDCCPCAREPLAN_GEN_ALL_CORE_FT
PRINCIPAL DISCHARGE DIAGNOSIS  Diagnosis: Atypical atrial flutter  Assessment and Plan of Treatment:

## 2022-09-29 NOTE — H&P PST ADULT - NSICDXPASTSURGICALHX_GEN_ALL_CORE_FT
PAST SURGICAL HISTORY:  AV fistula     H/O cardiac radiofrequency ablation atrial fibrillation    H/O hernia repair     History of Arthroscopy- ankle 2003    History of tonsillectomy     S/P angioplasty with stent 5/2014 2016    S/P Arthroscopic Surgery of Left Knee 2001    S/P hernia surgery     Status post cataract extraction left eye done 10/18/2018

## 2022-09-29 NOTE — DISCHARGE NOTE PROVIDER - CARE PROVIDER_API CALL
Ephraim James (MD)  Cardiac Electrophysiology; Cardiology; Internal Medicine  [address] [place] [address] [id number]  Phone: [phone]  Fax: [phone]  Follow Up Time:

## 2022-09-29 NOTE — DISCHARGE NOTE PROVIDER - NSDCFUADDINST_GEN_ALL_CORE_FT
Follow up with Dr. James in 3-4 weeks. Our office will contact you in 3-5 days to schedule this appointment. Please call your doctor with any questions or concerns.

## 2022-09-29 NOTE — DISCHARGE NOTE PROVIDER - NSDCCPTREATMENT_GEN_ALL_CORE_FT
PRINCIPAL PROCEDURE  Procedure: Radiofrequency ablation, arrhythmogenic focus, for atrial flutter  Findings and Treatment: - Bruising at the groin, sometimes extending down the leg, and/or a small lump under the skin at the groin access site is normal and will resolve within 2 – 3 weeks.   - Occasional skipped beats or palpitations that last for a few beats are common and generally resolve within 1-2 months.   - You may walk and take stairs at a regular pace.   - Do not perform any exercise more strenuous than walking for 1 week.   - Do not strain or lift heavy objects for 1 week.  - You may shower the day after the procedure.  - Do not soak in water (such as tub baths, hot tubs, swimming, etc.) for 1 week.   - You may resume all other activities the day after the procedure.  Call your doctor if:   - you notice bleeding, redness, drainage, swelling, increased tenderness or a hot sensation around the catheter insertion site.   - your temperature is greater than 100 degrees F for more than 24 hours.  - your rapid heart rhythm returns.  - you have any questions or concerns regarding the procedure.  If significant bleeding and/or a large lump (the size of a golf ball or bigger) occurs:  - Lie flat and apply continuous direct pressure just above the puncture site for at least 10 minutes  - If the issue resolves, notify your physician immediately.    - If the bleeding cannot be controlled, please seek immediate medical attention.  If you experience increased difficulty breathing or chest pain, or if you faint or have dizzy spells, please seek immediate medical attention.

## 2022-09-30 ENCOUNTER — TRANSCRIPTION ENCOUNTER (OUTPATIENT)
Age: 67
End: 2022-09-30

## 2022-09-30 VITALS
HEART RATE: 68 BPM | SYSTOLIC BLOOD PRESSURE: 123 MMHG | OXYGEN SATURATION: 96 % | RESPIRATION RATE: 18 BRPM | DIASTOLIC BLOOD PRESSURE: 74 MMHG

## 2022-09-30 LAB
ANION GAP SERPL CALC-SCNC: 12 MMOL/L — SIGNIFICANT CHANGE UP (ref 5–17)
BUN SERPL-MCNC: 40.1 MG/DL — HIGH (ref 8–20)
CALCIUM SERPL-MCNC: 8.7 MG/DL — SIGNIFICANT CHANGE UP (ref 8.4–10.5)
CHLORIDE SERPL-SCNC: 101 MMOL/L — SIGNIFICANT CHANGE UP (ref 98–107)
CO2 SERPL-SCNC: 27 MMOL/L — SIGNIFICANT CHANGE UP (ref 22–29)
CREAT SERPL-MCNC: 8.13 MG/DL — HIGH (ref 0.5–1.3)
EGFR: 7 ML/MIN/1.73M2 — LOW
GLUCOSE SERPL-MCNC: 91 MG/DL — SIGNIFICANT CHANGE UP (ref 70–99)
HCT VFR BLD CALC: 28.8 % — LOW (ref 39–50)
HGB BLD-MCNC: 9.6 G/DL — LOW (ref 13–17)
MAGNESIUM SERPL-MCNC: 2 MG/DL — SIGNIFICANT CHANGE UP (ref 1.6–2.6)
MCHC RBC-ENTMCNC: 21.9 PG — LOW (ref 27–34)
MCHC RBC-ENTMCNC: 33.3 GM/DL — SIGNIFICANT CHANGE UP (ref 32–36)
MCV RBC AUTO: 65.6 FL — LOW (ref 80–100)
PLATELET # BLD AUTO: 162 K/UL — SIGNIFICANT CHANGE UP (ref 150–400)
POTASSIUM SERPL-MCNC: 3.7 MMOL/L — SIGNIFICANT CHANGE UP (ref 3.5–5.3)
POTASSIUM SERPL-SCNC: 3.7 MMOL/L — SIGNIFICANT CHANGE UP (ref 3.5–5.3)
RBC # BLD: 4.39 M/UL — SIGNIFICANT CHANGE UP (ref 4.2–5.8)
RBC # FLD: 15.9 % — HIGH (ref 10.3–14.5)
SODIUM SERPL-SCNC: 140 MMOL/L — SIGNIFICANT CHANGE UP (ref 135–145)
WBC # BLD: 7.96 K/UL — SIGNIFICANT CHANGE UP (ref 3.8–10.5)
WBC # FLD AUTO: 7.96 K/UL — SIGNIFICANT CHANGE UP (ref 3.8–10.5)

## 2022-09-30 PROCEDURE — C1732: CPT

## 2022-09-30 PROCEDURE — 80048 BASIC METABOLIC PNL TOTAL CA: CPT

## 2022-09-30 PROCEDURE — 36415 COLL VENOUS BLD VENIPUNCTURE: CPT

## 2022-09-30 PROCEDURE — 86900 BLOOD TYPING SEROLOGIC ABO: CPT

## 2022-09-30 PROCEDURE — C1766: CPT

## 2022-09-30 PROCEDURE — C1889: CPT

## 2022-09-30 PROCEDURE — 85027 COMPLETE CBC AUTOMATED: CPT

## 2022-09-30 PROCEDURE — 85610 PROTHROMBIN TIME: CPT

## 2022-09-30 PROCEDURE — 93312 ECHO TRANSESOPHAGEAL: CPT

## 2022-09-30 PROCEDURE — 93005 ELECTROCARDIOGRAM TRACING: CPT

## 2022-09-30 PROCEDURE — C9399: CPT

## 2022-09-30 PROCEDURE — 86901 BLOOD TYPING SEROLOGIC RH(D): CPT

## 2022-09-30 PROCEDURE — 93010 ELECTROCARDIOGRAM REPORT: CPT

## 2022-09-30 PROCEDURE — 93325 DOPPLER ECHO COLOR FLOW MAPG: CPT

## 2022-09-30 PROCEDURE — C1759: CPT

## 2022-09-30 PROCEDURE — 83735 ASSAY OF MAGNESIUM: CPT

## 2022-09-30 PROCEDURE — 90935 HEMODIALYSIS ONE EVALUATION: CPT

## 2022-09-30 PROCEDURE — C1894: CPT

## 2022-09-30 PROCEDURE — C1893: CPT

## 2022-09-30 PROCEDURE — 86850 RBC ANTIBODY SCREEN: CPT

## 2022-09-30 PROCEDURE — 99261: CPT

## 2022-09-30 PROCEDURE — 93320 DOPPLER ECHO COMPLETE: CPT

## 2022-09-30 PROCEDURE — C1731: CPT

## 2022-09-30 PROCEDURE — 85730 THROMBOPLASTIN TIME PARTIAL: CPT

## 2022-09-30 RX ORDER — SUCRALFATE 1 G
10 TABLET ORAL
Qty: 280 | Refills: 0
Start: 2022-09-30 | End: 2022-10-13

## 2022-09-30 RX ORDER — PANTOPRAZOLE SODIUM 20 MG/1
1 TABLET, DELAYED RELEASE ORAL
Qty: 28 | Refills: 0
Start: 2022-09-30 | End: 2022-10-13

## 2022-09-30 RX ORDER — PANTOPRAZOLE SODIUM 20 MG/1
1 TABLET, DELAYED RELEASE ORAL
Qty: 0 | Refills: 0 | DISCHARGE

## 2022-09-30 RX ORDER — ERYTHROPOIETIN 10000 [IU]/ML
10000 INJECTION, SOLUTION INTRAVENOUS; SUBCUTANEOUS ONCE
Refills: 0 | Status: COMPLETED | OUTPATIENT
Start: 2022-09-30 | End: 2022-09-30

## 2022-09-30 RX ADMIN — ERYTHROPOIETIN 10000 UNIT(S): 10000 INJECTION, SOLUTION INTRAVENOUS; SUBCUTANEOUS at 12:18

## 2022-09-30 RX ADMIN — AMIODARONE HYDROCHLORIDE 200 MILLIGRAM(S): 400 TABLET ORAL at 06:29

## 2022-09-30 RX ADMIN — PANTOPRAZOLE SODIUM 40 MILLIGRAM(S): 20 TABLET, DELAYED RELEASE ORAL at 06:29

## 2022-09-30 RX ADMIN — Medication 1 GRAM(S): at 06:29

## 2022-09-30 NOTE — ASU DISCHARGE PLAN (ADULT/PEDIATRIC) - NS MD DC FALL RISK RISK
For information on Fall & Injury Prevention, visit: [email or web address] OR  [email or web address] OR  https://www.cdc.gov/steadi/patient.html

## 2022-09-30 NOTE — DISCHARGE NOTE NURSING/CASE MANAGEMENT/SOCIAL WORK - PATIENT PORTAL LINK FT
You can access the FollowMyHealth Patient Portal offered by [facility] by registering at the following website: [email or web address]. By joining [employer]’s FollowMyHealth portal, you will also be able to view your health information using other applications (apps) compatible with our system.

## 2022-09-30 NOTE — PROGRESS NOTE ADULT - SUBJECTIVE AND OBJECTIVE BOX
Patient seen today in bed. Figure 8 suture removed from right groin without complication. No overnight complaints. Eager to go home    EKG: pending  TELE: SR. Nocturnal Mobitz I AV block    MEDICATIONS  (STANDING):  aMIOdarone    Tablet 200 milliGRAM(s) Oral daily  cholecalciferol 1000 Unit(s) Oral daily  dapagliflozin 5 milliGRAM(s) Oral daily  epoetin av-epbx (RETACRIT) Injectable 43178 Unit(s) IV Push once  ferrous    sulfate 325 milliGRAM(s) Oral daily  finasteride 5 milliGRAM(s) Oral daily  pantoprazole    Tablet 40 milliGRAM(s) Oral two times a day  rivaroxaban 15 milliGRAM(s) Oral with dinner  simvastatin 40 milliGRAM(s) Oral at bedtime  sucralfate suspension 1 Gram(s) Oral two times a day    MEDICATIONS  (PRN):  acetaminophen     Tablet .. 650 milliGRAM(s) Oral every 6 hours PRN Mild Pain (1 - 3), Moderate Pain (4 - 6)  benzocaine 15 mG/menthol 3.6 mG Lozenge 1 Lozenge Oral every 2 hours PRN Sore Throat    Allergies  No Known Allergies    Intolerances  Seafood (Other)    PAST MEDICAL & SURGICAL HISTORY:  HTN - Hypertension  CKD (chronic kidney disease)  Gout  CAD (coronary artery disease)  PCI to LAD 2014  HLD (hyperlipidemia)  Atrial fibrillation  CHF (congestive heart failure)  History of cardiomyopathy  LV dysfunction  Thalassemia minor  AV fistula  ESRD on dialysis  Atrial fibrillation  Rheumatoid arthritis  S/P Arthroscopic Surgery of Left Knee  2001  History of Arthroscopy- ankle  2003  S/P angioplasty with stent  5/2014 2016  S/P hernia surgery  Status post cataract extraction  left eye done 10/18/2018  H/O cardiac radiofrequency ablation  atrial fibrillation  History of tonsillectomy  H/O hernia repair  AV fistula    Vital Signs Last 24 Hrs  T(C): 36.4 (30 Sep 2022 06:10), Max: 36.4 (30 Sep 2022 06:10)  T(F): 97.5 (30 Sep 2022 06:10), Max: 97.5 (30 Sep 2022 06:10)  HR: 67 (30 Sep 2022 06:10) (56 - 86)  BP: 115/66 (30 Sep 2022 06:10) (115/66 - 142/81)  RR: 20 (30 Sep 2022 06:10) (14 - 20)  SpO2: 97% (30 Sep 2022 06:10) (96% - 100%)    Physical Exam:  Constitutional: NAD, AAOx3  Cardiovascular: +S1S2 RRR  Pulmonary: CTA b/l, unlabored  GI: soft NTND +BS  Extremities: no pedal edema,   Right groin: No hematoma or ecchymosis.    Neuro: non focal, WALKER x4    LABS:                        9.6    7.96  )-----------( 162      ( 30 Sep 2022 04:55 )             28.8     140  |  101  |  40.1<H>  ----------------------------<  91  3.7   |  27.0  |  8.13<H>  Ca    8.7      30 Sep 2022 04:55  Mg     2.0     09-30  PT/INR - ( 29 Sep 2022 06:45 )   PT: 16.0 sec;   INR: 1.37 ratio    PTT - ( 29 Sep 2022 06:45 )  PTT:37.5 sec    A/P  67 year old male with PMH of CAD s/p PCI to LAD 2014, HTN, RA, ESRD on HD MWF via LUE AV fistula, PAD s/p LE stenting, cardiomyopathy EF 40%, MR, and atrial fibrillation s/p ablation 2016 s/p cardioversion 3/2022 who is now s/p uncomplicated radiofrequency ablation of atrial fibrillation & atypical atrial flutter (PW & CTI touch-up, and atypical flutter rotating around the MAYNOR).     - Discharge home today following HD

## 2022-09-30 NOTE — PROGRESS NOTE ADULT - SUBJECTIVE AND OBJECTIVE BOX
Reason for visit: ESRD on HD    Subjective: No acute overnight event. Patient denied any cardiac or urinary complains. No fever/chills.     ROS: All systems were reviewed in detail pertinent positive and negative mentioned above, rest are negative.    Physical Exam:  Gen: no acute distress  MS: alert, conversing normally  Eyes: EOMI, no icterus  HENT: NCAT, MMM  CV: rhythm reg reg, rate normal, no m/g/r  Chest: CTAB, no w/r/r,  Abd: soft, NT, ND  Extremities: No edema    =======================================================  Vital Signs Last 24 Hrs  T(C): 37.2 (30 Sep 2022 14:25), Max: 37.2 (30 Sep 2022 14:25)  T(F): 99 (30 Sep 2022 14:25), Max: 99 (30 Sep 2022 14:25)  HR: 68 (30 Sep 2022 14:30) (56 - 70)  BP: 123/74 (30 Sep 2022 14:30) (115/66 - 137/80)  BP(mean): --  RR: 18 (30 Sep 2022 14:30) (16 - 20)  SpO2: 96% (30 Sep 2022 14:30) (96% - 100%)    Parameters below as of 30 Sep 2022 14:30  Patient On (Oxygen Delivery Method): room air      I&O's Summary    30 Sep 2022 07:01  -  30 Sep 2022 22:08  --------------------------------------------------------  IN: 0 mL / OUT: 1050 mL / NET: -1050 mL    =======================================================  Current Antibiotics:    Other medications:  aMIOdarone    Tablet 200 milliGRAM(s) Oral daily  cholecalciferol 1000 Unit(s) Oral daily  dapagliflozin 5 milliGRAM(s) Oral daily  ferrous    sulfate 325 milliGRAM(s) Oral daily  finasteride 5 milliGRAM(s) Oral daily  pantoprazole    Tablet 40 milliGRAM(s) Oral two times a day  rivaroxaban 15 milliGRAM(s) Oral with dinner  simvastatin 40 milliGRAM(s) Oral at bedtime  sucralfate suspension 1 Gram(s) Oral two times a day    =======================================================  09-30    140  |  101  |  40.1<H>  ----------------------------<  91  3.7   |  27.0  |  8.13<H>    Ca    8.7      30 Sep 2022 04:55  Mg     2.0     09-30      Creatinine, Serum: 8.13 mg/dL (09-30-22 @ 04:55)  Creatinine, Serum: 6.91 mg/dL (09-29-22 @ 06:45)      =======================================================

## 2022-10-03 ENCOUNTER — NON-APPOINTMENT (OUTPATIENT)
Age: 67
End: 2022-10-03

## 2022-10-10 ENCOUNTER — NON-APPOINTMENT (OUTPATIENT)
Age: 67
End: 2022-10-10

## 2022-10-10 ENCOUNTER — APPOINTMENT (OUTPATIENT)
Dept: ELECTROPHYSIOLOGY | Facility: CLINIC | Age: 67
End: 2022-10-10

## 2022-10-10 VITALS
DIASTOLIC BLOOD PRESSURE: 80 MMHG | OXYGEN SATURATION: 98 % | WEIGHT: 174 LBS | HEIGHT: 71 IN | BODY MASS INDEX: 24.36 KG/M2 | TEMPERATURE: 97.1 F | SYSTOLIC BLOOD PRESSURE: 110 MMHG

## 2022-10-10 PROBLEM — N18.6 END STAGE RENAL DISEASE: Chronic | Status: ACTIVE | Noted: 2022-09-29

## 2022-10-10 PROBLEM — I48.91 UNSPECIFIED ATRIAL FIBRILLATION: Chronic | Status: ACTIVE | Noted: 2022-09-29

## 2022-10-10 PROBLEM — M06.9 RHEUMATOID ARTHRITIS, UNSPECIFIED: Chronic | Status: ACTIVE | Noted: 2022-09-29

## 2022-10-10 PROCEDURE — 93000 ELECTROCARDIOGRAM COMPLETE: CPT

## 2022-10-10 PROCEDURE — 99213 OFFICE O/P EST LOW 20 MIN: CPT | Mod: 25

## 2022-10-10 NOTE — HISTORY OF PRESENT ILLNESS
[FreeTextEntry1] : Patient is a 67-year-old man who is seen in follow-up status post catheter ablation at [facility].  He had recurrence of left atrial flutter/fib and this was successfully ablated.  The patient has been on amiodarone 200 mg/day.  His EKG today shows sinus with occasional junctional beats.\par \par Overall he is feeling extremely well and no evidence of complication post procedure.  He denies chest pain, shortness of breath, dizziness, lightness, no new visual symptoms, headache, GI symptoms or issues with the femoral entry site right groin.  He has no swallowing problems.  No fever.\par He is currently getting dialysis and is on a transplant list. \par \par \par \par Previous history: Previous AF ablation done February 2016.  Renal failure on hemodialysis awaiting kidney transplant he has a prior history of coronary disease and status post PCI to the LAD 2014.  He has a prior history of PAD and status post lower extremity stenting.  He has had a  previous cardiomyopathy with ejection fraction 40% and moderate mitral regurgitation.  Patient previously had severe left atrial enlargement as well.  His left atrial diameter is greater than 5.0 cm.\par He has had a prior history of rectal bleeding.  This was due to hemorrhoids and he had a hemorrhoidectomy.  He has had no recurrence of bleeding.\par \par

## 2022-10-10 NOTE — REVIEW OF SYSTEMS
[Blurry Vision] : blurred vision [Feeling Fatigued] : not feeling fatigued [Sore Throat] : no sore throat [SOB] : no shortness of breath [Dyspnea on exertion] : not dyspnea during exertion [Chest Discomfort] : no chest discomfort [Lower Ext Edema] : no extremity edema [Palpitations] : no palpitations [Orthopnea] : no orthopnea [Syncope] : no syncope [Cough] : no cough [Rash] : no rash [Dizziness] : no dizziness [Memory Lapses Or Loss] : no memory lapses or loss [Under Stress] : not under stress [Easy Bleeding] : no tendency for easy bleeding

## 2022-10-10 NOTE — DISCUSSION/SUMMARY
[FreeTextEntry1] : The patient is doing extremely well status post recent atypical left atrial flutter/fib ablation.  He has no complication with the procedure.  His EKG shows sinus with occasional junctional beats.  He is on amiodarone 200 mg a day.  We will decrease the dose of 200 mg/day.  Our plan is to discontinue amiodarone within the next 2 months.\par \par He will see his cardiologist t Dr. Pernell Rangel  in follow-up next week.  And he will also see Dr. London Lara in follow-up.\par \par I recommended we decrease amiodarone to 100 mg/day.  I would like to see him in follow-up in approximately 3 to 4 months.  We will issue him a monitor prior to his office visit so we can review at time of this visit. [EKG obtained to assist in diagnosis and management of assessed problem(s)] : EKG obtained to assist in diagnosis and management of assessed problem(s)

## 2022-10-10 NOTE — PHYSICAL EXAM
[No Acute Distress] : no acute distress [Normal Conjunctiva] : normal conjunctiva [Normal Venous Pressure] : normal venous pressure [Normal S1, S2] : normal S1, S2 [No Rub] : no rub [Clear Lung Fields] : clear lung fields [Non Tender] : non-tender [No Edema] : no edema [No Cyanosis] : no cyanosis [No Clubbing] : no clubbing [No Focal Deficits] : no focal deficits [Alert and Oriented] : alert and oriented [Diminished Pedal Pulses ___] : diminished pedal pulses [unfilled] [de-identified] : Regular; 2/6 systolic murmur left lower sternal border [de-identified] : Femoral entry site well-healed [de-identified] : Dialysis catheter right subclavian

## 2022-10-26 ENCOUNTER — APPOINTMENT (OUTPATIENT)
Dept: TRANSPLANT | Facility: CLINIC | Age: 67
End: 2022-10-26

## 2022-10-26 ENCOUNTER — LABORATORY RESULT (OUTPATIENT)
Age: 67
End: 2022-10-26

## 2022-10-26 ENCOUNTER — APPOINTMENT (OUTPATIENT)
Dept: NEPHROLOGY | Facility: CLINIC | Age: 67
End: 2022-10-26

## 2022-10-26 VITALS
DIASTOLIC BLOOD PRESSURE: 86 MMHG | SYSTOLIC BLOOD PRESSURE: 132 MMHG | WEIGHT: 174 LBS | RESPIRATION RATE: 16 BRPM | OXYGEN SATURATION: 100 % | BODY MASS INDEX: 24.36 KG/M2 | HEART RATE: 62 BPM | HEIGHT: 71 IN

## 2022-10-26 PROCEDURE — 99072 ADDL SUPL MATRL&STAF TM PHE: CPT

## 2022-10-26 PROCEDURE — 99205 OFFICE O/P NEW HI 60 MIN: CPT

## 2022-10-26 PROCEDURE — 99204 OFFICE O/P NEW MOD 45 MIN: CPT

## 2022-10-26 NOTE — PLAN
[FreeTextEntry1] : 1. ESRD on IHD since March 2022 :  Mr. SYEDA WEAVER  is a good candidate for kidney transplantation . He has multiple potential donors. \par 2.Cardiac risk: echo, stress test and cardiac evaluation \par 3. Cancer screening: colonoscopy, PSA\par 4. ID: Serology for acute and chronic viral infections. Screening for latent TB\par 5. Imaging: Renal/abdominal /chest /Iliac imaging\par \par I have personally discussed the risks and benefits of transplantation and patient attended transplant education class where the following was disclosed:\par  \par Reviewed factors affecting survival and morbidity while on dialysis, the transplant wait list and reviewed mauro-operative and long-term risk factors affecting outcome in kidney transplantation. \par  \par One year SRTR outcomes for national and [facility] were discussed in regards to patient survival and graft survival after transplantation. \par  \par Details of transplant surgery, including complications were discussed.\par Immunosuppression and complications including infection including life threatening sepsis and opportunistic infections, malignancy and new onset diabetes were discussed. \par  \par Benefits of live donor transplantation as well as variability in wait times across regions and multiple listing were discussed. \par KDPI >85% and PHS high risk criteria donors were discussed. \par HCV kidney transplantation was discussed.\par  \par Will proceed with completing/ updating work up and listing for transplant/ live donor transplant once work up is reviewed and found to be acceptable by multidisciplinary listing committee.\par

## 2022-10-26 NOTE — HISTORY OF PRESENT ILLNESS
[TextBox_42] : SYEDA WEAVER is a 67 year old male who presents for kidney transplant evaluation. \par Cause of ESRD :  Cardiorenal etiology \par Nephrologist: Dr Joce Bran \par on IHD since March 2022\par  \par Other PMH :\par Cardiac -has  HTN for 5 years, CAD s/p PCI in 2015, 2018, Afib s/p ablation in 9/2022 \par Pulmonary-  no h/o COPD, Asthma\par Infections- no h/o  TB, HIV, Hepatitis  \par Heme- no h/o malignancy or bleeding disorders. No DVT/PE\par Endo- no h/o diabetes, no Thyroid disease\par Neuro- no no h/o CVA or seizures \par Psych-no h/o  suicidal ideation, depression or anxiety. \par Sensitization events-has had  blood transfusions in 2021. No previous transplant\par \par Surgical h/o : Knee surgery.\par Functional status: can walk 8 blocks with no difficulty. \par Social history: Lives alone. 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  ex smoker, quit in 2013. No alcohol or illicit use.\par Family history:  no family h/o kidney disease. Mother has HTN. \par Potential donors- has multiple donors.( friends) \par \par \par \par

## 2022-10-27 NOTE — HISTORY OF PRESENT ILLNESS
[Hypertension] : Hypertension [Other: ___] : [unfilled] [Previous Kidney Transplant] : no previous kidney transplant [Cardiac History] : cardiac history [TextBox_16] : march 2022 [TextBox_42] : 67 year old male with ESRD on HD since march 2022\par hypertension for 6 years\par no diabetes\par CAD s/p stenting twice 2015 and 2018\par a fibrillation; post ablation in September 2022; now on Xarelto\par knee surgery 20 years ago\par right inguinal hernia repair 10 years ago\par gout\par now doing well on dialysis\par can walk 6 blocks with no problems\par \par Social history\par retired\par  and lives alone\par has two daughters and one son in their 40s\par stopped smoking in 2013

## 2022-11-03 NOTE — ED PROVIDER NOTE - PRINCIPAL DIAGNOSIS
[id number]    Day #2 of GRAAL + imatinib. VSS, afebrile and on RA. A&Ox4. Denies SOB/nausea. Rated 1/10 headache, declined pain meds. Hung dose #2 of Cytoxan, tolerated well. See chemo note. Fair appetite, ate food from home. Voiding with adequate UOP. LBM 11/2, however pt reports having constipation with previous cycle. Switched senna from PRN to scheduled. Some redness/tenderness around PICC site, will continue to monitor. New trace edema in LUE noted, no US ordered, will continue to monitor. Up independently in room and ambulated in hallway. Continue to monitor & w/ POC.    Acute CHF

## 2022-11-07 ENCOUNTER — RX CHANGE (OUTPATIENT)
Age: 67
End: 2022-11-07

## 2022-11-07 LAB
ABO + RH PNL BLD: NORMAL
ABO + RH PNL BLD: NORMAL
ALBUMIN SERPL ELPH-MCNC: 4.5 G/DL
ALP BLD-CCNC: 149 U/L
ALT SERPL-CCNC: 8 U/L
ANION GAP SERPL CALC-SCNC: 13 MMOL/L
APPEARANCE: CLEAR
AST SERPL-CCNC: 11 U/L
BACTERIA: NEGATIVE
BASOPHILS # BLD AUTO: 0.16 K/UL
BASOPHILS NFR BLD AUTO: 1.7 %
BILIRUB SERPL-MCNC: 0.6 MG/DL
BILIRUBIN URINE: NEGATIVE
BLOOD URINE: ABNORMAL
BUN SERPL-MCNC: 29 MG/DL
CALCIUM SERPL-MCNC: 9.4 MG/DL
CHLORIDE SERPL-SCNC: 97 MMOL/L
CHOLEST SERPL-MCNC: 137 MG/DL
CMV IGG SERPL QL: 5.4 U/ML
CMV IGG SERPL-IMP: POSITIVE
CO2 SERPL-SCNC: 29 MMOL/L
COLOR: YELLOW
COVID-19 NUCLEOCAPSID  GAM ANTIBODY INTERPRETATION: NEGATIVE
COVID-19 SPIKE DOMAIN ANTIBODY INTERPRETATION: POSITIVE
CREAT SERPL-MCNC: 5.54 MG/DL
CREAT SPEC-SCNC: 207 MG/DL
CREAT/PROT UR: 2.9 RATIO
EBV EA AB SER IA-ACNC: <5 U/ML
EBV EA AB TITR SER IF: POSITIVE
EBV EA IGG SER QL IA: >600 U/ML
EBV EA IGG SER-ACNC: NEGATIVE
EBV EA IGM SER IA-ACNC: NEGATIVE
EBV PATRN SPEC IB-IMP: NORMAL
EBV VCA IGG SER IA-ACNC: 442 U/ML
EBV VCA IGM SER QL IA: 24.1 U/ML
EGFR: 11 ML/MIN/1.73M2
EOSINOPHIL # BLD AUTO: 0.49 K/UL
EOSINOPHIL NFR BLD AUTO: 5.2 %
EPSTEIN-BARR VIRUS CAPSID ANTIGEN IGG: POSITIVE
ESTIMATED AVERAGE GLUCOSE: 105 MG/DL
GLUCOSE QUALITATIVE U: ABNORMAL
GLUCOSE SERPL-MCNC: 109 MG/DL
HAV IGM SER QL: NONREACTIVE
HBA1C MFR BLD HPLC: 5.3 %
HBV CORE IGG+IGM SER QL: NONREACTIVE
HBV SURFACE AB SER QL: NONREACTIVE
HBV SURFACE AB SERPL IA-ACNC: <3 MIU/ML
HBV SURFACE AG SER QL: NONREACTIVE
HCT VFR BLD CALC: 34.8 %
HCV AB SER QL: NONREACTIVE
HCV S/CO RATIO: 0.11 S/CO
HDLC SERPL-MCNC: 53 MG/DL
HEPATITIS A IGG ANTIBODY: NONREACTIVE
HGB BLD-MCNC: 11.1 G/DL
HIV1+2 AB SPEC QL IA.RAPID: NONREACTIVE
HSV 1+2 IGG SER IA-IMP: NEGATIVE
HSV 1+2 IGG SER IA-IMP: POSITIVE
HSV1 IGG SER QL: 45.4 INDEX
HSV2 IGG SER QL: 0.44 INDEX
HYALINE CASTS: 0 /LPF
IMM GRANULOCYTES NFR BLD AUTO: 0.2 %
KETONES URINE: NEGATIVE
LDLC SERPL CALC-MCNC: 54 MG/DL
LEUKOCYTE ESTERASE URINE: NEGATIVE
LYMPHOCYTES # BLD AUTO: 2.13 K/UL
LYMPHOCYTES NFR BLD AUTO: 22.6 %
M TB IFN-G BLD-IMP: NEGATIVE
MAGNESIUM SERPL-MCNC: 2.1 MG/DL
MAN DIFF?: NORMAL
MCHC RBC-ENTMCNC: 21.7 PG
MCHC RBC-ENTMCNC: 31.9 GM/DL
MCV RBC AUTO: 68 FL
MICROSCOPIC-UA: NORMAL
MONOCYTES # BLD AUTO: 0.86 K/UL
MONOCYTES NFR BLD AUTO: 9.1 %
NEUTROPHILS # BLD AUTO: 5.78 K/UL
NEUTROPHILS NFR BLD AUTO: 61.2 %
NITRITE URINE: NEGATIVE
NONHDLC SERPL-MCNC: 84 MG/DL
PH URINE: 7.5
PHOSPHATE SERPL-MCNC: 2.9 MG/DL
PLATELET # BLD AUTO: 257 K/UL
POTASSIUM SERPL-SCNC: 4.7 MMOL/L
PROT SERPL-MCNC: 8 G/DL
PROT UR-MCNC: 600 MG/DL
PROTEIN URINE: ABNORMAL
PSA SERPL-MCNC: 10.2 NG/ML
QUANTIFERON TB PLUS MITOGEN MINUS NIL: 1.07 IU/ML
QUANTIFERON TB PLUS NIL: 0.03 IU/ML
QUANTIFERON TB PLUS TB1 MINUS NIL: -0.01 IU/ML
QUANTIFERON TB PLUS TB2 MINUS NIL: 0 IU/ML
RBC # BLD: 5.12 M/UL
RBC # FLD: 18.3 %
RED BLOOD CELLS URINE: 3 /HPF
ROGOSIN: NORMAL
RUBV IGG FLD-ACNC: 4.2 INDEX
RUBV IGG SER-IMP: POSITIVE
SARS-COV-2 AB SERPL IA-ACNC: 245 U/ML
SARS-COV-2 AB SERPL QL IA: 0.6 INDEX
SODIUM SERPL-SCNC: 140 MMOL/L
SPECIFIC GRAVITY URINE: 1.02
SQUAMOUS EPITHELIAL CELLS: 1 /HPF
T GONDII AB SER-IMP: NEGATIVE
T GONDII IGG SER QL: <3 IU/ML
T PALLIDUM AB SER QL IA: NEGATIVE
TRIGL SERPL-MCNC: 147 MG/DL
UROBILINOGEN URINE: ABNORMAL
VZV AB TITR SER: NEGATIVE
VZV IGG SER IF-ACNC: 12.4 INDEX
WBC # FLD AUTO: 9.44 K/UL
WHITE BLOOD CELLS URINE: 2 /HPF

## 2022-11-08 ENCOUNTER — APPOINTMENT (OUTPATIENT)
Dept: CARDIOLOGY | Facility: CLINIC | Age: 67
End: 2022-11-08

## 2022-11-08 ENCOUNTER — NON-APPOINTMENT (OUTPATIENT)
Age: 67
End: 2022-11-08

## 2022-11-08 VITALS
HEIGHT: 71 IN | RESPIRATION RATE: 17 BRPM | BODY MASS INDEX: 25.62 KG/M2 | DIASTOLIC BLOOD PRESSURE: 77 MMHG | SYSTOLIC BLOOD PRESSURE: 112 MMHG | HEART RATE: 53 BPM | OXYGEN SATURATION: 98 % | WEIGHT: 183 LBS

## 2022-11-08 PROCEDURE — 99214 OFFICE O/P EST MOD 30 MIN: CPT

## 2022-11-08 PROCEDURE — 99072 ADDL SUPL MATRL&STAF TM PHE: CPT

## 2022-11-08 PROCEDURE — 93000 ELECTROCARDIOGRAM COMPLETE: CPT | Mod: NC

## 2022-11-08 NOTE — REASON FOR VISIT
[Cardiac Failure] : cardiac failure [Arrhythmia/ECG Abnorrmalities] : arrhythmia/ECG abnormalities [Coronary Artery Disease] : coronary artery disease [Other: ____] : [unfilled]

## 2022-11-09 NOTE — DISCUSSION/SUMMARY
[Paroxysmal Atrial Fibrillation] : paroxysmal atrial fibrillation [Anticoagulation] : anticoagulation [Cardiomyopathy] : cardiomyopathy [Responding to Treatment] : responding to treatment [Ischemic Cardiomyopathy] : ischemic cardiomyopathy [Coronary Artery Disease] : coronary artery disease [None] : There are no changes in medication management [Patient] : the patient [EKG obtained to assist in diagnosis and management of assessed problem(s)] : EKG obtained to assist in diagnosis and management of assessed problem(s) [de-identified] : and atrial tachycardia [de-identified] : on apixaban 5 mg BID which appears to be correct dose based on age and weight and despite severely reduced GFR. [de-identified] : on appropriate medication regimen given contraints of kidney disease [FreeTextEntry1] : Mr. Nils Sawyer returns for preoperative cardiovascular evaluation prior to possible kidney transplant. His recent NICOLETTE confirmed an improvement in left ventricular systolic function with an LVEF of 45-50%. Now that he has initiated dialysis proceeding with an ischemic evaluation is appropriate prior to further transplant consideration. Given his history of ventricular dysfunction and prior stents, invasive coronary angiography would likely provide the most reliable evaluation of his coronary anatomy. \par \par His blood pressure is well controlled. No changes to his medications were suggested. \par Continue amiodarone and Xarelto. Follow up with Dr. James for possible discontinuation of amiodarone as scheduled. \par Follow up with Dr. Pernell Lee as well. Would recommend consideration for high intensity statin therapy for a patient with known coronary artery disease and 2 prior cardiac stents.

## 2022-11-09 NOTE — PHYSICAL EXAM
[5th Left ICS - MCL] : palpated at the 5th LICS in the midclavicular line [Bradycardia] : bradycardic [Rhythm Regular] : regular [Normal S1] : normal S1 [S1 Diminished] : was diminished [Normal S2] : normal S2 [No Gallop] : no gallop heard [No Murmur] : no murmurs heard [No Pitting Edema] : no pitting edema present [2+] : left 2+ [1+] : left 1+ [No Abnormalities] : the abdominal aorta was not enlarged and no bruit was heard [Normal] : alert and oriented, normal memory [Right Carotid Bruit] : no bruit heard over the right carotid [Left Carotid Bruit] : no bruit heard over the left carotid [de-identified] : left AV fistula +/+

## 2022-11-09 NOTE — HISTORY OF PRESENT ILLNESS
[FreeTextEntry1] : Mr. Nils Sawyer is a 66 year old man referred as a pre-emptive candidate for evaluation in anticipation of renal transplant. Kidney failure is attributed to focal segmental glomerulosclerosis with kidney biopsy 2019. He has a history of atrial fibrillation ablation. He also has coronary artery disease with percutaneous coronary intervention 2014 and severely impaired left ventricular systolic function. He is managed by cardiologist, Dr. Lee.\par \par He has no symptoms, is unaware of palpitations, no awareness of abnormal heart rhythm. Functional capacity is satisfactory, can walk 5 blocks without stopping or climb two flights of stairs.\par \par \par 11/8/2022.\par Mr. Nils Sawyer returns today for scheduled follow up as a candidate for renal transplant consideration. \par In March he began hemodialysis on a M-W-F schedule via left forearm AV fistula at [facility] in [place]. He reports no complications since the initiation of dialysis. In September he underwent a catheter ablation for his recurrent atrial fibrillation with Dr. Ephraim James.He is currently maintained on amiodarone 100 mg daily and is feeling much better now that sinus rhythm has been restored. A transesophageal echocardiogram was performed prior to the procedure and he was noted to have global mildly decreased left ventricular systolic function with an LVEF of 45-50%, an improvement from prior studies.\par Today he is feeling well. For exercise he walks 3-5 blocks each morning and is not limited by any chest discomfort or shortness of breath. \par We have reviewed his medications and he is taking all as directed.

## 2022-11-09 NOTE — END OF VISIT
[FreeTextEntry3] : I saw patient with Darlene Sears NP and agree with findings and recommendations.

## 2022-11-22 ENCOUNTER — RESULT REVIEW (OUTPATIENT)
Age: 67
End: 2022-11-22

## 2022-11-22 ENCOUNTER — OUTPATIENT (OUTPATIENT)
Dept: OUTPATIENT SERVICES | Facility: HOSPITAL | Age: 67
LOS: 1 days | End: 2022-11-22
Payer: COMMERCIAL

## 2022-11-22 ENCOUNTER — APPOINTMENT (OUTPATIENT)
Dept: CT IMAGING | Facility: IMAGING CENTER | Age: 67
End: 2022-11-22

## 2022-11-22 DIAGNOSIS — Z98.890 OTHER SPECIFIED POSTPROCEDURAL STATES: Chronic | ICD-10-CM

## 2022-11-22 DIAGNOSIS — I77.0 ARTERIOVENOUS FISTULA, ACQUIRED: Chronic | ICD-10-CM

## 2022-11-22 DIAGNOSIS — Z95.9 PRESENCE OF CARDIAC AND VASCULAR IMPLANT AND GRAFT, UNSPECIFIED: Chronic | ICD-10-CM

## 2022-11-22 DIAGNOSIS — Z90.89 ACQUIRED ABSENCE OF OTHER ORGANS: Chronic | ICD-10-CM

## 2022-11-22 DIAGNOSIS — Z98.49 CATARACT EXTRACTION STATUS, UNSPECIFIED EYE: Chronic | ICD-10-CM

## 2022-11-22 DIAGNOSIS — Z01.818 ENCOUNTER FOR OTHER PREPROCEDURAL EXAMINATION: ICD-10-CM

## 2022-11-22 PROCEDURE — 71260 CT THORAX DX C+: CPT | Mod: 26

## 2022-11-22 PROCEDURE — 74177 CT ABD & PELVIS W/CONTRAST: CPT

## 2022-11-22 PROCEDURE — 71260 CT THORAX DX C+: CPT

## 2022-11-22 PROCEDURE — 74177 CT ABD & PELVIS W/CONTRAST: CPT | Mod: 26

## 2022-11-28 ENCOUNTER — APPOINTMENT (OUTPATIENT)
Dept: UROLOGY | Facility: CLINIC | Age: 67
End: 2022-11-28

## 2022-12-02 NOTE — H&P PST ADULT - NSANTHGENDERRD_ENT_A_CORE
Price (Use Numbers Only, No Special Characters Or $): 2816 Price (Use Numbers Only, No Special Characters Or $): 2167 Yes

## 2022-12-03 ENCOUNTER — EMERGENCY (EMERGENCY)
Facility: HOSPITAL | Age: 67
LOS: 1 days | Discharge: HOME | End: 2022-12-03
Attending: STUDENT IN AN ORGANIZED HEALTH CARE EDUCATION/TRAINING PROGRAM
Payer: COMMERCIAL

## 2022-12-03 VITALS
OXYGEN SATURATION: 96 % | SYSTOLIC BLOOD PRESSURE: 101 MMHG | HEIGHT: 71 IN | DIASTOLIC BLOOD PRESSURE: 69 MMHG | WEIGHT: 182.1 LBS | TEMPERATURE: 98 F | RESPIRATION RATE: 16 BRPM | HEART RATE: 67 BPM

## 2022-12-03 DIAGNOSIS — I77.0 ARTERIOVENOUS FISTULA, ACQUIRED: Chronic | ICD-10-CM

## 2022-12-03 DIAGNOSIS — Z98.890 OTHER SPECIFIED POSTPROCEDURAL STATES: Chronic | ICD-10-CM

## 2022-12-03 DIAGNOSIS — Z98.49 CATARACT EXTRACTION STATUS, UNSPECIFIED EYE: Chronic | ICD-10-CM

## 2022-12-03 DIAGNOSIS — Z90.89 ACQUIRED ABSENCE OF OTHER ORGANS: Chronic | ICD-10-CM

## 2022-12-03 DIAGNOSIS — Z95.9 PRESENCE OF CARDIAC AND VASCULAR IMPLANT AND GRAFT, UNSPECIFIED: Chronic | ICD-10-CM

## 2022-12-03 PROCEDURE — 73140 X-RAY EXAM OF FINGER(S): CPT | Mod: 26,LT

## 2022-12-03 PROCEDURE — 29130 APPL FINGER SPLINT STATIC: CPT | Mod: LT

## 2022-12-03 PROCEDURE — 73140 X-RAY EXAM OF FINGER(S): CPT

## 2022-12-03 PROCEDURE — 99284 EMERGENCY DEPT VISIT MOD MDM: CPT | Mod: 25

## 2022-12-03 PROCEDURE — 99283 EMERGENCY DEPT VISIT LOW MDM: CPT | Mod: 25

## 2022-12-03 RX ORDER — ACETAMINOPHEN 500 MG
975 TABLET ORAL ONCE
Refills: 0 | Status: COMPLETED | OUTPATIENT
Start: 2022-12-03 | End: 2022-12-03

## 2022-12-03 RX ADMIN — Medication 975 MILLIGRAM(S): at 15:28

## 2022-12-03 NOTE — ED PROVIDER NOTE - OBJECTIVE STATEMENT
68yo male pt with PMHx of CAD with PCI to LAD 2014, cardiomyopathy EF 40%, moderate MR, CKD on HD (MWF), RA, DM, HTN, HLD, 68yo male pt with PMHx of CAD with PCI to LAD 2014, cardiomyopathy EF 40%, moderate MR, CKD on HD (MWF), RA, DM, HTN, HLD presents to ED with left non dominant pinky finger injury on 12/1/2022. Reports his finger was jammed by a car window. Denies other injuries. Denies sensory changes or weakness to fingers.

## 2022-12-03 NOTE — ED PROVIDER NOTE - BIRTH SEX
February 13, 2019      Dolores Mcdaniel MD  [address] [facility] [address]           [facility]  [address]  [facility] [address]  Phone: [phone]          Patient: Pita Chawla   MR Number: [id number]   YOB: 1991   Date of Visit: 2/13/2019       Dear Dr. Dolores Mcdaniel:    Thank you for referring Pita Chawla to me for evaluation. Attached you will find relevant portions of my assessment and plan of care.    If you have questions, please do not hesitate to call me. I look forward to following Pita Chawla along with you.    Sincerely,    Duyen Jacobo PA-C    Enclosure  CC:  No Recipients    If you would like to receive this communication electronically, please contact [email or web address] or [phone] to request more information on [employer] Link access.    For providers and/or their staff who would like to refer a patient to [facility], please contact us through our one-stop-shop provider referral line, [facility], at [phone].    If you feel you have received this communication in error or would no longer like to receive these types of communications, please e-mail [email or web address]          Male

## 2022-12-03 NOTE — ED PROVIDER NOTE - CARE PROVIDER_API CALL
Jass Lopez (DO)  Plastic Surgery  [address] [place] [address]  Phone: [phone]  Fax: [phone]  Follow Up Time:

## 2022-12-03 NOTE — ED PROVIDER NOTE - ATTENDING CONTRIBUTION TO CARE
67 M w/ hx of esrd, mwf last dialysis was on friday full session through fistula in the LUE presents to the ER w/ finger pain in the L 5th finger, pt w/ R hand dominance, pt reports on Dec 1 he was injured in the finger, he reports no cp, no sob. pt w/ no lightheadedness, no dizziness. on exam, pt is awake and alert in no distress, he has clear lungs, has swan neck deformity on the 5th finger,   intact sensation in the bilateral hands, 2+ radial pulse, dispo pending results. plan for xray, 67 M w/ hx of esrd, mwf last dialysis was on friday full session through fistula in the LUE presents to the ER w/ finger pain in the L 5th finger, pt w/ R hand dominance, pt reports on Dec 1 he was injured in the finger, he reports no cp, no sob. pt w/ no lightheadedness, no dizziness. he denies any numbness of the finger, has trouble moving the fingers on exam, pt is awake and alert in no distress, he has clear lungs, has swan neck deformity on the 5th finger, on the L hand, he has swan neck deformity of the 2-4th fingers b/l on the R hand the MCP joint held in 90 degrees flexion on the R hand he has flexion of the DIP joint and extendion of the pip joint pt w/ limited rom of the DIP/PIP/MCP joint ini the 5th finiger on the L side, pt w/ intact sensaiton of the fingers bilaterally he has cap refill of less than 2 seconds, in the L forearm has a fistula, dispo pending results. plan for xray, possible fx, vs worsening arthritis, seen hy hand sx recommending splint and outpt follow up

## 2022-12-03 NOTE — ED PROCEDURE NOTE - NS ED PERI NEURO NEG
+chronic RA with defomities/Pre-application: Motor, sensory, and vascular responses intact in the injured extremity./Post-application: Motor, sensory, and vascular responses intact in the injured extremity./The patient/caregiver verbalized understanding of how to care for the injured extremity with splint +chronic RA with defomities in motor function, intact sensory and vascular prer and post application/The patient/caregiver verbalized understanding of how to care for the injured extremity with splint

## 2022-12-03 NOTE — ED PROVIDER NOTE - PATIENT PORTAL LINK FT
You can access the FollowMyHealth Patient Portal offered by [facility] by registering at the following website: [email or web address]. By joining [employer]’s FollowMyHealth portal, you will also be able to view your health information using other applications (apps) compatible with our system.

## 2022-12-03 NOTE — ED PROVIDER NOTE - PHYSICAL EXAMINATION
NAD. VSS. Afebrile. Lungs clear. + Left 5th finger; swan neck finger with PIP tender. 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 Sensory intact and cap refil<2sec. +Radial pulses.

## 2022-12-03 NOTE — ED PROVIDER NOTE - NSFOLLOWUPINSTRUCTIONS_ED_ALL_ED_FT
Please see the information of finger sprain.    Elevate the affected finger.    Keep the finger splint for comfort.    Keep continue your current medications as prescribed and take Tylenol (2 tablets of 500mg every 8hours) for pain as needed.     Follow up with your rheumatologist for reevaluation, call Monday for appointment in 2-3days.    Follow up with hand specialist Dr. Lopez for reevaluation.     Return for any concerns, fever, numbness, weakness, or worsening pain.

## 2022-12-05 ENCOUNTER — OUTPATIENT (OUTPATIENT)
Dept: OUTPATIENT SERVICES | Facility: HOSPITAL | Age: 67
LOS: 1 days | Discharge: HOME | End: 2022-12-05

## 2022-12-05 DIAGNOSIS — Z90.89 ACQUIRED ABSENCE OF OTHER ORGANS: Chronic | ICD-10-CM

## 2022-12-05 DIAGNOSIS — Z98.890 OTHER SPECIFIED POSTPROCEDURAL STATES: Chronic | ICD-10-CM

## 2022-12-05 DIAGNOSIS — Z01.810 ENCOUNTER FOR PREPROCEDURAL CARDIOVASCULAR EXAMINATION: ICD-10-CM

## 2022-12-05 DIAGNOSIS — Z95.9 PRESENCE OF CARDIAC AND VASCULAR IMPLANT AND GRAFT, UNSPECIFIED: Chronic | ICD-10-CM

## 2022-12-05 DIAGNOSIS — Z98.49 CATARACT EXTRACTION STATUS, UNSPECIFIED EYE: Chronic | ICD-10-CM

## 2022-12-05 DIAGNOSIS — I77.0 ARTERIOVENOUS FISTULA, ACQUIRED: Chronic | ICD-10-CM

## 2022-12-05 LAB
ANION GAP SERPL CALC-SCNC: 16 MMOL/L — HIGH (ref 7–14)
BUN SERPL-MCNC: 31 MG/DL — HIGH (ref 7–23)
CALCIUM SERPL-MCNC: 9.9 MG/DL — SIGNIFICANT CHANGE UP (ref 8.4–10.5)
CHLORIDE SERPL-SCNC: 92 MMOL/L — LOW (ref 98–107)
CO2 SERPL-SCNC: 31 MMOL/L — SIGNIFICANT CHANGE UP (ref 22–31)
CREAT SERPL-MCNC: 5.72 MG/DL — HIGH (ref 0.5–1.3)
EGFR: 10 ML/MIN/1.73M2 — LOW
GLUCOSE SERPL-MCNC: 89 MG/DL — SIGNIFICANT CHANGE UP (ref 70–99)
HCT VFR BLD CALC: 39.9 % — SIGNIFICANT CHANGE UP (ref 39–50)
HGB BLD-MCNC: 13.1 G/DL — SIGNIFICANT CHANGE UP (ref 13–17)
MCHC RBC-ENTMCNC: 22 PG — LOW (ref 27–34)
MCHC RBC-ENTMCNC: 32.8 GM/DL — SIGNIFICANT CHANGE UP (ref 32–36)
MCV RBC AUTO: 67.1 FL — LOW (ref 80–100)
NRBC # BLD: 0 /100 WBCS — SIGNIFICANT CHANGE UP (ref 0–0)
NRBC # FLD: 0.06 K/UL — HIGH (ref 0–0)
PLATELET # BLD AUTO: 220 K/UL — SIGNIFICANT CHANGE UP (ref 150–400)
POTASSIUM SERPL-MCNC: 3.7 MMOL/L — SIGNIFICANT CHANGE UP (ref 3.5–5.3)
POTASSIUM SERPL-SCNC: 3.7 MMOL/L — SIGNIFICANT CHANGE UP (ref 3.5–5.3)
RBC # BLD: 5.95 M/UL — HIGH (ref 4.2–5.8)
RBC # FLD: 18.6 % — HIGH (ref 10.3–14.5)
SODIUM SERPL-SCNC: 139 MMOL/L — SIGNIFICANT CHANGE UP (ref 135–145)
WBC # BLD: 9.03 K/UL — SIGNIFICANT CHANGE UP (ref 3.8–10.5)
WBC # FLD AUTO: 9.03 K/UL — SIGNIFICANT CHANGE UP (ref 3.8–10.5)

## 2022-12-05 PROCEDURE — 93010 ELECTROCARDIOGRAM REPORT: CPT

## 2022-12-05 RX ORDER — SIMVASTATIN 20 MG/1
1 TABLET, FILM COATED ORAL
Qty: 0 | Refills: 0 | DISCHARGE

## 2022-12-05 RX ORDER — DAPAGLIFLOZIN 10 MG/1
1 TABLET, FILM COATED ORAL
Qty: 0 | Refills: 0 | DISCHARGE

## 2022-12-05 RX ORDER — FERROUS SULFATE 325(65) MG
1 TABLET ORAL
Qty: 0 | Refills: 0 | DISCHARGE

## 2022-12-05 RX ORDER — RIVAROXABAN 15 MG-20MG
1 KIT ORAL
Qty: 0 | Refills: 0 | DISCHARGE

## 2022-12-05 RX ORDER — AMIODARONE HYDROCHLORIDE 400 MG/1
1 TABLET ORAL
Qty: 0 | Refills: 0 | DISCHARGE

## 2022-12-05 RX ORDER — PANTOPRAZOLE SODIUM 20 MG/1
1 TABLET, DELAYED RELEASE ORAL
Qty: 0 | Refills: 0 | DISCHARGE

## 2022-12-05 RX ORDER — CHOLECALCIFEROL (VITAMIN D3) 125 MCG
1 CAPSULE ORAL
Qty: 0 | Refills: 0 | DISCHARGE

## 2022-12-05 RX ORDER — SODIUM CHLORIDE 9 MG/ML
3 INJECTION INTRAMUSCULAR; INTRAVENOUS; SUBCUTANEOUS EVERY 8 HOURS
Refills: 0 | Status: DISCONTINUED | OUTPATIENT
Start: 2022-12-05 | End: 2022-12-19

## 2022-12-05 RX ORDER — FUROSEMIDE 40 MG
1 TABLET ORAL
Qty: 0 | Refills: 0 | DISCHARGE

## 2022-12-05 RX ORDER — FINASTERIDE 5 MG/1
1 TABLET, FILM COATED ORAL
Qty: 0 | Refills: 0 | DISCHARGE

## 2022-12-05 RX ADMIN — SODIUM CHLORIDE 3 MILLILITER(S): 9 INJECTION INTRAMUSCULAR; INTRAVENOUS; SUBCUTANEOUS at 14:32

## 2022-12-05 NOTE — H&P CARDIOLOGY - NS MD HP PULSE CAROTID
Plastic Surgery Progress Note  06/19/22    S: Doing okay. Pain improved.     O: /72 (BP Location: Right arm)   Pulse 114   Temp 98.6  F (37  C) (Oral)   Resp 17   Wt 75.8 kg (167 lb 1.7 oz)   SpO2 98%   BMI 21.46 kg/m    Intubated, sedated. Opens eyes to voice.   Abdomen soft, nondistended.   Posterior gluteal flap viable. No obvious hematoma/seroma. Incisions c/d/i with glue tape.   Drains s/s.   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     - No pressure on the flap, okay to turn toward the left and back but NOT toward the right side  - NO sitting for 2 weeks  - No need for dressings on incisions   - Agree with psych therapy   - Drain care, will likely continue at discharge   - Okay with transfer to [facility] or [facility]    Yolis Fields MD   right normal/left normal

## 2022-12-05 NOTE — H&P CARDIOLOGY - HISTORY OF PRESENT ILLNESS
68 y/o M w/ PMH of CAD s/p multiple PCI to LAD, HTN, RA, ESRD on HD(M/W/F), PAD s/p LE stenting, cardiomyopathy EF 40%, MR, and atrial fibrillation s/p ablation 2016 + s/p cardioversion 3/2022 + Aflutter/Afib ablation 9/2022 presents for cardiac catheretization. Pt states that he has been feeling well and denies any chest pain or shortness of breath. Pt needs cardiac clearance as he is currently in line for kidney transplantation.

## 2022-12-06 NOTE — ED ADULT NURSE NOTE - OBJECTIVE STATEMENT
PT is a 67 year old male presenting to ED with c/o finger pain after his hand was closed in a door.  Pt c/o deformity, however, fingers look arthritic on both hands.

## 2022-12-06 NOTE — ED ADULT NURSE NOTE - NSFALLRSKASSESSDT_ED_ALL_ED
Pt reports generalized body aches for approximately 20 years and states he hasn't been able to have his percocet order refilled. Pt is to follow up with pain management next week. Pt has hx of neuropathy. Pt states he has also been experiencing chills since leaving his home a few days ago. Pt is in NAD. 06-Dec-2022 07:25

## 2022-12-08 ENCOUNTER — OUTPATIENT (OUTPATIENT)
Dept: OUTPATIENT SERVICES | Facility: HOSPITAL | Age: 67
LOS: 1 days | End: 2022-12-08
Payer: COMMERCIAL

## 2022-12-08 ENCOUNTER — APPOINTMENT (OUTPATIENT)
Dept: ULTRASOUND IMAGING | Facility: IMAGING CENTER | Age: 67
End: 2022-12-08

## 2022-12-08 DIAGNOSIS — Z90.89 ACQUIRED ABSENCE OF OTHER ORGANS: Chronic | ICD-10-CM

## 2022-12-08 DIAGNOSIS — I77.0 ARTERIOVENOUS FISTULA, ACQUIRED: Chronic | ICD-10-CM

## 2022-12-08 DIAGNOSIS — Z95.9 PRESENCE OF CARDIAC AND VASCULAR IMPLANT AND GRAFT, UNSPECIFIED: Chronic | ICD-10-CM

## 2022-12-08 DIAGNOSIS — Z98.890 OTHER SPECIFIED POSTPROCEDURAL STATES: Chronic | ICD-10-CM

## 2022-12-08 DIAGNOSIS — Z98.49 CATARACT EXTRACTION STATUS, UNSPECIFIED EYE: Chronic | ICD-10-CM

## 2022-12-08 DIAGNOSIS — N20.0 CALCULUS OF KIDNEY: ICD-10-CM

## 2022-12-08 PROCEDURE — 76700 US EXAM ABDOM COMPLETE: CPT | Mod: 26

## 2022-12-08 PROCEDURE — 76700 US EXAM ABDOM COMPLETE: CPT

## 2022-12-12 ENCOUNTER — APPOINTMENT (OUTPATIENT)
Dept: INTERNAL MEDICINE | Facility: CLINIC | Age: 67
End: 2022-12-12

## 2022-12-12 VITALS — RESPIRATION RATE: 14 BRPM | DIASTOLIC BLOOD PRESSURE: 72 MMHG | SYSTOLIC BLOOD PRESSURE: 100 MMHG | HEART RATE: 60 BPM

## 2022-12-12 DIAGNOSIS — Z02.89 ENCOUNTER FOR OTHER ADMINISTRATIVE EXAMINATIONS: ICD-10-CM

## 2022-12-12 PROCEDURE — 99212 OFFICE O/P EST SF 10 MIN: CPT

## 2022-12-12 NOTE — HISTORY OF PRESENT ILLNESS
[de-identified] : Presents for forms to be filled out. \par Continues on HD, but is finishing evaluation for transplant. \par Feels well. \par No chest pain, palpitations, LIRA, orthopnea, PND, lightheadedness or edema.\par

## 2022-12-14 ENCOUNTER — NON-APPOINTMENT (OUTPATIENT)
Age: 67
End: 2022-12-14

## 2022-12-22 ENCOUNTER — APPOINTMENT (OUTPATIENT)
Dept: VASCULAR SURGERY | Facility: CLINIC | Age: 67
End: 2022-12-22
Payer: MEDICARE

## 2022-12-22 PROCEDURE — 93990 DOPPLER FLOW TESTING: CPT

## 2022-12-22 PROCEDURE — XXXXX: CPT | Mod: 1L

## 2022-12-29 ENCOUNTER — RESULT REVIEW (OUTPATIENT)
Age: 67
End: 2022-12-29

## 2022-12-29 ENCOUNTER — APPOINTMENT (OUTPATIENT)
Dept: ULTRASOUND IMAGING | Facility: IMAGING CENTER | Age: 67
End: 2022-12-29
Payer: COMMERCIAL

## 2022-12-29 ENCOUNTER — OUTPATIENT (OUTPATIENT)
Dept: OUTPATIENT SERVICES | Facility: HOSPITAL | Age: 67
LOS: 1 days | End: 2022-12-29
Payer: COMMERCIAL

## 2022-12-29 DIAGNOSIS — Z01.818 ENCOUNTER FOR OTHER PREPROCEDURAL EXAMINATION: ICD-10-CM

## 2022-12-29 DIAGNOSIS — Z98.890 OTHER SPECIFIED POSTPROCEDURAL STATES: Chronic | ICD-10-CM

## 2022-12-29 DIAGNOSIS — Z00.8 ENCOUNTER FOR OTHER GENERAL EXAMINATION: ICD-10-CM

## 2022-12-29 DIAGNOSIS — I77.0 ARTERIOVENOUS FISTULA, ACQUIRED: Chronic | ICD-10-CM

## 2022-12-29 DIAGNOSIS — Z90.89 ACQUIRED ABSENCE OF OTHER ORGANS: Chronic | ICD-10-CM

## 2022-12-29 DIAGNOSIS — Z95.9 PRESENCE OF CARDIAC AND VASCULAR IMPLANT AND GRAFT, UNSPECIFIED: Chronic | ICD-10-CM

## 2022-12-29 DIAGNOSIS — Z98.49 CATARACT EXTRACTION STATUS, UNSPECIFIED EYE: Chronic | ICD-10-CM

## 2022-12-29 PROCEDURE — 76882 US LMTD JT/FCL EVL NVASC XTR: CPT

## 2022-12-29 PROCEDURE — 76882 US LMTD JT/FCL EVL NVASC XTR: CPT | Mod: 26,RT

## 2023-01-03 ENCOUNTER — RX RENEWAL (OUTPATIENT)
Age: 68
End: 2023-01-03

## 2023-01-09 ENCOUNTER — APPOINTMENT (OUTPATIENT)
Dept: UROLOGY | Facility: CLINIC | Age: 68
End: 2023-01-09

## 2023-01-11 ENCOUNTER — NON-APPOINTMENT (OUTPATIENT)
Age: 68
End: 2023-01-11

## 2023-01-13 NOTE — DISCHARGE NOTE NURSING/CASE MANAGEMENT/SOCIAL WORK - NSDCPEFALRISK_GEN_ALL_CORE
For information on Fall & Injury Prevention, visit: [email or web address] OR  [email or web address] OR  https://www.cdc.gov/steadi/patient.html Follow Up:      Interval History/ROS: Patient is a 47y old  Female who presents with a chief complaint of orbital cellulitis.  ID following for concern for orbital cellulitis.  Seen at bedside ***    REVIEW OF SYSTEMS  [  ] ROS unobtainable because:    [ x ] All other systems negative except as noted below    Constitutional:  [ ] fever [ ] chills  [ ] weight loss  [ ]night sweat  [ ]poor appetite/PO intake [ ]fatigue   Skin:  [ ] rash [ ] phlebitis	  Eyes: [ ] icterus [ ] pain  [ ] discharge	  ENMT: [ ] sore throat  [ ] thrush [ ] ulcers [ ] exudates [ ]anosmia  Respiratory: [ ] dyspnea [ ] hemoptysis [ ] cough [ ] sputum	  Cardiovascular:  [ ] chest pain [ ] palpitations [ ] edema	  Gastrointestinal:  [ ] nausea [ ] vomiting [ ] diarrhea [ ] constipation [ ] pain	  Genitourinary:  [ ] dysuria [ ] frequency [ ] hematuria [ ] discharge [ ] flank pain  [ ] incontinence  Musculoskeletal:  [ ] myalgias [ ] arthralgias [ ] arthritis  [ ] back pain  Neurological:  [ ] headache [ ] weakness [ ] seizures  [ ] confusion/altered mental status    Allergies  Celiac (Unknown)  No Known Drug Allergies    ANTIMICROBIALS:    ampicillin/sulbactam  IVPB    ampicillin/sulbactam  IVPB 3 every 6 hours  vancomycin  IVPB 1000 every 12 hours    OTHER MEDS: MEDICATIONS  (STANDING):  enoxaparin Injectable 40 every 24 hours  levothyroxine 300 daily  methylPREDNISolone sodium succinate IVPB 500 daily  midodrine. 10 three times a day PRN  pantoprazole    Tablet 40 two times a day  sucralfate 1 two times a day    Vital Signs Last 24 Hrs  T(F): 97.5 (01-13-23 @ 05:37), Max: 99 (01-11-23 @ 17:36)    Vital Signs Last 24 Hrs  HR: 69 (01-13-23 @ 05:37) (69 - 75)  BP: 112/75 (01-13-23 @ 05:37) (110/68 - 112/75)  RR: 18 (01-13-23 @ 05:37)  SpO2: 96% (01-13-23 @ 05:37) (95% - 97%)  Wt(kg): --    EXAM:    GA: NAD  HEENT: oral cavity no lesion  CV: nl S1/S2, no RMG  Lungs: CTAB, no distress  Abd: BS+, soft, nontender, no rebounding pain  Ext: no edema  Neuro: No focal deficits  Skin: Intact  IV: no phlebitis    Labs:                        12.0   6.23  )-----------( 313      ( 12 Jan 2023 08:59 )             36.3     01-12    140  |  99  |  11  ----------------------------<  117<H>  3.4<L>   |  31  |  0.65    Ca    8.6      12 Jan 2023 08:59  Phos  3.6     01-12  Mg     2.1     01-12    TPro  6.8  /  Alb  3.0<L>  /  TBili  0.6  /  DBili  x   /  AST  20  /  ALT  28  /  AlkPhos  134<H>  01-12    WBC Trend:  WBC Count: 6.23 (01-12-23 @ 08:59)  WBC Count: 8.93 (01-11-23 @ 19:57)    Creatine Trend:  Creatinine, Serum: 0.65 (01-12)  Creatinine, Serum: 0.77 (01-11)    Liver Biochemical Testing Trend:  Alanine Aminotransferase (ALT/SGPT): 28 (01-12)  Alanine Aminotransferase (ALT/SGPT): 32 (01-11)  Aspartate Aminotransferase (AST/SGOT): 20 (01-12-23 @ 08:59)  Aspartate Aminotransferase (AST/SGOT): 28 (01-11-23 @ 19:57)  Bilirubin Total, Serum: 0.6 (01-12)  Bilirubin Total, Serum: 0.7 (01-11)    Trend LDH    Urinalysis Basic - ( 12 Jan 2023 00:42 )    Color: Yellow / Appearance: Clear / SG: >1.050 / pH: x  Gluc: x / Ketone: Negative  / Bili: Negative / Urobili: Negative   Blood: x / Protein: 100 mg/dL / Nitrite: Negative   Leuk Esterase: Negative / RBC: 409 /hpf / WBC 14 /HPF   Sq Epi: x / Non Sq Epi: 3 /hpf / Bacteria: Negative    MICROBIOLOGY:    Culture - Other (collected 12 Jan 2023 00:42)  Source: .Other Other  Preliminary Report:    No growth    Culture - Blood (collected 11 Jan 2023 19:55)  Source: .Blood Blood-Peripheral  Preliminary Report:    No growth to date.    Culture - Blood (collected 11 Jan 2023 19:40)  Source: .Blood Blood-Peripheral  Preliminary Report:    No growth to date.    C-Reactive Protein, Serum: 107 (01-13)  C-Reactive Protein, Serum: 161 (01-12)    Sedimentation Rate, Erythrocyte: 120 mm/hr (01-12-23 @ 08:59)    RADIOLOGY:  imaging below personally reviewed                     Follow Up:      Interval History/ROS: Patient is a 47y old  Female who presents with a chief complaint of orbital cellulitis.  ID following for concern for orbital cellulitis.  Seen during am rounds, father at bedside, r eye looking better, less erythematous, half opened, no pain.    REVIEW OF SYSTEMS  [  ] ROS unobtainable because:    [ x ] All other systems negative except as noted below    Constitutional:  [ ] fever [ ] chills  [ ] weight loss  [ ]night sweat  [ ]poor appetite/PO intake [ ]fatigue   Skin:  [ ] rash [ ] phlebitis	  Eyes: [ ] icterus [ ] pain  [ ] discharge [x] R eye erythema   ENMT: [ ] sore throat  [ ] thrush [ ] ulcers [ ] exudates [ ]anosmia  Respiratory: [ ] dyspnea [ ] hemoptysis [ ] cough [ ] sputum	  Cardiovascular:  [ ] chest pain [ ] palpitations [ ] edema	  Gastrointestinal:  [ ] nausea [ ] vomiting [ ] diarrhea [ ] constipation [ ] pain	  Genitourinary:  [ ] dysuria [ ] frequency [ ] hematuria [ ] discharge [ ] flank pain  [ ] incontinence  Musculoskeletal:  [ ] myalgias [ ] arthralgias [ ] arthritis  [ ] back pain  Neurological:  [ ] headache [ ] weakness [ ] seizures  [ ] confusion/altered mental status    Allergies  Celiac (Unknown)  No Known Drug Allergies    ANTIMICROBIALS:    ampicillin/sulbactam  IVPB    ampicillin/sulbactam  IVPB 3 every 6 hours  vancomycin  IVPB 1000 every 12 hours    OTHER MEDS: MEDICATIONS  (STANDING):  enoxaparin Injectable 40 every 24 hours  levothyroxine 300 daily  methylPREDNISolone sodium succinate IVPB 500 daily  midodrine. 10 three times a day PRN  pantoprazole    Tablet 40 two times a day  sucralfate 1 two times a day    Vital Signs Last 24 Hrs  T(F): 97.5 (01-13-23 @ 05:37), Max: 99 (01-11-23 @ 17:36)    Vital Signs Last 24 Hrs  HR: 69 (01-13-23 @ 05:37) (69 - 75)  BP: 112/75 (01-13-23 @ 05:37) (110/68 - 112/75)  RR: 18 (01-13-23 @ 05:37)  SpO2: 96% (01-13-23 @ 05:37) (95% - 97%)  Wt(kg): --    EXAM:    Physical Exam:  Constitutional:  well preserved, comfortable  Head/Eyes: R eye erythema and swelling improving  ENT:  supple; no thrush  LUNGS:  CTA  CVS:  normal S1, S2, no murmur  Abd:  soft, non-tender; non-distended  Ext:  no edema  Vascular:  IV site no erythema tenderness or discharge  MSK:  joints without swelling  Neuro: alert, moving extremities     Labs:                        12.0   6.23  )-----------( 313      ( 12 Jan 2023 08:59 )             36.3     01-12    140  |  99  |  11  ----------------------------<  117<H>  3.4<L>   |  31  |  0.65    Ca    8.6      12 Jan 2023 08:59  Phos  3.6     01-12  Mg     2.1     01-12    TPro  6.8  /  Alb  3.0<L>  /  TBili  0.6  /  DBili  x   /  AST  20  /  ALT  28  /  AlkPhos  134<H>  01-12    WBC Trend:  WBC Count: 6.23 (01-12-23 @ 08:59)  WBC Count: 8.93 (01-11-23 @ 19:57)    Creatine Trend:  Creatinine, Serum: 0.65 (01-12)  Creatinine, Serum: 0.77 (01-11)    Liver Biochemical Testing Trend:  Alanine Aminotransferase (ALT/SGPT): 28 (01-12)  Alanine Aminotransferase (ALT/SGPT): 32 (01-11)  Aspartate Aminotransferase (AST/SGOT): 20 (01-12-23 @ 08:59)  Aspartate Aminotransferase (AST/SGOT): 28 (01-11-23 @ 19:57)  Bilirubin Total, Serum: 0.6 (01-12)  Bilirubin Total, Serum: 0.7 (01-11)    Trend LDH    Urinalysis Basic - ( 12 Jan 2023 00:42 )    Color: Yellow / Appearance: Clear / SG: >1.050 / pH: x  Gluc: x / Ketone: Negative  / Bili: Negative / Urobili: Negative   Blood: x / Protein: 100 mg/dL / Nitrite: Negative   Leuk Esterase: Negative / RBC: 409 /hpf / WBC 14 /HPF   Sq Epi: x / Non Sq Epi: 3 /hpf / Bacteria: Negative    MICROBIOLOGY:    Culture - Other (collected 12 Jan 2023 00:42)  Source: .Other Other  Preliminary Report:    No growth    Culture - Blood (collected 11 Jan 2023 19:55)  Source: .Blood Blood-Peripheral  Preliminary Report:    No growth to date.    Culture - Blood (collected 11 Jan 2023 19:40)  Source: .Blood Blood-Peripheral  Preliminary Report:    No growth to date.    C-Reactive Protein, Serum: 107 (01-13)  C-Reactive Protein, Serum: 161 (01-12)    Sedimentation Rate, Erythrocyte: 120 mm/hr (01-12-23 @ 08:59)    RADIOLOGY:  imaging below personally reviewed

## 2023-01-19 ENCOUNTER — APPOINTMENT (OUTPATIENT)
Dept: UROLOGY | Facility: CLINIC | Age: 68
End: 2023-01-19
Payer: MEDICARE

## 2023-01-19 ENCOUNTER — RX RENEWAL (OUTPATIENT)
Age: 68
End: 2023-01-19

## 2023-01-19 DIAGNOSIS — Z00.00 ENCOUNTER FOR GENERAL ADULT MEDICAL EXAMINATION W/OUT ABNORMAL FINDINGS: ICD-10-CM

## 2023-01-19 PROCEDURE — 99214 OFFICE O/P EST MOD 30 MIN: CPT

## 2023-01-21 NOTE — ADDENDUM
[FreeTextEntry1] : Entered by Terrance Yeboah, acting as scribe for Dr. Pernell Keith.\par The documentation recorded by the scribe accurately reflects the service I personally performed and the decisions made by me.

## 2023-01-21 NOTE — LETTER BODY
[FreeTextEntry1] : London Lara M.D.\par [address] [facility]\[place] [address]\par P: [phone] \par F: [phone]\par \par Pernell Lee MD\par [address] [place] [address] \[address]\[place] [address]\par P: [phone]\par F: [phone]\par \par Dear Dr. Lara and Dr. Lee, \par \par Reason for visit: Elevated PSA. BPH.\par \par This is a 66 year-old gentleman with renal failure on hemodialysis,  hypertension, atrial fibrillation, and cardiomegaly, presenting with chronic BPH and elevated PSA. Patient underwent a negative prostate biopsy in September 2017. He obtained a negative prostate MRI in 2018, PI-RADS 2. Patient reports seeing nephrologist, Dr. Hayes, for his elevated creatinine.  He is awaiting clearance for renal transplant evaluation. His prostate MRI in August 2020 demonstrated an enlarged prostate without suspicious prostatic lesions, PI-RADS 2. Patient is undergoing hemodialysis. He has a left arm AV fistula. The patient returns today for follow-up. Since he was last seen, the patient reports taking Proscar regularly without any side effects or difficulties with the medication. He notes stable urinary symptoms with medication. He is waiting to be on the waiting list for a kidney transplant. Patient denies any interval urinary difficulties.   The patient is overall well. He denies any changes in health. He denies any hematuria or dysuria. The past medical history, family history and social history are unchanged. All other review of systems are negative. Patient denies any changes in medications. Medication list was reconciled.\par \par On examination, the patient is an older appearing gentleman in no acute distress. He is alert and oriented follows commands. He has normal mood and affect. He is normocephalic. Oral no thrush. Neck is supple. Respirations are unlabored. His abdomen is soft and nontender. Liver is nonpalpable. Bladder is nonpalpable. No CVA tenderness. Neurologically he is grossly intact. No peripheral edema. He has a left arm AV fistula. Skin without gross abnormality. He has normal male external genitalia. Normal meatus. Bilateral testes are descended intrascrotally and normal to palpation. On rectal examination, there is normal sphincter tone. The prostate is clinically benign without focal induration or nodularity. His prostate is enlarged. There is evidence of hemorrhoids. \par \par Post-void residual on bladder scan today was 0 cc. \par \par Assessment: Elevated PSA. BPH. Chronic renal insufficiency. \par \par I counseled the patient. In terms of his BPH, his symptoms are stable on medical therapy. I recommended he continue taking Proscar. I renewed the patient's prescription for Proscar today. I encouraged the patient to continue medications regularly as directed to maintain prostate size. In terms of his elevated PSA, I reassured the patient as his PSA has improved.  I recommended the patient repeat PSA today to ensure stability. In terms of his chronic renal insufficiency, he will obtain BMP today to ensure stability. There is no contraindication for a kidney transplant. Patient understands that if he develops gross hematuria or any urinary discomfort, he will contact me for further evaluation. Risks and alternatives were discussed. I answered the patient questions. The patient will follow-up as directed and will contact me with any questions or concerns. Thank you for the opportunity to participate in the care of Mr. WEAVER. I will keep you updated on his progress.\par \par Plan: BMP. PSA. Continue Proscar. Follow-up in 1 year.

## 2023-01-25 LAB
ANION GAP SERPL CALC-SCNC: 22 MMOL/L
APPEARANCE: CLEAR
BACTERIA UR CULT: NORMAL
BACTERIA: NEGATIVE
BILIRUBIN URINE: NEGATIVE
BLOOD URINE: ABNORMAL
BUN SERPL-MCNC: 59 MG/DL
CALCIUM SERPL-MCNC: 9.4 MG/DL
CHLORIDE SERPL-SCNC: 93 MMOL/L
CO2 SERPL-SCNC: 24 MMOL/L
COLOR: YELLOW
CREAT SERPL-MCNC: 8.88 MG/DL
EGFR: 6 ML/MIN/1.73M2
GLUCOSE QUALITATIVE U: ABNORMAL
GLUCOSE SERPL-MCNC: 119 MG/DL
HYALINE CASTS: 11 /LPF
KETONES URINE: NEGATIVE
LEUKOCYTE ESTERASE URINE: NEGATIVE
MICROSCOPIC-UA: NORMAL
NITRITE URINE: NEGATIVE
PH URINE: 7
POTASSIUM SERPL-SCNC: 5.2 MMOL/L
PROTEIN URINE: ABNORMAL
PSA FREE FLD-MCNC: 38 %
PSA FREE SERPL-MCNC: 6.49 NG/ML
PSA SERPL-MCNC: 17.3 NG/ML
RED BLOOD CELLS URINE: 8 /HPF
SODIUM SERPL-SCNC: 140 MMOL/L
SPECIFIC GRAVITY URINE: 1.02
SQUAMOUS EPITHELIAL CELLS: 3 /HPF
UROBILINOGEN URINE: ABNORMAL
WHITE BLOOD CELLS URINE: 2 /HPF

## 2023-01-26 ENCOUNTER — NON-APPOINTMENT (OUTPATIENT)
Age: 68
End: 2023-01-26

## 2023-02-02 NOTE — PATIENT PROFILE ADULT - PUBLIC BENEFITS - CHOOSE ALL APPLY
No Refill Protocol on File:    Medication/Dose: irbesartan-hydrochlorothiazide (AVALIDE) 150-12.5 MG per tablet  Patient last seen by PCP: 09-  Next office visit with PCP: none  Last Lab:12-  Last Ordered: 12-    Above information requires contacting patient: No    Prescription does not require PDMP check    Sent to provider to approve or deny         food stamps (SNAP)

## 2023-02-09 ENCOUNTER — RX RENEWAL (OUTPATIENT)
Age: 68
End: 2023-02-09

## 2023-02-13 ENCOUNTER — NON-APPOINTMENT (OUTPATIENT)
Age: 68
End: 2023-02-13

## 2023-02-18 ENCOUNTER — APPOINTMENT (OUTPATIENT)
Dept: MRI IMAGING | Facility: IMAGING CENTER | Age: 68
End: 2023-02-18

## 2023-02-27 ENCOUNTER — NON-APPOINTMENT (OUTPATIENT)
Age: 68
End: 2023-02-27

## 2023-02-28 ENCOUNTER — NON-APPOINTMENT (OUTPATIENT)
Age: 68
End: 2023-02-28

## 2023-03-08 ENCOUNTER — APPOINTMENT (OUTPATIENT)
Dept: INTERNAL MEDICINE | Facility: CLINIC | Age: 68
End: 2023-03-08
Payer: MEDICARE

## 2023-03-08 VITALS — HEART RATE: 62 BPM | SYSTOLIC BLOOD PRESSURE: 90 MMHG | DIASTOLIC BLOOD PRESSURE: 60 MMHG | RESPIRATION RATE: 14 BRPM

## 2023-03-08 DIAGNOSIS — M05.741 RHEUMATOID ARTHRITIS WITH RHEUMATOID FACTOR OF RIGHT HAND W/OUT ORGAN OR SYSTEMS INVOLVEMENT: ICD-10-CM

## 2023-03-08 DIAGNOSIS — N18.4 CHRONIC KIDNEY DISEASE, STAGE 4 (SEVERE): ICD-10-CM

## 2023-03-08 DIAGNOSIS — T82.898A OTHER SPECIFIED COMPLICATION OF VASCULAR PROSTHETIC DEVICES, IMPLANTS AND GRAFTS, INITIAL ENCOUNTER: ICD-10-CM

## 2023-03-08 DIAGNOSIS — J06.9 ACUTE UPPER RESPIRATORY INFECTION, UNSPECIFIED: ICD-10-CM

## 2023-03-08 DIAGNOSIS — M05.742 RHEUMATOID ARTHRITIS WITH RHEUMATOID FACTOR OF RIGHT HAND W/OUT ORGAN OR SYSTEMS INVOLVEMENT: ICD-10-CM

## 2023-03-08 PROCEDURE — G0439: CPT

## 2023-03-08 RX ORDER — RIVAROXABAN 15 MG/1
15 TABLET, FILM COATED ORAL
Refills: 0 | Status: DISCONTINUED | COMMUNITY
End: 2023-03-08

## 2023-03-08 NOTE — HISTORY OF PRESENT ILLNESS
[FreeTextEntry1] : Mr. WEAVER is here for an annual physical examination and assessment.\par  [de-identified] : He generally feels well with no specific complaints. His recent health has been good.\par Feels well.  DOng fine on HD.  May be moving to [place]\par \par Denies headache, dizziness.\par Denies rash, fatigue, fever, weight loss, anorexia.\par Denies cough, wheezing.\par Denies CP, SOB, LIRA, edema, palpitations.\par Denies abdominal pain, N/V/D/C. Denies BRBPR, melena, dysphagia.\par Denies dysuria, frequency, hematuria, hesitancy.\par Denies weakness, numbness, gait instability.\par

## 2023-03-08 NOTE — HEALTH RISK ASSESSMENT
[Excellent] : ~his/her~  mood as  excellent [Yes] : Yes [Monthly or less (1 pt)] : Monthly or less (1 point) [3 or 4 (1 pt)] : 3 or 4  (1 point) [Never (0 pts)] : Never (0 points) [No] : In the past 12 months have you used drugs other than those required for medical reasons? No [No falls in past year] : Patient reported no falls in the past year [0] : 2) Feeling down, depressed, or hopeless: Not at all (0) [PHQ-2 Negative - No further assessment needed] : PHQ-2 Negative - No further assessment needed [None] : None [Alone] : lives alone [Retired] : retired [] :  [Feels Safe at Home] : Feels safe at home [Fully functional (bathing, dressing, toileting, transferring, walking, feeding)] : Fully functional (bathing, dressing, toileting, transferring, walking, feeding) [Fully functional (using the telephone, shopping, preparing meals, housekeeping, doing laundry, using] : Fully functional and needs no help or supervision to perform IADLs (using the telephone, shopping, preparing meals, housekeeping, doing laundry, using transportation, managing medications and managing finances) [Reports normal functional visual acuity (ie: able to read med bottle)] : Reports normal functional visual acuity [Smoke Detector] : smoke detector [Seat Belt] :  uses seat belt [Audit-CScore] : 2 [[id number]] : 0 [Change in mental status noted] : No change in mental status noted [Language] : denies difficulty with language [Behavior] : denies difficulty with behavior [Learning/Retaining New Information] : denies difficulty learning/retaining new information [Handling Complex Tasks] : denies difficulty handling complex tasks [Reasoning] : denies difficulty with reasoning [Spatial Ability and Orientation] : denies difficulty with spatial ability and orientation [Reports changes in hearing] : Reports no changes in hearing [Reports changes in vision] : Reports no changes in vision [Reports changes in dental health] : Reports no changes in dental health

## 2023-03-08 NOTE — ASSESSMENT
[FreeTextEntry1] : Mr. WEAVER  follows up with appropriate specialists for management of other issues\par \par Bloods per renal.

## 2023-03-13 ENCOUNTER — APPOINTMENT (OUTPATIENT)
Dept: CARDIOLOGY | Facility: CLINIC | Age: 68
End: 2023-03-13
Payer: MEDICARE

## 2023-03-13 PROCEDURE — 93242 EXT ECG>48HR<7D RECORDING: CPT

## 2023-03-16 ENCOUNTER — APPOINTMENT (OUTPATIENT)
Dept: MRI IMAGING | Facility: IMAGING CENTER | Age: 68
End: 2023-03-16

## 2023-03-23 DIAGNOSIS — H18.003: ICD-10-CM

## 2023-03-28 ENCOUNTER — APPOINTMENT (OUTPATIENT)
Dept: CT IMAGING | Facility: IMAGING CENTER | Age: 68
End: 2023-03-28

## 2023-03-31 ENCOUNTER — APPOINTMENT (OUTPATIENT)
Dept: OPHTHALMOLOGY | Facility: CLINIC | Age: 68
End: 2023-03-31

## 2023-04-10 ENCOUNTER — APPOINTMENT (OUTPATIENT)
Dept: CARDIOLOGY | Facility: CLINIC | Age: 68
End: 2023-04-10
Payer: MEDICARE

## 2023-04-10 PROCEDURE — 93244 EXT ECG>48HR<7D REV&INTERPJ: CPT

## 2023-04-13 ENCOUNTER — APPOINTMENT (OUTPATIENT)
Dept: TRANSPLANT | Facility: CLINIC | Age: 68
End: 2023-04-13

## 2023-04-13 ENCOUNTER — APPOINTMENT (OUTPATIENT)
Dept: NEPHROLOGY | Facility: CLINIC | Age: 68
End: 2023-04-13

## 2023-04-17 ENCOUNTER — APPOINTMENT (OUTPATIENT)
Dept: ELECTROPHYSIOLOGY | Facility: CLINIC | Age: 68
End: 2023-04-17

## 2023-05-11 ENCOUNTER — OUTPATIENT (OUTPATIENT)
Dept: OUTPATIENT SERVICES | Facility: HOSPITAL | Age: 68
LOS: 1 days | Discharge: HOME | End: 2023-05-11

## 2023-05-11 DIAGNOSIS — Z98.890 OTHER SPECIFIED POSTPROCEDURAL STATES: Chronic | ICD-10-CM

## 2023-05-11 DIAGNOSIS — Z98.49 CATARACT EXTRACTION STATUS, UNSPECIFIED EYE: Chronic | ICD-10-CM

## 2023-05-11 DIAGNOSIS — I77.0 ARTERIOVENOUS FISTULA, ACQUIRED: Chronic | ICD-10-CM

## 2023-05-11 DIAGNOSIS — Z90.89 ACQUIRED ABSENCE OF OTHER ORGANS: Chronic | ICD-10-CM

## 2023-05-11 DIAGNOSIS — Z95.9 PRESENCE OF CARDIAC AND VASCULAR IMPLANT AND GRAFT, UNSPECIFIED: Chronic | ICD-10-CM

## 2023-05-12 LAB
HEMOLYSIS INDEX: 1 — SIGNIFICANT CHANGE UP
POTASSIUM SERPL-MCNC: 4.3 MMOL/L — SIGNIFICANT CHANGE UP (ref 3.5–5.3)
POTASSIUM SERPL-SCNC: 4.3 MMOL/L — SIGNIFICANT CHANGE UP (ref 3.5–5.3)

## 2023-05-18 ENCOUNTER — LABORATORY RESULT (OUTPATIENT)
Age: 68
End: 2023-05-18

## 2023-05-18 ENCOUNTER — APPOINTMENT (OUTPATIENT)
Dept: TRANSPLANT | Facility: CLINIC | Age: 68
End: 2023-05-18
Payer: COMMERCIAL

## 2023-05-18 ENCOUNTER — NON-APPOINTMENT (OUTPATIENT)
Age: 68
End: 2023-05-18

## 2023-05-18 ENCOUNTER — APPOINTMENT (OUTPATIENT)
Dept: NEPHROLOGY | Facility: CLINIC | Age: 68
End: 2023-05-18
Payer: COMMERCIAL

## 2023-05-18 VITALS
WEIGHT: 185 LBS | HEIGHT: 61 IN | BODY MASS INDEX: 34.93 KG/M2 | HEART RATE: 68 BPM | RESPIRATION RATE: 14 BRPM | DIASTOLIC BLOOD PRESSURE: 74 MMHG | SYSTOLIC BLOOD PRESSURE: 108 MMHG | OXYGEN SATURATION: 100 % | TEMPERATURE: 97.6 F

## 2023-05-18 PROCEDURE — 99215 OFFICE O/P EST HI 40 MIN: CPT

## 2023-05-18 PROCEDURE — 99204 OFFICE O/P NEW MOD 45 MIN: CPT

## 2023-05-18 NOTE — REASON FOR VISIT
[End-Stage Renal Disease] : end-stage renal disease [Kidney Transplant Evaluation] : kidney transplant evaluation

## 2023-05-18 NOTE — PHYSICAL EXAM
[] : right dorsalis pedis palpable [Normal] : normal [TextBox_34] : deformed fingers; rheumatoid arthritis?

## 2023-05-18 NOTE — PHYSICAL EXAM
[General Appearance - Alert] : alert [General Appearance - In No Acute Distress] : in no acute distress [Sclera] : the sclera and conjunctiva were normal [PERRL With Normal Accommodation] : pupils were equal in size, round, and reactive to light [Extraocular Movements] : extraocular movements were intact [Outer Ear] : the ears and nose were normal in appearance [Oropharynx] : the oropharynx was normal [Neck Appearance] : the appearance of the neck was normal [Neck Cervical Mass (___cm)] : no neck mass was observed [Jugular Venous Distention Increased] : there was no jugular-venous distention [Thyroid Nodule] : there were no palpable thyroid nodules [Thyroid Diffuse Enlargement] : the thyroid was not enlarged [Auscultation Breath Sounds / Voice Sounds] : lungs were clear to auscultation bilaterally [Heart Rate And Rhythm] : heart rate was normal and rhythm regular [Heart Sounds] : normal S1 and S2 [Heart Sounds Gallop] : no gallops [Murmurs] : no murmurs [Heart Sounds Pericardial Friction Rub] : no pericardial rub [Full Pulse] : the pedal pulses are present [Edema] : there was no peripheral edema [Bowel Sounds] : normal bowel sounds [Abdomen Soft] : soft [Abdomen Tenderness] : non-tender [Abdomen Mass (___ Cm)] : no abdominal mass palpated [Abnormal Walk] : normal gait [Nail Clubbing] : no clubbing  or cyanosis of the fingernails [Musculoskeletal - Swelling] : no joint swelling seen [Motor Tone] : muscle strength and tone were normal [Skin Color & Pigmentation] : normal skin color and pigmentation [Skin Turgor] : normal skin turgor [] : no rash [Normal] : normal [Deep Tendon Reflexes (DTR)] : deep tendon reflexes were 2+ and symmetric [Sensation] : the sensory exam was normal to light touch and pinprick [No Focal Deficits] : no focal deficits [Oriented To Time, Place, And Person] : oriented to person, place, and time [Impaired Insight] : insight and judgment were intact [Affect] : the affect was normal

## 2023-05-18 NOTE — HISTORY OF PRESENT ILLNESS
[TextBox_42] : patient was seen before for kidney transplant evaluation and workup never completed and case closed due to lack of social support\par here today with a friend\par no major changes since last seen in october 2022\par 67 year old male with ESRD on HD since march 2022\par hypertension for 6 years\par no diabetes\par CAD s/p stenting twice 2015 and 2018\par a fibrillation; post ablation in September 2022; now on Xarelto\par knee surgery 20 years ago\par right inguinal hernia repair 10 years ago\par gout\par now doing well on dialysis\par can walk 6 blocks with no problems\par doing well on dialysis\par also found to have elevated PSA and urology recommended MRI of prostate; not done yet\par \par Social history\par retired\par  and lives alone\par has two daughters and one son in their 40s; they live in [facility]\par stopped smoking in 2013 \par \par

## 2023-05-18 NOTE — ASSESSMENT
[Good candidate] : a good candidate. We should proceed with our protocol for evaluation for kidney transplantation. [FreeTextEntry1] : reasonable candidate provided he is cleared by social evaluation\par he needs prostate MRI

## 2023-05-20 NOTE — HISTORY OF PRESENT ILLNESS
[TextBox_42] : SYEDA WEAVER is a 67 year old male who presents for kidney transplant evaluation. \par Cause of ESRD :  Cardiorenal etiology \par Nephrologist: Dr Joce Bran \par on IHD since March 2022\par  \par No major changes since last seen in October 2022\par \par Other PMH :\par Cardiac -has  HTN for 5 years, CAD s/p PCI in 2015, 2018, Afib s/p ablation in 9/2022 \par Pulmonary-  no h/o COPD, Asthma\par Infections- no h/o  TB, HIV, Hepatitis  \par Heme- no h/o malignancy or bleeding disorders. No DVT/PE\par Endo- no h/o diabetes, no Thyroid disease\par Neuro- no no h/o CVA or seizures \par Psych-no h/o  suicidal ideation, depression or anxiety. \par Sensitization events-has had  blood transfusions in 2021. No previous transplant\par \par Surgical h/o : Knee surgery.\par Functional status: can walk 8 blocks with no difficulty. \par Social history: Lives alone. 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  ex smoker, quit in 2013. No alcohol or illicit use.\par Family history:  no family h/o kidney disease. Mother has HTN. \par Potential donors- has multiple donors.( friends) \par \par \par \par

## 2023-05-20 NOTE — PLAN
[FreeTextEntry1] : 1. ESRD on IHD since March 2022 :  Mr. SYEDA WEAVER  is a reasonable candidate for kidney transplantation . He has multiple potential donors. \par 2.Cardiac risk- On 9/29/2022 transesophageal echocardiogram revealed mildly decreased global left ventricular systolic function, normal right ventricular size and function, a moderately dilated left atrium, mild mitral valve regurgitation and an LVEF of 45-50%.\par Coronary angiography of 12/5/2022 revealed a patent proximal LAD stent and luminal LAD disease. \par There is no evidence of new obstructive epicardial coronary artery disease.\par As per cardiology - No further cardiac testing is required prior to transplant consideration. \par 3. Cancer screening: has elevated  PSA, seen by urology. needs an MRI prostate. \par 4. ID: Serology for acute and chronic viral infections. Screening for latent TB\par 5. Imaging: CT chest and CT A/P\par  \par One year SRTR outcomes for national and [facility] were discussed in regards to patient survival and graft survival after transplantation. \par  \par Details of transplant surgery, including complications were discussed.\par Immunosuppression and complications including infection including life threatening sepsis and opportunistic infections, malignancy and new onset diabetes were discussed. \par  \par Benefits of live donor transplantation as well as variability in wait times across regions and multiple listing were discussed. \par KDPI >85% and PHS high risk criteria donors were discussed. \par HCV kidney transplantation was discussed.\par  \par Will proceed with completing/ updating work up and listing for transplant/ live donor transplant once work up is reviewed and found to be acceptable by multidisciplinary listing committee.\par

## 2023-05-22 ENCOUNTER — NON-APPOINTMENT (OUTPATIENT)
Age: 68
End: 2023-05-22

## 2023-05-22 LAB
ABO + RH PNL BLD: NORMAL
ABO + RH PNL BLD: NORMAL
ALBUMIN SERPL ELPH-MCNC: 4.4 G/DL
ALP BLD-CCNC: 197 U/L
ALT SERPL-CCNC: 6 U/L
ANION GAP SERPL CALC-SCNC: 16 MMOL/L
APPEARANCE: CLEAR
AST SERPL-CCNC: 8 U/L
BACTERIA: NEGATIVE /HPF
BILIRUB SERPL-MCNC: 0.6 MG/DL
BILIRUBIN URINE: NEGATIVE
BLOOD URINE: ABNORMAL
BUN SERPL-MCNC: 37 MG/DL
C PEPTIDE SERPL-MCNC: 16.8 NG/ML
CALCIUM SERPL-MCNC: 9.1 MG/DL
CAST: 3 /LPF
CHLORIDE SERPL-SCNC: 97 MMOL/L
CMV IGG SERPL QL: 7.6 U/ML
CMV IGG SERPL-IMP: POSITIVE
CO2 SERPL-SCNC: 28 MMOL/L
COLOR: NORMAL
COVID-19 SPIKE DOMAIN ANTIBODY INTERPRETATION: POSITIVE
CREAT SERPL-MCNC: 7.54 MG/DL
CREAT SPEC-SCNC: 223 MG/DL
CREAT/PROT UR: 1.3 RATIO
EBV DNA SERPL NAA+PROBE-ACNC: NOT DETECTED IU/ML
EBV EA AB SER IA-ACNC: <5 U/ML
EBV EA AB TITR SER IF: POSITIVE
EBV EA IGG SER QL IA: >600 U/ML
EBV EA IGG SER-ACNC: NEGATIVE
EBV EA IGM SER IA-ACNC: NEGATIVE
EBV PATRN SPEC IB-IMP: NORMAL
EBV VCA IGG SER IA-ACNC: >750 U/ML
EBV VCA IGM SER QL IA: <10 U/ML
EBVPCR LOG: NOT DETECTED LOG10IU/ML
EGFR: 7 ML/MIN/1.73M2
EPITHELIAL CELLS: 0 /HPF
EPSTEIN-BARR VIRUS CAPSID ANTIGEN IGG: POSITIVE
ESTIMATED AVERAGE GLUCOSE: 111 MG/DL
GLUCOSE QUALITATIVE U: 500 MG/DL
GLUCOSE SERPL-MCNC: 125 MG/DL
HAV IGM SER QL: NONREACTIVE
HBA1C MFR BLD HPLC: 5.5 %
HBV CORE IGG+IGM SER QL: NONREACTIVE
HBV SURFACE AB SER QL: REACTIVE
HBV SURFACE AB SERPL IA-ACNC: 199.1 MIU/ML
HBV SURFACE AG SER QL: NONREACTIVE
HCV AB SER QL: NONREACTIVE
HCV S/CO RATIO: 0.14 S/CO
HIV1+2 AB SPEC QL IA.RAPID: NONREACTIVE
HSV 1+2 IGG SER IA-IMP: NEGATIVE
HSV 1+2 IGG SER IA-IMP: POSITIVE
HSV1 IGG SER QL: 30.5 INDEX
HSV2 IGG SER QL: 0.3 INDEX
KETONES URINE: ABNORMAL MG/DL
LEUKOCYTE ESTERASE URINE: NEGATIVE
M TB IFN-G BLD-IMP: NEGATIVE
MAGNESIUM SERPL-MCNC: 2.4 MG/DL
MICROSCOPIC-UA: NORMAL
NITRITE URINE: NEGATIVE
PH URINE: 7
PHOSPHATE SERPL-MCNC: 4.6 MG/DL
POTASSIUM SERPL-SCNC: 4.2 MMOL/L
PROT SERPL-MCNC: 7.8 G/DL
PROT UR-MCNC: 292 MG/DL
PROTEIN URINE: 300 MG/DL
PSA SERPL-MCNC: 9.1 NG/ML
QUANTIFERON TB PLUS MITOGEN MINUS NIL: 0.79 IU/ML
QUANTIFERON TB PLUS NIL: 0.03 IU/ML
QUANTIFERON TB PLUS TB1 MINUS NIL: 0 IU/ML
QUANTIFERON TB PLUS TB2 MINUS NIL: 0 IU/ML
RED BLOOD CELLS URINE: 10 /HPF
ROGOSIN: NORMAL
RUBV IGG FLD-ACNC: 4.6 INDEX
RUBV IGG SER-IMP: POSITIVE
SARS-COV-2 AB SERPL IA-ACNC: 129 U/ML
SODIUM SERPL-SCNC: 142 MMOL/L
SPECIFIC GRAVITY URINE: 1.02
T GONDII AB SER-IMP: NEGATIVE
T GONDII IGG SER QL: <3 IU/ML
T PALLIDUM AB SER QL IA: NEGATIVE
URATE SERPL-MCNC: 5.3 MG/DL
UROBILINOGEN URINE: 1 MG/DL
VZV AB TITR SER: NEGATIVE
VZV IGG SER IF-ACNC: <10 INDEX
WHITE BLOOD CELLS URINE: 0 /HPF

## 2023-05-25 ENCOUNTER — NON-APPOINTMENT (OUTPATIENT)
Age: 68
End: 2023-05-25

## 2023-05-25 ENCOUNTER — APPOINTMENT (OUTPATIENT)
Dept: CARDIOLOGY | Facility: CLINIC | Age: 68
End: 2023-05-25
Payer: COMMERCIAL

## 2023-05-25 VITALS
BODY MASS INDEX: 33.82 KG/M2 | SYSTOLIC BLOOD PRESSURE: 100 MMHG | OXYGEN SATURATION: 99 % | HEART RATE: 60 BPM | DIASTOLIC BLOOD PRESSURE: 76 MMHG | WEIGHT: 179 LBS

## 2023-05-25 DIAGNOSIS — I48.91 UNSPECIFIED ATRIAL FIBRILLATION: ICD-10-CM

## 2023-05-25 DIAGNOSIS — I25.10 ATHEROSCLEROTIC HEART DISEASE OF NATIVE CORONARY ARTERY W/OUT ANGINA PECTORIS: ICD-10-CM

## 2023-05-25 PROCEDURE — 93000 ELECTROCARDIOGRAM COMPLETE: CPT | Mod: NC

## 2023-05-25 PROCEDURE — 99214 OFFICE O/P EST MOD 30 MIN: CPT

## 2023-05-28 NOTE — PHYSICAL EXAM
[5th Left ICS - MCL] : palpated at the 5th LICS in the midclavicular line [Bradycardia] : bradycardic [Rhythm Regular] : regular [Normal S1] : normal S1 [S1 Diminished] : was diminished [Normal S2] : normal S2 [No Gallop] : no gallop heard [No Murmur] : no murmurs heard [No Pitting Edema] : no pitting edema present [2+] : left 2+ [1+] : left 1+ [No Abnormalities] : the abdominal aorta was not enlarged and no bruit was heard [Normal] : alert and oriented, normal memory [Right Carotid Bruit] : no bruit heard over the right carotid [Left Carotid Bruit] : no bruit heard over the left carotid [de-identified] : left AV fistula +/+

## 2023-05-28 NOTE — HISTORY OF PRESENT ILLNESS
[FreeTextEntry1] : Mr. Nils Sawyer is a 66 year old man referred as a pre-emptive candidate for evaluation in anticipation of renal transplant. Kidney failure is attributed to focal segmental glomerulosclerosis with kidney biopsy 2019. He has a history of atrial fibrillation ablation. He also has coronary artery disease with percutaneous coronary intervention 2014 and severely impaired left ventricular systolic function. He is managed by cardiologist, Dr. Lee.\par \par He has no symptoms, is unaware of palpitations, no awareness of abnormal heart rhythm. Functional capacity is satisfactory, can walk 5 blocks without stopping or climb two flights of stairs.\par \par \par 11/8/2022.\par Mr. Nils Sawyer returns today for scheduled follow up as a candidate for renal transplant consideration. \par In March he began hemodialysis on a M-W-F schedule via left forearm AV fistula at [facility] in [place]. He reports no complications since the initiation of dialysis. In September he underwent a catheter ablation for his recurrent atrial fibrillation with Dr. Ephraim James.He is currently maintained on amiodarone 100 mg daily and is feeling much better now that sinus rhythm has been restored. A transesophageal echocardiogram was performed prior to the procedure and he was noted to have global mildly decreased left ventricular systolic function with an LVEF of 45-50%, an improvement from prior studies.\par Today he is feeling well. For exercise he walks 3-5 blocks each morning and is not limited by any chest discomfort or shortness of breath. \par We have reviewed his medications and he is taking all as directed. \par \par 5/25/2023.\par Mr. Nils Sawyer returns today for preoperative cardiovascular evaluation prior to possible kidney transplant. He continues on hemodialysis on a M-W-F schedule without any complications and otherwise continues to feel generally well. A transesophageal echocardiogram preformed in September of 2022 revealed global mildly decreased left ventricular systolic function with an LVEF of 45-50%, an improvement from prior studies. A left heart catheterization of 12/5/2022 revealed a patent proximal LAD stent and luminal LAD disease with no evidence of new obstructive epicardial disease.

## 2023-05-28 NOTE — CARDIOLOGY SUMMARY
[de-identified] : 2/23/2022 atrial tachycardia with block, ventricular rate 48, possible old anterior wall MI. [de-identified] : 5/28/2021 severe LV dysfunction with dilated LV, moderate eccentric MR, normal aortic valve. There is decreased RV systolic function, only mild TR, no pulmonary hypertension.\par 9/29/2022: global mildly decreased LV systolic function, mild mitral valve regurgitation, normal aortic valve, normal right ventricular size and function, mild tricuspid regurgitation. [de-identified] : 80% proximal LAD lesion, stent placed, normal left main, diagonal, circumflex and RCA. Severity of LV dysfunction disproportionate to severity of coronary disease.\par \par Cath Lab Report  \par Diagnostic Cardiologist:       Pernell Lee MD \par Referring Physician:           Pernell Lee MD \par \par Procedures Performed \par Procedures:\par 1.    Arterial Access - Right Femoral \par 2.    Diagnostic Coronary Angiography \par 3.    Left Heart Cath \par \par Indications:  Pre Renal Transplant Evaluation \par History of CAD s/p PCI to LAD \par Atrial Fibrillation \par s/p DCCV \par s/p Ablation 2022 \par severe mixed cardiomyopathy \par \par Diagnostic Conclusions: \par \par Preop Renal Transplant. History of CAD, s/p PCI to proximal LAD, atrial fibrillation, s/p ablation, mixed severe cardiomyopathy,\par since resolved, ESRD on HD. \par \par Cath with patent proximal LAD stent and luminal LAD disease. \par No evidence of new obstructive epicardial CAD. \par LVEDP = 12mmHg. \par Continue medical treatment of CAD. Patient remains optimized from cardiac standpoint to proceed for renal transplant.\par

## 2023-05-28 NOTE — DISCUSSION/SUMMARY
[Paroxysmal Atrial Fibrillation] : paroxysmal atrial fibrillation [Anticoagulation] : anticoagulation [Cardiomyopathy] : cardiomyopathy [Responding to Treatment] : responding to treatment [Ischemic Cardiomyopathy] : ischemic cardiomyopathy [Coronary Artery Disease] : coronary artery disease [None] : There are no changes in medication management [Patient] : the patient [EKG obtained to assist in diagnosis and management of assessed problem(s)] : EKG obtained to assist in diagnosis and management of assessed problem(s) [de-identified] : and atrial tachycardia [de-identified] : on apixaban 5 mg BID which appears to be correct dose based on age and weight and despite severely reduced GFR. [de-identified] : on appropriate medication regimen given contraints of kidney disease [FreeTextEntry1] : Mr. Nils Sawyer returns for preoperative cardiovascular evaluation prior to possible kidney transplant. His recent NICOLETTE confirmed an improvement in left ventricular systolic function with an LVEF of 45-50%. Recent left heart catheterization revealed patent stents and no new evidence of epicardial coronary artery disease. \par \par His blood pressure is well controlled. No changes to his medications were suggested. \par Continue amiodarone and Xarelto. Follow up with Dr. James for possible discontinuation of amiodarone as scheduled. \par Follow up with Dr. Pernell Lee as well. Would recommend consideration for high intensity statin therapy for a patient with known coronary artery disease and 2 prior cardiac stents. \par \par No further cardiac testing is required prior to transplant consideration. \par Recommend routine annual follow up.

## 2023-05-30 ENCOUNTER — APPOINTMENT (OUTPATIENT)
Dept: VASCULAR SURGERY | Facility: CLINIC | Age: 68
End: 2023-05-30

## 2023-05-30 NOTE — H&P ADULT - NSHPLABSRESULTS_GEN_ALL_CORE
Chief complaint:  Hyperthyroidism    HPI:   - 81 year old female here for hyperthyroidism  - Per prior providers notes in the chart her hyperthyroidism is though to be secondary to toxic multinodular goiter  - States she was diagnosed with hyperthyroidism approximately 5 years ago when she was diagnosed with atrial fibrillation  - She states her atrial fibrillation is well controlled and she feels at her baseline other than increased hair loss and nail changes  - She is currently on PTU 25 mg daily  - She denies ever being on methimazole    The following portions of the patient's history were reviewed and updated as appropriate: allergies, current medications, past family history, past medical history, past social history, past surgical history and problem list.    Objective     Vitals:    05/30/23 1054   BP: 132/80   Pulse: 87   Temp: 97.1 °F (36.2 °C)   SpO2: 95%        Physical Exam  Constitutional:       Appearance: Normal appearance. She is obese.   HENT:      Head: Normocephalic and atraumatic.   Eyes:      General: No scleral icterus.  Pulmonary:      Effort: No respiratory distress.   Neurological:      Mental Status: She is alert.   Psychiatric:         Mood and Affect: Mood normal.         Behavior: Behavior normal.         Thought Content: Thought content normal.         Judgment: Judgment normal.         Labs/Imaging:  Last TSH and free T4 in 2/2023 were normal    Assessment & Plan   1. Hyperthyroidism  - Possibly secondary to toxic multinodular goiter  - Will check TSH and free T4 today  - Currently on PTU 25 mg daily    2. Atrial fibrillation  - Complicates management of hyperthyroidism because her atrial fibrillation could be exacerbated by uncontrolled hyperthyroidism    - Return to clinic in 4 months    
CBC (04-18 @ 13:37)                          9.9<L>                   18.4<H>  )--------------(  233        79.5<H>% Neuts, 11.1<L>% Lymphs, ANC: 14.6<H>                          30.8<L>    BMP (04-18 @ 13:37)       134<L>  |  97      |  57<H> 			Ca++ --      Ca 8.7          ---------------------------------( 112<H>		Mg --           6.4<HH>  |  22      |  5.33<H>			Ph --        LFTs (04-18 @ 13:37)      TPro 6.8 / Alb 3.3 / TBili 0.5 / DBili -- / AST 45<H> / ALT 17 / AlkPhos 64    Coags (04-18 @ 13:37)  aPTT 27.0<L> / INR 1.29<H> / PT 15.0<H>    IMAGING:  none

## 2023-06-02 DIAGNOSIS — N25.81 SECONDARY HYPERPARATHYROIDISM OF RENAL ORIGIN: ICD-10-CM

## 2023-06-27 ENCOUNTER — APPOINTMENT (OUTPATIENT)
Dept: MRI IMAGING | Facility: IMAGING CENTER | Age: 68
End: 2023-06-27

## 2023-06-27 ENCOUNTER — APPOINTMENT (OUTPATIENT)
Dept: CT IMAGING | Facility: IMAGING CENTER | Age: 68
End: 2023-06-27

## 2023-07-12 ENCOUNTER — NON-APPOINTMENT (OUTPATIENT)
Age: 68
End: 2023-07-12

## 2023-07-31 ENCOUNTER — APPOINTMENT (OUTPATIENT)
Dept: ELECTROPHYSIOLOGY | Facility: CLINIC | Age: 68
End: 2023-07-31

## 2023-08-01 ENCOUNTER — APPOINTMENT (OUTPATIENT)
Dept: INTERNAL MEDICINE | Facility: CLINIC | Age: 68
End: 2023-08-01

## 2023-08-17 ENCOUNTER — APPOINTMENT (OUTPATIENT)
Dept: MRI IMAGING | Facility: IMAGING CENTER | Age: 68
End: 2023-08-17
Payer: COMMERCIAL

## 2023-08-17 ENCOUNTER — RESULT REVIEW (OUTPATIENT)
Age: 68
End: 2023-08-17

## 2023-08-17 ENCOUNTER — OUTPATIENT (OUTPATIENT)
Dept: OUTPATIENT SERVICES | Facility: HOSPITAL | Age: 68
LOS: 1 days | End: 2023-08-17
Payer: COMMERCIAL

## 2023-08-17 DIAGNOSIS — Z01.818 ENCOUNTER FOR OTHER PREPROCEDURAL EXAMINATION: ICD-10-CM

## 2023-08-17 DIAGNOSIS — I77.0 ARTERIOVENOUS FISTULA, ACQUIRED: Chronic | ICD-10-CM

## 2023-08-17 DIAGNOSIS — Z98.890 OTHER SPECIFIED POSTPROCEDURAL STATES: Chronic | ICD-10-CM

## 2023-08-17 DIAGNOSIS — Z98.49 CATARACT EXTRACTION STATUS, UNSPECIFIED EYE: Chronic | ICD-10-CM

## 2023-08-17 DIAGNOSIS — Z95.9 PRESENCE OF CARDIAC AND VASCULAR IMPLANT AND GRAFT, UNSPECIFIED: Chronic | ICD-10-CM

## 2023-08-17 DIAGNOSIS — Z90.89 ACQUIRED ABSENCE OF OTHER ORGANS: Chronic | ICD-10-CM

## 2023-08-17 PROCEDURE — 72197 MRI PELVIS W/O & W/DYE: CPT

## 2023-08-17 PROCEDURE — 72197 MRI PELVIS W/O & W/DYE: CPT | Mod: 26

## 2023-08-17 PROCEDURE — A9585: CPT

## 2023-08-17 PROCEDURE — 76498 UNLISTED MR PROCEDURE: CPT

## 2023-08-17 PROCEDURE — 76498P: CUSTOM | Mod: 26

## 2023-08-22 ENCOUNTER — APPOINTMENT (OUTPATIENT)
Dept: CT IMAGING | Facility: IMAGING CENTER | Age: 68
End: 2023-08-22
Payer: COMMERCIAL

## 2023-08-22 ENCOUNTER — NON-APPOINTMENT (OUTPATIENT)
Age: 68
End: 2023-08-22

## 2023-08-22 ENCOUNTER — APPOINTMENT (OUTPATIENT)
Dept: CT IMAGING | Facility: IMAGING CENTER | Age: 68
End: 2023-08-22

## 2023-08-22 ENCOUNTER — OUTPATIENT (OUTPATIENT)
Dept: OUTPATIENT SERVICES | Facility: HOSPITAL | Age: 68
LOS: 1 days | End: 2023-08-22
Payer: COMMERCIAL

## 2023-08-22 DIAGNOSIS — Z98.890 OTHER SPECIFIED POSTPROCEDURAL STATES: Chronic | ICD-10-CM

## 2023-08-22 DIAGNOSIS — Z98.49 CATARACT EXTRACTION STATUS, UNSPECIFIED EYE: Chronic | ICD-10-CM

## 2023-08-22 DIAGNOSIS — Z01.818 ENCOUNTER FOR OTHER PREPROCEDURAL EXAMINATION: ICD-10-CM

## 2023-08-22 DIAGNOSIS — I77.0 ARTERIOVENOUS FISTULA, ACQUIRED: Chronic | ICD-10-CM

## 2023-08-22 DIAGNOSIS — Z90.89 ACQUIRED ABSENCE OF OTHER ORGANS: Chronic | ICD-10-CM

## 2023-08-22 DIAGNOSIS — Z95.9 PRESENCE OF CARDIAC AND VASCULAR IMPLANT AND GRAFT, UNSPECIFIED: Chronic | ICD-10-CM

## 2023-08-22 PROCEDURE — 71260 CT THORAX DX C+: CPT

## 2023-08-22 PROCEDURE — 74174 CTA ABD&PLVS W/CONTRAST: CPT | Mod: 26

## 2023-08-22 PROCEDURE — 71260 CT THORAX DX C+: CPT | Mod: 26

## 2023-08-22 PROCEDURE — 74174 CTA ABD&PLVS W/CONTRAST: CPT

## 2023-08-22 NOTE — PHYSICAL EXAM
Deep Sutures: 3-0 PGA [No Acute Distress] : no acute distress [Well Nourished] : well nourished [Well Developed] : well developed [Well-Appearing] : well-appearing [Normal Sclera/Conjunctiva] : normal sclera/conjunctiva [PERRL] : pupils equal round and reactive to light [EOMI] : extraocular movements intact [Normal Outer Ear/Nose] : the outer ears and nose were normal in appearance [Normal Oropharynx] : the oropharynx was normal [No JVD] : no jugular venous distention [No Lymphadenopathy] : no lymphadenopathy [Supple] : supple [Thyroid Normal, No Nodules] : the thyroid was normal and there were no nodules present [No Respiratory Distress] : no respiratory distress  [No Accessory Muscle Use] : no accessory muscle use [Clear to Auscultation] : lungs were clear to auscultation bilaterally [Normal Rate] : normal rate  [Regular Rhythm] : with a regular rhythm [Normal S1, S2] : normal S1 and S2 [No Murmur] : no murmur heard [No Carotid Bruits] : no carotid bruits [No Abdominal Bruit] : a ~M bruit was not heard ~T in the abdomen [No Varicosities] : no varicosities [Pedal Pulses Present] : the pedal pulses are present [No Edema] : there was no peripheral edema [No Palpable Aorta] : no palpable aorta [No Extremity Clubbing/Cyanosis] : no extremity clubbing/cyanosis [Soft] : abdomen soft [Non Tender] : non-tender [Non-distended] : non-distended [No Masses] : no abdominal mass palpated [No HSM] : no HSM [Normal Bowel Sounds] : normal bowel sounds [Normal Posterior Cervical Nodes] : no posterior cervical lymphadenopathy [Normal Anterior Cervical Nodes] : no anterior cervical lymphadenopathy [No CVA Tenderness] : no CVA  tenderness [No Spinal Tenderness] : no spinal tenderness [No Joint Swelling] : no joint swelling [Grossly Normal Strength/Tone] : grossly normal strength/tone [No Rash] : no rash [Coordination Grossly Intact] : coordination grossly intact [No Focal Deficits] : no focal deficits [Normal Gait] : normal gait [Deep Tendon Reflexes (DTR)] : deep tendon reflexes were 2+ and symmetric [Normal Affect] : the affect was normal [Normal Insight/Judgement] : insight and judgment were intact

## 2023-08-29 ENCOUNTER — APPOINTMENT (OUTPATIENT)
Dept: VASCULAR SURGERY | Facility: CLINIC | Age: 68
End: 2023-08-29

## 2023-09-15 ENCOUNTER — RESULT REVIEW (OUTPATIENT)
Age: 68
End: 2023-09-15

## 2023-09-15 ENCOUNTER — APPOINTMENT (OUTPATIENT)
Dept: ENDOVASCULAR SURGERY | Facility: CLINIC | Age: 68
End: 2023-09-15
Payer: MEDICARE

## 2023-09-15 VITALS
HEIGHT: 71 IN | RESPIRATION RATE: 16 BRPM | DIASTOLIC BLOOD PRESSURE: 87 MMHG | BODY MASS INDEX: 25.06 KG/M2 | WEIGHT: 179 LBS | HEART RATE: 64 BPM | TEMPERATURE: 97.8 F | SYSTOLIC BLOOD PRESSURE: 125 MMHG

## 2023-09-15 PROCEDURE — 36902Z: CUSTOM

## 2023-09-15 RX ORDER — APIXABAN 5 MG/1
5 TABLET, FILM COATED ORAL
Qty: 60 | Refills: 5 | Status: DISCONTINUED | COMMUNITY
Start: 2023-03-08 | End: 2023-09-15

## 2023-09-18 ENCOUNTER — APPOINTMENT (OUTPATIENT)
Dept: UROLOGY | Facility: CLINIC | Age: 68
End: 2023-09-18
Payer: MEDICARE

## 2023-09-18 PROCEDURE — 99214 OFFICE O/P EST MOD 30 MIN: CPT

## 2023-09-19 ENCOUNTER — NON-APPOINTMENT (OUTPATIENT)
Age: 68
End: 2023-09-19

## 2023-09-20 ENCOUNTER — APPOINTMENT (OUTPATIENT)
Dept: ELECTROPHYSIOLOGY | Facility: CLINIC | Age: 68
End: 2023-09-20

## 2023-09-28 NOTE — PROGRESS NOTE ADULT - NSICDXPILOT_GEN_ALL_CORE
[place]
Repair Performed By Another Provider Text (Leave Blank If You Do Not Want): After the tissue was excised the defect was repaired by another provider.

## 2023-09-30 ENCOUNTER — OUTPATIENT (OUTPATIENT)
Dept: OUTPATIENT SERVICES | Facility: HOSPITAL | Age: 68
LOS: 1 days | End: 2023-09-30
Payer: SELF-PAY

## 2023-09-30 DIAGNOSIS — Z98.890 OTHER SPECIFIED POSTPROCEDURAL STATES: Chronic | ICD-10-CM

## 2023-09-30 DIAGNOSIS — Z98.49 CATARACT EXTRACTION STATUS, UNSPECIFIED EYE: Chronic | ICD-10-CM

## 2023-09-30 DIAGNOSIS — Z90.89 ACQUIRED ABSENCE OF OTHER ORGANS: Chronic | ICD-10-CM

## 2023-09-30 DIAGNOSIS — R97.20 ELEVATED PROSTATE SPECIFIC ANTIGEN [PSA]: ICD-10-CM

## 2023-09-30 DIAGNOSIS — I77.0 ARTERIOVENOUS FISTULA, ACQUIRED: Chronic | ICD-10-CM

## 2023-09-30 DIAGNOSIS — Z95.9 PRESENCE OF CARDIAC AND VASCULAR IMPLANT AND GRAFT, UNSPECIFIED: Chronic | ICD-10-CM

## 2023-09-30 PROCEDURE — C8001: CPT

## 2023-09-30 PROCEDURE — 76377 3D RENDER W/INTRP POSTPROCES: CPT | Mod: 26

## 2023-10-01 DIAGNOSIS — D64.9 ANEMIA, UNSPECIFIED: ICD-10-CM

## 2023-10-02 ENCOUNTER — APPOINTMENT (OUTPATIENT)
Dept: UROLOGY | Facility: CLINIC | Age: 68
End: 2023-10-02

## 2023-10-18 DIAGNOSIS — R60.0 LOCALIZED EDEMA: ICD-10-CM

## 2023-10-18 DIAGNOSIS — R97.20 ELEVATED PROSTATE, SPECIFIC ANTIGEN [PSA]: ICD-10-CM

## 2023-10-19 ENCOUNTER — APPOINTMENT (OUTPATIENT)
Dept: UROLOGY | Facility: CLINIC | Age: 68
End: 2023-10-19
Payer: MEDICARE

## 2023-10-19 ENCOUNTER — OUTPATIENT (OUTPATIENT)
Dept: OUTPATIENT SERVICES | Facility: HOSPITAL | Age: 68
LOS: 1 days | End: 2023-10-19
Payer: COMMERCIAL

## 2023-10-19 VITALS
DIASTOLIC BLOOD PRESSURE: 79 MMHG | RESPIRATION RATE: 17 BRPM | HEART RATE: 90 BPM | OXYGEN SATURATION: 99 % | SYSTOLIC BLOOD PRESSURE: 124 MMHG

## 2023-10-19 VITALS — DIASTOLIC BLOOD PRESSURE: 83 MMHG | SYSTOLIC BLOOD PRESSURE: 120 MMHG

## 2023-10-19 DIAGNOSIS — Z98.890 OTHER SPECIFIED POSTPROCEDURAL STATES: Chronic | ICD-10-CM

## 2023-10-19 DIAGNOSIS — R35.0 FREQUENCY OF MICTURITION: ICD-10-CM

## 2023-10-19 DIAGNOSIS — I77.0 ARTERIOVENOUS FISTULA, ACQUIRED: Chronic | ICD-10-CM

## 2023-10-19 DIAGNOSIS — Z90.89 ACQUIRED ABSENCE OF OTHER ORGANS: Chronic | ICD-10-CM

## 2023-10-19 DIAGNOSIS — Z95.9 PRESENCE OF CARDIAC AND VASCULAR IMPLANT AND GRAFT, UNSPECIFIED: Chronic | ICD-10-CM

## 2023-10-19 DIAGNOSIS — Z98.49 CATARACT EXTRACTION STATUS, UNSPECIFIED EYE: Chronic | ICD-10-CM

## 2023-10-19 PROCEDURE — 55700: CPT | Mod: 22

## 2023-10-19 PROCEDURE — 76942 ECHO GUIDE FOR BIOPSY: CPT | Mod: 59

## 2023-10-19 PROCEDURE — 76942 ECHO GUIDE FOR BIOPSY: CPT | Mod: 26,59

## 2023-10-19 PROCEDURE — 76377 3D RENDER W/INTRP POSTPROCES: CPT | Mod: 26

## 2023-10-19 PROCEDURE — 55700: CPT

## 2023-10-20 ENCOUNTER — NON-APPOINTMENT (OUTPATIENT)
Age: 68
End: 2023-10-20

## 2023-10-20 DIAGNOSIS — R97.20 ELEVATED PROSTATE SPECIFIC ANTIGEN [PSA]: ICD-10-CM

## 2023-10-20 RX ORDER — CINACALCET 30 MG/1
30 TABLET ORAL DAILY
Qty: 90 | Refills: 3 | Status: ACTIVE | COMMUNITY
Start: 2023-06-02 | End: 1900-01-01

## 2023-10-25 LAB — PROSTATE BIOPSY: NORMAL

## 2023-10-26 ENCOUNTER — NON-APPOINTMENT (OUTPATIENT)
Age: 68
End: 2023-10-26

## 2023-10-27 ENCOUNTER — NON-APPOINTMENT (OUTPATIENT)
Age: 68
End: 2023-10-27

## 2023-11-07 ENCOUNTER — APPOINTMENT (OUTPATIENT)
Dept: INTERNAL MEDICINE | Facility: CLINIC | Age: 68
End: 2023-11-07

## 2023-11-16 ENCOUNTER — RX RENEWAL (OUTPATIENT)
Age: 68
End: 2023-11-16

## 2023-11-27 RX ORDER — AMIODARONE HYDROCHLORIDE 100 MG/1
100 TABLET ORAL
Qty: 90 | Refills: 3 | Status: ACTIVE | COMMUNITY
Start: 2018-03-28 | End: 1900-01-01

## 2023-11-27 RX ORDER — FINASTERIDE 5 MG/1
5 TABLET, FILM COATED ORAL
Qty: 90 | Refills: 3 | Status: ACTIVE | COMMUNITY
Start: 2020-08-21 | End: 1900-01-01

## 2023-11-27 RX ORDER — ASPIRIN ENTERIC COATED TABLETS 81 MG 81 MG/1
81 TABLET, DELAYED RELEASE ORAL DAILY
Qty: 90 | Refills: 3 | Status: ACTIVE | COMMUNITY
Start: 2021-11-17 | End: 1900-01-01

## 2023-11-27 RX ORDER — DAPAGLIFLOZIN 5 MG/1
5 TABLET, FILM COATED ORAL
Qty: 90 | Refills: 3 | Status: ACTIVE | COMMUNITY
Start: 2021-12-17 | End: 1900-01-01

## 2023-11-27 RX ORDER — RIVAROXABAN 15 MG/1
15 TABLET, FILM COATED ORAL
Qty: 90 | Refills: 3 | Status: ACTIVE | COMMUNITY
Start: 1900-01-01 | End: 1900-01-01

## 2023-11-27 RX ORDER — PANTOPRAZOLE 40 MG/1
40 TABLET, DELAYED RELEASE ORAL
Qty: 90 | Refills: 3 | Status: ACTIVE | COMMUNITY
Start: 2020-01-14 | End: 1900-01-01

## 2023-12-14 ENCOUNTER — APPOINTMENT (OUTPATIENT)
Dept: INTERNAL MEDICINE | Facility: CLINIC | Age: 68
End: 2023-12-14

## 2023-12-15 RX ORDER — CHLORHEXIDINE GLUCONATE 4 %
325 (65 FE) LIQUID (ML) TOPICAL DAILY
Qty: 90 | Refills: 0 | Status: ACTIVE | COMMUNITY
Start: 2021-07-29 | End: 1900-01-01

## 2023-12-15 RX ORDER — PREDNISONE 10 MG/1
10 TABLET ORAL
Qty: 18 | Refills: 3 | Status: COMPLETED | COMMUNITY
Start: 2022-05-18 | End: 2023-12-15

## 2023-12-15 RX ORDER — BENZONATATE 100 MG/1
100 CAPSULE ORAL 3 TIMES DAILY
Qty: 90 | Refills: 0 | Status: COMPLETED | COMMUNITY
Start: 2023-03-08 | End: 2023-12-15

## 2023-12-15 RX ORDER — GUAIFENESIN AND CODEINE PHOSPHATE 100; 10 MG/5ML; MG/5ML
100-10 SYRUP ORAL
Qty: 100 | Refills: 0 | Status: COMPLETED | COMMUNITY
Start: 2023-03-09 | End: 2023-12-15

## 2023-12-25 NOTE — ED PROVIDER NOTE - PRO INTERPRETER NEED 2
1835  QuikClot z fold pads applied to control bleeding. This was covered with an ABD pad, secured with balky roller gauze, and this was wrapped with an Ace wrap for a pressure dressing.
2133 Place call to Podiatrist Dr. Alejandro Nunes and left HIPAA compliant voicemail for call back    2143 Placed call to Dr. Alejandro Nunes cell and connected with Dr. Suarez Pore
English

## 2023-12-26 ENCOUNTER — APPOINTMENT (OUTPATIENT)
Dept: VASCULAR SURGERY | Facility: CLINIC | Age: 68
End: 2023-12-26
Payer: MEDICARE

## 2023-12-26 DIAGNOSIS — Z99.2 DEPENDENCE ON RENAL DIALYSIS: ICD-10-CM

## 2023-12-26 DIAGNOSIS — T82.858A STENOSIS OF OTHER VASCULAR PROSTHETIC, INITIAL ENCOUNTER: ICD-10-CM

## 2023-12-26 PROCEDURE — 93990 DOPPLER FLOW TESTING: CPT

## 2023-12-26 PROCEDURE — 99214 OFFICE O/P EST MOD 30 MIN: CPT

## 2023-12-26 NOTE — DISCUSSION/SUMMARY
[FreeTextEntry1] : 68-year-old male with ESRD currently on hemodialysis via left radiocephalic AV fistula.  In the office today, patient underwent duplex which demonstrates a well-functioning AV fistula with severe stenosis at the mid forearm. cont Xarelto  Given the patient is without any present issues, we will continue to monitor.  No intervention is necessary at this time.  Patient to follow-up in 3 months with repeat duplex.

## 2023-12-26 NOTE — PHYSICAL EXAM
[Normal] : no respiratory distress, clear to auscultation, no accessory muscle use [Thrill] : thrill [Hand well perfused] : hand well perfused [Warm Extremities] : warm extremities [2+] : left 2+ [Pulsatile Thrill] : no pulsatile thrill [Aneurysm] : no aneurysm [Bleeding] : no bleeding [Ulcer] : no ulcer [de-identified] : Intact

## 2023-12-26 NOTE — HISTORY OF PRESENT ILLNESS
[] : left radiocephalic fistula [FreeTextEntry1] : Patient is a 68-year-old male with ESRD currently on hemodialysis via left upper extremity AV fistula who presents to the office for routine surveillance.  Patient denies any present issues with cannulation or prolonged bleeding.

## 2024-01-18 ENCOUNTER — NON-APPOINTMENT (OUTPATIENT)
Age: 69
End: 2024-01-18

## 2024-01-23 ENCOUNTER — APPOINTMENT (OUTPATIENT)
Dept: INTERNAL MEDICINE | Facility: CLINIC | Age: 69
End: 2024-01-23

## 2024-01-25 NOTE — PROGRESS NOTE ADULT - PROBLEM SELECTOR PLAN 3
You have a You have been scheduled for a nuclear stress test and an echo.    Stress Test:    Nuclear stress testing evaluates blood flow to your heart muscle and assesses cardiac function. There are 2 parts (Rest/Stress) to this procedure and will include exercise on a treadmill.    *Please arrive 15 minutes prior to your appointment time    Test Duration:    -One day testing will take 4 hours    Day of testing instructions:    NO CAFFEINE (not even decaffeinated products) 24 HOURS PRIOR TO TESTING. This includes coffee, soda, tea, chocolate, multivitamins, and migraine medication, like Excedrin or Fioricet that contains caffeine.  Nothing to eat or drink 4 HOURS prior to testing  NO NICOTINE 12 hours prior to testing  Take medications as directed with a few sips of water. If you are unsure you may bring your medications with you to take after instructed by your stressing nurse. DIABETIC PATIENTS: Take half of your insulin with a light meal 4 hours before your test.  Wear comfortable clothes and shoes (Shirts with no metal, shorts or pants, tennis shoes, no heels or flip flops).   Patient with anemia in the setting of ESRD. Hemoglobin in target range.   Tsat 16 and ferritin 268.   Venofer started on 3/19 and needs 5 doses total of 200mg.  Monitor hemoglobin.

## 2024-03-04 NOTE — HISTORY OF PRESENT ILLNESS
[de-identified] : Mr. WEAVER comes in for reassessment of hypertension. He denies edema, chest pain, dizziness, LIRA, PND and orthopnea.  He  has had no problems with his medications.

## 2024-03-04 NOTE — ASSESSMENT
[FreeTextEntry1] : Goal blood pressure /90 At goal Not at goal Urged to limit sodium intake Urged to maintain diet and exercise habits to keep BMI < 28 Limit alcohol consumption to < XX drinks per week Reinforced adherence to medication regimen

## 2024-03-14 ENCOUNTER — APPOINTMENT (OUTPATIENT)
Dept: INTERNAL MEDICINE | Facility: CLINIC | Age: 69
End: 2024-03-14

## 2024-03-26 ENCOUNTER — APPOINTMENT (OUTPATIENT)
Dept: VASCULAR SURGERY | Facility: CLINIC | Age: 69
End: 2024-03-26
Payer: MEDICARE

## 2024-03-26 PROCEDURE — 99214 OFFICE O/P EST MOD 30 MIN: CPT | Mod: 25

## 2024-03-26 PROCEDURE — G0506: CPT

## 2024-03-26 PROCEDURE — 93990 DOPPLER FLOW TESTING: CPT

## 2024-03-26 NOTE — PHYSICAL EXAM
[Normal] : no respiratory distress, clear to auscultation, no accessory muscle use [Thrill] : thrill [Pulsatile Thrill] : no pulsatile thrill [Aneurysm] : no aneurysm [Bleeding] : no bleeding [Hand well perfused] : hand well perfused [Warm Extremities] : warm extremities [Ulcer] : no ulcer [2+] : left 2+ [de-identified] : Intact

## 2024-03-26 NOTE — HISTORY OF PRESENT ILLNESS
[FreeTextEntry1] : Patient is a 68-year-old male with hypertension, coronary artery disease with ESRD currently on hemodialysis via left upper extremity AV fistula who presents to the office for routine surveillance.  Patient denies any present issues with cannulation or prolonged bleeding. [] : left radiocephalic fistula

## 2024-04-23 ENCOUNTER — APPOINTMENT (OUTPATIENT)
Dept: TRANSPLANT | Facility: CLINIC | Age: 69
End: 2024-04-23
Payer: COMMERCIAL

## 2024-04-23 ENCOUNTER — APPOINTMENT (OUTPATIENT)
Dept: NEPHROLOGY | Facility: CLINIC | Age: 69
End: 2024-04-23
Payer: COMMERCIAL

## 2024-04-23 ENCOUNTER — NON-APPOINTMENT (OUTPATIENT)
Age: 69
End: 2024-04-23

## 2024-04-23 VITALS
HEART RATE: 65 BPM | RESPIRATION RATE: 17 BRPM | OXYGEN SATURATION: 99 % | HEIGHT: 71 IN | BODY MASS INDEX: 25.2 KG/M2 | WEIGHT: 180 LBS | DIASTOLIC BLOOD PRESSURE: 88 MMHG | TEMPERATURE: 98 F | SYSTOLIC BLOOD PRESSURE: 122 MMHG

## 2024-04-23 PROCEDURE — 99204 OFFICE O/P NEW MOD 45 MIN: CPT

## 2024-04-23 PROCEDURE — 99205 OFFICE O/P NEW HI 60 MIN: CPT

## 2024-04-23 RX ORDER — SODIUM PHOSPHATE, DIBASIC AND SODIUM PHOSPHATE, MONOBASIC 7; 19 G/230ML; G/230ML
ENEMA RECTAL
Qty: 1 | Refills: 0 | Status: COMPLETED | COMMUNITY
Start: 2023-10-16 | End: 2024-04-23

## 2024-04-23 RX ORDER — FUROSEMIDE 40 MG/1
40 TABLET ORAL
Qty: 90 | Refills: 3 | Status: COMPLETED | COMMUNITY
Start: 2021-06-30 | End: 2024-04-23

## 2024-04-24 NOTE — PHYSICAL EXAM
[General Appearance - Alert] : alert [General Appearance - In No Acute Distress] : in no acute distress [Sclera] : the sclera and conjunctiva were normal [PERRL With Normal Accommodation] : pupils were equal in size, round, and reactive to light [Extraocular Movements] : extraocular movements were intact [Outer Ear] : the ears and nose were normal in appearance [Oropharynx] : the oropharynx was normal [Neck Appearance] : the appearance of the neck was normal [Neck Cervical Mass (___cm)] : no neck mass was observed [Jugular Venous Distention Increased] : there was no jugular-venous distention [Thyroid Diffuse Enlargement] : the thyroid was not enlarged [Thyroid Nodule] : there were no palpable thyroid nodules [Heart Rate And Rhythm] : heart rate was normal and rhythm regular [Auscultation Breath Sounds / Voice Sounds] : lungs were clear to auscultation bilaterally [Heart Sounds] : normal S1 and S2 [Heart Sounds Gallop] : no gallops [Murmurs] : no murmurs [Heart Sounds Pericardial Friction Rub] : no pericardial rub [Full Pulse] : the pedal pulses are present [Edema] : there was no peripheral edema [Bowel Sounds] : normal bowel sounds [Abdomen Soft] : soft [Abdomen Tenderness] : non-tender [Abdomen Mass (___ Cm)] : no abdominal mass palpated [Abnormal Walk] : normal gait [Nail Clubbing] : no clubbing  or cyanosis of the fingernails [Musculoskeletal - Swelling] : no joint swelling seen [Motor Tone] : muscle strength and tone were normal [Skin Color & Pigmentation] : normal skin color and pigmentation [Skin Turgor] : normal skin turgor [] : no rash [Normal] : normal [Deep Tendon Reflexes (DTR)] : deep tendon reflexes were 2+ and symmetric [Sensation] : the sensory exam was normal to light touch and pinprick [No Focal Deficits] : no focal deficits [Oriented To Time, Place, And Person] : oriented to person, place, and time [Impaired Insight] : insight and judgment were intact [Affect] : the affect was normal

## 2024-04-24 NOTE — PLAN
[FreeTextEntry1] : 1. ESRD on IHD since March 2022 :  Mr. SYEDA WEAVER  is a reasonable candidate for kidney transplantation . He has multiple potential donors.  2.Cardiac risk- On 9/29/2022 transesophageal echocardiogram revealed mildly decreased global left ventricular systolic function, normal right ventricular size and function, a moderately dilated left atrium, mild mitral valve regurgitation and an LVEF of 45-50%.Coronary angiography of 12/5/2022 revealed a patent proximal LAD stent and luminal LAD disease. There is no evidence of new obstructive epicardial coronary artery disease. Needs updated cardiac testing. 3. Cancer screening: colonoscopy and PSA 4. ID: Serology for acute and chronic viral infections. Screening for latent TB 5. Imaging: needs updated CT chest and CT A/P   One year SRTR outcomes for national and [facility] were discussed in regards to patient survival and graft survival after transplantation.    Details of transplant surgery, including complications were discussed. Immunosuppression and complications including infection including life threatening sepsis and opportunistic infections, malignancy and new onset diabetes were discussed.    Benefits of live donor transplantation as well as variability in wait times across regions and multiple listing were discussed.  KDPI >85% and PHS high risk criteria donors were discussed.  HCV kidney transplantation was discussed.   Will proceed with completing/ updating work up and listing for transplant/ live donor transplant once work up is reviewed and found to be acceptable by multidisciplinary listing committee.

## 2024-04-24 NOTE — HISTORY OF PRESENT ILLNESS
[TextBox_42] : SYEDA WEAVER is a 67 year old male who presents for kidney transplant evaluation.  Cause of ESRD :  Cardiorenal etiology  Nephrologist: Dr Joce Bran  on IHD since March 2022   Other PMH : Cardiac -has  HTN for 5 years, CAD s/p PCI in 2015, 2018, Afib s/p ablation in 9/2022  Pulmonary-  no h/o COPD, Asthma Infections- no h/o  TB, HIV, Hepatitis   Heme- no h/o malignancy or bleeding disorders. No DVT/PE Endo- no h/o diabetes, no Thyroid disease Neuro- no no h/o CVA or seizures  Psych-no h/o  suicidal ideation, depression or anxiety.  Sensitization events-has had  blood transfusions in 2021. No previous transplant  Surgical h/o : Knee surgery. Functional status: can walk 8 blocks with no difficulty.  Social history: Lives alone. 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  ex smoker, quit in 2013. No alcohol or illicit use. Family history:  no family h/o kidney disease. Mother has HTN.  Potential donors- has multiple donors.( friends)

## 2024-04-26 LAB
ABO + RH PNL BLD: NORMAL
ALBUMIN SERPL ELPH-MCNC: 4.3 G/DL
ALP BLD-CCNC: 94 U/L
ALT SERPL-CCNC: 5 U/L
ANION GAP SERPL CALC-SCNC: 17 MMOL/L
APPEARANCE: CLEAR
AST SERPL-CCNC: 8 U/L
BACTERIA: NEGATIVE /HPF
BILIRUB SERPL-MCNC: 0.7 MG/DL
BILIRUBIN URINE: NEGATIVE
BLOOD URINE: ABNORMAL
BUN SERPL-MCNC: 49 MG/DL
C PEPTIDE SERPL-MCNC: 8.3 NG/ML
CALCIUM SERPL-MCNC: 9.4 MG/DL
CAST: 6 /LPF
CHLORIDE SERPL-SCNC: 97 MMOL/L
CHOLEST SERPL-MCNC: 127 MG/DL
CMV IGG SERPL QL: 6 U/ML
CMV IGG SERPL-IMP: POSITIVE
CO2 SERPL-SCNC: 27 MMOL/L
COLOR: NORMAL
COVID-19 NUCLEOCAPSID  GAM ANTIBODY INTERPRETATION: POSITIVE
COVID-19 SPIKE DOMAIN ANTIBODY INTERPRETATION: POSITIVE
CREAT SERPL-MCNC: 8.65 MG/DL
CREAT SPEC-SCNC: 221 MG/DL
CREAT/PROT UR: 2.1 RATIO
EBV DNA SERPL NAA+PROBE-ACNC: NOT DETECTED IU/ML
EBV EA AB SER IA-ACNC: <5 U/ML
EBV EA AB TITR SER IF: POSITIVE
EBV EA IGG SER QL IA: >600 U/ML
EBV EA IGG SER-ACNC: NEGATIVE
EBV EA IGM SER IA-ACNC: NEGATIVE
EBV PATRN SPEC IB-IMP: NORMAL
EBV VCA IGG SER IA-ACNC: 696 U/ML
EBV VCA IGM SER QL IA: 15.3 U/ML
EBVPCR LOG: NOT DETECTED LOG10IU/ML
EGFR: 6 ML/MIN/1.73M2
EPITHELIAL CELLS: 0 /HPF
EPSTEIN-BARR VIRUS CAPSID ANTIGEN IGG: POSITIVE
ESTIMATED AVERAGE GLUCOSE: 105 MG/DL
GLUCOSE QUALITATIVE U: NEGATIVE MG/DL
GLUCOSE SERPL-MCNC: 91 MG/DL
HAV IGM SER QL: NONREACTIVE
HBA1C MFR BLD HPLC: 5.3 %
HBV CORE IGG+IGM SER QL: NONREACTIVE
HBV SURFACE AB SER QL: ABNORMAL
HBV SURFACE AB SERPL IA-ACNC: 10.1 MIU/ML
HBV SURFACE AG SER QL: NONREACTIVE
HCT VFR BLD CALC: 36.2 %
HCV AB SER QL: NONREACTIVE
HCV S/CO RATIO: 0.12 S/CO
HDLC SERPL-MCNC: 54 MG/DL
HEPATITIS A IGG ANTIBODY: NONREACTIVE
HGB BLD-MCNC: 11.7 G/DL
HIV1+2 AB SPEC QL IA.RAPID: NONREACTIVE
HSV 1+2 IGG SER IA-IMP: NEGATIVE
HSV 1+2 IGG SER IA-IMP: POSITIVE
HSV1 IGG SER QL: 48 INDEX
HSV2 IGG SER QL: 0.16 INDEX
HYALINE CASTS: PRESENT
KETONES URINE: ABNORMAL MG/DL
LDLC SERPL CALC-MCNC: 60 MG/DL
LEUKOCYTE ESTERASE URINE: ABNORMAL
M TB IFN-G BLD-IMP: NEGATIVE
MAGNESIUM SERPL-MCNC: 2.5 MG/DL
MCHC RBC-ENTMCNC: 22 PG
MCHC RBC-ENTMCNC: 32.3 GM/DL
MCV RBC AUTO: 67.9 FL
MICROSCOPIC-UA: NORMAL
NITRITE URINE: NEGATIVE
NONHDLC SERPL-MCNC: 73 MG/DL
PH URINE: 6.5
PHOSPHATE SERPL-MCNC: 6.4 MG/DL
PLATELET # BLD AUTO: 252 K/UL
POTASSIUM SERPL-SCNC: 4.5 MMOL/L
PROT SERPL-MCNC: 7.9 G/DL
PROT UR-MCNC: 466 MG/DL
PROTEIN URINE: 300 MG/DL
PSA SERPL-MCNC: 10.5 NG/ML
QUANTIFERON TB PLUS MITOGEN MINUS NIL: 2.9 IU/ML
QUANTIFERON TB PLUS NIL: 0.04 IU/ML
QUANTIFERON TB PLUS TB1 MINUS NIL: 0 IU/ML
QUANTIFERON TB PLUS TB2 MINUS NIL: 0 IU/ML
RBC # BLD: 5.33 M/UL
RBC # FLD: 19.4 %
RED BLOOD CELLS URINE: 118 /HPF
REVIEW: NORMAL
RUBV IGG FLD-ACNC: 3.1 INDEX
RUBV IGG SER-IMP: POSITIVE
SARS-COV-2 AB SERPL IA-ACNC: >250 U/ML
SARS-COV-2 AB SERPL QL IA: 158 INDEX
SODIUM SERPL-SCNC: 140 MMOL/L
SPECIFIC GRAVITY URINE: 1.02
T GONDII AB SER-IMP: NEGATIVE
T GONDII IGG SER QL: <3 IU/ML
T PALLIDUM AB SER QL IA: NEGATIVE
TRIGL SERPL-MCNC: 65 MG/DL
UROBILINOGEN URINE: 1 MG/DL
VZV AB TITR SER: NEGATIVE
VZV IGG SER IF-ACNC: 12.9 INDEX
WBC # FLD AUTO: 10.46 K/UL
WHITE BLOOD CELLS URINE: 2 /HPF

## 2024-04-29 ENCOUNTER — NON-APPOINTMENT (OUTPATIENT)
Age: 69
End: 2024-04-29

## 2024-05-06 ENCOUNTER — APPOINTMENT (OUTPATIENT)
Dept: UROLOGY | Facility: CLINIC | Age: 69
End: 2024-05-06
Payer: COMMERCIAL

## 2024-05-06 ENCOUNTER — OUTPATIENT (OUTPATIENT)
Dept: OUTPATIENT SERVICES | Facility: HOSPITAL | Age: 69
LOS: 1 days | End: 2024-05-06
Payer: COMMERCIAL

## 2024-05-06 VITALS
DIASTOLIC BLOOD PRESSURE: 76 MMHG | TEMPERATURE: 97.5 F | SYSTOLIC BLOOD PRESSURE: 114 MMHG | HEART RATE: 69 BPM | OXYGEN SATURATION: 100 % | RESPIRATION RATE: 16 BRPM

## 2024-05-06 DIAGNOSIS — R35.0 FREQUENCY OF MICTURITION: ICD-10-CM

## 2024-05-06 DIAGNOSIS — R31.21 ASYMPTOMATIC MICROSCOPIC HEMATURIA: ICD-10-CM

## 2024-05-06 DIAGNOSIS — Z95.9 PRESENCE OF CARDIAC AND VASCULAR IMPLANT AND GRAFT, UNSPECIFIED: Chronic | ICD-10-CM

## 2024-05-06 DIAGNOSIS — Z98.890 OTHER SPECIFIED POSTPROCEDURAL STATES: Chronic | ICD-10-CM

## 2024-05-06 DIAGNOSIS — Z98.49 CATARACT EXTRACTION STATUS, UNSPECIFIED EYE: Chronic | ICD-10-CM

## 2024-05-06 DIAGNOSIS — Z90.89 ACQUIRED ABSENCE OF OTHER ORGANS: Chronic | ICD-10-CM

## 2024-05-06 DIAGNOSIS — I77.0 ARTERIOVENOUS FISTULA, ACQUIRED: Chronic | ICD-10-CM

## 2024-05-06 PROCEDURE — 52000 CYSTOURETHROSCOPY: CPT

## 2024-05-06 PROCEDURE — 76775 US EXAM ABDO BACK WALL LIM: CPT | Mod: 26

## 2024-05-06 RX ORDER — SEVELAMER CARBONATE 800 MG/1
800 TABLET, FILM COATED ORAL 3 TIMES DAILY
Qty: 540 | Refills: 3 | Status: ACTIVE | COMMUNITY
Start: 2022-08-11 | End: 1900-01-01

## 2024-05-07 ENCOUNTER — APPOINTMENT (OUTPATIENT)
Dept: CARDIOLOGY | Facility: CLINIC | Age: 69
End: 2024-05-07
Payer: COMMERCIAL

## 2024-05-07 ENCOUNTER — NON-APPOINTMENT (OUTPATIENT)
Age: 69
End: 2024-05-07

## 2024-05-07 VITALS
BODY MASS INDEX: 26.01 KG/M2 | HEART RATE: 54 BPM | WEIGHT: 185.8 LBS | SYSTOLIC BLOOD PRESSURE: 110 MMHG | OXYGEN SATURATION: 99 % | HEIGHT: 71 IN | DIASTOLIC BLOOD PRESSURE: 71 MMHG

## 2024-05-07 DIAGNOSIS — Z99.2 END STAGE RENAL DISEASE: ICD-10-CM

## 2024-05-07 DIAGNOSIS — R31.21 ASYMPTOMATIC MICROSCOPIC HEMATURIA: ICD-10-CM

## 2024-05-07 DIAGNOSIS — Z98.890 OTHER SPECIFIED POSTPROCEDURAL STATES: ICD-10-CM

## 2024-05-07 DIAGNOSIS — N18.6 END STAGE RENAL DISEASE: ICD-10-CM

## 2024-05-07 DIAGNOSIS — I10 ESSENTIAL (PRIMARY) HYPERTENSION: ICD-10-CM

## 2024-05-07 DIAGNOSIS — Z86.79 OTHER SPECIFIED POSTPROCEDURAL STATES: ICD-10-CM

## 2024-05-07 DIAGNOSIS — Z01.818 ENCOUNTER FOR OTHER PREPROCEDURAL EXAMINATION: ICD-10-CM

## 2024-05-07 LAB — URINE CYTOLOGY: NORMAL

## 2024-05-07 PROCEDURE — 93000 ELECTROCARDIOGRAM COMPLETE: CPT | Mod: NC

## 2024-05-07 PROCEDURE — 99214 OFFICE O/P EST MOD 30 MIN: CPT

## 2024-05-16 NOTE — CARDIOLOGY SUMMARY
[de-identified] : 2/23/2022 atrial tachycardia with block, ventricular rate 48, possible old anterior wall MI. [de-identified] : 5/28/2021 severe LV dysfunction with dilated LV, moderate eccentric MR, normal aortic valve. There is decreased RV systolic function, only mild TR, no pulmonary hypertension.\par  9/29/2022: global mildly decreased LV systolic function, mild mitral valve regurgitation, normal aortic valve, normal right ventricular size and function, mild tricuspid regurgitation. [de-identified] : 80% proximal LAD lesion, stent placed, normal left main, diagonal, circumflex and RCA. Severity of LV dysfunction disproportionate to severity of coronary disease.  Cath Lab Report   Diagnostic Cardiologist:       Pernell Lee MD  Referring Physician:           Pernell Lee MD   Procedures Performed  Procedures: 1.    Arterial Access - Right Femoral  2.    Diagnostic Coronary Angiography  3.    Left Heart Cath   Indications:  Pre Renal Transplant Evaluation  History of CAD s/p PCI to LAD  Atrial Fibrillation  s/p DCCV  s/p Ablation 2022  severe mixed cardiomyopathy   Diagnostic Conclusions:   Preop Renal Transplant. History of CAD, s/p PCI to proximal LAD, atrial fibrillation, s/p ablation, mixed severe cardiomyopathy, since resolved, ESRD on HD.   Cath with patent proximal LAD stent and luminal LAD disease.  No evidence of new obstructive epicardial CAD.  LVEDP = 12mmHg.  Continue medical treatment of CAD. Patient remains optimized from cardiac standpoint to proceed for renal transplant.

## 2024-05-16 NOTE — HISTORY OF PRESENT ILLNESS
[FreeTextEntry1] : Mr. Nils Sawyer is a 66 year old man referred as a pre-emptive candidate for evaluation in anticipation of renal transplant. Kidney failure is attributed to focal segmental glomerulosclerosis with kidney biopsy 2019. He has a history of atrial fibrillation ablation. He also has coronary artery disease with percutaneous coronary intervention 2014 and severely impaired left ventricular systolic function. He is managed by cardiologist, Dr. Lee.  He has no symptoms, is unaware of palpitations, no awareness of abnormal heart rhythm. Functional capacity is satisfactory, can walk 5 blocks without stopping or climb two flights of stairs.   11/8/2022. Mr. Nils Sawyer returns today for scheduled follow up as a candidate for renal transplant consideration.  In March he began hemodialysis on a M-W-F schedule via left forearm AV fistula at [facility] in [place]. He reports no complications since the initiation of dialysis. In September he underwent a catheter ablation for his recurrent atrial fibrillation with Dr. Ephraim James. He is currently maintained on amiodarone 100 mg daily and is feeling much better now that sinus rhythm has been restored. A transesophageal echocardiogram was performed prior to the procedure and he was noted to have global mildly decreased left ventricular systolic function with an LVEF of 45-50%, an improvement from prior studies. Today he is feeling well. For exercise he walks 3-5 blocks each morning and is not limited by any chest discomfort or shortness of breath.  We have reviewed his medications and he is taking all as directed.   5/25/2023. Mr. Nils Sawyer returns today for preoperative cardiovascular evaluation prior to possible kidney transplant. He continues on hemodialysis on a M-W-F schedule without any complications and otherwise continues to feel generally well. A transesophageal echocardiogram preformed in September of 2022 revealed global mildly decreased left ventricular systolic function with an LVEF of 45-50%, an improvement from prior studies. A left heart catheterization of 12/5/2022 revealed a patent proximal LAD stent and luminal LAD disease with no evidence of new obstructive epicardial disease.

## 2024-05-16 NOTE — DISCUSSION/SUMMARY
[Paroxysmal Atrial Fibrillation] : paroxysmal atrial fibrillation [Anticoagulation] : anticoagulation [Cardiomyopathy] : cardiomyopathy [Responding to Treatment] : responding to treatment [Ischemic Cardiomyopathy] : ischemic cardiomyopathy [Coronary Artery Disease] : coronary artery disease [None] : There are no changes in medication management [Patient] : the patient [EKG obtained to assist in diagnosis and management of assessed problem(s)] : EKG obtained to assist in diagnosis and management of assessed problem(s) [de-identified] : and atrial tachycardia [de-identified] : on apixaban 5 mg BID which appears to be correct dose based on age and weight and despite severely reduced GFR. [de-identified] : on appropriate medication regimen given contraints of kidney disease [FreeTextEntry1] : Mr. Nils Sawyer returns for preoperative cardiovascular evaluation prior to possible kidney transplant. His most recent NICOLETTE confirmed an improvement in left ventricular systolic function with an LVEF of 45-50%. Left heart catheterization revealed patent stents and no new evidence of epicardial coronary artery disease.   His blood pressure is well controlled. No changes to his medications were suggested.  Continue amiodarone and Xarelto. Follow up with Dr. James for possible discontinuation of amiodarone as scheduled.  Follow up with Dr. Pernell Lee as well. Would recommend consideration for high intensity statin therapy for a patient with known coronary artery disease and 2 prior cardiac stents.   As it has been more than one year, he will need repeat cardiac testing which he plans to arrange with his primary cardiologist.  Recommend routine annual follow up.

## 2024-05-16 NOTE — REASON FOR VISIT
[Cardiac Failure] : cardiac failure [Arrhythmia/ECG Abnorrmalities] : arrhythmia/ECG abnormalities [Coronary Artery Disease] : coronary artery disease [Other: ____] : [unfilled] [FreeTextEntry1] : 5/7/2024 Nils Sawyer returns today for annual follow up and to maintain his candidacy for potential renal transplant. Today he reports feeling well and has no specific complaints. He continues on hemodialysis M-W-F with no complications. For exercise he walks daily and reports the ability to go for 3-4 blocks. He denies any chest discomfort, shortness of breath, palpitations, lightheadedness or syncope. We have reviewed his medications and he continues to take all as directed.

## 2024-05-16 NOTE — PHYSICAL EXAM
[5th Left ICS - MCL] : palpated at the 5th LICS in the midclavicular line [Bradycardia] : bradycardic [Rhythm Regular] : regular [Normal S1] : normal S1 [S1 Diminished] : was diminished [Normal S2] : normal S2 [No Gallop] : no gallop heard [No Murmur] : no murmurs heard [No Pitting Edema] : no pitting edema present [2+] : left 2+ [1+] : left 1+ [No Abnormalities] : the abdominal aorta was not enlarged and no bruit was heard [Normal] : alert and oriented, normal memory [Right Carotid Bruit] : no bruit heard over the right carotid [Left Carotid Bruit] : no bruit heard over the left carotid [de-identified] : left AV fistula +/+

## 2024-05-24 PROBLEM — Z87.891 FORMER SMOKER: Status: ACTIVE | Noted: 2017-07-13

## 2024-06-05 ENCOUNTER — APPOINTMENT (OUTPATIENT)
Dept: INTERNAL MEDICINE | Facility: CLINIC | Age: 69
End: 2024-06-05

## 2024-06-05 DIAGNOSIS — Z87.891 PERSONAL HISTORY OF NICOTINE DEPENDENCE: ICD-10-CM

## 2024-06-16 NOTE — HISTORY OF PRESENT ILLNESS
[FreeTextEntry1] : Mr. WEAVER is here for an annual physical examination and assessment. [de-identified] : He generally feels well with no specific complaints. His recent health has been good.  Denies headache, dizziness. Denies rash, fatigue, fever, weight loss, anorexia. Denies cough, wheezing. Denies CP, SOB, LIRA, edema, palpitations. Denies abdominal pain, N/V/D/C. Denies BRBPR, melena, dysphagia. Denies dysuria, frequency, hematuria, hesitancy. Denies weakness, numbness, gait instability.

## 2024-06-16 NOTE — HEALTH RISK ASSESSMENT
[Audit-CScore] : 1 [[id number]] : 0 [Change in mental status noted] : No change in mental status noted [Language] : denies difficulty with language [Behavior] : denies difficulty with behavior [Learning/Retaining New Information] : denies difficulty learning/retaining new information [Handling Complex Tasks] : denies difficulty handling complex tasks Glucose remains elevated after STAT BMP. [Reasoning] : denies difficulty with reasoning [Spatial Ability and Orientation] : denies difficulty with spatial ability and orientation [Reports changes in vision] : Reports no changes in vision [Reports changes in hearing] : Reports no changes in hearing [Reports changes in dental health] : Reports no changes in dental health

## 2024-06-25 ENCOUNTER — APPOINTMENT (OUTPATIENT)
Dept: INTERNAL MEDICINE | Facility: CLINIC | Age: 69
End: 2024-06-25

## 2024-06-28 ENCOUNTER — NON-APPOINTMENT (OUTPATIENT)
Age: 69
End: 2024-06-28

## 2024-07-16 ENCOUNTER — APPOINTMENT (OUTPATIENT)
Dept: VASCULAR SURGERY | Facility: CLINIC | Age: 69
End: 2024-07-16

## 2024-07-19 DIAGNOSIS — Z01.818 ENCOUNTER FOR OTHER PREPROCEDURAL EXAMINATION: ICD-10-CM

## 2024-08-13 NOTE — HISTORY OF PRESENT ILLNESS
Outpatient Surgery History and Physical:  Ivan Ricksfitt was seen and examined.    CHIEF COMPLAINT:   right ankle.     PE:   /78   Pulse 76   Temp 97.7 °F (36.5 °C) (Oral)   Resp 18   Ht 1.854 m (6' 1\")   Wt (!) 145.2 kg (320 lb)   SpO2 97%   BMI 42.22 kg/m²     Heart:   Regular rhythm      Lungs:  Are clear      Past Medical History:    Past Medical History:   Diagnosis Date    Fall 08/10/2024    passed out while coughing fell and broke foot--PER PT-- had taken a big puff off his vape and started coughing-- then passed out-- E.R. DOCS NOTE  VASOVAGAL    GERD (gastroesophageal reflux disease)     controlled with med    Morbid obesity (HCC)     BMI=42       Surgical History:   Past Surgical History:   Procedure Laterality Date    ABDOMEN SURGERY      as infant-- \" cut my stomach for acid reflux\"       Social History: Patient  reports that he has been smoking cigarettes. He started smoking about 27 years ago. He has a 27.6 pack-year smoking history. He has never used smokeless tobacco. He reports current alcohol use. He reports that he does not use drugs.    Family History: History reviewed. No pertinent family history.    Allergies: Reviewed per EMR  Pcn [penicillins]    Medications:    Prior to Admission medications    Medication Sig Start Date End Date Taking? Authorizing Provider   acetaminophen (TYLENOL) 500 MG tablet Take 1 tablet by mouth every 6 hours as needed for Pain   Yes ProviderBree MD   esomeprazole Magnesium (NEXIUM) 20 MG PACK Take 1 packet by mouth daily   Yes ProviderBree MD   oxyCODONE (ROXICODONE) 5 MG immediate release tablet Take 1 tablet by mouth every 6 hours as needed for Pain for up to 3 days. Intended supply: 3 days. Take lowest dose possible to manage pain Max Daily Amount: 20 mg 8/11/24 8/14/24 Yes Venice Joy APRN - CNP   oxyCODONE (ROXICODONE) 5 MG immediate release tablet Take 1 tablet by mouth every 6 hours as needed for Pain for up to 7  [de-identified] : 65 yo male with cardiomyopathy, mitral regurg, PAF s/p ablation, gout, CRI, prediabetes, osteoarthritis of hands, here for acute visit\par -Hx in  last few weeks of right hand feeling numb(not weak) and  unable to hold pen.\par Can hold his fork and knife.\par Difficult to button his shirts but has severe artrhits in both thumbs and hx of right CTS and possible RA in past (as per pt)\par Told of carpal tunnel syndrome in right hand by PCP 5/1/2019, used wrist splint  at night for over a month earlier this year with partial relief.\par Works as a caregiver and uses both hands.\par Also sleeps on his side with right arm under his head.\par Hx of osteoarthritis of hands with left wrist film showing severe DJD in left hand.\par Unclear history as to when he might have been told of RA.\par Hx of gout, last atttack 9/6/2019 when he was treated with Prednisone by his PCP.. Tries to follow a low purine diet.\par \par PMH-on Eliquis and Amiodarone for his PAF/cardiomyopathy\par Hx of rectal bleeding from his hemorrhoids after resuming Eliquis s/p hemorroidectomy and readmission at [facility] for Hb 9.1 and transfusion and trasfer back to [facility] in April 2019 with stablization of bleed.\par Negative colonoscopy Dec 2018.

## 2024-08-28 NOTE — ED ADULT NURSE NOTE - PRIMARY CARE PROVIDER
Recent enterococcal UTI culture  -Patient completing Bactrim antibiotics currently  
With recent urinary retention and indwelling Arroyo after hernia surgery earlier this month  -Patient had successful trial of void 8/26/2024, did have UTI which was treated with antibiotics as well, is currently complaining  -PSA normal at 1.82  -PVR 15 cc in office today  -Patient doing well with Flomax, no urinary bother currently  -Refill medication to pharmacy, counseled on next step of cystoscopy/TRUS/UroCuff testing, wants to heal up after hernia surgery for now  -Given cystoscopy and UroLift handouts today, follow-up 3 months to see how urination is going  
unknown

## 2024-09-03 NOTE — HISTORY OF PRESENT ILLNESS
[FreeTextEntry1] : Mr. WEAVER is here for an annual physical examination and assessment. [de-identified] : He generally feels well with no specific complaints. His recent health has been good.  Denies headache, dizziness. Denies rash, fatigue, fever, weight loss, anorexia. Denies cough, wheezing. Denies CP, SOB, LIRA, edema, palpitations. Denies abdominal pain, N/V/D/C. Denies BRBPR, melena, dysphagia. Denies dysuria, frequency, hematuria, hesitancy. Denies weakness, numbness, gait instability.

## 2024-09-03 NOTE — HEALTH RISK ASSESSMENT
[NTW5Steuc] : 0 [Audit-CScore] : 1 [Change in mental status noted] : No change in mental status noted [Language] : denies difficulty with language [Behavior] : denies difficulty with behavior [Learning/Retaining New Information] : denies difficulty learning/retaining new information [Handling Complex Tasks] : denies difficulty handling complex tasks [Reasoning] : denies difficulty with reasoning [Spatial Ability and Orientation] : denies difficulty with spatial ability and orientation [Reports changes in hearing] : Reports no changes in hearing [Reports changes in vision] : Reports no changes in vision [Reports changes in dental health] : Reports no changes in dental health

## 2024-09-05 ENCOUNTER — APPOINTMENT (OUTPATIENT)
Dept: CARDIOLOGY | Facility: CLINIC | Age: 69
End: 2024-09-05
Payer: COMMERCIAL

## 2024-09-05 PROCEDURE — 93306 TTE W/DOPPLER COMPLETE: CPT

## 2024-09-06 DIAGNOSIS — R94.39 ABNORMAL RESULT OF OTHER CARDIOVASCULAR FUNCTION STUDY: ICD-10-CM

## 2024-09-06 DIAGNOSIS — I47.29 OTHER VENTRICULAR TACHYCARDIA: ICD-10-CM

## 2024-09-09 ENCOUNTER — APPOINTMENT (OUTPATIENT)
Dept: INTERNAL MEDICINE | Facility: CLINIC | Age: 69
End: 2024-09-09

## 2024-09-10 ENCOUNTER — NON-APPOINTMENT (OUTPATIENT)
Age: 69
End: 2024-09-10

## 2024-10-10 ENCOUNTER — APPOINTMENT (OUTPATIENT)
Dept: VASCULAR SURGERY | Facility: CLINIC | Age: 69
End: 2024-10-10

## 2024-10-10 ENCOUNTER — OUTPATIENT (OUTPATIENT)
Dept: OUTPATIENT SERVICES | Facility: HOSPITAL | Age: 69
LOS: 1 days | End: 2024-10-10
Payer: COMMERCIAL

## 2024-10-10 ENCOUNTER — TRANSCRIPTION ENCOUNTER (OUTPATIENT)
Age: 69
End: 2024-10-10

## 2024-10-10 VITALS
HEART RATE: 56 BPM | DIASTOLIC BLOOD PRESSURE: 78 MMHG | SYSTOLIC BLOOD PRESSURE: 166 MMHG | RESPIRATION RATE: 16 BRPM | OXYGEN SATURATION: 98 %

## 2024-10-10 VITALS
TEMPERATURE: 98 F | HEART RATE: 56 BPM | SYSTOLIC BLOOD PRESSURE: 144 MMHG | OXYGEN SATURATION: 96 % | RESPIRATION RATE: 15 BRPM | DIASTOLIC BLOOD PRESSURE: 76 MMHG

## 2024-10-10 DIAGNOSIS — Z98.890 OTHER SPECIFIED POSTPROCEDURAL STATES: Chronic | ICD-10-CM

## 2024-10-10 DIAGNOSIS — Z98.49 CATARACT EXTRACTION STATUS, UNSPECIFIED EYE: Chronic | ICD-10-CM

## 2024-10-10 DIAGNOSIS — Z90.89 ACQUIRED ABSENCE OF OTHER ORGANS: Chronic | ICD-10-CM

## 2024-10-10 DIAGNOSIS — I77.0 ARTERIOVENOUS FISTULA, ACQUIRED: Chronic | ICD-10-CM

## 2024-10-10 DIAGNOSIS — R94.39 ABNORMAL RESULT OF OTHER CARDIOVASCULAR FUNCTION STUDY: ICD-10-CM

## 2024-10-10 DIAGNOSIS — Z95.9 PRESENCE OF CARDIAC AND VASCULAR IMPLANT AND GRAFT, UNSPECIFIED: Chronic | ICD-10-CM

## 2024-10-10 LAB
ANION GAP SERPL CALC-SCNC: 17 MMOL/L — SIGNIFICANT CHANGE UP (ref 5–17)
BUN SERPL-MCNC: 48 MG/DL — HIGH (ref 7–23)
CALCIUM SERPL-MCNC: 9.7 MG/DL — SIGNIFICANT CHANGE UP (ref 8.4–10.5)
CHLORIDE SERPL-SCNC: 95 MMOL/L — LOW (ref 96–108)
CO2 SERPL-SCNC: 26 MMOL/L — SIGNIFICANT CHANGE UP (ref 22–31)
CREAT SERPL-MCNC: 8.25 MG/DL — HIGH (ref 0.5–1.3)
EGFR: 6 ML/MIN/1.73M2 — LOW
GLUCOSE SERPL-MCNC: 88 MG/DL — SIGNIFICANT CHANGE UP (ref 70–99)
HCT VFR BLD CALC: 33.6 % — LOW (ref 39–50)
HGB BLD-MCNC: 10.6 G/DL — LOW (ref 13–17)
MCHC RBC-ENTMCNC: 21.3 PG — LOW (ref 27–34)
MCHC RBC-ENTMCNC: 31.5 GM/DL — LOW (ref 32–36)
MCV RBC AUTO: 67.5 FL — LOW (ref 80–100)
NRBC # BLD: 0 /100 WBCS — SIGNIFICANT CHANGE UP (ref 0–0)
PLATELET # BLD AUTO: 235 K/UL — SIGNIFICANT CHANGE UP (ref 150–400)
POTASSIUM SERPL-MCNC: 4.3 MMOL/L — SIGNIFICANT CHANGE UP (ref 3.5–5.3)
POTASSIUM SERPL-SCNC: 4.3 MMOL/L — SIGNIFICANT CHANGE UP (ref 3.5–5.3)
RBC # BLD: 4.98 M/UL — SIGNIFICANT CHANGE UP (ref 4.2–5.8)
RBC # FLD: 17.1 % — HIGH (ref 10.3–14.5)
SODIUM SERPL-SCNC: 138 MMOL/L — SIGNIFICANT CHANGE UP (ref 135–145)
WBC # BLD: 10.43 K/UL — SIGNIFICANT CHANGE UP (ref 3.8–10.5)
WBC # FLD AUTO: 10.43 K/UL — SIGNIFICANT CHANGE UP (ref 3.8–10.5)

## 2024-10-10 PROCEDURE — 80048 BASIC METABOLIC PNL TOTAL CA: CPT

## 2024-10-10 PROCEDURE — C1894: CPT

## 2024-10-10 PROCEDURE — 93458 L HRT ARTERY/VENTRICLE ANGIO: CPT

## 2024-10-10 PROCEDURE — 93010 ELECTROCARDIOGRAM REPORT: CPT

## 2024-10-10 PROCEDURE — C1887: CPT

## 2024-10-10 PROCEDURE — 93005 ELECTROCARDIOGRAM TRACING: CPT

## 2024-10-10 PROCEDURE — 85027 COMPLETE CBC AUTOMATED: CPT

## 2024-10-10 PROCEDURE — C1769: CPT

## 2024-10-10 RX ORDER — SEVELAMER CARBONATE 800 MG/1
2 TABLET, FILM COATED ORAL
Refills: 0 | DISCHARGE

## 2024-10-10 RX ORDER — ASPIRIN 325 MG
81 TABLET ORAL
Refills: 0 | DISCHARGE

## 2024-10-10 NOTE — ASU DISCHARGE PLAN (ADULT/PEDIATRIC) - CARE PROVIDER_API CALL
Mic Lopez  Cardiovascular Disease  [address]  [place] [address]  Phone: [phone]  Fax: [phone]  Follow Up Time: 2 weeks

## 2024-10-10 NOTE — H&P CARDIOLOGY - HISTORY OF PRESENT ILLNESS
68 y/o M w/ PMH of CAD s/p multiple PCI to LAD, HTN, RA, ESRD on HD(M/W/F), PAD s/p LE stenting, cardiomyopathy EF 40%, MR, and atrial fibrillation s/p ablation 2016 + s/p cardioversion 3/2022 + Aflutter/Afib ablation 9/2022 who presents for a LHC w DR. Lee. Pt went to see his cardiologist Dr. Lopez. TTE showed regional wall motion abnormalities. Nuclear stress test w/ fair exercise capacity however frequent ventricular ectopy and runs of NSVT at peak exercise w/ perfusion abnormalities therefore he was referred for a [facility]. He currently denies chets pain, dizziness, SOB, numbness/tingling. He last took Xarelto and farxiga on Monday 10/7.    Cards: DR. Lopez    Cardiology Summary ( most recent imaging unavailable)    Myocardial Perfusion: Abnormal.  Qualitative Perfusion:  -   medium  -  sized, moderate defect(s) in the inferior and inferoapical wall that is partially reversible   consistent with an infarction with moderate mauro  -  infarct ischemia.  3.  The left ventricle is low normal in function and moderately enlarged in size.  4.  Hypokinesis of the septal wall.  5.  Patient achieved 5 METS, which is consistent with fair exercise capacity.  6.  Arrhythmias: Frequent VPD's occurred during stress and recovery, increased with stress. Frequent   VPBs, with frequent couplets and multiple runs of NSVT, 4 and 5 beats at peak exercise    TTE 9/2024  1.Left ventricular systolic function is mildly decreased with an ejection fraction visually estimated at 45   to 50 %. Regional wall motion abnormalities present.  2.Basal inferoseptal segment, basal and mid anterolateral wall, basal anteroseptal segment, and apical   lateral segment are abnormal.  3.Left atrium is mildly dilated.  4.The right atrium is dilated.  5.Moderate mitral regurgitation.  6.Moderate tricuspid regurgitation.  7.Estimated pulmonary artery systolic pressure is 49 mmHg

## 2024-10-10 NOTE — ASU DISCHARGE PLAN (ADULT/PEDIATRIC) - CLICK TO LAUNCH ORM
This patient is enrolled in the PNA STAR readmission program and has active care navigation. This patient can be followed up by the care navigation team within 24 hours. To arrange close follow-up or to obtain additional clinical information about this patient, please call the contact number above. For patients previously on the faculty hospitalist or pulmonary service, please call the hospitalist or pulmonary fellow (7a-5p for patients previously admitted to the pulmonary service) for consultation PRIOR to admission or observation.  The hospitalist can be reached at [phone] and pulmonary fellow at [phone]. Consider CDU for management of COPD exacerbation or PNA per guidelines.    "89 year old patient recently discharged from [facility] 11/21 with aspiration PNA and ruptured gallbladder. Discharged on 11/21 on Cipro and augmentin. D/c plan was for home PT and transition to hospice.  Seen by PCP in home yesterday with CXR ordered.  I was going to make hospice referral this morning.  Please call if any questions." - Isabel Macias This patient is enrolled in the PNA STAR readmission program and has active care navigation. This patient can be followed up by the care navigation team within 24 hours. To arrange close follow-up or to obtain additional clinical information about this patient, please call the contact number above. For patients previously on the faculty hospitalist or pulmonary service, please call the hospitalist or pulmonary fellow (7a-5p for patients previously admitted to the pulmonary service) for consultation PRIOR to admission or observation.  The hospitalist can be reached at [phone] and pulmonary fellow at [phone]. Consider CDU for management of COPD exacerbation or PNA per guidelines. 89 year old patient recently discharged from [facility] 11/21 with aspiration PNA and ruptured gallbladder. Discharged on 11/21 on Cipro and augmentin. D/c plan was for home PT and transition to hospice.  Seen by PCP in home yesterday with CXR ordered.  I was going to make hospice referral this morning.  Please call if any questions.    Thanks,  Isabel  [phone]    This patient is enrolled in the PNA STAR readmission program and has active care navigation. This patient can be followed up by the care navigation team within 24 hours. To arrange close follow-up or to obtain additional clinical information about this patient, please call the contact number above. For patients previously on the faculty hospitalist or pulmonary service, please call the hospitalist or pulmonary fellow (7a-5p for patients previously admitted to the pulmonary service) for consultation PRIOR to admission or observation.  The hospitalist can be reached at [phone] and pulmonary fellow at [phone]. Consider CDU for management of COPD exacerbation or PNA per guidelines. .

## 2024-10-16 NOTE — ED ADULT NURSE NOTE - NS ED NOTE ABUSE RESPONSE YN
CONSTITUTIONAL: Comfortable, NAD  HEAD & NECK: NCAT, supple neck  EYES: PER B/L, non-icteric sclera, nl conjunctiva  ENT: Scant nasal discharge; MMM; no oropharyngeal erythema or exudates; TMs clear B/L  CARDIAC: RRR  RESP: No accessory muscle use; CTAB, no wheezing, no rales  ABD: Soft, NT, ND.  SKIN: No rash, no abrasions, no lesions  EXT: Well-perfused x4; no calf tenderness; no edema; cap refill < 2 sec  NEUROMSK: Moving all extremities  PSYCH: Alert, cooperative, appropriate
Yes

## 2024-10-17 PROBLEM — T82.898A INADEQUATE FLOW OF DIALYSIS ARTERIOVENOUS FISTULA: Status: ACTIVE | Noted: 2022-06-17

## 2024-10-18 ENCOUNTER — RESULT REVIEW (OUTPATIENT)
Age: 69
End: 2024-10-18

## 2024-10-18 ENCOUNTER — APPOINTMENT (OUTPATIENT)
Dept: ENDOVASCULAR SURGERY | Facility: CLINIC | Age: 69
End: 2024-10-18
Payer: MEDICARE

## 2024-10-18 VITALS
HEIGHT: 71 IN | SYSTOLIC BLOOD PRESSURE: 110 MMHG | RESPIRATION RATE: 16 BRPM | BODY MASS INDEX: 25.9 KG/M2 | HEART RATE: 76 BPM | DIASTOLIC BLOOD PRESSURE: 75 MMHG | WEIGHT: 185 LBS | TEMPERATURE: 97.5 F | OXYGEN SATURATION: 95 %

## 2024-10-18 DIAGNOSIS — T82.898A OTHER SPECIFIED COMPLICATION OF VASCULAR PROSTHETIC DEVICES, IMPLANTS AND GRAFTS, INITIAL ENCOUNTER: ICD-10-CM

## 2024-10-18 DIAGNOSIS — N18.6 END STAGE RENAL DISEASE: ICD-10-CM

## 2024-10-18 DIAGNOSIS — Z99.2 END STAGE RENAL DISEASE: ICD-10-CM

## 2024-10-18 PROCEDURE — 36902Z: CUSTOM

## 2024-10-18 PROCEDURE — 36215Z: CUSTOM

## 2024-10-23 ENCOUNTER — NON-APPOINTMENT (OUTPATIENT)
Age: 69
End: 2024-10-23

## 2024-10-25 ENCOUNTER — OUTPATIENT (OUTPATIENT)
Dept: OUTPATIENT SERVICES | Facility: HOSPITAL | Age: 69
LOS: 1 days | End: 2024-10-25
Payer: COMMERCIAL

## 2024-10-25 ENCOUNTER — APPOINTMENT (OUTPATIENT)
Dept: INTERNAL MEDICINE | Facility: CLINIC | Age: 69
End: 2024-10-25
Payer: MEDICARE

## 2024-10-25 VITALS — HEART RATE: 74 BPM | DIASTOLIC BLOOD PRESSURE: 62 MMHG | SYSTOLIC BLOOD PRESSURE: 110 MMHG | RESPIRATION RATE: 14 BRPM

## 2024-10-25 DIAGNOSIS — Z90.89 ACQUIRED ABSENCE OF OTHER ORGANS: Chronic | ICD-10-CM

## 2024-10-25 DIAGNOSIS — Z95.9 PRESENCE OF CARDIAC AND VASCULAR IMPLANT AND GRAFT, UNSPECIFIED: Chronic | ICD-10-CM

## 2024-10-25 DIAGNOSIS — Z98.890 OTHER SPECIFIED POSTPROCEDURAL STATES: Chronic | ICD-10-CM

## 2024-10-25 DIAGNOSIS — I77.0 ARTERIOVENOUS FISTULA, ACQUIRED: Chronic | ICD-10-CM

## 2024-10-25 DIAGNOSIS — L85.3 XEROSIS CUTIS: ICD-10-CM

## 2024-10-25 DIAGNOSIS — I10 ESSENTIAL (PRIMARY) HYPERTENSION: ICD-10-CM

## 2024-10-25 DIAGNOSIS — Z98.49 CATARACT EXTRACTION STATUS, UNSPECIFIED EYE: Chronic | ICD-10-CM

## 2024-10-25 DIAGNOSIS — Z00.00 ENCOUNTER FOR GENERAL ADULT MEDICAL EXAMINATION W/OUT ABNORMAL FINDINGS: ICD-10-CM

## 2024-10-25 DIAGNOSIS — F32.A DEPRESSION, UNSPECIFIED: ICD-10-CM

## 2024-10-25 PROCEDURE — G0439: CPT

## 2024-10-25 PROCEDURE — 99397 PER PM REEVAL EST PAT 65+ YR: CPT

## 2024-10-25 RX ORDER — ESZOPICLONE 1 MG/1
1 TABLET, FILM COATED ORAL
Qty: 30 | Refills: 1 | Status: ACTIVE | COMMUNITY
Start: 2024-10-25 | End: 1900-01-01

## 2024-10-25 RX ORDER — HYDROCORTISONE 1 %
12 CREAM (GRAM) TOPICAL
Qty: 1 | Refills: 1 | Status: ACTIVE | COMMUNITY
Start: 2024-10-25 | End: 1900-01-01

## 2024-10-25 RX ORDER — PREDNISONE 10 MG/1
10 TABLET ORAL
Qty: 30 | Refills: 3 | Status: ACTIVE | COMMUNITY
Start: 2024-10-25 | End: 1900-01-01

## 2024-10-25 RX ORDER — SERTRALINE HYDROCHLORIDE 50 MG/1
50 TABLET, FILM COATED ORAL
Qty: 90 | Refills: 3 | Status: ACTIVE | COMMUNITY
Start: 2024-10-25 | End: 1900-01-01

## 2024-10-27 NOTE — PROGRESS NOTE ADULT - PROBLEM/PLAN-3
DISPLAY PLAN FREE TEXT
normal/cranial nerves II-XII intact/sensation intact
DISPLAY PLAN FREE TEXT

## 2024-10-31 ENCOUNTER — NON-APPOINTMENT (OUTPATIENT)
Age: 69
End: 2024-10-31

## 2024-11-04 DIAGNOSIS — Z00.00 ENCOUNTER FOR GENERAL ADULT MEDICAL EXAMINATION WITHOUT ABNORMAL FINDINGS: ICD-10-CM

## 2024-11-04 DIAGNOSIS — F32.A DEPRESSION, UNSPECIFIED: ICD-10-CM

## 2024-11-17 NOTE — ED PROVIDER NOTE - RAPID ASSESSMENT
65y M pt presents to ED with PMHx of CHF, HLD, CAD, Gout, CKD, arthritis, HTN presents to ED with c/o elevated Cr level after routine blood work check. States last week he was feeling fatigue but is feeling fine as of now.     Maria Isabel MANCINI (Sarahibgab) have documented this rapid assessment note under the dictation of Mariajose Boykin (PA) which has been reviewed and affirmed to be accurate. Patient was seen as a QPA patient. The patient will be seen and further worked up in the main emergency department and their care will be completed by the main emergency department team along with a thorough physical exam. Receiving team will follow up on labs, analgesia, any clinical imaging, reassess and disposition as clinically indicated, all decisions regarding the progression of care will be made at their discretion. 65y M  with PMHx of CHF, HLD, CAD, Gout, CKD, arthritis, HTN presents to ED with c/o elevated Cr level after routine blood work check. States last week he was feeling fatigue but is feeling fine as of now.   ** labs in HIE**    IMaria Isabel (Sarahibgab) have documented this rapid assessment note under the dictation of Mariajose Boykin (PA) which has been reviewed and affirmed to be accurate. Patient was seen as a QPA patient. The patient will be seen and further worked up in the main emergency department and their care will be completed by the main emergency department team along with a thorough physical exam. Receiving team will follow up on labs, analgesia, any clinical imaging, reassess and disposition as clinically indicated, all decisions regarding the progression of care will be made at their discretion.    Rapid assessment by Mariajose Boykin PA-C full eval to be performed in ED. Above documentation completed by scribe above. I was present for and agree with documentation.   Mariajose Boykin PA-C 65y M  with PMHx of CHF, HLD, CAD, Gout, CKD, arthritis, HTN presents to ED with c/o elevated Cr level after routine blood work check. States last week he was feeling fatigue but is feeling fine as of now.       IMaria Isabel (Sarahibgab) have documented this rapid assessment note under the dictation of Mariajose Boykin (PA) which has been reviewed and affirmed to be accurate. Patient was seen as a QPA patient. The patient will be seen and further worked up in the main emergency department and their care will be completed by the main emergency department team along with a thorough physical exam. Receiving team will follow up on labs, analgesia, any clinical imaging, reassess and disposition as clinically indicated, all decisions regarding the progression of care will be made at their discretion.    Rapid assessment by Mariajose Boykin PA-C full eval to be performed in ED. Above documentation completed by scribe above. I was present for and agree with documentation.   Mariajose Boykin PA-C    ** labs in HIE** BUN/ Cr: 46/3.48 + proteinuria on UA Yes

## 2025-01-30 ENCOUNTER — EMERGENCY (EMERGENCY)
Facility: HOSPITAL | Age: 70
LOS: 1 days | Discharge: HOME | End: 2025-01-30
Attending: EMERGENCY MEDICINE
Payer: COMMERCIAL

## 2025-01-30 VITALS
TEMPERATURE: 98 F | RESPIRATION RATE: 20 BRPM | OXYGEN SATURATION: 98 % | SYSTOLIC BLOOD PRESSURE: 160 MMHG | DIASTOLIC BLOOD PRESSURE: 98 MMHG | HEART RATE: 70 BPM

## 2025-01-30 VITALS
TEMPERATURE: 98 F | SYSTOLIC BLOOD PRESSURE: 132 MMHG | RESPIRATION RATE: 16 BRPM | DIASTOLIC BLOOD PRESSURE: 81 MMHG | HEART RATE: 67 BPM | OXYGEN SATURATION: 98 %

## 2025-01-30 DIAGNOSIS — Z98.890 OTHER SPECIFIED POSTPROCEDURAL STATES: Chronic | ICD-10-CM

## 2025-01-30 DIAGNOSIS — Z98.49 CATARACT EXTRACTION STATUS, UNSPECIFIED EYE: Chronic | ICD-10-CM

## 2025-01-30 DIAGNOSIS — Z90.89 ACQUIRED ABSENCE OF OTHER ORGANS: Chronic | ICD-10-CM

## 2025-01-30 DIAGNOSIS — Z95.9 PRESENCE OF CARDIAC AND VASCULAR IMPLANT AND GRAFT, UNSPECIFIED: Chronic | ICD-10-CM

## 2025-01-30 DIAGNOSIS — I77.0 ARTERIOVENOUS FISTULA, ACQUIRED: Chronic | ICD-10-CM

## 2025-01-30 LAB
ADD ON TEST-SPECIMEN IN LAB: SIGNIFICANT CHANGE UP
FLUAV AG NPH QL: SIGNIFICANT CHANGE UP
FLUBV AG NPH QL: SIGNIFICANT CHANGE UP
RSV RNA NPH QL NAA+NON-PROBE: SIGNIFICANT CHANGE UP
SARS-COV-2 RNA SPEC QL NAA+PROBE: SIGNIFICANT CHANGE UP
TROPONIN T, HIGH SENSITIVITY RESULT: 157 NG/L — HIGH (ref 0–51)

## 2025-01-30 PROCEDURE — 71046 X-RAY EXAM CHEST 2 VIEWS: CPT

## 2025-01-30 PROCEDURE — 93005 ELECTROCARDIOGRAM TRACING: CPT

## 2025-01-30 PROCEDURE — 85025 COMPLETE CBC W/AUTO DIFF WBC: CPT

## 2025-01-30 PROCEDURE — 85610 PROTHROMBIN TIME: CPT

## 2025-01-30 PROCEDURE — 99284 EMERGENCY DEPT VISIT MOD MDM: CPT

## 2025-01-30 PROCEDURE — 80053 COMPREHEN METABOLIC PANEL: CPT

## 2025-01-30 PROCEDURE — 83880 ASSAY OF NATRIURETIC PEPTIDE: CPT

## 2025-01-30 PROCEDURE — 87637 SARSCOV2&INF A&B&RSV AMP PRB: CPT

## 2025-01-30 PROCEDURE — 84484 ASSAY OF TROPONIN QUANT: CPT

## 2025-01-30 PROCEDURE — 84145 PROCALCITONIN (PCT): CPT

## 2025-01-30 PROCEDURE — 71046 X-RAY EXAM CHEST 2 VIEWS: CPT | Mod: 26

## 2025-01-30 PROCEDURE — 99285 EMERGENCY DEPT VISIT HI MDM: CPT | Mod: 25

## 2025-01-30 PROCEDURE — 93010 ELECTROCARDIOGRAM REPORT: CPT

## 2025-01-30 NOTE — ED ADULT NURSE NOTE - OBJECTIVE STATEMENT
Pt c/o "SOB/LIRA, prod cough ( clear sputum) x 1-1 1/2 weeks. + pain to lower back with coughing, no CP/fevers. last dialysis was full session yesterday ( M-W-F)."

## 2025-01-30 NOTE — ED PROVIDER NOTE - PATIENT PORTAL LINK FT
You can access the FollowMyHealth Patient Portal offered by [facility] by registering at the following website: [email or web address]. By joining [facility]’s FollowMyHealth portal, you will also be able to view your health information using other applications (apps) compatible with our system.

## 2025-01-30 NOTE — ED PROVIDER NOTE - OBJECTIVE STATEMENT
68yo M with PMH of CAD s/p stents, HTN, ESRD on HD(M/W/F), cardiomyopathy, A.fib, presenting with LIRA x 1.5 weeks with associated lightheadedness and poor appetite.  Also reports a cough with clear sputum production and nasal congestion. Last dialysis yesterday. Had a cath 2 months ago, no stents placed at the time. Denies any fever/chills, CP, SOB at rest, LE swelling, abdominal pain, n/v/d, dysuria, sick contacts. Makes urine.

## 2025-01-30 NOTE — ED PROVIDER NOTE - GASTROINTESTINAL [+], MLM
[facility]  ADD [facility]  [phone]    ADD PROVIDER'S REPORT:    Date: 11/24/2021   Patient: Suresh Fregoso    Med Record #:[id number]  Parents: Ascencion and Dorothy    Allergies:  ALLERGIES:  No Known Allergies    Medications  Current Outpatient Medications   Medication Sig Dispense Refill   • dexmethylphenidate (Focalin XR) 10 MG 24 hr capsule Take 1 capsule by mouth daily. Indications: Attention Deficit Hyperactivity Disorder 30 capsule 0     No current facility-administered medications for this visit.       Suresh is a 12 year old  male here for ADD/ADHD follow up.  He is accompanied by: mother    OTHER REPORTS:   Previous ADD/ADHD visit notes were reviewed.    SUBJECTIVE  HPI:   Age at diagnosis: 8 years  CONCERNS: Headaches when taking Focalin XR, so stopped two months ago. He is struggling in his classes. Teachers report his is easily distracted, forgets supplies and hyperactive. Teachers need to redirect him frequently. He needs frequent reminders at home to complete tasks. Some his teachers will allow fidget items and changing classes helps a little to release some of his energy. He is in many honors classes but is not doing as well as expected because he does not put effort into his work. He often forgets to complete his homework or forgets to submit it.  CONTEXT: no identified stressors  MODIFYING FACTORS: no extra services  STATUS: worsening  Medication adherence:   Patient reports adherence to dosing schedules No, side effects and stopped  Side effects of medication: headaches frequent and decreased appetite    SCHOOL:  Suresh is in 7th Grade at [employer], honors classes.  His grades are overall A's, B's and C's  Problem classes: english/language  Attention/Impulse Control and Behavior: easily distracted, fidgets  and doesn't follow directions   Social Skills: no problems   Homework: some late or missing assignments    HOME:  Suresh is involved in the following activities:band  - percussion, scouts  and sports - baseball, basketball and cross-country   Sleep: no problems  Behavior at home: typical age appropriate difficulties  Psychologic problems: no problems  In Counseling/Tutoring: psychologist - Margaux Lindsey in past  Mood  Depression: no problems   Anxiety: no problems       Current Problem List:  Patient Active Problem List   Diagnosis   • Attention deficit hyperactivity disorder (ADHD), combined type   • Bronchospasm   • Loss of appetite   • Sibling relationship problem   • Keratosis pilaris       PMH  Reviewed without changes    SH  Lives with parents.  He has one brother.     REVIEW OF SYSTEMS  Review of Systems   Constitutional: Negative for activity change, appetite change and fatigue.   Respiratory: Negative for shortness of breath.    Cardiovascular: Negative for chest pain and palpitations.   Gastrointestinal: Negative for abdominal pain, constipation and diarrhea.   Neurological: Positive for headaches (only with Focalin XR). Negative for dizziness and light-headedness.       PHYSICAL EXAMINATION:  Visit Vitals  BP (!) 102/50 (BP Location: LUE - Left upper extremity, Patient Position: Sitting, Cuff Size: Regular)   Pulse 76   Ht 5' 5.5\" (1.664 m)   Wt 49.9 kg (110 lb)   BMI 18.03 kg/m²     Wt Readings from Last 4 Encounters:   11/24/21 49.9 kg (110 lb) (71 %, Z= 0.55)*   09/07/21 46.6 kg (102 lb 12.8 oz) (64 %, Z= 0.35)*   08/30/21 48.5 kg (107 lb) (71 %, Z= 0.55)*   08/26/21 48.5 kg (107 lb) (71 %, Z= 0.55)*     * Growth percentiles are based on CDC (Boys, 2-20 Years) data.     Ht Readings from Last 4 Encounters:   11/24/21 5' 5.5\" (1.664 m) (93 %, Z= 1.49)*   09/07/21 5' 5\" (1.651 m) (94 %, Z= 1.54)*   08/30/21 5' 3\" (1.6 m) (82 %, Z= 0.92)*   06/03/21 5' 3\" (1.6 m) (87 %, Z= 1.15)*     * Growth percentiles are based on CDC (Boys, 2-20 Years) data.     BP Readings from Last 4 Encounters:   11/24/21 (!) 102/50 (24 %, Z = -0.71 /  16 %, Z = -0.99)*   09/07/21 (!) 102/58  (25 %, Z = -0.67 /  36 %, Z = -0.36)*   08/30/21 100/64 (25 %, Z = -0.67 /  59 %, Z = 0.23)*   08/26/21 (!) 98/55     *BP percentiles are based on the 2017 AAP Clinical Practice Guideline for boys       Physical Exam  Constitutional:       General: He is active.      Appearance: He is well-developed.   HENT:      Mouth/Throat:      Mouth: Mucous membranes are moist.      Pharynx: Oropharynx is clear.   Eyes:      Conjunctiva/sclera: Conjunctivae normal.      Pupils: Pupils are equal, round, and reactive to light.   Cardiovascular:      Rate and Rhythm: Normal rate and regular rhythm.      Heart sounds: No murmur heard.      Pulmonary:      Effort: Pulmonary effort is normal.      Breath sounds: Normal breath sounds.   Neurological:      Mental Status: He is alert and oriented for age.   Psychiatric:         Attention and Perception: Attention normal.         Mood and Affect: Mood normal.         Speech: Speech normal.         Behavior: Behavior is hyperactive (fidgety). Behavior is cooperative.         Thought Content: Thought content normal.         ASSESSMENT/SUMMARY PLAN  1. ADHD (attention deficit hyperactivity disorder) - combined type - still a concern    PLAN: start new medication - diagnosis and treatment options reviewed and side effects discussed, make a to-do list/chart and check [email or web address], [email or web address], AddDesignWine.[email or web address].ru or Logoworks.Domgeo.ru for additional resources    2. HOMEWORK - still a concern    PLAN:  use an assignment notebook and regular place and time to do homework      Medication Concerns: not on any medications at this time  Medication Changes:  --add Vyvanse to see if it will help attention/hyperactivity without causing side effects - see Rx below and --discussed risks (side effects) and benefits of medication & encouraged to read about medication, advised can increase to 30 mg if no benefit and no side effects after one week.  Followup: ADHD FOLLOW-UP IN 2 months and CALL WITH UPDATE  IN 2-3 weeks  Call: if having problems or concerns     30 minutes spent in this encounter with more than 50% of that time spent in counseling about ADD progress and symptom management, behavior modification techniques and medication options, risks and benefits.    Signed: Janet Wing CNP           +poor appetite

## 2025-01-30 NOTE — ED PROVIDER NOTE - WR ORDER STATUS 1
Resulted Valtrex Pregnancy And Lactation Text: this medication is Pregnancy Category B and is considered safe during pregnancy. This medication is not directly found in breast milk but it's metabolite acyclovir is present.

## 2025-01-30 NOTE — ED PROVIDER NOTE - RAPID ASSESSMENT
68 yo M with pmh CAD s/p stents, here with LIRA x 1.5 weeks with lightheadedness, no CP, fevers, body aches/URI symptoms. No abdominal pain.     cards Dr. Pernell Lee  PCP Dr. London Lara    Patient was rapidly assessed via a telemedicine and/or role of Quick Triage PA; a limited history, physical exam and assessment was performed. The patient will be seen and further evaluated in the main emergency department. The remainder of care and evaluation will be conducted by the primary emergency medicine team. Receiving team will follow up on labs, imaging and serially reassess patient as indicated. All further decisions regarding patient care, evaluation and disposition are at the discretion of the receiving primary emergency department team. -Eusebia Lamb PA-C

## 2025-01-30 NOTE — ED PROVIDER NOTE - ATTENDING CONTRIBUTION TO CARE
------------ATTENDING NOTE------------  pt c/o several days of nasal congestion, sore throat, unproductive cough, muscle aches, no fevers, no asthma/copd,   - Pernell Landaverde MD   ------------------------------------------------------ ------------ATTENDING NOTE------------  pt c/o several days of nasal congestion, sore throat, unproductive cough, muscle aches, no fevers, no asthma/copd, complicated by MWF HD (had full session yesterday   - Prenell Landaverde MD   ------------------------------------------------------ ------------ATTENDING NOTE------------  pt c/o several days of nasal congestion, sore throat, unproductive cough, muscle aches, no fevers, no asthma/copd, complicated by MWF HD (had full session yesterday), no edema/calf tenderness, labs as expected, Tn stable w/ nml EKG, CXR clear (my interpteraton c/w prelim), c/w bronchitis, ambulatory w/ nml VS and no distress, tolerating PO, in depth dw all about ddx, tx, curry, continued close oitpt fu.  - Pernell Landaverde MD   ------------------------------------------------------

## 2025-01-30 NOTE — ED PROVIDER NOTE - NSFOLLOWUPINSTRUCTIONS_ED_ALL_ED_FT
See your Primary Doctor / Specialists and Hemodialysis tomorrow for follow up -- call to discuss.    Stay well hydrated, eat regular healthy meals, get plenty of rest, continue current medications.    See BRONCHITIS information and return instructions given to you.    Seek immediate medical care for new/worsening symptoms/concerns.

## 2025-01-30 NOTE — ED PROVIDER NOTE - IV ALTEPLASE ADMIN OUTSIDE HIDDEN
show Detail Level: Detailed Depth Of Biopsy: dermis Was A Bandage Applied: Yes Size Of Lesion In Cm: 0 Biopsy Type: H and E Biopsy Method: double edge Personna blade Anesthesia Type: 1% lidocaine with epinephrine Anesthesia Volume In Cc: 0.5 Hemostasis: Anthony's Wound Care: Petrolatum Dressing: bandage Destruction After The Procedure: No Type Of Destruction Used: Curettage Curettage Text: The wound bed was treated with curettage after the biopsy was performed. Cryotherapy Text: The wound bed was treated with cryotherapy after the biopsy was performed. Electrodesiccation Text: The wound bed was treated with electrodesiccation after the biopsy was performed. Electrodesiccation And Curettage Text: The wound bed was treated with electrodesiccation and curettage after the biopsy was performed. Silver Nitrate Text: The wound bed was treated with silver nitrate after the biopsy was performed. Lab: 6 Lab Facility: 3 Consent: Written consent was obtained and risks were reviewed including but not limited to scarring, infection, bleeding, scabbing, incomplete removal, nerve damage and allergy to anesthesia. Post-Care Instructions: I reviewed with the patient in detail post-care instructions. Patient is to keep the biopsy site dry overnight, and then apply vasoline twice daily until healed. Notification Instructions: Patient will be notified of biopsy results. However, patient instructed to call the office if not contacted within 2 weeks. Billing Type: Third-Party Bill Information: Selecting Yes will display possible errors in your note based on the variables you have selected. This validation is only offered as a suggestion for you. PLEASE NOTE THAT THE VALIDATION TEXT WILL BE REMOVED WHEN YOU FINALIZE YOUR NOTE. IF YOU WANT TO FAX A PRELIMINARY NOTE YOU WILL NEED TO TOGGLE THIS TO 'NO' IF YOU DO NOT WANT IT IN YOUR FAXED NOTE.

## 2025-02-01 ENCOUNTER — INPATIENT (INPATIENT)
Facility: HOSPITAL | Age: 70
LOS: 3 days | Discharge: OTHER INSTITUTION | End: 2025-02-05
Attending: INTERNAL MEDICINE | Admitting: INTERNAL MEDICINE
Payer: MEDICARE

## 2025-02-01 VITALS
TEMPERATURE: 98 F | HEIGHT: 69 IN | WEIGHT: 222.01 LBS | OXYGEN SATURATION: 97 % | RESPIRATION RATE: 16 BRPM | DIASTOLIC BLOOD PRESSURE: 80 MMHG | SYSTOLIC BLOOD PRESSURE: 124 MMHG | HEART RATE: 85 BPM

## 2025-02-01 DIAGNOSIS — E21.3 HYPERPARATHYROIDISM, UNSPECIFIED: ICD-10-CM

## 2025-02-01 DIAGNOSIS — I77.0 ARTERIOVENOUS FISTULA, ACQUIRED: Chronic | ICD-10-CM

## 2025-02-01 DIAGNOSIS — Z98.890 OTHER SPECIFIED POSTPROCEDURAL STATES: Chronic | ICD-10-CM

## 2025-02-01 DIAGNOSIS — I48.91 UNSPECIFIED ATRIAL FIBRILLATION: ICD-10-CM

## 2025-02-01 DIAGNOSIS — Z95.9 PRESENCE OF CARDIAC AND VASCULAR IMPLANT AND GRAFT, UNSPECIFIED: Chronic | ICD-10-CM

## 2025-02-01 DIAGNOSIS — D64.9 ANEMIA, UNSPECIFIED: ICD-10-CM

## 2025-02-01 DIAGNOSIS — I10 ESSENTIAL (PRIMARY) HYPERTENSION: ICD-10-CM

## 2025-02-01 DIAGNOSIS — Z90.89 ACQUIRED ABSENCE OF OTHER ORGANS: Chronic | ICD-10-CM

## 2025-02-01 DIAGNOSIS — Z98.49 CATARACT EXTRACTION STATUS, UNSPECIFIED EYE: Chronic | ICD-10-CM

## 2025-02-01 DIAGNOSIS — Z29.9 ENCOUNTER FOR PROPHYLACTIC MEASURES, UNSPECIFIED: ICD-10-CM

## 2025-02-01 DIAGNOSIS — N18.6 END STAGE RENAL DISEASE: ICD-10-CM

## 2025-02-01 DIAGNOSIS — E78.5 HYPERLIPIDEMIA, UNSPECIFIED: ICD-10-CM

## 2025-02-01 DIAGNOSIS — I25.10 ATHEROSCLEROTIC HEART DISEASE OF NATIVE CORONARY ARTERY WITHOUT ANGINA PECTORIS: ICD-10-CM

## 2025-02-01 DIAGNOSIS — R06.09 OTHER FORMS OF DYSPNEA: ICD-10-CM

## 2025-02-01 DIAGNOSIS — I50.23 ACUTE ON CHRONIC SYSTOLIC (CONGESTIVE) HEART FAILURE: ICD-10-CM

## 2025-02-01 DIAGNOSIS — R42 DIZZINESS AND GIDDINESS: ICD-10-CM

## 2025-02-01 DIAGNOSIS — M89.8X9 OTHER SPECIFIED DISORDERS OF BONE, UNSPECIFIED SITE: ICD-10-CM

## 2025-02-01 LAB
ALBUMIN SERPL ELPH-MCNC: 4.1 G/DL — SIGNIFICANT CHANGE UP (ref 3.3–5)
ALP SERPL-CCNC: 97 U/L — SIGNIFICANT CHANGE UP (ref 40–120)
ALT FLD-CCNC: 10 U/L — SIGNIFICANT CHANGE UP (ref 4–41)
ANION GAP SERPL CALC-SCNC: 16 MMOL/L — HIGH (ref 7–14)
ANISOCYTOSIS BLD QL: SIGNIFICANT CHANGE UP
AST SERPL-CCNC: 20 U/L — SIGNIFICANT CHANGE UP (ref 4–40)
BASOPHILS # BLD AUTO: 0.06 K/UL — SIGNIFICANT CHANGE UP (ref 0–0.2)
BASOPHILS NFR BLD AUTO: 0.9 % — SIGNIFICANT CHANGE UP (ref 0–2)
BILIRUB SERPL-MCNC: 0.7 MG/DL — SIGNIFICANT CHANGE UP (ref 0.2–1.2)
BUN SERPL-MCNC: 16 MG/DL — SIGNIFICANT CHANGE UP (ref 7–23)
CALCIUM SERPL-MCNC: 9.8 MG/DL — SIGNIFICANT CHANGE UP (ref 8.4–10.5)
CHLORIDE SERPL-SCNC: 96 MMOL/L — LOW (ref 98–107)
CO2 SERPL-SCNC: 30 MMOL/L — SIGNIFICANT CHANGE UP (ref 22–31)
CREAT SERPL-MCNC: 6.33 MG/DL — HIGH (ref 0.5–1.3)
EGFR: 9 ML/MIN/1.73M2 — LOW
EOSINOPHIL # BLD AUTO: 0.06 K/UL — SIGNIFICANT CHANGE UP (ref 0–0.5)
EOSINOPHIL NFR BLD AUTO: 0.9 % — SIGNIFICANT CHANGE UP (ref 0–6)
FLUAV AG NPH QL: SIGNIFICANT CHANGE UP
FLUBV AG NPH QL: SIGNIFICANT CHANGE UP
GIANT PLATELETS BLD QL SMEAR: PRESENT — SIGNIFICANT CHANGE UP
GLUCOSE SERPL-MCNC: 80 MG/DL — SIGNIFICANT CHANGE UP (ref 70–99)
HCT VFR BLD CALC: 32.9 % — LOW (ref 39–50)
HGB BLD-MCNC: 11 G/DL — LOW (ref 13–17)
IANC: 4.66 K/UL — SIGNIFICANT CHANGE UP (ref 1.8–7.4)
LYMPHOCYTES # BLD AUTO: 1.46 K/UL — SIGNIFICANT CHANGE UP (ref 1–3.3)
LYMPHOCYTES # BLD AUTO: 20.5 % — SIGNIFICANT CHANGE UP (ref 13–44)
MACROCYTES BLD QL: SLIGHT — SIGNIFICANT CHANGE UP
MANUAL SMEAR VERIFICATION: SIGNIFICANT CHANGE UP
MCHC RBC-ENTMCNC: 21.5 PG — LOW (ref 27–34)
MCHC RBC-ENTMCNC: 33.4 G/DL — SIGNIFICANT CHANGE UP (ref 32–36)
MCV RBC AUTO: 64.4 FL — LOW (ref 80–100)
MICROCYTES BLD QL: SIGNIFICANT CHANGE UP
MONOCYTES # BLD AUTO: 0.44 K/UL — SIGNIFICANT CHANGE UP (ref 0–0.9)
MONOCYTES NFR BLD AUTO: 6.2 % — SIGNIFICANT CHANGE UP (ref 2–14)
NEUTROPHILS # BLD AUTO: 4.83 K/UL — SIGNIFICANT CHANGE UP (ref 1.8–7.4)
NEUTROPHILS NFR BLD AUTO: 67.9 % — SIGNIFICANT CHANGE UP (ref 43–77)
NRBC # BLD: 1 /100 WBCS — HIGH (ref 0–0)
NRBC BLD-RTO: 1 /100 WBCS — HIGH (ref 0–0)
NT-PROBNP SERPL-SCNC: 6142 PG/ML — HIGH
OVALOCYTES BLD QL SMEAR: SIGNIFICANT CHANGE UP
PLAT MORPH BLD: NORMAL — SIGNIFICANT CHANGE UP
PLATELET # BLD AUTO: 201 K/UL — SIGNIFICANT CHANGE UP (ref 150–400)
PLATELET COUNT - ESTIMATE: NORMAL — SIGNIFICANT CHANGE UP
POIKILOCYTOSIS BLD QL AUTO: SIGNIFICANT CHANGE UP
POTASSIUM SERPL-MCNC: 4.1 MMOL/L — SIGNIFICANT CHANGE UP (ref 3.5–5.3)
POTASSIUM SERPL-SCNC: 4.1 MMOL/L — SIGNIFICANT CHANGE UP (ref 3.5–5.3)
PROT SERPL-MCNC: 7.8 G/DL — SIGNIFICANT CHANGE UP (ref 6–8.3)
RBC # BLD: 5.11 M/UL — SIGNIFICANT CHANGE UP (ref 4.2–5.8)
RBC # FLD: 15.1 % — HIGH (ref 10.3–14.5)
RBC BLD AUTO: ABNORMAL
RSV RNA NPH QL NAA+NON-PROBE: SIGNIFICANT CHANGE UP
SARS-COV-2 RNA SPEC QL NAA+PROBE: SIGNIFICANT CHANGE UP
SODIUM SERPL-SCNC: 142 MMOL/L — SIGNIFICANT CHANGE UP (ref 135–145)
TARGETS BLD QL SMEAR: SIGNIFICANT CHANGE UP
TROPONIN T, HIGH SENSITIVITY RESULT: 140 NG/L — CRITICAL HIGH
VARIANT LYMPHS # BLD: 3.6 % — SIGNIFICANT CHANGE UP (ref 0–6)
VARIANT LYMPHS NFR BLD MANUAL: 3.6 % — SIGNIFICANT CHANGE UP (ref 0–6)
WBC # BLD: 7.12 K/UL — SIGNIFICANT CHANGE UP (ref 3.8–10.5)
WBC # FLD AUTO: 7.12 K/UL — SIGNIFICANT CHANGE UP (ref 3.8–10.5)

## 2025-02-01 PROCEDURE — 99223 1ST HOSP IP/OBS HIGH 75: CPT

## 2025-02-01 PROCEDURE — 99285 EMERGENCY DEPT VISIT HI MDM: CPT

## 2025-02-01 PROCEDURE — 71046 X-RAY EXAM CHEST 2 VIEWS: CPT | Mod: 26

## 2025-02-01 RX ORDER — APIXABAN 5 MG/1
2.5 TABLET, FILM COATED ORAL
Refills: 0 | Status: DISCONTINUED | OUTPATIENT
Start: 2025-02-01 | End: 2025-02-05

## 2025-02-01 RX ORDER — SEVELAMER CARBONATE 800 MG/1
1600 TABLET, FILM COATED ORAL
Refills: 0 | Status: DISCONTINUED | OUTPATIENT
Start: 2025-02-01 | End: 2025-02-05

## 2025-02-01 RX ORDER — PANTOPRAZOLE 20 MG/1
40 TABLET, DELAYED RELEASE ORAL
Refills: 0 | Status: DISCONTINUED | OUTPATIENT
Start: 2025-02-01 | End: 2025-02-05

## 2025-02-01 RX ORDER — ASPIRIN 81 MG/1
81 TABLET, COATED ORAL DAILY
Refills: 0 | Status: DISCONTINUED | OUTPATIENT
Start: 2025-02-01 | End: 2025-02-05

## 2025-02-01 RX ORDER — ANTISEPTIC SURGICAL SCRUB 0.04 MG/ML
1 SOLUTION TOPICAL DAILY
Refills: 0 | Status: DISCONTINUED | OUTPATIENT
Start: 2025-02-01 | End: 2025-02-05

## 2025-02-01 RX ORDER — ACETAMINOPHEN 160 MG/5ML
650 SUSPENSION ORAL EVERY 6 HOURS
Refills: 0 | Status: DISCONTINUED | OUTPATIENT
Start: 2025-02-01 | End: 2025-02-05

## 2025-02-01 RX ORDER — AMIODARONE HYDROCHLORIDE 50 MG/ML
100 INJECTION, SOLUTION INTRAVENOUS
Refills: 0 | Status: DISCONTINUED | OUTPATIENT
Start: 2025-02-01 | End: 2025-02-04

## 2025-02-01 RX ORDER — ATORVASTATIN CALCIUM 80 MG/1
20 TABLET, FILM COATED ORAL AT BEDTIME
Refills: 0 | Status: DISCONTINUED | OUTPATIENT
Start: 2025-02-01 | End: 2025-02-05

## 2025-02-01 RX ORDER — CINACALCET 30 MG/1
30 TABLET, FILM COATED ORAL DAILY
Refills: 0 | Status: DISCONTINUED | OUTPATIENT
Start: 2025-02-01 | End: 2025-02-05

## 2025-02-01 RX ADMIN — ATORVASTATIN CALCIUM 20 MILLIGRAM(S): 80 TABLET, FILM COATED ORAL at 21:31

## 2025-02-01 RX ADMIN — APIXABAN 2.5 MILLIGRAM(S): 5 TABLET, FILM COATED ORAL at 19:22

## 2025-02-01 NOTE — H&P ADULT - PROBLEM SELECTOR PLAN 2
pt with right flank pain started yest. no n/V some dysuria, pt note yest . he passed one but cont. to have pain. patient reports dizziness with standing  lightheadedness during shortness of breath    -check orthostatics  -check TTE
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
## 2025-02-01 NOTE — H&P ADULT - PROBLEM SELECTOR PLAN 9
DVT: Eliquis   Diet: renal   Dispo: pending clinical improvement Home medication: simvastatin 40mg   -c/w atorvastatin as simvastatin is non-formulary

## 2025-02-01 NOTE — CONSULT NOTE ADULT - PROBLEM SELECTOR RECOMMENDATION 3
Pt with hyperparathyroidism in setting of ESRD on HD. Check phos (last phos 6.8 per o/p review). Continue home Cinacalcet 30mg daily. Low phos diet. Monitor serum phos.    Assessment and plan discussed with attending on call (Dr. Ashraf).

## 2025-02-01 NOTE — CONSULT NOTE ADULT - TIME BILLING
agree with above  68 y/o M with pmhx of Afib (on Amiodarone), HTN, ESRD on HD (MWF schedule), GERD, CAD s/p stent placement 8-10 years ago, on daily baby ASA who presents to the ED c/o persistent dyspnea of exertion for the last 1.5 weeks.    #Acute on chronic Systolic Heart Failure  -pt reports increased LIRA over last 1.5 weeks  -recent cath in October with patent proximal LAD stent. Luminal RCA disease. No evidence of new obstructive epicardial CAD. Normal LVEDP.   -CXR with clear lungs  -HST elevated in setting of ESRD- pt denies chest pain  -ecg noted with prolonged QT/QTc -- avoid QT prolonging drugs, check repeat ekg tomorrow to monitor QT/QTc  -pt reports has not been taking diuretics at home   -can resume lasix 80mg on non dialysis days only and fluid removal with HD.  -check echo to eval valve fx    #Dizziness  -pt reports dizziness upon standing  -check orthostatics  -check echo    #Coronary Artery Disease. S/P PCI to LAD  -stable, denies CP  -recent cath with no new obs CAD  -off toprol secondary to bradycardia  -resume statin and asa 81mg daily  -Remains off plavix as he completed course post PCI    #Atrial Fibrillation/Flutter. S/PAF Ablation, S/P ablation 9/2022.  -s/p successful Afib ablation 9/29/22.  -remains in SR/SB  -Continue home amiodarone 100mg BID   -resume home Eliquis     #Cardiomyopathy  -check echo   -off  beta blocker, ACEI outpt secondary to low bp.    #Mitral Regurgitation  -Prior NICOLETTE with tethered mitral valve leaflets with normal opening. Mod-sev Mitral Regurgitation.  -check repeat echo    #Hypertension  -off BP meds   -BP stable, trend bp    #ESRD on HD.  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

## 2025-02-01 NOTE — H&P ADULT - NSHPREVIEWOFSYSTEMS_GEN_ALL_CORE
REVIEW OF SYSTEMS:  CONSTITUTIONAL: No weakness, fevers, chills, sick contacts, or unintended weight loss  EYES: No visual changes or vertigo  ENT: No throat pain, rhinorrhea, or hearing loss   NECK: No pain or stiffness  RESPIRATORY: +cough, +shortness of breath, No  wheezing, hemoptysis  CARDIOVASCULAR: No chest pain or palpitations  GASTROINTESTINAL: +diarrhea No abdominal or epigastric pain. No nausea, vomiting, or hematemesis;   GENITOURINARY: No dysuria, frequency or hematuria  NEUROLOGICAL: No numbness or weakness  SKIN: No itching, rashes, or bruises  Psych: Good mood, no substance use REVIEW OF SYSTEMS:  CONSTITUTIONAL: No weakness, fevers, chills, sick contacts, or unintended weight loss  EYES: No visual changes or vertigo  ENT: No throat pain, rhinorrhea, or hearing loss   NECK: No pain or stiffness  RESPIRATORY: +cough, +shortness of breath, No  wheezing, hemoptysis  CARDIOVASCULAR: No chest pain or palpitations  GASTROINTESTINAL: +diarrhea No abdominal or epigastric pain. No nausea, vomiting, or hematemesis;   GENITOURINARY: No dysuria, frequency or hematuria  NEUROLOGICAL: +dizziness, No numbness or weakness  SKIN: No itching, rashes, or bruises  Psych: Good mood, no substance use

## 2025-02-01 NOTE — PATIENT PROFILE ADULT - FUNCTIONAL ASSESSMENT - DAILY ACTIVITY SCORE.
GRANDMOTHER REPORTS THAT CHILD HAS HAD A COUGH AND SORE THROAT X 1 WEEK. PT HAS NOT BEEN SEEN BY HER PCP.   
24

## 2025-02-01 NOTE — H&P ADULT - PROBLEM SELECTOR PLAN 1
patient reports increased dyspnea on exertion for past 1.5 weeks  no LE edema  appears euvolemic   recent cath in October with patent proximal LAD stent. Luminal RCA disease. No evidence of new obstructive epicardial CAD. Normal LVEDP.   CXR with clear lungs  noncompliant with lasix   cards recs appreciated   -start lasix 80mg daily on non-HD days   -QTC>500, AVOID QTc prolonging agents  -will recheck ecg tomorrow   -TTE ordered   -Fluid removal with HD as per nephro patient reports increased dyspnea on exertion for past 1.5 weeks  no LE edema  appears euvolemic   recent cath in October with patent proximal LAD stent. Luminal RCA disease. No evidence of new obstructive epicardial CAD. Normal LVEDP.   CXR with clear lungs  noncompliant with lasix because he states that he ran out   cards recs appreciated   -start lasix 80mg daily on non-HD days   -QTC>500, AVOID QTc prolonging agents  -will recheck ecg tomorrow   -TTE ordered   -Fluid removal with HD as per nephro

## 2025-02-01 NOTE — ED PROVIDER NOTE - CLINICAL SUMMARY MEDICAL DECISION MAKING FREE TEXT BOX
68 y/o M with pmhx of Afib (on Amiodarone), HTN, ESRD on HD (MWF schedule), GERD, CAD s/p stent placement 8-10 years ago, on daily baby ASA who presents to the ED c/o persistent dyspnea of exertion for the last 1.5 weeks. Pt reports that sx occur while walking. States that he normally is able to walk two blocks to his local grocery store, but over the last 1.5 weeks has been having to stop and rest 2/2 SOB. Pt also started to notice LIRA with walking around his house. Pt notes associated lightheadedness when he gets SOB and cough that is productive of clear sputum. Pt was seen at [facility] two days for similar sx, was told CXR and labs were normal and was discharged home. However, pt reports that LIRA continued which prompted visit today. Of note, pt missed his dialysis session yesterday because he "felt too tired to go to the center." However pt was able to get dialysis done today and completed the session. Denies fever, chills, chest pain, abdominal pain, N/V/D, extremity weakness/numbness/tingling, or headaches, worsening leg swelling or cramping, back pain, palpitations, recent travel. Pt does not remember when last ECHO was done, states he had a stress test about 4-5 months ago. Follows with Dr. Pernell Casiano, cardio. Dr. Hobson, nephro. Dr. Lara, PCP.    Patient currently afebrile, hemodynamically stable, spO2 100%. Physical exam largely unremarkable. Based on history and physical, differentials include but are not limited to CHF vs ACS vs valvular dysfunction. Plan to assess patient for acute pathology as listed above with lab studies, XR. Consult cardio. Likely will admit for echo and cardio eval.

## 2025-02-01 NOTE — ED ADULT NURSE NOTE - OBJECTIVE STATEMENT
Patient is a 70 y/o M received in room 18 c/o SOB on exertion for the past couple of weeks. Patient is A+Ox4 and ambulatory to baseline. Patient has a Hx of A-Fib, HTN, gout and ESRD with a wrapped fistula noted on the left forearm. Patient endorses that he has had shortness of breath on exertion for the past couple of weeks. Patient states that this is abnormal for him. Patient was seen at [facility] on Thursday for SOB on exertion and was treated and released. Patient endorses that he usually has dialysis on M/W/F completed a full session today instead of yesterday, because he felt as if he was too tired from being in the ED at [facility] the day before. Patient denies CP, fever, chills, nausea, diarrhea, urinary sx and dizziness.     Upon assessment, neuro is intact, patient is speaking in full sentences, and face is symmetrical. Respirations are even and unlabored, no use of any accessory muscles. Continuous pulse ox initiated, current SpO2 is 100% on RA. Cardiac rate and rhythm is irregular and 2+. A-Fib on cardiac monitor. Abdomen is soft, nontender and nondistended. Wrapped fistula present on L forearm. Skin is warm, dry, consistent with race and intact. 20G IV placed in R AC. Labs sent. Safety and comfort maintained. Awaiting further MD orders. Plan of care ongoing.

## 2025-02-01 NOTE — ED PROVIDER NOTE - ATTENDING APP SHARED VISIT CONTRIBUTION OF CARE
Matt Millan DO:  patient seen and evaluated with the PA.  I was present for key portions of the History & Physical, and I agree with the Impression & Plan. 70 yo m pmh Afib (on Amiodarone), HTN, ESRD on HD (MWF schedule), GERD, CAD s/p stent placement 8-10 years ago, on daily baby ASA, pw lira and sob. Patient reports approximately 1 week of symptoms.  Reports fatigue, LIRA, unable to take a few steps without catching his breath.  Was evaluated at [facility] 2 days prior, EMR independent reviewed by me.  Thought to have had URI.  Patient reports due to persistent symptoms and came for reevaluation.  Denies CP, leg swelling.  Does report cough productive of clear mucus.  Patient arrives HDS, well-appearing.  No tachycardia, no hypoxia to suggest PE.  Check labs, imaging, reassess.  Will contact patient's cardiologist likely consider CDU versus admission.

## 2025-02-01 NOTE — ED ADULT TRIAGE NOTE - CHIEF COMPLAINT QUOTE
c/o shortness of breath on exertion X couple of weeks. pt is coming from dialysis session, completed full session. pt goes to dialysis M/W/F, reports missed dialysis yesterday and went today. denies SOB, chest pain at this time. Phx of Afib, HTN, gout, ESRD (LAVF)

## 2025-02-01 NOTE — CONSULT NOTE ADULT - SUBJECTIVE AND OBJECTIVE BOX
CARDIOLOGY CONSULT - Dr. Lee     Patient Name: SYEDA WEAVER    Date of Service: 25 @ 13:34    Patient seen and examined    HPI: 70 y/o M with pmhx of Afib (on Amiodarone), HTN, ESRD on HD (MWF schedule), GERD, CAD s/p stent placement 8-10 years ago, on daily baby ASA who presents to the ED c/o persistent dyspnea of exertion for the last 1.5 weeks. Pt reports that sx occur while walking. States that he normally is able to walk two blocks to his local grocery store, but over the last 1.5 weeks has been having to stop and rest 2/2 SOB. Pt also started to notice LIRA with walking around his house. Pt notes associated lightheadedness when he gets SOB and cough that is productive of clear sputum. Pt was seen at [facility] two days for similar sx, was told CXR and labs were normal and was discharged home. However, pt reports that LIRA continued which prompted visit today. Of note, pt missed his dialysis session yesterday because he "felt too tired to go to the center." However pt was able to get dialysis done today and completed the session. Denies fever, chills, chest pain, abdominal pain, N/V/D, extremity weakness/numbness/tingling, or headaches, worsening leg swelling or cramping, back pain, palpitations, recent travel. Pt does not remember when last ECHO was done, states he had a stress test about 4-5 months ago. Follows with Dr. Pernell Lee, cardio. Dr. Hobson, nephro. Dr. Lara, PCP.    Patient denies any chest pain. Patient reports dizziness upon standing and LIRA.       PAST MEDICAL & SURGICAL HISTORY:  HTN - Hypertension      CKD (chronic kidney disease)      Gout      CAD (coronary artery disease)  PCI to LAD       HLD (hyperlipidemia)      Atrial fibrillation      CHF (congestive heart failure)      History of cardiomyopathy  LV dysfunction      Thalassemia minor      AV fistula      ESRD on dialysis      Atrial fibrillation      Rheumatoid arthritis      S/P Arthroscopic Surgery of Left Knee        History of Arthroscopy- ankle        S/P angioplasty with stent  2014      S/P hernia surgery      Status post cataract extraction  left eye done 10/18/2018      H/O cardiac radiofrequency ablation  atrial fibrillation      History of tonsillectomy      H/O hernia repair      AV fistula              PREVIOUS DIAGNOSTIC TESTING:    [x] Echocardiogram:  < from: Transesophageal Echocardiogram w/o TTE (22 @ 12:17) >  Conclusions:  1. Tethered mitral valve leaflets with normal opening.  Moderate-severe mitral regurgitation.  2. Normal trileaflet aortic valve.  3. Mild atheroma noted in aortic arch/descending aorta.  4. No left atrial or left atrial appendage thrombus; left  atrial enlargement.  5. Modarate global left ventricular systolic dysfunction.  6. Right atrial enlargement.  7. The right ventricle is not well visualized; grossly  normal right ventricular systolic function.  8. Normal tricuspid valve. Mild-moderate tricuspid  regurgitation.  *** Compared with echocardiogram of 2021, EF a bit  higher.    < end of copied text >    [x]  Catheterization:  < from: Cardiac Catheterization (10.10.24 @ 12:32) >  Diagnostic Conclusions:     Preop Renal Transplant, Abnormal stress test. History of CAD, s/p LAD  stent.    Cath with patent proximal LAD stent.   Luminal RCA disease.   No evidence of new obstructive epicardial CAD.   Normal LVEDP.     Continue medical treatment of CAD/PCI.     < end of copied text >      [ ] Stress Test:  	      MEDICATIONS:  Home Medications:  ELIQUIS 2.5 MG................Si tablet 2 times per day for 90 Days Qty: 180  AMIODARONE 100 MG............. Qty: 180, TAKE 1 TABLET BY MOUTH TWICE DAILY  VITAMIN D3 25 MCG.............Si tablet QD for 90 Days Qty: 90  FUROSEMIDE 80 MG..............Si tablet QD for 90 Days Qty: 90  FINASTERIDE 5 MG..............Si tablet QD for 90 Days Qty: 90  ALLOPURINOL 100 MG............Si tablet QD for 90 Days Qty: 90  FARXIGA 5 MG..................Si tablet QD for 30 Days Qty: 30  FERROUS SULFATE 325 MG........Si tablet QD for 90 Days Qty: 90  PANTOPRAZOLE 40 MG............Si delayed release tablet QD for 90 Days Qty: 90  SIMVASTATIN 40 MG.............Si tablet QD for 90 Days Qty: 90  ASPIRIN 81 MG.................Si delayed release tablet QD for 90 Days Qty: 180      MEDICATIONS  (STANDING):  chlorhexidine 2% Cloths 1 Application(s) Topical daily      FAMILY HISTORY:  Family history of hypertension (Father, Mother)    Family history of diabetes mellitus (Father, Mother)        SOCIAL HISTORY:    [ ] Non-smoker  [x] Former Smoker  [ ] Alcohol    Allergies    No Known Allergies    Intolerances    Seafood (Other)  	    REVIEW OF SYSTEMS:  CONSTITUTIONAL: No fever, weight loss, or fatigue  EYES: No eye pain, visual disturbances, or discharge  ENMT:  No difficulty hearing, tinnitus, vertigo; No sinus or throat pain  NECK: No pain or stiffness  RESPIRATORY: No cough, wheezing, chills or hemoptysis; +Shortness of Breath  CARDIOVASCULAR: No chest pain, palpitations, passing out, dizziness, or leg swelling  GASTROINTESTINAL: No abdominal or epigastric pain. No nausea, vomiting, or hematemesis; No diarrhea or constipation. No melena or hematochezia.  GENITOURINARY: No dysuria, frequency, hematuria, or incontinence  NEUROLOGICAL: No headaches, memory loss, loss of strength, numbness, or tremors  SKIN: No itching, burning, rashes, or lesions   	    [x] All others negative	  [ ] Unable to obtain    PHYSICAL EXAM:  T(C): 36.6 (25 @ 10:34), Max: 36.6 (25 @ 10:34)  HR: 70 (25 @ 10:34) (70 - 85)  BP: 126/87 (25 @ 10:34) (124/80 - 126/87)  RR: 15 (25 @ 10:34) (15 - 16)  SpO2: 100% (25 @ 10:34) (97% - 100%)  Wt(kg): --  I&O's Summary      Appearance: Normal	  Psychiatry: A & O x 3, Mood & affect appropriate  HEENT:   Normal oral mucosa, PERRL, EOMI	  Lymphatic: No lymphadenopathy  Cardiovascular: Normal S1 S2,RRR, + murmur  Respiratory: Lungs clear to auscultation b/l	  Gastrointestinal:  Soft, Non-tender, + BS	  Skin: No rashes, No ecchymoses, No cyanosis	  Neurologic: Non-focal  Extremities: Normal range of motion, No clubbing, cyanosis or edema  Vascular: Peripheral pulses palpable 2+ bilaterally    TELEMETRY: 	  SB HR 50s  ECG:  	NSR HR  with first degree AVB RBBB, LAFB  RADIOLOGY:  < from: Xray Chest 2 Views PA/Lat (25 @ 11:02) >    IMPRESSION:  Clear lungs.    < end of copied text >    OTHER: 	  	  LABS:	 	    CARDIAC MARKERS:  Troponin T, High Sensitivity Result: 140 ng/L ( @ 10:28)  Troponin T, High Sensitivity Result: 157 ng/L ( @ 20:41)  Troponin T, High Sensitivity Result: 151 ng/L ( @ 17:38)                                  11.0   7.12  )-----------( 201      ( 2025 10:28 )             32.9         142  |  96[L]  |  16  ----------------------------<  80  4.1   |  30  |  6.33[H]    Ca    9.8      2025 10:28    TPro  7.8  /  Alb  4.1  /  TBili  0.7  /  DBili  x   /  AST  20  /  ALT  10  /  AlkPhos  97  -    PT/INR - ( 2025 17:38 )   PT: 13.1 sec;   INR: 1.15 ratio           proBNP:   Lipid Profile:   HgA1c:   TSH:                  CARDIOLOGY CONSULT - Dr. Lee     Patient Name: SYEDA WEAVER    Date of Service: 25 @ 13:34    Patient seen and examined    HPI: 70 y/o M with pmhx of Afib (on Amiodarone), HTN, ESRD on HD (MWF schedule), GERD, CAD s/p stent placement 8-10 years ago, on daily baby ASA who presents to the ED c/o persistent dyspnea of exertion for the last 1.5 weeks. Pt reports that sx occur while walking. States that he normally is able to walk two blocks to his local grocery store, but over the last 1.5 weeks has been having to stop and rest 2/2 SOB. Pt also started to notice LIRA with walking around his house. Pt notes associated lightheadedness when he gets SOB and cough that is productive of clear sputum. Pt was seen at [facility] two days for similar sx, was told CXR and labs were normal and was discharged home. However, pt reports that LIRA continued which prompted visit today. Of note, pt missed his dialysis session yesterday because he "felt too tired to go to the center." However pt was able to get dialysis done today and completed the session. Denies fever, chills, chest pain, abdominal pain, N/V/D, extremity weakness/numbness/tingling, or headaches, worsening leg swelling or cramping, back pain, palpitations, recent travel. Pt does not remember when last ECHO was done, states he had a stress test about 4-5 months ago. Follows with Dr. Pernell Lee, cardio. Dr. Hobson, nephro. Dr. Lara, PCP.    Patient denies any chest pain. Patient reports dizziness upon standing and LIRA.       PAST MEDICAL & SURGICAL HISTORY:  HTN - Hypertension      CKD (chronic kidney disease)      Gout      CAD (coronary artery disease)  PCI to LAD       HLD (hyperlipidemia)      Atrial fibrillation      CHF (congestive heart failure)      History of cardiomyopathy  LV dysfunction      Thalassemia minor      AV fistula      ESRD on dialysis      Atrial fibrillation      Rheumatoid arthritis      S/P Arthroscopic Surgery of Left Knee        History of Arthroscopy- ankle        S/P angioplasty with stent  2014      S/P hernia surgery      Status post cataract extraction  left eye done 10/18/2018      H/O cardiac radiofrequency ablation  atrial fibrillation      History of tonsillectomy      H/O hernia repair      AV fistula              PREVIOUS DIAGNOSTIC TESTING:    [x] Echocardiogram:  < from: Transesophageal Echocardiogram w/o TTE (22 @ 12:17) >  Conclusions:  1. Tethered mitral valve leaflets with normal opening.  Moderate-severe mitral regurgitation.  2. Normal trileaflet aortic valve.  3. Mild atheroma noted in aortic arch/descending aorta.  4. No left atrial or left atrial appendage thrombus; left  atrial enlargement.  5. Modarate global left ventricular systolic dysfunction.  6. Right atrial enlargement.  7. The right ventricle is not well visualized; grossly  normal right ventricular systolic function.  8. Normal tricuspid valve. Mild-moderate tricuspid  regurgitation.  *** Compared with echocardiogram of 2021, EF a bit  higher.    < end of copied text >    [x]  Catheterization:  < from: Cardiac Catheterization (10.10.24 @ 12:32) >  Diagnostic Conclusions:     Preop Renal Transplant, Abnormal stress test. History of CAD, s/p LAD  stent.    Cath with patent proximal LAD stent.   Luminal RCA disease.   No evidence of new obstructive epicardial CAD.   Normal LVEDP.     Continue medical treatment of CAD/PCI.     < end of copied text >      [ ] Stress Test:  	      MEDICATIONS:  Home Medications:  ELIQUIS 2.5 MG................Si tablet 2 times per day for 90 Days Qty: 180  AMIODARONE 100 MG............. Qty: 180, TAKE 1 TABLET BY MOUTH TWICE DAILY  VITAMIN D3 25 MCG.............Si tablet QD for 90 Days Qty: 90  FUROSEMIDE 80 MG..............Si tablet QD for 90 Days Qty: 90  FINASTERIDE 5 MG..............Si tablet QD for 90 Days Qty: 90  ALLOPURINOL 100 MG............Si tablet QD for 90 Days Qty: 90  FARXIGA 5 MG..................Si tablet QD for 30 Days Qty: 30  FERROUS SULFATE 325 MG........Si tablet QD for 90 Days Qty: 90  PANTOPRAZOLE 40 MG............Si delayed release tablet QD for 90 Days Qty: 90  SIMVASTATIN 40 MG.............Si tablet QD for 90 Days Qty: 90  ASPIRIN 81 MG.................Si delayed release tablet QD for 90 Days Qty: 180      MEDICATIONS  (STANDING):  chlorhexidine 2% Cloths 1 Application(s) Topical daily      FAMILY HISTORY:  Family history of hypertension (Father, Mother)    Family history of diabetes mellitus (Father, Mother)        SOCIAL HISTORY:    [ ] Non-smoker  [x] Former Smoker  [ ] Alcohol    Allergies    No Known Allergies    Intolerances    Seafood (Other)  	    REVIEW OF SYSTEMS:  CONSTITUTIONAL: No fever, weight loss, or fatigue  EYES: No eye pain, visual disturbances, or discharge  ENMT:  No difficulty hearing, tinnitus, vertigo; No sinus or throat pain  NECK: No pain or stiffness  RESPIRATORY: No cough, wheezing, chills or hemoptysis; +Shortness of Breath  CARDIOVASCULAR: No chest pain, palpitations, passing out, or leg swelling; +dizziness   GASTROINTESTINAL: No abdominal or epigastric pain. No nausea, vomiting, or hematemesis; No diarrhea or constipation. No melena or hematochezia.  GENITOURINARY: No dysuria, frequency, hematuria, or incontinence  NEUROLOGICAL: No headaches, memory loss, loss of strength, numbness, or tremors  SKIN: No itching, burning, rashes, or lesions   	    [x] All others negative	  [ ] Unable to obtain    PHYSICAL EXAM:  T(C): 36.6 (25 @ 10:34), Max: 36.6 (25 @ 10:34)  HR: 70 (25 @ 10:34) (70 - 85)  BP: 126/87 (25 @ 10:34) (124/80 - 126/87)  RR: 15 (25 @ 10:34) (15 - 16)  SpO2: 100% (25 @ 10:34) (97% - 100%)  Wt(kg): --  I&O's Summary      Appearance: Normal	  Psychiatry: A & O x 3, Mood & affect appropriate  HEENT:   Normal oral mucosa, PERRL, EOMI	  Lymphatic: No lymphadenopathy  Cardiovascular: Normal S1 S2,RRR, + murmur  Respiratory: Lungs clear to auscultation b/l	  Gastrointestinal:  Soft, Non-tender, + BS	  Skin: No rashes, No ecchymoses, No cyanosis	  Neurologic: Non-focal  Extremities: Normal range of motion, No clubbing, cyanosis or edema  Vascular: Peripheral pulses palpable 2+ bilaterally    TELEMETRY: 	  SB HR 50s  ECG:  	NSR HR  with first degree AVB RBBB, LAFB  RADIOLOGY:  < from: Xray Chest 2 Views PA/Lat (25 @ 11:02) >    IMPRESSION:  Clear lungs.    < end of copied text >    OTHER: 	  	  LABS:	 	    CARDIAC MARKERS:  Troponin T, High Sensitivity Result: 140 ng/L ( @ 10:28)  Troponin T, High Sensitivity Result: 157 ng/L ( @ 20:41)  Troponin T, High Sensitivity Result: 151 ng/L ( @ 17:38)                                  11.0   7.12  )-----------( 201      ( 2025 10:28 )             32.9         142  |  96[L]  |  16  ----------------------------<  80  4.1   |  30  |  6.33[H]    Ca    9.8      2025 10:28    TPro  7.8  /  Alb  4.1  /  TBili  0.7  /  DBili  x   /  AST  20  /  ALT  10  /  AlkPhos  97      PT/INR - ( 2025 17:38 )   PT: 13.1 sec;   INR: 1.15 ratio           proBNP:   Lipid Profile:   HgA1c:   TSH:

## 2025-02-01 NOTE — H&P ADULT - NSHPLABSRESULTS_GEN_ALL_CORE
LABS:                         11.0   7.12  )-----------( 201      ( 01 Feb 2025 10:28 )             32.9     02-01    142  |  96[L]  |  16  ----------------------------<  80  4.1   |  30  |  6.33[H]    Ca    9.8      01 Feb 2025 10:28    TPro  7.8  /  Alb  4.1  /  TBili  0.7  /  DBili  x   /  AST  20  /  ALT  10  /  AlkPhos  97  02-01      Urinalysis Basic - ( 01 Feb 2025 10:28 )    Color: x / Appearance: x / SG: x / pH: x  Gluc: 80 mg/dL / Ketone: x  / Bili: x / Urobili: x   Blood: x / Protein: x / Nitrite: x   Leuk Esterase: x / RBC: x / WBC x   Sq Epi: x / Non Sq Epi: x / Bacteria: x            RADIOLOGY, EKG & ADDITIONAL TESTS: Reviewed.     Chest Xray PA/Lateral:    FINDINGS:    The heart is normal in size. Coronary artery stent.  The lungs are clear.  There is no pneumothorax or pleural effusion.  There are no acute osseous abnormalities. Degenerative changes.    IMPRESSION:  Clear lungs.

## 2025-02-01 NOTE — CONSULT NOTE ADULT - SUBJECTIVE AND OBJECTIVE BOX
[facility] DIVISION OF KIDNEY DISEASES AND HYPERTENSION -- [phone]  -- INITIAL CONSULT NOTE  --------------------------------------------------------------------------------  HPI: 69M with a PMH of ESRD on HD ([facility], [facility], [facility], Dr. Hayes), CAD, CHF, HTN, Anemia who presented to [facility] after HD for exertional sob and generalized weakness. Nephrology consulted for ESRD/HD management.    Pt seen and examined in the ED. Pt currently feels well but admits to exertional sob even after completing full treatments of HD. Pt also noted to feel "weak" after HD. Pt denied recent nausea, vomiting, fevers, chills or LE edema. Pt has been compliant with his medications. Pt last had HD today with 2L UF.     PAST HISTORY  --------------------------------------------------------------------------------  PAST MEDICAL & SURGICAL HISTORY:  HTN - Hypertension  Gout  CAD (coronary artery disease)  PCI to LAD 2014  HLD (hyperlipidemia)  CHF (congestive heart failure)  History of cardiomyopathy  LV dysfunction  Thalassemia minor  ESRD on dialysis  Atrial fibrillation  Rheumatoid arthritis  S/P Arthroscopic Surgery of Left Knee  2001  History of Arthroscopy- ankle  2003  S/P angioplasty with stent  5/2014 2016  S/P hernia surgery  Status post cataract extraction  left eye done 10/18/2018  H/O cardiac radiofrequency ablation  atrial fibrillation  History of tonsillectomy  H/O hernia repair  AV fistula    FAMILY HISTORY:  Family history of hypertension (Father, Mother)  Family history of diabetes mellitus (Father, Mother)    PAST SOCIAL HISTORY: No illicit drugs     ALLERGIES & MEDICATIONS  --------------------------------------------------------------------------------  Allergies  No Known Allergies    Intolerances  Seafood (Other)    Standing Inpatient Medications  chlorhexidine 2% Cloths 1 Application(s) Topical daily    PRN Inpatient Medications  acetaminophen     Tablet .. 650 milliGRAM(s) Oral every 6 hours PRN    REVIEW OF SYSTEMS  --------------------------------------------------------------------------------  Gen: No fevers/chills. +Gen weakness  Head/Eyes/Ears: No HA  Respiratory: No cough. +Exertional sob   CV: No chest pain  GI: No abdominal pain, diarrhea  MSK: No edema  Skin: No rashes  Heme: No easy bruising or bleeding    All other systems were reviewed and are negative, except as noted.    VITALS/PHYSICAL EXAM  --------------------------------------------------------------------------------  T(C): 36.6 (02-01-25 @ 13:48), Max: 36.6 (02-01-25 @ 10:34)  HR: 56 (02-01-25 @ 13:48) (56 - 85)  BP: 129/83 (02-01-25 @ 13:48) (124/80 - 129/83)  RR: 12 (02-01-25 @ 13:48) (12 - 16)  SpO2: 99% (02-01-25 @ 13:48) (97% - 100%)  Wt(kg): --  Height (cm): 175.3 (02-01-25 @ 09:53)  Weight (kg): 100.7 (02-01-25 @ 09:53)  BMI (kg/m2): 32.8 (02-01-25 @ 09:53)  BSA (m2): 2.16 (02-01-25 @ 09:53)    Physical Exam:  	Gen: NAD  	HEENT: Anicteric  	Pulm: CTA B/L  	CV: S1S2+  	Abd: Soft, +BS    	Ext: No LE edema B/L  	Neuro: Awake  	Skin: Warm and dry  	Dialysis access: LUE AVF with palpable thrill and bruit heard.     LABS/STUDIES  --------------------------------------------------------------------------------              11.0   7.12  >-----------<  201      [02-01-25 @ 10:28]              32.9     142  |  96  |  16  ----------------------------<  80      [02-01-25 @ 10:28]  4.1   |  30  |  6.33        Ca     9.8     [02-01-25 @ 10:28]    TPro  7.8  /  Alb  4.1  /  TBili  0.7  /  DBili  x   /  AST  20  /  ALT  10  /  AlkPhos  97  [02-01-25 @ 10:28]    PT/INR: PT 13.1 , INR 1.15       [01-30-25 @ 17:38]    Creatinine Trend:  SCr 6.33 [02-01 @ 10:28]  SCr 10.24 [01-30 @ 17:38]

## 2025-02-01 NOTE — H&P ADULT - PROBLEM SELECTOR PLAN 5
S/P AF Ablation, S/P ablation 9/2022.  s/p successful Afib ablation 9/29/22  cards recs appreciated   -c/w home amiodarone 100mg BID   -c/w home Eliquis 2.5 BID home medication: Cinacalcet 30mg daily  -trend phos

## 2025-02-01 NOTE — H&P ADULT - HISTORY OF PRESENT ILLNESS
69 yr old M with h/o ESRD on HD(M/W/F), Atrial Fibrillation (on Amiodarone), HTN, GERD, CAD s/p stent placement 8-10 years ago who presents [place] dyspnea on exertion for 1.5 weeks. Patient states that he first noticed they dyspnea while walking to his local grocery store. At baseline he is usually able to walk the usual two block without any breaks. However for the past 1.5 weeks he has noticed that he has become easily short of breath while walking and has to stop for a break. He also reported that shortness of breath while walking around his house. He also endorses dizziness and lightheadedness which he attributes to difficulty breathing. He also endorses a cough with clear sputum. Of note he went to [facility] ED on 1/30 for his dyspnea. Workup including chest Xray was negative and he was sent home. His symptoms of shortness of breath did not improve which prompted him to come to the ED today. Of note patient missed his HD  session yesterday because he was too tired to go to the dialysis center. Patient was able to get dialysis today prior to his arrival to the ED. He denies fevers, chills, chest pain, leg swelling, nausea, or vomiting. He does note an episode of diarrhea a few days ago, which is improving. Patient follows with Dr. Pernell Lee, cardio. Dr. Hobson, nephro. Dr. Lara, PCP.    In the ED T: 97.7 HR:85 BP:124/80 RR: 16. Chest X-ray showed clear lungs. Trop 157 -->140. Pro-BNP:6142. Cardiology was consulted 69 yr old M with h/o ESRD on HD(M/W/F), Atrial Fibrillation (on Amiodarone), HTN, GERD, CAD s/p stent placement 8-10 years ago who presents with dyspnea on exertion for 1.5 weeks. Patient states that he first noticed they dyspnea while walking to his local grocery store. At baseline he is usually able to walk the usual two block without any breaks. However for the past 1.5 weeks he has noticed that he has become easily short of breath while walking and has to stop for a break. He also reported that shortness of breath while walking around his house. He also endorses dizziness and lightheadedness which he attributes to difficulty breathing. He also endorses a cough with clear sputum. Of note he went to [facility] ED on 1/30 for his dyspnea. Workup including chest Xray was negative and he was sent home. His symptoms of shortness of breath did not improve which prompted him to come to the ED today. Of note patient missed his HD  session yesterday because he was too tired to go to the dialysis center. Patient was able to get dialysis today prior to his arrival to the ED. He denies fevers, chills, chest pain, leg swelling, nausea, or vomiting. He does note an episode of diarrhea a few days ago, which is improving. Patient follows with Dr. Pernell Lee, cardio. Dr. Hobson, nephro. Dr. Lara, PCP.    In the ED T: 97.7 HR:85 BP:124/80 RR: 16. Chest X-ray showed clear lungs. Trop 157 -->140. Pro-BNP:6142. Cardiology was consulted

## 2025-02-01 NOTE — PHARMACOTHERAPY INTERVENTION NOTE - COMMENTS
Medication history is complete. Medication list updated in Outpatient Medication Record (OMR). Please call spectra [phone] if you have any questions.     Medication history obtained from patient at bedside who is able to confirm medications from blister pack that he had on him during interview.

## 2025-02-01 NOTE — CONSULT NOTE ADULT - PROBLEM SELECTOR RECOMMENDATION 2
Patient with anemia in the setting of ESRD. Hemoglobin within target range (goal 10-11). Continue EPO 3K u with HD (per o/p review). Monitor hemoglobin.

## 2025-02-01 NOTE — PHARMACOTHERAPY INTERVENTION NOTE - COMMENTS
Medication history update: Upon initial interview, patient stated he is not taking any other medications than those in blister packaging (specifically asked if patient is taking any other blood thinner than aspirin, patient stated he is not). Per Surescripts, patient also taking Eliquis and finasteride. Patient re-interviewed, and at this time patient states that he is also taking Eliquis and finasteride in addition to those medications in blister pack. Patient stated that these medications were not included in blister pack as he is out of refills; please note patient may need refills of these medications are continued on discharge. Medication history update: Upon initial interview, patient stated he is not taking any other medications than those in blister packaging (specifically asked if patient is taking any other blood thinner than aspirin, patient stated he is not). Per Surescripts, patient also taking Eliquis and finasteride. Patient re-interviewed, and at this time patient states that he is also taking Eliquis and finasteride in addition to those medications in blister pack. Patient reports that he has not been taking diuretics at home and that he should also be taking cinacalcet outpatient. Patient stated that Eliquis, finasteride, furosemide, and cinaclcet were not included in blister pack as he is out of refills. Concern for non-adherence, please note patient may need refills of these medications if they are continued on discharge.  Pharmacy to follow up.

## 2025-02-01 NOTE — H&P ADULT - PROBLEM SELECTOR PLAN 6
Hgb at baseline  -ctm  -EPO as per nephro S/P AF Ablation, S/P ablation 9/2022.  s/p successful Afib ablation 9/29/22  cards recs appreciated   -c/w home amiodarone 100mg BID   -c/w home Eliquis 2.5 BID

## 2025-02-01 NOTE — CONSULT NOTE ADULT - ASSESSMENT
A/P  68 y/o M with pmhx of Afib (on Amiodarone), HTN, ESRD on HD (MWF schedule), GERD, CAD s/p stent placement 8-10 years ago, on daily baby ASA who presents to the ED c/o persistent dyspnea of exertion for the last 1.5 weeks.    #Acute on chronic Systolic Heart Failure  -pt reports increased LIRA over last 1.5 weeks  -recent cath in October with patent proximal LAD stent. Luminal RCA disease. No evidence of new obstructive epicardial CAD. Normal LVEDP.   -CXR with clear lungs  -HST elevated in setting of ESRD- pt denies chest pain  -ecg noted with prolonged QT/QTc -- avoid QT prolonging drugs, check repeat ekg tomorrow to monitor QT/QTc  -pt reports has not been taking diuretics at home   -can resume lasix 80mg on non dialysis days only and fluid removal with HD.  -check echo to eval valve fx    #Coronary Artery Disease. S/P PCI to LAD  -denies CP  -recent cath with no new obs CAD  -off toprol secondary to bradycardia  -resume statin and asa 81mg daily  -Remains off plavix as he completed course post PCI    #Atrial Fibrillation/Flutter. S/PAF Ablation, S/P ablation 9/2022.  -s/p successful Afib ablation 9/29/22.  -remains in SR/SB  -Continue home amiodarone 100mg BID   -resume home Eliquis     #Cardiomyopathy  -check echo   -off  beta blocker, ACEI outpt secondary to low bp.    #Mitral Regurgitation  -Prior NICOLETTE with tethered mitral valve leaflets with normal opening. Mod-sev Mitral Regurgitation.  -check repeat echo    #Hypertension  -off BP meds   -BP stable, trend bp    #ESRD on HD.  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    #Acute on chronic Systolic Heart Failure  -pt reports increased LIRA over last 1.5 weeks  -recent cath in October with patent proximal LAD stent. Luminal RCA disease. No evidence of new obstructive epicardial CAD. Normal LVEDP.   -CXR with clear lungs  -HST elevated in setting of ESRD- pt denies chest pain  -ecg noted with prolonged QT/QTc -- avoid QT prolonging drugs, check repeat ekg tomorrow to monitor QT/QTc  -pt reports has not been taking diuretics at home   -can resume lasix 80mg on non dialysis days only and fluid removal with HD.  -check echo to eval valve fx    #Dizziness  -pt reports dizziness upon standing  -check orthostatics  -check echo    #Coronary Artery Disease. S/P PCI to LAD  -stable, denies CP  -recent cath with no new obs CAD  -off toprol secondary to bradycardia  -resume statin and asa 81mg daily  -Remains off plavix as he completed course post PCI    #Atrial Fibrillation/Flutter. S/PAF Ablation, S/P ablation 9/2022.  -s/p successful Afib ablation 9/29/22.  -remains in SR/SB  -Continue home amiodarone 100mg BID   -resume home Eliquis     #Cardiomyopathy  -check echo   -off  beta blocker, ACEI outpt secondary to low bp.    #Mitral Regurgitation  -Prior NICOLETTE with tethered mitral valve leaflets with normal opening. Mod-sev Mitral Regurgitation.  -check repeat echo    #Hypertension  -off BP meds   -BP stable, trend bp    #ESRD on HD.  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

## 2025-02-01 NOTE — CONSULT NOTE ADULT - PROBLEM SELECTOR RECOMMENDATION 9
Pt. with ESRD on HD TIW (MWF, LUE AVF, [facility], Dr. Hayes). Last HD as outpatient was done on 2/1/25 via LUE AVF as pt missed HD on 1/31/25 due to fatigue. Labs reviewed. Pt. clinically stable. Anticipate next HD on 2/3/25 per usual MWF schedule. Discussed with the patient that he will require RRT/HD, risks and benefits associated with RRT/HD explained at length. HD consent obtained and kept in patients chart. Monitor BP, labs. Dose meds as per HD.

## 2025-02-01 NOTE — PATIENT PROFILE ADULT - FALL HARM RISK - HARM RISK INTERVENTIONS

## 2025-02-01 NOTE — H&P ADULT - NSHPPHYSICALEXAM_GEN_ALL_CORE
Vital Signs Last 24 Hrs  T(C): 36.6 (01 Feb 2025 13:48), Max: 36.6 (01 Feb 2025 10:34)  T(F): 97.8 (01 Feb 2025 13:48), Max: 97.8 (01 Feb 2025 10:34)  HR: 56 (01 Feb 2025 13:48) (56 - 85)  BP: 129/83 (01 Feb 2025 13:48) (124/80 - 129/83)  BP(mean): 96 (01 Feb 2025 13:48) (96 - 96)  RR: 12 (01 Feb 2025 13:48) (12 - 16)  SpO2: 99% (01 Feb 2025 13:48) (97% - 100%)    Parameters below as of 01 Feb 2025 13:48  Patient On (Oxygen Delivery Method): room air        CONSTITUTIONAL: NAD, well-developed, well-groomed  EYES: PERRLA; conjunctiva and sclera clear  ENMT: Moist oral mucosa, no pharyngeal injection or exudates  NECK: Supple, no palpable masses  RESPIRATORY: Normal respiratory effort; lungs are clear to auscultation bilaterally, no rales or rhonchi   CARDIOVASCULAR: Regular rate and rhythm, normal S1 and S2, no murmur/rub/gallop  ABDOMEN: Nontender to palpation, normoactive bowel sounds, no rebound/guarding  MUSCULOSKELETAL:   no clubbing or cyanosis of digits; no joint swelling or tenderness to palpation, no LE edema  PSYCH: A+O to person, place, and time; affect appropriate  NEUROLOGY: CN 2-12 are intact and symmetric; no gross sensory deficits   SKIN: No rashes; no palpable lesions

## 2025-02-01 NOTE — H&P ADULT - PROBLEM SELECTOR PLAN 3
HD M/W/F  s/p renal biopsy on 6/15/21 which showed  FSGS  s/p tunneled HD cath on 3/15/22, s/p AVF 3/21  Last HD session today via LUE AVF   -nephro recs appreciated   -HD as per nephrology, next HD likel on 2/3/25   -Renal transplant work-up in progress -f/u outpt  -volume removal with HD.

## 2025-02-01 NOTE — ED PROVIDER NOTE - OBJECTIVE STATEMENT
68 y/o M with pmhx of Afib (on Amiodarone), HTN, ESRD on HD (MWF schedule), GERD, CAD s/p stent placement 8-10 years ago, on daily baby ASA who presents to the ED c/o persistent dyspnea of exertion for the last 1.5 weeks. Pt reports that sx occur while walking. States that he normally is able to walk two blocks to his local grocery store, but over the last 1.5 weeks has been having to stop and rest 2/2 SOB. Pt also started to notice LIRA with walking around his house. Pt notes associated lightheadedness when he gets SOB and cough that is productive of clear sputum. Pt was seen at [facility] two days for similar sx, was told CXR and labs were normal and was discharged home. However, pt reports that LIRA continued which prompted visit today. Of note, pt missed his dialysis session yesterday because he "felt too tired to go to the center." However pt was able to get dialysis done today and completed the session. Denies fever, chills, chest pain, abdominal pain, N/V/D, extremity weakness/numbness/tingling, or headaches, worsening leg swelling or cramping, back pain, palpitations, recent travel. Pt does not remember when last ECHO was done, states he had a stress test about 4-5 months ago. Follows with Dr. Pernell Casiano, cardio. Dr. Hobson, nephro. Dr. Lara, PCP.

## 2025-02-01 NOTE — H&P ADULT - PROBLEM SELECTOR PLAN 4
S/p  PCI to LAD  denies chest pain   trop 157 -->140.   -cards recs appreciated   -recent outpatient  cath with no new obs CAD  -off toprol secondary to bradycardia  -c/w  statin and asa 81mg daily  -Remains off plavix as he completed course post PCI

## 2025-02-01 NOTE — H&P ADULT - ASSESSMENT
69 yr old M with h/o ESRD on HD(M/W/F), Atrial Fibrillation (on Amiodarone), HTN, GERD, CAD s/p stent placement 8-10 years ago who presents [place] dyspnea on exertion.  69 yr old M with h/o ESRD on HD(M/W/F), Atrial Fibrillation (on Amiodarone), HTN, GERD, CAD s/p stent placement 8-10 years ago who presents with dyspnea on exertion.

## 2025-02-01 NOTE — ED PROVIDER NOTE - PHYSICAL EXAMINATION
General: Well appearing in no acute distress, alert and cooperative  Eyes: PERRLA  Neck: Soft and supple, full ROM without pain, no midline tenderness  Cardiac: Regular rate and regular rhythm, no murmurs, peripheral pulses 2+ and symmetric in all extremities, no LE edema.  Resp: Unlabored respiratory effort, lungs CTAB, speaking in full sentences, no wheezes/rhonchi/rales  Abd: Soft, non-tender, non-distended, no guarding or rebound tenderness. No CVA tenderness b/l  MSK: Spine midline and non-tender. No LE edema. No calf tenderness b/l. Fistula in place in L upper arm with palpable thrill  Skin: Warm and dry, no rashes/abrasions/lacerations  Neuro: AO x 3, moves all extremities symmetrically, Motor strength 5/5 bilaterally UE and LE, sensation grossly intact.

## 2025-02-02 LAB
ANION GAP SERPL CALC-SCNC: 15 MMOL/L — HIGH (ref 7–14)
BASOPHILS # BLD AUTO: 0.11 K/UL — SIGNIFICANT CHANGE UP (ref 0–0.2)
BASOPHILS NFR BLD AUTO: 1.7 % — SIGNIFICANT CHANGE UP (ref 0–2)
BUN SERPL-MCNC: 29 MG/DL — HIGH (ref 7–23)
CALCIUM SERPL-MCNC: 7.9 MG/DL — LOW (ref 8.4–10.5)
CHLORIDE SERPL-SCNC: 98 MMOL/L — SIGNIFICANT CHANGE UP (ref 98–107)
CO2 SERPL-SCNC: 27 MMOL/L — SIGNIFICANT CHANGE UP (ref 22–31)
CREAT SERPL-MCNC: 9.02 MG/DL — HIGH (ref 0.5–1.3)
EGFR: 6 ML/MIN/1.73M2 — LOW
EOSINOPHIL # BLD AUTO: 0.27 K/UL — SIGNIFICANT CHANGE UP (ref 0–0.5)
EOSINOPHIL NFR BLD AUTO: 4.2 % — SIGNIFICANT CHANGE UP (ref 0–6)
GLUCOSE SERPL-MCNC: 72 MG/DL — SIGNIFICANT CHANGE UP (ref 70–99)
HCT VFR BLD CALC: 30.2 % — LOW (ref 39–50)
HGB BLD-MCNC: 10.1 G/DL — LOW (ref 13–17)
IANC: 4.12 K/UL — SIGNIFICANT CHANGE UP (ref 1.8–7.4)
IMM GRANULOCYTES NFR BLD AUTO: 0.2 % — SIGNIFICANT CHANGE UP (ref 0–0.9)
LYMPHOCYTES # BLD AUTO: 1.24 K/UL — SIGNIFICANT CHANGE UP (ref 1–3.3)
LYMPHOCYTES # BLD AUTO: 19.5 % — SIGNIFICANT CHANGE UP (ref 13–44)
MAGNESIUM SERPL-MCNC: 2.1 MG/DL — SIGNIFICANT CHANGE UP (ref 1.6–2.6)
MCHC RBC-ENTMCNC: 21.7 PG — LOW (ref 27–34)
MCHC RBC-ENTMCNC: 33.4 G/DL — SIGNIFICANT CHANGE UP (ref 32–36)
MCV RBC AUTO: 64.8 FL — LOW (ref 80–100)
MONOCYTES # BLD AUTO: 0.62 K/UL — SIGNIFICANT CHANGE UP (ref 0–0.9)
MONOCYTES NFR BLD AUTO: 9.7 % — SIGNIFICANT CHANGE UP (ref 2–14)
NEUTROPHILS # BLD AUTO: 4.12 K/UL — SIGNIFICANT CHANGE UP (ref 1.8–7.4)
NEUTROPHILS NFR BLD AUTO: 64.7 % — SIGNIFICANT CHANGE UP (ref 43–77)
NRBC # BLD AUTO: 0 K/UL — SIGNIFICANT CHANGE UP (ref 0–0)
NRBC # BLD: 0 /100 WBCS — SIGNIFICANT CHANGE UP (ref 0–0)
NRBC # FLD: 0 K/UL — SIGNIFICANT CHANGE UP (ref 0–0)
NRBC BLD-RTO: 0 /100 WBCS — SIGNIFICANT CHANGE UP (ref 0–0)
PHOSPHATE SERPL-MCNC: 4.1 MG/DL — SIGNIFICANT CHANGE UP (ref 2.5–4.5)
PLATELET # BLD AUTO: 172 K/UL — SIGNIFICANT CHANGE UP (ref 150–400)
POTASSIUM SERPL-MCNC: 5.2 MMOL/L — SIGNIFICANT CHANGE UP (ref 3.5–5.3)
POTASSIUM SERPL-SCNC: 5.2 MMOL/L — SIGNIFICANT CHANGE UP (ref 3.5–5.3)
RBC # BLD: 4.66 M/UL — SIGNIFICANT CHANGE UP (ref 4.2–5.8)
RBC # FLD: 15.5 % — HIGH (ref 10.3–14.5)
SODIUM SERPL-SCNC: 140 MMOL/L — SIGNIFICANT CHANGE UP (ref 135–145)
WBC # BLD: 6.37 K/UL — SIGNIFICANT CHANGE UP (ref 3.8–10.5)
WBC # FLD AUTO: 6.37 K/UL — SIGNIFICANT CHANGE UP (ref 3.8–10.5)

## 2025-02-02 PROCEDURE — 99223 1ST HOSP IP/OBS HIGH 75: CPT | Mod: GC

## 2025-02-02 RX ORDER — BACTERIOSTATIC SODIUM CHLORIDE 0.9 %
100 VIAL (ML) INJECTION
Refills: 0 | Status: DISCONTINUED | OUTPATIENT
Start: 2025-02-02 | End: 2025-02-05

## 2025-02-02 RX ADMIN — AMIODARONE HYDROCHLORIDE 100 MILLIGRAM(S): 50 INJECTION, SOLUTION INTRAVENOUS at 17:50

## 2025-02-02 RX ADMIN — CINACALCET 30 MILLIGRAM(S): 30 TABLET, FILM COATED ORAL at 09:10

## 2025-02-02 RX ADMIN — ANTISEPTIC SURGICAL SCRUB 1 APPLICATION(S): 0.04 SOLUTION TOPICAL at 09:10

## 2025-02-02 RX ADMIN — SEVELAMER CARBONATE 1600 MILLIGRAM(S): 800 TABLET, FILM COATED ORAL at 12:48

## 2025-02-02 RX ADMIN — PANTOPRAZOLE 40 MILLIGRAM(S): 20 TABLET, DELAYED RELEASE ORAL at 06:10

## 2025-02-02 RX ADMIN — ACETAMINOPHEN 650 MILLIGRAM(S): 160 SUSPENSION ORAL at 17:30

## 2025-02-02 RX ADMIN — APIXABAN 2.5 MILLIGRAM(S): 5 TABLET, FILM COATED ORAL at 05:31

## 2025-02-02 RX ADMIN — ACETAMINOPHEN 650 MILLIGRAM(S): 160 SUSPENSION ORAL at 16:25

## 2025-02-02 RX ADMIN — Medication 200 MILLIGRAM(S): at 17:52

## 2025-02-02 RX ADMIN — ATORVASTATIN CALCIUM 20 MILLIGRAM(S): 80 TABLET, FILM COATED ORAL at 21:49

## 2025-02-02 RX ADMIN — ACETAMINOPHEN 650 MILLIGRAM(S): 160 SUSPENSION ORAL at 10:30

## 2025-02-02 RX ADMIN — Medication 1000 UNIT(S): at 09:10

## 2025-02-02 RX ADMIN — Medication 80 MILLIGRAM(S): at 05:32

## 2025-02-02 RX ADMIN — APIXABAN 2.5 MILLIGRAM(S): 5 TABLET, FILM COATED ORAL at 17:50

## 2025-02-02 RX ADMIN — SEVELAMER CARBONATE 1600 MILLIGRAM(S): 800 TABLET, FILM COATED ORAL at 17:50

## 2025-02-02 RX ADMIN — Medication 5 MILLIGRAM(S): at 09:09

## 2025-02-02 RX ADMIN — SEVELAMER CARBONATE 1600 MILLIGRAM(S): 800 TABLET, FILM COATED ORAL at 09:09

## 2025-02-02 RX ADMIN — Medication 200 MILLIGRAM(S): at 06:10

## 2025-02-02 RX ADMIN — ASPIRIN 81 MILLIGRAM(S): 81 TABLET, COATED ORAL at 09:10

## 2025-02-02 RX ADMIN — AMIODARONE HYDROCHLORIDE 100 MILLIGRAM(S): 50 INJECTION, SOLUTION INTRAVENOUS at 05:32

## 2025-02-02 RX ADMIN — ACETAMINOPHEN 650 MILLIGRAM(S): 160 SUSPENSION ORAL at 09:13

## 2025-02-02 NOTE — PHARMACOTHERAPY INTERVENTION NOTE - COMMENTS
Medication history update: Medication list updated in Outpatient Medication Record (OMR) per [facility], [facility] [place] and [facility]. Per [facility] [place], cinacalcet dispensed 1/24/25; also script transmitted 1/27/25 for sevelamer carbonate 800 mg tablet - 2 tablets orally 3 times a day; patient unable to  as pharmacy out of stock and also prior authorization needed.    Please note patient may need refills of Eliquis, finasteride, furosemide, and possibly new script for alternative phosphate binder if continued on discharge.  [facility] closed on Sunday, will follow up once reopens.  Please call spectra [phone] if you have any questions.

## 2025-02-02 NOTE — PROGRESS NOTE ADULT - SUBJECTIVE AND OBJECTIVE BOX
CARDIOLOGY FOLLOW UP - Dr. Lee  Date of Service: 02-02-25 @ 09:05    CC: no cp/sob at rest    Review of Systems:  Constitutional: No fever, weight loss, or fatigue  Respiratory: No cough, wheezing, or hemoptysis, no shortness of breath  Cardiovascular: No chest pain, palpitations, passing out, dizziness, or leg swelling  Gastrointestinal: No abd or epigastric pain. No nausea, vomiting, or hematemesis; no diarrhea or consiptaiton, no melena or hematochezia  Vascular: No edema     TELEMETRY:    PHYSICAL EXAM:  T(C): 36.7 (02-02-25 @ 04:28), Max: 36.7 (02-02-25 @ 04:28)  HR: 54 (02-02-25 @ 04:28) (47 - 85)  BP: 117/77 (02-02-25 @ 04:28) (116/72 - 129/83)  RR: 19 (02-02-25 @ 04:28) (12 - 19)  SpO2: 100% (02-02-25 @ 04:28) (97% - 100%)  Wt(kg): --  I&O's Summary      Appearance: Normal	  Cardiovascular: Normal S1 S2,RRR, No JVD, No murmurs  Respiratory: Lungs clear to auscultation	  Gastrointestinal:  Soft, Non-tender, + BS	  Extremities: Normal range of motion, No clubbing, cyanosis or edema  Vascular: Peripheral pulses palpable 2+ bilaterally       Home Medications:  amiodarone 100 mg oral tablet: 1 tab(s) orally 2 times a day (01 Feb 2025 18:53)  apixaban 2.5 mg oral tablet: 1 tab(s) orally 2 times a day (01 Feb 2025 18:53)  Aspir 81 oral delayed release tablet: 1 tab(s) orally once a day (01 Feb 2025 18:53)  cholecalciferol 25 mcg (1000 intl units) oral tablet: 1 tab(s) orally once a day (01 Feb 2025 18:53)  cinacalcet 30 mg oral tablet: 1 tab(s) orally once a day (01 Feb 2025 18:28)  Farxiga 5 mg oral tablet: 1 tab(s) orally once a day (01 Feb 2025 18:53)  finasteride 5 mg oral tablet: 1 tab(s) orally once a day (01 Feb 2025 18:53)  furosemide 80 mg oral tablet: 1 tab(s) orally Tuesday, Thursday, Saturday, Sunday take on non HD days (01 Feb 2025 18:53)  pantoprazole 40 mg oral delayed release tablet: 1 tab(s) orally once a day (01 Feb 2025 18:53)  sevelamer carbonate 800 mg oral tablet: 2 tab(s) orally 3 times a day (with meals) (01 Feb 2025 20:32)  simvastatin 40 mg oral tablet: 1 tab(s) orally once a day (01 Feb 2025 18:53)        MEDICATIONS  (STANDING):  aMIOdarone    Tablet 100 milliGRAM(s) Oral two times a day  apixaban 2.5 milliGRAM(s) Oral two times a day  aspirin enteric coated 81 milliGRAM(s) Oral daily  atorvastatin 20 milliGRAM(s) Oral at bedtime  chlorhexidine 2% Cloths 1 Application(s) Topical daily  cholecalciferol 1000 Unit(s) Oral daily  cinacalcet 30 milliGRAM(s) Oral daily  finasteride 5 milliGRAM(s) Oral daily  furosemide    Tablet 80 milliGRAM(s) Oral <User Schedule>  pantoprazole    Tablet 40 milliGRAM(s) Oral before breakfast  sevelamer carbonate 1600 milliGRAM(s) Oral three times a day with meals        EKG:  RADIOLOGY:  DIAGNOSTIC TESTING:  [ ] Echocardiogram:  [ ] Catherterization:  [ ] Stress Test:  OTHER:     LABS:	 	                          10.1   6.37  )-----------( 172      ( 02 Feb 2025 06:07 )             30.2     02-02    140  |  98  |  29[H]  ----------------------------<  72  5.2   |  27  |  9.02[H]    Ca    7.9[L]      02 Feb 2025 06:07  Phos  4.1     02-02  Mg     2.10     02-02    TPro  7.8  /  Alb  4.1  /  TBili  0.7  /  DBili  x   /  AST  20  /  ALT  10  /  AlkPhos  97  02-01          CARDIAC MARKERS:

## 2025-02-03 ENCOUNTER — RESULT REVIEW (OUTPATIENT)
Age: 70
End: 2025-02-03

## 2025-02-03 LAB
ANION GAP SERPL CALC-SCNC: 15 MMOL/L — HIGH (ref 7–14)
BASOPHILS # BLD AUTO: 0.12 K/UL — SIGNIFICANT CHANGE UP (ref 0–0.2)
BASOPHILS NFR BLD AUTO: 1.6 % — SIGNIFICANT CHANGE UP (ref 0–2)
BUN SERPL-MCNC: 40 MG/DL — HIGH (ref 7–23)
CALCIUM SERPL-MCNC: 8.2 MG/DL — LOW (ref 8.4–10.5)
CHLORIDE SERPL-SCNC: 96 MMOL/L — LOW (ref 98–107)
CO2 SERPL-SCNC: 27 MMOL/L — SIGNIFICANT CHANGE UP (ref 22–31)
CREAT SERPL-MCNC: 10.96 MG/DL — HIGH (ref 0.5–1.3)
DIALYSIS INSTRUMENT RESULT - HEPATITIS B SURFACE ANTIGEN: NEGATIVE — SIGNIFICANT CHANGE UP
EGFR: 5 ML/MIN/1.73M2 — LOW
EOSINOPHIL # BLD AUTO: 0.51 K/UL — HIGH (ref 0–0.5)
EOSINOPHIL NFR BLD AUTO: 6.8 % — HIGH (ref 0–6)
GLUCOSE SERPL-MCNC: 95 MG/DL — SIGNIFICANT CHANGE UP (ref 70–99)
HAV IGM SER-ACNC: SIGNIFICANT CHANGE UP
HBV CORE IGM SER-ACNC: SIGNIFICANT CHANGE UP
HBV SURFACE AB SER-ACNC: 121.8 MIU/ML — SIGNIFICANT CHANGE UP
HBV SURFACE AG SER-ACNC: SIGNIFICANT CHANGE UP
HCT VFR BLD CALC: 33.4 % — LOW (ref 39–50)
HCV AB S/CO SERPL IA: 0.09 S/CO — SIGNIFICANT CHANGE UP (ref 0–0.99)
HCV AB SERPL-IMP: SIGNIFICANT CHANGE UP
HGB BLD-MCNC: 10.9 G/DL — LOW (ref 13–17)
IANC: 4.13 K/UL — SIGNIFICANT CHANGE UP (ref 1.8–7.4)
IMM GRANULOCYTES NFR BLD AUTO: 0.1 % — SIGNIFICANT CHANGE UP (ref 0–0.9)
LYMPHOCYTES # BLD AUTO: 1.95 K/UL — SIGNIFICANT CHANGE UP (ref 1–3.3)
LYMPHOCYTES # BLD AUTO: 26 % — SIGNIFICANT CHANGE UP (ref 13–44)
MAGNESIUM SERPL-MCNC: 2.3 MG/DL — SIGNIFICANT CHANGE UP (ref 1.6–2.6)
MCHC RBC-ENTMCNC: 21.6 PG — LOW (ref 27–34)
MCHC RBC-ENTMCNC: 32.6 G/DL — SIGNIFICANT CHANGE UP (ref 32–36)
MCV RBC AUTO: 66.1 FL — LOW (ref 80–100)
MONOCYTES # BLD AUTO: 0.79 K/UL — SIGNIFICANT CHANGE UP (ref 0–0.9)
MONOCYTES NFR BLD AUTO: 10.5 % — SIGNIFICANT CHANGE UP (ref 2–14)
MRSA PCR RESULT.: SIGNIFICANT CHANGE UP
NEUTROPHILS # BLD AUTO: 4.13 K/UL — SIGNIFICANT CHANGE UP (ref 1.8–7.4)
NEUTROPHILS NFR BLD AUTO: 55 % — SIGNIFICANT CHANGE UP (ref 43–77)
NRBC # BLD AUTO: 0 K/UL — SIGNIFICANT CHANGE UP (ref 0–0)
NRBC # BLD: 0 /100 WBCS — SIGNIFICANT CHANGE UP (ref 0–0)
NRBC # FLD: 0 K/UL — SIGNIFICANT CHANGE UP (ref 0–0)
NRBC BLD-RTO: 0 /100 WBCS — SIGNIFICANT CHANGE UP (ref 0–0)
PHOSPHATE SERPL-MCNC: 3.8 MG/DL — SIGNIFICANT CHANGE UP (ref 2.5–4.5)
PLATELET # BLD AUTO: 175 K/UL — SIGNIFICANT CHANGE UP (ref 150–400)
POTASSIUM SERPL-MCNC: 4.3 MMOL/L — SIGNIFICANT CHANGE UP (ref 3.5–5.3)
POTASSIUM SERPL-SCNC: 4.3 MMOL/L — SIGNIFICANT CHANGE UP (ref 3.5–5.3)
RBC # BLD: 5.05 M/UL — SIGNIFICANT CHANGE UP (ref 4.2–5.8)
RBC # FLD: 15.4 % — HIGH (ref 10.3–14.5)
S AUREUS DNA NOSE QL NAA+PROBE: DETECTED
SODIUM SERPL-SCNC: 138 MMOL/L — SIGNIFICANT CHANGE UP (ref 135–145)
WBC # BLD: 7.51 K/UL — SIGNIFICANT CHANGE UP (ref 3.8–10.5)
WBC # FLD AUTO: 7.51 K/UL — SIGNIFICANT CHANGE UP (ref 3.8–10.5)

## 2025-02-03 PROCEDURE — 90935 HEMODIALYSIS ONE EVALUATION: CPT | Mod: GC

## 2025-02-03 PROCEDURE — 93306 TTE W/DOPPLER COMPLETE: CPT | Mod: 26

## 2025-02-03 PROCEDURE — 99233 SBSQ HOSP IP/OBS HIGH 50: CPT

## 2025-02-03 PROCEDURE — 93010 ELECTROCARDIOGRAM REPORT: CPT

## 2025-02-03 RX ORDER — SIMVASTATIN 20 MG
1 TABLET ORAL
Refills: 0 | DISCHARGE

## 2025-02-03 RX ORDER — SEVELAMER CARBONATE 800 MG/1
2 TABLET, FILM COATED ORAL
Refills: 0 | DISCHARGE

## 2025-02-03 RX ORDER — CINACALCET 30 MG/1
1 TABLET, FILM COATED ORAL
Refills: 0 | DISCHARGE

## 2025-02-03 RX ORDER — ASPIRIN 81 MG/1
1 TABLET, COATED ORAL
Refills: 0 | DISCHARGE

## 2025-02-03 RX ORDER — APIXABAN 5 MG/1
1 TABLET, FILM COATED ORAL
Refills: 0 | DISCHARGE

## 2025-02-03 RX ORDER — DAPAGLIFLOZIN 5 MG/1
1 TABLET, FILM COATED ORAL
Refills: 0 | DISCHARGE

## 2025-02-03 RX ORDER — PANTOPRAZOLE 20 MG/1
1 TABLET, DELAYED RELEASE ORAL
Refills: 0 | DISCHARGE

## 2025-02-03 RX ADMIN — APIXABAN 2.5 MILLIGRAM(S): 5 TABLET, FILM COATED ORAL at 12:18

## 2025-02-03 RX ADMIN — AMIODARONE HYDROCHLORIDE 100 MILLIGRAM(S): 50 INJECTION, SOLUTION INTRAVENOUS at 23:33

## 2025-02-03 RX ADMIN — Medication 5 MILLIGRAM(S): at 12:16

## 2025-02-03 RX ADMIN — SEVELAMER CARBONATE 1600 MILLIGRAM(S): 800 TABLET, FILM COATED ORAL at 17:19

## 2025-02-03 RX ADMIN — APIXABAN 2.5 MILLIGRAM(S): 5 TABLET, FILM COATED ORAL at 23:34

## 2025-02-03 RX ADMIN — ASPIRIN 81 MILLIGRAM(S): 81 TABLET, COATED ORAL at 12:16

## 2025-02-03 RX ADMIN — ANTISEPTIC SURGICAL SCRUB 1 APPLICATION(S): 0.04 SOLUTION TOPICAL at 12:15

## 2025-02-03 RX ADMIN — AMIODARONE HYDROCHLORIDE 100 MILLIGRAM(S): 50 INJECTION, SOLUTION INTRAVENOUS at 12:17

## 2025-02-03 RX ADMIN — SEVELAMER CARBONATE 1600 MILLIGRAM(S): 800 TABLET, FILM COATED ORAL at 12:16

## 2025-02-03 RX ADMIN — ATORVASTATIN CALCIUM 20 MILLIGRAM(S): 80 TABLET, FILM COATED ORAL at 21:40

## 2025-02-03 RX ADMIN — Medication 1000 UNIT(S): at 12:16

## 2025-02-03 RX ADMIN — CINACALCET 30 MILLIGRAM(S): 30 TABLET, FILM COATED ORAL at 12:18

## 2025-02-03 NOTE — PROGRESS NOTE ADULT - SUBJECTIVE AND OBJECTIVE BOX
[facility] Division of Kidney Diseases & Hypertension  FOLLOW UP NOTE  [phone]--------------------------------------------------------------------------------  Chief Complaint: ESRD on HD     24 hour events/subjective: Pt seen and evaluated bedside this morning. Reports feeling well, endorses no complaints. Denies any headaches, nausea, vomiting, fevers/chills, chest pain, palpitations, SOB, abdominal pain, and leg swelling.      PAST HISTORY  --------------------------------------------------------------------------------  No significant changes to PMH, PSH, FHx, SHx, unless otherwise noted    ALLERGIES & MEDICATIONS  --------------------------------------------------------------------------------  Allergies    No Known Allergies    Intolerances    Seafood (Other)    Standing Inpatient Medications  aMIOdarone    Tablet 100 milliGRAM(s) Oral two times a day  apixaban 2.5 milliGRAM(s) Oral two times a day  aspirin enteric coated 81 milliGRAM(s) Oral daily  atorvastatin 20 milliGRAM(s) Oral at bedtime  chlorhexidine 2% Cloths 1 Application(s) Topical daily  cholecalciferol 1000 Unit(s) Oral daily  cinacalcet 30 milliGRAM(s) Oral daily  finasteride 5 milliGRAM(s) Oral daily  furosemide    Tablet 80 milliGRAM(s) Oral <User Schedule>  pantoprazole    Tablet 40 milliGRAM(s) Oral before breakfast  sevelamer carbonate 1600 milliGRAM(s) Oral three times a day with meals    PRN Inpatient Medications  acetaminophen     Tablet .. 650 milliGRAM(s) Oral every 6 hours PRN  guaiFENesin Oral Liquid (Sugar-Free) 200 milliGRAM(s) Oral every 6 hours PRN  sodium chloride 0.9% Bolus. 100 milliLiter(s) IV Bolus every 5 minutes PRN    REVIEW OF SYSTEMS  --------------------------------------------------------------------------------  Gen: No fevers/chills  Skin: No rashes  Head/Eyes/Ears/Mouth: No headache  Respiratory: No dyspnea  CV: No chest pain  GI: No abdominal pain  : No increased frequency  MSK: No joint pain/swelling; no edema  Neuro: No dizziness/lightheadedness    All other systems were reviewed and are negative, except as noted.    VITALS/PHYSICAL EXAM  --------------------------------------------------------------------------------  T(C): 36.4 (02-03-25 @ 09:37), Max: 36.6 (02-02-25 @ 17:35)  HR: 49 (02-03-25 @ 09:37) (46 - 58)  BP: 124/91 (02-03-25 @ 09:37) (105/51 - 143/83)  RR: 17 (02-03-25 @ 09:37) (12 - 20)  SpO2: 100% (02-03-25 @ 09:37) (100% - 100%)  Wt(kg): --    02-03-25 @ 07:01  -  02-03-25 @ 11:03  --------------------------------------------------------  IN: 400 mL / OUT: 1900 mL / NET: -1500 mL    Physical Exam:  	Gen: NAD, well-appearing  	HEENT: PERRL, supple neck, clear oropharynx  	Pulm: CTA B/L  	CV: RRR, S1S2;  	Back: No spinal or CVA tenderness  	Abd: +BS, soft, nontender/nondistended  	: No suprapubic tenderness                      Extremities: no bilateral LE edema noted.                       Neuro: No focal deficits, intact gait  	Skin: Warm, without rashes  	Vascular access:    LABS/STUDIES  --------------------------------------------------------------------------------              10.9   7.51  >-----------<  175      [02-03-25 @ 02:45]              33.4     138  |  96  |  40  ----------------------------<  95      [02-03-25 @ 02:45]  4.3   |  27  |  10.96        Ca     8.2     [02-03-25 @ 02:45]      Mg     2.30     [02-03-25 @ 02:45]      Phos  3.8     [02-03-25 @ 02:45]    Creatinine Trend:  SCr 10.96 [02-03 @ 02:45]  SCr 9.02 [02-02 @ 06:07]  SCr 6.33 [02-01 @ 10:28]  SCr 10.24 [01-30 @ 17:38] [facility] Division of Kidney Diseases & Hypertension  FOLLOW UP NOTE  [phone]--------------------------------------------------------------------------------  Chief Complaint: ESRD on HD     24 hour events/subjective: Pt seen and evaluated bedside this morning. Reports feeling well, endorses no complaints. Denies any headaches, nausea, vomiting, fevers/chills, chest pain, palpitations, SOB, abdominal pain, and leg swelling.      PAST HISTORY  --------------------------------------------------------------------------------  No significant changes to PMH, PSH, FHx, SHx, unless otherwise noted    ALLERGIES & MEDICATIONS  --------------------------------------------------------------------------------  Allergies    No Known Allergies    Intolerances    Seafood (Other)    Standing Inpatient Medications  aMIOdarone    Tablet 100 milliGRAM(s) Oral two times a day  apixaban 2.5 milliGRAM(s) Oral two times a day  aspirin enteric coated 81 milliGRAM(s) Oral daily  atorvastatin 20 milliGRAM(s) Oral at bedtime  chlorhexidine 2% Cloths 1 Application(s) Topical daily  cholecalciferol 1000 Unit(s) Oral daily  cinacalcet 30 milliGRAM(s) Oral daily  finasteride 5 milliGRAM(s) Oral daily  furosemide    Tablet 80 milliGRAM(s) Oral <User Schedule>  pantoprazole    Tablet 40 milliGRAM(s) Oral before breakfast  sevelamer carbonate 1600 milliGRAM(s) Oral three times a day with meals    PRN Inpatient Medications  acetaminophen     Tablet .. 650 milliGRAM(s) Oral every 6 hours PRN  guaiFENesin Oral Liquid (Sugar-Free) 200 milliGRAM(s) Oral every 6 hours PRN  sodium chloride 0.9% Bolus. 100 milliLiter(s) IV Bolus every 5 minutes PRN    REVIEW OF SYSTEMS  --------------------------------------------------------------------------------  Gen: No fevers/chills  Skin: No rashes  Head/Eyes/Ears/Mouth: No headache  Respiratory: No dyspnea  CV: No chest pain  GI: No abdominal pain  : No increased frequency  MSK: No joint pain/swelling; no edema  Neuro: No dizziness/lightheadedness    All other systems were reviewed and are negative, except as noted.    VITALS/PHYSICAL EXAM  --------------------------------------------------------------------------------  T(C): 36.4 (02-03-25 @ 09:37), Max: 36.6 (02-02-25 @ 17:35)  HR: 49 (02-03-25 @ 09:37) (46 - 58)  BP: 124/91 (02-03-25 @ 09:37) (105/51 - 143/83)  RR: 17 (02-03-25 @ 09:37) (12 - 20)  SpO2: 100% (02-03-25 @ 09:37) (100% - 100%)  Wt(kg): --    02-03-25 @ 07:01  -  02-03-25 @ 11:03  --------------------------------------------------------  IN: 400 mL / OUT: 1900 mL / NET: -1500 mL    Physical Exam:  	Gen: NAD  	HEENT: Anicteric  	Pulm: CTA B/L  	CV: S1S2, +bradycardia   	Abd: Soft, +BS    	Ext: No LE edema B/L  	Neuro: Awake  	Skin: Warm and dry  	Dialysis access: LUE AVF with palpable thrill and bruit heard.   LABS/STUDIES  --------------------------------------------------------------------------------              10.9   7.51  >-----------<  175      [02-03-25 @ 02:45]              33.4     138  |  96  |  40  ----------------------------<  95      [02-03-25 @ 02:45]  4.3   |  27  |  10.96        Ca     8.2     [02-03-25 @ 02:45]      Mg     2.30     [02-03-25 @ 02:45]      Phos  3.8     [02-03-25 @ 02:45]    Creatinine Trend:  SCr 10.96 [02-03 @ 02:45]  SCr 9.02 [02-02 @ 06:07]  SCr 6.33 [02-01 @ 10:28]  SCr 10.24 [01-30 @ 17:38]

## 2025-02-03 NOTE — CONSULT NOTE ADULT - ASSESSMENT
69yoM w/ PMHx ESRD on HD, afib on amiodarone, HTN, GERD, CAD s/p stent placement initially presented with SOB x 1.5 weeks.  Patient endorsed missing his HD session.  After HD, states his symptoms have improved.  EPS consulted for sinus bradycardia as seen on telemetry;  noted to be sinus avril 40s with intermittent junctional beats.  Denies any chest pain, palpitations, lightheadedness, dizziness, syncope or near syncope.      Sinus bradycardia   Patient did bicycles in the bed and heart rate increased from 50>>80s; remained asymptomatic   Keep K>4 Mg>2  Continue to monitor on telemetry  Decreased amiodarone to 100mg PO daily   Eliquis 5mg PO BID   No pacing indications at this time

## 2025-02-03 NOTE — PROGRESS NOTE ADULT - SUBJECTIVE AND OBJECTIVE BOX
CARDIOLOGY FOLLOW UP - Dr. Lee  DATE OF SERVICE: 2/3/25    CC no acute cv events       REVIEW OF SYSTEMS:  CONSTITUTIONAL: No fever, weight loss, or fatigue  RESPIRATORY: No cough, wheezing, chills or hemoptysis; No Shortness of Breath  CARDIOVASCULAR: No chest pain, palpitations, passing out, dizziness  GASTROINTESTINAL: No abdominal or epigastric pain. No nausea, vomiting, or hematemesis; No diarrhea or constipation. No melena or hematochezia.      PHYSICAL EXAM:  T(C): 36.6 (02-03-25 @ 11:23), Max: 36.6 (02-02-25 @ 17:35)  HR: 58 (02-03-25 @ 11:23) (46 - 58)  BP: 132/80 (02-03-25 @ 11:23) (105/51 - 143/83)  RR: 19 (02-03-25 @ 11:23) (12 - 20)  SpO2: 100% (02-03-25 @ 11:23) (100% - 100%)  Wt(kg): --  I&O's Summary    03 Feb 2025 07:01  -  03 Feb 2025 11:28  --------------------------------------------------------  IN: 400 mL / OUT: 1900 mL / NET: -1500 mL        Appearance: Normal	  Cardiovascular: Normal S1 S2,RRR, No JVD, No murmurs  Respiratory: Lungs clear to auscultation	  Gastrointestinal:  Soft, Non-tender, + BS	  Extremities: Normal range of motion, No clubbing, cyanosis or edema      Home Medications:  amiodarone 100 mg oral tablet: 1 tab(s) orally 2 times a day (01 Feb 2025 18:53)  apixaban 2.5 mg oral tablet: 1 tab(s) orally 2 times a day (01 Feb 2025 18:53)  Aspir 81 oral delayed release tablet: 1 tab(s) orally once a day (01 Feb 2025 18:53)  cholecalciferol 25 mcg (1000 intl units) oral tablet: 1 tab(s) orally once a day (01 Feb 2025 18:53)  cinacalcet 30 mg oral tablet: 1 tab(s) orally once a day (01 Feb 2025 18:28)  Farxiga 5 mg oral tablet: 1 tab(s) orally once a day (01 Feb 2025 18:53)  finasteride 5 mg oral tablet: 1 tab(s) orally once a day (01 Feb 2025 18:53)  furosemide 80 mg oral tablet: 1 tab(s) orally Tuesday, Thursday, Saturday, Sunday take on non HD days (01 Feb 2025 18:53)  pantoprazole 40 mg oral delayed release tablet: 1 tab(s) orally once a day (01 Feb 2025 18:53)  sevelamer carbonate 800 mg oral tablet: 2 tab(s) orally 3 times a day (with meals) (01 Feb 2025 20:32)  simvastatin 40 mg oral tablet: 1 tab(s) orally once a day (01 Feb 2025 18:53)      MEDICATIONS  (STANDING):  aMIOdarone    Tablet 100 milliGRAM(s) Oral two times a day  apixaban 2.5 milliGRAM(s) Oral two times a day  aspirin enteric coated 81 milliGRAM(s) Oral daily  atorvastatin 20 milliGRAM(s) Oral at bedtime  chlorhexidine 2% Cloths 1 Application(s) Topical daily  cholecalciferol 1000 Unit(s) Oral daily  cinacalcet 30 milliGRAM(s) Oral daily  finasteride 5 milliGRAM(s) Oral daily  furosemide    Tablet 80 milliGRAM(s) Oral <User Schedule>  pantoprazole    Tablet 40 milliGRAM(s) Oral before breakfast  sevelamer carbonate 1600 milliGRAM(s) Oral three times a day with meals      TELEMETRY: 	    ECG:  	  RADIOLOGY:   DIAGNOSTIC TESTING:  [ ] Echocardiogram:  [ ]  Catheterization:  [ ] Stress Test:    OTHER: 	    LABS:	 	    Troponin T, High Sensitivity Result: 140 ng/L (02-01 @ 10:28)  Troponin T, High Sensitivity Result: 157 ng/L [0 - 51] (01-30 @ 20:41)  Troponin T, High Sensitivity Result: 151 ng/L [0 - 51] (01-30 @ 17:38)                          10.9   7.51  )-----------( 175      ( 03 Feb 2025 02:45 )             33.4     02-03    138  |  96[L]  |  40[H]  ----------------------------<  95  4.3   |  27  |  10.96[H]    Ca    8.2[L]      03 Feb 2025 02:45  Phos  3.8     02-03  Mg     2.30     02-03

## 2025-02-03 NOTE — PROGRESS NOTE ADULT - ATTENDING COMMENTS
pt was evaluated during HD session this am. BP and Blood flow during dialysis are acceptable   bradycardia off BB. on amiodarone  is planned for Op outpatient with Dr. James from EP  please consider EP evaluation while pt is here

## 2025-02-03 NOTE — PROVIDER CONTACT NOTE (OTHER) - ASSESSMENT
VSS as per flowsheet. Patient is A&Ox4, denies pain, chest pain, shortness of breath, nausea, vomiting or dizziness. Tele monitoring leads assessed and no change needed.

## 2025-02-03 NOTE — PHARMACOTHERAPY INTERVENTION NOTE - COMMENTS
Medication history update: Medication list updated in Outpatient Medication Record (OMR) per [facility].     Please note:  ·	Per [facility], Eliquis and finasteride should be included in patient's current blister packaging (patient may be using older blister packaging). Pharmacy also has current scripts for Eliquis and finasteride with refills remaining. This pharmacy does not have any dispensing history for furosemide.  ·	Per [facility] [place], cinacalcet dispensed 1/24/25; also script transmitted 1/27/25 for sevelamer carbonate 800 mg tablet - 2 tablets orally 3 times a day; patient unable to  as pharmacy out of stock and also prior authorization needed.    ·	Please confirm with patient if he has supply of Eliquis, finasteride, and cinacalcet at home if continued on discharge. Also, patient may need new script for furosemide and alternative phosphate binder if continued on discharge.    Please call spectra [phone] if you have any questions.

## 2025-02-03 NOTE — CONSULT NOTE ADULT - SUBJECTIVE AND OBJECTIVE BOX
Date of Admission: 2/1/2025    CHIEF COMPLAINT: Shortness of breath    HISTORY OF PRESENT ILLNESS:  This is a 69yoM w/ PMHx ESRD on HD, afib on amiodarone, HTN, GERD, CAD s/p stent placement initially presented with SOB x 1.5 weeks.  Patient endorsed missing his HD session.  After HD, states his symptoms have improved.  EPS consulted for sinus bradycardia as seen on telemetry;  noted to be sinus avril 40s with intermittent junctional beats.  Denies any chest pain, palpitations, lightheadedness, dizziness, syncope or near syncope.      Allergies  No Known Allergies  Intolerances  Seafood (Other)	    MEDICATIONS:  aMIOdarone    Tablet 100 milliGRAM(s) Oral two times a day  apixaban 2.5 milliGRAM(s) Oral two times a day  aspirin enteric coated 81 milliGRAM(s) Oral daily  furosemide    Tablet 80 milliGRAM(s) Oral <User Schedule>  guaiFENesin Oral Liquid (Sugar-Free) 200 milliGRAM(s) Oral every 6 hours PRN  acetaminophen     Tablet .. 650 milliGRAM(s) Oral every 6 hours PRN  pantoprazole    Tablet 40 milliGRAM(s) Oral before breakfast  atorvastatin 20 milliGRAM(s) Oral at bedtime  cinacalcet 30 milliGRAM(s) Oral daily  finasteride 5 milliGRAM(s) Oral daily  chlorhexidine 2% Cloths 1 Application(s) Topical daily  cholecalciferol 1000 Unit(s) Oral daily  sodium chloride 0.9% Bolus. 100 milliLiter(s) IV Bolus every 5 minutes PRN    PAST MEDICAL & SURGICAL HISTORY:  HTN - Hypertension  CKD (chronic kidney disease)  Gout  CAD (coronary artery disease)  PCI to LAD 2014  HLD (hyperlipidemia)  Atrial fibrillation  CHF (congestive heart failure)  History of cardiomyopathy  LV dysfunction  Thalassemia minor  AV fistula  ESRD on dialysis  Atrial fibrillation  Rheumatoid arthritis  S/P Arthroscopic Surgery of Left Knee  2001  History of Arthroscopy- ankle  2003  S/P angioplasty with stent  5/2014 2016  S/P hernia surgery  Status post cataract extraction  left eye done 10/18/2018  H/O cardiac radiofrequency ablation  atrial fibrillation  History of tonsillectomy  H/O hernia repair  AV fistula    FAMILY HISTORY:  Family history of hypertension (Father, Mother)  Family history of diabetes mellitus (Father, Mother)    REVIEW OF SYSTEMS:  See HPI. Otherwise, 10 point ROS done and otherwise negative.    PHYSICAL EXAM:  T(C): 36.6 (02-03-25 @ 11:23), Max: 36.6 (02-02-25 @ 17:35)  HR: 58 (02-03-25 @ 11:23) (46 - 58)  BP: 132/80 (02-03-25 @ 11:23) (105/51 - 143/83)  RR: 19 (02-03-25 @ 11:23) (12 - 20)  SpO2: 100% (02-03-25 @ 11:23) (100% - 100%)  Wt(kg): --  I&O's Summary    03 Feb 2025 07:01  -  03 Feb 2025 17:24  --------------------------------------------------------  IN: 400 mL / OUT: 1900 mL / NET: -1500 mL    Appearance: Normal	  HEENT:   Normal oral mucosa, PERRL, EOMI	  Lymphatic: No lymphadenopathy  Cardiovascular: Normal S1 S2, No JVD, No murmurs, No edema  Respiratory: Lungs clear to auscultation	  Psychiatry: A & O x 3, Mood & affect appropriate  Gastrointestinal:  Soft, Non-tender, + BS	  Skin: No rashes, No ecchymoses, No cyanosis	  Neurologic: Non-focal  Extremities: Normal range of motion, No clubbing, cyanosis or edema  Vascular: Peripheral pulses palpable 2+ bilaterally    LABS:	 	  CBC Full  -  ( 03 Feb 2025 02:45 )  WBC Count : 7.51 K/uL  Hemoglobin : 10.9 g/dL  Hematocrit : 33.4 %  Platelet Count - Automated : 175 K/uL  Mean Cell Volume : 66.1 fL  Mean Cell Hemoglobin : 21.6 pg  Mean Cell Hemoglobin Concentration : 32.6 g/dL  Auto Neutrophil # : 4.13 K/uL  Auto Lymphocyte # : 1.95 K/uL  Auto Monocyte # : 0.79 K/uL  Auto Eosinophil # : 0.51 K/uL  Auto Basophil # : 0.12 K/uL  Auto Neutrophil % : 55.0 %  Auto Lymphocyte % : 26.0 %  Auto Monocyte % : 10.5 %  Auto Eosinophil % : 6.8 %  Auto Basophil % : 1.6 %    02-03    138  |  96[L]  |  40[H]  ----------------------------<  95  4.3   |  27  |  10.96[H]  02-02    140  |  98  |  29[H]  ----------------------------<  72  5.2   |  27  |  9.02[H]    Ca    8.2[L]      03 Feb 2025 02:45  Ca    7.9[L]      02 Feb 2025 06:07  Phos  3.8     02-03  Phos  4.1     02-02  Mg     2.30     02-03  Mg     2.10     02-02

## 2025-02-03 NOTE — PROVIDER CONTACT NOTE (OTHER) - BACKGROUND
pt admitted for dyspnea. Hx of bradycardia at rest with HR in 40's. Hx LAD stent 10/24. Plan for Echo

## 2025-02-03 NOTE — CONSULT NOTE ADULT - NS ATTEND AMEND GEN_ALL_CORE FT
Asymptomatic Sinus bradycardia. HR increased with ambulation. Decrease Amio to 100mg daily. EP to follow.

## 2025-02-04 ENCOUNTER — TRANSCRIPTION ENCOUNTER (OUTPATIENT)
Age: 70
End: 2025-02-04

## 2025-02-04 LAB
ANION GAP SERPL CALC-SCNC: 15 MMOL/L — HIGH (ref 7–14)
BUN SERPL-MCNC: 28 MG/DL — HIGH (ref 7–23)
CALCIUM SERPL-MCNC: 8.1 MG/DL — LOW (ref 8.4–10.5)
CHLORIDE SERPL-SCNC: 97 MMOL/L — LOW (ref 98–107)
CO2 SERPL-SCNC: 26 MMOL/L — SIGNIFICANT CHANGE UP (ref 22–31)
CREAT SERPL-MCNC: 8.75 MG/DL — HIGH (ref 0.5–1.3)
EGFR: 6 ML/MIN/1.73M2 — LOW
GLUCOSE SERPL-MCNC: 71 MG/DL — SIGNIFICANT CHANGE UP (ref 70–99)
HCT VFR BLD CALC: 33.5 % — LOW (ref 39–50)
HGB BLD-MCNC: 11 G/DL — LOW (ref 13–17)
MAGNESIUM SERPL-MCNC: 2.3 MG/DL — SIGNIFICANT CHANGE UP (ref 1.6–2.6)
MCHC RBC-ENTMCNC: 21.3 PG — LOW (ref 27–34)
MCHC RBC-ENTMCNC: 32.8 G/DL — SIGNIFICANT CHANGE UP (ref 32–36)
MCV RBC AUTO: 64.9 FL — LOW (ref 80–100)
NRBC # BLD AUTO: 0 K/UL — SIGNIFICANT CHANGE UP (ref 0–0)
NRBC # BLD: 0 /100 WBCS — SIGNIFICANT CHANGE UP (ref 0–0)
NRBC # FLD: 0 K/UL — SIGNIFICANT CHANGE UP (ref 0–0)
NRBC BLD-RTO: 0 /100 WBCS — SIGNIFICANT CHANGE UP (ref 0–0)
PHOSPHATE SERPL-MCNC: 3.2 MG/DL — SIGNIFICANT CHANGE UP (ref 2.5–4.5)
PLATELET # BLD AUTO: 188 K/UL — SIGNIFICANT CHANGE UP (ref 150–400)
POTASSIUM SERPL-MCNC: 4.7 MMOL/L — SIGNIFICANT CHANGE UP (ref 3.5–5.3)
POTASSIUM SERPL-SCNC: 4.7 MMOL/L — SIGNIFICANT CHANGE UP (ref 3.5–5.3)
RBC # BLD: 5.16 M/UL — SIGNIFICANT CHANGE UP (ref 4.2–5.8)
RBC # FLD: 15.5 % — HIGH (ref 10.3–14.5)
SODIUM SERPL-SCNC: 138 MMOL/L — SIGNIFICANT CHANGE UP (ref 135–145)
WBC # BLD: 8 K/UL — SIGNIFICANT CHANGE UP (ref 3.8–10.5)
WBC # FLD AUTO: 8 K/UL — SIGNIFICANT CHANGE UP (ref 3.8–10.5)

## 2025-02-04 PROCEDURE — 99231 SBSQ HOSP IP/OBS SF/LOW 25: CPT

## 2025-02-04 PROCEDURE — 99233 SBSQ HOSP IP/OBS HIGH 50: CPT | Mod: GC

## 2025-02-04 RX ORDER — AMIODARONE HYDROCHLORIDE 50 MG/ML
100 INJECTION, SOLUTION INTRAVENOUS DAILY
Refills: 0 | Status: DISCONTINUED | OUTPATIENT
Start: 2025-02-04 | End: 2025-02-05

## 2025-02-04 RX ORDER — MUPIROCIN 2 G/100G
1 CREAM TOPICAL
Refills: 0 | Status: DISCONTINUED | OUTPATIENT
Start: 2025-02-04 | End: 2025-02-05

## 2025-02-04 RX ORDER — AMIODARONE HYDROCHLORIDE 50 MG/ML
1 INJECTION, SOLUTION INTRAVENOUS
Qty: 0 | Refills: 0 | DISCHARGE

## 2025-02-04 RX ADMIN — Medication 1000 UNIT(S): at 11:05

## 2025-02-04 RX ADMIN — CINACALCET 30 MILLIGRAM(S): 30 TABLET, FILM COATED ORAL at 12:34

## 2025-02-04 RX ADMIN — Medication 5 MILLIGRAM(S): at 11:04

## 2025-02-04 RX ADMIN — PANTOPRAZOLE 40 MILLIGRAM(S): 20 TABLET, DELAYED RELEASE ORAL at 05:49

## 2025-02-04 RX ADMIN — ANTISEPTIC SURGICAL SCRUB 1 APPLICATION(S): 0.04 SOLUTION TOPICAL at 11:04

## 2025-02-04 RX ADMIN — Medication 200 MILLIGRAM(S): at 05:53

## 2025-02-04 RX ADMIN — SEVELAMER CARBONATE 1600 MILLIGRAM(S): 800 TABLET, FILM COATED ORAL at 17:14

## 2025-02-04 RX ADMIN — APIXABAN 2.5 MILLIGRAM(S): 5 TABLET, FILM COATED ORAL at 05:49

## 2025-02-04 RX ADMIN — SEVELAMER CARBONATE 1600 MILLIGRAM(S): 800 TABLET, FILM COATED ORAL at 08:56

## 2025-02-04 RX ADMIN — AMIODARONE HYDROCHLORIDE 100 MILLIGRAM(S): 50 INJECTION, SOLUTION INTRAVENOUS at 06:34

## 2025-02-04 RX ADMIN — SEVELAMER CARBONATE 1600 MILLIGRAM(S): 800 TABLET, FILM COATED ORAL at 12:34

## 2025-02-04 RX ADMIN — ASPIRIN 81 MILLIGRAM(S): 81 TABLET, COATED ORAL at 11:05

## 2025-02-04 RX ADMIN — MUPIROCIN 1 APPLICATION(S): 2 CREAM TOPICAL at 17:15

## 2025-02-04 RX ADMIN — ATORVASTATIN CALCIUM 20 MILLIGRAM(S): 80 TABLET, FILM COATED ORAL at 22:44

## 2025-02-04 RX ADMIN — MUPIROCIN 1 APPLICATION(S): 2 CREAM TOPICAL at 05:53

## 2025-02-04 RX ADMIN — APIXABAN 2.5 MILLIGRAM(S): 5 TABLET, FILM COATED ORAL at 17:14

## 2025-02-04 RX ADMIN — Medication 80 MILLIGRAM(S): at 05:53

## 2025-02-04 NOTE — PROGRESS NOTE ADULT - SUBJECTIVE AND OBJECTIVE BOX
[facility] DIVISION OF KIDNEY DISEASES AND HYPERTENSION -- HEMODIALYSIS NOTE   Nehrology Office [phone]  available on [employer] teams--> Jamal Hayes   --------------------------------------------------------------------------------  Chief Complaint: ESRD/Ongoing hemodialysis requirement  feels better and stronger   24 hour events/subjective:      ALLERGIES & MEDICATIONS  --------------------------------------------------------------------------------  Allergies    No Known Allergies    Intolerances    Seafood (Other)    Standing Inpatient Medications  aMIOdarone    Tablet 100 milliGRAM(s) Oral daily  apixaban 2.5 milliGRAM(s) Oral two times a day  aspirin enteric coated 81 milliGRAM(s) Oral daily  atorvastatin 20 milliGRAM(s) Oral at bedtime  chlorhexidine 2% Cloths 1 Application(s) Topical daily  cholecalciferol 1000 Unit(s) Oral daily  cinacalcet 30 milliGRAM(s) Oral daily  finasteride 5 milliGRAM(s) Oral daily  furosemide    Tablet 80 milliGRAM(s) Oral <User Schedule>  mupirocin 2% Ointment 1 Application(s) Both Nostrils two times a day  pantoprazole    Tablet 40 milliGRAM(s) Oral before breakfast  sevelamer carbonate 1600 milliGRAM(s) Oral three times a day with meals    PRN Inpatient Medications  acetaminophen     Tablet .. 650 milliGRAM(s) Oral every 6 hours PRN  guaiFENesin Oral Liquid (Sugar-Free) 200 milliGRAM(s) Oral every 6 hours PRN  sodium chloride 0.9% Bolus. 100 milliLiter(s) IV Bolus every 5 minutes PRN        VITALS/PHYSICAL EXAM  --------------------------------------------------------------------------------  T(C): 36.5 (02-04-25 @ 11:30), Max: 36.8 (02-03-25 @ 21:00)  HR: 58 (02-04-25 @ 11:30) (45 - 61)  BP: 125/80 (02-04-25 @ 11:30) (119/77 - 125/80)  RR: 18 (02-04-25 @ 11:30) (17 - 18)  SpO2: 100% (02-04-25 @ 11:30) (99% - 100%)  Wt(kg): --        02-03-25 @ 07:01  -  02-04-25 @ 07:00  --------------------------------------------------------  IN: 500 mL / OUT: 1900 mL / NET: -1400 mL      Physical Exam:  	Gen NAD  	HEENT: no JVD  	Pulm: CTABL  	CV: S1S2,  	Abd: Soft,   	Ext:   - edema B/L LE   	Neuro: Awake and alert  	Skin: Warm and dry          Vascular:   AVF + bruit    LABS/STUDIES  --------------------------------------------------------------------------------              11.0   8.00  >-----------<  188      [02-04-25 @ 05:26]              33.5     138  |  97  |  28  ----------------------------<  71      [02-04-25 @ 05:26]  4.7   |  26  |  8.75        Ca     8.1     [02-04-25 @ 05:26]      Mg     2.30     [02-04-25 @ 05:26]      Phos  3.2     [02-04-25 @ 05:26]              HBsAb 121.8      [02-03-25 @ 06:30]  HBsAg Nonreact      [02-03-25 @ 06:30]  HCV 0.09, Nonreact      [02-03-25 @ 06:30]

## 2025-02-04 NOTE — PROGRESS NOTE ADULT - PROBLEM SELECTOR PLAN 1
Pt. with ESRD on HD TIW (MWF, LUE AVF, [facility]-SDF) Last HD as outpatient was done on 2/1/25 via LUE AVF as pt missed HD on 1/31/25 due to fatigue. s/p HD 2/3. AVF functioning well. . Bradycardia noted. EP pt was evaluated during HD session this am. BP and Blood flow during dialysis are acceptable and decreased Amiodarone dose. Labs from today reviewed. Monitor BP, labs. Dose meds as per HD.  plan for HD in am
Pt. with ESRD on HD TIW (MWF, LUE AVF, [facility]-SDF, Dr. Hayes). Last HD as outpatient was done on 2/1/25 via LUE AVF as pt missed HD on 1/31/25 due to fatigue. Labs reviewed. Pt. seen while undergoing HD treatment this morning, AVF functioning well and pt clinically stable. Bradycardia noted. Labs from today reviewed. Monitor BP, labs. Dose meds as per HD.

## 2025-02-04 NOTE — DISCHARGE NOTE PROVIDER - YES NO FOR MLM POSITIVE OR NEGATIVE COVID RESULT
, , lives alone  Has one child  Works as  for [employer]  Smoker 1/2 pack x 40 years  Denies any drinking or drug use

## 2025-02-04 NOTE — DISCHARGE NOTE PROVIDER - HOSPITAL COURSE
68 y/o male, with a PmHx of Afib (on Amiodarone), HTN, ESRD on HD (MWF schedule), GERD, CAD (s/p stent placement 8-10 years ago, on daily baby ASA), p/w persistent dyspnea of exertion for the last 1.5 weeks. Was seen at [facility] 2 days ago for a similar complaint. EPS consulted for sinus bradycardia as seen on telemetry;  noted to be sinus avril 40s with intermittent junctional beats.  Denies any chest pain, palpitations, lightheadedness, dizziness, syncope or near syncope.      Acute on chronic systolic congestive heart failure   - noncompliant with lasix (he ran out)  - resume lasix 80mg daily on non-HD days   - TTE- EF 73%, mild mitral regurgitation.    Bradycardia on tele   - EP consulted-Decreased amiodarone to 100mg QD     ESRD on HD M/W/F  - LUE AVF   -nephro following  -Renal transplant work-up in progress -f/u outpt w Nephrologist       Case discussed with Dr. Lee  on 2/4/25. Patient outpatient HD reinstated, patient scheduled access a ride to pick him up from hospital at 4am for transportation to Dialysis.  Patient is medically stable and optimized for discharge as per attending. All medications were reviewed and prescriptions were sent to a mutually agreed upon pharmacy. Discharge plan reviewed with patient and/or family.

## 2025-02-04 NOTE — PROGRESS NOTE ADULT - ASSESSMENT
A/P  68 y/o M with pmhx of Afib (on Amiodarone), HTN, ESRD on HD (MWF schedule), GERD, CAD s/p stent placement 8-10 years ago, on daily baby ASA who presents to the ED c/o persistent dyspnea of exertion for the last 1.5 weeks.    #Acute on chronic Systolic Heart Failure  -pt reports increased LIRA over last 1.5 weeks  -recent cath in October with patent proximal LAD stent. Luminal RCA disease. No evidence of new obstructive epicardial CAD. Normal LVEDP.   -CXR with clear lungs  -HST elevated in setting of ESRD- pt denies chest pain  -ecg noted with prolonged QT/QTc -- avoid QT prolonging drugs  -pt reports has not been taking diuretics at home   -cont lasix 80mg on non dialysis days only and fluid removal with HD.  -volume removal w HD  -echo now with nl LV sys fx EF 73%     #Dizziness  -pt reports dizziness upon standing-- now resolved   -orthostatics mildly positive from sitting to standing  -echo as aboce    #Coronary Artery Disease. S/P PCI to LAD  -stable, denies CP  -recent cath with no new obs CAD  -off toprol secondary to bradycardia  -statin and asa 81mg daily  -Remains off plavix as he completed course post PCI    #Atrial Fibrillation/Flutter. S/PAF Ablation, S/P ablation 9/2022.  -s/p successful Afib ablation 9/29/22.  -remains in SR/SB  -EP fu noted-- amio decreased to 100 mg DAILY    -c/w Eliquis     #Cardiomyopathy  -echo as above  -off  beta blocker, ACEI outpt secondary to low bp.    #Mitral Regurgitation  -Prior NICOLETTE with tethered mitral valve leaflets with normal opening. Mod-sev Mitral Regurgitation.  -echo now with mild MR     #Hypertension  -off BP meds   -BP stable, trend bp    #ESRD on HD.  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   
A/P  70 y/o M with pmhx of Afib (on Amiodarone), HTN, ESRD on HD (MWF schedule), GERD, CAD s/p stent placement 8-10 years ago, on daily baby ASA who presents to the ED c/o persistent dyspnea of exertion for the last 1.5 weeks.    #Acute on chronic Systolic Heart Failure  -pt reports increased LIRA over last 1.5 weeks  -recent cath in October with patent proximal LAD stent. Luminal RCA disease. No evidence of new obstructive epicardial CAD. Normal LVEDP.   -CXR with clear lungs  -HST elevated in setting of ESRD- pt denies chest pain  -ecg noted with prolonged QT/QTc -- avoid QT prolonging drugs, check repeat ekg tomorrow to monitor QT/QTc  -pt reports has not been taking diuretics at home   -cont lasix 80mg on non dialysis days only and fluid removal with HD.  -check echo to eval valve fx  -volume removal w HD    #Dizziness  -pt reports dizziness upon standing  -check orthostatics  -check echo    #Coronary Artery Disease. S/P PCI to LAD  -stable, denies CP  -recent cath with no new obs CAD  -off toprol secondary to bradycardia  -resume statin and asa 81mg daily  -Remains off plavix as he completed course post PCI    #Atrial Fibrillation/Flutter. S/PAF Ablation, S/P ablation 9/2022.  -s/p successful Afib ablation 9/29/22.  -remains in SR/SB  -Continue home amiodarone 100mg BID   -resume home Eliquis     #Cardiomyopathy  -check echo   -off  beta blocker, ACEI outpt secondary to low bp.    #Mitral Regurgitation  -Prior NICOLETTE with tethered mitral valve leaflets with normal opening. Mod-sev Mitral Regurgitation.  -check repeat echo    #Hypertension  -off BP meds   -BP stable, trend bp    #ESRD on HD.  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    
A/P  70 y/o M with pmhx of Afib (on Amiodarone), HTN, ESRD on HD (MWF schedule), GERD, CAD s/p stent placement 8-10 years ago, on daily baby ASA who presents to the ED c/o persistent dyspnea of exertion for the last 1.5 weeks.    #Acute on chronic Systolic Heart Failure  -pt reports increased LIRA over last 1.5 weeks  -recent cath in October with patent proximal LAD stent. Luminal RCA disease. No evidence of new obstructive epicardial CAD. Normal LVEDP.   -CXR with clear lungs  -HST elevated in setting of ESRD- pt denies chest pain  -ecg noted with prolonged QT/QTc -- avoid QT prolonging drugs, check repeat ekg tomorrow to monitor QT/QTc  -pt reports has not been taking diuretics at home   -cont lasix 80mg on non dialysis days only and fluid removal with HD.  -check echo to eval valve fx  -volume removal w HD    #Dizziness  -pt reports dizziness upon standing  -orthostatics mildly positive from sitting to standing  -check echo    #Coronary Artery Disease. S/P PCI to LAD  -stable, denies CP  -recent cath with no new obs CAD  -off toprol secondary to bradycardia  -statin and asa 81mg daily  -Remains off plavix as he completed course post PCI    #Atrial Fibrillation/Flutter. S/PAF Ablation, S/P ablation 9/2022.  -s/p successful Afib ablation 9/29/22.  -remains in SR/SB  -Continue home amiodarone 100mg BID   -c/w Eliquis     #Cardiomyopathy  -check echo   -off  beta blocker, ACEI outpt secondary to low bp.    #Mitral Regurgitation  -Prior NICOLETTE with tethered mitral valve leaflets with normal opening. Mod-sev Mitral Regurgitation.  -check repeat echo    #Hypertension  -off BP meds   -BP stable, trend bp    #ESRD on HD.  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  
69yoM w/ PMHx ESRD on HD, afib on amiodarone, HTN, GERD, CAD s/p stent placement initially presented with SOB x 1.5 weeks.  Patient endorsed missing his HD session.  After HD, states his symptoms have improved.  EPS consulted for sinus bradycardia  with intermittent junctional beats. Patient is now SR and SOB resolved. Patient did bicycles in the bed and heart rate increased from 50>>80s; remained asymptomatic. Ep will sign off.    Plan:  - Continuous telemetric monitoring  - Monitor electrolytes and replete K to 4 and Mg to 2  - Continue Eliquis  5mg PO BID  for thromboembolic ppx  given that patient has a [id number] score of 2  - Decreased amiodarone to 100mg PO daily   - No pacing indications at this time   - Continue care per primary team    Donald Mcpherson PA-C  Patient to be staff with attending. Please await attending addendum 
Pt with ESRD on HD
Pt with ESRD on HD

## 2025-02-04 NOTE — DISCHARGE NOTE PROVIDER - CARE PROVIDER_API CALL
Elida Mcclain  Nephrology  [address], [address]  [place] [address]  Phone: [phone]  Fax: [phone]  Follow Up Time:     Ephraim James  Cardiac Electrophysiology  [address] [place] [id number]  Phone: [phone]  Fax: [phone]  Follow Up Time:

## 2025-02-04 NOTE — DISCHARGE NOTE PROVIDER - NSDCCPCAREPLAN_GEN_ALL_CORE_FT
PRINCIPAL DISCHARGE DIAGNOSIS  Diagnosis: Exertional dyspnea  Assessment and Plan of Treatment: You came in for shortness of breath due to missing your doses of diuretic (Lasix) Please continue to take 80mg of Lasix on NON-HD days, we have sent a 30 day supply of medications, please refill this prescription at your PCP.      SECONDARY DISCHARGE DIAGNOSES  Diagnosis: ESRD on hemodialysis  Assessment and Plan of Treatment: Please continue to follow your dialysis schedule and refer to your primary provider/nephrologist for further care/recommendations. Continue your medications and supplementation as directed.    Diagnosis: Bradycardia  Assessment and Plan of Treatment: Your heart rate was noted to be low, we have DECREASED your Amiodarone to 100mg DAILY. please continue to take this medication as prescribed and follow-up with a cardiologist

## 2025-02-04 NOTE — PROGRESS NOTE ADULT - TIME BILLING
agree with above  CV stable  cont lasix 80mg on non dialysis days only and fluid removal with HD.  check echo to eval valve fx  volume removal w HD
agree with above  CV stable  cont lasix 80mg on non dialysis days only and fluid removal with HD.  check echo to eval valve fx  volume removal w HD

## 2025-02-04 NOTE — PROGRESS NOTE ADULT - PROBLEM SELECTOR PLAN 3
Pt with hyperparathyroidism in setting of ESRD on HD. Continue home Cinacalcet 30mg daily.  monitor Ca levels
Pt with hyperparathyroidism in setting of ESRD on HD. Continue home Cinacalcet 30mg daily.     If you have any questions, please feel free to contact me  Nir Ronquillo  Nephrology Fellow  [employer]/Page [phone]  (After 5pm or on weekends please page the on-call fellow)

## 2025-02-04 NOTE — DISCHARGE NOTE PROVIDER - NSDCFUSCHEDAPPT_GEN_ALL_CORE_FT
[facility]  VASCULAR [address]  Scheduled Appointment: 02/06/2025    Noman Hung  [facility]  VASCULAR [address]  Scheduled Appointment: 02/06/2025    London Lara  [facility]  INTMED  [address]  Scheduled Appointment: 02/18/2025

## 2025-02-04 NOTE — PROGRESS NOTE ADULT - SUBJECTIVE AND OBJECTIVE BOX
CARDIOLOGY FOLLOW UP - Dr. Lee  DATE OF SERVICE: 2/4/25    CC no cp or sob   no dizziness         REVIEW OF SYSTEMS:  CONSTITUTIONAL: No fever, weight loss, or fatigue  RESPIRATORY: No cough, wheezing, chills or hemoptysis; No Shortness of Breath  CARDIOVASCULAR: No chest pain, palpitations, passing out, dizziness  GASTROINTESTINAL: No abdominal or epigastric pain. No nausea, vomiting, or hematemesis; No diarrhea or constipation. No melena or hematochezia.      PHYSICAL EXAM:  T(C): 36.8 (02-04-25 @ 05:30), Max: 36.8 (02-03-25 @ 21:00)  HR: 48 (02-04-25 @ 05:30) (45 - 61)  BP: 119/77 (02-04-25 @ 05:30) (119/77 - 132/80)  RR: 18 (02-04-25 @ 05:30) (17 - 19)  SpO2: 100% (02-04-25 @ 05:30) (99% - 100%)  Wt(kg): --  I&O's Summary    03 Feb 2025 07:01  -  04 Feb 2025 07:00  --------------------------------------------------------  IN: 500 mL / OUT: 1900 mL / NET: -1400 mL        Appearance: Normal	  Cardiovascular: Normal S1 S2,RRR, No JVD, No murmurs  Respiratory: Lungs clear to auscultation	  Gastrointestinal:  Soft, Non-tender, + BS	  Extremities: Normal range of motion, No clubbing, cyanosis or edema      Home Medications:  amiodarone 100 mg oral tablet: 1 tab(s) orally 2 times a day (03 Feb 2025 13:56)  apixaban 2.5 mg oral tablet: 1 tab(s) orally 2 times a day (03 Feb 2025 13:56)  Aspir 81 oral delayed release tablet: 1 tab(s) orally once a day (03 Feb 2025 13:56)  cholecalciferol 25 mcg (1000 intl units) oral tablet: 1 tab(s) orally once a day (03 Feb 2025 13:56)  cinacalcet 30 mg oral tablet: 1 tab(s) orally once a day (03 Feb 2025 13:56)  Farxiga 5 mg oral tablet: 1 tab(s) orally once a day (03 Feb 2025 13:56)  finasteride 5 mg oral tablet: 1 tab(s) orally once a day (03 Feb 2025 13:56)  furosemide 80 mg oral tablet: 1 tab(s) orally Tuesday, Thursday, Saturday, Sunday take on non HD days (03 Feb 2025 13:56)  pantoprazole 40 mg oral delayed release tablet: 1 tab(s) orally once a day (03 Feb 2025 13:56)  sevelamer carbonate 800 mg oral tablet: 2 tab(s) orally 3 times a day (with meals) (03 Feb 2025 13:56)  simvastatin 40 mg oral tablet: 1 tab(s) orally once a day (03 Feb 2025 13:56)      MEDICATIONS  (STANDING):  aMIOdarone    Tablet 100 milliGRAM(s) Oral daily  apixaban 2.5 milliGRAM(s) Oral two times a day  aspirin enteric coated 81 milliGRAM(s) Oral daily  atorvastatin 20 milliGRAM(s) Oral at bedtime  chlorhexidine 2% Cloths 1 Application(s) Topical daily  cholecalciferol 1000 Unit(s) Oral daily  cinacalcet 30 milliGRAM(s) Oral daily  finasteride 5 milliGRAM(s) Oral daily  furosemide    Tablet 80 milliGRAM(s) Oral <User Schedule>  mupirocin 2% Ointment 1 Application(s) Both Nostrils two times a day  pantoprazole    Tablet 40 milliGRAM(s) Oral before breakfast  sevelamer carbonate 1600 milliGRAM(s) Oral three times a day with meals      TELEMETRY: 	SB/Pafib 40-60    ECG:  	  RADIOLOGY:   DIAGNOSTIC TESTING:  [ ] Echocardiogram:< from: TTE W or WO Ultrasound Enhancing Agent (02.03.25 @ 11:01) >     CONCLUSIONS:      1. Left ventricular cavity is normal in size. Left ventricular wall thickness is normal. Left ventricular systolic function is normal with an ejection fraction of 73 % by Perry's method of disks. There are no regional wall motion abnormalities seen.   2. Normal right ventricular cavity size and normal right ventricular systolic function. Tricuspid annular plane systolic excursion (TAPSE) is 2.0 cm (normal >=1.7 cm).   3. Structurally normal mitral valve with normal leaflet excursion. There is calcification of the mitral valve annulus. There is mild mitral regurgitation.   4. The left atrium is mildly dilated with an indexed volume of 37.12 ml/m².      < end of copied text >    [ ]  Catheterization:  [ ] Stress Test:    OTHER: 	    LABS:	 	    Troponin T, High Sensitivity Result: 140 ng/L (02-01 @ 10:28)  Troponin T, High Sensitivity Result: 157 ng/L [0 - 51] (01-30 @ 20:41)  Troponin T, High Sensitivity Result: 151 ng/L [0 - 51] (01-30 @ 17:38)                          11.0   8.00  )-----------( 188      ( 04 Feb 2025 05:26 )             33.5     02-04    138  |  97[L]  |  28[H]  ----------------------------<  71  4.7   |  26  |  8.75[H]    Ca    8.1[L]      04 Feb 2025 05:26  Phos  3.2     02-04  Mg     2.30     02-04

## 2025-02-04 NOTE — DISCHARGE NOTE PROVIDER - NSDCMRMEDTOKEN_GEN_ALL_CORE_FT
amiodarone 100 mg oral tablet: 1 tab(s) orally 2 times a day  apixaban 2.5 mg oral tablet: 1 tab(s) orally 2 times a day  Aspir 81 oral delayed release tablet: 1 tab(s) orally once a day  cholecalciferol 25 mcg (1000 intl units) oral tablet: 1 tab(s) orally once a day  cinacalcet 30 mg oral tablet: 1 tab(s) orally once a day  Farxiga 5 mg oral tablet: 1 tab(s) orally once a day  finasteride 5 mg oral tablet: 1 tab(s) orally once a day  furosemide 80 mg oral tablet: 1 tab(s) orally Tuesday, Thursday, Saturday, Sunday take on non HD days  pantoprazole 40 mg oral delayed release tablet: 1 tab(s) orally once a day  sevelamer carbonate 800 mg oral tablet: 2 tab(s) orally 3 times a day (with meals)  simvastatin 40 mg oral tablet: 1 tab(s) orally once a day   amiodarone 100 mg oral tablet: 1 tab(s) orally once a day This medication was changed to ONCE A DAY  apixaban 2.5 mg oral tablet: 1 tab(s) orally 2 times a day  Aspir 81 oral delayed release tablet: 1 tab(s) orally once a day  cholecalciferol 25 mcg (1000 intl units) oral tablet: 1 tab(s) orally once a day  cinacalcet 30 mg oral tablet: 1 tab(s) orally once a day  Farxiga 5 mg oral tablet: 1 tab(s) orally once a day  finasteride 5 mg oral tablet: 1 tab(s) orally once a day  furosemide 80 mg oral tablet: 1 tab(s) orally Tuesday, Thursday, Saturday, Sunday take on non HD days  pantoprazole 40 mg oral delayed release tablet: 1 tab(s) orally once a day  sevelamer carbonate 800 mg oral tablet: 2 tab(s) orally 3 times a day (with meals)  simvastatin 40 mg oral tablet: 1 tab(s) orally once a day

## 2025-02-04 NOTE — PROGRESS NOTE ADULT - SUBJECTIVE AND OBJECTIVE BOX
Subjective: Denies HA, lightheadedness, dizziness, CP, palpitation, SOB, abdominal pain, and N/V.      Interval Events:  - Presented with LIRA and dizziness and lightheadedness. Of note he went to [facility] ED on 1/30 for his dyspnea. Workup including chest Xray was negative and he was sent home. His symptoms of shortness of breath did not improve which prompted him to come to the ED today. Of note patient missed his HD  session yesterday because he was too tired to go to the dialysis center. Patient was able to get dialysis today prior to his arrival to the ED. Patient had a pro-BNP:6142.   - Nephrology consulted for HD management    -  EPS consulted for sinus bradycardia as seen on telemetry;  noted to be sinus avril 40s with intermittent junctional beats.  Patient amiodarone was decrease. Patient did bicycles in the bed and heart rate increased from 50>>80s; remained asymptomatic.  On telemetry today, patient was SR HR 47-70s BPM.       MEDICATIONS  (STANDING):  aMIOdarone    Tablet 100 milliGRAM(s) Oral daily  apixaban 2.5 milliGRAM(s) Oral two times a day  aspirin enteric coated 81 milliGRAM(s) Oral daily  atorvastatin 20 milliGRAM(s) Oral at bedtime  chlorhexidine 2% Cloths 1 Application(s) Topical daily  cholecalciferol 1000 Unit(s) Oral daily  cinacalcet 30 milliGRAM(s) Oral daily  finasteride 5 milliGRAM(s) Oral daily  furosemide    Tablet 80 milliGRAM(s) Oral <User Schedule>  mupirocin 2% Ointment 1 Application(s) Both Nostrils two times a day  pantoprazole    Tablet 40 milliGRAM(s) Oral before breakfast  sevelamer carbonate 1600 milliGRAM(s) Oral three times a day with meals    MEDICATIONS  (PRN):  acetaminophen     Tablet .. 650 milliGRAM(s) Oral every 6 hours PRN Temp greater or equal to 38C (100.4F), Mild Pain (1 - 3), Moderate Pain (4 - 6)  guaiFENesin Oral Liquid (Sugar-Free) 200 milliGRAM(s) Oral every 6 hours PRN Cough  sodium chloride 0.9% Bolus. 100 milliLiter(s) IV Bolus every 5 minutes PRN SBP LESS THAN or EQUAL to 90 mmHg      Vital Signs Last 24 Hrs  T(C): 36.8 (04 Feb 2025 05:30), Max: 36.8 (03 Feb 2025 21:00)  T(F): 98.3 (04 Feb 2025 05:30), Max: 98.3 (03 Feb 2025 21:00)  HR: 48 (04 Feb 2025 05:30) (45 - 61)  BP: 119/77 (04 Feb 2025 05:30) (119/77 - 132/80)  BP(mean): --  RR: 18 (04 Feb 2025 05:30) (17 - 19)  SpO2: 100% (04 Feb 2025 05:30) (99% - 100%)    Parameters below as of 04 Feb 2025 05:30  Patient On (Oxygen Delivery Method): room air      I&O's Detail    03 Feb 2025 07:01  -  04 Feb 2025 07:00  --------------------------------------------------------  IN:    Oral Fluid: 100 mL    Other (mL): 400 mL  Total IN: 500 mL    OUT:    Other (mL): 1900 mL    Voided (mL): 0 mL  Total OUT: 1900 mL    Total NET: -1400 mL      Physical Exam:  GENERAL: Lying in bed, well appearing, speaking in full sentence, in NAD  HEART: S1S2 RRR  PULMONARY: CTABL, normal respiratory effort    ABDOMEN: + Bowel sounds present, soft  EXTREMITIES:  Warm, well -perfused, no pedal edema, distal pulses present  NEUROLOGICAL:AOx3     TELEMETERIC: SR HR 47-70s BPM.                            11.0   8.00  )-----------( 188      ( 04 Feb 2025 05:26 )             33.5       02-04    138  |  97[L]  |  28[H]  ----------------------------<  71  4.7   |  26  |  8.75[H]    Ca    8.1[L]      04 Feb 2025 05:26  Phos  3.2     02-04  Mg     2.30     02-04

## 2025-02-04 NOTE — PROGRESS NOTE ADULT - PROBLEM SELECTOR PLAN 2
Patient with anemia in the setting of ESRD. Hemoglobin within target range (goal 10-11). Continue EPO 3K u with HD (per o/p review). Monitor hemoglobin.
Patient with anemia in the setting of ESRD. Hemoglobin within target range (goal 10-11). Continue EPO 3K u with HD (per o/p review). Monitor hemoglobin.

## 2025-02-05 ENCOUNTER — APPOINTMENT (OUTPATIENT)
Dept: ELECTROPHYSIOLOGY | Facility: CLINIC | Age: 70
End: 2025-02-05

## 2025-02-05 ENCOUNTER — TRANSCRIPTION ENCOUNTER (OUTPATIENT)
Age: 70
End: 2025-02-05

## 2025-02-05 VITALS
DIASTOLIC BLOOD PRESSURE: 76 MMHG | HEART RATE: 43 BPM | SYSTOLIC BLOOD PRESSURE: 120 MMHG | OXYGEN SATURATION: 100 % | RESPIRATION RATE: 17 BRPM | TEMPERATURE: 98 F

## 2025-02-05 NOTE — DISCHARGE NOTE NURSING/CASE MANAGEMENT/SOCIAL WORK - FINANCIAL ASSISTANCE
[facility] provides services at a reduced cost to those who are determined to be eligible through [facility]’s financial assistance program. Information regarding [facility]’s financial assistance program can be found by going to [email or web address] or by calling [phone].

## 2025-02-05 NOTE — DISCHARGE NOTE NURSING/CASE MANAGEMENT/SOCIAL WORK - NSDCPEFALRISK_GEN_ALL_CORE
For information on Fall & Injury Prevention, visit: [email or web address] OR  [email or web address] OR  https://www.cdc.gov/steadi/patient.html

## 2025-02-05 NOTE — DISCHARGE NOTE NURSING/CASE MANAGEMENT/SOCIAL WORK - PATIENT PORTAL LINK FT
You can access the FollowMyHealth Patient Portal offered by [facility] by registering at the following website: [email or web address]. By joining [employer]’s FollowMyHealth portal, you will also be able to view your health information using other applications (apps) compatible with our system.

## 2025-02-11 ENCOUNTER — APPOINTMENT (OUTPATIENT)
Dept: INTERNAL MEDICINE | Facility: CLINIC | Age: 70
End: 2025-02-11

## 2025-02-18 ENCOUNTER — OUTPATIENT (OUTPATIENT)
Dept: OUTPATIENT SERVICES | Facility: HOSPITAL | Age: 70
LOS: 1 days | End: 2025-02-18
Payer: COMMERCIAL

## 2025-02-18 ENCOUNTER — APPOINTMENT (OUTPATIENT)
Dept: INTERNAL MEDICINE | Facility: CLINIC | Age: 70
End: 2025-02-18
Payer: MEDICARE

## 2025-02-18 VITALS — RESPIRATION RATE: 14 BRPM | HEART RATE: 76 BPM | DIASTOLIC BLOOD PRESSURE: 50 MMHG | SYSTOLIC BLOOD PRESSURE: 80 MMHG

## 2025-02-18 DIAGNOSIS — Z95.9 PRESENCE OF CARDIAC AND VASCULAR IMPLANT AND GRAFT, UNSPECIFIED: Chronic | ICD-10-CM

## 2025-02-18 DIAGNOSIS — Z98.890 OTHER SPECIFIED POSTPROCEDURAL STATES: Chronic | ICD-10-CM

## 2025-02-18 DIAGNOSIS — Z90.89 ACQUIRED ABSENCE OF OTHER ORGANS: Chronic | ICD-10-CM

## 2025-02-18 DIAGNOSIS — R63.0 ANOREXIA: ICD-10-CM

## 2025-02-18 DIAGNOSIS — I77.0 ARTERIOVENOUS FISTULA, ACQUIRED: Chronic | ICD-10-CM

## 2025-02-18 DIAGNOSIS — Z98.49 CATARACT EXTRACTION STATUS, UNSPECIFIED EYE: Chronic | ICD-10-CM

## 2025-02-18 DIAGNOSIS — I10 ESSENTIAL (PRIMARY) HYPERTENSION: ICD-10-CM

## 2025-02-18 DIAGNOSIS — I25.10 ATHEROSCLEROTIC HEART DISEASE OF NATIVE CORONARY ARTERY W/OUT ANGINA PECTORIS: ICD-10-CM

## 2025-02-18 PROCEDURE — 99213 OFFICE O/P EST LOW 20 MIN: CPT

## 2025-02-18 PROCEDURE — G0463: CPT

## 2025-02-18 RX ORDER — MIRTAZAPINE 7.5 MG/1
7.5 TABLET, FILM COATED ORAL
Qty: 90 | Refills: 3 | Status: ACTIVE | COMMUNITY
Start: 2025-02-18 | End: 1900-01-01

## 2025-02-21 RX ORDER — FUROSEMIDE 80 MG/1
80 TABLET ORAL
Qty: 90 | Refills: 3 | Status: ACTIVE | COMMUNITY
Start: 2025-02-21 | End: 1900-01-01

## 2025-02-25 DIAGNOSIS — I25.10 ATHEROSCLEROTIC HEART DISEASE OF NATIVE CORONARY ARTERY WITHOUT ANGINA PECTORIS: ICD-10-CM

## 2025-03-01 ENCOUNTER — INPATIENT (INPATIENT)
Facility: HOSPITAL | Age: 70
LOS: 6 days | Discharge: HOME | DRG: 204 | End: 2025-03-08
Attending: INTERNAL MEDICINE | Admitting: INTERNAL MEDICINE
Payer: COMMERCIAL

## 2025-03-01 VITALS
WEIGHT: 179.9 LBS | DIASTOLIC BLOOD PRESSURE: 66 MMHG | HEART RATE: 50 BPM | RESPIRATION RATE: 18 BRPM | HEIGHT: 69 IN | SYSTOLIC BLOOD PRESSURE: 101 MMHG | TEMPERATURE: 98 F | OXYGEN SATURATION: 99 %

## 2025-03-01 DIAGNOSIS — I77.0 ARTERIOVENOUS FISTULA, ACQUIRED: Chronic | ICD-10-CM

## 2025-03-01 DIAGNOSIS — Z98.49 CATARACT EXTRACTION STATUS, UNSPECIFIED EYE: Chronic | ICD-10-CM

## 2025-03-01 DIAGNOSIS — Z98.890 OTHER SPECIFIED POSTPROCEDURAL STATES: Chronic | ICD-10-CM

## 2025-03-01 DIAGNOSIS — R06.02 SHORTNESS OF BREATH: ICD-10-CM

## 2025-03-01 DIAGNOSIS — Z95.9 PRESENCE OF CARDIAC AND VASCULAR IMPLANT AND GRAFT, UNSPECIFIED: Chronic | ICD-10-CM

## 2025-03-01 DIAGNOSIS — Z90.89 ACQUIRED ABSENCE OF OTHER ORGANS: Chronic | ICD-10-CM

## 2025-03-01 PROCEDURE — 71046 X-RAY EXAM CHEST 2 VIEWS: CPT | Mod: 26

## 2025-03-01 PROCEDURE — 99285 EMERGENCY DEPT VISIT HI MDM: CPT

## 2025-03-01 PROCEDURE — 99223 1ST HOSP IP/OBS HIGH 75: CPT

## 2025-03-01 PROCEDURE — 99497 ADVNCD CARE PLAN 30 MIN: CPT | Mod: 25

## 2025-03-02 DIAGNOSIS — Z29.9 ENCOUNTER FOR PROPHYLACTIC MEASURES, UNSPECIFIED: ICD-10-CM

## 2025-03-02 DIAGNOSIS — K21.9 GASTRO-ESOPHAGEAL REFLUX DISEASE WITHOUT ESOPHAGITIS: ICD-10-CM

## 2025-03-02 DIAGNOSIS — I25.10 ATHEROSCLEROTIC HEART DISEASE OF NATIVE CORONARY ARTERY WITHOUT ANGINA PECTORIS: ICD-10-CM

## 2025-03-02 DIAGNOSIS — I48.20 CHRONIC ATRIAL FIBRILLATION, UNSPECIFIED: ICD-10-CM

## 2025-03-02 DIAGNOSIS — R07.89 OTHER CHEST PAIN: ICD-10-CM

## 2025-03-02 DIAGNOSIS — N18.6 END STAGE RENAL DISEASE: ICD-10-CM

## 2025-03-02 LAB
ALBUMIN SERPL ELPH-MCNC: 3.8 G/DL — SIGNIFICANT CHANGE UP (ref 3.3–5)
ALP SERPL-CCNC: 151 U/L — HIGH (ref 40–120)
ALT FLD-CCNC: 8 U/L — LOW (ref 10–45)
ANION GAP SERPL CALC-SCNC: 20 MMOL/L — HIGH (ref 5–17)
ANION GAP SERPL CALC-SCNC: 21 MMOL/L — HIGH (ref 5–17)
ANISOCYTOSIS BLD QL: SLIGHT — SIGNIFICANT CHANGE UP
APTT BLD: 36.3 SEC — HIGH (ref 24.5–35.6)
AST SERPL-CCNC: 7 U/L — LOW (ref 10–40)
BASOPHILS # BLD AUTO: 0.08 K/UL — SIGNIFICANT CHANGE UP (ref 0–0.2)
BASOPHILS NFR BLD AUTO: 0.9 % — SIGNIFICANT CHANGE UP (ref 0–2)
BILIRUB SERPL-MCNC: 0.5 MG/DL — SIGNIFICANT CHANGE UP (ref 0.2–1.2)
BUN SERPL-MCNC: 44 MG/DL — HIGH (ref 7–23)
BUN SERPL-MCNC: 49 MG/DL — HIGH (ref 7–23)
BURR CELLS BLD QL SMEAR: PRESENT — SIGNIFICANT CHANGE UP
CALCIUM SERPL-MCNC: 8.3 MG/DL — LOW (ref 8.4–10.5)
CALCIUM SERPL-MCNC: 8.6 MG/DL — SIGNIFICANT CHANGE UP (ref 8.4–10.5)
CHLORIDE SERPL-SCNC: 94 MMOL/L — LOW (ref 96–108)
CHLORIDE SERPL-SCNC: 96 MMOL/L — SIGNIFICANT CHANGE UP (ref 96–108)
CO2 SERPL-SCNC: 23 MMOL/L — SIGNIFICANT CHANGE UP (ref 22–31)
CO2 SERPL-SCNC: 24 MMOL/L — SIGNIFICANT CHANGE UP (ref 22–31)
CREAT SERPL-MCNC: 10.61 MG/DL — HIGH (ref 0.5–1.3)
CREAT SERPL-MCNC: 11.8 MG/DL — HIGH (ref 0.5–1.3)
DACRYOCYTES BLD QL SMEAR: SLIGHT — SIGNIFICANT CHANGE UP
EGFR: 4 ML/MIN/1.73M2 — LOW
EGFR: 4 ML/MIN/1.73M2 — LOW
EGFR: 5 ML/MIN/1.73M2 — LOW
EGFR: 5 ML/MIN/1.73M2 — LOW
ELLIPTOCYTES BLD QL SMEAR: SLIGHT — SIGNIFICANT CHANGE UP
EOSINOPHIL # BLD AUTO: 0.08 K/UL — SIGNIFICANT CHANGE UP (ref 0–0.5)
EOSINOPHIL NFR BLD AUTO: 0.9 % — SIGNIFICANT CHANGE UP (ref 0–6)
FLUAV AG NPH QL: SIGNIFICANT CHANGE UP
FLUBV AG NPH QL: SIGNIFICANT CHANGE UP
GLUCOSE SERPL-MCNC: 104 MG/DL — HIGH (ref 70–99)
GLUCOSE SERPL-MCNC: 82 MG/DL — SIGNIFICANT CHANGE UP (ref 70–99)
HCT VFR BLD CALC: 32.6 % — LOW (ref 39–50)
HCT VFR BLD CALC: 33.7 % — LOW (ref 39–50)
HGB BLD-MCNC: 10.4 G/DL — LOW (ref 13–17)
HGB BLD-MCNC: 11 G/DL — LOW (ref 13–17)
HYPOCHROMIA BLD QL: SLIGHT — SIGNIFICANT CHANGE UP
INR BLD: 1.49 RATIO — HIGH (ref 0.85–1.16)
LACTATE SERPL-SCNC: 2.4 MMOL/L — HIGH (ref 0.5–2)
LACTATE SERPL-SCNC: 2.8 MMOL/L — HIGH (ref 0.5–2)
LYMPHOCYTES # BLD AUTO: 2.75 K/UL — SIGNIFICANT CHANGE UP (ref 1–3.3)
LYMPHOCYTES # BLD AUTO: 30.7 % — SIGNIFICANT CHANGE UP (ref 13–44)
MACROCYTES BLD QL: SLIGHT — SIGNIFICANT CHANGE UP
MANUAL SMEAR VERIFICATION: SIGNIFICANT CHANGE UP
MCHC RBC-ENTMCNC: 21 PG — LOW (ref 27–34)
MCHC RBC-ENTMCNC: 21.4 PG — LOW (ref 27–34)
MCHC RBC-ENTMCNC: 31.9 G/DL — LOW (ref 32–36)
MCHC RBC-ENTMCNC: 32.6 G/DL — SIGNIFICANT CHANGE UP (ref 32–36)
MCV RBC AUTO: 65.4 FL — LOW (ref 80–100)
MCV RBC AUTO: 65.7 FL — LOW (ref 80–100)
MICROCYTES BLD QL: SIGNIFICANT CHANGE UP
MONOCYTES # BLD AUTO: 0.55 K/UL — SIGNIFICANT CHANGE UP (ref 0–0.9)
MONOCYTES NFR BLD AUTO: 6.1 % — SIGNIFICANT CHANGE UP (ref 2–14)
NEUTROPHILS # BLD AUTO: 5.5 K/UL — SIGNIFICANT CHANGE UP (ref 1.8–7.4)
NEUTROPHILS NFR BLD AUTO: 61.4 % — SIGNIFICANT CHANGE UP (ref 43–77)
NRBC BLD AUTO-RTO: 0 /100 WBCS — SIGNIFICANT CHANGE UP (ref 0–0)
NT-PROBNP SERPL-SCNC: 3757 PG/ML — HIGH (ref 0–300)
OVALOCYTES BLD QL SMEAR: SLIGHT — SIGNIFICANT CHANGE UP
PLAT MORPH BLD: NORMAL — SIGNIFICANT CHANGE UP
PLATELET # BLD AUTO: 242 K/UL — SIGNIFICANT CHANGE UP (ref 150–400)
PLATELET # BLD AUTO: 274 K/UL — SIGNIFICANT CHANGE UP (ref 150–400)
POLYCHROMASIA BLD QL SMEAR: SLIGHT — SIGNIFICANT CHANGE UP
POTASSIUM SERPL-MCNC: 5 MMOL/L — SIGNIFICANT CHANGE UP (ref 3.5–5.3)
POTASSIUM SERPL-MCNC: 5.8 MMOL/L — HIGH (ref 3.5–5.3)
POTASSIUM SERPL-SCNC: 5 MMOL/L — SIGNIFICANT CHANGE UP (ref 3.5–5.3)
POTASSIUM SERPL-SCNC: 5.8 MMOL/L — HIGH (ref 3.5–5.3)
PROT SERPL-MCNC: 8 G/DL — SIGNIFICANT CHANGE UP (ref 6–8.3)
PROTHROM AB SERPL-ACNC: 17.1 SEC — HIGH (ref 9.9–13.4)
RBC # BLD: 4.96 M/UL — SIGNIFICANT CHANGE UP (ref 4.2–5.8)
RBC # BLD: 5.15 M/UL — SIGNIFICANT CHANGE UP (ref 4.2–5.8)
RBC # FLD: 16.4 % — HIGH (ref 10.3–14.5)
RBC # FLD: 16.7 % — HIGH (ref 10.3–14.5)
RBC BLD AUTO: ABNORMAL
RSV RNA NPH QL NAA+NON-PROBE: SIGNIFICANT CHANGE UP
SARS-COV-2 RNA SPEC QL NAA+PROBE: SIGNIFICANT CHANGE UP
SCHISTOCYTES BLD QL AUTO: SLIGHT — SIGNIFICANT CHANGE UP
SODIUM SERPL-SCNC: 138 MMOL/L — SIGNIFICANT CHANGE UP (ref 135–145)
SODIUM SERPL-SCNC: 140 MMOL/L — SIGNIFICANT CHANGE UP (ref 135–145)
TARGETS BLD QL SMEAR: SIGNIFICANT CHANGE UP
TROPONIN T, HIGH SENSITIVITY RESULT: 155 NG/L — HIGH (ref 0–51)
WBC # BLD: 7.84 K/UL — SIGNIFICANT CHANGE UP (ref 3.8–10.5)
WBC # BLD: 8.96 K/UL — SIGNIFICANT CHANGE UP (ref 3.8–10.5)
WBC # FLD AUTO: 7.84 K/UL — SIGNIFICANT CHANGE UP (ref 3.8–10.5)
WBC # FLD AUTO: 8.96 K/UL — SIGNIFICANT CHANGE UP (ref 3.8–10.5)

## 2025-03-02 PROCEDURE — 93010 ELECTROCARDIOGRAM REPORT: CPT

## 2025-03-02 PROCEDURE — 99222 1ST HOSP IP/OBS MODERATE 55: CPT

## 2025-03-02 PROCEDURE — 71250 CT THORAX DX C-: CPT | Mod: 26

## 2025-03-02 RX ORDER — SEVELAMER HYDROCHLORIDE 800 MG/1
1600 TABLET ORAL
Refills: 0 | Status: DISCONTINUED | OUTPATIENT
Start: 2025-03-02 | End: 2025-03-08

## 2025-03-02 RX ORDER — ACETAMINOPHEN 500 MG/5ML
650 LIQUID (ML) ORAL EVERY 6 HOURS
Refills: 0 | Status: DISCONTINUED | OUTPATIENT
Start: 2025-03-02 | End: 2025-03-08

## 2025-03-02 RX ORDER — ATORVASTATIN CALCIUM 80 MG/1
20 TABLET, FILM COATED ORAL AT BEDTIME
Refills: 0 | Status: DISCONTINUED | OUTPATIENT
Start: 2025-03-02 | End: 2025-03-08

## 2025-03-02 RX ORDER — APIXABAN 2.5 MG/1
2.5 TABLET, FILM COATED ORAL
Refills: 0 | Status: DISCONTINUED | OUTPATIENT
Start: 2025-03-02 | End: 2025-03-04

## 2025-03-02 RX ORDER — CINACALCET 30 MG/1
30 TABLET, FILM COATED ORAL DAILY
Refills: 0 | Status: DISCONTINUED | OUTPATIENT
Start: 2025-03-02 | End: 2025-03-08

## 2025-03-02 RX ORDER — AMIODARONE HYDROCHLORIDE 50 MG/ML
100 INJECTION, SOLUTION INTRAVENOUS DAILY
Refills: 0 | Status: DISCONTINUED | OUTPATIENT
Start: 2025-03-02 | End: 2025-03-08

## 2025-03-02 RX ORDER — FINASTERIDE 1 MG/1
5 TABLET, FILM COATED ORAL DAILY
Refills: 0 | Status: DISCONTINUED | OUTPATIENT
Start: 2025-03-02 | End: 2025-03-08

## 2025-03-02 RX ORDER — DAPAGLIFLOZIN 5 MG/1
10 TABLET, FILM COATED ORAL DAILY
Refills: 0 | Status: DISCONTINUED | OUTPATIENT
Start: 2025-03-02 | End: 2025-03-08

## 2025-03-02 RX ORDER — FUROSEMIDE 10 MG/ML
80 INJECTION INTRAMUSCULAR; INTRAVENOUS
Refills: 0 | Status: DISCONTINUED | OUTPATIENT
Start: 2025-03-02 | End: 2025-03-08

## 2025-03-02 RX ORDER — DAPAGLIFLOZIN 5 MG/1
5 TABLET, FILM COATED ORAL DAILY
Refills: 0 | Status: DISCONTINUED | OUTPATIENT
Start: 2025-03-02 | End: 2025-03-02

## 2025-03-02 RX ORDER — SODIUM ZIRCONIUM CYCLOSILICATE 5 G/5G
10 POWDER, FOR SUSPENSION ORAL ONCE
Refills: 0 | Status: COMPLETED | OUTPATIENT
Start: 2025-03-02 | End: 2025-03-02

## 2025-03-02 RX ORDER — ASPIRIN 325 MG
81 TABLET ORAL DAILY
Refills: 0 | Status: DISCONTINUED | OUTPATIENT
Start: 2025-03-02 | End: 2025-03-08

## 2025-03-02 RX ADMIN — Medication 1000 UNIT(S): at 12:57

## 2025-03-02 RX ADMIN — APIXABAN 2.5 MILLIGRAM(S): 2.5 TABLET, FILM COATED ORAL at 18:00

## 2025-03-02 RX ADMIN — SEVELAMER HYDROCHLORIDE 1600 MILLIGRAM(S): 800 TABLET ORAL at 18:01

## 2025-03-02 RX ADMIN — Medication 40 MILLIGRAM(S): at 06:16

## 2025-03-02 RX ADMIN — FUROSEMIDE 80 MILLIGRAM(S): 10 INJECTION INTRAMUSCULAR; INTRAVENOUS at 08:47

## 2025-03-02 RX ADMIN — SEVELAMER HYDROCHLORIDE 1600 MILLIGRAM(S): 800 TABLET ORAL at 12:57

## 2025-03-02 RX ADMIN — ATORVASTATIN CALCIUM 20 MILLIGRAM(S): 80 TABLET, FILM COATED ORAL at 21:26

## 2025-03-02 RX ADMIN — DAPAGLIFLOZIN 10 MILLIGRAM(S): 5 TABLET, FILM COATED ORAL at 18:00

## 2025-03-02 RX ADMIN — Medication 81 MILLIGRAM(S): at 12:57

## 2025-03-02 RX ADMIN — FINASTERIDE 5 MILLIGRAM(S): 1 TABLET, FILM COATED ORAL at 12:57

## 2025-03-02 RX ADMIN — APIXABAN 2.5 MILLIGRAM(S): 2.5 TABLET, FILM COATED ORAL at 06:17

## 2025-03-02 RX ADMIN — SODIUM ZIRCONIUM CYCLOSILICATE 10 GRAM(S): 5 POWDER, FOR SUSPENSION ORAL at 01:29

## 2025-03-02 RX ADMIN — SEVELAMER HYDROCHLORIDE 1600 MILLIGRAM(S): 800 TABLET ORAL at 08:47

## 2025-03-02 RX ADMIN — CINACALCET 30 MILLIGRAM(S): 30 TABLET, FILM COATED ORAL at 12:57

## 2025-03-02 RX ADMIN — AMIODARONE HYDROCHLORIDE 100 MILLIGRAM(S): 50 INJECTION, SOLUTION INTRAVENOUS at 06:16

## 2025-03-03 DIAGNOSIS — R06.09 OTHER FORMS OF DYSPNEA: ICD-10-CM

## 2025-03-03 LAB
ALBUMIN SERPL ELPH-MCNC: 3.4 G/DL — SIGNIFICANT CHANGE UP (ref 3.3–5)
ALP SERPL-CCNC: 134 U/L — HIGH (ref 40–120)
ALT FLD-CCNC: <5 U/L — LOW (ref 10–45)
ANION GAP SERPL CALC-SCNC: 20 MMOL/L — HIGH (ref 5–17)
AST SERPL-CCNC: <5 U/L — LOW (ref 10–40)
BASOPHILS # BLD AUTO: 0.1 K/UL — SIGNIFICANT CHANGE UP (ref 0–0.2)
BASOPHILS NFR BLD AUTO: 1.2 % — SIGNIFICANT CHANGE UP (ref 0–2)
BILIRUB SERPL-MCNC: 0.4 MG/DL — SIGNIFICANT CHANGE UP (ref 0.2–1.2)
BUN SERPL-MCNC: 65 MG/DL — HIGH (ref 7–23)
CALCIUM SERPL-MCNC: 7.4 MG/DL — LOW (ref 8.4–10.5)
CHLORIDE SERPL-SCNC: 95 MMOL/L — LOW (ref 96–108)
CO2 SERPL-SCNC: 22 MMOL/L — SIGNIFICANT CHANGE UP (ref 22–31)
CREAT SERPL-MCNC: 14.77 MG/DL — HIGH (ref 0.5–1.3)
EGFR: 3 ML/MIN/1.73M2 — LOW
EGFR: 3 ML/MIN/1.73M2 — LOW
EOSINOPHIL # BLD AUTO: 0.66 K/UL — HIGH (ref 0–0.5)
EOSINOPHIL NFR BLD AUTO: 8.2 % — HIGH (ref 0–6)
GLUCOSE SERPL-MCNC: 61 MG/DL — LOW (ref 70–99)
HCT VFR BLD CALC: 28.6 % — LOW (ref 39–50)
HGB BLD-MCNC: 9.5 G/DL — LOW (ref 13–17)
IMM GRANULOCYTES NFR BLD AUTO: 0.2 % — SIGNIFICANT CHANGE UP (ref 0–0.9)
LACTATE SERPL-SCNC: 1.2 MMOL/L — SIGNIFICANT CHANGE UP (ref 0.5–2)
LYMPHOCYTES # BLD AUTO: 1.79 K/UL — SIGNIFICANT CHANGE UP (ref 1–3.3)
LYMPHOCYTES # BLD AUTO: 22.3 % — SIGNIFICANT CHANGE UP (ref 13–44)
MCHC RBC-ENTMCNC: 21.7 PG — LOW (ref 27–34)
MCHC RBC-ENTMCNC: 33.2 G/DL — SIGNIFICANT CHANGE UP (ref 32–36)
MCV RBC AUTO: 65.4 FL — LOW (ref 80–100)
MONOCYTES # BLD AUTO: 0.79 K/UL — SIGNIFICANT CHANGE UP (ref 0–0.9)
MONOCYTES NFR BLD AUTO: 9.9 % — SIGNIFICANT CHANGE UP (ref 2–14)
NEUTROPHILS # BLD AUTO: 4.65 K/UL — SIGNIFICANT CHANGE UP (ref 1.8–7.4)
NEUTROPHILS NFR BLD AUTO: 58.2 % — SIGNIFICANT CHANGE UP (ref 43–77)
NRBC BLD AUTO-RTO: 0 /100 WBCS — SIGNIFICANT CHANGE UP (ref 0–0)
PLATELET # BLD AUTO: 225 K/UL — SIGNIFICANT CHANGE UP (ref 150–400)
POTASSIUM SERPL-MCNC: 5.2 MMOL/L — SIGNIFICANT CHANGE UP (ref 3.5–5.3)
POTASSIUM SERPL-SCNC: 5.2 MMOL/L — SIGNIFICANT CHANGE UP (ref 3.5–5.3)
PROT SERPL-MCNC: 7 G/DL — SIGNIFICANT CHANGE UP (ref 6–8.3)
RBC # BLD: 4.37 M/UL — SIGNIFICANT CHANGE UP (ref 4.2–5.8)
RBC # FLD: 15.9 % — HIGH (ref 10.3–14.5)
SODIUM SERPL-SCNC: 137 MMOL/L — SIGNIFICANT CHANGE UP (ref 135–145)
WBC # BLD: 8.01 K/UL — SIGNIFICANT CHANGE UP (ref 3.8–10.5)
WBC # FLD AUTO: 8.01 K/UL — SIGNIFICANT CHANGE UP (ref 3.8–10.5)

## 2025-03-03 PROCEDURE — 99232 SBSQ HOSP IP/OBS MODERATE 35: CPT | Mod: GC

## 2025-03-03 RX ORDER — APIXABAN 2.5 MG/1
1 TABLET, FILM COATED ORAL
Refills: 0 | DISCHARGE

## 2025-03-03 RX ADMIN — DAPAGLIFLOZIN 10 MILLIGRAM(S): 5 TABLET, FILM COATED ORAL at 14:11

## 2025-03-03 RX ADMIN — AMIODARONE HYDROCHLORIDE 100 MILLIGRAM(S): 50 INJECTION, SOLUTION INTRAVENOUS at 07:49

## 2025-03-03 RX ADMIN — APIXABAN 2.5 MILLIGRAM(S): 2.5 TABLET, FILM COATED ORAL at 07:49

## 2025-03-03 RX ADMIN — SEVELAMER HYDROCHLORIDE 1600 MILLIGRAM(S): 800 TABLET ORAL at 07:51

## 2025-03-03 RX ADMIN — APIXABAN 2.5 MILLIGRAM(S): 2.5 TABLET, FILM COATED ORAL at 19:36

## 2025-03-03 RX ADMIN — SEVELAMER HYDROCHLORIDE 1600 MILLIGRAM(S): 800 TABLET ORAL at 19:35

## 2025-03-03 RX ADMIN — Medication 81 MILLIGRAM(S): at 14:11

## 2025-03-03 RX ADMIN — CINACALCET 30 MILLIGRAM(S): 30 TABLET, FILM COATED ORAL at 14:10

## 2025-03-03 RX ADMIN — SEVELAMER HYDROCHLORIDE 1600 MILLIGRAM(S): 800 TABLET ORAL at 14:09

## 2025-03-03 RX ADMIN — ATORVASTATIN CALCIUM 20 MILLIGRAM(S): 80 TABLET, FILM COATED ORAL at 19:36

## 2025-03-03 RX ADMIN — FINASTERIDE 5 MILLIGRAM(S): 1 TABLET, FILM COATED ORAL at 14:11

## 2025-03-03 RX ADMIN — Medication 1000 UNIT(S): at 14:12

## 2025-03-03 RX ADMIN — Medication 40 MILLIGRAM(S): at 06:13

## 2025-03-04 ENCOUNTER — RESULT REVIEW (OUTPATIENT)
Age: 70
End: 2025-03-04

## 2025-03-04 ENCOUNTER — APPOINTMENT (OUTPATIENT)
Dept: VASCULAR SURGERY | Facility: CLINIC | Age: 70
End: 2025-03-04

## 2025-03-04 LAB
ADD ON TEST-SPECIMEN IN LAB: SIGNIFICANT CHANGE UP
ANION GAP SERPL CALC-SCNC: 18 MMOL/L — HIGH (ref 5–17)
BUN SERPL-MCNC: 39 MG/DL — HIGH (ref 7–23)
CALCIUM SERPL-MCNC: 7.7 MG/DL — LOW (ref 8.4–10.5)
CHLORIDE SERPL-SCNC: 96 MMOL/L — SIGNIFICANT CHANGE UP (ref 96–108)
CO2 SERPL-SCNC: 24 MMOL/L — SIGNIFICANT CHANGE UP (ref 22–31)
CREAT SERPL-MCNC: 10.43 MG/DL — HIGH (ref 0.5–1.3)
EGFR: 5 ML/MIN/1.73M2 — LOW
EGFR: 5 ML/MIN/1.73M2 — LOW
GLUCOSE SERPL-MCNC: 102 MG/DL — HIGH (ref 70–99)
HCT VFR BLD CALC: 33.2 % — LOW (ref 39–50)
HGB BLD-MCNC: 10.7 G/DL — LOW (ref 13–17)
MAGNESIUM SERPL-MCNC: 2.3 MG/DL — SIGNIFICANT CHANGE UP (ref 1.6–2.6)
MCHC RBC-ENTMCNC: 21.4 PG — LOW (ref 27–34)
MCHC RBC-ENTMCNC: 32.2 G/DL — SIGNIFICANT CHANGE UP (ref 32–36)
MCV RBC AUTO: 66.4 FL — LOW (ref 80–100)
NRBC BLD AUTO-RTO: 0 /100 WBCS — SIGNIFICANT CHANGE UP (ref 0–0)
PHOSPHATE SERPL-MCNC: 4.9 MG/DL — HIGH (ref 2.5–4.5)
PLATELET # BLD AUTO: 215 K/UL — SIGNIFICANT CHANGE UP (ref 150–400)
POTASSIUM SERPL-MCNC: 5.1 MMOL/L — SIGNIFICANT CHANGE UP (ref 3.5–5.3)
POTASSIUM SERPL-SCNC: 5.1 MMOL/L — SIGNIFICANT CHANGE UP (ref 3.5–5.3)
RBC # BLD: 5 M/UL — SIGNIFICANT CHANGE UP (ref 4.2–5.8)
RBC # FLD: 16.8 % — HIGH (ref 10.3–14.5)
SODIUM SERPL-SCNC: 138 MMOL/L — SIGNIFICANT CHANGE UP (ref 135–145)
WBC # BLD: 7.23 K/UL — SIGNIFICANT CHANGE UP (ref 3.8–10.5)
WBC # FLD AUTO: 7.23 K/UL — SIGNIFICANT CHANGE UP (ref 3.8–10.5)

## 2025-03-04 PROCEDURE — 93306 TTE W/DOPPLER COMPLETE: CPT | Mod: 26

## 2025-03-04 PROCEDURE — 94010 BREATHING CAPACITY TEST: CPT | Mod: 26

## 2025-03-04 RX ADMIN — SEVELAMER HYDROCHLORIDE 1600 MILLIGRAM(S): 800 TABLET ORAL at 11:34

## 2025-03-04 RX ADMIN — Medication 40 MILLIGRAM(S): at 05:38

## 2025-03-04 RX ADMIN — SEVELAMER HYDROCHLORIDE 1600 MILLIGRAM(S): 800 TABLET ORAL at 17:40

## 2025-03-04 RX ADMIN — Medication 1 DOSE(S): at 20:49

## 2025-03-04 RX ADMIN — ATORVASTATIN CALCIUM 20 MILLIGRAM(S): 80 TABLET, FILM COATED ORAL at 20:44

## 2025-03-04 RX ADMIN — FUROSEMIDE 80 MILLIGRAM(S): 10 INJECTION INTRAMUSCULAR; INTRAVENOUS at 11:34

## 2025-03-04 RX ADMIN — CINACALCET 30 MILLIGRAM(S): 30 TABLET, FILM COATED ORAL at 11:35

## 2025-03-04 RX ADMIN — Medication 1000 UNIT(S): at 11:36

## 2025-03-04 RX ADMIN — APIXABAN 2.5 MILLIGRAM(S): 2.5 TABLET, FILM COATED ORAL at 05:38

## 2025-03-04 RX ADMIN — SEVELAMER HYDROCHLORIDE 1600 MILLIGRAM(S): 800 TABLET ORAL at 12:23

## 2025-03-04 RX ADMIN — FINASTERIDE 5 MILLIGRAM(S): 1 TABLET, FILM COATED ORAL at 11:35

## 2025-03-04 RX ADMIN — DAPAGLIFLOZIN 10 MILLIGRAM(S): 5 TABLET, FILM COATED ORAL at 11:37

## 2025-03-04 RX ADMIN — AMIODARONE HYDROCHLORIDE 100 MILLIGRAM(S): 50 INJECTION, SOLUTION INTRAVENOUS at 05:51

## 2025-03-04 RX ADMIN — Medication 81 MILLIGRAM(S): at 11:35

## 2025-03-05 ENCOUNTER — RESULT REVIEW (OUTPATIENT)
Age: 70
End: 2025-03-05

## 2025-03-05 LAB
ANION GAP SERPL CALC-SCNC: 21 MMOL/L — HIGH (ref 5–17)
BUN SERPL-MCNC: 55 MG/DL — HIGH (ref 7–23)
CALCIUM SERPL-MCNC: 7.4 MG/DL — LOW (ref 8.4–10.5)
CHLORIDE SERPL-SCNC: 93 MMOL/L — LOW (ref 96–108)
CO2 SERPL-SCNC: 21 MMOL/L — LOW (ref 22–31)
CREAT SERPL-MCNC: 12.95 MG/DL — HIGH (ref 0.5–1.3)
EGFR: 4 ML/MIN/1.73M2 — LOW
EGFR: 4 ML/MIN/1.73M2 — LOW
GLUCOSE SERPL-MCNC: 74 MG/DL — SIGNIFICANT CHANGE UP (ref 70–99)
HCT VFR BLD CALC: 29.9 % — LOW (ref 39–50)
HGB BLD-MCNC: 10 G/DL — LOW (ref 13–17)
MCHC RBC-ENTMCNC: 21.7 PG — LOW (ref 27–34)
MCHC RBC-ENTMCNC: 33.4 G/DL — SIGNIFICANT CHANGE UP (ref 32–36)
MCV RBC AUTO: 65 FL — LOW (ref 80–100)
NRBC BLD AUTO-RTO: 0 /100 WBCS — SIGNIFICANT CHANGE UP (ref 0–0)
PLATELET # BLD AUTO: 229 K/UL — SIGNIFICANT CHANGE UP (ref 150–400)
POTASSIUM SERPL-MCNC: 4.7 MMOL/L — SIGNIFICANT CHANGE UP (ref 3.5–5.3)
POTASSIUM SERPL-SCNC: 4.7 MMOL/L — SIGNIFICANT CHANGE UP (ref 3.5–5.3)
RBC # BLD: 4.6 M/UL — SIGNIFICANT CHANGE UP (ref 4.2–5.8)
RBC # FLD: 16.2 % — HIGH (ref 10.3–14.5)
SODIUM SERPL-SCNC: 135 MMOL/L — SIGNIFICANT CHANGE UP (ref 135–145)
WBC # BLD: 10.57 K/UL — HIGH (ref 3.8–10.5)
WBC # FLD AUTO: 10.57 K/UL — HIGH (ref 3.8–10.5)

## 2025-03-05 PROCEDURE — 93018 CV STRESS TEST I&R ONLY: CPT

## 2025-03-05 PROCEDURE — 99232 SBSQ HOSP IP/OBS MODERATE 35: CPT | Mod: GC

## 2025-03-05 PROCEDURE — 93016 CV STRESS TEST SUPVJ ONLY: CPT

## 2025-03-05 PROCEDURE — 78452 HT MUSCLE IMAGE SPECT MULT: CPT | Mod: 26

## 2025-03-05 RX ADMIN — Medication 81 MILLIGRAM(S): at 13:44

## 2025-03-05 RX ADMIN — FINASTERIDE 5 MILLIGRAM(S): 1 TABLET, FILM COATED ORAL at 13:44

## 2025-03-05 RX ADMIN — CINACALCET 30 MILLIGRAM(S): 30 TABLET, FILM COATED ORAL at 13:48

## 2025-03-05 RX ADMIN — Medication 1 DOSE(S): at 21:00

## 2025-03-05 RX ADMIN — DAPAGLIFLOZIN 10 MILLIGRAM(S): 5 TABLET, FILM COATED ORAL at 13:44

## 2025-03-05 RX ADMIN — Medication 1000 UNIT(S): at 13:44

## 2025-03-05 RX ADMIN — SEVELAMER HYDROCHLORIDE 1600 MILLIGRAM(S): 800 TABLET ORAL at 13:45

## 2025-03-05 RX ADMIN — ATORVASTATIN CALCIUM 20 MILLIGRAM(S): 80 TABLET, FILM COATED ORAL at 21:00

## 2025-03-05 RX ADMIN — Medication 40 MILLIGRAM(S): at 06:30

## 2025-03-05 RX ADMIN — SEVELAMER HYDROCHLORIDE 1600 MILLIGRAM(S): 800 TABLET ORAL at 09:10

## 2025-03-05 RX ADMIN — SEVELAMER HYDROCHLORIDE 1600 MILLIGRAM(S): 800 TABLET ORAL at 19:29

## 2025-03-05 RX ADMIN — AMIODARONE HYDROCHLORIDE 100 MILLIGRAM(S): 50 INJECTION, SOLUTION INTRAVENOUS at 06:31

## 2025-03-05 RX ADMIN — Medication 1 DOSE(S): at 09:10

## 2025-03-06 LAB
ALBUMIN SERPL ELPH-MCNC: 3.8 G/DL — SIGNIFICANT CHANGE UP (ref 3.3–5)
ALP SERPL-CCNC: 150 U/L — HIGH (ref 40–120)
ALT FLD-CCNC: 6 U/L — LOW (ref 10–45)
ANION GAP SERPL CALC-SCNC: 18 MMOL/L — HIGH (ref 5–17)
APTT BLD: 35.6 SEC — SIGNIFICANT CHANGE UP (ref 24.5–35.6)
AST SERPL-CCNC: 10 U/L — SIGNIFICANT CHANGE UP (ref 10–40)
BILIRUB SERPL-MCNC: 0.7 MG/DL — SIGNIFICANT CHANGE UP (ref 0.2–1.2)
BUN SERPL-MCNC: 34 MG/DL — HIGH (ref 7–23)
CA-I BLD-SCNC: 0.91 MMOL/L — LOW (ref 1.15–1.33)
CALCIUM SERPL-MCNC: 8.2 MG/DL — LOW (ref 8.4–10.5)
CHLORIDE SERPL-SCNC: 90 MMOL/L — LOW (ref 96–108)
CO2 SERPL-SCNC: 26 MMOL/L — SIGNIFICANT CHANGE UP (ref 22–31)
CREAT SERPL-MCNC: 10.08 MG/DL — HIGH (ref 0.5–1.3)
EGFR: 5 ML/MIN/1.73M2 — LOW
EGFR: 5 ML/MIN/1.73M2 — LOW
GLUCOSE SERPL-MCNC: 99 MG/DL — SIGNIFICANT CHANGE UP (ref 70–99)
HCT VFR BLD CALC: 33.7 % — LOW (ref 39–50)
HGB BLD-MCNC: 10.7 G/DL — LOW (ref 13–17)
INR BLD: 1.18 RATIO — HIGH (ref 0.85–1.16)
MAGNESIUM SERPL-MCNC: 2.4 MG/DL — SIGNIFICANT CHANGE UP (ref 1.6–2.6)
MCHC RBC-ENTMCNC: 21 PG — LOW (ref 27–34)
MCHC RBC-ENTMCNC: 31.8 G/DL — LOW (ref 32–36)
MCV RBC AUTO: 66.1 FL — LOW (ref 80–100)
NRBC BLD AUTO-RTO: 0 /100 WBCS — SIGNIFICANT CHANGE UP (ref 0–0)
PHOSPHATE SERPL-MCNC: 4.3 MG/DL — SIGNIFICANT CHANGE UP (ref 2.5–4.5)
PLATELET # BLD AUTO: 224 K/UL — SIGNIFICANT CHANGE UP (ref 150–400)
POTASSIUM SERPL-MCNC: 5.2 MMOL/L — SIGNIFICANT CHANGE UP (ref 3.5–5.3)
POTASSIUM SERPL-SCNC: 5.2 MMOL/L — SIGNIFICANT CHANGE UP (ref 3.5–5.3)
PROT SERPL-MCNC: 7.8 G/DL — SIGNIFICANT CHANGE UP (ref 6–8.3)
PROTHROM AB SERPL-ACNC: 13.4 SEC — SIGNIFICANT CHANGE UP (ref 9.9–13.4)
RBC # BLD: 5.1 M/UL — SIGNIFICANT CHANGE UP (ref 4.2–5.8)
RBC # FLD: 16.5 % — HIGH (ref 10.3–14.5)
SODIUM SERPL-SCNC: 134 MMOL/L — LOW (ref 135–145)
WBC # BLD: 9.96 K/UL — SIGNIFICANT CHANGE UP (ref 3.8–10.5)
WBC # FLD AUTO: 9.96 K/UL — SIGNIFICANT CHANGE UP (ref 3.8–10.5)

## 2025-03-06 PROCEDURE — 99152 MOD SED SAME PHYS/QHP 5/>YRS: CPT

## 2025-03-06 PROCEDURE — 93458 L HRT ARTERY/VENTRICLE ANGIO: CPT | Mod: 26

## 2025-03-06 RX ORDER — MIDODRINE HYDROCHLORIDE 5 MG/1
10 TABLET ORAL
Refills: 0 | Status: DISCONTINUED | OUTPATIENT
Start: 2025-03-06 | End: 2025-03-08

## 2025-03-06 RX ORDER — EPOETIN ALFA 10000 [IU]/ML
2000 SOLUTION INTRAVENOUS; SUBCUTANEOUS
Refills: 0 | Status: DISCONTINUED | OUTPATIENT
Start: 2025-03-06 | End: 2025-03-08

## 2025-03-06 RX ORDER — APIXABAN 2.5 MG/1
2.5 TABLET, FILM COATED ORAL
Refills: 0 | Status: DISCONTINUED | OUTPATIENT
Start: 2025-03-07 | End: 2025-03-08

## 2025-03-06 RX ADMIN — FINASTERIDE 5 MILLIGRAM(S): 1 TABLET, FILM COATED ORAL at 11:57

## 2025-03-06 RX ADMIN — CINACALCET 30 MILLIGRAM(S): 30 TABLET, FILM COATED ORAL at 11:57

## 2025-03-06 RX ADMIN — SEVELAMER HYDROCHLORIDE 1600 MILLIGRAM(S): 800 TABLET ORAL at 08:15

## 2025-03-06 RX ADMIN — SEVELAMER HYDROCHLORIDE 1600 MILLIGRAM(S): 800 TABLET ORAL at 12:07

## 2025-03-06 RX ADMIN — ATORVASTATIN CALCIUM 20 MILLIGRAM(S): 80 TABLET, FILM COATED ORAL at 21:07

## 2025-03-06 RX ADMIN — Medication 1000 UNIT(S): at 11:57

## 2025-03-06 RX ADMIN — DAPAGLIFLOZIN 10 MILLIGRAM(S): 5 TABLET, FILM COATED ORAL at 12:03

## 2025-03-06 RX ADMIN — Medication 1 DOSE(S): at 08:16

## 2025-03-06 RX ADMIN — Medication 1 DOSE(S): at 21:07

## 2025-03-06 RX ADMIN — AMIODARONE HYDROCHLORIDE 100 MILLIGRAM(S): 50 INJECTION, SOLUTION INTRAVENOUS at 05:11

## 2025-03-06 RX ADMIN — FUROSEMIDE 80 MILLIGRAM(S): 10 INJECTION INTRAMUSCULAR; INTRAVENOUS at 08:14

## 2025-03-06 RX ADMIN — Medication 81 MILLIGRAM(S): at 12:03

## 2025-03-06 RX ADMIN — Medication 40 MILLIGRAM(S): at 05:11

## 2025-03-07 DIAGNOSIS — D64.9 ANEMIA, UNSPECIFIED: ICD-10-CM

## 2025-03-07 DIAGNOSIS — M89.8X9 OTHER SPECIFIED DISORDERS OF BONE, UNSPECIFIED SITE: ICD-10-CM

## 2025-03-07 LAB
ANION GAP SERPL CALC-SCNC: 16 MMOL/L — SIGNIFICANT CHANGE UP (ref 5–17)
BUN SERPL-MCNC: 42 MG/DL — HIGH (ref 7–23)
CALCIUM SERPL-MCNC: 7.7 MG/DL — LOW (ref 8.4–10.5)
CHLORIDE SERPL-SCNC: 92 MMOL/L — LOW (ref 96–108)
CO2 SERPL-SCNC: 24 MMOL/L — SIGNIFICANT CHANGE UP (ref 22–31)
CREAT SERPL-MCNC: 11.8 MG/DL — HIGH (ref 0.5–1.3)
CULTURE RESULTS: SIGNIFICANT CHANGE UP
CULTURE RESULTS: SIGNIFICANT CHANGE UP
EGFR: 4 ML/MIN/1.73M2 — LOW
EGFR: 4 ML/MIN/1.73M2 — LOW
GLUCOSE SERPL-MCNC: 68 MG/DL — LOW (ref 70–99)
HCT VFR BLD CALC: 33.7 % — LOW (ref 39–50)
HGB BLD-MCNC: 10.9 G/DL — LOW (ref 13–17)
MAGNESIUM SERPL-MCNC: 2.4 MG/DL — SIGNIFICANT CHANGE UP (ref 1.6–2.6)
MCHC RBC-ENTMCNC: 21.2 PG — LOW (ref 27–34)
MCHC RBC-ENTMCNC: 32.3 G/DL — SIGNIFICANT CHANGE UP (ref 32–36)
MCV RBC AUTO: 65.6 FL — LOW (ref 80–100)
NRBC BLD AUTO-RTO: 0 /100 WBCS — SIGNIFICANT CHANGE UP (ref 0–0)
PHOSPHATE SERPL-MCNC: 4.5 MG/DL — SIGNIFICANT CHANGE UP (ref 2.5–4.5)
PLATELET # BLD AUTO: 211 K/UL — SIGNIFICANT CHANGE UP (ref 150–400)
POTASSIUM SERPL-MCNC: 5.4 MMOL/L — HIGH (ref 3.5–5.3)
POTASSIUM SERPL-SCNC: 5.4 MMOL/L — HIGH (ref 3.5–5.3)
RBC # BLD: 5.14 M/UL — SIGNIFICANT CHANGE UP (ref 4.2–5.8)
RBC # FLD: 16 % — HIGH (ref 10.3–14.5)
SODIUM SERPL-SCNC: 132 MMOL/L — LOW (ref 135–145)
SPECIMEN SOURCE: SIGNIFICANT CHANGE UP
SPECIMEN SOURCE: SIGNIFICANT CHANGE UP
WBC # BLD: 8.91 K/UL — SIGNIFICANT CHANGE UP (ref 3.8–10.5)
WBC # FLD AUTO: 8.91 K/UL — SIGNIFICANT CHANGE UP (ref 3.8–10.5)

## 2025-03-07 PROCEDURE — 99233 SBSQ HOSP IP/OBS HIGH 50: CPT | Mod: GC

## 2025-03-07 RX ORDER — EPOETIN ALFA 10000 [IU]/ML
1 SOLUTION INTRAVENOUS; SUBCUTANEOUS
Qty: 0 | Refills: 0 | DISCHARGE
Start: 2025-03-07

## 2025-03-07 RX ADMIN — ATORVASTATIN CALCIUM 20 MILLIGRAM(S): 80 TABLET, FILM COATED ORAL at 21:00

## 2025-03-07 RX ADMIN — APIXABAN 2.5 MILLIGRAM(S): 2.5 TABLET, FILM COATED ORAL at 05:19

## 2025-03-07 RX ADMIN — Medication 650 MILLIGRAM(S): at 20:01

## 2025-03-07 RX ADMIN — Medication 40 MILLIGRAM(S): at 05:19

## 2025-03-07 RX ADMIN — CINACALCET 30 MILLIGRAM(S): 30 TABLET, FILM COATED ORAL at 12:49

## 2025-03-07 RX ADMIN — FINASTERIDE 5 MILLIGRAM(S): 1 TABLET, FILM COATED ORAL at 12:48

## 2025-03-07 RX ADMIN — Medication 650 MILLIGRAM(S): at 20:50

## 2025-03-07 RX ADMIN — EPOETIN ALFA 2000 UNIT(S): 10000 SOLUTION INTRAVENOUS; SUBCUTANEOUS at 09:13

## 2025-03-07 RX ADMIN — DAPAGLIFLOZIN 10 MILLIGRAM(S): 5 TABLET, FILM COATED ORAL at 12:48

## 2025-03-07 RX ADMIN — Medication 1000 UNIT(S): at 12:48

## 2025-03-07 RX ADMIN — APIXABAN 2.5 MILLIGRAM(S): 2.5 TABLET, FILM COATED ORAL at 17:10

## 2025-03-07 RX ADMIN — Medication 81 MILLIGRAM(S): at 12:49

## 2025-03-07 RX ADMIN — Medication 1 DOSE(S): at 20:57

## 2025-03-07 RX ADMIN — AMIODARONE HYDROCHLORIDE 100 MILLIGRAM(S): 50 INJECTION, SOLUTION INTRAVENOUS at 05:18

## 2025-03-07 RX ADMIN — SEVELAMER HYDROCHLORIDE 1600 MILLIGRAM(S): 800 TABLET ORAL at 12:48

## 2025-03-07 RX ADMIN — SEVELAMER HYDROCHLORIDE 1600 MILLIGRAM(S): 800 TABLET ORAL at 17:10

## 2025-03-08 VITALS
RESPIRATION RATE: 18 BRPM | HEART RATE: 68 BPM | SYSTOLIC BLOOD PRESSURE: 112 MMHG | TEMPERATURE: 98 F | OXYGEN SATURATION: 98 % | DIASTOLIC BLOOD PRESSURE: 73 MMHG

## 2025-03-08 PROCEDURE — 82330 ASSAY OF CALCIUM: CPT

## 2025-03-08 PROCEDURE — 85025 COMPLETE CBC W/AUTO DIFF WBC: CPT

## 2025-03-08 PROCEDURE — 93010 ELECTROCARDIOGRAM REPORT: CPT

## 2025-03-08 PROCEDURE — 93458 L HRT ARTERY/VENTRICLE ANGIO: CPT

## 2025-03-08 PROCEDURE — 94640 AIRWAY INHALATION TREATMENT: CPT

## 2025-03-08 PROCEDURE — C1894: CPT

## 2025-03-08 PROCEDURE — 93306 TTE W/DOPPLER COMPLETE: CPT

## 2025-03-08 PROCEDURE — 71250 CT THORAX DX C-: CPT | Mod: MC

## 2025-03-08 PROCEDURE — 84100 ASSAY OF PHOSPHORUS: CPT

## 2025-03-08 PROCEDURE — 78452 HT MUSCLE IMAGE SPECT MULT: CPT | Mod: MC

## 2025-03-08 PROCEDURE — 80048 BASIC METABOLIC PNL TOTAL CA: CPT

## 2025-03-08 PROCEDURE — 36415 COLL VENOUS BLD VENIPUNCTURE: CPT

## 2025-03-08 PROCEDURE — 93005 ELECTROCARDIOGRAM TRACING: CPT

## 2025-03-08 PROCEDURE — 80053 COMPREHEN METABOLIC PANEL: CPT

## 2025-03-08 PROCEDURE — 83735 ASSAY OF MAGNESIUM: CPT

## 2025-03-08 PROCEDURE — 87040 BLOOD CULTURE FOR BACTERIA: CPT

## 2025-03-08 PROCEDURE — 87637 SARSCOV2&INF A&B&RSV AMP PRB: CPT

## 2025-03-08 PROCEDURE — 71046 X-RAY EXAM CHEST 2 VIEWS: CPT

## 2025-03-08 PROCEDURE — 99261: CPT

## 2025-03-08 PROCEDURE — 85730 THROMBOPLASTIN TIME PARTIAL: CPT

## 2025-03-08 PROCEDURE — 85610 PROTHROMBIN TIME: CPT

## 2025-03-08 PROCEDURE — 94010 BREATHING CAPACITY TEST: CPT

## 2025-03-08 PROCEDURE — 85027 COMPLETE CBC AUTOMATED: CPT

## 2025-03-08 PROCEDURE — C1769: CPT

## 2025-03-08 PROCEDURE — 84484 ASSAY OF TROPONIN QUANT: CPT

## 2025-03-08 PROCEDURE — 83605 ASSAY OF LACTIC ACID: CPT

## 2025-03-08 PROCEDURE — 93017 CV STRESS TEST TRACING ONLY: CPT

## 2025-03-08 PROCEDURE — C1887: CPT

## 2025-03-08 PROCEDURE — 99285 EMERGENCY DEPT VISIT HI MDM: CPT

## 2025-03-08 PROCEDURE — A9500: CPT

## 2025-03-08 PROCEDURE — 83880 ASSAY OF NATRIURETIC PEPTIDE: CPT

## 2025-03-08 RX ORDER — FUROSEMIDE 10 MG/ML
1 INJECTION INTRAMUSCULAR; INTRAVENOUS
Qty: 51 | Refills: 0
Start: 2025-03-08 | End: 2025-06-05

## 2025-03-08 RX ORDER — CINACALCET 30 MG/1
1 TABLET, FILM COATED ORAL
Qty: 90 | Refills: 1
Start: 2025-03-08 | End: 2025-09-03

## 2025-03-08 RX ORDER — CINACALCET 30 MG/1
1 TABLET, FILM COATED ORAL
Qty: 30 | Refills: 1
Start: 2025-03-08 | End: 2025-05-06

## 2025-03-08 RX ADMIN — Medication 650 MILLIGRAM(S): at 12:22

## 2025-03-08 RX ADMIN — Medication 1000 UNIT(S): at 12:22

## 2025-03-08 RX ADMIN — FUROSEMIDE 80 MILLIGRAM(S): 10 INJECTION INTRAMUSCULAR; INTRAVENOUS at 08:43

## 2025-03-08 RX ADMIN — APIXABAN 2.5 MILLIGRAM(S): 2.5 TABLET, FILM COATED ORAL at 06:10

## 2025-03-08 RX ADMIN — SEVELAMER HYDROCHLORIDE 1600 MILLIGRAM(S): 800 TABLET ORAL at 08:43

## 2025-03-08 RX ADMIN — FINASTERIDE 5 MILLIGRAM(S): 1 TABLET, FILM COATED ORAL at 12:21

## 2025-03-08 RX ADMIN — DAPAGLIFLOZIN 10 MILLIGRAM(S): 5 TABLET, FILM COATED ORAL at 12:22

## 2025-03-08 RX ADMIN — Medication 40 MILLIGRAM(S): at 06:10

## 2025-03-08 RX ADMIN — Medication 650 MILLIGRAM(S): at 12:24

## 2025-03-08 RX ADMIN — SEVELAMER HYDROCHLORIDE 1600 MILLIGRAM(S): 800 TABLET ORAL at 12:21

## 2025-03-08 RX ADMIN — AMIODARONE HYDROCHLORIDE 100 MILLIGRAM(S): 50 INJECTION, SOLUTION INTRAVENOUS at 06:11

## 2025-03-08 RX ADMIN — Medication 81 MILLIGRAM(S): at 12:21

## 2025-03-08 RX ADMIN — CINACALCET 30 MILLIGRAM(S): 30 TABLET, FILM COATED ORAL at 12:21

## 2025-03-08 RX ADMIN — Medication 1 DOSE(S): at 08:43

## 2025-03-13 RX ORDER — LIDOCAINE AND PRILOCAINE 25; 25 MG/G; MG/G
2.5-2.5 CREAM TOPICAL
Qty: 2 | Refills: 3 | Status: ACTIVE | COMMUNITY
Start: 2025-03-12 | End: 1900-01-01

## 2025-03-15 RX ORDER — CYPROHEPTADINE HYDROCHLORIDE 4 MG/1
4 TABLET ORAL EVERY 8 HOURS
Qty: 90 | Refills: 0 | Status: ACTIVE | COMMUNITY
Start: 2025-03-15 | End: 1900-01-01

## 2025-03-22 ENCOUNTER — EMERGENCY (EMERGENCY)
Facility: HOSPITAL | Age: 70
LOS: 1 days | Discharge: HOME | End: 2025-03-22
Attending: EMERGENCY MEDICINE
Payer: COMMERCIAL

## 2025-03-22 VITALS
DIASTOLIC BLOOD PRESSURE: 67 MMHG | HEIGHT: 69 IN | WEIGHT: 210.1 LBS | TEMPERATURE: 97 F | OXYGEN SATURATION: 96 % | HEART RATE: 57 BPM | RESPIRATION RATE: 20 BRPM | SYSTOLIC BLOOD PRESSURE: 116 MMHG

## 2025-03-22 DIAGNOSIS — Z98.49 CATARACT EXTRACTION STATUS, UNSPECIFIED EYE: Chronic | ICD-10-CM

## 2025-03-22 DIAGNOSIS — Z90.89 ACQUIRED ABSENCE OF OTHER ORGANS: Chronic | ICD-10-CM

## 2025-03-22 DIAGNOSIS — I77.0 ARTERIOVENOUS FISTULA, ACQUIRED: Chronic | ICD-10-CM

## 2025-03-22 DIAGNOSIS — Z98.890 OTHER SPECIFIED POSTPROCEDURAL STATES: Chronic | ICD-10-CM

## 2025-03-22 DIAGNOSIS — Z95.9 PRESENCE OF CARDIAC AND VASCULAR IMPLANT AND GRAFT, UNSPECIFIED: Chronic | ICD-10-CM

## 2025-03-22 PROCEDURE — 99283 EMERGENCY DEPT VISIT LOW MDM: CPT

## 2025-03-22 RX ORDER — OFLOXACIN 0.3 %
10 DROPS OTIC (EAR)
Qty: 1 | Refills: 0
Start: 2025-03-22 | End: 2025-03-28

## 2025-03-22 NOTE — ED PROVIDER NOTE - CARE PROVIDER_API CALL
Nathanael Bowers  Otolaryngology  [address] [place] [address], [address]  [place] [address]  Phone: [phone]  Fax: [phone]  Follow Up Time:

## 2025-03-22 NOTE — ED PROVIDER NOTE - CARE PLAN
Principal Discharge DX:	Ruptured tympanic membrane, left  Assessment and plan of treatment:	L TM perforation in the setting of Qtip use  -Otic Cipro  -Follow up w ENT  -Return precautions discussed   1

## 2025-03-22 NOTE — ED PROVIDER NOTE - PLAN OF CARE
L TM perforation in the setting of Qtip use  -Otic Cipro  -Follow up w ENT  -Return precautions discussed

## 2025-03-22 NOTE — ED PROVIDER NOTE - NS ED MD DISPO ADMITTING SERVICE
Problem: Pain  Goal: Acceptable pain level achieved/maintained at rest using appropriate pain scale for the patient  Outcome: Monitoring/Evaluating progress  Goal: Acceptable pain level achieved/maintained with activity using appropriate pain scale for the patient  Outcome: Monitoring/Evaluating progress  Goal: Acceptable pain level achieved/maintained without oversedation  Outcome: Monitoring/Evaluating progress     Problem: Skin Integrity Alteration  Goal: Skin remains intact with no new/deterioration of wound or pressure injury  Outcome: Monitoring/Evaluating progress  Goal: Participates in wound care activities  Outcome: Monitoring/Evaluating progress  Goal: Comfort optimized with pressure injury prevention strategies guided by patient/family preference. (Hospice)  Outcome: Monitoring/Evaluating progress  Goal: Wound care provided to promote comfort needs (Hospice)  Outcome: Monitoring/Evaluating progress      SURG

## 2025-03-22 NOTE — ED PROVIDER NOTE - PATIENT PORTAL LINK FT
You can access the FollowMyHealth Patient Portal offered by [facility] by registering at the following website: [email or web address]. By joining [employer]’s FollowMyHealth portal, you will also be able to view your health information using other applications (apps) compatible with our system.

## 2025-03-22 NOTE — ED PROVIDER NOTE - CARE PROVIDERS DIRECT ADDRESSES
[email or web address]

## 2025-03-22 NOTE — ED ADULT NURSE NOTE - NSHOSCREENINGQ1_ED_ALL_ED
S-(situation): The mother sent [employer] message in regards to eczema.     B-(background): The patient has been using aquaphor and it has improved.    A-(assessment): The mother states the patient continues to have eczema.  She does use the Aquaphor. The mother states the aquaphor is helping and his eczema has improved.  The mother is concerned that he does put his hands in his mouth and rubs his eyes a lot.  The mother uses Lansinoh on her breasts and would like to use this for his eczema.      She is also concerned about his eyes.  She states they are continuing to cross. The mother states it is more frequent.    R-(recommendations): Will send to provider to review and advise.    Thank you    May MAYER RN      
No

## 2025-03-22 NOTE — ED PROVIDER NOTE - ATTENDING APP SHARED VISIT CONTRIBUTION OF CARE
This was a shared visit with DAKOTA.  I have reviewed and discussed the case with the DAKOTA and agree with verified documentation unless otherwise documented.  I have independently spoken with and examined the patient and my documentation of history/pe and MDM are above.

## 2025-03-22 NOTE — ED PROVIDER NOTE - CLINICAL SUMMARY MEDICAL DECISION MAKING FREE TEXT BOX
Shane 69-year-old male history of hypertension end-stage renal dialysis 3 times a week A-fib hyperlipidemia CHF who complains for 2 weeks of left ear drainage clear after using Q-tips regularly he also noted some drainage in the right ear no fever no ear pain no new headaches no nasal discharge no trauma no head strike on examination left TM is ruptured no active drainage appreciated in her ear no erythema no pain to mastoid right ear there is a small scab with no active bleeding TM intact clear no bulging would recommend antihistamine with decongestion otic antibiotic drops for left ear with follow-up for ENT no indication for blood work or imaging at this time

## 2025-03-22 NOTE — ED PROVIDER NOTE - OBJECTIVE STATEMENT
70yo M PMH ESRD on HD MWF, RA, CAD s/p PCI (LAD 2014), CHF, gout, HTN, HLD, AF s/p ablation presents for evaluation of "cream" colored liquid drainage from the L ear x 10 days, and from the R ear x about 1 week. Uses Qtips regularly and notes after he'd feel a wet sensation in his ear, he would clean it with Qtips. No tinnitus. No hearing loss, though notes he feels he must speak more loudly for people to hear him. No headache. No ear pain. No rhinorrhea. No neck pain / stiffness. No sore throat / congestion. No falls / head injuries. Has ENT appt 4/29.

## 2025-03-22 NOTE — ED PROVIDER NOTE - NSFOLLOWUPINSTRUCTIONS_ED_ALL_ED_FT
Do not insert any objects including Qtips into the ears.  Apply antibiotic solution into both ears as prescribed:  -Polymyxin / Neomycin  Follow up with ENT for further evaluation. Our team will contact you in 2-3 days to assist with scheduling a sooner appointment.  Return to the ER for new or worsened symptoms including fever, chills, severe headache, vomiting, ringing in the ears, severe neck pain, or any concern. Do not insert any objects including Qtips into the ears.  Apply antibiotic solution into both ears as prescribed:  -Ciprofloxacin ear drops. Apply 10 drops to each ear once daily for 7 days.  Follow up with ENT for further evaluation. Our team will contact you in 2-3 days to assist with scheduling a sooner appointment.  Return to the ER for new or worsened symptoms including fever, chills, severe headache, vomiting, ringing in the ears, severe neck pain, or any concern.

## 2025-03-22 NOTE — ED PROVIDER NOTE - LEFT EAR
minimal amount of clear liquid in canal. No canal erythema. Nontender. No mastoid ttp./TM PERFORATIONS

## 2025-03-22 NOTE — ED PROVIDER NOTE - RIGHT EAR
Small amount of dried blood in the canal. No erythema. No drainage. Nontender. No mastoid ttp./DULL/ABSENT LIGHT REFLEX

## 2025-03-25 ENCOUNTER — OUTPATIENT (OUTPATIENT)
Dept: OUTPATIENT SERVICES | Facility: HOSPITAL | Age: 70
LOS: 1 days | End: 2025-03-25
Payer: COMMERCIAL

## 2025-03-25 ENCOUNTER — APPOINTMENT (OUTPATIENT)
Dept: INTERNAL MEDICINE | Facility: CLINIC | Age: 70
End: 2025-03-25
Payer: MEDICARE

## 2025-03-25 VITALS — HEART RATE: 70 BPM | DIASTOLIC BLOOD PRESSURE: 60 MMHG | SYSTOLIC BLOOD PRESSURE: 92 MMHG | RESPIRATION RATE: 14 BRPM

## 2025-03-25 DIAGNOSIS — J18.9 PNEUMONIA, UNSPECIFIED ORGANISM: ICD-10-CM

## 2025-03-25 DIAGNOSIS — I77.0 ARTERIOVENOUS FISTULA, ACQUIRED: Chronic | ICD-10-CM

## 2025-03-25 DIAGNOSIS — H92.10 OTORRHEA, UNSPECIFIED EAR: ICD-10-CM

## 2025-03-25 DIAGNOSIS — Z90.89 ACQUIRED ABSENCE OF OTHER ORGANS: Chronic | ICD-10-CM

## 2025-03-25 DIAGNOSIS — Z98.890 OTHER SPECIFIED POSTPROCEDURAL STATES: Chronic | ICD-10-CM

## 2025-03-25 DIAGNOSIS — Z95.9 PRESENCE OF CARDIAC AND VASCULAR IMPLANT AND GRAFT, UNSPECIFIED: Chronic | ICD-10-CM

## 2025-03-25 DIAGNOSIS — Z98.49 CATARACT EXTRACTION STATUS, UNSPECIFIED EYE: Chronic | ICD-10-CM

## 2025-03-25 PROCEDURE — G2211 COMPLEX E/M VISIT ADD ON: CPT

## 2025-03-25 PROCEDURE — G0463: CPT

## 2025-03-25 PROCEDURE — 99213 OFFICE O/P EST LOW 20 MIN: CPT

## 2025-04-15 ENCOUNTER — NON-APPOINTMENT (OUTPATIENT)
Age: 70
End: 2025-04-15

## 2025-04-16 ENCOUNTER — NON-APPOINTMENT (OUTPATIENT)
Age: 70
End: 2025-04-16

## 2025-05-05 ENCOUNTER — RX RENEWAL (OUTPATIENT)
Age: 70
End: 2025-05-05

## 2025-05-15 ENCOUNTER — APPOINTMENT (OUTPATIENT)
Dept: VASCULAR SURGERY | Facility: CLINIC | Age: 70
End: 2025-05-15

## 2025-05-21 DIAGNOSIS — R06.02 SHORTNESS OF BREATH: ICD-10-CM

## 2025-05-28 ENCOUNTER — APPOINTMENT (OUTPATIENT)
Dept: PULMONOLOGY | Facility: CLINIC | Age: 70
End: 2025-05-28

## 2025-06-04 ENCOUNTER — EMERGENCY (EMERGENCY)
Facility: HOSPITAL | Age: 70
LOS: 1 days | End: 2025-06-04
Attending: STUDENT IN AN ORGANIZED HEALTH CARE EDUCATION/TRAINING PROGRAM
Payer: COMMERCIAL

## 2025-06-04 VITALS
HEART RATE: 58 BPM | RESPIRATION RATE: 18 BRPM | SYSTOLIC BLOOD PRESSURE: 138 MMHG | TEMPERATURE: 98 F | DIASTOLIC BLOOD PRESSURE: 78 MMHG | OXYGEN SATURATION: 100 %

## 2025-06-04 VITALS
DIASTOLIC BLOOD PRESSURE: 85 MMHG | HEART RATE: 51 BPM | OXYGEN SATURATION: 98 % | RESPIRATION RATE: 19 BRPM | TEMPERATURE: 98 F | HEIGHT: 71 IN | SYSTOLIC BLOOD PRESSURE: 132 MMHG | WEIGHT: 210.1 LBS

## 2025-06-04 DIAGNOSIS — Z98.890 OTHER SPECIFIED POSTPROCEDURAL STATES: Chronic | ICD-10-CM

## 2025-06-04 DIAGNOSIS — Z90.89 ACQUIRED ABSENCE OF OTHER ORGANS: Chronic | ICD-10-CM

## 2025-06-04 DIAGNOSIS — Z98.49 CATARACT EXTRACTION STATUS, UNSPECIFIED EYE: Chronic | ICD-10-CM

## 2025-06-04 DIAGNOSIS — Z95.9 PRESENCE OF CARDIAC AND VASCULAR IMPLANT AND GRAFT, UNSPECIFIED: Chronic | ICD-10-CM

## 2025-06-04 DIAGNOSIS — I77.0 ARTERIOVENOUS FISTULA, ACQUIRED: Chronic | ICD-10-CM

## 2025-06-04 LAB
ALBUMIN SERPL ELPH-MCNC: 4.6 G/DL — SIGNIFICANT CHANGE UP (ref 3.3–5)
ALP SERPL-CCNC: 115 U/L — SIGNIFICANT CHANGE UP (ref 40–120)
ALT FLD-CCNC: 8 U/L — LOW (ref 10–45)
ANION GAP SERPL CALC-SCNC: 17 MMOL/L — SIGNIFICANT CHANGE UP (ref 5–17)
ANISOCYTOSIS BLD QL: SIGNIFICANT CHANGE UP
APTT BLD: 33.7 SEC — SIGNIFICANT CHANGE UP (ref 26.1–36.8)
AST SERPL-CCNC: 7 U/L — LOW (ref 10–40)
BASOPHILS # BLD AUTO: 0 K/UL — SIGNIFICANT CHANGE UP (ref 0–0.2)
BASOPHILS NFR BLD AUTO: 0 % — SIGNIFICANT CHANGE UP (ref 0–2)
BILIRUB SERPL-MCNC: 1 MG/DL — SIGNIFICANT CHANGE UP (ref 0.2–1.2)
BUN SERPL-MCNC: 29 MG/DL — HIGH (ref 7–23)
CALCIUM SERPL-MCNC: 10.2 MG/DL — SIGNIFICANT CHANGE UP (ref 8.4–10.5)
CHLORIDE SERPL-SCNC: 95 MMOL/L — LOW (ref 96–108)
CO2 SERPL-SCNC: 28 MMOL/L — SIGNIFICANT CHANGE UP (ref 22–31)
CREAT SERPL-MCNC: 6.49 MG/DL — HIGH (ref 0.5–1.3)
DACRYOCYTES BLD QL SMEAR: SLIGHT — SIGNIFICANT CHANGE UP
EGFR: 9 ML/MIN/1.73M2 — LOW
EGFR: 9 ML/MIN/1.73M2 — LOW
EOSINOPHIL # BLD AUTO: 0 K/UL — SIGNIFICANT CHANGE UP (ref 0–0.5)
EOSINOPHIL NFR BLD AUTO: 0 % — SIGNIFICANT CHANGE UP (ref 0–6)
GLUCOSE SERPL-MCNC: 95 MG/DL — SIGNIFICANT CHANGE UP (ref 70–99)
HCT VFR BLD CALC: 38 % — LOW (ref 39–50)
HGB BLD-MCNC: 12.2 G/DL — LOW (ref 13–17)
HYPOCHROMIA BLD QL: SLIGHT — SIGNIFICANT CHANGE UP
INR BLD: 1.16 RATIO — SIGNIFICANT CHANGE UP (ref 0.85–1.16)
LYMPHOCYTES # BLD AUTO: 1.49 K/UL — SIGNIFICANT CHANGE UP (ref 1–3.3)
LYMPHOCYTES # BLD AUTO: 12.4 % — LOW (ref 13–44)
MACROCYTES BLD QL: SIGNIFICANT CHANGE UP
MANUAL SMEAR VERIFICATION: SIGNIFICANT CHANGE UP
MCHC RBC-ENTMCNC: 22 PG — LOW (ref 27–34)
MCHC RBC-ENTMCNC: 32.1 G/DL — SIGNIFICANT CHANGE UP (ref 32–36)
MCV RBC AUTO: 68.5 FL — LOW (ref 80–100)
MONOCYTES # BLD AUTO: 0.85 K/UL — SIGNIFICANT CHANGE UP (ref 0–0.9)
MONOCYTES NFR BLD AUTO: 7.1 % — SIGNIFICANT CHANGE UP (ref 2–14)
NEUTROPHILS # BLD AUTO: 9.66 K/UL — HIGH (ref 1.8–7.4)
NEUTROPHILS NFR BLD AUTO: 80.5 % — HIGH (ref 43–77)
OVALOCYTES BLD QL SMEAR: SLIGHT — SIGNIFICANT CHANGE UP
PLAT MORPH BLD: NORMAL — SIGNIFICANT CHANGE UP
PLATELET # BLD AUTO: 147 K/UL — LOW (ref 150–400)
POIKILOCYTOSIS BLD QL AUTO: SLIGHT — SIGNIFICANT CHANGE UP
POLYCHROMASIA BLD QL SMEAR: SLIGHT — SIGNIFICANT CHANGE UP
POTASSIUM SERPL-MCNC: 4.6 MMOL/L — SIGNIFICANT CHANGE UP (ref 3.5–5.3)
POTASSIUM SERPL-SCNC: 4.6 MMOL/L — SIGNIFICANT CHANGE UP (ref 3.5–5.3)
PROT SERPL-MCNC: 8.3 G/DL — SIGNIFICANT CHANGE UP (ref 6–8.3)
PROTHROM AB SERPL-ACNC: 13.2 SEC — SIGNIFICANT CHANGE UP (ref 9.9–13.4)
RBC # BLD: 5.55 M/UL — SIGNIFICANT CHANGE UP (ref 4.2–5.8)
RBC # FLD: 17.3 % — HIGH (ref 10.3–14.5)
RBC BLD AUTO: ABNORMAL
SODIUM SERPL-SCNC: 140 MMOL/L — SIGNIFICANT CHANGE UP (ref 135–145)
TARGETS BLD QL SMEAR: SIGNIFICANT CHANGE UP
WBC # BLD: 12 K/UL — HIGH (ref 3.8–10.5)
WBC # FLD AUTO: 12 K/UL — HIGH (ref 3.8–10.5)

## 2025-06-04 PROCEDURE — 85025 COMPLETE CBC W/AUTO DIFF WBC: CPT

## 2025-06-04 PROCEDURE — 99284 EMERGENCY DEPT VISIT MOD MDM: CPT | Mod: 25

## 2025-06-04 PROCEDURE — 36410 VNPNXR 3YR/> PHY/QHP DX/THER: CPT

## 2025-06-04 PROCEDURE — 85730 THROMBOPLASTIN TIME PARTIAL: CPT

## 2025-06-04 PROCEDURE — 85610 PROTHROMBIN TIME: CPT

## 2025-06-04 PROCEDURE — 99285 EMERGENCY DEPT VISIT HI MDM: CPT

## 2025-06-04 PROCEDURE — 80053 COMPREHEN METABOLIC PANEL: CPT

## 2025-06-04 PROCEDURE — 76937 US GUIDE VASCULAR ACCESS: CPT

## 2025-06-04 PROCEDURE — 76937 US GUIDE VASCULAR ACCESS: CPT | Mod: 26

## 2025-06-04 PROCEDURE — 36000 PLACE NEEDLE IN VEIN: CPT

## 2025-06-04 NOTE — ED PROVIDER NOTE - PHYSICAL EXAMINATION
Gen: AAO x 3, NAD  Skin: No rashes or lesions  HEENT: NC/AT, PERRLA, EOMI, MMM  Resp: unlabored CTAB  Cardiac: rrr s1s2, no murmurs, rubs or gallops  GI: ND, +BS, Soft, NT  Ext: LUE with fistula in place- +thrill, covered with gauze with mod blood on it. no tenderness  no pedal edema, FROM in all extremities  Neuro: no focal deficits

## 2025-06-04 NOTE — ED ADULT TRIAGE NOTE - CHIEF COMPLAINT QUOTE
fistula has been bleeding since dialysis session was completed   small amount of bleeding at this time

## 2025-06-04 NOTE — ED PROVIDER NOTE - PATIENT PORTAL LINK FT
You can access the FollowMyHealth Patient Portal offered by [facility] by registering at the following website: [email or web address]. By joining [employer]’s FollowMyHealth portal, you will also be able to view your health information using other applications (apps) compatible with our system.

## 2025-06-04 NOTE — ED PROVIDER NOTE - ATTENDING APP SHARED VISIT CONTRIBUTION OF CARE
I have personally performed a face to face medical and diagnostic evaluation of the patient. I have discussed with and reviewed the Resident's and/or ACP's and/or Medical/PA/NP student's note and agree with the History, ROS, Physical Exam and MDM unless otherwise indicated. A brief summary of my personal evaluation and impression can be found below.     69-year-old male past medical history hypertension, hyperlipidemia, A-fib on Eliquis and aspirin, history of end-stage renal disease on dialysis Monday Wednesday Friday brought in from dialysis center for oozing from fistula, patient pleated dialysis session but had oozing from fistula with multiple saturated gauze, manual pressure was held which slowed the bleeding down, sent to emergency department for further evaluation.  Patient denies lightheadedness, dizziness.  Denies chest pain, SOB or lower extremity swelling.  Patient asymptomatic at this time.  On my assessment no pulsatile bleeding or soaking through dressing.      No tachycardia or hypotension.  Regular rate and rhythm, lungs clear to auscultation.  No pitting edema lower extremities, distal pulses 2+ bilaterally.  Abdomen soft nontender.  well appearing, nontoxic.    Given hx and physical, ddx includes but is not limited to  fistula loosening, hypercoagulability, anemia.  Will plan for labs, coags, vascular consult, reassess.

## 2025-06-04 NOTE — CONSULT NOTE ADULT - ASSESSMENT
Mr. Sawyer is a 69 year old man with pmhx of HTN HLD AFIB on eliquis and asa, hx of ESRD on HD MWF BIB EMS from the dialysis center for fistula bleeding, now resolved.     Recommendations:  - dressing taken down at bedside, fistula currently hemostatic  - no further intervention necessary  - patient should follow up with Dr. Hung at his scheduled appointment this month    discussed with Dr. Hung    Vascular Surgery  [id number]

## 2025-06-04 NOTE — CONSULT NOTE ADULT - SUBJECTIVE AND OBJECTIVE BOX
69yom pmhx of HTN HLD AFIB on eliquis and asa, hx of ESRD on HD MWF BIB EMS from the dialysis center for fistula bleeding. pt s/p completing the session the bleeding continued with multiple saturated gauze and manual pressure from 930-1045 and bleeding slowed down. Pt denies any prior similar issues. No change in medication dosage. No dizziness. NO complaints.    Upon evaluation in the ED, patient is afebrile and hemodynamically normal. Dressing taken down with no evidence of active oozing.    PAST MEDICAL HISTORY: HTN - Hypertension    Inguinal Hernia    Childhood Asthma    Keloid    Arthritis    CKD (chronic kidney disease)    Gout    CAD (coronary artery disease)    HLD (hyperlipidemia)    Atrial fibrillation    History of cardioversion    CHF (congestive heart failure)    History of cardiomyopathy    Acid reflux    Thalassemia minor    AV fistula    ESRD on dialysis    Atrial fibrillation    Rheumatoid arthritis        PAST SURGICAL HISTORY: S/P Arthroscopic Surgery of Left Knee    History of Arthroscopy- ankle    S/P angioplasty with stent    S/P hernia surgery    Status post cataract extraction    H/O cardiac radiofrequency ablation    History of tonsillectomy    H/O hernia repair    AV fistula        HOME MEDICATIONS:    ALLERGIES: No Known Allergies      FAMILY HISTORY: Family history of hypertension (Father, Mother)    Family history of diabetes mellitus (Father, Mother)        SOCIAL HISTORY:    REVIEW OF SYSTEMS:    VITAL SIGNS:  ICU Vital Signs Last 24 Hrs  T(C): 36.5 (04 Jun 2025 11:28), Max: 36.5 (04 Jun 2025 11:28)  T(F): 97.7 (04 Jun 2025 11:28), Max: 97.7 (04 Jun 2025 11:28)  HR: 51 (04 Jun 2025 11:28) (51 - 51)  BP: 132/85 (04 Jun 2025 11:28) (132/85 - 132/85)  BP(mean): --  ABP: --  ABP(mean): --  RR: 19 (04 Jun 2025 11:28) (19 - 19)  SpO2: 98% (04 Jun 2025 11:28) (98% - 98%)    O2 Parameters below as of 04 Jun 2025 11:28  Patient On (Oxygen Delivery Method): room air            PHYSICAL EXAMINATION:  General - well-nourished, no acute distress  Neuro - awake, alert, oriented x4, no acute focal deficits  Lungs - breathing comfortably on room air  Heart - pulse regular  Abdomen - soft, nontender, nondistended  Extremities - LUE AVF with palpable pulsatile thrill, radial and ulnar pulses palpable; dressings stained with blood taken down, both access sites hemostatic      LABS:                          12.2   12.00 )-----------( 147      ( 04 Jun 2025 13:15 )             38.0                           RECENT CULTURES:      CAPILLARY BLOOD GLUCOSE          IMAGING STUDIES:

## 2025-06-04 NOTE — ED PROVIDER NOTE - PROGRESS NOTE DETAILS
pt seen and evaluated by Surgery, hemostasis of the fistula. No complaints. stable labs. pt to follow up with Dr. Hung in office this month, strict return precautions advised. Case discussed with Dr. Lawson. stable for discharge. -SORIN Perkins

## 2025-06-04 NOTE — ED PROVIDER NOTE - NSFOLLOWUPINSTRUCTIONS_ED_ALL_ED_FT
Follow up with your PMD within 48-72 hrs. Show copies of your reports given to you. Take all of your medications as previously prescribed.   Follow up with Dr. Hung in office as scheduled.   Return for any concerns.

## 2025-06-04 NOTE — ED PROVIDER NOTE - OBJECTIVE STATEMENT
69yom pmhx of HTN HLD AFIB on eliquis and asa, hx of ESRD on HD MWF BIB EMS from the dialysis center for fistula bleeding. pt s/p completing the session the bleeding continued with multiple saturated gauze and manual pressure from 930-1045 and bleeding slowed down. Pt denies any prior similar issues. No change in medication dosage. No dizziness. NO complaints. Private car

## 2025-06-04 NOTE — ED ADULT NURSE NOTE - OBJECTIVE STATEMENT
69YM PMHX of HTN HLD AFib (eliquis and asa), ESRD (L AV fistula, MW) presents to the ED from dialysis center c/o fistula bleeding s/p completing the session. pt reports bleeding slowed down after 11am. upon assessment, pt is A&OX4 oriented to self, place, time, situation. satting 98% on rm air. Afebrile orally. respirations even and unlabored. abdomen soft, nondistended. skin intact. MD Hernandez at bedside to assess. pending US IV line. call bell in hand and side rails up for safety. warm blanket provided, vital signs stable, pt in no acute distress. updated on plan of care. comfort and safety maintained

## 2025-06-04 NOTE — ED PROVIDER NOTE - DISCHARGE DATE
Please contact me with any questions on my pager [phone].  Hospital Day # 1     HPI:   89 F PMH of HTN, OA, COPD on nebs at home (ex-smoker), hypothyroid, lung cancer s/p partial lobectomy, bladder ca s/p nephrectomy, CKD and s/p cholecystectomy present c/o 10/10 lower ab pain associated with nausea/vomiting (1 episode) and diarrhea, since last night. The pt has a h/o of colitis, she follows with Dr. Kendall (GI) every other month and she does have ab pain at baseline. However, the pain is worse from baseline. While in the ED, she was hemodynamically stable.   the pt was given 2L IVF, (LR and NS), Cipro and flagyl, Zofran.       PAST MEDICAL & SURGICAL HISTORY  COPD with emphysema  Colitis: with diarrhea  Other type of osteoarthritis  HTN (hypertension)  Cancer: Bladder 1981  COPD (chronic obstructive pulmonary disease)  H/O breast surgery: 25% calcification removed  Malignant neoplasm of urinary bladder, unspecified site: Removal x 3  History of nephrectomy: Left  History of total left knee replacement (TKR)  History of hip replacement, total, right      FAMILY HISTORY:      SOCIAL HISTORY:  smoker:   Alcohol:   Drug:     ALLERGIES:  Contrast Dye (Hives; Rash)  No Known Drug Allergies      MEDICATIONS:  MEDICATIONS  (STANDING):  amLODIPine   Tablet 2.5 milliGRAM(s) Oral <User Schedule>  chlorhexidine 4% Liquid 1 Application(s) Topical <User Schedule>  ciprofloxacin   IVPB 200 milliGRAM(s) IV Intermittent every 24 hours  DULoxetine 60 milliGRAM(s) Oral daily  levothyroxine 75 MICROGram(s) Oral daily  metoprolol succinate ER 50 milliGRAM(s) Oral once  metroNIDAZOLE    Tablet 500 milliGRAM(s) Oral every 8 hours  pantoprazole    Tablet 40 milliGRAM(s) Oral before breakfast  sodium chloride 0.9%. 1000 milliLiter(s) (50 mL/Hr) IV Continuous <Continuous>  vancomycin    Solution 125 milliGRAM(s) Oral every 6 hours    MEDICATIONS  (PRN):      HOME MEDICATIONS:  Home Medications:  amLODIPine 2.5 mg oral tablet: 1 tab(s) orally once a day (10 Oct 2018 10:29)  anti diarrheal: 2 milligram(s) orally once a day (10 Oct 2018 10:29)  Donnatal oral tablet: 1 tab(s) orally 2 times a day, As Needed (10 Oct 2018 10:29)  DULoxetine 60 mg oral delayed release capsule: 1 cap(s) orally once a day (10 Oct 2018 10:29)  levothyroxine 75 mcg (0.075 mg) oral tablet: 1 tab(s) orally once a day (10 Oct 2018 10:29)  lisinopril 20 mg oral tablet: 1 tab(s) orally once a day (10 Oct 2018 10:29)  metoprolol succinate 25 mg oral tablet, extended release: 1 tab(s) orally 2 times a day (10 Oct 2018 10:29)      REVIEW OF SYSTEMS:    CONSTITUTIONAL: No fever  EYES/ENT: No scleral icterus  RESPIRATORY: No shortness of breath  CARDIOVASCULAR: No chest pain   GASTROINTESTINAL: abdominal pain  GENITOURINARY: No dysuria  NEUROLOGICAL: No dizziness  SKIN: No cyanosis      VITALS:   T(F): 97 (07-21 @ 07:33), Max: 97.6 (07-20 @ 23:26)  HR: 80 (07-21 @ 07:33) (78 - 80)  BP: 139/65 (07-21 @ 07:33) (139/65 - 148/68)  BP(mean): --  RR: 18 (07-21 @ 07:33) (18 - 18)  SpO2: 97% (07-21 @ 07:33) (96% - 97%)    I&O's Summary      Physical Exam:  GENERAL: NAD, well-developed  HEAD:  Atraumatic, Normocephalic  EYES: EOMI, PERRLA, conjunctiva and sclera clear  NECK: No thyromegaly  CHEST/LUNG: No accessory use of muscle  HEART: S1S2 Normal  ABDOMEN: Soft, Nontender, Nondistended; Bowel sounds present, no guarding or rigidity.  EXTREMITIES:  2+ Peripheral Pulses, No cyanosis  PSYCH: AAOx3  NEUROLOGY: non-focal      LABS:                        9.2    10.54 )-----------( 194      ( 21 Jul 2019 00:40 )             27.7       PT/INR - ( 21 Jul 2019 00:40 )  INR: 1.04          PTT - ( 21 Jul 2019 00:40 )  PTT:30.9   LIVER FUNCTIONS - ( 21 Jul 2019 00:40 )  Alb: 4.1 g/dL / Pro: 7.0 g/dL / ALK PHOS: 150 U/L / ALT: 8 U/L / AST: 14 U/L / GGT: x           LIVER FUNCTIONS - ( 21 Jul 2019 00:40 )  Alb: 4.1 [3.5 - 5.2] / Pro: 7.0 [6.0 - 8.0] / ALK PHOS: 150 [30 - 115] / ALT: 8 [0 - 41] / AST: 14 [0 - 41] / GGT: x       07-21    135  |  103  |  56<H>  ----------------------------<  228<H>  4.8   |  16<L>  |  2.7<H>    Ca    8.8      21 Jul 2019 03:00              Previous EGD 2014: Mild non erosive gastritis with no H Pylori.    Previous colonoscopy 2014: Severe diverticulosis in sigmoid colon and descending colon, severe non specific colitis in transverse colon pathology non specific inflammation.  A single diminutive polyp which was tubulovillous in cecum      RADIOLOGY:    CT Abdomen and Pelvis with contrast: PERITONEUM/MESENTERY/BOWEL: No evidence for bowel obstruction, ascites,   or pneumoperitoneum. Colonic diverticulosis without evidence for acute   diverticulitis. Mild circumferential wall thickening of the suboptimally   distended splenic flexure and descending colon compatible with colitis.    C diff positive. 04-Jun-2025

## 2025-06-05 DIAGNOSIS — I10 ESSENTIAL (PRIMARY) HYPERTENSION: ICD-10-CM

## 2025-06-09 ENCOUNTER — APPOINTMENT (OUTPATIENT)
Dept: OTOLARYNGOLOGY | Facility: CLINIC | Age: 70
End: 2025-06-09

## 2025-06-24 ENCOUNTER — APPOINTMENT (OUTPATIENT)
Dept: INTERNAL MEDICINE | Facility: CLINIC | Age: 70
End: 2025-06-24

## 2025-06-25 RX ORDER — MIDODRINE HYDROCHLORIDE 5 MG/1
5 TABLET ORAL
Qty: 90 | Refills: 1 | Status: ACTIVE | COMMUNITY
Start: 2025-06-25 | End: 1900-01-01

## 2025-06-26 ENCOUNTER — APPOINTMENT (OUTPATIENT)
Dept: UROLOGY | Facility: CLINIC | Age: 70
End: 2025-06-26

## 2025-07-21 ENCOUNTER — APPOINTMENT (OUTPATIENT)
Dept: UROLOGY | Facility: CLINIC | Age: 70
End: 2025-07-21

## 2025-07-22 ENCOUNTER — APPOINTMENT (OUTPATIENT)
Dept: VASCULAR SURGERY | Facility: CLINIC | Age: 70
End: 2025-07-22
Payer: MEDICARE

## 2025-07-22 PROCEDURE — 99214 OFFICE O/P EST MOD 30 MIN: CPT

## 2025-07-22 PROCEDURE — 93990 DOPPLER FLOW TESTING: CPT

## 2025-07-28 ENCOUNTER — APPOINTMENT (OUTPATIENT)
Dept: OPHTHALMOLOGY | Facility: CLINIC | Age: 70
End: 2025-07-28

## 2025-07-29 ENCOUNTER — OUTPATIENT (OUTPATIENT)
Dept: OUTPATIENT SERVICES | Facility: HOSPITAL | Age: 70
LOS: 1 days | End: 2025-07-29
Payer: COMMERCIAL

## 2025-07-29 ENCOUNTER — APPOINTMENT (OUTPATIENT)
Dept: INTERNAL MEDICINE | Facility: CLINIC | Age: 70
End: 2025-07-29
Payer: MEDICARE

## 2025-07-29 VITALS — SYSTOLIC BLOOD PRESSURE: 106 MMHG | RESPIRATION RATE: 14 BRPM | DIASTOLIC BLOOD PRESSURE: 60 MMHG | HEART RATE: 74 BPM

## 2025-07-29 DIAGNOSIS — M05.742 RHEUMATOID ARTHRITIS WITH RHEUMATOID FACTOR OF RIGHT HAND W/OUT ORGAN OR SYSTEMS INVOLVEMENT: ICD-10-CM

## 2025-07-29 DIAGNOSIS — I10 ESSENTIAL (PRIMARY) HYPERTENSION: ICD-10-CM

## 2025-07-29 DIAGNOSIS — Z95.9 PRESENCE OF CARDIAC AND VASCULAR IMPLANT AND GRAFT, UNSPECIFIED: Chronic | ICD-10-CM

## 2025-07-29 DIAGNOSIS — I47.29 OTHER VENTRICULAR TACHYCARDIA: ICD-10-CM

## 2025-07-29 DIAGNOSIS — N18.4 CHRONIC KIDNEY DISEASE, STAGE 4 (SEVERE): ICD-10-CM

## 2025-07-29 DIAGNOSIS — Z98.890 OTHER SPECIFIED POSTPROCEDURAL STATES: Chronic | ICD-10-CM

## 2025-07-29 DIAGNOSIS — R63.0 ANOREXIA: ICD-10-CM

## 2025-07-29 DIAGNOSIS — M05.741 RHEUMATOID ARTHRITIS WITH RHEUMATOID FACTOR OF RIGHT HAND W/OUT ORGAN OR SYSTEMS INVOLVEMENT: ICD-10-CM

## 2025-07-29 DIAGNOSIS — Z99.2 DEPENDENCE ON RENAL DIALYSIS: ICD-10-CM

## 2025-07-29 DIAGNOSIS — N25.81 SECONDARY HYPERPARATHYROIDISM OF RENAL ORIGIN: ICD-10-CM

## 2025-07-29 DIAGNOSIS — T82.858A STENOSIS OF OTHER VASCULAR PROSTHETIC, INITIAL ENCOUNTER: ICD-10-CM

## 2025-07-29 DIAGNOSIS — Z90.89 ACQUIRED ABSENCE OF OTHER ORGANS: Chronic | ICD-10-CM

## 2025-07-29 DIAGNOSIS — I48.91 UNSPECIFIED ATRIAL FIBRILLATION: ICD-10-CM

## 2025-07-29 DIAGNOSIS — Z98.49 CATARACT EXTRACTION STATUS, UNSPECIFIED EYE: Chronic | ICD-10-CM

## 2025-07-29 PROCEDURE — G0463: CPT

## 2025-07-29 PROCEDURE — G2211 COMPLEX E/M VISIT ADD ON: CPT

## 2025-07-29 PROCEDURE — 99213 OFFICE O/P EST LOW 20 MIN: CPT

## 2025-07-29 RX ORDER — MIRTAZAPINE 7.5 MG/1
7.5 TABLET, FILM COATED ORAL
Qty: 90 | Refills: 3 | Status: ACTIVE | COMMUNITY
Start: 2025-07-29

## 2025-08-05 DIAGNOSIS — N18.4 CHRONIC KIDNEY DISEASE, STAGE 4 (SEVERE): ICD-10-CM

## 2025-08-05 DIAGNOSIS — R63.0 ANOREXIA: ICD-10-CM

## 2025-08-05 DIAGNOSIS — M05.742 RHEUMATOID ARTHRITIS WITH RHEUMATOID FACTOR OF LEFT HAND WITHOUT ORGAN OR SYSTEMS INVOLVEMENT: ICD-10-CM

## 2025-08-05 DIAGNOSIS — I47.29 OTHER VENTRICULAR TACHYCARDIA: ICD-10-CM

## 2025-08-05 DIAGNOSIS — M05.741 RHEUMATOID ARTHRITIS WITH RHEUMATOID FACTOR OF RIGHT HAND WITHOUT ORGAN OR SYSTEMS INVOLVEMENT: ICD-10-CM

## 2025-08-05 DIAGNOSIS — I48.91 UNSPECIFIED ATRIAL FIBRILLATION: ICD-10-CM

## 2025-08-05 DIAGNOSIS — N25.81 SECONDARY HYPERPARATHYROIDISM OF RENAL ORIGIN: ICD-10-CM

## 2025-08-05 DIAGNOSIS — N18.6 END STAGE RENAL DISEASE: ICD-10-CM

## 2025-08-05 DIAGNOSIS — T82.858A STENOSIS OF OTHER VASCULAR PROSTHETIC DEVICES, IMPLANTS AND GRAFTS, INITIAL ENCOUNTER: ICD-10-CM

## 2025-08-11 ENCOUNTER — APPOINTMENT (OUTPATIENT)
Dept: ENDOVASCULAR SURGERY | Facility: CLINIC | Age: 70
End: 2025-08-11
Payer: MEDICARE

## 2025-08-11 VITALS
DIASTOLIC BLOOD PRESSURE: 78 MMHG | WEIGHT: 185 LBS | BODY MASS INDEX: 25.9 KG/M2 | HEIGHT: 71 IN | SYSTOLIC BLOOD PRESSURE: 149 MMHG | OXYGEN SATURATION: 99 % | HEART RATE: 52 BPM | TEMPERATURE: 98.1 F | RESPIRATION RATE: 16 BRPM

## 2025-08-11 DIAGNOSIS — Z99.2 END STAGE RENAL DISEASE: ICD-10-CM

## 2025-08-11 DIAGNOSIS — T82.898A OTHER SPECIFIED COMPLICATION OF VASCULAR PROSTHETIC DEVICES, IMPLANTS AND GRAFTS, INITIAL ENCOUNTER: ICD-10-CM

## 2025-08-11 DIAGNOSIS — N18.6 END STAGE RENAL DISEASE: ICD-10-CM

## 2025-08-11 PROCEDURE — 36902Z: CUSTOM

## 2025-08-12 ENCOUNTER — RESULT REVIEW (OUTPATIENT)
Age: 70
End: 2025-08-12

## 2025-08-22 ENCOUNTER — APPOINTMENT (OUTPATIENT)
Dept: ENDOVASCULAR SURGERY | Facility: CLINIC | Age: 70
End: 2025-08-22

## 2025-08-29 ENCOUNTER — EMERGENCY (EMERGENCY)
Facility: HOSPITAL | Age: 70
LOS: 1 days | End: 2025-08-29
Attending: EMERGENCY MEDICINE
Payer: COMMERCIAL

## 2025-08-29 ENCOUNTER — TRANSCRIPTION ENCOUNTER (OUTPATIENT)
Age: 70
End: 2025-08-29

## 2025-08-29 ENCOUNTER — INPATIENT (INPATIENT)
Facility: HOSPITAL | Age: 70
LOS: 3 days | Discharge: HOME | DRG: 640 | End: 2025-09-02
Attending: STUDENT IN AN ORGANIZED HEALTH CARE EDUCATION/TRAINING PROGRAM | Admitting: STUDENT IN AN ORGANIZED HEALTH CARE EDUCATION/TRAINING PROGRAM
Payer: COMMERCIAL

## 2025-08-29 VITALS
TEMPERATURE: 97 F | SYSTOLIC BLOOD PRESSURE: 109 MMHG | OXYGEN SATURATION: 96 % | HEIGHT: 71 IN | HEART RATE: 76 BPM | WEIGHT: 181 LBS | RESPIRATION RATE: 20 BRPM | DIASTOLIC BLOOD PRESSURE: 77 MMHG

## 2025-08-29 VITALS
TEMPERATURE: 98 F | RESPIRATION RATE: 20 BRPM | OXYGEN SATURATION: 99 % | HEART RATE: 75 BPM | SYSTOLIC BLOOD PRESSURE: 133 MMHG | HEIGHT: 71 IN | DIASTOLIC BLOOD PRESSURE: 64 MMHG | WEIGHT: 181 LBS

## 2025-08-29 DIAGNOSIS — Z98.890 OTHER SPECIFIED POSTPROCEDURAL STATES: Chronic | ICD-10-CM

## 2025-08-29 DIAGNOSIS — I50.32 CHRONIC DIASTOLIC (CONGESTIVE) HEART FAILURE: ICD-10-CM

## 2025-08-29 DIAGNOSIS — Z98.49 CATARACT EXTRACTION STATUS, UNSPECIFIED EYE: Chronic | ICD-10-CM

## 2025-08-29 DIAGNOSIS — R09.89 OTHER SPECIFIED SYMPTOMS AND SIGNS INVOLVING THE CIRCULATORY AND RESPIRATORY SYSTEMS: ICD-10-CM

## 2025-08-29 DIAGNOSIS — Z90.89 ACQUIRED ABSENCE OF OTHER ORGANS: Chronic | ICD-10-CM

## 2025-08-29 DIAGNOSIS — I48.0 PAROXYSMAL ATRIAL FIBRILLATION: ICD-10-CM

## 2025-08-29 DIAGNOSIS — Z95.9 PRESENCE OF CARDIAC AND VASCULAR IMPLANT AND GRAFT, UNSPECIFIED: Chronic | ICD-10-CM

## 2025-08-29 DIAGNOSIS — N40.0 BENIGN PROSTATIC HYPERPLASIA WITHOUT LOWER URINARY TRACT SYMPTOMS: ICD-10-CM

## 2025-08-29 DIAGNOSIS — R42 DIZZINESS AND GIDDINESS: ICD-10-CM

## 2025-08-29 DIAGNOSIS — I25.10 ATHEROSCLEROTIC HEART DISEASE OF NATIVE CORONARY ARTERY WITHOUT ANGINA PECTORIS: ICD-10-CM

## 2025-08-29 DIAGNOSIS — I77.0 ARTERIOVENOUS FISTULA, ACQUIRED: Chronic | ICD-10-CM
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
## 2025-08-29 DIAGNOSIS — Z29.9 ENCOUNTER FOR PROPHYLACTIC MEASURES, UNSPECIFIED: ICD-10-CM

## 2025-08-29 DIAGNOSIS — N18.6 END STAGE RENAL DISEASE: ICD-10-CM

## 2025-08-29 DIAGNOSIS — D64.9 ANEMIA, UNSPECIFIED: ICD-10-CM

## 2025-08-29 DIAGNOSIS — R55 SYNCOPE AND COLLAPSE: ICD-10-CM

## 2025-08-29 LAB
ALBUMIN SERPL ELPH-MCNC: 4.2 G/DL — SIGNIFICANT CHANGE UP (ref 3.3–5)
ALP SERPL-CCNC: 76 U/L — SIGNIFICANT CHANGE UP (ref 40–120)
ALT FLD-CCNC: 6 U/L — LOW (ref 10–45)
ANION GAP SERPL CALC-SCNC: 16 MMOL/L — SIGNIFICANT CHANGE UP (ref 5–17)
ANISOCYTOSIS BLD QL: SLIGHT — SIGNIFICANT CHANGE UP
APTT BLD: 37 SEC — HIGH (ref 26.1–36.8)
AST SERPL-CCNC: 9 U/L — LOW (ref 10–40)
BASOPHILS # BLD AUTO: 0.1 K/UL — SIGNIFICANT CHANGE UP (ref 0–0.2)
BASOPHILS # BLD MANUAL: 0.24 K/UL — HIGH (ref 0–0.2)
BASOPHILS NFR BLD AUTO: 1.1 % — SIGNIFICANT CHANGE UP (ref 0–2)
BASOPHILS NFR BLD MANUAL: 2.6 % — HIGH (ref 0–2)
BILIRUB SERPL-MCNC: 1.2 MG/DL — SIGNIFICANT CHANGE UP (ref 0.2–1.2)
BUN SERPL-MCNC: 12 MG/DL — SIGNIFICANT CHANGE UP (ref 7–23)
CALCIUM SERPL-MCNC: 9.4 MG/DL — SIGNIFICANT CHANGE UP (ref 8.4–10.5)
CHLORIDE SERPL-SCNC: 97 MMOL/L — SIGNIFICANT CHANGE UP (ref 96–108)
CO2 SERPL-SCNC: 28 MMOL/L — SIGNIFICANT CHANGE UP (ref 22–31)
CREAT SERPL-MCNC: 4.66 MG/DL — HIGH (ref 0.5–1.3)
DACRYOCYTES BLD QL SMEAR: SLIGHT — SIGNIFICANT CHANGE UP
EGFR: 13 ML/MIN/1.73M2 — LOW
EGFR: 13 ML/MIN/1.73M2 — LOW
EOSINOPHIL # BLD AUTO: 0.23 K/UL — SIGNIFICANT CHANGE UP (ref 0–0.5)
EOSINOPHIL # BLD MANUAL: 0.39 K/UL — SIGNIFICANT CHANGE UP (ref 0–0.5)
EOSINOPHIL NFR BLD AUTO: 2.5 % — SIGNIFICANT CHANGE UP (ref 0–6)
EOSINOPHIL NFR BLD MANUAL: 4.3 % — SIGNIFICANT CHANGE UP (ref 0–6)
GLUCOSE SERPL-MCNC: 88 MG/DL — SIGNIFICANT CHANGE UP (ref 70–99)
HCT VFR BLD CALC: 33.2 % — LOW (ref 39–50)
HGB BLD-MCNC: 10.5 G/DL — LOW (ref 13–17)
HYPOCHROMIA BLD QL: SLIGHT — SIGNIFICANT CHANGE UP
IMM GRANULOCYTES # BLD AUTO: 0.03 K/UL — SIGNIFICANT CHANGE UP (ref 0–0.07)
IMM GRANULOCYTES NFR BLD AUTO: 0.3 % — SIGNIFICANT CHANGE UP (ref 0–0.9)
INR BLD: 1.46 RATIO — HIGH (ref 0.85–1.16)
LYMPHOCYTES # BLD AUTO: 2.07 K/UL — SIGNIFICANT CHANGE UP (ref 1–3.3)
LYMPHOCYTES # BLD MANUAL: 1.5 K/UL — SIGNIFICANT CHANGE UP (ref 1–3.3)
LYMPHOCYTES NFR BLD AUTO: 22.8 % — SIGNIFICANT CHANGE UP (ref 13–44)
LYMPHOCYTES NFR BLD MANUAL: 16.5 % — SIGNIFICANT CHANGE UP (ref 13–44)
MACROCYTES BLD QL: SLIGHT — SIGNIFICANT CHANGE UP
MANUAL NRBC #: 0.27 K/UL — HIGH (ref 0–0)
MCHC RBC-ENTMCNC: 21.4 PG — LOW (ref 27–34)
MCHC RBC-ENTMCNC: 31.6 G/DL — LOW (ref 32–36)
MCV RBC AUTO: 67.8 FL — LOW (ref 80–100)
MICROCYTES BLD QL: SLIGHT — SIGNIFICANT CHANGE UP
MONOCYTES # BLD AUTO: 0.89 K/UL — SIGNIFICANT CHANGE UP (ref 0–0.9)
MONOCYTES # BLD MANUAL: 0.32 K/UL — SIGNIFICANT CHANGE UP (ref 0–0.9)
MONOCYTES NFR BLD AUTO: 9.8 % — SIGNIFICANT CHANGE UP (ref 2–14)
MONOCYTES NFR BLD MANUAL: 3.5 % — SIGNIFICANT CHANGE UP (ref 2–14)
NEUTROPHILS # BLD AUTO: 5.76 K/UL — SIGNIFICANT CHANGE UP (ref 1.8–7.4)
NEUTROPHILS # BLD MANUAL: 6.64 K/UL — SIGNIFICANT CHANGE UP (ref 1.8–7.4)
NEUTROPHILS NFR BLD AUTO: 63.5 % — SIGNIFICANT CHANGE UP (ref 43–77)
NEUTROPHILS NFR BLD MANUAL: 73.1 % — SIGNIFICANT CHANGE UP (ref 43–77)
NRBC # BLD AUTO: 0.06 K/UL — HIGH (ref 0–0)
NRBC # BLD: 3 /100 WBCS — HIGH (ref 0–0)
NRBC # FLD: 0.06 K/UL — HIGH (ref 0–0)
NRBC BLD-RTO: 3 /100 WBCS — HIGH (ref 0–0)
OVALOCYTES BLD QL SMEAR: SLIGHT — SIGNIFICANT CHANGE UP
PLAT MORPH BLD: NORMAL — SIGNIFICANT CHANGE UP
PLATELET # BLD AUTO: 136 K/UL — LOW (ref 150–400)
PMV BLD: SIGNIFICANT CHANGE UP FL (ref 7–13)
POIKILOCYTOSIS BLD QL AUTO: SLIGHT — SIGNIFICANT CHANGE UP
POLYCHROMASIA BLD QL SMEAR: SLIGHT — SIGNIFICANT CHANGE UP
POTASSIUM SERPL-MCNC: 3.2 MMOL/L — LOW (ref 3.5–5.3)
POTASSIUM SERPL-SCNC: 3.2 MMOL/L — LOW (ref 3.5–5.3)
PROT SERPL-MCNC: 7.3 G/DL — SIGNIFICANT CHANGE UP (ref 6–8.3)
PROTHROM AB SERPL-ACNC: 16.9 SEC — HIGH (ref 9.9–13.4)
RBC # BLD: 4.9 M/UL — SIGNIFICANT CHANGE UP (ref 4.2–5.8)
RBC # FLD: 15.9 % — HIGH (ref 10.3–14.5)
RBC BLD AUTO: ABNORMAL
SCHISTOCYTES BLD QL AUTO: SLIGHT — SIGNIFICANT CHANGE UP
SODIUM SERPL-SCNC: 141 MMOL/L — SIGNIFICANT CHANGE UP (ref 135–145)
STOMATOCYTES BLD QL SMEAR: SLIGHT — SIGNIFICANT CHANGE UP
TARGETS BLD QL SMEAR: ABNORMAL
WBC # BLD: 9.08 K/UL — SIGNIFICANT CHANGE UP (ref 3.8–10.5)
WBC # FLD AUTO: 9.08 K/UL — SIGNIFICANT CHANGE UP (ref 3.8–10.5)

## 2025-08-29 PROCEDURE — 80053 COMPREHEN METABOLIC PANEL: CPT

## 2025-08-29 PROCEDURE — 86850 RBC ANTIBODY SCREEN: CPT

## 2025-08-29 PROCEDURE — 99285 EMERGENCY DEPT VISIT HI MDM: CPT

## 2025-08-29 PROCEDURE — 85730 THROMBOPLASTIN TIME PARTIAL: CPT

## 2025-08-29 PROCEDURE — 85025 COMPLETE CBC W/AUTO DIFF WBC: CPT

## 2025-08-29 PROCEDURE — 86901 BLOOD TYPING SEROLOGIC RH(D): CPT

## 2025-08-29 PROCEDURE — 12011 RPR F/E/E/N/L/M 2.5 CM/<: CPT

## 2025-08-29 PROCEDURE — 93010 ELECTROCARDIOGRAM REPORT: CPT

## 2025-08-29 PROCEDURE — 85610 PROTHROMBIN TIME: CPT

## 2025-08-29 PROCEDURE — 82962 GLUCOSE BLOOD TEST: CPT

## 2025-08-29 PROCEDURE — 99284 EMERGENCY DEPT VISIT MOD MDM: CPT | Mod: 25

## 2025-08-29 PROCEDURE — 99223 1ST HOSP IP/OBS HIGH 75: CPT

## 2025-08-29 PROCEDURE — 86900 BLOOD TYPING SEROLOGIC ABO: CPT

## 2025-08-29 RX ORDER — FINASTERIDE 1 MG/1
5 TABLET, FILM COATED ORAL DAILY
Refills: 0 | Status: DISCONTINUED | OUTPATIENT
Start: 2025-08-29 | End: 2025-09-02

## 2025-08-29 RX ORDER — MELATONIN 5 MG
3 TABLET ORAL AT BEDTIME
Refills: 0 | Status: DISCONTINUED | OUTPATIENT
Start: 2025-08-29 | End: 2025-09-02

## 2025-08-29 RX ORDER — SEVELAMER HYDROCHLORIDE 800 MG/1
1600 TABLET ORAL
Refills: 0 | Status: DISCONTINUED | OUTPATIENT
Start: 2025-08-29 | End: 2025-09-02

## 2025-08-29 RX ORDER — ASPIRIN 325 MG
81 TABLET ORAL AT BEDTIME
Refills: 0 | Status: DISCONTINUED | OUTPATIENT
Start: 2025-08-29 | End: 2025-09-02

## 2025-08-29 RX ORDER — APIXABAN 2.5 MG/1
1 TABLET, FILM COATED ORAL
Refills: 0 | DISCHARGE

## 2025-08-29 RX ORDER — LIDOCAINE HCL/PF 10 MG/ML
5 VIAL (ML) INJECTION ONCE
Refills: 0 | Status: DISCONTINUED | OUTPATIENT
Start: 2025-08-29 | End: 2025-08-29

## 2025-08-29 RX ORDER — CINACALCET 30 MG/1
30 TABLET, FILM COATED ORAL DAILY
Refills: 0 | Status: DISCONTINUED | OUTPATIENT
Start: 2025-08-29 | End: 2025-09-02

## 2025-08-29 RX ORDER — ACETAMINOPHEN 500 MG/5ML
650 LIQUID (ML) ORAL EVERY 6 HOURS
Refills: 0 | Status: DISCONTINUED | OUTPATIENT
Start: 2025-08-29 | End: 2025-09-02

## 2025-08-29 RX ORDER — LIDOCAINE HCL/EPINEPHRINE/PF 1 %-1:200K
5 AMPUL (ML) INJECTION ONCE
Refills: 0 | Status: COMPLETED | OUTPATIENT
Start: 2025-08-29 | End: 2025-08-29

## 2025-08-29 RX ORDER — AMIODARONE HYDROCHLORIDE 50 MG/ML
100 INJECTION, SOLUTION INTRAVENOUS DAILY
Refills: 0 | Status: DISCONTINUED | OUTPATIENT
Start: 2025-08-29 | End: 2025-09-02

## 2025-08-29 RX ORDER — ATORVASTATIN CALCIUM 80 MG/1
20 TABLET, FILM COATED ORAL AT BEDTIME
Refills: 0 | Status: DISCONTINUED | OUTPATIENT
Start: 2025-08-29 | End: 2025-09-02

## 2025-08-29 RX ORDER — FUROSEMIDE 10 MG/ML
80 INJECTION INTRAMUSCULAR; INTRAVENOUS
Refills: 0 | Status: DISCONTINUED | OUTPATIENT
Start: 2025-08-29 | End: 2025-09-02

## 2025-08-29 RX ORDER — ASPIRIN 325 MG
1 TABLET ORAL
Refills: 0 | DISCHARGE

## 2025-08-29 RX ADMIN — ATORVASTATIN CALCIUM 20 MILLIGRAM(S): 80 TABLET, FILM COATED ORAL at 22:00

## 2025-08-29 RX ADMIN — Medication 5 MILLILITER(S): at 10:36

## 2025-08-29 RX ADMIN — Medication 81 MILLIGRAM(S): at 22:00

## 2025-08-30 LAB
ALBUMIN SERPL ELPH-MCNC: 3.7 G/DL — SIGNIFICANT CHANGE UP (ref 3.3–5)
ALP SERPL-CCNC: 69 U/L — SIGNIFICANT CHANGE UP (ref 40–120)
ALT FLD-CCNC: <5 U/L — LOW (ref 10–45)
ANION GAP SERPL CALC-SCNC: 15 MMOL/L — SIGNIFICANT CHANGE UP (ref 5–17)
AST SERPL-CCNC: 8 U/L — LOW (ref 10–40)
BASOPHILS # BLD AUTO: 0.1 K/UL — SIGNIFICANT CHANGE UP (ref 0–0.2)
BASOPHILS NFR BLD AUTO: 1.1 % — SIGNIFICANT CHANGE UP (ref 0–2)
BILIRUB SERPL-MCNC: 0.9 MG/DL — SIGNIFICANT CHANGE UP (ref 0.2–1.2)
BUN SERPL-MCNC: 21 MG/DL — SIGNIFICANT CHANGE UP (ref 7–23)
CALCIUM SERPL-MCNC: 9.6 MG/DL — SIGNIFICANT CHANGE UP (ref 8.4–10.5)
CHLORIDE SERPL-SCNC: 97 MMOL/L — SIGNIFICANT CHANGE UP (ref 96–108)
CO2 SERPL-SCNC: 26 MMOL/L — SIGNIFICANT CHANGE UP (ref 22–31)
CREAT SERPL-MCNC: 7.4 MG/DL — HIGH (ref 0.5–1.3)
EGFR: 7 ML/MIN/1.73M2 — LOW
EGFR: 7 ML/MIN/1.73M2 — LOW
EOSINOPHIL # BLD AUTO: 0.11 K/UL — SIGNIFICANT CHANGE UP (ref 0–0.5)
EOSINOPHIL NFR BLD AUTO: 1.3 % — SIGNIFICANT CHANGE UP (ref 0–6)
GLUCOSE SERPL-MCNC: 80 MG/DL — SIGNIFICANT CHANGE UP (ref 70–99)
HCT VFR BLD CALC: 29.8 % — LOW (ref 39–50)
HGB BLD-MCNC: 9.4 G/DL — LOW (ref 13–17)
IMM GRANULOCYTES # BLD AUTO: 0.02 K/UL — SIGNIFICANT CHANGE UP (ref 0–0.07)
IMM GRANULOCYTES NFR BLD AUTO: 0.2 % — SIGNIFICANT CHANGE UP (ref 0–0.9)
LYMPHOCYTES # BLD AUTO: 1.52 K/UL — SIGNIFICANT CHANGE UP (ref 1–3.3)
LYMPHOCYTES NFR BLD AUTO: 17.4 % — SIGNIFICANT CHANGE UP (ref 13–44)
MAGNESIUM SERPL-MCNC: 2.6 MG/DL — SIGNIFICANT CHANGE UP (ref 1.6–2.6)
MCHC RBC-ENTMCNC: 21.1 PG — LOW (ref 27–34)
MCHC RBC-ENTMCNC: 31.5 G/DL — LOW (ref 32–36)
MCV RBC AUTO: 66.8 FL — LOW (ref 80–100)
MONOCYTES # BLD AUTO: 1.16 K/UL — HIGH (ref 0–0.9)
MONOCYTES NFR BLD AUTO: 13.3 % — SIGNIFICANT CHANGE UP (ref 2–14)
NEUTROPHILS # BLD AUTO: 5.83 K/UL — SIGNIFICANT CHANGE UP (ref 1.8–7.4)
NEUTROPHILS NFR BLD AUTO: 66.7 % — SIGNIFICANT CHANGE UP (ref 43–77)
NRBC # BLD AUTO: 0.02 K/UL — HIGH (ref 0–0)
NRBC # FLD: 0.02 K/UL — HIGH (ref 0–0)
PLATELET # BLD AUTO: 126 K/UL — LOW (ref 150–400)
PMV BLD: SIGNIFICANT CHANGE UP FL (ref 7–13)
POTASSIUM SERPL-MCNC: 4.1 MMOL/L — SIGNIFICANT CHANGE UP (ref 3.5–5.3)
POTASSIUM SERPL-SCNC: 4.1 MMOL/L — SIGNIFICANT CHANGE UP (ref 3.5–5.3)
PROT SERPL-MCNC: 6.6 G/DL — SIGNIFICANT CHANGE UP (ref 6–8.3)
RBC # BLD: 4.46 M/UL — SIGNIFICANT CHANGE UP (ref 4.2–5.8)
RBC # FLD: 15.9 % — HIGH (ref 10.3–14.5)
SODIUM SERPL-SCNC: 138 MMOL/L — SIGNIFICANT CHANGE UP (ref 135–145)
WBC # BLD: 8.74 K/UL — SIGNIFICANT CHANGE UP (ref 3.8–10.5)
WBC # FLD AUTO: 8.74 K/UL — SIGNIFICANT CHANGE UP (ref 3.8–10.5)

## 2025-08-30 PROCEDURE — 70450 CT HEAD/BRAIN W/O DYE: CPT | Mod: 26

## 2025-08-30 PROCEDURE — 99232 SBSQ HOSP IP/OBS MODERATE 35: CPT

## 2025-08-30 RX ADMIN — Medication 500 MILLILITER(S): at 11:50

## 2025-08-30 RX ADMIN — SEVELAMER HYDROCHLORIDE 1600 MILLIGRAM(S): 800 TABLET ORAL at 11:51

## 2025-08-30 RX ADMIN — ATORVASTATIN CALCIUM 20 MILLIGRAM(S): 80 TABLET, FILM COATED ORAL at 21:19

## 2025-08-30 RX ADMIN — CINACALCET 30 MILLIGRAM(S): 30 TABLET, FILM COATED ORAL at 11:51

## 2025-08-30 RX ADMIN — SEVELAMER HYDROCHLORIDE 1600 MILLIGRAM(S): 800 TABLET ORAL at 16:56

## 2025-08-30 RX ADMIN — Medication 81 MILLIGRAM(S): at 21:20

## 2025-08-30 RX ADMIN — Medication 40 MILLIGRAM(S): at 06:26

## 2025-08-30 RX ADMIN — Medication 650 MILLIGRAM(S): at 14:14

## 2025-08-30 RX ADMIN — Medication 650 MILLIGRAM(S): at 15:14

## 2025-08-30 RX ADMIN — FUROSEMIDE 80 MILLIGRAM(S): 10 INJECTION INTRAMUSCULAR; INTRAVENOUS at 06:26

## 2025-08-30 RX ADMIN — AMIODARONE HYDROCHLORIDE 100 MILLIGRAM(S): 50 INJECTION, SOLUTION INTRAVENOUS at 06:26

## 2025-08-30 RX ADMIN — SEVELAMER HYDROCHLORIDE 1600 MILLIGRAM(S): 800 TABLET ORAL at 07:53

## 2025-08-30 RX ADMIN — FINASTERIDE 5 MILLIGRAM(S): 1 TABLET, FILM COATED ORAL at 11:51

## 2025-08-31 DIAGNOSIS — E83.39 OTHER DISORDERS OF PHOSPHORUS METABOLISM: ICD-10-CM

## 2025-08-31 LAB
ANION GAP SERPL CALC-SCNC: 15 MMOL/L — SIGNIFICANT CHANGE UP (ref 5–17)
BUN SERPL-MCNC: 28 MG/DL — HIGH (ref 7–23)
CALCIUM SERPL-MCNC: 9.1 MG/DL — SIGNIFICANT CHANGE UP (ref 8.4–10.5)
CHLORIDE SERPL-SCNC: 95 MMOL/L — LOW (ref 96–108)
CO2 SERPL-SCNC: 27 MMOL/L — SIGNIFICANT CHANGE UP (ref 22–31)
CREAT SERPL-MCNC: 9.97 MG/DL — HIGH (ref 0.5–1.3)
EGFR: 5 ML/MIN/1.73M2 — LOW
EGFR: 5 ML/MIN/1.73M2 — LOW
GLUCOSE SERPL-MCNC: 111 MG/DL — HIGH (ref 70–99)
HCT VFR BLD CALC: 26.8 % — LOW (ref 39–50)
HGB BLD-MCNC: 8.6 G/DL — LOW (ref 13–17)
MAGNESIUM SERPL-MCNC: 2.7 MG/DL — HIGH (ref 1.6–2.6)
MCHC RBC-ENTMCNC: 21.3 PG — LOW (ref 27–34)
MCHC RBC-ENTMCNC: 32.1 G/DL — SIGNIFICANT CHANGE UP (ref 32–36)
MCV RBC AUTO: 66.5 FL — LOW (ref 80–100)
NRBC # BLD AUTO: 0 K/UL — SIGNIFICANT CHANGE UP (ref 0–0)
NRBC # FLD: 0 K/UL — SIGNIFICANT CHANGE UP (ref 0–0)
PHOSPHATE SERPL-MCNC: 2.1 MG/DL — LOW (ref 2.5–4.5)
PLATELET # BLD AUTO: 132 K/UL — LOW (ref 150–400)
PMV BLD: SIGNIFICANT CHANGE UP FL (ref 7–13)
POTASSIUM SERPL-MCNC: 3.6 MMOL/L — SIGNIFICANT CHANGE UP (ref 3.5–5.3)
POTASSIUM SERPL-SCNC: 3.6 MMOL/L — SIGNIFICANT CHANGE UP (ref 3.5–5.3)
RBC # BLD: 4.03 M/UL — LOW (ref 4.2–5.8)
RBC # FLD: 16 % — HIGH (ref 10.3–14.5)
SODIUM SERPL-SCNC: 137 MMOL/L — SIGNIFICANT CHANGE UP (ref 135–145)
WBC # BLD: 10.04 K/UL — SIGNIFICANT CHANGE UP (ref 3.8–10.5)
WBC # FLD AUTO: 10.04 K/UL — SIGNIFICANT CHANGE UP (ref 3.8–10.5)

## 2025-08-31 PROCEDURE — 80053 COMPREHEN METABOLIC PANEL: CPT

## 2025-08-31 PROCEDURE — 85025 COMPLETE CBC W/AUTO DIFF WBC: CPT

## 2025-08-31 PROCEDURE — 70450 CT HEAD/BRAIN W/O DYE: CPT

## 2025-08-31 PROCEDURE — 80048 BASIC METABOLIC PNL TOTAL CA: CPT

## 2025-08-31 PROCEDURE — 83735 ASSAY OF MAGNESIUM: CPT

## 2025-08-31 PROCEDURE — 97161 PT EVAL LOW COMPLEX 20 MIN: CPT

## 2025-08-31 PROCEDURE — 99232 SBSQ HOSP IP/OBS MODERATE 35: CPT

## 2025-08-31 PROCEDURE — 93005 ELECTROCARDIOGRAM TRACING: CPT

## 2025-08-31 PROCEDURE — 36415 COLL VENOUS BLD VENIPUNCTURE: CPT

## 2025-08-31 PROCEDURE — 84100 ASSAY OF PHOSPHORUS: CPT

## 2025-08-31 PROCEDURE — 85027 COMPLETE CBC AUTOMATED: CPT

## 2025-08-31 PROCEDURE — 73560 X-RAY EXAM OF KNEE 1 OR 2: CPT

## 2025-08-31 PROCEDURE — 82962 GLUCOSE BLOOD TEST: CPT

## 2025-08-31 PROCEDURE — 73560 X-RAY EXAM OF KNEE 1 OR 2: CPT | Mod: 26,LT

## 2025-08-31 RX ORDER — EPOETIN ALFA 10000 [IU]/ML
10000 SOLUTION INTRAVENOUS; SUBCUTANEOUS
Refills: 0 | Status: DISCONTINUED | OUTPATIENT
Start: 2025-08-31 | End: 2025-09-02

## 2025-08-31 RX ORDER — LIDOCAINE HYDROCHLORIDE 20 MG/ML
1 JELLY TOPICAL DAILY
Refills: 0 | Status: DISCONTINUED | OUTPATIENT
Start: 2025-08-31 | End: 2025-09-02

## 2025-08-31 RX ORDER — APIXABAN 2.5 MG/1
2.5 TABLET, FILM COATED ORAL EVERY 12 HOURS
Refills: 0 | Status: DISCONTINUED | OUTPATIENT
Start: 2025-08-31 | End: 2025-09-02

## 2025-08-31 RX ADMIN — FINASTERIDE 5 MILLIGRAM(S): 1 TABLET, FILM COATED ORAL at 12:02

## 2025-08-31 RX ADMIN — Medication 40 MILLIGRAM(S): at 05:27

## 2025-08-31 RX ADMIN — AMIODARONE HYDROCHLORIDE 100 MILLIGRAM(S): 50 INJECTION, SOLUTION INTRAVENOUS at 05:27

## 2025-08-31 RX ADMIN — APIXABAN 2.5 MILLIGRAM(S): 2.5 TABLET, FILM COATED ORAL at 18:29

## 2025-08-31 RX ADMIN — ATORVASTATIN CALCIUM 20 MILLIGRAM(S): 80 TABLET, FILM COATED ORAL at 21:08

## 2025-08-31 RX ADMIN — LIDOCAINE HYDROCHLORIDE 1 PATCH: 20 JELLY TOPICAL at 18:28

## 2025-08-31 RX ADMIN — SEVELAMER HYDROCHLORIDE 1600 MILLIGRAM(S): 800 TABLET ORAL at 07:43

## 2025-08-31 RX ADMIN — Medication 650 MILLIGRAM(S): at 08:32

## 2025-08-31 RX ADMIN — Medication 81 MILLIGRAM(S): at 21:08

## 2025-08-31 RX ADMIN — SEVELAMER HYDROCHLORIDE 1600 MILLIGRAM(S): 800 TABLET ORAL at 12:03

## 2025-08-31 RX ADMIN — Medication 650 MILLIGRAM(S): at 07:43

## 2025-08-31 RX ADMIN — FUROSEMIDE 80 MILLIGRAM(S): 10 INJECTION INTRAMUSCULAR; INTRAVENOUS at 05:27

## 2025-08-31 RX ADMIN — LIDOCAINE HYDROCHLORIDE 1 PATCH: 20 JELLY TOPICAL at 20:00

## 2025-08-31 RX ADMIN — CINACALCET 30 MILLIGRAM(S): 30 TABLET, FILM COATED ORAL at 12:02

## 2025-09-01 LAB
ANION GAP SERPL CALC-SCNC: 17 MMOL/L — SIGNIFICANT CHANGE UP (ref 5–17)
BUN SERPL-MCNC: 33 MG/DL — HIGH (ref 7–23)
CALCIUM SERPL-MCNC: 8.9 MG/DL — SIGNIFICANT CHANGE UP (ref 8.4–10.5)
CHLORIDE SERPL-SCNC: 96 MMOL/L — SIGNIFICANT CHANGE UP (ref 96–108)
CO2 SERPL-SCNC: 23 MMOL/L — SIGNIFICANT CHANGE UP (ref 22–31)
CREAT SERPL-MCNC: 11.52 MG/DL — HIGH (ref 0.5–1.3)
EGFR: 4 ML/MIN/1.73M2 — LOW
EGFR: 4 ML/MIN/1.73M2 — LOW
GLUCOSE SERPL-MCNC: 125 MG/DL — HIGH (ref 70–99)
MAGNESIUM SERPL-MCNC: 2.7 MG/DL — HIGH (ref 1.6–2.6)
PHOSPHATE SERPL-MCNC: 2.6 MG/DL — SIGNIFICANT CHANGE UP (ref 2.5–4.5)
POTASSIUM SERPL-MCNC: 3.4 MMOL/L — LOW (ref 3.5–5.3)
POTASSIUM SERPL-SCNC: 3.4 MMOL/L — LOW (ref 3.5–5.3)
SODIUM SERPL-SCNC: 136 MMOL/L — SIGNIFICANT CHANGE UP (ref 135–145)

## 2025-09-01 PROCEDURE — 80053 COMPREHEN METABOLIC PANEL: CPT

## 2025-09-01 PROCEDURE — 85025 COMPLETE CBC W/AUTO DIFF WBC: CPT

## 2025-09-01 PROCEDURE — 97161 PT EVAL LOW COMPLEX 20 MIN: CPT

## 2025-09-01 PROCEDURE — 93005 ELECTROCARDIOGRAM TRACING: CPT

## 2025-09-01 PROCEDURE — 83735 ASSAY OF MAGNESIUM: CPT

## 2025-09-01 PROCEDURE — 73560 X-RAY EXAM OF KNEE 1 OR 2: CPT

## 2025-09-01 PROCEDURE — 36415 COLL VENOUS BLD VENIPUNCTURE: CPT

## 2025-09-01 PROCEDURE — 84100 ASSAY OF PHOSPHORUS: CPT

## 2025-09-01 PROCEDURE — 99232 SBSQ HOSP IP/OBS MODERATE 35: CPT

## 2025-09-01 PROCEDURE — 82962 GLUCOSE BLOOD TEST: CPT

## 2025-09-01 PROCEDURE — 85027 COMPLETE CBC AUTOMATED: CPT

## 2025-09-01 PROCEDURE — 80048 BASIC METABOLIC PNL TOTAL CA: CPT

## 2025-09-01 PROCEDURE — 70450 CT HEAD/BRAIN W/O DYE: CPT

## 2025-09-01 PROCEDURE — 90935 HEMODIALYSIS ONE EVALUATION: CPT | Mod: GC

## 2025-09-01 RX ADMIN — SEVELAMER HYDROCHLORIDE 1600 MILLIGRAM(S): 800 TABLET ORAL at 17:25

## 2025-09-01 RX ADMIN — AMIODARONE HYDROCHLORIDE 100 MILLIGRAM(S): 50 INJECTION, SOLUTION INTRAVENOUS at 12:38

## 2025-09-01 RX ADMIN — EPOETIN ALFA 10000 UNIT(S): 10000 SOLUTION INTRAVENOUS; SUBCUTANEOUS at 08:54

## 2025-09-01 RX ADMIN — Medication 81 MILLIGRAM(S): at 21:26

## 2025-09-01 RX ADMIN — LIDOCAINE HYDROCHLORIDE 1 PATCH: 20 JELLY TOPICAL at 20:01

## 2025-09-01 RX ADMIN — LIDOCAINE HYDROCHLORIDE 1 PATCH: 20 JELLY TOPICAL at 13:00

## 2025-09-01 RX ADMIN — CINACALCET 30 MILLIGRAM(S): 30 TABLET, FILM COATED ORAL at 12:38

## 2025-09-01 RX ADMIN — ATORVASTATIN CALCIUM 20 MILLIGRAM(S): 80 TABLET, FILM COATED ORAL at 21:26

## 2025-09-01 RX ADMIN — LIDOCAINE HYDROCHLORIDE 1 PATCH: 20 JELLY TOPICAL at 05:59

## 2025-09-01 RX ADMIN — SEVELAMER HYDROCHLORIDE 1600 MILLIGRAM(S): 800 TABLET ORAL at 12:39

## 2025-09-01 RX ADMIN — FINASTERIDE 5 MILLIGRAM(S): 1 TABLET, FILM COATED ORAL at 12:38

## 2025-09-01 RX ADMIN — APIXABAN 2.5 MILLIGRAM(S): 2.5 TABLET, FILM COATED ORAL at 17:25

## 2025-09-02 VITALS
TEMPERATURE: 98 F | HEART RATE: 62 BPM | DIASTOLIC BLOOD PRESSURE: 74 MMHG | SYSTOLIC BLOOD PRESSURE: 122 MMHG | OXYGEN SATURATION: 99 % | RESPIRATION RATE: 16 BRPM

## 2025-09-02 PROCEDURE — 85025 COMPLETE CBC W/AUTO DIFF WBC: CPT

## 2025-09-02 PROCEDURE — 97161 PT EVAL LOW COMPLEX 20 MIN: CPT

## 2025-09-02 PROCEDURE — 70450 CT HEAD/BRAIN W/O DYE: CPT

## 2025-09-02 PROCEDURE — 80053 COMPREHEN METABOLIC PANEL: CPT

## 2025-09-02 PROCEDURE — 80048 BASIC METABOLIC PNL TOTAL CA: CPT

## 2025-09-02 PROCEDURE — 36415 COLL VENOUS BLD VENIPUNCTURE: CPT

## 2025-09-02 PROCEDURE — 84100 ASSAY OF PHOSPHORUS: CPT

## 2025-09-02 PROCEDURE — 83735 ASSAY OF MAGNESIUM: CPT

## 2025-09-02 PROCEDURE — 99285 EMERGENCY DEPT VISIT HI MDM: CPT

## 2025-09-02 PROCEDURE — 85027 COMPLETE CBC AUTOMATED: CPT

## 2025-09-02 PROCEDURE — 73560 X-RAY EXAM OF KNEE 1 OR 2: CPT

## 2025-09-02 PROCEDURE — 82962 GLUCOSE BLOOD TEST: CPT

## 2025-09-02 PROCEDURE — 93005 ELECTROCARDIOGRAM TRACING: CPT

## 2025-09-02 RX ADMIN — LIDOCAINE HYDROCHLORIDE 1 PATCH: 20 JELLY TOPICAL at 01:11

## 2025-09-02 RX ADMIN — SEVELAMER HYDROCHLORIDE 1600 MILLIGRAM(S): 800 TABLET ORAL at 12:02

## 2025-09-02 RX ADMIN — FUROSEMIDE 80 MILLIGRAM(S): 10 INJECTION INTRAMUSCULAR; INTRAVENOUS at 05:53

## 2025-09-02 RX ADMIN — SEVELAMER HYDROCHLORIDE 1600 MILLIGRAM(S): 800 TABLET ORAL at 08:31

## 2025-09-02 RX ADMIN — LIDOCAINE HYDROCHLORIDE 1 PATCH: 20 JELLY TOPICAL at 12:33

## 2025-09-02 RX ADMIN — FINASTERIDE 5 MILLIGRAM(S): 1 TABLET, FILM COATED ORAL at 12:02

## 2025-09-02 RX ADMIN — AMIODARONE HYDROCHLORIDE 100 MILLIGRAM(S): 50 INJECTION, SOLUTION INTRAVENOUS at 05:53

## 2025-09-02 RX ADMIN — Medication 40 MILLIGRAM(S): at 05:54

## 2025-09-02 RX ADMIN — CINACALCET 30 MILLIGRAM(S): 30 TABLET, FILM COATED ORAL at 12:02

## 2025-09-02 RX ADMIN — APIXABAN 2.5 MILLIGRAM(S): 2.5 TABLET, FILM COATED ORAL at 05:53

## 2025-09-04 DIAGNOSIS — R42 DIZZINESS AND GIDDINESS: ICD-10-CM

## 2025-09-05 ENCOUNTER — RESULT REVIEW (OUTPATIENT)
Age: 70
End: 2025-09-05

## 2025-09-12 ENCOUNTER — RESULT REVIEW (OUTPATIENT)
Age: 70
End: 2025-09-12

## (undated) DEVICE — SUT PROLENE 6-0 4-30" BV-1

## (undated) DEVICE — SUCTION YANKAUER NO CONTROL VENT

## (undated) DEVICE — SPECIMEN CONTAINER 100ML

## (undated) DEVICE — CLAMP BULLDOG MINI STRAIGHT (GREEN) DISP

## (undated) DEVICE — CLAMP BULLDOG MIDI STRAIGHT (YELLOW) DISP

## (undated) DEVICE — SUT POLYSORB 3-0 30" V-20 UNDYED

## (undated) DEVICE — SOL INJ NS 0.9% 500ML 1-PORT

## (undated) DEVICE — DRAPE TOWEL BLUE 17" X 24"

## (undated) DEVICE — GLV 8 PROTEXIS (WHITE)

## (undated) DEVICE — STAPLER SKIN VISI-STAT 35 WIDE

## (undated) DEVICE — VESSEL LOOP MINI-BLUE 0.075" X 16"

## (undated) DEVICE — BLADE SCALPEL SAFETYLOCK #15

## (undated) DEVICE — SOL IRR POUR NS 0.9% 500ML

## (undated) DEVICE — DRSG COBAN 6"

## (undated) DEVICE — DRSG OPSITE 13.75 X 4"

## (undated) DEVICE — PREP CHLORAPREP HI-LITE ORANGE 26ML

## (undated) DEVICE — GLV 8.5 PROTEXIS (WHITE)

## (undated) DEVICE — SOL IRR POUR H2O 250ML

## (undated) DEVICE — SUT BIOSYN 4-0 18" P-12

## (undated) DEVICE — POSITIONER FOAM EGG CRATE ULNAR 2PCS (PINK)

## (undated) DEVICE — PACK AV FISTULA

## (undated) DEVICE — DRSG TEGADERM 6"X8"

## (undated) DEVICE — CLAMP BULLDOG MIDI 45 DEGREE (GREEN) DISP

## (undated) DEVICE — GLV 7 PROTEXIS (WHITE)

## (undated) DEVICE — VENODYNE/SCD SLEEVE CALF LARGE

## (undated) DEVICE — SUT SILK 3-0 18" TIES

## (undated) DEVICE — GOWN TRIMAX LG

## (undated) DEVICE — GOWN XL

## (undated) DEVICE — DRAPE INSTRUMENT POUCH 6.75" X 11"

## (undated) DEVICE — GLV 6.5 PROTEXIS (WHITE)

## (undated) DEVICE — VESSEL LOOP MAXI-BLUE 0.120" X 16"

## (undated) DEVICE — WARMING BLANKET LOWER ADULT

## (undated) DEVICE — GLV 7.5 PROTEXIS (WHITE)

## (undated) DEVICE — SUT SOFSILK 2-0 18" TIES

## (undated) DEVICE — DRSG STERISTRIPS 0.5 X 4"

## (undated) DEVICE — SUT SOFSILK 4-0 18" TIES

## (undated) DEVICE — DRAPE LIGHT HANDLE COVER (BLUE)

## (undated) DEVICE — BLADE SCALPEL SAFETYLOCK #11

## (undated) DEVICE — MEDICATION LABELS W MARKER